# Patient Record
Sex: FEMALE | Race: WHITE | Employment: OTHER | URBAN - METROPOLITAN AREA
[De-identification: names, ages, dates, MRNs, and addresses within clinical notes are randomized per-mention and may not be internally consistent; named-entity substitution may affect disease eponyms.]

---

## 2017-03-17 ENCOUNTER — TELEPHONE (OUTPATIENT)
Dept: PULMONOLOGY | Age: 72
End: 2017-03-17

## 2017-03-19 RX ORDER — PREDNISONE 20 MG/1
40 TABLET ORAL DAILY
Qty: 10 TABLET | Refills: 0 | Status: SHIPPED | OUTPATIENT
Start: 2017-03-19 | End: 2017-03-24

## 2017-03-19 RX ORDER — AMOXICILLIN AND CLAVULANATE POTASSIUM 875; 125 MG/1; MG/1
1 TABLET, FILM COATED ORAL EVERY 12 HOURS
Qty: 10 TABLET | Refills: 0 | Status: SHIPPED | OUTPATIENT
Start: 2017-03-19 | End: 2017-03-24

## 2017-04-07 ENCOUNTER — HOSPITAL ENCOUNTER (OUTPATIENT)
Dept: WOMENS IMAGING | Age: 72
Discharge: HOME | End: 2017-04-07
Attending: FAMILY MEDICINE | Admitting: FAMILY MEDICINE

## 2017-04-07 ENCOUNTER — HOSPITAL ENCOUNTER (OUTPATIENT)
Dept: WOMENS IMAGING | Age: 72
Discharge: HOME | End: 2017-04-08
Admitting: FAMILY MEDICINE

## 2017-04-07 DIAGNOSIS — Z13.820 ENCOUNTER FOR SCREENING FOR OSTEOPOROSIS: ICD-10-CM

## 2017-04-07 DIAGNOSIS — Z12.31 VISIT FOR SCREENING MAMMOGRAM: ICD-10-CM

## 2017-04-07 DIAGNOSIS — Z13.820 SCREENING FOR OSTEOPOROSIS: ICD-10-CM

## 2017-05-15 ENCOUNTER — OFFICE VISIT (OUTPATIENT)
Dept: PULMONOLOGY | Age: 72
End: 2017-05-15

## 2017-05-15 ENCOUNTER — HOSPITAL ENCOUNTER (OUTPATIENT)
Dept: OTHER | Age: 72
Discharge: HOME | End: 2017-05-15
Attending: INTERNAL MEDICINE | Admitting: INTERNAL MEDICINE

## 2017-05-15 VITALS
OXYGEN SATURATION: 93 % | RESPIRATION RATE: 20 BRPM | HEIGHT: 60 IN | WEIGHT: 163.4 LBS | BODY MASS INDEX: 32.08 KG/M2 | DIASTOLIC BLOOD PRESSURE: 70 MMHG | TEMPERATURE: 98.5 F | SYSTOLIC BLOOD PRESSURE: 176 MMHG | HEART RATE: 70 BPM

## 2017-05-15 DIAGNOSIS — R06.02 SOB (SHORTNESS OF BREATH): ICD-10-CM

## 2017-05-15 DIAGNOSIS — J44.9 CHRONIC OBSTRUCTIVE PULMONARY DISEASE, UNSPECIFIED COPD TYPE (HCC): Primary | ICD-10-CM

## 2017-05-15 DIAGNOSIS — R06.02 SHORTNESS OF BREATH: ICD-10-CM

## 2017-05-15 DIAGNOSIS — G47.33 OSA (OBSTRUCTIVE SLEEP APNEA): ICD-10-CM

## 2017-05-15 PROCEDURE — 99214 OFFICE O/P EST MOD 30 MIN: CPT | Performed by: INTERNAL MEDICINE

## 2017-06-14 RX ORDER — LISINOPRIL 40 MG/1
40 TABLET ORAL DAILY
Qty: 90 TABLET | Refills: 0 | Status: SHIPPED | OUTPATIENT
Start: 2017-06-14 | End: 2017-09-14 | Stop reason: SDUPTHER

## 2017-06-16 ENCOUNTER — TELEPHONE (OUTPATIENT)
Dept: PULMONOLOGY | Age: 72
End: 2017-06-16

## 2017-06-16 DIAGNOSIS — J96.01 ACUTE RESPIRATORY FAILURE WITH HYPOXIA (HCC): Primary | ICD-10-CM

## 2017-06-16 DIAGNOSIS — J44.1 COPD EXACERBATION (HCC): ICD-10-CM

## 2017-06-16 RX ORDER — ALBUTEROL SULFATE 90 UG/1
2 AEROSOL, METERED RESPIRATORY (INHALATION) EVERY 6 HOURS PRN
Qty: 1 INHALER | Refills: 3 | Status: SHIPPED | OUTPATIENT
Start: 2017-06-16 | End: 2018-04-25 | Stop reason: SDUPTHER

## 2017-06-20 ENCOUNTER — HOSPITAL ENCOUNTER (OUTPATIENT)
Dept: CARDIAC REHAB | Age: 72
Discharge: HOME | End: 2017-06-30
Attending: INTERNAL MEDICINE | Admitting: INTERNAL MEDICINE

## 2017-06-20 ENCOUNTER — OFFICE VISIT (OUTPATIENT)
Dept: CARDIOLOGY CLINIC | Age: 72
End: 2017-06-20

## 2017-06-20 VITALS
OXYGEN SATURATION: 95 % | SYSTOLIC BLOOD PRESSURE: 128 MMHG | HEIGHT: 60 IN | WEIGHT: 164 LBS | HEART RATE: 68 BPM | BODY MASS INDEX: 32.2 KG/M2 | DIASTOLIC BLOOD PRESSURE: 60 MMHG

## 2017-06-20 DIAGNOSIS — I10 ESSENTIAL HYPERTENSION: ICD-10-CM

## 2017-06-20 DIAGNOSIS — I73.9 PAD (PERIPHERAL ARTERY DISEASE) (HCC): ICD-10-CM

## 2017-06-20 DIAGNOSIS — R09.89 BILATERAL CAROTID BRUITS: ICD-10-CM

## 2017-06-20 DIAGNOSIS — R06.02 SOB (SHORTNESS OF BREATH): Primary | ICD-10-CM

## 2017-06-20 DIAGNOSIS — E03.9 HYPOTHYROIDISM, UNSPECIFIED TYPE: ICD-10-CM

## 2017-06-20 DIAGNOSIS — R53.82 CHRONIC FATIGUE: ICD-10-CM

## 2017-06-20 DIAGNOSIS — J44.9 CHRONIC OBSTRUCTIVE PULMONARY DISEASE, UNSPECIFIED COPD TYPE (HCC): ICD-10-CM

## 2017-06-20 PROCEDURE — 99214 OFFICE O/P EST MOD 30 MIN: CPT | Performed by: NURSE PRACTITIONER

## 2017-06-20 RX ORDER — TORSEMIDE 20 MG/1
20 TABLET ORAL DAILY
Qty: 30 TABLET | Refills: 3 | Status: SHIPPED | OUTPATIENT
Start: 2017-06-20 | End: 2017-09-28 | Stop reason: SDUPTHER

## 2017-06-20 RX ORDER — FUROSEMIDE 40 MG/1
40 TABLET ORAL 2 TIMES DAILY
COMMUNITY
End: 2017-06-20 | Stop reason: ALTCHOICE

## 2017-07-19 ENCOUNTER — HOSPITAL ENCOUNTER (OUTPATIENT)
Dept: NON INVASIVE DIAGNOSTICS | Age: 72
Discharge: HOME | End: 2017-07-19
Attending: NURSE PRACTITIONER | Admitting: NURSE PRACTITIONER

## 2017-07-19 LAB
LV EF: 58 %
LVEF MODALITY: NORMAL

## 2017-08-22 ENCOUNTER — OFFICE VISIT (OUTPATIENT)
Dept: CARDIOLOGY CLINIC | Age: 72
End: 2017-08-22

## 2017-08-22 VITALS
DIASTOLIC BLOOD PRESSURE: 78 MMHG | BODY MASS INDEX: 32.59 KG/M2 | WEIGHT: 166 LBS | OXYGEN SATURATION: 94 % | HEIGHT: 60 IN | SYSTOLIC BLOOD PRESSURE: 128 MMHG | HEART RATE: 66 BPM

## 2017-08-22 DIAGNOSIS — I77.9 CAROTID DISEASE, BILATERAL (HCC): ICD-10-CM

## 2017-08-22 DIAGNOSIS — I25.119 CORONARY ARTERY DISEASE INVOLVING NATIVE CORONARY ARTERY OF NATIVE HEART WITH ANGINA PECTORIS (HCC): Primary | ICD-10-CM

## 2017-08-22 DIAGNOSIS — J44.9 CHRONIC OBSTRUCTIVE PULMONARY DISEASE, UNSPECIFIED COPD TYPE (HCC): ICD-10-CM

## 2017-08-22 DIAGNOSIS — I73.9 PAD (PERIPHERAL ARTERY DISEASE) (HCC): ICD-10-CM

## 2017-08-22 DIAGNOSIS — R07.89 CHEST DISCOMFORT: ICD-10-CM

## 2017-08-22 DIAGNOSIS — I73.9 CLAUDICATION (HCC): ICD-10-CM

## 2017-08-22 DIAGNOSIS — R06.09 DOE (DYSPNEA ON EXERTION): ICD-10-CM

## 2017-08-22 PROCEDURE — 99214 OFFICE O/P EST MOD 30 MIN: CPT | Performed by: INTERNAL MEDICINE

## 2017-08-28 ENCOUNTER — HOSPITAL ENCOUNTER (OUTPATIENT)
Dept: NON INVASIVE DIAGNOSTICS | Age: 72
Discharge: HOME | End: 2017-08-28
Attending: INTERNAL MEDICINE | Admitting: INTERNAL MEDICINE

## 2017-08-28 DIAGNOSIS — I25.119 ATHEROSCLEROTIC HEART DISEASE OF NATIVE CORONARY ARTERY WITH ANGINA PECTORIS (HCC): ICD-10-CM

## 2017-08-28 LAB
LV EF: 75 %
LVEF MODALITY: NORMAL

## 2017-09-01 ENCOUNTER — TELEPHONE (OUTPATIENT)
Dept: CARDIOLOGY CLINIC | Age: 72
End: 2017-09-01

## 2017-09-05 DIAGNOSIS — I25.119 CORONARY ARTERY DISEASE INVOLVING NATIVE CORONARY ARTERY OF NATIVE HEART WITH ANGINA PECTORIS (HCC): ICD-10-CM

## 2017-09-05 DIAGNOSIS — R94.39 ABNORMAL STRESS TEST: Primary | ICD-10-CM

## 2017-09-05 RX ORDER — PREDNISONE 20 MG/1
40 TABLET ORAL EVERY 8 HOURS
Qty: 6 TABLET | Refills: 1 | Status: SHIPPED | OUTPATIENT
Start: 2017-09-05 | End: 2017-09-06

## 2017-09-05 RX ORDER — DIPHENHYDRAMINE HCL 25 MG
25 CAPSULE ORAL EVERY 8 HOURS
Qty: 3 CAPSULE | Refills: 1 | Status: SHIPPED | OUTPATIENT
Start: 2017-09-05 | End: 2017-09-06

## 2017-09-05 RX ORDER — FAMOTIDINE 20 MG/1
20 TABLET, FILM COATED ORAL EVERY 8 HOURS
Qty: 3 TABLET | Refills: 1 | Status: SHIPPED | OUTPATIENT
Start: 2017-09-05 | End: 2017-09-28

## 2017-09-07 ENCOUNTER — TELEPHONE (OUTPATIENT)
Dept: CARDIOLOGY CLINIC | Age: 72
End: 2017-09-07

## 2017-09-08 ENCOUNTER — HOSPITAL ENCOUNTER (OUTPATIENT)
Dept: OTHER | Age: 72
Discharge: HOME | End: 2017-09-08
Attending: INTERNAL MEDICINE | Admitting: INTERNAL MEDICINE

## 2017-09-08 DIAGNOSIS — R94.39 ABNORMAL STRESS TEST: ICD-10-CM

## 2017-09-08 DIAGNOSIS — I25.119 CORONARY ARTERY DISEASE INVOLVING NATIVE CORONARY ARTERY OF NATIVE HEART WITH ANGINA PECTORIS (HCC): ICD-10-CM

## 2017-09-08 LAB
ANION GAP SERPL CALCULATED.3IONS-SCNC: 11 MMOL/L (ref 3–16)
BUN BLDV-MCNC: 38 MG/DL (ref 7–20)
CALCIUM SERPL-MCNC: 9.6 MG/DL (ref 8.3–10.6)
CHLORIDE BLD-SCNC: 98 MMOL/L (ref 99–110)
CO2: 31 MMOL/L (ref 21–32)
CREAT SERPL-MCNC: 1.3 MG/DL (ref 0.6–1.2)
GFR AFRICAN AMERICAN: 49
GFR NON-AFRICAN AMERICAN: 40
GLUCOSE BLD-MCNC: 99 MG/DL (ref 70–99)
HCT VFR BLD CALC: 40.7 % (ref 36–48)
HEMOGLOBIN: 13.3 G/DL (ref 12–16)
INR BLD: 0.99 (ref 0.85–1.15)
MCH RBC QN AUTO: 28.7 PG (ref 26–34)
MCHC RBC AUTO-ENTMCNC: 32.8 G/DL (ref 31–36)
MCV RBC AUTO: 87.5 FL (ref 80–100)
PDW BLD-RTO: 15.2 % (ref 12.4–15.4)
PLATELET # BLD: 264 K/UL (ref 135–450)
PMV BLD AUTO: 9.2 FL (ref 5–10.5)
POTASSIUM SERPL-SCNC: 5.1 MMOL/L (ref 3.5–5.1)
PROTHROMBIN TIME: 11.2 SEC (ref 9.6–13)
RBC # BLD: 4.65 M/UL (ref 4–5.2)
SODIUM BLD-SCNC: 140 MMOL/L (ref 136–145)
WBC # BLD: 8.6 K/UL (ref 4–11)

## 2017-09-08 RX ORDER — DIPHENHYDRAMINE HCL 25 MG
25 CAPSULE ORAL EVERY 8 HOURS
Qty: 3 CAPSULE | Refills: 0 | Status: SHIPPED | OUTPATIENT
Start: 2017-09-08 | End: 2017-09-09

## 2017-09-08 RX ORDER — PREDNISONE 20 MG/1
40 TABLET ORAL DAILY
Qty: 6 TABLET | Refills: 0 | Status: SHIPPED | OUTPATIENT
Start: 2017-09-08 | End: 2017-09-11

## 2017-09-11 ENCOUNTER — TELEPHONE (OUTPATIENT)
Dept: CARDIOLOGY CLINIC | Age: 72
End: 2017-09-11

## 2017-09-12 ENCOUNTER — TELEPHONE (OUTPATIENT)
Dept: CARDIOLOGY CLINIC | Age: 72
End: 2017-09-12

## 2017-09-13 ENCOUNTER — HOSPITAL ENCOUNTER (OUTPATIENT)
Dept: CARDIAC CATH/INVASIVE PROCEDURES | Age: 72
Discharge: HOME | End: 2017-09-13
Attending: INTERNAL MEDICINE | Admitting: INTERNAL MEDICINE

## 2017-09-13 VITALS — BODY MASS INDEX: 33.18 KG/M2 | WEIGHT: 169 LBS | HEIGHT: 60 IN

## 2017-09-13 DIAGNOSIS — R94.39 ABNORMAL CARDIOVASCULAR STRESS TEST: ICD-10-CM

## 2017-09-13 DIAGNOSIS — R07.2 PRECORDIAL PAIN: ICD-10-CM

## 2017-09-13 LAB
CREAT SERPL-MCNC: 1.4 MG/DL (ref 0.6–1.2)
GFR AFRICAN AMERICAN: 45
GFR NON-AFRICAN AMERICAN: 37

## 2017-09-13 PROCEDURE — 93458 L HRT ARTERY/VENTRICLE ANGIO: CPT | Performed by: INTERNAL MEDICINE

## 2017-09-13 PROCEDURE — 99152 MOD SED SAME PHYS/QHP 5/>YRS: CPT | Performed by: INTERNAL MEDICINE

## 2017-09-13 RX ORDER — ASPIRIN 325 MG
325 TABLET ORAL ONCE
Status: DISCONTINUED | OUTPATIENT
Start: 2017-09-13 | End: 2017-09-14 | Stop reason: HOSPADM

## 2017-09-13 RX ORDER — SODIUM CHLORIDE 9 MG/ML
INJECTION, SOLUTION INTRAVENOUS CONTINUOUS
Status: DISCONTINUED | OUTPATIENT
Start: 2017-09-13 | End: 2017-09-14 | Stop reason: HOSPADM

## 2017-09-13 RX ORDER — ACETAMINOPHEN 325 MG/1
650 TABLET ORAL EVERY 4 HOURS PRN
Status: DISCONTINUED | OUTPATIENT
Start: 2017-09-13 | End: 2017-09-14 | Stop reason: HOSPADM

## 2017-09-13 RX ORDER — SODIUM CHLORIDE 0.9 % (FLUSH) 0.9 %
10 SYRINGE (ML) INJECTION EVERY 12 HOURS SCHEDULED
Status: CANCELLED | OUTPATIENT
Start: 2017-09-13

## 2017-09-13 RX ORDER — SODIUM CHLORIDE 0.9 % (FLUSH) 0.9 %
10 SYRINGE (ML) INJECTION EVERY 12 HOURS SCHEDULED
Status: DISCONTINUED | OUTPATIENT
Start: 2017-09-13 | End: 2017-09-14 | Stop reason: HOSPADM

## 2017-09-13 RX ORDER — SODIUM CHLORIDE 0.9 % (FLUSH) 0.9 %
10 SYRINGE (ML) INJECTION PRN
Status: CANCELLED | OUTPATIENT
Start: 2017-09-13

## 2017-09-13 RX ORDER — SODIUM CHLORIDE 0.9 % (FLUSH) 0.9 %
10 SYRINGE (ML) INJECTION PRN
Status: DISCONTINUED | OUTPATIENT
Start: 2017-09-13 | End: 2017-09-14 | Stop reason: HOSPADM

## 2017-09-13 RX ORDER — ONDANSETRON 2 MG/ML
4 INJECTION INTRAMUSCULAR; INTRAVENOUS EVERY 6 HOURS PRN
Status: DISCONTINUED | OUTPATIENT
Start: 2017-09-13 | End: 2017-09-14 | Stop reason: HOSPADM

## 2017-09-15 RX ORDER — LISINOPRIL 40 MG/1
40 TABLET ORAL DAILY
Qty: 30 TABLET | Refills: 3 | Status: SHIPPED | OUTPATIENT
Start: 2017-09-15 | End: 2017-09-28 | Stop reason: SDUPTHER

## 2017-09-15 RX ORDER — CARVEDILOL 25 MG/1
25 TABLET ORAL 2 TIMES DAILY WITH MEALS
Qty: 60 TABLET | Refills: 3 | Status: SHIPPED | OUTPATIENT
Start: 2017-09-15 | End: 2017-09-28 | Stop reason: SDUPTHER

## 2017-09-15 RX ORDER — CLOPIDOGREL BISULFATE 75 MG/1
75 TABLET ORAL DAILY
Qty: 30 TABLET | Refills: 3 | Status: SHIPPED | OUTPATIENT
Start: 2017-09-15 | End: 2018-01-24 | Stop reason: SDUPTHER

## 2017-09-28 ENCOUNTER — OFFICE VISIT (OUTPATIENT)
Dept: CARDIOLOGY CLINIC | Age: 72
End: 2017-09-28

## 2017-09-28 VITALS
WEIGHT: 167 LBS | HEIGHT: 60 IN | OXYGEN SATURATION: 96 % | DIASTOLIC BLOOD PRESSURE: 68 MMHG | BODY MASS INDEX: 32.79 KG/M2 | SYSTOLIC BLOOD PRESSURE: 122 MMHG | HEART RATE: 62 BPM

## 2017-09-28 DIAGNOSIS — I25.10 CORONARY ARTERY DISEASE INVOLVING NATIVE CORONARY ARTERY OF NATIVE HEART WITHOUT ANGINA PECTORIS: ICD-10-CM

## 2017-09-28 DIAGNOSIS — I10 ESSENTIAL HYPERTENSION: ICD-10-CM

## 2017-09-28 DIAGNOSIS — E03.9 HYPOTHYROIDISM, UNSPECIFIED TYPE: ICD-10-CM

## 2017-09-28 DIAGNOSIS — I73.9 PAD (PERIPHERAL ARTERY DISEASE) (HCC): Primary | ICD-10-CM

## 2017-09-28 DIAGNOSIS — I77.9 CAROTID DISEASE, BILATERAL (HCC): ICD-10-CM

## 2017-09-28 DIAGNOSIS — R06.02 SOB (SHORTNESS OF BREATH): ICD-10-CM

## 2017-09-28 DIAGNOSIS — K21.9 GASTROESOPHAGEAL REFLUX DISEASE, ESOPHAGITIS PRESENCE NOT SPECIFIED: ICD-10-CM

## 2017-09-28 PROCEDURE — 99214 OFFICE O/P EST MOD 30 MIN: CPT | Performed by: NURSE PRACTITIONER

## 2017-09-28 RX ORDER — CARVEDILOL 25 MG/1
25 TABLET ORAL 2 TIMES DAILY WITH MEALS
Qty: 60 TABLET | Refills: 3 | Status: SHIPPED | OUTPATIENT
Start: 2017-09-28 | End: 2018-05-26 | Stop reason: SDUPTHER

## 2017-09-28 RX ORDER — ATORVASTATIN CALCIUM 20 MG/1
20 TABLET, FILM COATED ORAL DAILY
Qty: 30 TABLET | Refills: 2 | Status: SHIPPED | OUTPATIENT
Start: 2017-09-28 | End: 2018-01-12 | Stop reason: SDUPTHER

## 2017-09-28 RX ORDER — CLONIDINE HYDROCHLORIDE 0.1 MG/1
0.1 TABLET ORAL 2 TIMES DAILY
Qty: 60 TABLET | Refills: 2 | Status: SHIPPED | OUTPATIENT
Start: 2017-09-28 | End: 2018-01-08 | Stop reason: SDUPTHER

## 2017-09-28 RX ORDER — TORSEMIDE 20 MG/1
20 TABLET ORAL DAILY
Qty: 30 TABLET | Refills: 3 | Status: SHIPPED | OUTPATIENT
Start: 2017-09-28 | End: 2018-03-25 | Stop reason: SDUPTHER

## 2017-09-28 RX ORDER — LANSOPRAZOLE 30 MG/1
30 CAPSULE, DELAYED RELEASE ORAL DAILY
Qty: 30 CAPSULE | Refills: 0 | Status: SHIPPED | OUTPATIENT
Start: 2017-09-28 | End: 2017-10-10 | Stop reason: SDUPTHER

## 2017-09-28 RX ORDER — LISINOPRIL 40 MG/1
40 TABLET ORAL DAILY
Qty: 30 TABLET | Refills: 3 | Status: ON HOLD | OUTPATIENT
Start: 2017-09-28 | End: 2018-06-14 | Stop reason: HOSPADM

## 2017-09-28 RX ORDER — AMLODIPINE BESYLATE 5 MG/1
5 TABLET ORAL DAILY
Qty: 30 TABLET | Refills: 3 | Status: SHIPPED | OUTPATIENT
Start: 2017-09-28 | End: 2018-04-06 | Stop reason: SDUPTHER

## 2017-10-10 ENCOUNTER — OFFICE VISIT (OUTPATIENT)
Dept: FAMILY MEDICINE CLINIC | Age: 72
End: 2017-10-10

## 2017-10-10 VITALS
WEIGHT: 168.6 LBS | HEART RATE: 66 BPM | OXYGEN SATURATION: 93 % | SYSTOLIC BLOOD PRESSURE: 138 MMHG | DIASTOLIC BLOOD PRESSURE: 82 MMHG | HEIGHT: 61 IN | TEMPERATURE: 98.6 F | RESPIRATION RATE: 20 BRPM | BODY MASS INDEX: 31.83 KG/M2

## 2017-10-10 DIAGNOSIS — N18.3 CKD (CHRONIC KIDNEY DISEASE), STAGE 3 (MODERATE): ICD-10-CM

## 2017-10-10 DIAGNOSIS — K21.9 GASTROESOPHAGEAL REFLUX DISEASE, ESOPHAGITIS PRESENCE NOT SPECIFIED: ICD-10-CM

## 2017-10-10 DIAGNOSIS — F41.9 ANXIETY AND DEPRESSION: ICD-10-CM

## 2017-10-10 DIAGNOSIS — N32.1 COLOVESICAL FISTULA: Primary | ICD-10-CM

## 2017-10-10 DIAGNOSIS — I10 ESSENTIAL HYPERTENSION: ICD-10-CM

## 2017-10-10 DIAGNOSIS — F32.A ANXIETY AND DEPRESSION: ICD-10-CM

## 2017-10-10 DIAGNOSIS — E03.9 HYPOTHYROIDISM, UNSPECIFIED TYPE: ICD-10-CM

## 2017-10-10 PROBLEM — R07.2 PRECORDIAL PAIN: Status: RESOLVED | Noted: 2017-09-13 | Resolved: 2017-10-10

## 2017-10-10 LAB
BILIRUBIN, POC: ABNORMAL
BLOOD URINE, POC: ABNORMAL
CLARITY, POC: CLEAR
COLOR, POC: YELLOW
GLUCOSE URINE, POC: ABNORMAL
KETONES, POC: ABNORMAL
LEUKOCYTE EST, POC: ABNORMAL
NITRITE, POC: ABNORMAL
PH, POC: 6
PROTEIN, POC: ABNORMAL
SPECIFIC GRAVITY, POC: 1.02
UROBILINOGEN, POC: ABNORMAL

## 2017-10-10 PROCEDURE — 99204 OFFICE O/P NEW MOD 45 MIN: CPT | Performed by: FAMILY MEDICINE

## 2017-10-10 PROCEDURE — 81002 URINALYSIS NONAUTO W/O SCOPE: CPT | Performed by: FAMILY MEDICINE

## 2017-10-10 RX ORDER — LEVOTHYROXINE SODIUM 0.05 MG/1
50 TABLET ORAL DAILY
Qty: 30 TABLET | Refills: 2 | Status: SHIPPED | OUTPATIENT
Start: 2017-10-10 | End: 2018-01-17 | Stop reason: SDUPTHER

## 2017-10-10 RX ORDER — IBUPROFEN 800 MG/1
800 TABLET ORAL EVERY 8 HOURS PRN
Status: ON HOLD | COMMUNITY
End: 2018-03-14 | Stop reason: HOSPADM

## 2017-10-10 RX ORDER — CITALOPRAM 20 MG/1
TABLET ORAL
Qty: 60 TABLET | Refills: 1 | Status: SHIPPED | OUTPATIENT
Start: 2017-10-10 | End: 2017-12-12 | Stop reason: SDUPTHER

## 2017-10-10 RX ORDER — LANSOPRAZOLE 15 MG/1
15 CAPSULE, DELAYED RELEASE ORAL DAILY
Qty: 90 CAPSULE | Refills: 1 | Status: SHIPPED | OUTPATIENT
Start: 2017-10-10 | End: 2018-07-03 | Stop reason: SDUPTHER

## 2017-10-10 ASSESSMENT — ENCOUNTER SYMPTOMS
CONSTIPATION: 0
COUGH: 0
SHORTNESS OF BREATH: 0
DIARRHEA: 0

## 2017-10-10 NOTE — PROGRESS NOTES
W Day, MD   carvedilol (COREG) 25 MG tablet Take 1 tablet by mouth 2 times daily (with meals) Yes Diane M Enzweiler, CNP   lisinopril (PRINIVIL;ZESTRIL) 40 MG tablet Take 1 tablet by mouth daily Yes Amanda Bond CNP   torsemide (DEMADEX) 20 MG tablet Take 1 tablet by mouth daily Yes Eloina M Enzweiler, CNP   amLODIPine (NORVASC) 5 MG tablet Take 1 tablet by mouth daily Yes Eloina Georgene Monday, CNP   cloNIDine (CATAPRES) 0.1 MG tablet Take 1 tablet by mouth 2 times daily Yes Eloina M Enzweiler, CNP   atorvastatin (LIPITOR) 20 MG tablet Take 1 tablet by mouth daily Yes Eloina Carrascone Monday, CNP   clopidogrel (PLAVIX) 75 MG tablet Take 1 tablet by mouth daily Yes Hermelinda Beck MD   ipratropium-albuterol (DUONEB) 0.5-2.5 (3) MG/3ML SOLN nebulizer solution Take 3 mLs by nebulization 2 times daily Yes Mono Damon MD   albuterol sulfate HFA (VENTOLIN HFA) 108 (90 Base) MCG/ACT inhaler Inhale 2 puffs into the lungs every 6 hours as needed for Wheezing or Shortness of Breath Yes Mono Damon MD   budesonide (PULMICORT) 0.5 MG/2ML nebulizer suspension TAKE ONE VIAL VIA NEBULIZER BY MOUTH TWICE A DAY Yes Mono Damon MD   aspirin 81 MG tablet Take 1 tablet by mouth daily Yes Hermelinda Beck MD   BROVANA 15 MCG/2ML NEBU INHALE TWO MILLILITERS VIA NEBULIZER TWICE A DAY Yes Mono Damon MD   ipratropium-albuterol (DUONEB) 0.5-2.5 (3) MG/3ML SOLN nebulizer solution Inhale 3 mLs into the lungs every 6 hours. Yes Myla Paniagua CNP   montelukast (SINGULAIR) 10 MG tablet Take 1 tablet by mouth daily.  Yes Myla Paniagua CNP   potassium chloride (MICRO-K) 10 MEQ CR capsule Take 1 capsule by mouth See Carmelina Arroyo MD     Past Medical History:   Diagnosis Date    [facility]     [facility]     Hyperlipidemia     Hypertension     Kidney disease     Stage 3    Peripheral vascular disease [facility] Results   Component Value Date    GLUCOSE 99 09/08/2017     Lab Results   Component Value Date     09/08/2017    K 5.1 09/08/2017    CREATININE 1.4 (H) 09/13/2017     No results found for: CHOL, LDLCALCNo results found for: HDLNo results found for: TRIG  Lab Results   Component Value Date    ALT 11 06/10/2017    AST 16 06/10/2017    ALKPHOS 50 06/10/2017    BILITOT <0.2 06/10/2017      Lab Results   Component Value Date    WBC 8.6 09/08/2017    HGB 13.3 09/08/2017    HCT 40.7 09/08/2017    MCV 87.5 09/08/2017     09/08/2017        PHYSICAL EXAM  /82 (Site: Right Arm, Position: Sitting, Cuff Size: Small Adult)   Pulse 66   Temp 98.6 °F (37 °C) (Oral)   Resp 20   Ht 5' 0.5\" (1.537 m)   Wt 168 lb 9.6 oz (76.5 kg)   LMP 05/01/2006 (Approximate)   SpO2 93% Comment: not on O2 currently  BMI 32.39 kg/m²     BP Readings from Last 3 Encounters:   10/10/17 138/82   09/28/17 122/68   08/22/17 128/78       Wt Readings from Last 3 Encounters:   10/10/17 168 lb 9.6 oz (76.5 kg)   09/28/17 167 lb (75.8 kg)   09/13/17 169 lb (76.7 kg)        Physical Exam   Constitutional: She is oriented to person, place, and time. She appears well-developed and well-nourished. HENT:   Head: Normocephalic and atraumatic. Eyes: Conjunctivae and EOM are normal.   Neck: Normal range of motion. Neck supple. Cardiovascular: Normal rate, regular rhythm and normal heart sounds. II/VI systolic murmur   Pulmonary/Chest: Effort normal and breath sounds normal. She has no wheezes. Abdominal: Soft. Bowel sounds are normal. She exhibits no mass. There is no tenderness. Musculoskeletal: Normal range of motion. Lymphadenopathy:     She has no cervical adenopathy. Neurological: She is alert and oriented to person, place, and time. She displays normal reflexes. Skin: Skin is warm and dry. No rash noted. Normal turgor   Psychiatric: She has a normal mood and affect. Thought content normal.       ASSESSMENT/PLAN:  1.

## 2017-10-11 LAB — URINE CULTURE, ROUTINE: NORMAL

## 2017-11-09 RX ORDER — MONTELUKAST SODIUM 10 MG/1
10 TABLET ORAL DAILY
Qty: 30 TABLET | Refills: 3 | OUTPATIENT
Start: 2017-11-09

## 2017-12-12 RX ORDER — CITALOPRAM 20 MG/1
20 TABLET ORAL DAILY
Qty: 60 TABLET | Refills: 1 | Status: SHIPPED | OUTPATIENT
Start: 2017-12-12 | End: 2018-02-15 | Stop reason: SDUPTHER

## 2017-12-14 ENCOUNTER — TELEPHONE (OUTPATIENT)
Dept: PULMONOLOGY | Age: 72
End: 2017-12-14

## 2017-12-19 ENCOUNTER — TELEPHONE (OUTPATIENT)
Dept: PULMONOLOGY | Age: 72
End: 2017-12-19

## 2017-12-19 RX ORDER — LEVOFLOXACIN 500 MG/1
500 TABLET, FILM COATED ORAL DAILY
Qty: 7 TABLET | Refills: 0 | Status: SHIPPED | OUTPATIENT
Start: 2017-12-19 | End: 2017-12-26

## 2017-12-19 RX ORDER — PREDNISONE 20 MG/1
40 TABLET ORAL DAILY
Qty: 10 TABLET | Refills: 0 | Status: SHIPPED | OUTPATIENT
Start: 2017-12-19 | End: 2017-12-24

## 2017-12-19 NOTE — TELEPHONE ENCOUNTER
Pt called c/o sore throat, chest pain hurts to breathe, productive yellow cough, nasal drainage dont know color, no fevers but chills comes and goes, and wheezing slightly, also gets sob when walks, would like abx called in before it gets bad.

## 2018-01-08 DIAGNOSIS — I10 ESSENTIAL HYPERTENSION: ICD-10-CM

## 2018-01-08 RX ORDER — CLONIDINE HYDROCHLORIDE 0.1 MG/1
TABLET ORAL
Qty: 60 TABLET | Refills: 3 | Status: SHIPPED | OUTPATIENT
Start: 2018-01-08 | End: 2018-04-19 | Stop reason: SDUPTHER

## 2018-01-09 DIAGNOSIS — I10 ESSENTIAL HYPERTENSION: ICD-10-CM

## 2018-01-09 DIAGNOSIS — E03.9 HYPOTHYROIDISM, UNSPECIFIED TYPE: ICD-10-CM

## 2018-01-09 DIAGNOSIS — I73.9 PAD (PERIPHERAL ARTERY DISEASE) (HCC): ICD-10-CM

## 2018-01-09 DIAGNOSIS — I25.10 CORONARY ARTERY DISEASE INVOLVING NATIVE CORONARY ARTERY OF NATIVE HEART WITHOUT ANGINA PECTORIS: ICD-10-CM

## 2018-01-09 DIAGNOSIS — I77.9 CAROTID DISEASE, BILATERAL (HCC): ICD-10-CM

## 2018-01-09 LAB
A/G RATIO: 1.3 (ref 1.1–2.2)
ALBUMIN SERPL-MCNC: 3.9 G/DL (ref 3.4–5)
ALP BLD-CCNC: 51 U/L (ref 40–129)
ALT SERPL-CCNC: 10 U/L (ref 10–40)
ANION GAP SERPL CALCULATED.3IONS-SCNC: 13 MMOL/L (ref 3–16)
AST SERPL-CCNC: 11 U/L (ref 15–37)
BASOPHILS ABSOLUTE: 0.1 K/UL (ref 0–0.2)
BASOPHILS RELATIVE PERCENT: 0.6 %
BILIRUB SERPL-MCNC: <0.2 MG/DL (ref 0–1)
BUN BLDV-MCNC: 41 MG/DL (ref 7–20)
CALCIUM SERPL-MCNC: 9.6 MG/DL (ref 8.3–10.6)
CHLORIDE BLD-SCNC: 100 MMOL/L (ref 99–110)
CHOLESTEROL, TOTAL: 198 MG/DL (ref 0–199)
CO2: 32 MMOL/L (ref 21–32)
CREAT SERPL-MCNC: 1.8 MG/DL (ref 0.6–1.2)
EOSINOPHILS ABSOLUTE: 0.2 K/UL (ref 0–0.6)
EOSINOPHILS RELATIVE PERCENT: 2 %
GFR AFRICAN AMERICAN: 34
GFR NON-AFRICAN AMERICAN: 28
GLOBULIN: 3.1 G/DL
GLUCOSE BLD-MCNC: 107 MG/DL (ref 70–99)
HCT VFR BLD CALC: 39.6 % (ref 36–48)
HDLC SERPL-MCNC: 43 MG/DL (ref 40–60)
HEMOGLOBIN: 12.7 G/DL (ref 12–16)
LDL CHOLESTEROL CALCULATED: ABNORMAL MG/DL
LDL CHOLESTEROL DIRECT: 120 MG/DL
LYMPHOCYTES ABSOLUTE: 1.9 K/UL (ref 1–5.1)
LYMPHOCYTES RELATIVE PERCENT: 21.5 %
MCH RBC QN AUTO: 28.2 PG (ref 26–34)
MCHC RBC AUTO-ENTMCNC: 32.1 G/DL (ref 31–36)
MCV RBC AUTO: 87.8 FL (ref 80–100)
MONOCYTES ABSOLUTE: 0.6 K/UL (ref 0–1.3)
MONOCYTES RELATIVE PERCENT: 6.9 %
NEUTROPHILS ABSOLUTE: 6.1 K/UL (ref 1.7–7.7)
NEUTROPHILS RELATIVE PERCENT: 69 %
PDW BLD-RTO: 15 % (ref 12.4–15.4)
PLATELET # BLD: 266 K/UL (ref 135–450)
PMV BLD AUTO: 9.4 FL (ref 5–10.5)
POTASSIUM SERPL-SCNC: 4.6 MMOL/L (ref 3.5–5.1)
RBC # BLD: 4.51 M/UL (ref 4–5.2)
SODIUM BLD-SCNC: 145 MMOL/L (ref 136–145)
TOTAL PROTEIN: 7 G/DL (ref 6.4–8.2)
TRIGL SERPL-MCNC: 354 MG/DL (ref 0–150)
TSH SERPL DL<=0.05 MIU/L-ACNC: 1.38 UIU/ML (ref 0.27–4.2)
TSH SERPL DL<=0.05 MIU/L-ACNC: 1.39 UIU/ML (ref 0.27–4.2)
VLDLC SERPL CALC-MCNC: ABNORMAL MG/DL
WBC # BLD: 8.8 K/UL (ref 4–11)

## 2018-01-10 ENCOUNTER — TELEPHONE (OUTPATIENT)
Dept: PULMONOLOGY | Age: 73
End: 2018-01-10

## 2018-01-10 DIAGNOSIS — J44.9 COPD, VERY SEVERE (HCC): Primary | ICD-10-CM

## 2018-01-10 LAB
ESTIMATED AVERAGE GLUCOSE: 134.1 MG/DL
HBA1C MFR BLD: 6.3 %

## 2018-01-10 NOTE — TELEPHONE ENCOUNTER
Isadore Sever called to change her appt with Dr Shlomo Castillo.   She also wants a Rx sent to [facility] for a POC

## 2018-01-11 ENCOUNTER — TELEPHONE (OUTPATIENT)
Dept: CARDIOLOGY CLINIC | Age: 73
End: 2018-01-11

## 2018-01-11 DIAGNOSIS — I25.119 CORONARY ARTERY DISEASE INVOLVING NATIVE CORONARY ARTERY OF NATIVE HEART WITH ANGINA PECTORIS (HCC): Primary | ICD-10-CM

## 2018-01-11 DIAGNOSIS — I10 ESSENTIAL HYPERTENSION: ICD-10-CM

## 2018-01-11 DIAGNOSIS — E78.1 HIGH TRIGLYCERIDES: ICD-10-CM

## 2018-01-12 DIAGNOSIS — I25.10 CORONARY ARTERY DISEASE INVOLVING NATIVE CORONARY ARTERY OF NATIVE HEART WITHOUT ANGINA PECTORIS: ICD-10-CM

## 2018-01-12 DIAGNOSIS — I73.9 PAD (PERIPHERAL ARTERY DISEASE) (HCC): ICD-10-CM

## 2018-01-15 RX ORDER — ATORVASTATIN CALCIUM 40 MG/1
40 TABLET, FILM COATED ORAL DAILY
Qty: 30 TABLET | Refills: 2 | Status: SHIPPED | OUTPATIENT
Start: 2018-01-15 | End: 2018-04-18 | Stop reason: SDUPTHER

## 2018-01-17 ENCOUNTER — OFFICE VISIT (OUTPATIENT)
Dept: PULMONOLOGY | Age: 73
End: 2018-01-17

## 2018-01-17 VITALS
HEIGHT: 60 IN | SYSTOLIC BLOOD PRESSURE: 174 MMHG | DIASTOLIC BLOOD PRESSURE: 68 MMHG | TEMPERATURE: 97.7 F | WEIGHT: 170.6 LBS | HEART RATE: 57 BPM | BODY MASS INDEX: 33.49 KG/M2 | OXYGEN SATURATION: 93 % | RESPIRATION RATE: 16 BRPM

## 2018-01-17 DIAGNOSIS — J44.9 CHRONIC OBSTRUCTIVE PULMONARY DISEASE, UNSPECIFIED COPD TYPE (HCC): Primary | ICD-10-CM

## 2018-01-17 DIAGNOSIS — G47.33 OSA (OBSTRUCTIVE SLEEP APNEA): ICD-10-CM

## 2018-01-17 DIAGNOSIS — Z23 NEED FOR INFLUENZA VACCINATION: ICD-10-CM

## 2018-01-17 DIAGNOSIS — Z23 NEED FOR VACCINATION FOR STREP PNEUMONIAE: ICD-10-CM

## 2018-01-17 DIAGNOSIS — Z01.818 PRE-OP EVALUATION: ICD-10-CM

## 2018-01-17 PROCEDURE — G8484 FLU IMMUNIZE NO ADMIN: HCPCS | Performed by: INTERNAL MEDICINE

## 2018-01-17 PROCEDURE — 90670 PCV13 VACCINE IM: CPT | Performed by: INTERNAL MEDICINE

## 2018-01-17 PROCEDURE — 3014F SCREEN MAMMO DOC REV: CPT | Performed by: INTERNAL MEDICINE

## 2018-01-17 PROCEDURE — 90472 IMMUNIZATION ADMIN EACH ADD: CPT | Performed by: INTERNAL MEDICINE

## 2018-01-17 PROCEDURE — 3023F SPIROM DOC REV: CPT | Performed by: INTERNAL MEDICINE

## 2018-01-17 PROCEDURE — 90471 IMMUNIZATION ADMIN: CPT | Performed by: INTERNAL MEDICINE

## 2018-01-17 PROCEDURE — G8399 PT W/DXA RESULTS DOCUMENT: HCPCS | Performed by: INTERNAL MEDICINE

## 2018-01-17 PROCEDURE — 1090F PRES/ABSN URINE INCON ASSESS: CPT | Performed by: INTERNAL MEDICINE

## 2018-01-17 PROCEDURE — G8598 ASA/ANTIPLAT THER USED: HCPCS | Performed by: INTERNAL MEDICINE

## 2018-01-17 PROCEDURE — 90662 IIV NO PRSV INCREASED AG IM: CPT | Performed by: INTERNAL MEDICINE

## 2018-01-17 PROCEDURE — 1036F TOBACCO NON-USER: CPT | Performed by: INTERNAL MEDICINE

## 2018-01-17 PROCEDURE — G8926 SPIRO NO PERF OR DOC: HCPCS | Performed by: INTERNAL MEDICINE

## 2018-01-17 PROCEDURE — 4040F PNEUMOC VAC/ADMIN/RCVD: CPT | Performed by: INTERNAL MEDICINE

## 2018-01-17 PROCEDURE — 99214 OFFICE O/P EST MOD 30 MIN: CPT | Performed by: INTERNAL MEDICINE

## 2018-01-17 PROCEDURE — 3017F COLORECTAL CA SCREEN DOC REV: CPT | Performed by: INTERNAL MEDICINE

## 2018-01-17 PROCEDURE — G8417 CALC BMI ABV UP PARAM F/U: HCPCS | Performed by: INTERNAL MEDICINE

## 2018-01-17 PROCEDURE — G8427 DOCREV CUR MEDS BY ELIG CLIN: HCPCS | Performed by: INTERNAL MEDICINE

## 2018-01-17 PROCEDURE — 1123F ACP DISCUSS/DSCN MKR DOCD: CPT | Performed by: INTERNAL MEDICINE

## 2018-01-17 RX ORDER — LEVOTHYROXINE SODIUM 0.05 MG/1
TABLET ORAL
Qty: 30 TABLET | Refills: 5 | Status: SHIPPED | OUTPATIENT
Start: 2018-01-17 | End: 2018-06-29 | Stop reason: SDUPTHER

## 2018-01-17 NOTE — PROGRESS NOTES
education: 15     Occupational History    Not on file. Social History Main Topics    Smoking status: Former Smoker     Packs/day: 3.00     Years: 30.00     Quit date: 2/2/1992    Smokeless tobacco: Never Used      Comment: QUIT 21 YRS AGO    Alcohol use No    Drug use: No    Sexual activity: Not Currently     Other Topics Concern    Not on file     Social History Narrative    No narrative on file       Immunization History   Administered Date(s) Administered    Influenza, High Dose 12/16/2015, 01/17/2018    Pneumococcal 13-valent Conjugate ([id number]) 01/17/2018    Pneumococcal Conjugate 7-valent 12/30/2013       ROS:  GENERAL:  No fevers, no chills, +7lb weight gain in 8 months  RESPIRATORY:  + exertional shortness of breath, no wheezing, no cough      PHYSICAL EXAM:  Vitals:    01/17/18 1402 01/17/18 1432   BP: (!) 173/66 (!) 174/68   Site: Left Arm    Position: Sitting    Cuff Size: Medium Adult    Pulse: 57    Resp: 16    Temp: 97.7 °F (36.5 °C)    TempSrc: Oral    SpO2: 93%    Weight: 170 lb 9.6 oz (77.4 kg)    Height: 5' (1.524 m)    on RA    Gen: Well developed; well nourished  Eyes: No scleral icterus. No conjunctival injection. ENT:  Oropharynx clear. External appearance of ears and nose normal.  Neck: Trachea midline. No obvious mass. No visible thyroid enlargement    Respiratory: Clear to auscultation bilaterally, no accessory muscle use  Cardiovascular: Regular rate and rhythm, no appreciable murmurs. No edema. Gastrointestinal: Soft, non-tender. No hernia  Skin: Warm and dry. No rashes or ulcers on visible areas. Normal texture and turgor  Lymphatic: No cervical LAD. No supraclavicular LAD. Musculoskeletal: No cyanosis, clubbing or joint deformity.     Psychiatric: Normal mood and affect; exhibits normal insight and judgement     Pulmonary Function Testing (7/7/15)  FEV1 0.75 L at 36% predicted ---> 0.86 (41%)  FVC 1.54 L at 56% predicted---> 1.76 (64%)  FEV1/FVC ratio at 49%---> 49%  TLC 4.47 L at 97% predicted  VC 1.85L at 72% predicted  ERV 0.29L at 34% predicted  RV/TLC at 59% predicted  DLCO 11.7 at 60% predicted  DLCO/VA 4.26L at 115 % predicted      Overall: Severe lower airflow obstruction with reversal in FVC only; air trapping and mild reduction in diffusion       Six minute walk test:  10/27/16: Walked 303m, 70% predicted, normal; michael saturation 89%     Images and reports of chest imaging were reviewed by me. My interpretation is:  CXR (6/10/17): Clear lung fields  Chest CT (4/8/15): Small mediastinal nodes; RML granuloma; RML and LLL atelectasis      ECHO (5/12/14)  Summary  Left ventricle size is normal.  Normal left ventricular wall thickness. Ejection fraction is visually estimated to be   55 %. There is reversal of E/A inflow velocities across the mitral valve  suggesting impaired left ventricular relaxation. Normal right ventricular size and function. Lab Results   Component Value Date    WBC 8.8 01/09/2018    HGB 12.7 01/09/2018    HCT 39.6 01/09/2018    MCV 87.8 01/09/2018     01/09/2018       No results found for: BNP    Lab Results   Component Value Date    CREATININE 1.8 (H) 01/09/2018    BUN 41 (H) 01/09/2018     01/09/2018    K 4.6 01/09/2018     01/09/2018    CO2 32 01/09/2018         Assessment/Plan:81y.o. year old female presents for follow up. COPD- Has severe lower airflow obstruction on PFTs. She will continue budesonide and formoterol nebulizers twice daily. Continue Duonebs twice daily and as needed. Will obtain repeat PFTs and 6 minute walk test. She has not attended pulmonary rehabilitation. Pre-operative evaluation- She reports that she is being considered for \"bladder surgery,\" but is unsure of what operation is planned or the reason for it. She has severe COPD and so is at a higher risk than average of post-operative respiratory failure which has been discussed with her.  Will try to obtain records from her urologist and

## 2018-01-18 ENCOUNTER — TELEPHONE (OUTPATIENT)
Dept: PULMONOLOGY | Age: 73
End: 2018-01-18

## 2018-01-22 ENCOUNTER — TELEPHONE (OUTPATIENT)
Dept: PULMONOLOGY | Age: 73
End: 2018-01-22

## 2018-01-22 RX ORDER — PREDNISONE 20 MG/1
40 TABLET ORAL DAILY
Qty: 10 TABLET | Refills: 0 | Status: SHIPPED | OUTPATIENT
Start: 2018-01-22 | End: 2018-01-27

## 2018-01-22 RX ORDER — DOXYCYCLINE HYCLATE 100 MG
100 TABLET ORAL 2 TIMES DAILY
Qty: 14 TABLET | Refills: 0 | Status: SHIPPED | OUTPATIENT
Start: 2018-01-22 | End: 2018-01-29

## 2018-01-25 ENCOUNTER — TELEPHONE (OUTPATIENT)
Dept: CARDIOLOGY CLINIC | Age: 73
End: 2018-01-25

## 2018-01-25 DIAGNOSIS — I65.23 BILATERAL CAROTID ARTERY STENOSIS: Primary | ICD-10-CM

## 2018-01-25 DIAGNOSIS — H53.8 BLURRED VISION, BILATERAL: ICD-10-CM

## 2018-01-25 DIAGNOSIS — R29.90 STROKE-LIKE SYMPTOMS: ICD-10-CM

## 2018-01-25 RX ORDER — CLOPIDOGREL BISULFATE 75 MG/1
TABLET ORAL
Qty: 30 TABLET | Refills: 2 | Status: SHIPPED | OUTPATIENT
Start: 2018-01-25 | End: 2018-04-19 | Stop reason: SDUPTHER

## 2018-01-25 NOTE — TELEPHONE ENCOUNTER
Patient woke up 5 days ago and states she could not move anything-she is unsure the duration of the episode, but it seemed like forever. Then the symptoms resolved spontaneously \"like nothing ever happened\". The rest of the day she was fine except for some fatigue and she felt nervous. Two days later, she woke up with the same episode. She denies any numbness or tingling in her hands and feet. She has had a headache on and off for the past few days. She states when she looks in the mirror-her smile is symmetrical, tongue is at midline and no shift to the left or right noted. Patient denies any unilateral arm weakness. Patient called Dr. Abdi Young office and is going to start Viratabs and Prednisone for complaints of congestion, sore throat,  Low grade fever and increased SOB. States she has blurred vision in both eyes-it started about 4-5 days ago and just comes and goes. Last week her BP was as high as 230/89-had her check while she was on the phone-reading 110/46. She does have \"pinched nerve and disc problems\" and has had imaging in the past-unable to find imaging to review. He denies any heavy lifting or abnormal activity prior to either episode. Spoke with Dr. Jag Lopez. Last carotid duplex revealed bilateral internal carotid disease 50-79 %. He would like to order a MRI of the brain WO contrast and CTA of the neck W/WO contrast.  Orders placed. she does have a IV dye allergy so she will need premedication. Patient would like us to schedule the test and then call her with the date and time and then we will proceed with the premedication from there.

## 2018-01-26 ENCOUNTER — HOSPITAL ENCOUNTER (OUTPATIENT)
Dept: MRI IMAGING | Age: 73
Discharge: HOME | End: 2018-01-26
Admitting: INTERNAL MEDICINE

## 2018-01-26 ENCOUNTER — HOSPITAL ENCOUNTER (OUTPATIENT)
Dept: INFUSION THERAPY | Age: 73
Discharge: HOME | End: 2018-01-31
Admitting: INTERNAL MEDICINE

## 2018-01-26 VITALS
RESPIRATION RATE: 17 BRPM | HEART RATE: 65 BPM | TEMPERATURE: 97.8 F | DIASTOLIC BLOOD PRESSURE: 68 MMHG | SYSTOLIC BLOOD PRESSURE: 173 MMHG | OXYGEN SATURATION: 95 % | HEIGHT: 60 IN

## 2018-01-26 DIAGNOSIS — I65.23 OCCLUSION AND STENOSIS OF BILATERAL CAROTID ARTERIES: ICD-10-CM

## 2018-01-26 DIAGNOSIS — I65.23 BILATERAL CAROTID ARTERY STENOSIS: ICD-10-CM

## 2018-01-26 DIAGNOSIS — R29.90 STROKE-LIKE SYMPTOMS: ICD-10-CM

## 2018-01-26 DIAGNOSIS — H53.8 BLURRED VISION, BILATERAL: ICD-10-CM

## 2018-01-26 DIAGNOSIS — I10 ESSENTIAL HYPERTENSION: Primary | ICD-10-CM

## 2018-01-26 LAB
GFR AFRICAN AMERICAN: 30
GFR NON-AFRICAN AMERICAN: 25
PERFORMED ON: ABNORMAL
POC CREATININE: 2 MG/DL (ref 0.6–1.2)
POC SAMPLE TYPE: ABNORMAL

## 2018-01-26 RX ORDER — SODIUM CHLORIDE 9 MG/ML
INJECTION, SOLUTION INTRAVENOUS
Status: COMPLETED
Start: 2018-01-26 | End: 2018-01-26

## 2018-01-26 RX ORDER — SODIUM CHLORIDE 9 MG/ML
INJECTION, SOLUTION INTRAVENOUS CONTINUOUS
Status: DISCONTINUED | OUTPATIENT
Start: 2018-01-26 | End: 2018-01-27 | Stop reason: HOSPADM

## 2018-01-26 RX ORDER — SODIUM CHLORIDE 9 MG/ML
INJECTION, SOLUTION INTRAVENOUS ONCE
Status: DISCONTINUED | OUTPATIENT
Start: 2018-01-26 | End: 2018-02-01 | Stop reason: HOSPADM

## 2018-01-26 RX ORDER — SODIUM CHLORIDE 9 MG/ML
INJECTION, SOLUTION INTRAVENOUS
Qty: 1000 ML | Refills: 0 | Status: ON HOLD | OUTPATIENT
Start: 2018-01-26 | End: 2018-03-14 | Stop reason: HOSPADM

## 2018-01-26 RX ADMIN — SODIUM CHLORIDE 250 ML: 9 INJECTION, SOLUTION INTRAVENOUS at 13:35

## 2018-01-26 RX ADMIN — SODIUM CHLORIDE 250 ML: 9 INJECTION, SOLUTION INTRAVENOUS at 14:45

## 2018-01-26 NOTE — TELEPHONE ENCOUNTER
Billie from the Infusion center called and spoke with ISABELL Perez to verify IV fluid instructions. She explained them to her. ISABELL Perez asked that I call Paulo Salamanca to make sure we are all on the same page. I called Paulo Salamanca and she did verbalized confusion. I explained that William caballerot. called the infusion center and she called me back and walked me through placing the order-word for word.    [facility] and she will speak with Paulo Salamanca.

## 2018-01-26 NOTE — PROGRESS NOTES
[facility]    IV Hydration Visit    NAME:  Cullen Layne  YOB: 1945  MEDICAL RECORD NUMBER:  [id number]  DATE:  1/26/2018    Patient arrived to [facility]   [] per wheelchair   [x] ambulatory     Patient Active Problem List   Diagnosis    Fracture, metacarpal, neck    [facility]peripheral [facility]    CAD (coronary artery disease)    HTN (hypertension)    Diastolic dysfunction    RLS (restless legs syndrome)    COPD, very severe ([facility]    History of tobacco use    Hypoxia    Chronic obstructive pulmonary disease ([facility]    Centrilobular emphysema (HCC)    Abnormal cardiovascular stress test    Colovesical fistula     Dehydration due to:  Nausea: No  Vomiting: No  Diarrhea: No  Other: Yes // Kidney disease stage 3 needing hydration before and after CT scan. Labs drawn: No     LMP 05/01/2006 (Approximate)   Vital signs and infusion rates per protocol and documented of flowsheets. Fluids ordered: saline bolus pre and post CT Scan today. Meds ordered: none. IV access:    [x]  Peripheral   []  Port  []  PICC                   Fluid orders clarified from MD and CT dept. 3 ml/KGs pre procedure x 1 hour and   1 ml/KGs post procedure x 3 hours due to kidney disease. Bilateral carotids scanned today due to incident about 1.5 weeks ago where she was unable to speak to her son or move any extremties    Just happened for a few minutes and then went away. Didn't call doc at the time that it happened. No further problems since then. Denies dizziness while here, but does have this at home occ. Went over her AVS and pt took it home along with her recent labwork. She is very interested in her past / current medical history. Reminded to call MD Monday or Tuesday for CT results and make a follow-up appt.  With MD.    Also reminded of her January 29th appt here at 0930 for a Pulmonary test.    Family  to pick her up soon,

## 2018-02-01 ENCOUNTER — HOSPITAL ENCOUNTER (OUTPATIENT)
Dept: INFUSION THERAPY | Age: 73
Discharge: HOME | End: 2018-02-28
Attending: INTERNAL MEDICINE | Admitting: INTERNAL MEDICINE

## 2018-02-13 ENCOUNTER — HOSPITAL ENCOUNTER (OUTPATIENT)
Dept: OTHER | Age: 73
Discharge: HOME | End: 2018-02-13
Attending: INTERNAL MEDICINE | Admitting: INTERNAL MEDICINE

## 2018-02-13 PROCEDURE — 94618 PULMONARY STRESS TESTING: CPT | Performed by: INTERNAL MEDICINE

## 2018-02-15 ENCOUNTER — TELEPHONE (OUTPATIENT)
Dept: FAMILY MEDICINE CLINIC | Age: 73
End: 2018-02-15

## 2018-02-15 ENCOUNTER — OFFICE VISIT (OUTPATIENT)
Dept: FAMILY MEDICINE CLINIC | Age: 73
End: 2018-02-15

## 2018-02-15 VITALS
TEMPERATURE: 98.6 F | DIASTOLIC BLOOD PRESSURE: 82 MMHG | SYSTOLIC BLOOD PRESSURE: 136 MMHG | HEART RATE: 88 BPM | BODY MASS INDEX: 32.75 KG/M2 | WEIGHT: 166.8 LBS | HEIGHT: 60 IN | OXYGEN SATURATION: 94 % | RESPIRATION RATE: 18 BRPM

## 2018-02-15 DIAGNOSIS — F41.9 ANXIETY: ICD-10-CM

## 2018-02-15 DIAGNOSIS — J44.9 COPD, VERY SEVERE (HCC): ICD-10-CM

## 2018-02-15 DIAGNOSIS — K57.92 ACUTE DIVERTICULITIS: Primary | ICD-10-CM

## 2018-02-15 DIAGNOSIS — I25.119 CORONARY ARTERY DISEASE INVOLVING NATIVE CORONARY ARTERY OF NATIVE HEART WITH ANGINA PECTORIS (HCC): ICD-10-CM

## 2018-02-15 DIAGNOSIS — I10 ESSENTIAL HYPERTENSION: ICD-10-CM

## 2018-02-15 DIAGNOSIS — R21 SKIN RASH: ICD-10-CM

## 2018-02-15 PROCEDURE — 99214 OFFICE O/P EST MOD 30 MIN: CPT | Performed by: FAMILY MEDICINE

## 2018-02-15 RX ORDER — CIPROFLOXACIN 500 MG/1
500 TABLET, FILM COATED ORAL 2 TIMES DAILY
Qty: 20 TABLET | Refills: 0 | Status: SHIPPED | OUTPATIENT
Start: 2018-02-15 | End: 2018-02-15 | Stop reason: CLARIF

## 2018-02-15 RX ORDER — CITALOPRAM 40 MG/1
40 TABLET ORAL DAILY
Qty: 30 TABLET | Refills: 3 | Status: SHIPPED | OUTPATIENT
Start: 2018-02-15 | End: 2018-02-15 | Stop reason: CLARIF

## 2018-02-15 RX ORDER — METRONIDAZOLE 500 MG/1
500 TABLET ORAL 3 TIMES DAILY
Qty: 30 TABLET | Refills: 0 | Status: SHIPPED | OUTPATIENT
Start: 2018-02-15 | End: 2018-02-15 | Stop reason: CLARIF

## 2018-02-15 RX ORDER — METRONIDAZOLE 500 MG/1
500 TABLET ORAL 2 TIMES DAILY
Qty: 20 TABLET | Refills: 0 | Status: SHIPPED | OUTPATIENT
Start: 2018-02-15 | End: 2018-02-25

## 2018-02-15 RX ORDER — CIPROFLOXACIN 250 MG/1
250 TABLET, FILM COATED ORAL 2 TIMES DAILY
Qty: 20 TABLET | Refills: 0 | Status: SHIPPED | OUTPATIENT
Start: 2018-02-15 | End: 2018-02-25

## 2018-02-15 ASSESSMENT — ENCOUNTER SYMPTOMS
COUGH: 1
VOMITING: 0
NAUSEA: 1
ABDOMINAL PAIN: 1
CONSTIPATION: 0

## 2018-02-15 NOTE — PROGRESS NOTES
01/09/2018     Lab Results   Component Value Date     01/09/2018    K 4.6 01/09/2018    CREATININE 2.0 (H) 01/26/2018     Cholesterol, Total   Date Value Ref Range Status   01/09/2018 198 0 - 199 mg/dL Final     LDL Calculated   Date Value Ref Range Status   01/09/2018 see below <100 mg/dL Final     Comment:     When the triglyceride is <30 mg/dL or >300 mg/dL the  calculated LDL and  VLDL are not valid and a measured  LDL is performed. HDL   Date Value Ref Range Status   01/09/2018 43 40 - 60 mg/dL Final     Triglycerides   Date Value Ref Range Status   01/09/2018 354 (H) 0 - 150 mg/dL Final     Lab Results   Component Value Date    ALT 10 01/09/2018    AST 11 (L) 01/09/2018    ALKPHOS 51 01/09/2018    BILITOT <0.2 01/09/2018      Lab Results   Component Value Date    WBC 8.8 01/09/2018    HGB 12.7 01/09/2018    HCT 39.6 01/09/2018    MCV 87.8 01/09/2018     01/09/2018     TSH (uIU/mL)   Date Value   01/09/2018 1.38   01/09/2018 1.39     Lab Results   Component Value Date    LABA1C 6.3 01/09/2018        PHYSICAL EXAM  /82 (Site: Right Arm, Position: Sitting, Cuff Size: Small Adult)   Pulse 88   Temp 98.6 °F (37 °C) (Oral)   Resp 18   Ht 5' (1.524 m)   Wt 166 lb 12.8 oz (75.7 kg)   LMP 05/01/2006 (Approximate)   SpO2 94%   BMI 32.58 kg/m²     BP Readings from Last 5 Encounters:   02/15/18 136/82   01/26/18 (!) 173/68   01/17/18 (!) 174/68   10/10/17 138/82   09/28/17 122/68       Wt Readings from Last 5 Encounters:   02/15/18 166 lb 12.8 oz (75.7 kg)   01/17/18 170 lb 9.6 oz (77.4 kg)   10/10/17 168 lb 9.6 oz (76.5 kg)   09/28/17 167 lb (75.8 kg)   09/13/17 169 lb (76.7 kg)        Physical Exam   Constitutional: She is oriented to person, place, and time. She appears well-developed and well-nourished. HENT:   Head: Normocephalic and atraumatic. Eyes: EOM are normal.   Neck: Normal range of motion. Cardiovascular: Normal rate, regular rhythm and normal heart sounds. Pulmonary/Chest: Effort normal and breath sounds normal.   Abdominal: Soft. Bowel sounds are normal. There is no rebound and no guarding. Tender to palpation in left lower quadrant   Musculoskeletal: Normal range of motion. She exhibits no edema or tenderness. Neurological: She is alert and oriented to person, place, and time. Skin: Skin is warm and dry. Scattered erythematous papules on mid lower back   Psychiatric: She has a normal mood and affect. ASSESSMENT/PLAN:  1. Acute diverticulitis  Patient has a history of diverticulitis in her current symptoms are consistent with an acute flareup. She has normal vitals today and does not appear toxic. I asked her to call if her symptoms are not improved with antibiotics in 48 hours as she may warrant CT imaging at that time  - ciprofloxacin (CIPRO) 250 MG tablet; Take 1 tablet by mouth 2 times daily for 10 days  Dispense: 20 tablet; Refill: 0  - metroNIDAZOLE (FLAGYL) 500 MG tablet; Take 1 tablet by mouth 2 times daily for 10 days  Dispense: 20 tablet; Refill: 0    2. Essential hypertension  Well-controlled on current regimen. Has been running high prior to visits today. Stressed importance of medication compliance in setting of her chronic kidney disease    3. Anxiety  On Celexa    4. COPD, very severe ([facility]  Follows with Dr. Vaishali Liz    5. Coronary artery disease involving native coronary artery of native heart with angina pectoris [facility])  Follows with cardiology    6. Skin rash  Likely dry skin dermatitis versus local reaction, treatment with topical steroids call if unimproved  - hydrocortisone 2.5 % cream; Apply topically 2 times daily. Dispense: 28 g; Refill: 1      Return in about 3 months (around 5/15/2018) for anxiety f/u.

## 2018-02-19 ENCOUNTER — TELEPHONE (OUTPATIENT)
Dept: PULMONOLOGY | Age: 73
End: 2018-02-19

## 2018-02-19 NOTE — TELEPHONE ENCOUNTER
Patient calling stating she is supposed to go to [place] in March and wants to know if it is OK since she is on 3L O2 and worried about the higher altitude.   Please advise

## 2018-02-20 NOTE — TELEPHONE ENCOUNTER
Patient called back and expressed understanding. She will call [employer] and see if they can set her up with a company out in [place] for over night O2. She will call us when she gets back in town to have [employer]  her tanks since she no longer qualifies for day time O2.

## 2018-02-22 ENCOUNTER — TELEPHONE (OUTPATIENT)
Dept: FAMILY MEDICINE CLINIC | Age: 73
End: 2018-02-22

## 2018-02-22 RX ORDER — CLOTRIMAZOLE AND BETAMETHASONE DIPROPIONATE 10; .64 MG/G; MG/G
CREAM TOPICAL
Qty: 15 G | Refills: 0 | Status: SHIPPED | OUTPATIENT
Start: 2018-02-22 | End: 2018-08-20 | Stop reason: SDUPTHER

## 2018-03-01 ENCOUNTER — TELEPHONE (OUTPATIENT)
Dept: FAMILY MEDICINE CLINIC | Age: 73
End: 2018-03-01

## 2018-03-02 ENCOUNTER — OFFICE VISIT (OUTPATIENT)
Dept: FAMILY MEDICINE CLINIC | Age: 73
End: 2018-03-02

## 2018-03-02 VITALS
SYSTOLIC BLOOD PRESSURE: 136 MMHG | OXYGEN SATURATION: 95 % | HEIGHT: 60 IN | TEMPERATURE: 98.6 F | HEART RATE: 70 BPM | WEIGHT: 176 LBS | BODY MASS INDEX: 34.55 KG/M2 | DIASTOLIC BLOOD PRESSURE: 86 MMHG | RESPIRATION RATE: 18 BRPM

## 2018-03-02 DIAGNOSIS — Z87.19 HISTORY OF DIVERTICULITIS: ICD-10-CM

## 2018-03-02 DIAGNOSIS — R10.32 LLQ ABDOMINAL PAIN: Primary | ICD-10-CM

## 2018-03-02 PROCEDURE — 3014F SCREEN MAMMO DOC REV: CPT | Performed by: FAMILY MEDICINE

## 2018-03-02 PROCEDURE — 1123F ACP DISCUSS/DSCN MKR DOCD: CPT | Performed by: FAMILY MEDICINE

## 2018-03-02 PROCEDURE — 1090F PRES/ABSN URINE INCON ASSESS: CPT | Performed by: FAMILY MEDICINE

## 2018-03-02 PROCEDURE — 1036F TOBACCO NON-USER: CPT | Performed by: FAMILY MEDICINE

## 2018-03-02 PROCEDURE — 4040F PNEUMOC VAC/ADMIN/RCVD: CPT | Performed by: FAMILY MEDICINE

## 2018-03-02 PROCEDURE — G8598 ASA/ANTIPLAT THER USED: HCPCS | Performed by: FAMILY MEDICINE

## 2018-03-02 PROCEDURE — G8417 CALC BMI ABV UP PARAM F/U: HCPCS | Performed by: FAMILY MEDICINE

## 2018-03-02 PROCEDURE — G8428 CUR MEDS NOT DOCUMENT: HCPCS | Performed by: FAMILY MEDICINE

## 2018-03-02 PROCEDURE — G8399 PT W/DXA RESULTS DOCUMENT: HCPCS | Performed by: FAMILY MEDICINE

## 2018-03-02 PROCEDURE — 3017F COLORECTAL CA SCREEN DOC REV: CPT | Performed by: FAMILY MEDICINE

## 2018-03-02 PROCEDURE — G8484 FLU IMMUNIZE NO ADMIN: HCPCS | Performed by: FAMILY MEDICINE

## 2018-03-02 PROCEDURE — 99213 OFFICE O/P EST LOW 20 MIN: CPT | Performed by: FAMILY MEDICINE

## 2018-03-02 ASSESSMENT — ENCOUNTER SYMPTOMS
NAUSEA: 1
ABDOMINAL PAIN: 1

## 2018-03-02 NOTE — PROGRESS NOTES
since treatment. Some concern for underlying abscess or other underlying intraabdominal pathology. She is quite tender on abdominal exam today. I feel she would benefit from CT scan and have called the ER for patient to be evaluated. Appreciate their assistance. Pt agreeable to this.     History of diverticulitis

## 2018-03-03 PROBLEM — K57.33 DIVERTICULITIS LARGE INTESTINE W/O PERFORATION OR ABSCESS W/BLEEDING: Status: ACTIVE | Noted: 2018-03-03

## 2018-03-03 PROBLEM — K57.32 DIVERTICULITIS OF LARGE INTESTINE WITHOUT PERFORATION OR ABSCESS WITHOUT BLEEDING: Status: ACTIVE | Noted: 2018-03-03

## 2018-03-05 PROBLEM — N17.9 ACUTE-ON-CHRONIC KIDNEY INJURY (HCC): Status: ACTIVE | Noted: 2018-03-05

## 2018-03-05 PROBLEM — K56.609 LARGE BOWEL OBSTRUCTION (HCC): Status: ACTIVE | Noted: 2018-03-05

## 2018-03-05 PROBLEM — J96.21 ACUTE ON CHRONIC RESPIRATORY FAILURE WITH HYPOXIA (HCC): Status: ACTIVE | Noted: 2018-03-05

## 2018-03-05 PROBLEM — N18.9 ACUTE-ON-CHRONIC KIDNEY INJURY (HCC): Status: ACTIVE | Noted: 2018-03-05

## 2018-03-06 PROBLEM — G47.33 OSA ON CPAP: Status: ACTIVE | Noted: 2018-03-06

## 2018-03-06 PROBLEM — J98.11 ATELECTASIS: Status: ACTIVE | Noted: 2018-03-06

## 2018-03-06 PROBLEM — Z99.89 OSA ON CPAP: Status: ACTIVE | Noted: 2018-03-06

## 2018-03-06 PROBLEM — J96.01 ACUTE RESPIRATORY FAILURE WITH HYPOXIA (HCC): Status: ACTIVE | Noted: 2018-03-06

## 2018-03-06 PROBLEM — G47.33 OSA TREATED WITH BIPAP: Status: ACTIVE | Noted: 2018-03-06

## 2018-03-08 PROBLEM — N18.30 CKD (CHRONIC KIDNEY DISEASE), STAGE III (HCC): Status: ACTIVE | Noted: 2018-03-08

## 2018-03-14 PROBLEM — R10.9 ABDOMINAL PAIN: Status: ACTIVE | Noted: 2018-03-14

## 2018-03-20 ENCOUNTER — TELEPHONE (OUTPATIENT)
Dept: PULMONOLOGY | Age: 73
End: 2018-03-20

## 2018-03-22 ENCOUNTER — OFFICE VISIT (OUTPATIENT)
Dept: SURGERY | Age: 73
End: 2018-03-22

## 2018-03-22 VITALS
DIASTOLIC BLOOD PRESSURE: 95 MMHG | HEART RATE: 83 BPM | WEIGHT: 165 LBS | HEIGHT: 60 IN | SYSTOLIC BLOOD PRESSURE: 224 MMHG | BODY MASS INDEX: 32.39 KG/M2

## 2018-03-22 DIAGNOSIS — N32.1 COLOVESICAL FISTULA: Primary | ICD-10-CM

## 2018-03-22 DIAGNOSIS — K56.609 COLONIC OBSTRUCTION (HCC): ICD-10-CM

## 2018-03-22 PROCEDURE — 99024 POSTOP FOLLOW-UP VISIT: CPT | Performed by: SURGERY

## 2018-03-22 RX ORDER — SULFAMETHOXAZOLE AND TRIMETHOPRIM 800; 160 MG/1; MG/1
1 TABLET ORAL 2 TIMES DAILY
Qty: 20 TABLET | Refills: 0 | Status: SHIPPED | OUTPATIENT
Start: 2018-03-22 | End: 2018-04-01

## 2018-03-25 DIAGNOSIS — R06.02 SOB (SHORTNESS OF BREATH): ICD-10-CM

## 2018-03-26 RX ORDER — TORSEMIDE 20 MG/1
TABLET ORAL
Qty: 30 TABLET | Refills: 1 | Status: SHIPPED | OUTPATIENT
Start: 2018-03-26 | End: 2018-04-19 | Stop reason: SDUPTHER

## 2018-03-27 ENCOUNTER — TELEPHONE (OUTPATIENT)
Dept: SLEEP MEDICINE | Age: 73
End: 2018-03-27

## 2018-03-27 DIAGNOSIS — J43.2 CENTRILOBULAR EMPHYSEMA (HCC): Primary | ICD-10-CM

## 2018-04-04 ENCOUNTER — OFFICE VISIT (OUTPATIENT)
Dept: SURGERY | Age: 73
End: 2018-04-04

## 2018-04-04 ENCOUNTER — OFFICE VISIT (OUTPATIENT)
Dept: FAMILY MEDICINE CLINIC | Age: 73
End: 2018-04-04

## 2018-04-04 VITALS
OXYGEN SATURATION: 94 % | HEIGHT: 60 IN | DIASTOLIC BLOOD PRESSURE: 60 MMHG | WEIGHT: 160.8 LBS | TEMPERATURE: 98.2 F | HEART RATE: 80 BPM | BODY MASS INDEX: 31.57 KG/M2 | RESPIRATION RATE: 20 BRPM | SYSTOLIC BLOOD PRESSURE: 108 MMHG

## 2018-04-04 VITALS
WEIGHT: 163 LBS | BODY MASS INDEX: 32 KG/M2 | HEIGHT: 60 IN | HEART RATE: 108 BPM | SYSTOLIC BLOOD PRESSURE: 109 MMHG | DIASTOLIC BLOOD PRESSURE: 70 MMHG

## 2018-04-04 DIAGNOSIS — R68.84 JAW PAIN: ICD-10-CM

## 2018-04-04 DIAGNOSIS — R68.84 JAW PAIN: Primary | ICD-10-CM

## 2018-04-04 DIAGNOSIS — R53.1 WEAKNESS: ICD-10-CM

## 2018-04-04 DIAGNOSIS — K56.609 COLONIC OBSTRUCTION (HCC): Primary | ICD-10-CM

## 2018-04-04 DIAGNOSIS — F06.8 ANXIETY DISORDER DUE TO MULTIPLE MEDICAL PROBLEMS: ICD-10-CM

## 2018-04-04 DIAGNOSIS — N32.1 COLOVESICAL FISTULA: ICD-10-CM

## 2018-04-04 LAB
C-REACTIVE PROTEIN: 12.4 MG/L (ref 0–5.1)
SEDIMENTATION RATE, ERYTHROCYTE: 38 MM/HR (ref 0–30)

## 2018-04-04 PROCEDURE — 99213 OFFICE O/P EST LOW 20 MIN: CPT | Performed by: FAMILY MEDICINE

## 2018-04-04 PROCEDURE — G8427 DOCREV CUR MEDS BY ELIG CLIN: HCPCS | Performed by: FAMILY MEDICINE

## 2018-04-04 PROCEDURE — G8399 PT W/DXA RESULTS DOCUMENT: HCPCS | Performed by: FAMILY MEDICINE

## 2018-04-04 PROCEDURE — 99024 POSTOP FOLLOW-UP VISIT: CPT | Performed by: SURGERY

## 2018-04-04 PROCEDURE — 4040F PNEUMOC VAC/ADMIN/RCVD: CPT | Performed by: FAMILY MEDICINE

## 2018-04-04 PROCEDURE — G8598 ASA/ANTIPLAT THER USED: HCPCS | Performed by: FAMILY MEDICINE

## 2018-04-04 PROCEDURE — 1111F DSCHRG MED/CURRENT MED MERGE: CPT | Performed by: FAMILY MEDICINE

## 2018-04-04 PROCEDURE — 1090F PRES/ABSN URINE INCON ASSESS: CPT | Performed by: FAMILY MEDICINE

## 2018-04-04 PROCEDURE — G8417 CALC BMI ABV UP PARAM F/U: HCPCS | Performed by: FAMILY MEDICINE

## 2018-04-04 PROCEDURE — 3017F COLORECTAL CA SCREEN DOC REV: CPT | Performed by: FAMILY MEDICINE

## 2018-04-04 PROCEDURE — 1036F TOBACCO NON-USER: CPT | Performed by: FAMILY MEDICINE

## 2018-04-04 PROCEDURE — 1123F ACP DISCUSS/DSCN MKR DOCD: CPT | Performed by: FAMILY MEDICINE

## 2018-04-04 PROCEDURE — 3014F SCREEN MAMMO DOC REV: CPT | Performed by: FAMILY MEDICINE

## 2018-04-04 RX ORDER — LORAZEPAM 0.5 MG/1
0.5 TABLET ORAL DAILY PRN
Qty: 30 TABLET | Refills: 0 | Status: SHIPPED | OUTPATIENT
Start: 2018-04-04 | End: 2018-05-01 | Stop reason: SDUPTHER

## 2018-04-04 ASSESSMENT — PATIENT HEALTH QUESTIONNAIRE - PHQ9
SUM OF ALL RESPONSES TO PHQ QUESTIONS 1-9: 2
SUM OF ALL RESPONSES TO PHQ9 QUESTIONS 1 & 2: 2
1. LITTLE INTEREST OR PLEASURE IN DOING THINGS: 1
2. FEELING DOWN, DEPRESSED OR HOPELESS: 1

## 2018-04-05 ENCOUNTER — TELEPHONE (OUTPATIENT)
Dept: SURGERY | Age: 73
End: 2018-04-05

## 2018-04-05 ENCOUNTER — TELEPHONE (OUTPATIENT)
Dept: FAMILY MEDICINE CLINIC | Age: 73
End: 2018-04-05

## 2018-04-05 DIAGNOSIS — M26.69 JAW CLAUDICATION: Primary | ICD-10-CM

## 2018-04-05 RX ORDER — PREDNISONE 20 MG/1
40 TABLET ORAL DAILY
Qty: 10 TABLET | Refills: 0 | Status: SHIPPED | OUTPATIENT
Start: 2018-04-05 | End: 2018-04-10

## 2018-04-05 ASSESSMENT — ENCOUNTER SYMPTOMS
SHORTNESS OF BREATH: 0
COUGH: 0

## 2018-04-06 ENCOUNTER — OFFICE VISIT (OUTPATIENT)
Dept: SURGERY | Age: 73
End: 2018-04-06

## 2018-04-06 ENCOUNTER — TELEPHONE (OUTPATIENT)
Dept: SURGERY | Age: 73
End: 2018-04-06

## 2018-04-06 ENCOUNTER — OFFICE VISIT (OUTPATIENT)
Dept: CARDIOLOGY CLINIC | Age: 73
End: 2018-04-06

## 2018-04-06 VITALS
HEART RATE: 118 BPM | WEIGHT: 164 LBS | SYSTOLIC BLOOD PRESSURE: 201 MMHG | DIASTOLIC BLOOD PRESSURE: 93 MMHG | BODY MASS INDEX: 32.03 KG/M2

## 2018-04-06 VITALS
DIASTOLIC BLOOD PRESSURE: 70 MMHG | HEART RATE: 114 BPM | BODY MASS INDEX: 32.2 KG/M2 | WEIGHT: 164 LBS | HEIGHT: 60 IN | OXYGEN SATURATION: 93 % | SYSTOLIC BLOOD PRESSURE: 200 MMHG

## 2018-04-06 DIAGNOSIS — M26.623 BILATERAL TEMPOROMANDIBULAR JOINT PAIN: ICD-10-CM

## 2018-04-06 DIAGNOSIS — I25.119 CORONARY ARTERY DISEASE INVOLVING NATIVE CORONARY ARTERY OF NATIVE HEART WITH ANGINA PECTORIS (HCC): Primary | ICD-10-CM

## 2018-04-06 DIAGNOSIS — Z99.89 OSA ON CPAP: ICD-10-CM

## 2018-04-06 DIAGNOSIS — I10 ESSENTIAL HYPERTENSION: ICD-10-CM

## 2018-04-06 DIAGNOSIS — I25.119 CORONARY ARTERY DISEASE INVOLVING NATIVE CORONARY ARTERY OF NATIVE HEART WITH ANGINA PECTORIS (HCC): ICD-10-CM

## 2018-04-06 DIAGNOSIS — R07.9 CHEST PAIN AT REST: Primary | ICD-10-CM

## 2018-04-06 DIAGNOSIS — G47.33 OSA ON CPAP: ICD-10-CM

## 2018-04-06 DIAGNOSIS — I65.23 CAROTID ARTERY STENOSIS WITHOUT CEREBRAL INFARCTION, BILATERAL: ICD-10-CM

## 2018-04-06 DIAGNOSIS — J44.9 COPD, SEVERE (HCC): ICD-10-CM

## 2018-04-06 DIAGNOSIS — N18.30 CKD (CHRONIC KIDNEY DISEASE), STAGE III (HCC): ICD-10-CM

## 2018-04-06 PROCEDURE — G8926 SPIRO NO PERF OR DOC: HCPCS | Performed by: SURGERY

## 2018-04-06 PROCEDURE — 1090F PRES/ABSN URINE INCON ASSESS: CPT | Performed by: INTERNAL MEDICINE

## 2018-04-06 PROCEDURE — G8427 DOCREV CUR MEDS BY ELIG CLIN: HCPCS | Performed by: SURGERY

## 2018-04-06 PROCEDURE — 99214 OFFICE O/P EST MOD 30 MIN: CPT | Performed by: INTERNAL MEDICINE

## 2018-04-06 PROCEDURE — G8598 ASA/ANTIPLAT THER USED: HCPCS | Performed by: INTERNAL MEDICINE

## 2018-04-06 PROCEDURE — 1036F TOBACCO NON-USER: CPT | Performed by: SURGERY

## 2018-04-06 PROCEDURE — 1090F PRES/ABSN URINE INCON ASSESS: CPT | Performed by: SURGERY

## 2018-04-06 PROCEDURE — 4040F PNEUMOC VAC/ADMIN/RCVD: CPT | Performed by: INTERNAL MEDICINE

## 2018-04-06 PROCEDURE — 4040F PNEUMOC VAC/ADMIN/RCVD: CPT | Performed by: SURGERY

## 2018-04-06 PROCEDURE — G8399 PT W/DXA RESULTS DOCUMENT: HCPCS | Performed by: INTERNAL MEDICINE

## 2018-04-06 PROCEDURE — G8417 CALC BMI ABV UP PARAM F/U: HCPCS | Performed by: SURGERY

## 2018-04-06 PROCEDURE — G8427 DOCREV CUR MEDS BY ELIG CLIN: HCPCS | Performed by: INTERNAL MEDICINE

## 2018-04-06 PROCEDURE — 3017F COLORECTAL CA SCREEN DOC REV: CPT | Performed by: INTERNAL MEDICINE

## 2018-04-06 PROCEDURE — 1111F DSCHRG MED/CURRENT MED MERGE: CPT | Performed by: INTERNAL MEDICINE

## 2018-04-06 PROCEDURE — 1123F ACP DISCUSS/DSCN MKR DOCD: CPT | Performed by: SURGERY

## 2018-04-06 PROCEDURE — 3014F SCREEN MAMMO DOC REV: CPT | Performed by: INTERNAL MEDICINE

## 2018-04-06 PROCEDURE — 3014F SCREEN MAMMO DOC REV: CPT | Performed by: SURGERY

## 2018-04-06 PROCEDURE — 1036F TOBACCO NON-USER: CPT | Performed by: INTERNAL MEDICINE

## 2018-04-06 PROCEDURE — G8399 PT W/DXA RESULTS DOCUMENT: HCPCS | Performed by: SURGERY

## 2018-04-06 PROCEDURE — 3017F COLORECTAL CA SCREEN DOC REV: CPT | Performed by: SURGERY

## 2018-04-06 PROCEDURE — 99214 OFFICE O/P EST MOD 30 MIN: CPT | Performed by: SURGERY

## 2018-04-06 PROCEDURE — 3023F SPIROM DOC REV: CPT | Performed by: SURGERY

## 2018-04-06 PROCEDURE — G8417 CALC BMI ABV UP PARAM F/U: HCPCS | Performed by: INTERNAL MEDICINE

## 2018-04-06 PROCEDURE — 1111F DSCHRG MED/CURRENT MED MERGE: CPT | Performed by: SURGERY

## 2018-04-06 PROCEDURE — 1123F ACP DISCUSS/DSCN MKR DOCD: CPT | Performed by: INTERNAL MEDICINE

## 2018-04-06 PROCEDURE — G8598 ASA/ANTIPLAT THER USED: HCPCS | Performed by: SURGERY

## 2018-04-06 PROCEDURE — 93000 ELECTROCARDIOGRAM COMPLETE: CPT | Performed by: INTERNAL MEDICINE

## 2018-04-06 RX ORDER — AMLODIPINE BESYLATE 10 MG/1
10 TABLET ORAL DAILY
Qty: 30 TABLET | Refills: 6 | Status: SHIPPED | OUTPATIENT
Start: 2018-04-06 | End: 2018-04-19 | Stop reason: SDUPTHER

## 2018-04-12 ENCOUNTER — TELEPHONE (OUTPATIENT)
Dept: SURGERY | Age: 73
End: 2018-04-12

## 2018-04-12 ENCOUNTER — TELEPHONE (OUTPATIENT)
Dept: CARDIOLOGY CLINIC | Age: 73
End: 2018-04-12

## 2018-04-16 ENCOUNTER — TELEPHONE (OUTPATIENT)
Dept: FAMILY MEDICINE CLINIC | Age: 73
End: 2018-04-16

## 2018-04-18 ENCOUNTER — TELEPHONE (OUTPATIENT)
Dept: CARDIOLOGY CLINIC | Age: 73
End: 2018-04-18

## 2018-04-18 DIAGNOSIS — I73.9 PAD (PERIPHERAL ARTERY DISEASE) (HCC): ICD-10-CM

## 2018-04-18 DIAGNOSIS — I25.10 CORONARY ARTERY DISEASE INVOLVING NATIVE CORONARY ARTERY OF NATIVE HEART WITHOUT ANGINA PECTORIS: ICD-10-CM

## 2018-04-18 RX ORDER — ATORVASTATIN CALCIUM 40 MG/1
TABLET, FILM COATED ORAL
Qty: 30 TABLET | Refills: 3 | Status: SHIPPED | OUTPATIENT
Start: 2018-04-18 | End: 2018-08-22 | Stop reason: SDUPTHER

## 2018-04-19 ENCOUNTER — OFFICE VISIT (OUTPATIENT)
Dept: SURGERY | Age: 73
End: 2018-04-19

## 2018-04-19 ENCOUNTER — OFFICE VISIT (OUTPATIENT)
Dept: CARDIOLOGY CLINIC | Age: 73
End: 2018-04-19

## 2018-04-19 ENCOUNTER — HOSPITAL ENCOUNTER (OUTPATIENT)
Dept: CARDIAC REHAB | Age: 73
Discharge: HOME | End: 2018-04-19
Attending: INTERNAL MEDICINE | Admitting: INTERNAL MEDICINE

## 2018-04-19 VITALS
WEIGHT: 163 LBS | HEIGHT: 60 IN | BODY MASS INDEX: 32 KG/M2 | SYSTOLIC BLOOD PRESSURE: 116 MMHG | HEART RATE: 92 BPM | DIASTOLIC BLOOD PRESSURE: 68 MMHG

## 2018-04-19 VITALS
SYSTOLIC BLOOD PRESSURE: 92 MMHG | OXYGEN SATURATION: 96 % | HEART RATE: 80 BPM | DIASTOLIC BLOOD PRESSURE: 40 MMHG | BODY MASS INDEX: 32 KG/M2 | HEIGHT: 60 IN | WEIGHT: 163 LBS

## 2018-04-19 DIAGNOSIS — R42 LIGHTHEADEDNESS: ICD-10-CM

## 2018-04-19 DIAGNOSIS — I25.10 CORONARY ARTERY DISEASE INVOLVING NATIVE CORONARY ARTERY OF NATIVE HEART WITHOUT ANGINA PECTORIS: ICD-10-CM

## 2018-04-19 DIAGNOSIS — R07.89 CHEST TIGHTNESS: Primary | ICD-10-CM

## 2018-04-19 DIAGNOSIS — I10 ESSENTIAL HYPERTENSION: ICD-10-CM

## 2018-04-19 DIAGNOSIS — R06.02 SOB (SHORTNESS OF BREATH): ICD-10-CM

## 2018-04-19 DIAGNOSIS — D64.9 ANEMIA, UNSPECIFIED TYPE: ICD-10-CM

## 2018-04-19 DIAGNOSIS — I95.1 ORTHOSTATIC HYPOTENSION: ICD-10-CM

## 2018-04-19 DIAGNOSIS — K56.609 COLONIC OBSTRUCTION (HCC): Primary | ICD-10-CM

## 2018-04-19 DIAGNOSIS — I65.23 BILATERAL CAROTID ARTERY STENOSIS: ICD-10-CM

## 2018-04-19 DIAGNOSIS — I73.9 PAD (PERIPHERAL ARTERY DISEASE) (HCC): ICD-10-CM

## 2018-04-19 PROCEDURE — G8598 ASA/ANTIPLAT THER USED: HCPCS | Performed by: NURSE PRACTITIONER

## 2018-04-19 PROCEDURE — 4040F PNEUMOC VAC/ADMIN/RCVD: CPT | Performed by: NURSE PRACTITIONER

## 2018-04-19 PROCEDURE — 99024 POSTOP FOLLOW-UP VISIT: CPT | Performed by: SURGERY

## 2018-04-19 PROCEDURE — G8417 CALC BMI ABV UP PARAM F/U: HCPCS | Performed by: NURSE PRACTITIONER

## 2018-04-19 PROCEDURE — 99214 OFFICE O/P EST MOD 30 MIN: CPT | Performed by: NURSE PRACTITIONER

## 2018-04-19 PROCEDURE — G8427 DOCREV CUR MEDS BY ELIG CLIN: HCPCS | Performed by: NURSE PRACTITIONER

## 2018-04-19 PROCEDURE — 3014F SCREEN MAMMO DOC REV: CPT | Performed by: NURSE PRACTITIONER

## 2018-04-19 PROCEDURE — 1090F PRES/ABSN URINE INCON ASSESS: CPT | Performed by: NURSE PRACTITIONER

## 2018-04-19 PROCEDURE — 1123F ACP DISCUSS/DSCN MKR DOCD: CPT | Performed by: NURSE PRACTITIONER

## 2018-04-19 PROCEDURE — 3017F COLORECTAL CA SCREEN DOC REV: CPT | Performed by: NURSE PRACTITIONER

## 2018-04-19 PROCEDURE — G8399 PT W/DXA RESULTS DOCUMENT: HCPCS | Performed by: NURSE PRACTITIONER

## 2018-04-19 PROCEDURE — 93000 ELECTROCARDIOGRAM COMPLETE: CPT | Performed by: NURSE PRACTITIONER

## 2018-04-19 PROCEDURE — 94618 PULMONARY STRESS TESTING: CPT | Performed by: INTERNAL MEDICINE

## 2018-04-19 PROCEDURE — 1036F TOBACCO NON-USER: CPT | Performed by: NURSE PRACTITIONER

## 2018-04-19 RX ORDER — AMLODIPINE BESYLATE 10 MG/1
5 TABLET ORAL DAILY
Qty: 30 TABLET | Refills: 6
Start: 2018-04-19 | End: 2018-06-20 | Stop reason: DRUGHIGH

## 2018-04-19 RX ORDER — CLONIDINE HYDROCHLORIDE 0.1 MG/1
TABLET ORAL
Qty: 60 TABLET | Refills: 3
Start: 2018-04-19 | End: 2018-06-08 | Stop reason: SDUPTHER

## 2018-04-19 RX ORDER — TORSEMIDE 20 MG/1
TABLET ORAL
Qty: 30 TABLET | Refills: 1 | Status: SHIPPED | OUTPATIENT
Start: 2018-04-19 | End: 2018-06-29 | Stop reason: SDUPTHER

## 2018-04-19 RX ORDER — CLOPIDOGREL BISULFATE 75 MG/1
75 TABLET ORAL DAILY
Qty: 30 TABLET | Refills: 3 | Status: SHIPPED | OUTPATIENT
Start: 2018-04-19 | End: 2018-07-20 | Stop reason: SDUPTHER

## 2018-04-25 ENCOUNTER — HOSPITAL ENCOUNTER (OUTPATIENT)
Dept: WOUND CARE | Age: 73
Discharge: HOME | End: 2018-04-25
Attending: NURSE PRACTITIONER | Admitting: NURSE PRACTITIONER

## 2018-04-25 ENCOUNTER — OFFICE VISIT (OUTPATIENT)
Dept: PULMONOLOGY | Age: 73
End: 2018-04-25

## 2018-04-25 VITALS
RESPIRATION RATE: 16 BRPM | WEIGHT: 164.24 LBS | HEIGHT: 60 IN | TEMPERATURE: 97.7 F | DIASTOLIC BLOOD PRESSURE: 75 MMHG | HEART RATE: 97 BPM | SYSTOLIC BLOOD PRESSURE: 194 MMHG | BODY MASS INDEX: 32.25 KG/M2

## 2018-04-25 VITALS
DIASTOLIC BLOOD PRESSURE: 78 MMHG | BODY MASS INDEX: 32.31 KG/M2 | WEIGHT: 164.6 LBS | TEMPERATURE: 97.1 F | HEIGHT: 60 IN | OXYGEN SATURATION: 94 % | SYSTOLIC BLOOD PRESSURE: 130 MMHG | RESPIRATION RATE: 16 BRPM | HEART RATE: 79 BPM

## 2018-04-25 DIAGNOSIS — G47.33 OSA TREATED WITH BIPAP: ICD-10-CM

## 2018-04-25 DIAGNOSIS — I25.10 CORONARY ARTERY DISEASE INVOLVING NATIVE CORONARY ARTERY OF NATIVE HEART WITHOUT ANGINA PECTORIS: ICD-10-CM

## 2018-04-25 DIAGNOSIS — I10 ESSENTIAL HYPERTENSION: ICD-10-CM

## 2018-04-25 DIAGNOSIS — J44.9 CHRONIC OBSTRUCTIVE PULMONARY DISEASE, UNSPECIFIED COPD TYPE (HCC): Primary | ICD-10-CM

## 2018-04-25 DIAGNOSIS — R06.02 SOB (SHORTNESS OF BREATH): ICD-10-CM

## 2018-04-25 DIAGNOSIS — Z43.3 COLOSTOMY CARE (HCC): ICD-10-CM

## 2018-04-25 LAB
ANION GAP SERPL CALCULATED.3IONS-SCNC: 15 MMOL/L (ref 3–16)
BASOPHILS ABSOLUTE: 0.1 K/UL (ref 0–0.2)
BASOPHILS RELATIVE PERCENT: 0.8 %
BUN BLDV-MCNC: 25 MG/DL (ref 7–20)
CALCIUM SERPL-MCNC: 9 MG/DL (ref 8.3–10.6)
CHLORIDE BLD-SCNC: 108 MMOL/L (ref 99–110)
CO2: 21 MMOL/L (ref 21–32)
CREAT SERPL-MCNC: 1.3 MG/DL (ref 0.6–1.2)
EOSINOPHILS ABSOLUTE: 0.3 K/UL (ref 0–0.6)
EOSINOPHILS RELATIVE PERCENT: 3.8 %
GFR AFRICAN AMERICAN: 49
GFR NON-AFRICAN AMERICAN: 40
GLUCOSE BLD-MCNC: 94 MG/DL (ref 70–99)
HCT VFR BLD CALC: 34.3 % (ref 36–48)
HEMOGLOBIN: 11.3 G/DL (ref 12–16)
LYMPHOCYTES ABSOLUTE: 1.6 K/UL (ref 1–5.1)
LYMPHOCYTES RELATIVE PERCENT: 21.2 %
MCH RBC QN AUTO: 27.8 PG (ref 26–34)
MCHC RBC AUTO-ENTMCNC: 33 G/DL (ref 31–36)
MCV RBC AUTO: 84.2 FL (ref 80–100)
MONOCYTES ABSOLUTE: 0.4 K/UL (ref 0–1.3)
MONOCYTES RELATIVE PERCENT: 5.8 %
NEUTROPHILS ABSOLUTE: 5.1 K/UL (ref 1.7–7.7)
NEUTROPHILS RELATIVE PERCENT: 68.4 %
PDW BLD-RTO: 16.4 % (ref 12.4–15.4)
PLATELET # BLD: 321 K/UL (ref 135–450)
PMV BLD AUTO: 8.6 FL (ref 5–10.5)
POTASSIUM SERPL-SCNC: 5.5 MMOL/L (ref 3.5–5.1)
RBC # BLD: 4.07 M/UL (ref 4–5.2)
SODIUM BLD-SCNC: 144 MMOL/L (ref 136–145)
WBC # BLD: 7.4 K/UL (ref 4–11)

## 2018-04-25 PROCEDURE — G8417 CALC BMI ABV UP PARAM F/U: HCPCS | Performed by: INTERNAL MEDICINE

## 2018-04-25 PROCEDURE — 4040F PNEUMOC VAC/ADMIN/RCVD: CPT | Performed by: INTERNAL MEDICINE

## 2018-04-25 PROCEDURE — G8399 PT W/DXA RESULTS DOCUMENT: HCPCS | Performed by: INTERNAL MEDICINE

## 2018-04-25 PROCEDURE — 1090F PRES/ABSN URINE INCON ASSESS: CPT | Performed by: INTERNAL MEDICINE

## 2018-04-25 PROCEDURE — 1036F TOBACCO NON-USER: CPT | Performed by: INTERNAL MEDICINE

## 2018-04-25 PROCEDURE — 99213 OFFICE O/P EST LOW 20 MIN: CPT | Performed by: INTERNAL MEDICINE

## 2018-04-25 PROCEDURE — 1123F ACP DISCUSS/DSCN MKR DOCD: CPT | Performed by: INTERNAL MEDICINE

## 2018-04-25 PROCEDURE — 3017F COLORECTAL CA SCREEN DOC REV: CPT | Performed by: INTERNAL MEDICINE

## 2018-04-25 PROCEDURE — 99204 OFFICE O/P NEW MOD 45 MIN: CPT | Performed by: NURSE PRACTITIONER

## 2018-04-25 PROCEDURE — G8926 SPIRO NO PERF OR DOC: HCPCS | Performed by: INTERNAL MEDICINE

## 2018-04-25 PROCEDURE — G8427 DOCREV CUR MEDS BY ELIG CLIN: HCPCS | Performed by: INTERNAL MEDICINE

## 2018-04-25 PROCEDURE — G8598 ASA/ANTIPLAT THER USED: HCPCS | Performed by: INTERNAL MEDICINE

## 2018-04-25 PROCEDURE — 3023F SPIROM DOC REV: CPT | Performed by: INTERNAL MEDICINE

## 2018-04-25 RX ORDER — MONTELUKAST SODIUM 10 MG/1
10 TABLET ORAL DAILY
Qty: 30 TABLET | Refills: 5 | Status: SHIPPED | OUTPATIENT
Start: 2018-04-25 | End: 2018-07-20 | Stop reason: SDUPTHER

## 2018-04-25 RX ORDER — ALBUTEROL SULFATE 90 UG/1
2 AEROSOL, METERED RESPIRATORY (INHALATION) EVERY 4 HOURS PRN
Qty: 1 INHALER | Refills: 5 | Status: SHIPPED | OUTPATIENT
Start: 2018-04-25 | End: 2018-09-08 | Stop reason: ALTCHOICE

## 2018-04-25 ASSESSMENT — PAIN DESCRIPTION - PAIN TYPE: TYPE: ACUTE PAIN

## 2018-04-25 ASSESSMENT — PAIN DESCRIPTION - FREQUENCY: FREQUENCY: INTERMITTENT

## 2018-04-25 ASSESSMENT — PAIN DESCRIPTION - PROGRESSION: CLINICAL_PROGRESSION: NOT CHANGED

## 2018-04-25 ASSESSMENT — PAIN DESCRIPTION - ORIENTATION: ORIENTATION: RIGHT

## 2018-04-25 ASSESSMENT — PAIN DESCRIPTION - ONSET: ONSET: ON-GOING

## 2018-04-25 ASSESSMENT — PAIN DESCRIPTION - DESCRIPTORS: DESCRIPTORS: DISCOMFORT

## 2018-04-25 ASSESSMENT — PAIN SCALES - GENERAL: PAINLEVEL_OUTOF10: 5

## 2018-04-25 ASSESSMENT — PAIN DESCRIPTION - LOCATION: LOCATION: ABDOMEN

## 2018-04-26 ENCOUNTER — TELEPHONE (OUTPATIENT)
Dept: INTERNAL MEDICINE | Age: 73
End: 2018-04-26

## 2018-04-27 DIAGNOSIS — I10 ESSENTIAL HYPERTENSION: Primary | ICD-10-CM

## 2018-04-30 ENCOUNTER — HOSPITAL ENCOUNTER (OUTPATIENT)
Dept: WOUND CARE | Age: 73
Discharge: HOME | End: 2018-04-30
Attending: NURSE PRACTITIONER | Admitting: NURSE PRACTITIONER

## 2018-05-01 ENCOUNTER — TELEPHONE (OUTPATIENT)
Dept: INTERNAL MEDICINE | Age: 73
End: 2018-05-01

## 2018-05-01 DIAGNOSIS — F06.8 ANXIETY DISORDER DUE TO MULTIPLE MEDICAL PROBLEMS: ICD-10-CM

## 2018-05-01 RX ORDER — LORAZEPAM 0.5 MG/1
0.5 TABLET ORAL DAILY PRN
Qty: 30 TABLET | Refills: 0 | Status: SHIPPED | OUTPATIENT
Start: 2018-05-01 | End: 2018-06-11 | Stop reason: SDUPTHER

## 2018-05-08 ENCOUNTER — TELEPHONE (OUTPATIENT)
Dept: INTERNAL MEDICINE | Age: 73
End: 2018-05-08

## 2018-05-09 ENCOUNTER — TELEPHONE (OUTPATIENT)
Dept: SURGERY | Age: 73
End: 2018-05-09

## 2018-05-11 ENCOUNTER — TELEPHONE (OUTPATIENT)
Dept: PULMONOLOGY | Age: 73
End: 2018-05-11

## 2018-05-17 ENCOUNTER — HOSPITAL ENCOUNTER (OUTPATIENT)
Dept: WOUND CARE | Age: 73
Discharge: HOME | End: 2018-05-17
Attending: NURSE PRACTITIONER | Admitting: NURSE PRACTITIONER

## 2018-05-26 DIAGNOSIS — I25.10 CORONARY ARTERY DISEASE INVOLVING NATIVE CORONARY ARTERY OF NATIVE HEART WITHOUT ANGINA PECTORIS: ICD-10-CM

## 2018-05-26 DIAGNOSIS — I10 ESSENTIAL HYPERTENSION: ICD-10-CM

## 2018-05-29 RX ORDER — CARVEDILOL 25 MG/1
TABLET ORAL
Qty: 60 TABLET | Refills: 2 | Status: SHIPPED | OUTPATIENT
Start: 2018-05-29 | End: 2018-07-20 | Stop reason: SDUPTHER

## 2018-06-08 ENCOUNTER — OFFICE VISIT (OUTPATIENT)
Dept: CARDIOLOGY CLINIC | Age: 73
End: 2018-06-08

## 2018-06-08 ENCOUNTER — HOSPITAL ENCOUNTER (OUTPATIENT)
Dept: WOUND CARE | Age: 73
Discharge: HOME | End: 2018-06-08
Attending: NURSE PRACTITIONER | Admitting: NURSE PRACTITIONER

## 2018-06-08 VITALS
TEMPERATURE: 98.4 F | DIASTOLIC BLOOD PRESSURE: 100 MMHG | RESPIRATION RATE: 16 BRPM | SYSTOLIC BLOOD PRESSURE: 217 MMHG | HEART RATE: 98 BPM

## 2018-06-08 VITALS
DIASTOLIC BLOOD PRESSURE: 94 MMHG | HEIGHT: 60 IN | BODY MASS INDEX: 32.39 KG/M2 | SYSTOLIC BLOOD PRESSURE: 170 MMHG | WEIGHT: 165 LBS | OXYGEN SATURATION: 92 % | HEART RATE: 95 BPM

## 2018-06-08 DIAGNOSIS — R09.89 BILATERAL CAROTID BRUITS: ICD-10-CM

## 2018-06-08 DIAGNOSIS — I10 ESSENTIAL HYPERTENSION: ICD-10-CM

## 2018-06-08 DIAGNOSIS — I25.10 CORONARY ARTERY DISEASE INVOLVING NATIVE CORONARY ARTERY OF NATIVE HEART WITHOUT ANGINA PECTORIS: ICD-10-CM

## 2018-06-08 DIAGNOSIS — R00.2 PALPITATIONS: Primary | ICD-10-CM

## 2018-06-08 DIAGNOSIS — E87.5 HYPERKALEMIA: ICD-10-CM

## 2018-06-08 DIAGNOSIS — Z43.3 COLOSTOMY CARE (HCC): ICD-10-CM

## 2018-06-08 DIAGNOSIS — I73.9 PAD (PERIPHERAL ARTERY DISEASE) (HCC): ICD-10-CM

## 2018-06-08 DIAGNOSIS — R42 DIZZINESS: ICD-10-CM

## 2018-06-08 DIAGNOSIS — J44.9 CHRONIC OBSTRUCTIVE PULMONARY DISEASE, UNSPECIFIED COPD TYPE (HCC): ICD-10-CM

## 2018-06-08 LAB
ANION GAP SERPL CALCULATED.3IONS-SCNC: 15 MMOL/L (ref 3–16)
BUN BLDV-MCNC: 13 MG/DL (ref 7–20)
CALCIUM SERPL-MCNC: 9.3 MG/DL (ref 8.3–10.6)
CHLORIDE BLD-SCNC: 100 MMOL/L (ref 99–110)
CO2: 26 MMOL/L (ref 21–32)
CREAT SERPL-MCNC: 1 MG/DL (ref 0.6–1.2)
GFR AFRICAN AMERICAN: >60
GFR NON-AFRICAN AMERICAN: 55
GLUCOSE BLD-MCNC: 96 MG/DL (ref 70–99)
POTASSIUM SERPL-SCNC: 4.5 MMOL/L (ref 3.5–5.1)
SODIUM BLD-SCNC: 141 MMOL/L (ref 136–145)

## 2018-06-08 PROCEDURE — G8417 CALC BMI ABV UP PARAM F/U: HCPCS | Performed by: NURSE PRACTITIONER

## 2018-06-08 PROCEDURE — G8598 ASA/ANTIPLAT THER USED: HCPCS | Performed by: NURSE PRACTITIONER

## 2018-06-08 PROCEDURE — 4040F PNEUMOC VAC/ADMIN/RCVD: CPT | Performed by: NURSE PRACTITIONER

## 2018-06-08 PROCEDURE — 3017F COLORECTAL CA SCREEN DOC REV: CPT | Performed by: NURSE PRACTITIONER

## 2018-06-08 PROCEDURE — G8926 SPIRO NO PERF OR DOC: HCPCS | Performed by: NURSE PRACTITIONER

## 2018-06-08 PROCEDURE — 99213 OFFICE O/P EST LOW 20 MIN: CPT | Performed by: NURSE PRACTITIONER

## 2018-06-08 PROCEDURE — 1123F ACP DISCUSS/DSCN MKR DOCD: CPT | Performed by: NURSE PRACTITIONER

## 2018-06-08 PROCEDURE — 3023F SPIROM DOC REV: CPT | Performed by: NURSE PRACTITIONER

## 2018-06-08 PROCEDURE — 99214 OFFICE O/P EST MOD 30 MIN: CPT | Performed by: NURSE PRACTITIONER

## 2018-06-08 PROCEDURE — 1090F PRES/ABSN URINE INCON ASSESS: CPT | Performed by: NURSE PRACTITIONER

## 2018-06-08 PROCEDURE — G8399 PT W/DXA RESULTS DOCUMENT: HCPCS | Performed by: NURSE PRACTITIONER

## 2018-06-08 PROCEDURE — G8427 DOCREV CUR MEDS BY ELIG CLIN: HCPCS | Performed by: NURSE PRACTITIONER

## 2018-06-08 PROCEDURE — 1036F TOBACCO NON-USER: CPT | Performed by: NURSE PRACTITIONER

## 2018-06-08 RX ORDER — CLONIDINE HYDROCHLORIDE 0.1 MG/1
TABLET ORAL
Qty: 60 TABLET | Refills: 3 | Status: ON HOLD | OUTPATIENT
Start: 2018-06-08 | End: 2018-06-13 | Stop reason: HOSPADM

## 2018-06-09 ENCOUNTER — PATIENT MESSAGE (OUTPATIENT)
Dept: FAMILY MEDICINE CLINIC | Age: 73
End: 2018-06-09

## 2018-06-09 DIAGNOSIS — F06.8 ANXIETY DISORDER DUE TO MULTIPLE MEDICAL PROBLEMS: ICD-10-CM

## 2018-06-10 PROBLEM — J96.00 ACUTE RESPIRATORY FAILURE (HCC): Status: ACTIVE | Noted: 2018-06-10

## 2018-06-11 PROBLEM — J96.22 ACUTE ON CHRONIC RESPIRATORY FAILURE WITH HYPOXIA AND HYPERCAPNIA (HCC): Status: ACTIVE | Noted: 2018-06-10

## 2018-06-11 PROBLEM — J96.21 ACUTE ON CHRONIC RESPIRATORY FAILURE WITH HYPOXIA AND HYPERCAPNIA (HCC): Status: ACTIVE | Noted: 2018-06-10

## 2018-06-11 RX ORDER — LORAZEPAM 0.5 MG/1
0.5 TABLET ORAL DAILY PRN
Qty: 30 TABLET | Refills: 0 | Status: SHIPPED | OUTPATIENT
Start: 2018-06-11 | End: 2018-07-12 | Stop reason: SDUPTHER

## 2018-06-20 ENCOUNTER — OFFICE VISIT (OUTPATIENT)
Dept: FAMILY MEDICINE CLINIC | Age: 73
End: 2018-06-20

## 2018-06-20 ENCOUNTER — TELEPHONE (OUTPATIENT)
Dept: FAMILY MEDICINE CLINIC | Age: 73
End: 2018-06-20

## 2018-06-20 VITALS
SYSTOLIC BLOOD PRESSURE: 172 MMHG | DIASTOLIC BLOOD PRESSURE: 70 MMHG | OXYGEN SATURATION: 98 % | WEIGHT: 167.8 LBS | RESPIRATION RATE: 20 BRPM | TEMPERATURE: 98.3 F | HEART RATE: 83 BPM | BODY MASS INDEX: 32.94 KG/M2 | HEIGHT: 60 IN

## 2018-06-20 DIAGNOSIS — J44.9 COPD, SEVERE (HCC): ICD-10-CM

## 2018-06-20 DIAGNOSIS — N18.30 CKD (CHRONIC KIDNEY DISEASE), STAGE III (HCC): ICD-10-CM

## 2018-06-20 DIAGNOSIS — I10 ESSENTIAL HYPERTENSION: ICD-10-CM

## 2018-06-20 DIAGNOSIS — Z09 HOSPITAL DISCHARGE FOLLOW-UP: Primary | ICD-10-CM

## 2018-06-20 PROBLEM — M26.623 BILATERAL TEMPOROMANDIBULAR JOINT PAIN: Status: RESOLVED | Noted: 2018-04-06 | Resolved: 2018-06-20

## 2018-06-20 PROBLEM — R10.9 ABDOMINAL PAIN: Status: RESOLVED | Noted: 2018-03-14 | Resolved: 2018-06-20

## 2018-06-20 PROBLEM — J96.21 ACUTE ON CHRONIC RESPIRATORY FAILURE WITH HYPOXIA (HCC): Status: RESOLVED | Noted: 2018-03-05 | Resolved: 2018-06-20

## 2018-06-20 PROCEDURE — 4040F PNEUMOC VAC/ADMIN/RCVD: CPT | Performed by: FAMILY MEDICINE

## 2018-06-20 PROCEDURE — G8417 CALC BMI ABV UP PARAM F/U: HCPCS | Performed by: FAMILY MEDICINE

## 2018-06-20 PROCEDURE — 1111F DSCHRG MED/CURRENT MED MERGE: CPT | Performed by: FAMILY MEDICINE

## 2018-06-20 PROCEDURE — G8926 SPIRO NO PERF OR DOC: HCPCS | Performed by: FAMILY MEDICINE

## 2018-06-20 PROCEDURE — 99214 OFFICE O/P EST MOD 30 MIN: CPT | Performed by: FAMILY MEDICINE

## 2018-06-20 PROCEDURE — G8598 ASA/ANTIPLAT THER USED: HCPCS | Performed by: FAMILY MEDICINE

## 2018-06-20 PROCEDURE — 1123F ACP DISCUSS/DSCN MKR DOCD: CPT | Performed by: FAMILY MEDICINE

## 2018-06-20 PROCEDURE — G8427 DOCREV CUR MEDS BY ELIG CLIN: HCPCS | Performed by: FAMILY MEDICINE

## 2018-06-20 PROCEDURE — 3023F SPIROM DOC REV: CPT | Performed by: FAMILY MEDICINE

## 2018-06-20 PROCEDURE — G8399 PT W/DXA RESULTS DOCUMENT: HCPCS | Performed by: FAMILY MEDICINE

## 2018-06-20 PROCEDURE — 1036F TOBACCO NON-USER: CPT | Performed by: FAMILY MEDICINE

## 2018-06-20 PROCEDURE — 1090F PRES/ABSN URINE INCON ASSESS: CPT | Performed by: FAMILY MEDICINE

## 2018-06-20 PROCEDURE — 3017F COLORECTAL CA SCREEN DOC REV: CPT | Performed by: FAMILY MEDICINE

## 2018-06-20 RX ORDER — LISINOPRIL 20 MG/1
20 TABLET ORAL DAILY
Qty: 30 TABLET | Refills: 3 | Status: SHIPPED | OUTPATIENT
Start: 2018-06-20 | End: 2018-06-20 | Stop reason: SDUPTHER

## 2018-06-20 RX ORDER — AMLODIPINE BESYLATE 10 MG/1
10 TABLET ORAL DAILY
Qty: 30 TABLET | Refills: 0 | Status: SHIPPED
Start: 2018-06-20 | End: 2018-08-20 | Stop reason: SDUPTHER

## 2018-06-20 RX ORDER — LISINOPRIL 10 MG/1
10 TABLET ORAL DAILY
Qty: 30 TABLET | Refills: 2 | Status: SHIPPED | OUTPATIENT
Start: 2018-06-20 | End: 2018-07-10 | Stop reason: ALTCHOICE

## 2018-06-20 RX ORDER — CLONIDINE HYDROCHLORIDE 0.1 MG/1
0.1 TABLET ORAL 2 TIMES DAILY
Qty: 60 TABLET | Refills: 2 | Status: SHIPPED | OUTPATIENT
Start: 2018-06-20 | End: 2018-07-24 | Stop reason: SDUPTHER

## 2018-06-25 DIAGNOSIS — I10 ESSENTIAL HYPERTENSION: ICD-10-CM

## 2018-06-25 LAB
ANION GAP SERPL CALCULATED.3IONS-SCNC: 19 MMOL/L (ref 3–16)
BUN BLDV-MCNC: 33 MG/DL (ref 7–20)
CALCIUM SERPL-MCNC: 9.9 MG/DL (ref 8.3–10.6)
CHLORIDE BLD-SCNC: 97 MMOL/L (ref 99–110)
CO2: 33 MMOL/L (ref 21–32)
CREAT SERPL-MCNC: 1.3 MG/DL (ref 0.6–1.2)
GFR AFRICAN AMERICAN: 49
GFR NON-AFRICAN AMERICAN: 40
GLUCOSE BLD-MCNC: 98 MG/DL (ref 70–99)
POTASSIUM SERPL-SCNC: 3.9 MMOL/L (ref 3.5–5.1)
SODIUM BLD-SCNC: 149 MMOL/L (ref 136–145)

## 2018-06-25 RX ORDER — ARFORMOTEROL TARTRATE 15 UG/2ML
1 SOLUTION RESPIRATORY (INHALATION) 2 TIMES DAILY
Qty: 120 ML | Refills: 5 | Status: SHIPPED | OUTPATIENT
Start: 2018-06-25 | End: 2018-10-23 | Stop reason: SDUPTHER

## 2018-06-26 ENCOUNTER — TELEPHONE (OUTPATIENT)
Dept: PULMONOLOGY | Age: 73
End: 2018-06-26

## 2018-06-27 ENCOUNTER — NURSE ONLY (OUTPATIENT)
Dept: FAMILY MEDICINE CLINIC | Age: 73
End: 2018-06-27

## 2018-06-27 VITALS — SYSTOLIC BLOOD PRESSURE: 132 MMHG | DIASTOLIC BLOOD PRESSURE: 84 MMHG

## 2018-06-27 DIAGNOSIS — J43.2 CENTRILOBULAR EMPHYSEMA (HCC): Primary | ICD-10-CM

## 2018-06-27 DIAGNOSIS — J44.9 CHRONIC OBSTRUCTIVE PULMONARY DISEASE, UNSPECIFIED COPD TYPE (HCC): ICD-10-CM

## 2018-06-27 DIAGNOSIS — Z01.30 BLOOD PRESSURE CHECK: Primary | ICD-10-CM

## 2018-06-28 ENCOUNTER — TELEPHONE (OUTPATIENT)
Dept: PULMONOLOGY | Age: 73
End: 2018-06-28

## 2018-06-28 ENCOUNTER — HOSPITAL ENCOUNTER (OUTPATIENT)
Dept: OTHER | Age: 73
Discharge: HOME | End: 2018-06-28
Attending: NURSE PRACTITIONER | Admitting: NURSE PRACTITIONER

## 2018-06-28 ENCOUNTER — OFFICE VISIT (OUTPATIENT)
Dept: FAMILY MEDICINE CLINIC | Age: 73
End: 2018-06-28

## 2018-06-28 VITALS
BODY MASS INDEX: 32.81 KG/M2 | SYSTOLIC BLOOD PRESSURE: 130 MMHG | RESPIRATION RATE: 22 BRPM | OXYGEN SATURATION: 93 % | WEIGHT: 168 LBS | HEART RATE: 73 BPM | DIASTOLIC BLOOD PRESSURE: 60 MMHG

## 2018-06-28 DIAGNOSIS — J44.9 COPD, SEVERE (HCC): Primary | ICD-10-CM

## 2018-06-28 DIAGNOSIS — J44.9 CHRONIC OBSTRUCTIVE PULMONARY DISEASE, UNSPECIFIED COPD TYPE (HCC): ICD-10-CM

## 2018-06-28 DIAGNOSIS — J43.2 CENTRILOBULAR EMPHYSEMA (HCC): ICD-10-CM

## 2018-06-28 DIAGNOSIS — J44.9 COPD, SEVERE (HCC): ICD-10-CM

## 2018-06-28 DIAGNOSIS — I10 ESSENTIAL HYPERTENSION: ICD-10-CM

## 2018-06-28 LAB
ALBUMIN SERPL-MCNC: 3.7 G/DL (ref 3.4–5)
ANION GAP SERPL CALCULATED.3IONS-SCNC: 14 MMOL/L (ref 3–16)
BUN BLDV-MCNC: 39 MG/DL (ref 7–20)
CALCIUM SERPL-MCNC: 9.3 MG/DL (ref 8.3–10.6)
CHLORIDE BLD-SCNC: 99 MMOL/L (ref 99–110)
CO2: 29 MMOL/L (ref 21–32)
CREAT SERPL-MCNC: 1.6 MG/DL (ref 0.6–1.2)
GFR AFRICAN AMERICAN: 38
GFR NON-AFRICAN AMERICAN: 32
GLUCOSE BLD-MCNC: 103 MG/DL (ref 70–99)
PHOSPHORUS: 4.1 MG/DL (ref 2.5–4.9)
POTASSIUM SERPL-SCNC: 4.5 MMOL/L (ref 3.5–5.1)
SODIUM BLD-SCNC: 142 MMOL/L (ref 136–145)

## 2018-06-28 PROCEDURE — 99214 OFFICE O/P EST MOD 30 MIN: CPT | Performed by: NURSE PRACTITIONER

## 2018-06-28 PROCEDURE — G8598 ASA/ANTIPLAT THER USED: HCPCS | Performed by: NURSE PRACTITIONER

## 2018-06-28 PROCEDURE — 96372 THER/PROPH/DIAG INJ SC/IM: CPT | Performed by: NURSE PRACTITIONER

## 2018-06-28 PROCEDURE — G8417 CALC BMI ABV UP PARAM F/U: HCPCS | Performed by: NURSE PRACTITIONER

## 2018-06-28 PROCEDURE — G8926 SPIRO NO PERF OR DOC: HCPCS | Performed by: NURSE PRACTITIONER

## 2018-06-28 PROCEDURE — 1036F TOBACCO NON-USER: CPT | Performed by: NURSE PRACTITIONER

## 2018-06-28 PROCEDURE — G8427 DOCREV CUR MEDS BY ELIG CLIN: HCPCS | Performed by: NURSE PRACTITIONER

## 2018-06-28 PROCEDURE — G8399 PT W/DXA RESULTS DOCUMENT: HCPCS | Performed by: NURSE PRACTITIONER

## 2018-06-28 PROCEDURE — 1090F PRES/ABSN URINE INCON ASSESS: CPT | Performed by: NURSE PRACTITIONER

## 2018-06-28 PROCEDURE — 3017F COLORECTAL CA SCREEN DOC REV: CPT | Performed by: NURSE PRACTITIONER

## 2018-06-28 PROCEDURE — 3023F SPIROM DOC REV: CPT | Performed by: NURSE PRACTITIONER

## 2018-06-28 PROCEDURE — 1111F DSCHRG MED/CURRENT MED MERGE: CPT | Performed by: NURSE PRACTITIONER

## 2018-06-28 PROCEDURE — 1123F ACP DISCUSS/DSCN MKR DOCD: CPT | Performed by: NURSE PRACTITIONER

## 2018-06-28 PROCEDURE — 4040F PNEUMOC VAC/ADMIN/RCVD: CPT | Performed by: NURSE PRACTITIONER

## 2018-06-28 RX ORDER — METHYLPREDNISOLONE ACETATE 80 MG/ML
80 INJECTION, SUSPENSION INTRA-ARTICULAR; INTRALESIONAL; INTRAMUSCULAR; SOFT TISSUE ONCE
Qty: 1 ML | Refills: 0
Start: 2018-06-28 | End: 2018-06-28 | Stop reason: CLARIF

## 2018-06-28 RX ORDER — METHYLPREDNISOLONE ACETATE 80 MG/ML
80 INJECTION, SUSPENSION INTRA-ARTICULAR; INTRALESIONAL; INTRAMUSCULAR; SOFT TISSUE ONCE
Status: COMPLETED | OUTPATIENT
Start: 2018-06-28 | End: 2018-06-28

## 2018-06-28 RX ORDER — PREDNISONE 10 MG/1
TABLET ORAL
Qty: 53 TABLET | Refills: 0 | Status: SHIPPED | OUTPATIENT
Start: 2018-06-28 | End: 2018-07-08

## 2018-06-28 RX ADMIN — METHYLPREDNISOLONE ACETATE 80 MG: 80 INJECTION, SUSPENSION INTRA-ARTICULAR; INTRALESIONAL; INTRAMUSCULAR; SOFT TISSUE at 11:39

## 2018-06-28 ASSESSMENT — ENCOUNTER SYMPTOMS
CHEST TIGHTNESS: 0
COUGH: 1
VOMITING: 0
NAUSEA: 0
SHORTNESS OF BREATH: 1
WHEEZING: 0

## 2018-06-29 DIAGNOSIS — R06.02 SOB (SHORTNESS OF BREATH): ICD-10-CM

## 2018-06-29 DIAGNOSIS — I73.9 PAD (PERIPHERAL ARTERY DISEASE) (HCC): ICD-10-CM

## 2018-06-29 DIAGNOSIS — I25.10 CORONARY ARTERY DISEASE INVOLVING NATIVE CORONARY ARTERY OF NATIVE HEART WITHOUT ANGINA PECTORIS: ICD-10-CM

## 2018-06-29 LAB — URIC ACID, SERUM: 8.5 MG/DL (ref 2.6–6)

## 2018-06-29 RX ORDER — TORSEMIDE 20 MG/1
20 TABLET ORAL 2 TIMES DAILY
Qty: 60 TABLET | Refills: 5 | Status: SHIPPED | OUTPATIENT
Start: 2018-06-29 | End: 2018-08-24

## 2018-06-29 RX ORDER — LEVOTHYROXINE SODIUM 0.05 MG/1
TABLET ORAL
Qty: 30 TABLET | Refills: 0 | Status: SHIPPED | OUTPATIENT
Start: 2018-06-29 | End: 2018-07-20 | Stop reason: SDUPTHER

## 2018-06-29 RX ORDER — ATORVASTATIN CALCIUM 40 MG/1
40 TABLET, FILM COATED ORAL DAILY
Qty: 30 TABLET | Refills: 5 | Status: CANCELLED | OUTPATIENT
Start: 2018-06-29

## 2018-07-02 DIAGNOSIS — I10 ESSENTIAL HYPERTENSION: ICD-10-CM

## 2018-07-03 DIAGNOSIS — K21.9 GASTROESOPHAGEAL REFLUX DISEASE, ESOPHAGITIS PRESENCE NOT SPECIFIED: ICD-10-CM

## 2018-07-03 LAB
ANION GAP SERPL CALCULATED.3IONS-SCNC: 16 MMOL/L (ref 3–16)
BUN BLDV-MCNC: 30 MG/DL (ref 7–20)
CALCIUM SERPL-MCNC: 10.3 MG/DL (ref 8.3–10.6)
CHLORIDE BLD-SCNC: 95 MMOL/L (ref 99–110)
CO2: 31 MMOL/L (ref 21–32)
CREAT SERPL-MCNC: 1.4 MG/DL (ref 0.6–1.2)
GFR AFRICAN AMERICAN: 45
GFR NON-AFRICAN AMERICAN: 37
GLUCOSE BLD-MCNC: 138 MG/DL (ref 70–99)
POTASSIUM SERPL-SCNC: 4.5 MMOL/L (ref 3.5–5.1)
SODIUM BLD-SCNC: 142 MMOL/L (ref 136–145)

## 2018-07-03 RX ORDER — LANSOPRAZOLE 15 MG/1
15 CAPSULE, DELAYED RELEASE ORAL DAILY
Qty: 90 CAPSULE | Refills: 1 | Status: SHIPPED | OUTPATIENT
Start: 2018-07-03 | End: 2018-08-20 | Stop reason: SDUPTHER

## 2018-07-03 NOTE — TELEPHONE ENCOUNTER
From: Parvin Barraza  Sent: 7/2/2018 7:38 PM EDT  Subject: Medication Renewal Request    David Romeo would like a refill of the following medications:     aspirin 81 MG tablet Dakota Irwin MD]    Preferred pharmacy: [facility] [facility] [phone] - F [phone]    Comment:      Medication renewals requested in this message routed separately:     lansoprazole (PREVACID) 15 MG delayed release capsule [Hans Webber MD]

## 2018-07-04 LAB — ALDOSTERONE: 20 NG/DL

## 2018-07-10 ENCOUNTER — OFFICE VISIT (OUTPATIENT)
Dept: CARDIOLOGY CLINIC | Age: 73
End: 2018-07-10

## 2018-07-10 VITALS
WEIGHT: 172 LBS | SYSTOLIC BLOOD PRESSURE: 180 MMHG | OXYGEN SATURATION: 98 % | DIASTOLIC BLOOD PRESSURE: 72 MMHG | HEIGHT: 60 IN | HEART RATE: 64 BPM | BODY MASS INDEX: 33.77 KG/M2

## 2018-07-10 DIAGNOSIS — I73.9 PAD (PERIPHERAL ARTERY DISEASE) (HCC): ICD-10-CM

## 2018-07-10 DIAGNOSIS — N18.9 CHRONIC KIDNEY DISEASE, UNSPECIFIED CKD STAGE: ICD-10-CM

## 2018-07-10 DIAGNOSIS — I25.10 CORONARY ARTERY DISEASE INVOLVING NATIVE CORONARY ARTERY OF NATIVE HEART WITHOUT ANGINA PECTORIS: ICD-10-CM

## 2018-07-10 DIAGNOSIS — I65.23 CAROTID ARTERY STENOSIS WITHOUT CEREBRAL INFARCTION, BILATERAL: ICD-10-CM

## 2018-07-10 DIAGNOSIS — J44.9 CHRONIC OBSTRUCTIVE PULMONARY DISEASE, UNSPECIFIED COPD TYPE (HCC): ICD-10-CM

## 2018-07-10 DIAGNOSIS — I50.32 CHRONIC DIASTOLIC HF (HEART FAILURE) (HCC): Primary | ICD-10-CM

## 2018-07-10 DIAGNOSIS — I10 ESSENTIAL HYPERTENSION: ICD-10-CM

## 2018-07-10 PROCEDURE — 3023F SPIROM DOC REV: CPT | Performed by: NURSE PRACTITIONER

## 2018-07-10 PROCEDURE — 1123F ACP DISCUSS/DSCN MKR DOCD: CPT | Performed by: NURSE PRACTITIONER

## 2018-07-10 PROCEDURE — G8427 DOCREV CUR MEDS BY ELIG CLIN: HCPCS | Performed by: NURSE PRACTITIONER

## 2018-07-10 PROCEDURE — 1090F PRES/ABSN URINE INCON ASSESS: CPT | Performed by: NURSE PRACTITIONER

## 2018-07-10 PROCEDURE — 99214 OFFICE O/P EST MOD 30 MIN: CPT | Performed by: NURSE PRACTITIONER

## 2018-07-10 PROCEDURE — 3017F COLORECTAL CA SCREEN DOC REV: CPT | Performed by: NURSE PRACTITIONER

## 2018-07-10 PROCEDURE — G8399 PT W/DXA RESULTS DOCUMENT: HCPCS | Performed by: NURSE PRACTITIONER

## 2018-07-10 PROCEDURE — 4040F PNEUMOC VAC/ADMIN/RCVD: CPT | Performed by: NURSE PRACTITIONER

## 2018-07-10 PROCEDURE — 1111F DSCHRG MED/CURRENT MED MERGE: CPT | Performed by: NURSE PRACTITIONER

## 2018-07-10 PROCEDURE — 93000 ELECTROCARDIOGRAM COMPLETE: CPT | Performed by: NURSE PRACTITIONER

## 2018-07-10 PROCEDURE — 1036F TOBACCO NON-USER: CPT | Performed by: NURSE PRACTITIONER

## 2018-07-10 PROCEDURE — G8598 ASA/ANTIPLAT THER USED: HCPCS | Performed by: NURSE PRACTITIONER

## 2018-07-10 PROCEDURE — G8926 SPIRO NO PERF OR DOC: HCPCS | Performed by: NURSE PRACTITIONER

## 2018-07-10 PROCEDURE — G8417 CALC BMI ABV UP PARAM F/U: HCPCS | Performed by: NURSE PRACTITIONER

## 2018-07-10 RX ORDER — HYDRALAZINE HYDROCHLORIDE 50 MG/1
50 TABLET, FILM COATED ORAL 3 TIMES DAILY
Qty: 90 TABLET | Refills: 3 | Status: SHIPPED | OUTPATIENT
Start: 2018-07-10 | End: 2018-08-24 | Stop reason: SDUPTHER

## 2018-07-10 NOTE — LETTER
[facility] [address]  Phone: [phone]  Fax: [phone]    Nisreenmacalvin Doshi, GIULIANO - MILDRED        July 10, 2018     Becky Orta MD  [address] [place] [address] Drive [address] [address]    Patient: Isaac Wallace  MR Number: [id number]  YOB: 1945  Date of Visit: 7/10/2018    Dear Dr. John Tidwell Day: Thank you for the request for consultation for [facility]. HPI:  The patient is 81 y.o. female with a past medical history significant for CAD/CABG, HTN, COPD, bilateral carotid disease and sleep apnea her for hospital follow up. She was hospitalized from 6/11/2018-6/14/2018 with SOB, respiratory failure and admitted with PNA and DHF. She was diuresed and received antibiotics with improvement in her symptoms. Her Pro-BNP was 1480. She has been followed by Dr. Mauricio Jones in our office. Seen by Dr. Mauricio Jones on 4/6/2018 d/t elevated BP (SBP > 200), tachycardia and chest pain. On prednisone at that time for temporal arteritis. Her meds were adjusted and she was seen in office follow up on 4/19/2018 and noted to have orthostatic hypotension and amlodipine was decreased. She was seen in office again on 6/8/2018 and was noted to not be feeling well with increased BP and dizziness. Meds were adjusted at that time. Overall doing okay since discharge from hospital. Her BP has been running high 159//112. Lisinopril dc'd in hospital d/t elevated Cr. Remains on O2 24/7. Has palpitations, dizziness and chest tightness when BP goes up and improves after BP decreases. SOB has been worse recently d/t elevated humidity and heat. Denies  orthopnea/PND, cough,  syncope, edema , weight change or claudication. No regular exercise. Was scheduled to have a renal scan performed, but nephrology has ot called her to schedule. Monitoring sodium intake closely.  Using BiPap nightly    Assessment: 1. Chronic diastolic HF (heart failure) (HCC)  -currently compensated after recent hospitalzation for decompensation  -NYHA class III  -not on ACE-I/ARB/aldactone d/t CKD  -continue carvedilol, hydralazine and diuretic    2. PAD (peripheral artery disease) (HCC)  -mild-moderate disease on peripheral angiogram in 2014  -prior aorto-bifem graft  -Arterial dopplers LE 8/2017 with normal ELOISE's  -continue statin and DAPT    3. Essential Hypertension  -elevated and labile  -continue medical management with increase in hydralazine    4. Coronary artery disease involving native coronary artery of native heart without angina pectoris  -noted on cardiac cath in 9/2017 (50^ mid LAD stenosis)  -continue medical management with ASA, statin and BB  -risk factor modification    5. Chronic obstructive pulmonary disease, unspecified COPD type ([facility]  -on home O2  -follows pulmonary    6. Carotid artery stenosis without cerebral infarction, bilateral  -bilateral carotids 50-79% noted on carotid doppler 8/2017  -CTA neck unremarkable in 1/2018     7. Chronic kidney disease, unspecified CKD stage  -last Cr 1.4     8. Sleep apnea  -uses BiPap    Plan:    Increase Hydralazine to 50 mg TID  Continue with torsemide, plavix, clonidine, carvedilol, statin, ASA and amlodipine  Emphasized low-fat/low sodium diet, monitoring of daily weights, fluid restriction, worsening signs and symptoms of heart failure and when to call, and the importance of regular exercise and activity. Follow up with Nephrology (Dr. Andrzej Fink)  Follow up with D. Enzweiler, CNP in 2-4 weeks or sooner if needed    Return in about 2 weeks (around 7/24/2018) for 2-4 weeks with D. Enzweiler, CNP. Thanks for allowing me to participate in the care of this patient. If you have questions, please do not hesitate to call me. I look forward to following Shaye Yao along with you.     Sincerely,        GIULIANO Woodson - MILDRED

## 2018-07-10 NOTE — PATIENT INSTRUCTIONS
Increase Hydralazine to 50 mg three times a day    Continue other medications    Try taking 2nd torsemide tablet and amlodipine in afternoon with 2nd dose of Hydralazine    Take carvedilol and clonidine in the morning and at night (can space 1-2 hours between these)    Take Torsemide in the morning with morning medications

## 2018-07-10 NOTE — PROGRESS NOTES
[facility]  Office Visit    Michelle [id number]  1945    July 10, 2018    CC:   Chief Complaint   Patient presents with    Congestive Heart Failure     pad,htn,cad/ hospital f/u/ pt states that she feels rough today because its so hot today its causing her to SOB/ but no other cardiac complaints/ pt states that her blood pressue has been up very high lately,she has documentation of her BP.     HPI:  The patient is 81 y.o. female with a past medical history significant for CAD/CABG, HTN, COPD, bilateral carotid disease and sleep apnea her for hospital follow up. She was hospitalized from 6/11/2018-6/14/2018 with SOB, respiratory failure and admitted with PNA and DHF. She was diuresed and received antibiotics with improvement in her symptoms. Her Pro-BNP was 1480. She has been followed by Dr. Gold Figueroa in our office. Seen by Dr. Gold Figueroa on 4/6/2018 d/t elevated BP (SBP > 200), tachycardia and chest pain. On prednisone at that time for temporal arteritis. Her meds were adjusted and she was seen in office follow up on 4/19/2018 and noted to have orthostatic hypotension and amlodipine was decreased. She was seen in office again on 6/8/2018 and was noted to not be feeling well with increased BP and dizziness. Meds were adjusted at that time. Overall doing okay since discharge from hospital. Her BP has been running high 159//112. Lisinopril dc'd in hospital d/t elevated Cr. Remains on O2 24/7. Has palpitations, dizziness and chest tightness when BP goes up and improves after BP decreases. SOB has been worse recently d/t elevated humidity and heat. Denies  orthopnea/PND, cough,  syncope, edema , weight change or claudication. No regular exercise. Was scheduled to have a renal scan performed, but nephrology has ot called her to schedule. Monitoring sodium intake closely. Using BiPap nightly      Review of Systems:  Constitutional: Denies night sweats or fever.    + weakness and fatigue person, place, and time and self. In no acute distress. Face flushed  HEENT: Normocephalic and atraumatic. Sclerae anicteric. No xanthelasmas. EOM's intact  Neck: Neck supple. No JVD present. Carotids with bilateral bruits. No thyromegaly present. Cardiovascular: RRR, normal S1 and S2; + S4 gallop  Pulmonary/Chest: Effort normal.  Lungs clear to auscultation. Chest wall nontender  Abdominal: soft, nontender, nondistended. + bowel sounds; no hepatomegaly  Extremities: No edema, cyanosis. Pulses are 2+ radial/DP/PT bilaterally. Cap refill brisk. Neurological: No focal deficit. Skin: Skin is warm and dry. Psychiatric: She has a normal mood and affect. Her speech is normal and behavior is normal.     Lab Review:   Lab Results   Component Value Date    TRIG 354 01/09/2018    HDL 43 01/09/2018    LDLCALC see below 01/09/2018    LDLDIRECT 120 01/09/2018    LABVLDL see below 01/09/2018     Lab Results   Component Value Date     07/02/2018    K 4.5 07/02/2018    K 4.2 06/11/2018    CL 95 07/02/2018    CO2 31 07/02/2018    BUN 30 07/02/2018    CREATININE 1.4 07/02/2018    GLUCOSE 138 07/02/2018    CALCIUM 10.3 07/02/2018      Lab Results   Component Value Date    WBC 14.5 (H) 06/13/2018    HGB 10.7 (L) 06/13/2018    HCT 33.6 (L) 06/13/2018    MCV 84.3 06/13/2018     06/13/2018     ECG 7/10/2018:   Sinus rhythm with nonspecific ST-T abnormalities; short PA interval    Echo 6/12/2018:  Summary   Left ventricular cavity size is normal.   Normal left ventricular wall thickness.   Ejection fraction is visually estimated to be 55-60%.   There is reversal of E/A inflow velocities across the mitral valve.   Mitral valve leaflets appear mildly thickened.   Mild mitral regurgitation is present.   The left atrium is at the upper limits of normal in size.   Mild tricuspid regurgitation.  IVC size is dilated (>2.1 cm) and collapses < 50% with respiration   consistent with elevated RA pressure (15 mmHg).    Estimated pulmonary artery systolic pressure is moderately elevated at 47  mmHg assuming a right atrial pressure of 15 mmHg. c/w mild pulmonary HTN.     9/2017 Cardiac cath:  ANGIOGRAPHY FINDINGS:  1. The left main comes from the left coronary cusp. There is normal ELIER 3 flow. 2.  The left anterior descending artery comes from the left main coronary artery. It has ELIER 3 flow. The midportion has 50% stenosis. 3.  The left circumflex comes from the left main. It is nondominant. No significant stenosis was seen. 4.  The right coronary artery comes from the right coronary cusp. It is dominant giving rise to the right posterior descending artery and posterolateral branch and has no significant stenosis. 5.  LV ejection fraction is 60%.     SUMMARY:  Nonobstructive coronary artery disease with normal LV ejection fraction. LVEDP was 20 mmHg. 8/2017 Lexiscan-Myoview:  Summary    Abnormal study. There is a moderate sized, moderate intensity, reversible    defect of the mid to apical inferior wall which is consistent with ischemia.    Normal LV size and systolic function.    Overall findings represent an intermediate risk study. Carotid US 8/28/2017:  Jean Pierre Preston left and right internal carotid artery appears to have a 50-79%     Arterial dopplers LE 8/28/2017:  Right Impression   Right ELOISE: 1.1. This is consistent with no significant PAD at rest.   Right first toe/brachial index not calculated due to calcified vessels. Continuous wave Doppler is normal throughout the right lower extremity. Arterial waveforms for digits 1-5 appear normal.   Left Impression   Left ELOISE: 1.1. This is consistent with no significant PAD at rest.   Left first toe/brachial index not calculated due to calcified vessels. Continuous wave Doppler is normal throughout the left lower extremity.    Arterial waveforms for digits 1-5 appear normal.       Echo 7/2017:  Left ventricle size is normal.   There is mild concentric LV hypertrophy with D. Enzweiler, CNP in 2-4 weeks or sooner if needed    Return in about 2 weeks (around 7/24/2018) for 2-4 weeks with D. Enzweiler, CNP. Thanks for allowing me to participate in the care of this patient.     Michael Will, [address] [facility] [address], [address] [place] [address]  Office: [phone]  Fax: [phone]

## 2018-07-12 ENCOUNTER — OFFICE VISIT (OUTPATIENT)
Dept: FAMILY MEDICINE CLINIC | Age: 73
End: 2018-07-12

## 2018-07-12 ENCOUNTER — PATIENT MESSAGE (OUTPATIENT)
Dept: FAMILY MEDICINE CLINIC | Age: 73
End: 2018-07-12

## 2018-07-12 VITALS
BODY MASS INDEX: 32.94 KG/M2 | SYSTOLIC BLOOD PRESSURE: 106 MMHG | RESPIRATION RATE: 18 BRPM | WEIGHT: 167.8 LBS | TEMPERATURE: 98.2 F | DIASTOLIC BLOOD PRESSURE: 62 MMHG | HEIGHT: 60 IN | HEART RATE: 72 BPM | OXYGEN SATURATION: 91 %

## 2018-07-12 DIAGNOSIS — N18.30 CKD (CHRONIC KIDNEY DISEASE), STAGE III (HCC): ICD-10-CM

## 2018-07-12 DIAGNOSIS — F06.8 ANXIETY DISORDER DUE TO MULTIPLE MEDICAL PROBLEMS: ICD-10-CM

## 2018-07-12 DIAGNOSIS — I10 ESSENTIAL HYPERTENSION: Primary | ICD-10-CM

## 2018-07-12 DIAGNOSIS — Z90.49 HISTORY OF COLON RESECTION: ICD-10-CM

## 2018-07-12 DIAGNOSIS — Z43.3 COLOSTOMY CARE (HCC): ICD-10-CM

## 2018-07-12 DIAGNOSIS — J44.9 COPD, SEVERE (HCC): ICD-10-CM

## 2018-07-12 PROBLEM — N17.9 ACUTE-ON-CHRONIC KIDNEY INJURY (HCC): Status: RESOLVED | Noted: 2018-03-05 | Resolved: 2018-07-12

## 2018-07-12 PROBLEM — J96.01 ACUTE RESPIRATORY FAILURE WITH HYPOXIA (HCC): Status: RESOLVED | Noted: 2018-03-06 | Resolved: 2018-07-12

## 2018-07-12 PROBLEM — J96.22 ACUTE ON CHRONIC RESPIRATORY FAILURE WITH HYPOXIA AND HYPERCAPNIA (HCC): Status: RESOLVED | Noted: 2018-06-10 | Resolved: 2018-07-12

## 2018-07-12 PROBLEM — J96.21 ACUTE ON CHRONIC RESPIRATORY FAILURE WITH HYPOXIA AND HYPERCAPNIA (HCC): Status: RESOLVED | Noted: 2018-06-10 | Resolved: 2018-07-12

## 2018-07-12 PROBLEM — N18.9 ACUTE-ON-CHRONIC KIDNEY INJURY (HCC): Status: RESOLVED | Noted: 2018-03-05 | Resolved: 2018-07-12

## 2018-07-12 PROBLEM — R07.9 CHEST PAIN AT REST: Status: RESOLVED | Noted: 2018-04-06 | Resolved: 2018-07-12

## 2018-07-12 PROBLEM — J98.11 ATELECTASIS: Status: RESOLVED | Noted: 2018-03-06 | Resolved: 2018-07-12

## 2018-07-12 PROCEDURE — 1036F TOBACCO NON-USER: CPT | Performed by: FAMILY MEDICINE

## 2018-07-12 PROCEDURE — 1101F PT FALLS ASSESS-DOCD LE1/YR: CPT | Performed by: FAMILY MEDICINE

## 2018-07-12 PROCEDURE — G8417 CALC BMI ABV UP PARAM F/U: HCPCS | Performed by: FAMILY MEDICINE

## 2018-07-12 PROCEDURE — G8598 ASA/ANTIPLAT THER USED: HCPCS | Performed by: FAMILY MEDICINE

## 2018-07-12 PROCEDURE — 4040F PNEUMOC VAC/ADMIN/RCVD: CPT | Performed by: FAMILY MEDICINE

## 2018-07-12 PROCEDURE — G8926 SPIRO NO PERF OR DOC: HCPCS | Performed by: FAMILY MEDICINE

## 2018-07-12 PROCEDURE — 99213 OFFICE O/P EST LOW 20 MIN: CPT | Performed by: FAMILY MEDICINE

## 2018-07-12 PROCEDURE — 1123F ACP DISCUSS/DSCN MKR DOCD: CPT | Performed by: FAMILY MEDICINE

## 2018-07-12 PROCEDURE — 3023F SPIROM DOC REV: CPT | Performed by: FAMILY MEDICINE

## 2018-07-12 PROCEDURE — G8427 DOCREV CUR MEDS BY ELIG CLIN: HCPCS | Performed by: FAMILY MEDICINE

## 2018-07-12 PROCEDURE — 1111F DSCHRG MED/CURRENT MED MERGE: CPT | Performed by: FAMILY MEDICINE

## 2018-07-12 PROCEDURE — 3017F COLORECTAL CA SCREEN DOC REV: CPT | Performed by: FAMILY MEDICINE

## 2018-07-12 PROCEDURE — 1090F PRES/ABSN URINE INCON ASSESS: CPT | Performed by: FAMILY MEDICINE

## 2018-07-12 PROCEDURE — G8399 PT W/DXA RESULTS DOCUMENT: HCPCS | Performed by: FAMILY MEDICINE

## 2018-07-12 RX ORDER — LORAZEPAM 0.5 MG/1
0.5 TABLET ORAL DAILY PRN
Qty: 30 TABLET | Refills: 0 | Status: SHIPPED | OUTPATIENT
Start: 2018-07-12 | End: 2018-09-17 | Stop reason: SDUPTHER

## 2018-07-12 ASSESSMENT — PATIENT HEALTH QUESTIONNAIRE - PHQ9
SUM OF ALL RESPONSES TO PHQ9 QUESTIONS 1 & 2: 0
2. FEELING DOWN, DEPRESSED OR HOPELESS: 0
1. LITTLE INTEREST OR PLEASURE IN DOING THINGS: 0
SUM OF ALL RESPONSES TO PHQ QUESTIONS 1-9: 0

## 2018-07-12 NOTE — PROGRESS NOTES
0.5 MG tablet Take 1 tablet by mouth daily as needed for Anxiety for up to 30 days. Adrien Webber MD     Health Maintenance   Topic Date Due    Hepatitis C screen  1945    DTaP/Tdap/Td vaccine (1 - Tdap) 09/20/1966    Shingles Vaccine (1 of 2 - 2 Dose Series) 09/20/1997    Colon cancer screen colonoscopy  09/20/1997    Flu vaccine (1) 09/01/2018    A1C test (Diabetic or Prediabetic)  01/09/2019    Breast cancer screen  04/07/2019    Potassium monitoring  07/02/2019    Creatinine monitoring  07/02/2019    Lipid screen  01/09/2023    DEXA (modify frequency per FRAX score)  Completed    Pneumococcal low/med risk  Completed     Past Medical History:   Diagnosis Date    CAD (coronary artery disease)     Nonobstructive on cath in 9/2017    [facility]     Colostomy care ([facility] 4/25/2018    Peristomal irritation and early leaking    COPD (chronic obstructive pulmonary disease) ([facility])     Hyperlipidemia     Hypertension     Kidney disease     Stage 3    Peripheral vascular disease ([facility]     Rectal fissure 11/2014    surgery pending    Restless legs syndrome     Sleep apnea     O2 3 liters at hs    Unspecified sleep apnea      Social History     Social History    Marital status:      Spouse name: N/A    Number of children: 6    Years of education: 15     Social History Main Topics    Smoking status: Former Smoker     Packs/day: 3.00     Years: 30.00     Quit date: 2/2/1992    Smokeless tobacco: Never Used      Comment: QUIT 21 YRS AGO    Alcohol use No    Drug use: No    Sexual activity: Not Currently     Other Topics Concern    Not on file     Social History Narrative    No narrative on file     Allergies   Allergen Reactions    Iv Dye [Iodides]      Flushed, broke out and difficulty breathing       LABS:  Lab Results   Component Value Date    GLUCOSE 138 (H) 07/02/2018     Lab Results   Component Value Date     07/02/2018    K 4.5 07/02/2018 place, and time. Skin: No pallor. Psychiatric: She has a normal mood and affect. ASSESSMENT/PLAN:  1. Essential hypertension  Her blood pressures continued to be labile. Asked her to follow up with her nephrologist Dr. Phuong Hope are to see if indeed has adjustment is needed    2. Colostomy care ([facility]  No signs of infection. She is having some discomfort and is concerned about hernia. If this continues she'll get a CT scan to evaluate  - CT ABDOMEN PELVIS WO CONTRAST Additional Contrast? None; Future    3. COPD, severe ([facility]  Follows with pulmonology    4. CKD (chronic kidney disease), stage III - sees Dr. Phuong Hope  As above encouraged close follow-up due to labile blood pressures. She is well-controlled here today    5.  History of colon resection  As above  - CT ABDOMEN PELVIS WO CONTRAST Additional Contrast? None; Future      Return in about 3 months (around 10/12/2018) for thyroid, BP .

## 2018-07-13 ASSESSMENT — ENCOUNTER SYMPTOMS: COUGH: 1

## 2018-07-17 ENCOUNTER — TELEPHONE (OUTPATIENT)
Dept: CARDIOLOGY CLINIC | Age: 73
End: 2018-07-17

## 2018-07-17 PROCEDURE — 93228 REMOTE 30 DAY ECG REV/REPORT: CPT | Performed by: NURSE PRACTITIONER

## 2018-07-18 DIAGNOSIS — R00.2 PALPITATIONS: ICD-10-CM

## 2018-07-18 DIAGNOSIS — R42 DIZZINESS: ICD-10-CM

## 2018-07-19 NOTE — TELEPHONE ENCOUNTER
Spoke with the patient and she is longer having any palpitations and is feeling much better. So per Fidel Hdez CNP-no need to reorder event monitor. Instructed patient to mail the monitor back to the company that she has at her home at this time. Patient v/u.

## 2018-07-20 ENCOUNTER — TELEPHONE (OUTPATIENT)
Dept: SURGERY | Age: 73
End: 2018-07-20

## 2018-07-20 DIAGNOSIS — I25.10 CORONARY ARTERY DISEASE INVOLVING NATIVE CORONARY ARTERY OF NATIVE HEART WITHOUT ANGINA PECTORIS: ICD-10-CM

## 2018-07-20 DIAGNOSIS — I65.23 BILATERAL CAROTID ARTERY STENOSIS: ICD-10-CM

## 2018-07-20 DIAGNOSIS — I10 ESSENTIAL HYPERTENSION: ICD-10-CM

## 2018-07-20 RX ORDER — CARVEDILOL 25 MG/1
25 TABLET ORAL 2 TIMES DAILY WITH MEALS
Qty: 60 TABLET | Refills: 5 | Status: SHIPPED | OUTPATIENT
Start: 2018-07-20 | End: 2018-08-20 | Stop reason: SDUPTHER

## 2018-07-20 RX ORDER — CARVEDILOL 25 MG/1
25 TABLET ORAL 2 TIMES DAILY WITH MEALS
Qty: 60 TABLET | Refills: 5 | Status: SHIPPED | OUTPATIENT
Start: 2018-07-20 | End: 2018-07-20 | Stop reason: SDUPTHER

## 2018-07-20 RX ORDER — CLOPIDOGREL BISULFATE 75 MG/1
75 TABLET ORAL DAILY
Qty: 30 TABLET | Refills: 5 | Status: SHIPPED | OUTPATIENT
Start: 2018-07-20 | End: 2018-07-20 | Stop reason: SDUPTHER

## 2018-07-20 RX ORDER — CLOPIDOGREL BISULFATE 75 MG/1
75 TABLET ORAL DAILY
Qty: 30 TABLET | Refills: 5 | Status: SHIPPED | OUTPATIENT
Start: 2018-07-20 | End: 2018-08-20 | Stop reason: SDUPTHER

## 2018-07-20 RX ORDER — LEVOTHYROXINE SODIUM 0.05 MG/1
TABLET ORAL
Qty: 30 TABLET | Refills: 3 | Status: SHIPPED | OUTPATIENT
Start: 2018-07-20 | End: 2018-08-20 | Stop reason: SDUPTHER

## 2018-07-20 RX ORDER — MONTELUKAST SODIUM 10 MG/1
10 TABLET ORAL DAILY
Qty: 30 TABLET | Refills: 5 | Status: SHIPPED | OUTPATIENT
Start: 2018-07-20 | End: 2018-08-20 | Stop reason: SDUPTHER

## 2018-07-20 NOTE — TELEPHONE ENCOUNTER
Called [employer] requested order request form to be re-faxed to our office, fax number verified.

## 2018-07-20 NOTE — TELEPHONE ENCOUNTER
Patient needs RX signed for ostomy supplies. They report sending a fax for this already. They also need office notes to submit to insurance. Does PCP take care of this?    If not, please fax to [address] [facility] @[phone]

## 2018-07-24 ENCOUNTER — OFFICE VISIT (OUTPATIENT)
Dept: CARDIOLOGY CLINIC | Age: 73
End: 2018-07-24

## 2018-07-24 VITALS
OXYGEN SATURATION: 96 % | BODY MASS INDEX: 33.77 KG/M2 | WEIGHT: 172 LBS | DIASTOLIC BLOOD PRESSURE: 80 MMHG | HEIGHT: 60 IN | SYSTOLIC BLOOD PRESSURE: 160 MMHG | HEART RATE: 72 BPM

## 2018-07-24 DIAGNOSIS — I10 ESSENTIAL HYPERTENSION: Primary | ICD-10-CM

## 2018-07-24 DIAGNOSIS — I25.10 CORONARY ARTERY DISEASE INVOLVING NATIVE CORONARY ARTERY OF NATIVE HEART WITHOUT ANGINA PECTORIS: ICD-10-CM

## 2018-07-24 DIAGNOSIS — I50.32 CHRONIC DIASTOLIC HF (HEART FAILURE) (HCC): ICD-10-CM

## 2018-07-24 DIAGNOSIS — I73.9 PAD (PERIPHERAL ARTERY DISEASE) (HCC): ICD-10-CM

## 2018-07-24 DIAGNOSIS — J44.9 CHRONIC OBSTRUCTIVE PULMONARY DISEASE, UNSPECIFIED COPD TYPE (HCC): ICD-10-CM

## 2018-07-24 PROCEDURE — 4040F PNEUMOC VAC/ADMIN/RCVD: CPT | Performed by: NURSE PRACTITIONER

## 2018-07-24 PROCEDURE — G8926 SPIRO NO PERF OR DOC: HCPCS | Performed by: NURSE PRACTITIONER

## 2018-07-24 PROCEDURE — 3023F SPIROM DOC REV: CPT | Performed by: NURSE PRACTITIONER

## 2018-07-24 PROCEDURE — 1101F PT FALLS ASSESS-DOCD LE1/YR: CPT | Performed by: NURSE PRACTITIONER

## 2018-07-24 PROCEDURE — G8427 DOCREV CUR MEDS BY ELIG CLIN: HCPCS | Performed by: NURSE PRACTITIONER

## 2018-07-24 PROCEDURE — G8417 CALC BMI ABV UP PARAM F/U: HCPCS | Performed by: NURSE PRACTITIONER

## 2018-07-24 PROCEDURE — 1036F TOBACCO NON-USER: CPT | Performed by: NURSE PRACTITIONER

## 2018-07-24 PROCEDURE — 1090F PRES/ABSN URINE INCON ASSESS: CPT | Performed by: NURSE PRACTITIONER

## 2018-07-24 PROCEDURE — 99214 OFFICE O/P EST MOD 30 MIN: CPT | Performed by: NURSE PRACTITIONER

## 2018-07-24 PROCEDURE — G8399 PT W/DXA RESULTS DOCUMENT: HCPCS | Performed by: NURSE PRACTITIONER

## 2018-07-24 PROCEDURE — G8598 ASA/ANTIPLAT THER USED: HCPCS | Performed by: NURSE PRACTITIONER

## 2018-07-24 PROCEDURE — 1123F ACP DISCUSS/DSCN MKR DOCD: CPT | Performed by: NURSE PRACTITIONER

## 2018-07-24 PROCEDURE — 3017F COLORECTAL CA SCREEN DOC REV: CPT | Performed by: NURSE PRACTITIONER

## 2018-07-24 RX ORDER — CLONIDINE HYDROCHLORIDE 0.2 MG/1
0.1 TABLET ORAL 2 TIMES DAILY
Qty: 30 TABLET | Refills: 0
Start: 2018-07-24 | End: 2018-08-24 | Stop reason: SDUPTHER

## 2018-07-24 NOTE — LETTER
[facility] [address]. [address]  Phone: [phone]  Fax: [phone]    GIULIANO See - CNP        July 24, 2018     María Gonzalez, [address] [address]    Patient: Kip Baker  MR Number: [id number]  YOB: 1945  Date of Visit: 7/24/2018    Dear Dr. Teofilo De La Rosa Day: Thank you for the request for consultation for [facility]. HPI:  The patient is 81 y.o. female with a past medical history significant for CAD/CABG, HTN, COPD, bilateral carotid disease and sleep apnea here for  follow up. She was hospitalized from 6/11/2018-6/14/2018 with SOB, respiratory failure and admitted with PNA and DHF. She was diuresed and received antibiotics with improvement in her symptoms. Her Pro-BNP was 1480. She was last seen in office on 7/10/2018 and BP elevated and hydralazine increased and meds adjusted. Her for follow up of her BP and med changes. Overall doing okay. Continues to have no energy and feels weak and fatigued. Chronic SOBOE has been improving. O2 sats continue to drop at times with increased walking. Uses O2 as needed and at night with BiPap. Occasional dry cough. Sleeps elevated on 3 pillows. Denies chest pain/discomfort,  palpitations, dizziness, syncope, edema , weight change or claudication. Still has issues with anxiety and takes Ativan. No regular exercise regimen. Assessment:    1. Essential hypertension  -remains labile  -continue carvedilol, hydralazine and diuretic  -adjust clonidine    2. Chronic diastolic HF (heart failure) (HCC)  -currently compensated; NYHA class III  -not on ACE-I/ARB/aldactone d/t CKD  -continue carvedilol, hydralazine and diuretic    3.  PAD (peripheral artery disease) (HCC)  -mild-moderate disease on peripheral angiogram in 2014  -prior aorto-bifem graft  -Arterial dopplers LE 8/2017 with normal ELOISE's  -continue statin and DAPT -no c/o claudication    4. Coronary artery disease involving native coronary artery of native heart without angina pectoris  -noted on cardiac cath in 9/2017 (50% mid LAD stenosis)  -continue medical management with ASA, statin and BB  -risk factor modification  -no recurrent angina    5. Chronic obstructive pulmonary disease, unspecified COPD type ([facility]  -on home O2  -follows pulmonary    6. Carotid artery stenosis without cerebral infarction, bilateral  -bilateral carotids 50-79% noted on carotid doppler 8/2017  -CTA neck unremarkable in 1/2018     7. Chronic kidney disease, unspecified CKD stage  -last Cr 1.4     8. Sleep apnea  -uses BiPap    Plan:    Increase Clonidine to 1 tablet at night and half tablet midday  Continue with torsemide, plavix, hydralazine, carvedilol, statin, ASA and amlodipine  Reinforced and discussed low-fat/low sodium diet, monitoring of daily weights, fluid restriction, worsening signs and symptoms of heart failure and when to call, and the importance of regular exercise and activity. D/W Dr. Cliff Martinez regarding renal doppler  Follow up with Nephrology (Dr. Freddy Sebastian)  Follow up with Dr. Cliff Martinez in 2 months    Return in about 2 months (around 9/24/2018) for with Dr. Cliff Martinez. Thanks for allowing me to participate in the care of this patient. If you have questions, please do not hesitate to call me. I look forward to following Janet Ho along with you.     Sincerely,        GIULIANO Valenzuela - CNP

## 2018-07-24 NOTE — PROGRESS NOTES
[facility]  Office Visit    Michelle [id number]  1945 July 24, 2018    CC:   Chief Complaint   Patient presents with    Coronary Artery Disease     pad,copd, 2 week f/u/pt states that she has a headache pt Sob with normal activity/ no other cardiac complaints at this time / pt didnt bring medication list today but states all medications are the same and current/ pt states  thats he BP has been running really high for the past week. HPI:  The patient is 81 y.o. female with a past medical history significant for CAD/CABG, HTN, COPD, bilateral carotid disease and sleep apnea here for  follow up. She was hospitalized from 6/11/2018-6/14/2018 with SOB, respiratory failure and admitted with PNA and DHF. She was diuresed and received antibiotics with improvement in her symptoms. Her Pro-BNP was 1480. She was last seen in office on 7/10/2018 and BP elevated and hydralazine increased and meds adjusted. Her for follow up of her BP and med changes. Overall doing okay. Continues to have no energy and feels weak and fatigued. Chronic SOBOE has been improving. O2 sats continue to drop at times with increased walking. Uses O2 as needed and at night with BiPap. Occasional dry cough. Sleeps elevated on 3 pillows. Denies chest pain/discomfort,  palpitations, dizziness, syncope, edema , weight change or claudication. Still has issues with anxiety and takes Ativan. No regular exercise regimen. Review of Systems:  Constitutional: Denies night sweats or fever. HEENT: Denies new visual changes, ringing in ears, nosebleeds   Respiratory: + SOBOE improving  Cardiovascular: see HPI  GI: Denies nausea/vomiting,  constipation, abdominal pain, change in bowel habits, melena or hematochezia   : Denies urinary frequency, urgency, incontinence, hematuria or dysuria.   Skin: Denies rash, hives, or cyanosis  Musculoskeletal: + occasional muscle/joint aching  Neurological: Denies syncope or TIA-like hepatomegaly  Extremities: No edema, cyanosis. Pulses are 2+ radial//pedal bilaterally. Cap refill brisk. Neurological: No focal deficit. Skin: Skin is warm and dry. Psychiatric: She has a normal mood and affect. Her speech is normal and behavior is normal.     Lab Review:   Lab Results   Component Value Date    TRIG 354 01/09/2018    HDL 43 01/09/2018    LDLCALC see below 01/09/2018    LDLDIRECT 120 01/09/2018    LABVLDL see below 01/09/2018     Lab Results   Component Value Date     07/02/2018    K 4.5 07/02/2018    K 4.2 06/11/2018    CL 95 07/02/2018    CO2 31 07/02/2018    BUN 30 07/02/2018    CREATININE 1.4 07/02/2018    GLUCOSE 138 07/02/2018    CALCIUM 10.3 07/02/2018      Lab Results   Component Value Date    WBC 14.5 (H) 06/13/2018    HGB 10.7 (L) 06/13/2018    HCT 33.6 (L) 06/13/2018    MCV 84.3 06/13/2018     06/13/2018     ECG 7/10/2018:   Sinus rhythm with nonspecific ST-T abnormalities; short AL interval    Echo 6/12/2018:  Summary   Left ventricular cavity size is normal.   Normal left ventricular wall thickness.   Ejection fraction is visually estimated to be 55-60%.   There is reversal of E/A inflow velocities across the mitral valve.   Mitral valve leaflets appear mildly thickened.   Mild mitral regurgitation is present.   The left atrium is at the upper limits of normal in size.   Mild tricuspid regurgitation.  IVC size is dilated (>2.1 cm) and collapses < 50% with respiration   consistent with elevated RA pressure (15 mmHg).  Estimated pulmonary artery systolic pressure is moderately elevated at 47  mmHg assuming a right atrial pressure of 15 mmHg. c/w mild pulmonary HTN.     9/2017 Cardiac cath:  ANGIOGRAPHY FINDINGS:  1. The left main comes from the left coronary cusp. There is normal ELIER 3 flow. 2.  The left anterior descending artery comes from the left main coronary artery. It has ELIER 3 flow. The midportion has 50% stenosis.   3.  The left circumflex comes from the

## 2018-07-25 NOTE — COMMUNICATION BODY
HPI:  The patient is 81 y.o. female with a past medical history significant for CAD/CABG, HTN, COPD, bilateral carotid disease and sleep apnea here for  follow up. She was hospitalized from 6/11/2018-6/14/2018 with SOB, respiratory failure and admitted with PNA and DHF. She was diuresed and received antibiotics with improvement in her symptoms. Her Pro-BNP was 1480. She was last seen in office on 7/10/2018 and BP elevated and hydralazine increased and meds adjusted. Her for follow up of her BP and med changes. Overall doing okay. Continues to have no energy and feels weak and fatigued. Chronic SOBOE has been improving. O2 sats continue to drop at times with increased walking. Uses O2 as needed and at night with BiPap. Occasional dry cough. Sleeps elevated on 3 pillows. Denies chest pain/discomfort,  palpitations, dizziness, syncope, edema , weight change or claudication. Still has issues with anxiety and takes Ativan. No regular exercise regimen. Assessment:    1. Essential hypertension  -remains labile  -continue carvedilol, hydralazine and diuretic  -adjust clonidine    2. Chronic diastolic HF (heart failure) (HCC)  -currently compensated; NYHA class III  -not on ACE-I/ARB/aldactone d/t CKD  -continue carvedilol, hydralazine and diuretic    3. PAD (peripheral artery disease) (HCC)  -mild-moderate disease on peripheral angiogram in 2014  -prior aorto-bifem graft  -Arterial dopplers LE 8/2017 with normal ELOISE's  -continue statin and DAPT  -no c/o claudication    4. Coronary artery disease involving native coronary artery of native heart without angina pectoris  -noted on cardiac cath in 9/2017 (50% mid LAD stenosis)  -continue medical management with ASA, statin and BB  -risk factor modification  -no recurrent angina    5. Chronic obstructive pulmonary disease, unspecified COPD type ([facility]  -on home O2  -follows pulmonary    6.  Carotid artery stenosis without cerebral infarction, bilateral  -bilateral

## 2018-07-26 ENCOUNTER — OFFICE VISIT (OUTPATIENT)
Dept: PULMONOLOGY | Age: 73
End: 2018-07-26

## 2018-07-26 VITALS
DIASTOLIC BLOOD PRESSURE: 79 MMHG | WEIGHT: 172.6 LBS | OXYGEN SATURATION: 92 % | RESPIRATION RATE: 16 BRPM | SYSTOLIC BLOOD PRESSURE: 195 MMHG | TEMPERATURE: 97.9 F | HEIGHT: 60 IN | HEART RATE: 89 BPM | BODY MASS INDEX: 33.89 KG/M2

## 2018-07-26 DIAGNOSIS — J43.2 CENTRILOBULAR EMPHYSEMA (HCC): Primary | ICD-10-CM

## 2018-07-26 DIAGNOSIS — G47.33 OSA (OBSTRUCTIVE SLEEP APNEA): ICD-10-CM

## 2018-07-26 PROCEDURE — 1101F PT FALLS ASSESS-DOCD LE1/YR: CPT | Performed by: INTERNAL MEDICINE

## 2018-07-26 PROCEDURE — G8427 DOCREV CUR MEDS BY ELIG CLIN: HCPCS | Performed by: INTERNAL MEDICINE

## 2018-07-26 PROCEDURE — G8598 ASA/ANTIPLAT THER USED: HCPCS | Performed by: INTERNAL MEDICINE

## 2018-07-26 PROCEDURE — 1090F PRES/ABSN URINE INCON ASSESS: CPT | Performed by: INTERNAL MEDICINE

## 2018-07-26 PROCEDURE — 3023F SPIROM DOC REV: CPT | Performed by: INTERNAL MEDICINE

## 2018-07-26 PROCEDURE — 1123F ACP DISCUSS/DSCN MKR DOCD: CPT | Performed by: INTERNAL MEDICINE

## 2018-07-26 PROCEDURE — 99214 OFFICE O/P EST MOD 30 MIN: CPT | Performed by: INTERNAL MEDICINE

## 2018-07-26 PROCEDURE — 4040F PNEUMOC VAC/ADMIN/RCVD: CPT | Performed by: INTERNAL MEDICINE

## 2018-07-26 PROCEDURE — G8926 SPIRO NO PERF OR DOC: HCPCS | Performed by: INTERNAL MEDICINE

## 2018-07-26 PROCEDURE — G8417 CALC BMI ABV UP PARAM F/U: HCPCS | Performed by: INTERNAL MEDICINE

## 2018-07-26 PROCEDURE — G8399 PT W/DXA RESULTS DOCUMENT: HCPCS | Performed by: INTERNAL MEDICINE

## 2018-07-26 PROCEDURE — 1036F TOBACCO NON-USER: CPT | Performed by: INTERNAL MEDICINE

## 2018-07-26 PROCEDURE — 3017F COLORECTAL CA SCREEN DOC REV: CPT | Performed by: INTERNAL MEDICINE

## 2018-07-26 RX ORDER — BUDESONIDE AND FORMOTEROL FUMARATE DIHYDRATE 160; 4.5 UG/1; UG/1
2 AEROSOL RESPIRATORY (INHALATION) 2 TIMES DAILY
Qty: 1 INHALER | Refills: 0 | Status: ON HOLD | COMMUNITY
Start: 2018-07-26 | End: 2018-10-13 | Stop reason: ALTCHOICE

## 2018-07-26 NOTE — PROGRESS NOTES
Chief complaint  This is a 81y.o. year old female  who presents with a chief complaint of   Chief Complaint   Patient presents with    Follow-up     COPD       HPI  82-year-old woman with severe COPD (FEV1 0.86 L, 41% predicted) and ANGEI (on BiPAP) presents for follow-up. She was admitted to [facility] in June 2018 for shortness of breath. She was treated for COPD and CHF. She reports that she does no activities because of feeling \"tired. \" She says she sleeps much of the day. She denies cough or sputum production. She checks her oxygen saturation which is at lowest 89%. She says it is usually in the low to mid 90s. She uses BiPAP at night with 5 L of oxygen. She has been using budesonide and formoterol nebulizers and Duonebs about twice a day, however, her nebulizer machine broke yesterday.        Past Medical History:   Diagnosis Date    CAD (coronary artery disease)     Nonobstructive on cath in 9/2017    [facility]     Colostomy care ([facility] 4/25/2018    Peristomal irritation and early leaking    COPD (chronic obstructive pulmonary disease) ([facility])     Hyperlipidemia     Hypertension     Kidney disease     Stage 3    Peripheral vascular disease ([facility]     Rectal fissure 11/2014    surgery pending    Restless legs syndrome     Sleep apnea     O2 3 liters at hs    Unspecified sleep apnea        Past Surgical History:   Procedure Laterality Date    CARDIAC CATHETERIZATION  09/13/2017    Dr. Lopez Adjutant  03/09/2018    CORONARY ARTERY BYPASS GRAFT      Legs & Carotid    HERNIA REPAIR         Current Outpatient Prescriptions   Medication Sig Dispense Refill    budesonide-formoterol (SYMBICORT) 160-4.5 MCG/ACT AERO Inhale 2 puffs into the lungs 2 times daily 1 Inhaler 0    cloNIDine (CATAPRES) 0.2 MG tablet Take 0.5 tablets by mouth 2 times daily 30 tablet 0    levothyroxine (SYNTHROID) 50 MCG tablet TAKE ONE TABLET BY MOUTH DAILY 30 tablet 3    Marital status:      Spouse name: N/A    Number of children: 6    Years of education: 15     Occupational History    Not on file. Social History Main Topics    Smoking status: Former Smoker     Packs/day: 3.00     Years: 30.00     Quit date: 2/2/1992    Smokeless tobacco: Never Used      Comment: QUIT 21 YRS AGO    Alcohol use No    Drug use: No    Sexual activity: Not Currently     Other Topics Concern    Not on file     Social History Narrative    No narrative on file       Immunization History   Administered Date(s) Administered    Influenza, High Dose 12/16/2015, 01/17/2018    Pneumococcal 13-valent Conjugate ([id number]) 01/17/2018    Pneumococcal Conjugate 7-valent 12/30/2013       ROS:  GENERAL:  No fevers, no chills, +8lb weight gain in 3 months  RESPIRATORY: +exertional shortness of breath, no wheezing, no cough      PHYSICAL EXAM:  Vitals:    07/26/18 1150   BP: 195/79   Pulse: 89   Resp: 16   Temp: 97.9 °F (36.6 °C)   SpO2: 92%   On RA    Gen: Well developed; well nourished  Eyes: No scleral icterus. No conjunctival injection. ENT:  Oropharynx clear. External appearance of ears and nose normal.  Neck: Trachea midline. No obvious mass. No visible thyroid enlargement    Respiratory: Clear to auscultation bilaterally, no accessory muscle use  Cardiovascular: Regular rate and rhythm, no appreciable murmurs. No edema. Gastrointestinal: Soft, non-tender. No hernia  Skin: Warm and dry. No rashes or ulcers on visible areas. Normal texture and turgor  Lymphatic: No cervical LAD. No supraclavicular LAD. Musculoskeletal: No cyanosis, clubbing or joint deformity.     Psychiatric: Normal mood and affect; exhibits normal insight and judgement     Pulmonary Function Testing (7/7/15)  FEV1 0.75 L at 36% predicted ---> 0.86 (41%)  FVC 1.54 L at 56% predicted---> 1.76 (64%)  FEV1/FVC ratio at 49%---> 49%  TLC 4.47 L at 97% predicted  VC 1.85L at 72% predicted  ERV 0.29L at 34% predicted  RV/TLC at 59% predicted  DLCO 11.7 at 60% predicted  DLCO/VA 4.26L at 115 % predicted      Overall: Severe lower airflow obstruction with reversal in FVC only; air trapping and mild reduction in diffusion       Six minute walk test:  10/27/16: Walked 303m, 70% predicted, normal; michael saturation 89%  4/19/18- Walked 213m, 51.5% predicted; michael saturation 89%     Images and reports of chest imaging were reviewed by me. My interpretation is:  CXR (6/28/18): Clear lung fields  Chest CT (4/8/15): Small mediastinal nodes; RML granuloma; RML and LLL atelectasis  Chest CT (6/11/18): No LAD; bilateral pleural effusions; centrilobular emphysema; diffuse ground glass opacities; atelectasis in the bilateral lower lobes and lingula      ECHO (6/12/18)  Summary   Left ventricular cavity size is normal.   Normal left ventricular wall thickness.   Ejection fraction is visually estimated to be 55-60%.  Norrine Maryland is reversal of E/A inflow velocities across the mitral valve.   Mitral valve leaflets appear mildly thickened.   Mild mitral regurgitation is present.   The left atrium is at the upper limits of normal in size.   Mild tricuspid regurgitation.  IVC size is dilated (>2.1 cm) and collapses < 50% with respiration   consistent with elevated RA pressure (15 mmHg).  Estimated pulmonary artery systolic pressure is moderately elevated at 47   mmHg assuming a right atrial pressure of 15 mmHg. c/w mild pulmonary HTN. Lab Results   Component Value Date    WBC 14.5 (H) 06/13/2018    HGB 10.7 (L) 06/13/2018    HCT 33.6 (L) 06/13/2018    MCV 84.3 06/13/2018     06/13/2018       No results found for: BNP    Lab Results   Component Value Date    CREATININE 1.4 (H) 07/02/2018    BUN 30 (H) 07/02/2018     07/02/2018    K 4.5 07/02/2018    CL 95 (L) 07/02/2018    CO2 31 07/02/2018         Assessment/Plan:81y.o. year old female presents for follow up. COPD- Has severe lower airflow obstruction on PFTs.  Will try to obtain a new nebulizer machine for her. In the meantime I have given her a sample of Symbicort 160/4.5 µg to be used twice daily. Continue Ventolin inhaler as needed. She has not attended pulmonary rehabilitation. Will obtain new PFTs and 6 minute walk test.    Obstructive sleep apnea- On BiPAP. I have asked her to use 3 L nasal cannula bleed in. I have also asked her to see Dr. Samy Ramirez. She is to return for follow-up in 2 months.       Lidia Guardado MD  [facility]

## 2018-07-26 NOTE — LETTER
[facility] Sleep and [address]  Phone: [phone]  Fax: [phone]    Isidro Higgins MD        July 26, 2018     Patient: Lorrie Menendez   YOB: 1945   Date of Visit: 7/26/2018       To Whom It May Concern: It is my medical opinion that Alvino Roy requires a disability parking placard for the following reasons: COPD    Duration of need: 5 years  If you have any questions or concerns, please don't hesitate to call.     Sincerely,        Isidro Higgins MD

## 2018-08-08 ENCOUNTER — HOSPITAL ENCOUNTER (OUTPATIENT)
Dept: PULMONOLOGY | Age: 73
Discharge: HOME | End: 2018-08-08

## 2018-08-08 DIAGNOSIS — J43.2 CENTRILOBULAR EMPHYSEMA (HCC): ICD-10-CM

## 2018-08-16 ENCOUNTER — HOSPITAL ENCOUNTER (OUTPATIENT)
Dept: OTHER | Age: 73
Discharge: HOME | End: 2018-08-16
Attending: FAMILY MEDICINE | Admitting: FAMILY MEDICINE

## 2018-08-16 ENCOUNTER — OFFICE VISIT (OUTPATIENT)
Dept: FAMILY MEDICINE CLINIC | Age: 73
End: 2018-08-16

## 2018-08-16 VITALS
HEIGHT: 60 IN | DIASTOLIC BLOOD PRESSURE: 78 MMHG | BODY MASS INDEX: 35.22 KG/M2 | HEART RATE: 79 BPM | TEMPERATURE: 98.3 F | WEIGHT: 179.4 LBS | SYSTOLIC BLOOD PRESSURE: 200 MMHG | OXYGEN SATURATION: 94 %

## 2018-08-16 DIAGNOSIS — I10 ESSENTIAL HYPERTENSION: ICD-10-CM

## 2018-08-16 DIAGNOSIS — R06.02 SHORTNESS OF BREATH: ICD-10-CM

## 2018-08-16 DIAGNOSIS — I51.89 DIASTOLIC DYSFUNCTION: ICD-10-CM

## 2018-08-16 DIAGNOSIS — J44.9 COPD, SEVERE (HCC): ICD-10-CM

## 2018-08-16 DIAGNOSIS — Z09 HOSPITAL DISCHARGE FOLLOW-UP: Primary | ICD-10-CM

## 2018-08-16 LAB
ANION GAP SERPL CALCULATED.3IONS-SCNC: 15 MMOL/L (ref 3–16)
BASOPHILS ABSOLUTE: 0.1 K/UL (ref 0–0.2)
BASOPHILS RELATIVE PERCENT: 0.3 %
BUN BLDV-MCNC: 30 MG/DL (ref 7–20)
CALCIUM SERPL-MCNC: 9.8 MG/DL (ref 8.3–10.6)
CHLORIDE BLD-SCNC: 87 MMOL/L (ref 99–110)
CO2: 38 MMOL/L (ref 21–32)
CREAT SERPL-MCNC: 1.6 MG/DL (ref 0.6–1.2)
EOSINOPHILS ABSOLUTE: 0 K/UL (ref 0–0.6)
EOSINOPHILS RELATIVE PERCENT: 0.2 %
GFR AFRICAN AMERICAN: 38
GFR NON-AFRICAN AMERICAN: 32
GLUCOSE BLD-MCNC: 121 MG/DL (ref 70–99)
HCT VFR BLD CALC: 41.2 % (ref 36–48)
HEMOGLOBIN: 13.1 G/DL (ref 12–16)
LYMPHOCYTES ABSOLUTE: 2.9 K/UL (ref 1–5.1)
LYMPHOCYTES RELATIVE PERCENT: 17.9 %
MCH RBC QN AUTO: 26.4 PG (ref 26–34)
MCHC RBC AUTO-ENTMCNC: 31.8 G/DL (ref 31–36)
MCV RBC AUTO: 83.1 FL (ref 80–100)
MONOCYTES ABSOLUTE: 1.1 K/UL (ref 0–1.3)
MONOCYTES RELATIVE PERCENT: 7.1 %
NEUTROPHILS ABSOLUTE: 11.9 K/UL (ref 1.7–7.7)
NEUTROPHILS RELATIVE PERCENT: 74.5 %
PDW BLD-RTO: 18.4 % (ref 12.4–15.4)
PLATELET # BLD: 316 K/UL (ref 135–450)
PMV BLD AUTO: 9.4 FL (ref 5–10.5)
POTASSIUM SERPL-SCNC: 3.7 MMOL/L (ref 3.5–5.1)
RBC # BLD: 4.96 M/UL (ref 4–5.2)
SODIUM BLD-SCNC: 140 MMOL/L (ref 136–145)
WBC # BLD: 16 K/UL (ref 4–11)

## 2018-08-16 PROCEDURE — G8417 CALC BMI ABV UP PARAM F/U: HCPCS | Performed by: FAMILY MEDICINE

## 2018-08-16 PROCEDURE — G8926 SPIRO NO PERF OR DOC: HCPCS | Performed by: FAMILY MEDICINE

## 2018-08-16 PROCEDURE — 4040F PNEUMOC VAC/ADMIN/RCVD: CPT | Performed by: FAMILY MEDICINE

## 2018-08-16 PROCEDURE — G8399 PT W/DXA RESULTS DOCUMENT: HCPCS | Performed by: FAMILY MEDICINE

## 2018-08-16 PROCEDURE — 3017F COLORECTAL CA SCREEN DOC REV: CPT | Performed by: FAMILY MEDICINE

## 2018-08-16 PROCEDURE — G8427 DOCREV CUR MEDS BY ELIG CLIN: HCPCS | Performed by: FAMILY MEDICINE

## 2018-08-16 PROCEDURE — 1101F PT FALLS ASSESS-DOCD LE1/YR: CPT | Performed by: FAMILY MEDICINE

## 2018-08-16 PROCEDURE — 99213 OFFICE O/P EST LOW 20 MIN: CPT | Performed by: FAMILY MEDICINE

## 2018-08-16 PROCEDURE — 1090F PRES/ABSN URINE INCON ASSESS: CPT | Performed by: FAMILY MEDICINE

## 2018-08-16 PROCEDURE — 1123F ACP DISCUSS/DSCN MKR DOCD: CPT | Performed by: FAMILY MEDICINE

## 2018-08-16 PROCEDURE — G8598 ASA/ANTIPLAT THER USED: HCPCS | Performed by: FAMILY MEDICINE

## 2018-08-16 PROCEDURE — 3023F SPIROM DOC REV: CPT | Performed by: FAMILY MEDICINE

## 2018-08-16 PROCEDURE — 1036F TOBACCO NON-USER: CPT | Performed by: FAMILY MEDICINE

## 2018-08-16 ASSESSMENT — ENCOUNTER SYMPTOMS
SHORTNESS OF BREATH: 1
RHINORRHEA: 0
WHEEZING: 1

## 2018-08-16 NOTE — PROGRESS NOTES
no edema or tenderness. Neurological: She is alert and oriented to person, place, and time. She has normal reflexes. Skin: Skin is warm and dry. No rash noted. Psychiatric: She has a normal mood and affect. ASSESSMENT/PLAN:  1. Hospital discharge follow-up  Recently finished prednisone for COPDE admission up in [facility], [address] [place] [address] [address]. Still with some SOB (see below)    2. COPD, severe ([address]  As above    3. Essential hypertension  Remains labile. Following with Nephrology    4. Shortness of breath  Concern with her weight gain and exertional SOB that she may be having buildup of fluid based on her CHF. However, somewhat difficult to discern with her underlying COPDE. We will check labs and CXR and based on results treat underlying etiology. Told to call immediately if symptoms worsen or SOB at rest  - XR CHEST STANDARD (2 VW); Future  - BASIC METABOLIC PANEL; Future  - CBC Auto Differential; Future    5. Diastolic dysfunction  As above      Return if symptoms worsen or fail to improve.

## 2018-08-17 ENCOUNTER — TELEPHONE (OUTPATIENT)
Dept: FAMILY MEDICINE CLINIC | Age: 73
End: 2018-08-17

## 2018-08-17 ENCOUNTER — TELEPHONE (OUTPATIENT)
Dept: PULMONOLOGY | Age: 73
End: 2018-08-17

## 2018-08-17 DIAGNOSIS — J44.1 COPD EXACERBATION (HCC): Primary | ICD-10-CM

## 2018-08-17 RX ORDER — PREDNISONE 1 MG/1
TABLET ORAL
Qty: 25 TABLET | Refills: 0 | Status: SHIPPED | OUTPATIENT
Start: 2018-08-17 | End: 2018-09-08 | Stop reason: ALTCHOICE

## 2018-08-17 RX ORDER — DOXYCYCLINE HYCLATE 100 MG/1
100 CAPSULE ORAL 2 TIMES DAILY
Qty: 14 CAPSULE | Refills: 0 | Status: SHIPPED | OUTPATIENT
Start: 2018-08-17 | End: 2018-08-24

## 2018-08-20 DIAGNOSIS — I65.23 BILATERAL CAROTID ARTERY STENOSIS: ICD-10-CM

## 2018-08-20 DIAGNOSIS — K21.9 GASTROESOPHAGEAL REFLUX DISEASE, ESOPHAGITIS PRESENCE NOT SPECIFIED: ICD-10-CM

## 2018-08-20 DIAGNOSIS — I25.10 CORONARY ARTERY DISEASE INVOLVING NATIVE CORONARY ARTERY OF NATIVE HEART WITHOUT ANGINA PECTORIS: ICD-10-CM

## 2018-08-20 DIAGNOSIS — I10 ESSENTIAL HYPERTENSION: ICD-10-CM

## 2018-08-20 RX ORDER — CITALOPRAM 40 MG/1
40 TABLET ORAL DAILY
Qty: 30 TABLET | Refills: 5 | Status: SHIPPED | OUTPATIENT
Start: 2018-08-20 | End: 2018-10-12 | Stop reason: SDUPTHER

## 2018-08-20 RX ORDER — CLOPIDOGREL BISULFATE 75 MG/1
75 TABLET ORAL DAILY
Qty: 30 TABLET | Refills: 5 | Status: SHIPPED | OUTPATIENT
Start: 2018-08-20 | End: 2018-10-12 | Stop reason: SDUPTHER

## 2018-08-20 RX ORDER — CLOTRIMAZOLE AND BETAMETHASONE DIPROPIONATE 10; .64 MG/G; MG/G
CREAM TOPICAL
Qty: 15 G | Refills: 0 | Status: SHIPPED | OUTPATIENT
Start: 2018-08-20 | End: 2018-10-26

## 2018-08-20 RX ORDER — CARVEDILOL 25 MG/1
25 TABLET ORAL 2 TIMES DAILY WITH MEALS
Qty: 60 TABLET | Refills: 5 | Status: SHIPPED | OUTPATIENT
Start: 2018-08-20 | End: 2018-10-12 | Stop reason: SDUPTHER

## 2018-08-20 RX ORDER — LANSOPRAZOLE 15 MG/1
15 CAPSULE, DELAYED RELEASE ORAL DAILY
Qty: 90 CAPSULE | Refills: 1 | Status: SHIPPED | OUTPATIENT
Start: 2018-08-20 | End: 2018-10-12 | Stop reason: SDUPTHER

## 2018-08-20 RX ORDER — AMLODIPINE BESYLATE 10 MG/1
10 TABLET ORAL DAILY
Qty: 30 TABLET | Refills: 0 | Status: ON HOLD | OUTPATIENT
Start: 2018-08-20 | End: 2018-10-13 | Stop reason: ALTCHOICE

## 2018-08-20 RX ORDER — MONTELUKAST SODIUM 10 MG/1
10 TABLET ORAL DAILY
Qty: 30 TABLET | Refills: 5 | Status: SHIPPED | OUTPATIENT
Start: 2018-08-20 | End: 2018-10-12 | Stop reason: SDUPTHER

## 2018-08-20 RX ORDER — LEVOTHYROXINE SODIUM 0.05 MG/1
TABLET ORAL
Qty: 30 TABLET | Refills: 3 | Status: SHIPPED | OUTPATIENT
Start: 2018-08-20 | End: 2018-10-12 | Stop reason: SDUPTHER

## 2018-08-21 ENCOUNTER — OFFICE VISIT (OUTPATIENT)
Dept: PULMONOLOGY | Age: 73
End: 2018-08-21

## 2018-08-21 ENCOUNTER — TELEPHONE (OUTPATIENT)
Dept: CARDIOLOGY CLINIC | Age: 73
End: 2018-08-21

## 2018-08-21 VITALS
WEIGHT: 179 LBS | HEIGHT: 60 IN | SYSTOLIC BLOOD PRESSURE: 160 MMHG | HEART RATE: 86 BPM | OXYGEN SATURATION: 91 % | DIASTOLIC BLOOD PRESSURE: 62 MMHG | BODY MASS INDEX: 35.14 KG/M2 | RESPIRATION RATE: 16 BRPM | TEMPERATURE: 98.4 F

## 2018-08-21 DIAGNOSIS — G47.33 OSA (OBSTRUCTIVE SLEEP APNEA): ICD-10-CM

## 2018-08-21 DIAGNOSIS — J43.2 CENTRILOBULAR EMPHYSEMA (HCC): ICD-10-CM

## 2018-08-21 DIAGNOSIS — R06.02 SOB (SHORTNESS OF BREATH): Primary | ICD-10-CM

## 2018-08-21 DIAGNOSIS — R06.02 SOB (SHORTNESS OF BREATH): ICD-10-CM

## 2018-08-21 LAB — PRO-BNP: 2186 PG/ML (ref 0–124)

## 2018-08-21 PROCEDURE — 1123F ACP DISCUSS/DSCN MKR DOCD: CPT | Performed by: INTERNAL MEDICINE

## 2018-08-21 PROCEDURE — G8926 SPIRO NO PERF OR DOC: HCPCS | Performed by: INTERNAL MEDICINE

## 2018-08-21 PROCEDURE — 99214 OFFICE O/P EST MOD 30 MIN: CPT | Performed by: INTERNAL MEDICINE

## 2018-08-21 PROCEDURE — G8598 ASA/ANTIPLAT THER USED: HCPCS | Performed by: INTERNAL MEDICINE

## 2018-08-21 PROCEDURE — 1101F PT FALLS ASSESS-DOCD LE1/YR: CPT | Performed by: INTERNAL MEDICINE

## 2018-08-21 PROCEDURE — 1090F PRES/ABSN URINE INCON ASSESS: CPT | Performed by: INTERNAL MEDICINE

## 2018-08-21 PROCEDURE — G8427 DOCREV CUR MEDS BY ELIG CLIN: HCPCS | Performed by: INTERNAL MEDICINE

## 2018-08-21 PROCEDURE — G8417 CALC BMI ABV UP PARAM F/U: HCPCS | Performed by: INTERNAL MEDICINE

## 2018-08-21 PROCEDURE — 3023F SPIROM DOC REV: CPT | Performed by: INTERNAL MEDICINE

## 2018-08-21 PROCEDURE — 3017F COLORECTAL CA SCREEN DOC REV: CPT | Performed by: INTERNAL MEDICINE

## 2018-08-21 PROCEDURE — G8399 PT W/DXA RESULTS DOCUMENT: HCPCS | Performed by: INTERNAL MEDICINE

## 2018-08-21 PROCEDURE — 1036F TOBACCO NON-USER: CPT | Performed by: INTERNAL MEDICINE

## 2018-08-21 PROCEDURE — 4040F PNEUMOC VAC/ADMIN/RCVD: CPT | Performed by: INTERNAL MEDICINE

## 2018-08-21 NOTE — TELEPHONE ENCOUNTER
Dr. Marin Ness discussed appt with Kandace Mcgarry today and is requesting for us to bring her in for an appt due to SOSBE, weight gain, increase in diuretics without improvement. Dr. Jorge Stanley completing proBNP.   (COPD/ANGIE-BiPAP 3L NC)    Called Alisha IRIZARRY at [phone] and left her a message to call the office to schedule an appt with Fidel Hdez CNP or with Dr. Viral howard.

## 2018-08-21 NOTE — PROGRESS NOTES
(NORVASC) 10 MG tablet Take 1 tablet by mouth daily 30 tablet 0    lansoprazole (PREVACID) 15 MG delayed release capsule Take 1 capsule by mouth daily 90 capsule 1    levothyroxine (SYNTHROID) 50 MCG tablet TAKE ONE TABLET BY MOUTH DAILY 30 tablet 3    clopidogrel (PLAVIX) 75 MG tablet Take 1 tablet by mouth daily 30 tablet 5    carvedilol (COREG) 25 MG tablet Take 1 tablet by mouth 2 times daily (with meals) 60 tablet 5    montelukast (SINGULAIR) 10 MG tablet Take 1 tablet by mouth daily 30 tablet 5    citalopram (CELEXA) 40 MG tablet Take 1 tablet by mouth daily 30 tablet 5    predniSONE (DELTASONE) 5 MG tablet Take 4 tabs by mouth daily for 3 days, 3 tabs for 2 days, 2 tabs for 2 days, 1 tab for 2 days, 1/2 tab for 2 days 25 tablet 0    doxycycline hyclate (VIBRAMYCIN) 100 MG capsule Take 1 capsule by mouth 2 times daily for 7 days 14 capsule 0    budesonide-formoterol (SYMBICORT) 160-4.5 MCG/ACT AERO Inhale 2 puffs into the lungs 2 times daily 1 Inhaler 0    cloNIDine (CATAPRES) 0.2 MG tablet Take 0.5 tablets by mouth 2 times daily 30 tablet 0    hydrALAZINE (APRESOLINE) 50 MG tablet Take 1 tablet by mouth 3 times daily 90 tablet 3    aspirin 81 MG tablet Take 1 tablet by mouth daily 90 tablet 4    torsemide (DEMADEX) 20 MG tablet Take 1 tablet by mouth 2 times daily 60 tablet 5    Arformoterol Tartrate (BROVANA) 15 MCG/2ML NEBU Take 1 ampule by nebulization 2 times daily 120 mL 5    albuterol sulfate HFA (VENTOLIN HFA) 108 (90 Base) MCG/ACT inhaler Inhale 2 puffs into the lungs every 4 hours as needed for Wheezing or Shortness of Breath 1 Inhaler 5    atorvastatin (LIPITOR) 40 MG tablet TAKE ONE TABLET BY MOUTH DAILY 30 tablet 3    budesonide (PULMICORT) 0.5 MG/2ML nebulizer suspension Take 2 mLs by nebulization 2 times daily 120 ampule 5    ipratropium-albuterol (DUONEB) 0.5-2.5 (3) MG/3ML SOLN nebulizer solution Inhale 3 mLs into the lungs every 6 hours.  120 vial 3     No current facility-administered medications for this visit. Allergies   Allergen Reactions    Iv Dye [Iodides]      Flushed, broke out and difficulty breathing       Family History   Problem Relation Age of Onset    Heart Disease Mother     Heart Disease Father     Heart Disease Sister     Cancer Brother        Social History     Social History    Marital status:      Spouse name: N/A    Number of children: 6    Years of education: 15     Occupational History    Not on file. Social History Main Topics    Smoking status: Former Smoker     Packs/day: 3.00     Years: 30.00     Quit date: 2/2/1992    Smokeless tobacco: Never Used      Comment: QUIT 21 YRS AGO    Alcohol use No    Drug use: No    Sexual activity: Not Currently     Other Topics Concern    Not on file     Social History Narrative    No narrative on file       Immunization History   Administered Date(s) Administered    Influenza, High Dose 12/16/2015, 01/17/2018    Pneumococcal 13-valent Conjugate ([id number]) 01/17/2018    Pneumococcal Conjugate 7-valent 12/30/2013       ROS:  GENERAL:  No fevers, no chills, +9lb weight gain in 1 month  RESPIRATORY:  +exertional shortness of breath, no wheezing, no cough      PHYSICAL EXAM:  Vitals:    08/21/18 1443 08/21/18 1518   BP: (!) 170/64 (!) 160/62   Site: Left Arm    Position: Sitting    Cuff Size: Medium Adult    Pulse: 86    Resp: 16    Temp: 98.4 °F (36.9 °C)    TempSrc: Oral    SpO2: 91%    Weight: 179 lb (81.2 kg)    Height: 5' (1.524 m)    on RA    Gen: Well developed; well nourished  Eyes: No scleral icterus. No conjunctival injection. ENT:  Oropharynx clear. External appearance of ears and nose normal.  Neck: Trachea midline. No obvious mass. No visible thyroid enlargement    Respiratory: Clear to auscultation bilaterally, no accessory muscle use  Cardiovascular: Regular rate and rhythm, murmur over right heart border that radiates to the carotids. No edema. Gastrointestinal: Soft, non-tender. No hernia  Skin: Warm and dry. No rashes or ulcers on visible areas. Normal texture and turgor  Lymphatic: No cervical LAD. No supraclavicular LAD. Musculoskeletal: No cyanosis, clubbing or joint deformity. Psychiatric: Normal mood and affect; exhibits normal insight and judgement        Pulmonary Function Testing (7/7/15)  FEV1 0.75 L at 36% predicted ---> 0.86 (41%)  FVC 1.54 L at 56% predicted---> 1.76 (64%)  FEV1/FVC ratio at 49%---> 49%  TLC 4.47 L at 97% predicted  VC 1.85L at 72% predicted  ERV 0.29L at 34% predicted  RV/TLC at 59% predicted  DLCO 11.7 at 60% predicted  DLCO/VA 4.26L at 115 % predicted      Overall: Severe lower airflow obstruction with reversal in FVC only; air trapping and mild reduction in diffusion       Six minute walk test:  10/27/16: Walked 303m, 70% predicted, normal; michael saturation 89%  4/19/18- Walked 213m, 51.5% predicted; michael saturation 89%     Images and reports of chest imaging were reviewed by me. My interpretation is:  CXR (8/16/18): Clear lung fields  Chest CT (4/8/15): Small mediastinal nodes; RML granuloma; RML and LLL atelectasis  Chest CT (6/11/18): No LAD; bilateral pleural effusions; centrilobular emphysema; diffuse ground glass opacities; atelectasis in the bilateral lower lobes and lingula      ECHO (6/12/18)  Summary   Left ventricular cavity size is normal.   Normal left ventricular wall thickness.   Ejection fraction is visually estimated to be 55-60%.  Vernelle Rile is reversal of E/A inflow velocities across the mitral valve.   Mitral valve leaflets appear mildly thickened.   Mild mitral regurgitation is present.   The left atrium is at the upper limits of normal in size.   Mild tricuspid regurgitation.  IVC size is dilated (>2.1 cm) and collapses < 50% with respiration   consistent with elevated RA pressure (15 mmHg).    Estimated pulmonary artery systolic pressure is moderately elevated at 47   mmHg assuming a right atrial pressure of 15 mmHg. c/w mild pulmonary HTN. Lab Results   Component Value Date    WBC 16.0 (H) 08/16/2018    HGB 13.1 08/16/2018    HCT 41.2 08/16/2018    MCV 83.1 08/16/2018     08/16/2018       No results found for: BNP    Lab Results   Component Value Date    CREATININE 1.6 (H) 08/16/2018    BUN 30 (H) 08/16/2018     08/16/2018    K 3.7 08/16/2018    CL 87 (L) 08/16/2018    CO2 38 (H) 08/16/2018         Assessment/Plan:81y.o. year old female presents for follow up. Shortness of breath- Suspect that this is due to volume overload. Will obtain BNP. Discussed with patient that if her breathing worsens she should got to the emergency room. Will contact cardiology to find out if they are able to see her sooner than her scheduled appointment. COPD- Has severe lower airflow obstruction on PFTs. Continue budesonide and formoterol nebulizers and Duonebs twice a day. Continue Ventolin as needed. Repeat PFTs and 6 minute walk test have been ordered. Obstructive sleep apnea- On BiPAP with 3 L nasal cannula bleed in. Needs to follow up with Dr. Vicki Miller.        She is to return for follow-up in 6 weeks.       Rae Saavedra MD  [facility]

## 2018-08-22 ENCOUNTER — TELEPHONE (OUTPATIENT)
Dept: PULMONOLOGY | Age: 73
End: 2018-08-22

## 2018-08-22 DIAGNOSIS — I25.10 CORONARY ARTERY DISEASE INVOLVING NATIVE CORONARY ARTERY OF NATIVE HEART WITHOUT ANGINA PECTORIS: ICD-10-CM

## 2018-08-22 DIAGNOSIS — I73.9 PAD (PERIPHERAL ARTERY DISEASE) (HCC): ICD-10-CM

## 2018-08-22 RX ORDER — METOLAZONE 5 MG/1
5 TABLET ORAL DAILY
Qty: 14 TABLET | Refills: 0 | Status: SHIPPED | OUTPATIENT
Start: 2018-08-22 | End: 2018-08-24 | Stop reason: HOSPADM

## 2018-08-22 NOTE — TELEPHONE ENCOUNTER
Fox David called back this morning and Chasity Lock had a cancellation appt tomorrow 8/23/18 and Fox David is scheduled to come in at 2:45 pm

## 2018-08-22 NOTE — TELEPHONE ENCOUNTER
Patient called and stated that Dr. Steve Dean said she was putting her on another water pill yesterday at her visit. I looked in her chart and meds list and couldn't find anything Dr. Steve Dean prescribing her a water pill yesterday. She even said she went to the pharmacy to pick it up and there was nothing. Please advise and verify if there was a water pill even prescribed or not.  Ty

## 2018-08-23 ENCOUNTER — OFFICE VISIT (OUTPATIENT)
Dept: CARDIOLOGY CLINIC | Age: 73
End: 2018-08-23

## 2018-08-23 ENCOUNTER — HOSPITAL ENCOUNTER (OUTPATIENT)
Dept: OTHER | Age: 73
Discharge: HOME | End: 2018-08-23
Attending: INTERNAL MEDICINE | Admitting: INTERNAL MEDICINE

## 2018-08-23 VITALS
DIASTOLIC BLOOD PRESSURE: 78 MMHG | WEIGHT: 174 LBS | BODY MASS INDEX: 34.16 KG/M2 | SYSTOLIC BLOOD PRESSURE: 118 MMHG | HEIGHT: 60 IN | OXYGEN SATURATION: 95 %

## 2018-08-23 DIAGNOSIS — I25.119 CORONARY ARTERY DISEASE INVOLVING NATIVE CORONARY ARTERY OF NATIVE HEART WITH ANGINA PECTORIS (HCC): ICD-10-CM

## 2018-08-23 DIAGNOSIS — I73.9 PAD (PERIPHERAL ARTERY DISEASE) (HCC): ICD-10-CM

## 2018-08-23 DIAGNOSIS — R06.02 SHORTNESS OF BREATH: Primary | ICD-10-CM

## 2018-08-23 DIAGNOSIS — R06.02 SHORTNESS OF BREATH: ICD-10-CM

## 2018-08-23 DIAGNOSIS — J44.9 CHRONIC OBSTRUCTIVE PULMONARY DISEASE, UNSPECIFIED COPD TYPE (HCC): ICD-10-CM

## 2018-08-23 DIAGNOSIS — I10 ESSENTIAL HYPERTENSION: ICD-10-CM

## 2018-08-23 LAB
ANION GAP SERPL CALCULATED.3IONS-SCNC: 19 MMOL/L (ref 3–16)
BUN BLDV-MCNC: 40 MG/DL (ref 7–20)
CALCIUM SERPL-MCNC: 10.7 MG/DL (ref 8.3–10.6)
CHLORIDE BLD-SCNC: 80 MMOL/L (ref 99–110)
CO2: 39 MMOL/L (ref 21–32)
CREAT SERPL-MCNC: 1.6 MG/DL (ref 0.6–1.2)
GFR AFRICAN AMERICAN: 38
GFR NON-AFRICAN AMERICAN: 32
GLUCOSE BLD-MCNC: 148 MG/DL (ref 70–99)
HCT VFR BLD CALC: 44 % (ref 36–48)
HEMOGLOBIN: 14.3 G/DL (ref 12–16)
INR BLD: 0.96 (ref 0.86–1.14)
MCH RBC QN AUTO: 26.5 PG (ref 26–34)
MCHC RBC AUTO-ENTMCNC: 32.5 G/DL (ref 31–36)
MCV RBC AUTO: 81.6 FL (ref 80–100)
PDW BLD-RTO: 19 % (ref 12.4–15.4)
PLATELET # BLD: 297 K/UL (ref 135–450)
PMV BLD AUTO: 8.7 FL (ref 5–10.5)
POTASSIUM SERPL-SCNC: 2.9 MMOL/L (ref 3.5–5.1)
PROTHROMBIN TIME: 10.9 SEC (ref 9.8–13)
RBC # BLD: 5.39 M/UL (ref 4–5.2)
SODIUM BLD-SCNC: 138 MMOL/L (ref 136–145)
WBC # BLD: 16.7 K/UL (ref 4–11)

## 2018-08-23 PROCEDURE — G8427 DOCREV CUR MEDS BY ELIG CLIN: HCPCS | Performed by: INTERNAL MEDICINE

## 2018-08-23 PROCEDURE — 1101F PT FALLS ASSESS-DOCD LE1/YR: CPT | Performed by: INTERNAL MEDICINE

## 2018-08-23 PROCEDURE — G8598 ASA/ANTIPLAT THER USED: HCPCS | Performed by: INTERNAL MEDICINE

## 2018-08-23 PROCEDURE — 1090F PRES/ABSN URINE INCON ASSESS: CPT | Performed by: INTERNAL MEDICINE

## 2018-08-23 PROCEDURE — 93000 ELECTROCARDIOGRAM COMPLETE: CPT | Performed by: INTERNAL MEDICINE

## 2018-08-23 PROCEDURE — 1036F TOBACCO NON-USER: CPT | Performed by: INTERNAL MEDICINE

## 2018-08-23 PROCEDURE — 1123F ACP DISCUSS/DSCN MKR DOCD: CPT | Performed by: INTERNAL MEDICINE

## 2018-08-23 PROCEDURE — 99214 OFFICE O/P EST MOD 30 MIN: CPT | Performed by: INTERNAL MEDICINE

## 2018-08-23 PROCEDURE — 3023F SPIROM DOC REV: CPT | Performed by: INTERNAL MEDICINE

## 2018-08-23 PROCEDURE — 4040F PNEUMOC VAC/ADMIN/RCVD: CPT | Performed by: INTERNAL MEDICINE

## 2018-08-23 PROCEDURE — 3017F COLORECTAL CA SCREEN DOC REV: CPT | Performed by: INTERNAL MEDICINE

## 2018-08-23 PROCEDURE — G8926 SPIRO NO PERF OR DOC: HCPCS | Performed by: INTERNAL MEDICINE

## 2018-08-23 PROCEDURE — G8399 PT W/DXA RESULTS DOCUMENT: HCPCS | Performed by: INTERNAL MEDICINE

## 2018-08-23 PROCEDURE — G8417 CALC BMI ABV UP PARAM F/U: HCPCS | Performed by: INTERNAL MEDICINE

## 2018-08-23 RX ORDER — ATORVASTATIN CALCIUM 40 MG/1
40 TABLET, FILM COATED ORAL DAILY
Qty: 30 TABLET | Refills: 3 | Status: SHIPPED | OUTPATIENT
Start: 2018-08-23 | End: 2018-10-25 | Stop reason: SDUPTHER

## 2018-08-23 NOTE — PROGRESS NOTES
[address]      Cardiology Progress Note    Negar Alcala  1945 August 23, 2018    CC: \"More short of breath\"     HPI:  The patient is 81 y.o. female with a past medical history significant for CABG, claudication, coronary artery disease, hypercholesterolemia and hypertension, CTA for evaluation for peripheral vascular disease and MVP. Patient had reported symptoms of exertional BLE pain. CTA reviewed and reports stenosis at ostium of the graft originating from the aorta.   Ms. Johanna Cadena returned semi urgently 4/2018 for elevated SBP >/= 200, tachycardic and CP at her follow up today with Dr. Lafonda Lundborg. She is on prednisone for jaw, temple and head pain with possible temporal arteritis. Mostly constant with occasionally mild relief. Sed rate 38, CRP 12.4. Surgery with Dr. Zehra Jimenez with colostomy bag placement-diverticulitis, bowel blockage and fistula. Clonidine >  otherwise once daily per Nephrology. Lisinopril and coreg together evening, rest in the morning. Chest pain started yesterday, mild, was not concerned. Off/on. Stronger today and concerning. Lasting 4-5 minutes and resolving on own. She regularly checks her BP once daily to 1 week. Did check BP yesterday , did not check today. Taken all of her medical therapy today. Chronic SCOTT does worsen with chest pain. Not on anything with decongestants, no increase in caffeine, occasional chocolate intake and does not drink alcohol. Chronic arms and legs feel weak and heavy since surgery. Occasionally off/on lightheadedness/dizziness all since the headaches started. We also follow her for her CAD, PAD. She reports that her SOB/SCOTT has not worsened and has been stable. Continues to follow Dr. Neeta Santos and Dr. Floyd Gomez. Chronic back-lumbar. Denies bilateral leg pain or cramping with walking.      She was seen by pulmonology 8/21/18 for worsening shortness of breath 8/21/18 and had a BNP completed and was instructed to follow up with cardiology. Her BNP 2186 and she was started on metolazone. She reports episodes of lightheadedness and denies any syncopal episodes. She is on continuous oxygen at 3 liters per nasal canula. She states episodes of her \"heart racing\" that will occur weekly. She has been compliant with medications and tolerating. No abnormal bruising or bleeding. Patient denies any symptoms of chest pain, pressure, tightness, nausea, vomiting, near syncope, dizziness, lightheaded, PND, orthopnea, bilateral lower extremities pain, cramping or fatigue. No s/s TIA/CVA. No falls at home.      Past Medical History:   Diagnosis Date    CAD (coronary artery disease)     Nonobstructive on cath in 9/2017    [facility]     Colostomy care ([facility] 4/25/2018    Peristomal irritation and early leaking    COPD (chronic obstructive pulmonary disease) ([facility])     Hyperlipidemia     Hypertension     Kidney disease     Stage 3    Peripheral vascular disease ([facility]     Rectal fissure 11/2014    surgery pending    Restless legs syndrome     Sleep apnea     O2 3 liters at hs    Unspecified sleep apnea      Past Surgical History:   Procedure Laterality Date    CARDIAC CATHETERIZATION  09/13/2017    Dr. Peace Aparicio  03/09/2018    CORONARY ARTERY BYPASS GRAFT      Legs & Carotid    HERNIA REPAIR       Family History   Problem Relation Age of Onset    Heart Disease Mother     Heart Disease Father     Heart Disease Sister     Cancer Brother      Social History   Substance Use Topics    Smoking status: Former Smoker     Packs/day: 3.00     Years: 30.00     Quit date: 2/2/1992    Smokeless tobacco: Never Used      Comment: QUIT 21 YRS AGO    Alcohol use No       Allergies   Allergen Reactions    Iv Dye [Iodides]      Flushed, broke out and difficulty breathing     Current Outpatient Prescriptions   Medication Sig Dispense Refill    atorvastatin (LIPITOR) 40 MG Coordination normal.     Skin: Skin is warm and dry. There is no rash or diaphoresis. Psychiatric: She has a normal mood and affect. Her speech is normal and behavior is normal.     Lab Review:     Lab Results   Component Value Date    TRIG 354 01/09/2018    HDL 43 01/09/2018    LDLCALC see below 01/09/2018    LDLDIRECT 120 01/09/2018    LABVLDL see below 01/09/2018      Lab Results   Component Value Date    BUN 30 08/16/2018    CREATININE 1.6 08/16/2018     Lab Results   Component Value Date     08/16/2018     Lab Results   Component Value Date    HGB 13.1 08/16/2018    HCT 41.2 08/16/2018     Lab Results   Component Value Date     08/16/2018     [place]   Lab Results   Component Value Date    K 3.7 08/16/2018    K 4.2 06/11/2018   proBNP 428 4/1/16      EKG Interpretation: 8/25/16 SR. No acute changes. 4/6/18 SR , 100 bpm      8/23/18 Sinus rhythm    Image Review:     Echo 6/12/2018  Left ventricular cavity size is normal.  Normal left ventricular wall thickness. Ejection fraction is visually estimated to be 55-60%. There is reversal of E/A inflow velocities across the mitral valve. Mitral valve leaflets appear mildly thickened. Mild mitral regurgitation is present. The left atrium is at the upper limits of normal in size. Mild tricuspid regurgitation. IVC size is dilated (>2.1 cm) and collapses < 50% with respiration  consistent with elevated RA pressure (15 mmHg). Estimated pulmonary artery systolic pressure is moderately elevated at 47  mmHg assuming a right atrial pressure of 15 mmHg. c/w mild pulmonary HTN.    Cath 9/18/2017  1. The left main comes from the left coronary cusp. There is normal ELIER 3 flow. 2.  The left anterior descending artery comes from the left main coronary artery. It has ELIER 3 flow. The midportion has 50%  stenosis. 3.  The left circumflex comes from the left main. It is nondominant. No significant stenosis was seen.   4.  The right coronary artery comes from the right coronary cusp. It is dominant giving rise to the right posterior descending artery and posterolateral branch and has no significant stenosis. 5.  LV ejection fraction is 60%. Assessment:    Worsening shortness of breath  Will arrange for a right heart cath     -Hypertension  Controlled, continue current medical therapy   Encouraged to continue to monitor at home  Call with elevated blood pressures    -Possible temporal arteritis  Sed rate and CRP elevated   Evaluated per Dr. Gabriel Jauregui today   On prednisone   Not pursuing temporal artery biopsy at this time    -PAD  No claudication, bilateral leg pain  Fatigue, weakenss   Last arterial duplex 8/28/17     - CAD:   Chest  Pain and worsening chronic SCOTT since yesterday  Elevated BP today   Reviewed last stress test and C 9/2017 non obstructive CAD      -Carotid stenosis   8/28/17  Carotid duplex     -Hyperlipidemia     -Sleep Apnea  BiPAP compliance     - COPD-severe obstruction on PFT's   Dr. Sha Zamora     -Former smoker   Stressed importance of continued smoking cessation and spent 3 minutes discussing       Follow up      Thank you very much for allowing me to participate in the care of your patient. Please do not hesitate to contact me if you have any questions. Sincerely,    Jemal Weber MD, MPH      [facility] [address] [place] [address]  Ph: [phone]  Fax: [phone]    Physician Attestation:  The scribes documentation has been prepared under my direction and personally reviewed by me in its entirety. I confirm that the note above accurately reflects all work, treatment, procedures, and medical decision making performed by me.

## 2018-08-24 ENCOUNTER — TELEPHONE (OUTPATIENT)
Dept: CARDIOLOGY CLINIC | Age: 73
End: 2018-08-24

## 2018-08-24 ENCOUNTER — HOSPITAL ENCOUNTER (OUTPATIENT)
Dept: CARDIAC CATH/INVASIVE PROCEDURES | Age: 73
Discharge: HOME | End: 2018-08-24
Attending: INTERNAL MEDICINE | Admitting: INTERNAL MEDICINE

## 2018-08-24 VITALS — BODY MASS INDEX: 34.24 KG/M2 | HEIGHT: 60 IN | WEIGHT: 174.38 LBS

## 2018-08-24 DIAGNOSIS — R06.02 SOB (SHORTNESS OF BREATH): ICD-10-CM

## 2018-08-24 DIAGNOSIS — Z43.3 ENCOUNTER FOR ATTENTION TO COLOSTOMY (HCC): ICD-10-CM

## 2018-08-24 DIAGNOSIS — I10 ESSENTIAL HYPERTENSION: ICD-10-CM

## 2018-08-24 LAB
ANION GAP SERPL CALCULATED.3IONS-SCNC: 17 MMOL/L (ref 3–16)
BUN BLDV-MCNC: 38 MG/DL (ref 7–20)
CALCIUM SERPL-MCNC: 9.7 MG/DL (ref 8.3–10.6)
CHLORIDE BLD-SCNC: 83 MMOL/L (ref 99–110)
CO2: 36 MMOL/L (ref 21–32)
CREAT SERPL-MCNC: 1.5 MG/DL (ref 0.6–1.2)
GFR AFRICAN AMERICAN: 41
GFR NON-AFRICAN AMERICAN: 34
GLUCOSE BLD-MCNC: 173 MG/DL (ref 70–99)
POTASSIUM SERPL-SCNC: 3.1 MMOL/L (ref 3.5–5.1)
SODIUM BLD-SCNC: 136 MMOL/L (ref 136–145)

## 2018-08-24 PROCEDURE — 93451 RIGHT HEART CATH: CPT | Performed by: INTERNAL MEDICINE

## 2018-08-24 PROCEDURE — 99152 MOD SED SAME PHYS/QHP 5/>YRS: CPT | Performed by: INTERNAL MEDICINE

## 2018-08-24 RX ORDER — SODIUM CHLORIDE 0.9 % (FLUSH) 0.9 %
10 SYRINGE (ML) INJECTION EVERY 12 HOURS SCHEDULED
Status: DISCONTINUED | OUTPATIENT
Start: 2018-08-24 | End: 2018-08-25 | Stop reason: HOSPADM

## 2018-08-24 RX ORDER — TORSEMIDE 20 MG/1
20 TABLET ORAL DAILY
Qty: 60 TABLET | Refills: 5 | Status: SHIPPED | OUTPATIENT
Start: 2018-08-24 | End: 2018-11-05 | Stop reason: SDUPTHER

## 2018-08-24 RX ORDER — CLONIDINE HYDROCHLORIDE 0.2 MG/1
0.1 TABLET ORAL 2 TIMES DAILY
Qty: 30 TABLET | Refills: 0 | Status: SHIPPED | OUTPATIENT
Start: 2018-08-24 | End: 2018-10-12 | Stop reason: SDUPTHER

## 2018-08-24 RX ORDER — HYDRALAZINE HYDROCHLORIDE 50 MG/1
50 TABLET, FILM COATED ORAL 3 TIMES DAILY
Qty: 90 TABLET | Refills: 3 | Status: SHIPPED | OUTPATIENT
Start: 2018-08-24 | End: 2018-10-12 | Stop reason: SDUPTHER

## 2018-08-24 RX ORDER — ACETAMINOPHEN 325 MG/1
650 TABLET ORAL EVERY 4 HOURS PRN
Status: DISCONTINUED | OUTPATIENT
Start: 2018-08-24 | End: 2018-08-25 | Stop reason: HOSPADM

## 2018-08-24 RX ORDER — ONDANSETRON 2 MG/ML
4 INJECTION INTRAMUSCULAR; INTRAVENOUS EVERY 6 HOURS PRN
Status: DISCONTINUED | OUTPATIENT
Start: 2018-08-24 | End: 2018-08-25 | Stop reason: HOSPADM

## 2018-08-24 RX ORDER — SODIUM CHLORIDE 9 MG/ML
INJECTION, SOLUTION INTRAVENOUS CONTINUOUS
Status: DISCONTINUED | OUTPATIENT
Start: 2018-08-24 | End: 2018-08-24 | Stop reason: ALTCHOICE

## 2018-08-24 RX ORDER — SODIUM CHLORIDE 0.9 % (FLUSH) 0.9 %
10 SYRINGE (ML) INJECTION PRN
Status: DISCONTINUED | OUTPATIENT
Start: 2018-08-24 | End: 2018-08-24 | Stop reason: ALTCHOICE

## 2018-08-24 RX ORDER — SODIUM CHLORIDE 0.9 % (FLUSH) 0.9 %
10 SYRINGE (ML) INJECTION EVERY 12 HOURS SCHEDULED
Status: DISCONTINUED | OUTPATIENT
Start: 2018-08-24 | End: 2018-08-24 | Stop reason: ALTCHOICE

## 2018-08-24 RX ORDER — TORSEMIDE 20 MG/1
20 TABLET ORAL 2 TIMES DAILY
Qty: 60 TABLET | Refills: 5 | Status: SHIPPED | OUTPATIENT
Start: 2018-08-24 | End: 2018-08-28 | Stop reason: SDUPTHER

## 2018-08-24 RX ORDER — ASPIRIN 325 MG
325 TABLET ORAL ONCE
Status: DISCONTINUED | OUTPATIENT
Start: 2018-08-24 | End: 2018-08-24 | Stop reason: ALTCHOICE

## 2018-08-24 RX ORDER — SODIUM CHLORIDE 0.9 % (FLUSH) 0.9 %
10 SYRINGE (ML) INJECTION PRN
Status: DISCONTINUED | OUTPATIENT
Start: 2018-08-24 | End: 2018-08-25 | Stop reason: HOSPADM

## 2018-08-24 NOTE — PROCEDURES
[address] [place], [place] [address]                              CARDIAC CATHETERIZATION    PATIENT NAME: Aretha Guajardo                      :        1945  MED REC NO:   [id number]                          ROOM:  ACCOUNT NO:   [de-identified]                          ADMIT DATE: 2018  PROVIDER:     Nereyda Michele MD    DATE OF PROCEDURE:  2018    PROCEDURE PERFORMED:  Right heart catheterization. INDICATION FOR PROCEDURE:  Chronic dyspnea, heart failure. Informed consent obtained. PROCEDURE IN DETAIL:  The patient was brought to cardiac cath laboratory. Her right arm was prepped and draped in sterile fashion. We accessed the  right basilic vein after giving lidocaine. We inserted a 7-Maltese sheath. Dilators and wires were removed. Sheath was flushed. A balloon-tipped  Hensley-Saad catheter was advanced in the right atrium, right ventricle, right  pulmonary artery recording pressures and hemodynamics. After that, the  Donney Kenvil was pulled. Sheath was pulled. Manual pressure was applied to  achieve hemostasis. No complication. Estimated blood loss less than 20  mL. HEMODYNAMIC FINDINGS:  1. Right atrial pressure is 10 mmHg. 2.  RV pressure is at 39/-6. 3.  Right ventricular pressure is 32/-1. 4.  Pulmonary capillary wedge pressure of 7 mmHg. 5.  Pulmonary artery pressure 26/1.  6.  Cardiac output is 3.46 liters per minute. 7.  Right atrial pressure is 63%. 8.  Aortic saturation 96%. 9.  Pulmonary artery saturation 64%. SUMMARY:  Normal left and right heart cardiac pressure. RECOMMENDATION:  1. Continue postop post cath care. 2.  Continue to watch for bleeding. Site care. 3.  We will discuss with pulmonologist regarding her pulmonary status.         Arlyn Hussein MD    D: 2018 12:21:45       T: 2018 14:20:56     [email or web address]  Job#: [id number]     Doc#: [id number]    CC:

## 2018-08-24 NOTE — TELEPHONE ENCOUNTER
From: Bere Fearing  Sent: 8/18/2018 2:08 PM EDT  Subject: Medication Renewal Request    Suraj Burciagajair Leo would like a refill of the following medications:     torsemide (DEMADEX) 20 MG tablet Gloryirma Romano, APRN - CNP]     aspirin 81 MG tablet [DIANE M ENZWEILER, APRN - CNP]     hydrALAZINE (APRESOLINE) 50 MG tablet Glory Juan Antonio, APRN - CNP]     cloNIDine (CATAPRES) 0.2 MG tablet Glory Juan Antonio, APRN - CNP]    Preferred pharmacy: [place] [address], [facility] [address] [place] [address] [phone] - F [phone]    Comment:      Medication renewals requested in this message routed separately:     atorvastatin (LIPITOR) 40 MG tablet Kusum Deras MD]       Arformoterol Tartrate (BROVANA) 15 MCG/2ML NEBU [Aubrie Damon, APRN - CNP]       budesonide (PULMICORT) 0.5 MG/2ML nebulizer suspension Valarie Quiroz MD]     albuterol sulfate HFA (VENTOLIN HFA) 108 (90 Base) MCG/ACT inhaler Valarie Quiroz MD]     budesonide-formoterol (SYMBICORT) 160-4.5 MCG/ACT AERO Valarie Quiroz MD]       clotrimazole-betamethasone (LOTRISONE) 1-0.05 % cream [Hans Webber MD]     amLODIPine (NORVASC) 10 MG tablet [Hans Webber MD]     lansoprazole (PREVACID) 15 MG delayed release capsule [Hans Webber MD]     levothyroxine (SYNTHROID) 50 MCG tablet [Hans Webber MD]     clopidogrel (PLAVIX) 75 MG tablet [Hans Webber MD]     carvedilol (COREG) 25 MG tablet [Hans Webber MD]     montelukast (SINGULAIR) 10 MG tablet [Hans Webber MD]     citalopram (CELEXA) 40 MG tablet [Hans Webber MD]

## 2018-08-24 NOTE — TELEPHONE ENCOUNTER
Procedure scheduled  8/24/18 11:30am         Pt to arrive & report to [facility] cath lab  10:00am  Pre procedure labs due/ no labs ordered per [address] [place] [address] Avenue to take all med's in am with sip of water   at discharge  If you go home the same day as your procedure someone needs to stay with you overnight.      HFU ov   9/6/18  130pm dr Azeb Wynn    Patient expressed understanding

## 2018-08-27 DIAGNOSIS — R06.02 SOB (SHORTNESS OF BREATH): Primary | ICD-10-CM

## 2018-08-28 ENCOUNTER — OFFICE VISIT (OUTPATIENT)
Dept: PULMONOLOGY | Age: 73
End: 2018-08-28

## 2018-08-28 ENCOUNTER — TELEPHONE (OUTPATIENT)
Dept: PULMONOLOGY | Age: 73
End: 2018-08-28

## 2018-08-28 VITALS
BODY MASS INDEX: 34.83 KG/M2 | SYSTOLIC BLOOD PRESSURE: 140 MMHG | WEIGHT: 177.4 LBS | OXYGEN SATURATION: 93 % | HEIGHT: 60 IN | DIASTOLIC BLOOD PRESSURE: 78 MMHG | TEMPERATURE: 98.4 F | HEART RATE: 95 BPM | RESPIRATION RATE: 20 BRPM

## 2018-08-28 DIAGNOSIS — R06.02 SOB (SHORTNESS OF BREATH): Primary | ICD-10-CM

## 2018-08-28 DIAGNOSIS — J43.2 CENTRILOBULAR EMPHYSEMA (HCC): ICD-10-CM

## 2018-08-28 DIAGNOSIS — G47.33 OSA (OBSTRUCTIVE SLEEP APNEA): ICD-10-CM

## 2018-08-28 PROCEDURE — 99214 OFFICE O/P EST MOD 30 MIN: CPT | Performed by: INTERNAL MEDICINE

## 2018-08-28 PROCEDURE — 3023F SPIROM DOC REV: CPT | Performed by: INTERNAL MEDICINE

## 2018-08-28 PROCEDURE — 1090F PRES/ABSN URINE INCON ASSESS: CPT | Performed by: INTERNAL MEDICINE

## 2018-08-28 PROCEDURE — G8417 CALC BMI ABV UP PARAM F/U: HCPCS | Performed by: INTERNAL MEDICINE

## 2018-08-28 PROCEDURE — G8926 SPIRO NO PERF OR DOC: HCPCS | Performed by: INTERNAL MEDICINE

## 2018-08-28 PROCEDURE — G8598 ASA/ANTIPLAT THER USED: HCPCS | Performed by: INTERNAL MEDICINE

## 2018-08-28 PROCEDURE — G8427 DOCREV CUR MEDS BY ELIG CLIN: HCPCS | Performed by: INTERNAL MEDICINE

## 2018-08-28 PROCEDURE — 1101F PT FALLS ASSESS-DOCD LE1/YR: CPT | Performed by: INTERNAL MEDICINE

## 2018-08-28 PROCEDURE — 3017F COLORECTAL CA SCREEN DOC REV: CPT | Performed by: INTERNAL MEDICINE

## 2018-08-28 PROCEDURE — 1036F TOBACCO NON-USER: CPT | Performed by: INTERNAL MEDICINE

## 2018-08-28 PROCEDURE — 4040F PNEUMOC VAC/ADMIN/RCVD: CPT | Performed by: INTERNAL MEDICINE

## 2018-08-28 PROCEDURE — 96372 THER/PROPH/DIAG INJ SC/IM: CPT | Performed by: INTERNAL MEDICINE

## 2018-08-28 PROCEDURE — 1123F ACP DISCUSS/DSCN MKR DOCD: CPT | Performed by: INTERNAL MEDICINE

## 2018-08-28 PROCEDURE — G8399 PT W/DXA RESULTS DOCUMENT: HCPCS | Performed by: INTERNAL MEDICINE

## 2018-08-28 RX ORDER — PREDNISONE 20 MG/1
TABLET ORAL
Qty: 17 TABLET | Refills: 0 | Status: CANCELLED | OUTPATIENT
Start: 2018-08-28

## 2018-08-28 RX ORDER — METHYLPREDNISOLONE ACETATE 80 MG/ML
80 INJECTION, SUSPENSION INTRA-ARTICULAR; INTRALESIONAL; INTRAMUSCULAR; SOFT TISSUE ONCE
Status: COMPLETED | OUTPATIENT
Start: 2018-08-28 | End: 2018-08-29

## 2018-08-28 NOTE — TELEPHONE ENCOUNTER
Yes, she can take those 2 water pills today, but she should not continue to take them. Can she come in today at 3pm to see me?

## 2018-08-28 NOTE — PROGRESS NOTES
Pulmonary Function Testing (7/7/15)  FEV1 0.75 L at 36% predicted ---> 0.86 (41%)  FVC 1.54 L at 56% predicted---> 1.76 (64%)  FEV1/FVC ratio at 49%---> 49%  TLC 4.47 L at 97% predicted  VC 1.85L at 72% predicted  ERV 0.29L at 34% predicted  RV/TLC at 59% predicted  DLCO 11.7 at 60% predicted  DLCO/VA 4.26L at 115 % predicted      Overall: Severe lower airflow obstruction with reversal in FVC only; air trapping and mild reduction in diffusion       Six minute walk test:  10/27/16: Walked 303m, 70% predicted, normal; michael saturation 89%  4/19/18- Walked 213m, 51.5% predicted; michael saturation 89%     Images and reports of chest imaging were reviewed by me. My interpretation is:  CXR (8/16/18): Clear lung fields  Chest CT (8/24/18): No LAD; centrilobular emphysema; bi-apical scarring; granuloma in the medial right lower lobe; scarring/atelectasis in the lingula and left lower lobe      ECHO (6/12/18)  Summary   Left ventricular cavity size is normal.   Normal left ventricular wall thickness.   Ejection fraction is visually estimated to be 55-60%.  Roberta Le is reversal of E/A inflow velocities across the mitral valve.   Mitral valve leaflets appear mildly thickened.   Mild mitral regurgitation is present.   The left atrium is at the upper limits of normal in size.   Mild tricuspid regurgitation.  IVC size is dilated (>2.1 cm) and collapses < 50% with respiration   consistent with elevated RA pressure (15 mmHg).  Estimated pulmonary artery systolic pressure is moderately elevated at 47   mmHg assuming a right atrial pressure of 15 mmHg. c/w mild pulmonary HTN.     Lab Results   Component Value Date    WBC 16.7 (H) 08/23/2018    HGB 14.3 08/23/2018    HCT 44.0 08/23/2018    MCV 81.6 08/23/2018     08/23/2018       No results found for: BNP    Lab Results   Component Value Date    CREATININE 1.5 (H) 08/24/2018    BUN 38 (H) 08/24/2018     08/24/2018    K 3.1 (L) 08/24/2018    CL 83 (L) 08/24/2018    CO2 39 (H) 08/24/2018         Assessment/Plan:81y.o. year old female presents for follow up. Shortness of breath- Recent right heart catheterization showed normal filling pressures. Patient will continue torsemide, but stop metolazone. I have also asked her to check her weight daily and to call her cardiologist's office if her weight increases by more than 2 pounds in a day. I have ordered the V/Q scan stat as she has chronic kidney disease. She was given 80 mg of Depo-Medrol IM empirically today. We discussed that if her breathing does not improve over the next 24 hours she should go to the emergency room. COPD- Has severe lower airflow obstruction on PFTs. Continue budesonide and formoterol nebulizers and Duonebs twice a day. Continue Ventolin as needed. Repeat PFTs and 6 minute walk test have been ordered.     Obstructive sleep apnea- On BiPAP with 3 L nasal cannula bleed in. Needs to follow up with Dr. Samy Ramirez.        She is to return for follow-up in 4 weeks.       Lidia Guardado MD  [facility] Pulmonology, Critical Care and Sleep

## 2018-08-29 ENCOUNTER — HOSPITAL ENCOUNTER (OUTPATIENT)
Dept: NUCLEAR MEDICINE | Age: 73
Discharge: HOME | End: 2018-08-29
Attending: INTERNAL MEDICINE | Admitting: INTERNAL MEDICINE

## 2018-08-29 DIAGNOSIS — R06.02 SHORTNESS OF BREATH: ICD-10-CM

## 2018-08-29 DIAGNOSIS — R06.02 SOB (SHORTNESS OF BREATH): ICD-10-CM

## 2018-08-29 DIAGNOSIS — E87.6 HYPOKALEMIA: Primary | ICD-10-CM

## 2018-08-29 RX ORDER — POTASSIUM CHLORIDE 20 MEQ/1
TABLET, EXTENDED RELEASE ORAL
Qty: 60 TABLET | Refills: 3 | Status: ON HOLD | OUTPATIENT
Start: 2018-08-29 | End: 2018-10-13 | Stop reason: ALTCHOICE

## 2018-08-29 RX ORDER — XENON XE-133 10 MCI/1
10 GAS RESPIRATORY (INHALATION)
Status: COMPLETED | OUTPATIENT
Start: 2018-08-29 | End: 2018-08-29

## 2018-08-29 RX ADMIN — XENON XE-133 10 MILLICURIE: 10 GAS RESPIRATORY (INHALATION) at 10:51

## 2018-08-29 RX ADMIN — Medication 6 MILLICURIE: at 10:52

## 2018-08-29 RX ADMIN — METHYLPREDNISOLONE ACETATE 80 MG: 80 INJECTION, SUSPENSION INTRA-ARTICULAR; INTRALESIONAL; INTRAMUSCULAR; SOFT TISSUE at 07:39

## 2018-09-04 ENCOUNTER — TELEPHONE (OUTPATIENT)
Dept: PULMONOLOGY | Age: 73
End: 2018-09-04

## 2018-09-04 RX ORDER — PREDNISONE 20 MG/1
TABLET ORAL
Qty: 35 TABLET | Refills: 0 | Status: SHIPPED | OUTPATIENT
Start: 2018-09-04 | End: 2018-09-08 | Stop reason: ALTCHOICE

## 2018-09-04 NOTE — TELEPHONE ENCOUNTER
Spoke to patient. She says she felt better after Depo-Medrol, but is feeling short of breath again. Will prescribe a slow prednisone taper.  Will also discuss with her cardiologist whether she needs a stress test.

## 2018-09-06 ENCOUNTER — OFFICE VISIT (OUTPATIENT)
Dept: CARDIOLOGY CLINIC | Age: 73
End: 2018-09-06

## 2018-09-06 ENCOUNTER — TELEPHONE (OUTPATIENT)
Dept: PULMONOLOGY | Age: 73
End: 2018-09-06

## 2018-09-06 VITALS
BODY MASS INDEX: 35.34 KG/M2 | SYSTOLIC BLOOD PRESSURE: 152 MMHG | DIASTOLIC BLOOD PRESSURE: 84 MMHG | OXYGEN SATURATION: 92 % | WEIGHT: 180 LBS | HEIGHT: 60 IN

## 2018-09-06 DIAGNOSIS — I73.9 PAD (PERIPHERAL ARTERY DISEASE) (HCC): ICD-10-CM

## 2018-09-06 DIAGNOSIS — I65.23 BILATERAL CAROTID ARTERY STENOSIS: ICD-10-CM

## 2018-09-06 DIAGNOSIS — I10 ESSENTIAL HYPERTENSION: ICD-10-CM

## 2018-09-06 DIAGNOSIS — I25.10 CORONARY ARTERY DISEASE INVOLVING NATIVE CORONARY ARTERY OF NATIVE HEART WITHOUT ANGINA PECTORIS: ICD-10-CM

## 2018-09-06 DIAGNOSIS — R06.09 DOE (DYSPNEA ON EXERTION): Primary | ICD-10-CM

## 2018-09-06 DIAGNOSIS — E78.2 MIXED HYPERLIPIDEMIA: ICD-10-CM

## 2018-09-06 DIAGNOSIS — J44.9 CHRONIC OBSTRUCTIVE PULMONARY DISEASE, UNSPECIFIED COPD TYPE (HCC): ICD-10-CM

## 2018-09-06 DIAGNOSIS — G47.33 OSA TREATED WITH BIPAP: ICD-10-CM

## 2018-09-06 DIAGNOSIS — Z87.891 FORMER SMOKER: ICD-10-CM

## 2018-09-06 PROCEDURE — G8427 DOCREV CUR MEDS BY ELIG CLIN: HCPCS | Performed by: INTERNAL MEDICINE

## 2018-09-06 PROCEDURE — 1036F TOBACCO NON-USER: CPT | Performed by: INTERNAL MEDICINE

## 2018-09-06 PROCEDURE — 1090F PRES/ABSN URINE INCON ASSESS: CPT | Performed by: INTERNAL MEDICINE

## 2018-09-06 PROCEDURE — G8399 PT W/DXA RESULTS DOCUMENT: HCPCS | Performed by: INTERNAL MEDICINE

## 2018-09-06 PROCEDURE — G8598 ASA/ANTIPLAT THER USED: HCPCS | Performed by: INTERNAL MEDICINE

## 2018-09-06 PROCEDURE — 3017F COLORECTAL CA SCREEN DOC REV: CPT | Performed by: INTERNAL MEDICINE

## 2018-09-06 PROCEDURE — 4040F PNEUMOC VAC/ADMIN/RCVD: CPT | Performed by: INTERNAL MEDICINE

## 2018-09-06 PROCEDURE — G8417 CALC BMI ABV UP PARAM F/U: HCPCS | Performed by: INTERNAL MEDICINE

## 2018-09-06 PROCEDURE — 1101F PT FALLS ASSESS-DOCD LE1/YR: CPT | Performed by: INTERNAL MEDICINE

## 2018-09-06 PROCEDURE — 3023F SPIROM DOC REV: CPT | Performed by: INTERNAL MEDICINE

## 2018-09-06 PROCEDURE — 1123F ACP DISCUSS/DSCN MKR DOCD: CPT | Performed by: INTERNAL MEDICINE

## 2018-09-06 PROCEDURE — 99214 OFFICE O/P EST MOD 30 MIN: CPT | Performed by: INTERNAL MEDICINE

## 2018-09-06 PROCEDURE — G8926 SPIRO NO PERF OR DOC: HCPCS | Performed by: INTERNAL MEDICINE

## 2018-09-06 RX ORDER — DIPHENHYDRAMINE HCL 25 MG
CAPSULE ORAL
Qty: 3 CAPSULE | Refills: 1 | Status: SHIPPED | OUTPATIENT
Start: 2018-09-06 | End: 2018-09-26 | Stop reason: SDUPTHER

## 2018-09-06 RX ORDER — FAMOTIDINE 10 MG
TABLET ORAL
Qty: 3 TABLET | Refills: 1 | Status: SHIPPED | OUTPATIENT
Start: 2018-09-06 | End: 2018-09-26 | Stop reason: SDUPTHER

## 2018-09-06 RX ORDER — PREDNISONE 20 MG/1
TABLET ORAL
Qty: 6 TABLET | Refills: 1 | Status: SHIPPED | OUTPATIENT
Start: 2018-09-06 | End: 2018-09-08 | Stop reason: ALTCHOICE

## 2018-09-06 NOTE — PROGRESS NOTES
[address]      Cardiology Progress Note    Lorrie Menendez  1945 September 6, 2018    CC: \"More short of breath\"     HPI:  The patient is 81 y.o. female with a past medical history significant for CABG, claudication, coronary artery disease, hypercholesterolemia and hypertension, PAD. She was seen by pulmonology 8/21/18 for worsening shortness of breath 8/21/18 and had a BNP completed and was instructed to follow up with cardiology. Her BNP 2186 and she was started on metolazone. She reports episodes of lightheadedness and denies any syncopal episodes. She is on continuous oxygen at 3 liters per nasal canula. She states episodes of her \"heart racing\" that will occur weekly. She has been compliant with medications and tolerating. No abnormal bruising or bleeding. Patient denies any symptoms of chest pain, pressure, tightness, nausea, vomiting, near syncope, dizziness, lightheaded, PND, orthopnea, bilateral lower extremities pain, cramping or fatigue. No s/s TIA/CVA. No falls at home.      We had done a RHC but found her filling pressures to be normal     Past Medical History:   Diagnosis Date    CAD (coronary artery disease)     Nonobstructive on cath in 9/2017    CHF (congestive heart failure) ([address]     Colostomy care ([address] 4/25/2018    Peristomal irritation and early leaking    [facility]     Hyperlipidemia     Hypertension     Kidney disease     Stage 3    Peripheral vascular disease ([address]     Rectal fissure 11/2014    surgery pending    Restless legs syndrome     Sleep apnea     O2 3 liters at hs    Unspecified sleep apnea      Past Surgical History:   Procedure Laterality Date    CARDIAC CATHETERIZATION  09/13/2017    Dr. Chucky Morales  03/09/2018    CORONARY ARTERY BYPASS GRAFT      Legs & Carotid    HERNIA REPAIR       Family History   Problem Relation Age of Onset    Heart Disease Mother    [facility] (DELTASONE) 5 MG tablet Take 4 tabs by mouth daily for 3 days, 3 tabs for 2 days, 2 tabs for 2 days, 1 tab for 2 days, 1/2 tab for 2 days 25 tablet 0    budesonide-formoterol (SYMBICORT) 160-4.5 MCG/ACT AERO Inhale 2 puffs into the lungs 2 times daily 1 Inhaler 0    Arformoterol Tartrate (BROVANA) 15 MCG/2ML NEBU Take 1 ampule by nebulization 2 times daily 120 mL 5    albuterol sulfate HFA (VENTOLIN HFA) 108 (90 Base) MCG/ACT inhaler Inhale 2 puffs into the lungs every 4 hours as needed for Wheezing or Shortness of Breath 1 Inhaler 5    budesonide (PULMICORT) 0.5 MG/2ML nebulizer suspension Take 2 mLs by nebulization 2 times daily 120 ampule 5    ipratropium-albuterol (DUONEB) 0.5-2.5 (3) MG/3ML SOLN nebulizer solution Inhale 3 mLs into the lungs every 6 hours. 120 vial 3     No current facility-administered medications for this visit. Review of Systems:    Constitutional: positive for fatigue  Eyes: negative  Ears, nose, mouth, throat, and face: negative  Respiratory: positive for dyspnea on exertion  Cardiovascular: positive for chest pain  Gastrointestinal: negative  Genitourinary:negative  Integument/breast: negative  Hematologic/lymphatic: negative  Musculoskeletal:negative  Neurological: negative  Behavioral/Psych: negative  Endocrine: negative  Allergic/Immunologic: negative   Colostomy bag in place  Headache      Physical Exam:   BP (!) 152/84 (Site: Left Arm, Position: Sitting)   Ht 5' (1.524 m)   Wt 180 lb (81.6 kg) Comment: shoes on  LMP 05/01/2006 (Approximate)   SpO2 92% Comment: on O2  Breastfeeding? No   BMI 35.15 kg/m²   Wt Readings from Last 3 Encounters:   09/06/18 180 lb (81.6 kg)   08/28/18 177 lb 6.4 oz (80.5 kg)   08/24/18 174 lb 6.1 oz (79.1 kg)       Constitutional: She is oriented to person, place, and time. She appears well-developed and well-nourished. In no acute distress. Head: Normocephalic and atraumatic. Pupils equal and round. Neck: Neck supple.  No JVP or regurgitation. IVC size is dilated (>2.1 cm) and collapses < 50% with respiration  consistent with elevated RA pressure (15 mmHg). Estimated pulmonary artery systolic pressure is moderately elevated at 47  mmHg assuming a right atrial pressure of 15 mmHg. c/w mild pulmonary HTN.    Cath 9/18/2017  1. The left main comes from the left coronary cusp. There is normal ELIER 3 flow. 2.  The left anterior descending artery comes from the left main coronary artery. It has ELIER 3 flow. The midportion has 50%  stenosis. 3.  The left circumflex comes from the left main. It is nondominant. No significant stenosis was seen. 4.  The right coronary artery comes from the right coronary cusp. It is dominant giving rise to the right posterior descending artery and posterolateral branch and has no significant stenosis. 5.  LV ejection fraction is 60%. ECHO:6/12/18   Summary   Left ventricular cavity size is normal.   Normal left ventricular wall thickness.   Ejection fraction is visually estimated to be 55-60%.   There is reversal of E/A inflow velocities across the mitral valve.   Mitral valve leaflets appear mildly thickened.   Mild mitral regurgitation is present.   The left atrium is at the upper limits of normal in size.   Mild tricuspid regurgitation.  IVC size is dilated (>2.1 cm) and collapses < 50% with respiration   consistent with elevated RA pressure (15 mmHg).  Estimated pulmonary artery systolic pressure is moderately elevated at 47   mmHg assuming a right atrial pressure of 15 mmHg. c/w mild pulmonary HTN. RHC: 8/27/18  HEMODYNAMIC FINDINGS:  1. Right atrial pressure is 10 mmHg. 2.  RV pressure is at 39/-6. 3.  Right ventricular pressure is 32/-1. 4.  Pulmonary capillary wedge pressure of 7 mmHg. 5.  Pulmonary artery pressure 26/1.  6.  Cardiac output is 3.46 liters per minute. 7.  Right atrial pressure is 63%. 8.  Aortic saturation 96%.   9.  Pulmonary artery saturation 64%.   SUMMARY:

## 2018-09-12 ENCOUNTER — TELEPHONE (OUTPATIENT)
Dept: CARDIOLOGY CLINIC | Age: 73
End: 2018-09-12

## 2018-09-12 DIAGNOSIS — I25.119 CORONARY ARTERY DISEASE INVOLVING NATIVE CORONARY ARTERY OF NATIVE HEART WITH ANGINA PECTORIS (HCC): Primary | ICD-10-CM

## 2018-09-12 RX ORDER — SODIUM CHLORIDE 9 MG/ML
INJECTION, SOLUTION INTRAVENOUS CONTINUOUS
Status: CANCELLED | OUTPATIENT
Start: 2018-09-12 | End: 2018-09-12

## 2018-09-13 ENCOUNTER — TELEPHONE (OUTPATIENT)
Dept: PULMONOLOGY | Age: 73
End: 2018-09-13

## 2018-09-13 RX ORDER — METOPROLOL TARTRATE 50 MG/1
50 TABLET, FILM COATED ORAL ONCE
Qty: 1 TABLET | Refills: 0 | Status: ON HOLD | OUTPATIENT
Start: 2018-09-18 | End: 2018-09-24 | Stop reason: HOSPADM

## 2018-09-13 NOTE — TELEPHONE ENCOUNTER
Fanybriana Ivey has pending orders for a PFT and 6 min walk from back at her 8/21/18 appt. Do you still want her to get those done prior to her 9/25/18 appt with all her current issues or should she hold off for now?   Please advise

## 2018-09-13 NOTE — TELEPHONE ENCOUNTER
Updated Brady Padilla on Dr. Mollie Holter recommendations for lopressor 50 mg before the procedure. Anthony instructed to have her take it before she leaves for the appointment on 9/18. Called and spoke to [place], she verbalized an understanding and sent RX to her pharmacy.

## 2018-09-17 DIAGNOSIS — F06.8 ANXIETY DISORDER DUE TO MULTIPLE MEDICAL PROBLEMS: ICD-10-CM

## 2018-09-17 RX ORDER — LORAZEPAM 0.5 MG/1
0.5 TABLET ORAL DAILY PRN
Qty: 30 TABLET | Refills: 0 | Status: SHIPPED | OUTPATIENT
Start: 2018-09-17 | End: 2018-10-17

## 2018-09-20 ENCOUNTER — OFFICE VISIT (OUTPATIENT)
Dept: FAMILY MEDICINE CLINIC | Age: 73
End: 2018-09-20

## 2018-09-20 VITALS
DIASTOLIC BLOOD PRESSURE: 80 MMHG | WEIGHT: 182 LBS | RESPIRATION RATE: 20 BRPM | HEART RATE: 84 BPM | OXYGEN SATURATION: 95 % | SYSTOLIC BLOOD PRESSURE: 110 MMHG | BODY MASS INDEX: 35.54 KG/M2

## 2018-09-20 DIAGNOSIS — I25.119 CORONARY ARTERY DISEASE INVOLVING NATIVE CORONARY ARTERY OF NATIVE HEART WITH ANGINA PECTORIS (HCC): ICD-10-CM

## 2018-09-20 DIAGNOSIS — J44.9 COPD, SEVERE (HCC): ICD-10-CM

## 2018-09-20 DIAGNOSIS — R06.09 DOE (DYSPNEA ON EXERTION): Primary | ICD-10-CM

## 2018-09-20 PROCEDURE — 1101F PT FALLS ASSESS-DOCD LE1/YR: CPT | Performed by: NURSE PRACTITIONER

## 2018-09-20 PROCEDURE — 99214 OFFICE O/P EST MOD 30 MIN: CPT | Performed by: NURSE PRACTITIONER

## 2018-09-20 PROCEDURE — G8417 CALC BMI ABV UP PARAM F/U: HCPCS | Performed by: NURSE PRACTITIONER

## 2018-09-20 PROCEDURE — 4040F PNEUMOC VAC/ADMIN/RCVD: CPT | Performed by: NURSE PRACTITIONER

## 2018-09-20 PROCEDURE — G8427 DOCREV CUR MEDS BY ELIG CLIN: HCPCS | Performed by: NURSE PRACTITIONER

## 2018-09-20 PROCEDURE — G8926 SPIRO NO PERF OR DOC: HCPCS | Performed by: NURSE PRACTITIONER

## 2018-09-20 PROCEDURE — 1090F PRES/ABSN URINE INCON ASSESS: CPT | Performed by: NURSE PRACTITIONER

## 2018-09-20 PROCEDURE — 1123F ACP DISCUSS/DSCN MKR DOCD: CPT | Performed by: NURSE PRACTITIONER

## 2018-09-20 PROCEDURE — G8399 PT W/DXA RESULTS DOCUMENT: HCPCS | Performed by: NURSE PRACTITIONER

## 2018-09-20 PROCEDURE — 3017F COLORECTAL CA SCREEN DOC REV: CPT | Performed by: NURSE PRACTITIONER

## 2018-09-20 PROCEDURE — G8598 ASA/ANTIPLAT THER USED: HCPCS | Performed by: NURSE PRACTITIONER

## 2018-09-20 PROCEDURE — 1036F TOBACCO NON-USER: CPT | Performed by: NURSE PRACTITIONER

## 2018-09-20 PROCEDURE — 3023F SPIROM DOC REV: CPT | Performed by: NURSE PRACTITIONER

## 2018-09-20 ASSESSMENT — ENCOUNTER SYMPTOMS
ABDOMINAL PAIN: 0
CHEST TIGHTNESS: 1
CONSTIPATION: 0
NAUSEA: 1
COUGH: 1
DIARRHEA: 0
WHEEZING: 1
VOMITING: 0
SHORTNESS OF BREATH: 1

## 2018-09-20 NOTE — PROGRESS NOTES
9/20/2018    This is a 72 y.o. female   Chief Complaint   Patient presents with    Shortness of Breath     x2 days, chest tightness, come and go    Leg Swelling     bilateral    .    HPI  Patient reports that she has had increased shortness of breath in the past couple of days. Will happen with rest and with activity. Will have to sit down and rest. Will have chest pain or tightness with the shortness of breath. Saw cardiologist Dr. Harjeet Ndiaye on 9/6/18 for shortness of breath and chest pain. She had RHC and was found to have filling pressures to be normal. Scheduled to have coronary CTA next week. She has history of LHC 9/17 that showed non obstructive CAD. Had leg swelling that started 2 days ago. Reports that it is currently controlled today. Taking her torsemide medication. She reports that she thinks she has gained 10 lbs in the past month. Discussed with patient that her weight at 8/16/18 visit was 179 lbs. Has severe COPD. Using 3 L O2 continuously. Denies worsening cough. Reports that she has been compliant with her respiratory medications. Has order to repeat PFT and 6 minute walk test, but has not yet completed.       Patient Active Problem List   Diagnosis    Fracture, metacarpal, neck    PAD (peripheral artery disease) (HCC)    CAD (coronary artery disease)    HTN (hypertension)    Diastolic dysfunction    RLS (restless legs syndrome)    History of tobacco use    Chronic obstructive pulmonary disease (HCC)    Centrilobular emphysema (HCC)    Abnormal cardiovascular stress test    Colovesical fistula    Diverticulitis of large intestine without perforation or abscess without bleeding    Diverticulitis large intestine w/o perforation or abscess w/bleeding    Large bowel obstruction ([address]    ANGIE on CPAP    ANGIE treated with BiPAP    CKD (chronic kidney disease), stage III - sees Dr. Jeffery Powell COPD, severe ([address]    Colonic obstruction ([address]    Carotid artery stenosis without cerebral by mouth daily 30 tablet 0    lansoprazole (PREVACID) 15 MG delayed release capsule Take 1 capsule by mouth daily 90 capsule 1    levothyroxine (SYNTHROID) 50 MCG tablet TAKE ONE TABLET BY MOUTH DAILY 30 tablet 3    clopidogrel (PLAVIX) 75 MG tablet Take 1 tablet by mouth daily 30 tablet 5    carvedilol (COREG) 25 MG tablet Take 1 tablet by mouth 2 times daily (with meals) 60 tablet 5    montelukast (SINGULAIR) 10 MG tablet Take 1 tablet by mouth daily 30 tablet 5    citalopram (CELEXA) 40 MG tablet Take 1 tablet by mouth daily 30 tablet 5    budesonide-formoterol (SYMBICORT) 160-4.5 MCG/ACT AERO Inhale 2 puffs into the lungs 2 times daily 1 Inhaler 0    Arformoterol Tartrate (BROVANA) 15 MCG/2ML NEBU Take 1 ampule by nebulization 2 times daily 120 mL 5    budesonide (PULMICORT) 0.5 MG/2ML nebulizer suspension Take 2 mLs by nebulization 2 times daily 120 ampule 5    ipratropium-albuterol (DUONEB) 0.5-2.5 (3) MG/3ML SOLN nebulizer solution Inhale 3 mLs into the lungs every 6 hours. 120 vial 3    metoprolol tartrate (LOPRESSOR) 50 MG tablet Take 1 tablet by mouth once for 1 dose Take prior to scheduled coronary CT. 1 tablet 0     No current facility-administered medications for this visit. Allergies   Allergen Reactions    Iv Dye [Iodides]      Flushed, broke out and difficulty breathing       Review of Systems   Constitutional: Positive for activity change and fatigue. Negative for fever. Respiratory: Positive for cough, chest tightness, shortness of breath and wheezing. Cardiovascular: Positive for chest pain. Gastrointestinal: Positive for nausea. Negative for abdominal pain, constipation, diarrhea and vomiting. Genitourinary: Negative for dysuria and frequency. Neurological: Positive for dizziness. Negative for headaches.        Vitals:    09/20/18 1548   BP: 110/80   Site: Right Upper Arm   Position: Sitting   Cuff Size: Medium Adult   Pulse: 84   Resp: 20   SpO2: 95%   Weight: 182 lb artery of native heart with angina pectoris (HCC)  Shortness of breath and chest tightness has been worsening. Had recent evaluation with Dr. Cliff Martinez. Planning on coronary CTA next week. [facility] from 9/17 showed non obstructive CAD    COPD, severe ([facility] [address]  Had recent evaluation with Dr. Janusz White. Continue medications per Dr. Irish Thomson orders. Has PFT and 6 minute walk test orders yet to complete. Return if symptoms worsen or fail to improve. Should keep f/u with Dr. Lauro Stevens at scheduled appt next month.

## 2018-09-22 ENCOUNTER — TELEPHONE (OUTPATIENT)
Dept: PULMONOLOGY | Age: 73
End: 2018-09-22

## 2018-09-22 ENCOUNTER — APPOINTMENT (OUTPATIENT)
Dept: GENERAL RADIOLOGY | Age: 73
DRG: 720 | End: 2018-09-22
Payer: COMMERCIAL

## 2018-09-22 ENCOUNTER — HOSPITAL ENCOUNTER (INPATIENT)
Age: 73
LOS: 2 days | Discharge: HOME | DRG: 720 | End: 2018-09-24
Attending: EMERGENCY MEDICINE | Admitting: FAMILY MEDICINE
Payer: COMMERCIAL

## 2018-09-22 DIAGNOSIS — J18.9 PNEUMONIA OF BOTH LUNGS DUE TO INFECTIOUS ORGANISM, UNSPECIFIED PART OF LUNG: ICD-10-CM

## 2018-09-22 DIAGNOSIS — J96.21 ACUTE ON CHRONIC RESPIRATORY FAILURE WITH HYPOXIA AND HYPERCAPNIA (HCC): ICD-10-CM

## 2018-09-22 DIAGNOSIS — J96.22 ACUTE ON CHRONIC RESPIRATORY FAILURE WITH HYPOXIA AND HYPERCAPNIA (HCC): ICD-10-CM

## 2018-09-22 DIAGNOSIS — A41.9 SEPSIS, DUE TO UNSPECIFIED ORGANISM: Primary | ICD-10-CM

## 2018-09-22 LAB
A/G RATIO: 1.3 (ref 1.1–2.2)
ALBUMIN SERPL-MCNC: 4.3 G/DL (ref 3.4–5)
ALP BLD-CCNC: 54 U/L (ref 40–129)
ALT SERPL-CCNC: 54 U/L (ref 10–40)
ANION GAP SERPL CALCULATED.3IONS-SCNC: 21 MMOL/L (ref 3–16)
AST SERPL-CCNC: 38 U/L (ref 15–37)
BASE EXCESS ARTERIAL: 5.8 MMOL/L (ref -3–3)
BASOPHILS ABSOLUTE: 0.1 K/UL (ref 0–0.2)
BASOPHILS RELATIVE PERCENT: 0.5 %
BILIRUB SERPL-MCNC: <0.2 MG/DL (ref 0–1)
BUN BLDV-MCNC: 18 MG/DL (ref 7–20)
CALCIUM SERPL-MCNC: 9.6 MG/DL (ref 8.3–10.6)
CARBOXYHEMOGLOBIN ARTERIAL: 0.9 % (ref 0–1.5)
CHLORIDE BLD-SCNC: 98 MMOL/L (ref 99–110)
CO2: 25 MMOL/L (ref 21–32)
CREAT SERPL-MCNC: 1.1 MG/DL (ref 0.6–1.2)
EOSINOPHILS ABSOLUTE: 0 K/UL (ref 0–0.6)
EOSINOPHILS RELATIVE PERCENT: 0.1 %
GFR AFRICAN AMERICAN: 59
GFR NON-AFRICAN AMERICAN: 49
GLOBULIN: 3.4 G/DL
GLUCOSE BLD-MCNC: 175 MG/DL (ref 70–99)
HCO3 ARTERIAL: 31.8 MMOL/L (ref 21–29)
HCT VFR BLD CALC: 46.9 % (ref 36–48)
HEMOGLOBIN, ART, EXTENDED: 13.3 G/DL (ref 12–16)
HEMOGLOBIN: 14.2 G/DL (ref 12–16)
LACTIC ACID: 2.9 MMOL/L (ref 0.4–2)
LYMPHOCYTES ABSOLUTE: 3.8 K/UL (ref 1–5.1)
LYMPHOCYTES RELATIVE PERCENT: 13.2 %
MCH RBC QN AUTO: 26.1 PG (ref 26–34)
MCHC RBC AUTO-ENTMCNC: 30.3 G/DL (ref 31–36)
MCV RBC AUTO: 85.9 FL (ref 80–100)
METHEMOGLOBIN ARTERIAL: 0.8 %
MONOCYTES ABSOLUTE: 1.2 K/UL (ref 0–1.3)
MONOCYTES RELATIVE PERCENT: 4.2 %
NEUTROPHILS ABSOLUTE: 23.4 K/UL (ref 1.7–7.7)
NEUTROPHILS RELATIVE PERCENT: 82 %
O2 CONTENT ARTERIAL: 18 ML/DL
O2 SAT, ARTERIAL: 95.6 %
O2 THERAPY: ABNORMAL
PCO2 ARTERIAL: 54.6 MMHG (ref 35–45)
PDW BLD-RTO: 19.7 % (ref 12.4–15.4)
PH ARTERIAL: 7.38 (ref 7.35–7.45)
PLATELET # BLD: 297 K/UL (ref 135–450)
PMV BLD AUTO: 9.1 FL (ref 5–10.5)
PO2 ARTERIAL: 80.2 MMHG (ref 75–108)
POTASSIUM SERPL-SCNC: 4.4 MMOL/L (ref 3.5–5.1)
PRO-BNP: 4984 PG/ML (ref 0–124)
RBC # BLD: 5.46 M/UL (ref 4–5.2)
SODIUM BLD-SCNC: 144 MMOL/L (ref 136–145)
TCO2 ARTERIAL: 33.5 MMOL/L
TOTAL PROTEIN: 7.7 G/DL (ref 6.4–8.2)
TROPONIN: <0.01 NG/ML
WBC # BLD: 28.5 K/UL (ref 4–11)

## 2018-09-22 PROCEDURE — 36415 COLL VENOUS BLD VENIPUNCTURE: CPT

## 2018-09-22 PROCEDURE — 94762 N-INVAS EAR/PLS OXIMTRY CONT: CPT

## 2018-09-22 PROCEDURE — 99285 EMERGENCY DEPT VISIT HI MDM: CPT

## 2018-09-22 PROCEDURE — 6370000000 HC RX 637 (ALT 250 FOR IP): Performed by: PHYSICIAN ASSISTANT

## 2018-09-22 PROCEDURE — 36600 WITHDRAWAL OF ARTERIAL BLOOD: CPT

## 2018-09-22 PROCEDURE — 93005 ELECTROCARDIOGRAM TRACING: CPT | Performed by: PHYSICIAN ASSISTANT

## 2018-09-22 PROCEDURE — 2580000003 HC RX 258: Performed by: PHYSICIAN ASSISTANT

## 2018-09-22 PROCEDURE — 83605 ASSAY OF LACTIC ACID: CPT

## 2018-09-22 PROCEDURE — 82803 BLOOD GASES ANY COMBINATION: CPT

## 2018-09-22 PROCEDURE — 83880 ASSAY OF NATRIURETIC PEPTIDE: CPT

## 2018-09-22 PROCEDURE — 94660 CPAP INITIATION&MGMT: CPT

## 2018-09-22 PROCEDURE — 80053 COMPREHEN METABOLIC PANEL: CPT

## 2018-09-22 PROCEDURE — 6360000002 HC RX W HCPCS: Performed by: PHYSICIAN ASSISTANT

## 2018-09-22 PROCEDURE — 84484 ASSAY OF TROPONIN QUANT: CPT

## 2018-09-22 PROCEDURE — 94640 AIRWAY INHALATION TREATMENT: CPT

## 2018-09-22 PROCEDURE — 71045 X-RAY EXAM CHEST 1 VIEW: CPT

## 2018-09-22 PROCEDURE — 2700000000 HC OXYGEN THERAPY PER DAY

## 2018-09-22 PROCEDURE — 96368 THER/DIAG CONCURRENT INF: CPT

## 2018-09-22 PROCEDURE — 96375 TX/PRO/DX INJ NEW DRUG ADDON: CPT

## 2018-09-22 PROCEDURE — 96365 THER/PROPH/DIAG IV INF INIT: CPT

## 2018-09-22 PROCEDURE — 87040 BLOOD CULTURE FOR BACTERIA: CPT

## 2018-09-22 PROCEDURE — 85025 COMPLETE CBC W/AUTO DIFF WBC: CPT

## 2018-09-22 PROCEDURE — 2060000000 HC ICU INTERMEDIATE R&B

## 2018-09-22 RX ORDER — IPRATROPIUM BROMIDE AND ALBUTEROL SULFATE 2.5; .5 MG/3ML; MG/3ML
3 SOLUTION RESPIRATORY (INHALATION) ONCE
Status: COMPLETED | OUTPATIENT
Start: 2018-09-22 | End: 2018-09-22

## 2018-09-22 RX ORDER — METHYLPREDNISOLONE SODIUM SUCCINATE 125 MG/2ML
125 INJECTION, POWDER, LYOPHILIZED, FOR SOLUTION INTRAMUSCULAR; INTRAVENOUS ONCE
Status: COMPLETED | OUTPATIENT
Start: 2018-09-22 | End: 2018-09-22

## 2018-09-22 RX ORDER — 0.9 % SODIUM CHLORIDE 0.9 %
1650 INTRAVENOUS SOLUTION INTRAVENOUS ONCE
Status: DISCONTINUED | OUTPATIENT
Start: 2018-09-22 | End: 2018-09-23

## 2018-09-22 RX ADMIN — CEFEPIME HYDROCHLORIDE 2 G: 2 INJECTION, POWDER, FOR SOLUTION INTRAVENOUS at 23:56

## 2018-09-22 RX ADMIN — AZITHROMYCIN MONOHYDRATE 500 MG: 500 INJECTION, POWDER, LYOPHILIZED, FOR SOLUTION INTRAVENOUS at 23:56

## 2018-09-22 RX ADMIN — METHYLPREDNISOLONE SODIUM SUCCINATE 125 MG: 125 INJECTION, POWDER, FOR SOLUTION INTRAMUSCULAR; INTRAVENOUS at 19:58

## 2018-09-22 RX ADMIN — NITROGLYCERIN 1 INCH: 20 OINTMENT TOPICAL at 20:47

## 2018-09-22 RX ADMIN — IPRATROPIUM BROMIDE AND ALBUTEROL SULFATE 3 AMPULE: .5; 3 SOLUTION RESPIRATORY (INHALATION) at 19:59

## 2018-09-22 ASSESSMENT — ENCOUNTER SYMPTOMS
COUGH: 0
SHORTNESS OF BREATH: 1

## 2018-09-22 NOTE — ED PROVIDER NOTES
[facility]  eMERGENCY dEPARTMENT eNCOUnter      Pt Name: Jessica Orozco  MRN: [id number]  [place] 1945  Date of evaluation: 9/22/2018  Provider: SHILOH Cedillo    CHIEF COMPLAINT       Chief Complaint   Patient presents with    Shortness of Breath     since yesterday, on oxygen at home, requesting cpap, has been intubated in past         HISTORY OF PRESENT ILLNESS  (Location/Symptom, Timing/Onset, Context/Setting, Quality, Duration, Modifying Factors, Severity.)   Jessica Orozco is a 72 y.o. female who presents to the emergency department For shortness of breath since yesterday. Patient comes in requesting BiPAP. History is limited due to her shortness of breath and respiratory distress. She has COPD and CHF. Her home nebulizers and inhalers are helping. Does report some weight gain was unsure how much. Also reports intermittent chest pain but none now. Denies fevers or chills or cough. No history of VTE. She is normally on 3 L but has increased to 5 L all day. Nursing Notes were reviewed and I agree. REVIEW OF SYSTEMS    (2-9 systems for level 4, 10 or more for level 5)   Limited due to respiratory distress  Review of Systems   Constitutional: Negative for chills and fever. Respiratory: Positive for shortness of breath. Negative for cough. Cardiovascular: Positive for chest pain (none now). Except as noted above the remainder of the review of systems was reviewed and negative.        PAST MEDICAL HISTORY         Diagnosis Date    CAD (coronary artery disease)     Nonobstructive on cath in 9/2017    [facility] heart failure) ([facility])     Colostomy care ([facility] [address] 4/25/2018    Peristomal irritation and early leaking    COPD (chronic obstructive pulmonary disease) ([facility])     Hyperlipidemia     Hypertension     Kidney disease     Stage 3    Peripheral vascular disease ([facility] [address]     Rectal fissure 11/2014    surgery pending    Restless Benadryl 25 mg, pepcid 10 mg and Prednisone 40 mg every 8 hours times 3 doses prior to cath due to iodine allergy    HYDRALAZINE (APRESOLINE) 50 MG TABLET    Take 1 tablet by mouth 3 times daily    IPRATROPIUM-ALBUTEROL (DUONEB) 0.5-2.5 (3) MG/3ML SOLN NEBULIZER SOLUTION    Inhale 3 mLs into the lungs every 6 hours. LANSOPRAZOLE (PREVACID) 15 MG DELAYED RELEASE CAPSULE    Take 1 capsule by mouth daily    LEVOTHYROXINE (SYNTHROID) 50 MCG TABLET    TAKE ONE TABLET BY MOUTH DAILY    LIDOCAINE (XYLOCAINE) 2 % JELLY    Apply small amount topically to affected area every 8 hours when necessary pain    LORAZEPAM (ATIVAN) 0.5 MG TABLET    Take 1 tablet by mouth daily as needed for Anxiety for up to 30 days. Chazy Bender METOPROLOL TARTRATE (LOPRESSOR) 50 MG TABLET    Take 1 tablet by mouth once for 1 dose Take prior to scheduled coronary CT. MONTELUKAST (SINGULAIR) 10 MG TABLET    Take 1 tablet by mouth daily    POTASSIUM CHLORIDE (KLOR-CON M) 20 MEQ EXTENDED RELEASE TABLET    Pt is to take 40mEq x 3 doses today 4hrs apart, then 20mEq BID    TORSEMIDE (DEMADEX) 20 MG TABLET    Take 1 tablet by mouth daily       ALLERGIES     Iv dye [iodides]    FAMILY HISTORY       Family History   Problem Relation Age of Onset    Heart Disease Mother     Heart Disease Father     Heart Disease Sister     Cancer Brother      Family Status   Relation Status    Mother     Father     Sister Alive    Brother         SOCIAL HISTORY      reports that she quit smoking about 26 years ago. She started smoking about 55 years ago. She has a 90.00 pack-year smoking history. She has never used smokeless tobacco. She reports that she does not drink alcohol or use drugs.     PHYSICAL EXAM    (up to 7 for level 4, 8 or more for level 5)     ED Triage Vitals   BP Temp Temp src Pulse Resp SpO2 Height Weight   18 -- -- 18 -- 18   (!) 255/154   163 (!) 38 (!) 55 %  190 lb 0.6 oz (86.2 kg)       Physical Exam   Constitutional: She is oriented to person, place, and time. She appears well-developed and well-nourished. She appears distressed (moderately distressed due to SOB). HENT:   Head: Normocephalic and atraumatic. Right Ear: External ear normal.   Left Ear: External ear normal.   Mouth/Throat: Oropharynx is clear and moist.   Eyes: EOM are normal.   Neck: Normal range of motion. Neck supple. Cardiovascular: Regular rhythm and normal heart sounds. +tachy   Pulmonary/Chest: She is in respiratory distress (moderate distress). Diminished breath sounds bilaterally     Musculoskeletal: Normal range of motion. She exhibits no edema (no lower extremity edema bialterally) or tenderness (no calf tenderness bilaterally). Neurological: She is alert and oriented to person, place, and time. Skin: Skin is warm and dry. She is not diaphoretic. Psychiatric: She has a normal mood and affect. Her behavior is normal. Judgment and thought content normal.       DIFFERENTIAL DIAGNOSIS   COPD/Asthma Exacerbation, Pneumothorax, CHF, Pneumonia, Pulmonary Embolism, Anemia      DIAGNOSTIC RESULTS     EKG: All EKG's are interpreted by SHILOH Stanton in the absence of a cardiologist.    EKG obtained. Please see Dr. Kenan Aguilar note for interpretation.      RADIOLOGY:   Non-plain film images such as CT, Ultrasound and MRI are read by the radiologist. Tabby Newsome radiographic images are visualized and preliminarily interpreted by SHILOH Stanton with the below findings:      Interpretation per the Radiologist below, if available at the time of this note:    XR CHEST PORTABLE   Final Result   Bilateral perihilar opacities suspicious for pneumonia               LABS:  Results for orders placed or performed during the hospital encounter of 09/22/18   Blood Gas, Arterial   Result Value Ref Range    pH, Arterial 7.384 7.350 - 7.450    pCO2, Arterial 54.6 (H) 35.0 - 45.0 mmHg    pO2, Arterial 80.2 75.0 - 108.0 mmHg    HCO3, Arterial 31.8 (H) 21.0 - 29.0 mmol/L    Base Excess, Arterial 5.8 (H) -3.0 - 3.0 mmol/L    Hemoglobin, Art, Extended 13.3 12.0 - 16.0 g/dL    O2 Sat, Arterial 95.6 >92 %    Carboxyhgb, Arterial 0.9 0.0 - 1.5 %    Methemoglobin, Arterial 0.8 <1.5 %    TCO2, Arterial 33.5 Not Established mmol/L    O2 Content, Arterial 18 Not Established mL/dL    O2 Therapy Unknown    CBC Auto Differential   Result Value Ref Range    WBC 28.5 (H) 4.0 - 11.0 K/uL    RBC 5.46 (H) 4.00 - 5.20 M/uL    Hemoglobin 14.2 12.0 - 16.0 g/dL    Hematocrit 46.9 36.0 - 48.0 %    MCV 85.9 80.0 - 100.0 fL    MCH 26.1 26.0 - 34.0 pg    MCHC 30.3 (L) 31.0 - 36.0 g/dL    RDW 19.7 (H) 12.4 - 15.4 %    Platelets 506 743 - 979 K/uL    MPV 9.1 5.0 - 10.5 fL    Neutrophils % 82.0 %    Lymphocytes % 13.2 %    Monocytes % 4.2 %    Eosinophils % 0.1 %    Basophils % 0.5 %    Neutrophils # 23.4 (H) 1.7 - 7.7 K/uL    Lymphocytes # 3.8 1.0 - 5.1 K/uL    Monocytes # 1.2 0.0 - 1.3 K/uL    Eosinophils # 0.0 0.0 - 0.6 K/uL    Basophils # 0.1 0.0 - 0.2 K/uL   Comprehensive Metabolic Panel   Result Value Ref Range    Sodium 144 136 - 145 mmol/L    Potassium 4.4 3.5 - 5.1 mmol/L    Chloride 98 (L) 99 - 110 mmol/L    CO2 25 21 - 32 mmol/L    Anion Gap 21 (H) 3 - 16    Glucose 175 (H) 70 - 99 mg/dL    BUN 18 7 - 20 mg/dL    CREATININE 1.1 0.6 - 1.2 mg/dL    GFR Non- 49 (A) >60    GFR  59 (A) >60    Calcium 9.6 8.3 - 10.6 mg/dL    Total Protein 7.7 6.4 - 8.2 g/dL    Alb 4.3 3.4 - 5.0 g/dL    Albumin/Globulin Ratio 1.3 1.1 - 2.2    Total Bilirubin <0.2 0.0 - 1.0 mg/dL    Alkaline Phosphatase 54 40 - 129 U/L    ALT 54 (H) 10 - 40 U/L    AST 38 (H) 15 - 37 U/L    Globulin 3.4 g/dL   Troponin   Result Value Ref Range    Troponin <0.01 <0.01 ng/mL   Brain Natriuretic Peptide   Result Value Ref Range    Pro-BNP 4,984 (H) 0 - 124 pg/mL       All other labs were within normal range or not returned as of this dictation. EMERGENCY DEPARTMENT COURSE and DIFFERENTIAL DIAGNOSIS/MDM:   Vitals:    Vitals:    09/22/18 1955 09/22/18 1959 09/22/18 2041 09/22/18 2054   BP: (!) 255/154      Pulse: 131  96    Resp: (!) 32 (!) 31  (!) 31   SpO2: 98% 98% 99%    Weight:           Patient was seen and examined by myself and Dr. Angel Rangel. She is in moderate respiratory distress upon arrival.  Requesting bipap and clinically, she needs it for her respiratory distress. Resp tech immediately called and came to bedside to start. Initial oxygen saturation was 55%.  and RR 38. /154. Nitropaste applied. Solumedrol and albuterol given. EKg with no acute changes. Upon reeval, she clinically appears greatly improved. She is now smiling and no longer in respiratory distress. Skin appears more well perfused. Saturation is normal.  CBC with white count 28.5. ABg wit normal pH.  CO2 is mildly elevated. CMP with AG 21 and minimally elevated LFTs. Trop negative. BNP 4984. CXR with bilateral perihilar opacities suspicious for pneumonia. Upon reevaluation, she still appears improved. She has CHF and BNP is almost 5000 and the perihilar opacities could be edema so was not given the sepsis bolus. She does meet sepsis criteria with the pneumonia nad has been admitted in the past 90 days so will cover for HCAP with cefepime, vanc, and azithro. Upon my reexamination, breath sounds diminished bilaterally. Heart with regular rate and rhythm. Distal radial pulse weak. Capillary refill brisk and less than 2 seconds. /155, sat 100%, HR 88 and RR is 24. She requires admission. I consulted medicine and Dr. Luis Manuel Jacob accepted admission. Patient is in agreement with plan for admission. She is admitted in stable condition. CRITICAL CARE TIME   Total Critical Care time was 40 minutes, excluding separately reportable procedures.   There was a high probability of clinically significant/life threatening deterioration in the patient's condition which required my urgent intervention. Time was spent managing patients acute respiratory distress and also sepsis with initiating bipap, steroids, nebs along with antibiotics and frequent reevals. This patient was seen by the attending physician and they agreed with the assessment and plan. CONSULTS:  IP CONSULT TO HOSPITALIST  IP CONSULT TO PHARMACY    PROCEDURES:  None    FINAL IMPRESSION      1. Sepsis, due to unspecified organism ([address]    2. Pneumonia of both lungs due to infectious organism, unspecified part of lung    3. Acute on chronic respiratory failure with hypoxia and hypercapnia (HCC)          DISPOSITION/PLAN   DISPOSITION Decision To Admit 09/22/2018 09:34:15 PM      PATIENT REFERRED TO:  No follow-up provider specified.     DISCHARGE MEDICATIONS:  New Prescriptions    No medications on file       (Please note that portions of this note were completed with a voice recognition program.  Efforts were made to edit the dictations but occasionally words are mis-transcribed.)    Frida Dailey, [address] [place] [address]  09/22/18 0681

## 2018-09-23 PROBLEM — J96.01 ACUTE RESPIRATORY FAILURE WITH HYPOXIA AND HYPERCAPNIA (HCC): Status: ACTIVE | Noted: 2018-09-23

## 2018-09-23 PROBLEM — J96.02 ACUTE RESPIRATORY FAILURE WITH HYPOXIA AND HYPERCAPNIA (HCC): Status: ACTIVE | Noted: 2018-09-23

## 2018-09-23 PROBLEM — E78.5 HYPERLIPIDEMIA: Status: ACTIVE | Noted: 2018-09-23

## 2018-09-23 LAB
ANION GAP SERPL CALCULATED.3IONS-SCNC: 11 MMOL/L (ref 3–16)
BUN BLDV-MCNC: 19 MG/DL (ref 7–20)
CALCIUM SERPL-MCNC: 9.2 MG/DL (ref 8.3–10.6)
CHLORIDE BLD-SCNC: 98 MMOL/L (ref 99–110)
CO2: 31 MMOL/L (ref 21–32)
CREAT SERPL-MCNC: 1.1 MG/DL (ref 0.6–1.2)
GFR AFRICAN AMERICAN: 59
GFR NON-AFRICAN AMERICAN: 49
GLUCOSE BLD-MCNC: 208 MG/DL (ref 70–99)
HCT VFR BLD CALC: 37.9 % (ref 36–48)
HEMOGLOBIN: 12.1 G/DL (ref 12–16)
MCH RBC QN AUTO: 27 PG (ref 26–34)
MCHC RBC AUTO-ENTMCNC: 31.9 G/DL (ref 31–36)
MCV RBC AUTO: 84.6 FL (ref 80–100)
MRSA SCREEN RT-PCR: NORMAL
PDW BLD-RTO: 19.5 % (ref 12.4–15.4)
PLATELET # BLD: 202 K/UL (ref 135–450)
PMV BLD AUTO: 8.6 FL (ref 5–10.5)
POTASSIUM REFLEX MAGNESIUM: 4.1 MMOL/L (ref 3.5–5.1)
RBC # BLD: 4.48 M/UL (ref 4–5.2)
SODIUM BLD-SCNC: 140 MMOL/L (ref 136–145)
WBC # BLD: 14.1 K/UL (ref 4–11)

## 2018-09-23 PROCEDURE — 2580000003 HC RX 258: Performed by: INTERNAL MEDICINE

## 2018-09-23 PROCEDURE — 2580000003 HC RX 258

## 2018-09-23 PROCEDURE — 6360000002 HC RX W HCPCS: Performed by: INTERNAL MEDICINE

## 2018-09-23 PROCEDURE — 85027 COMPLETE CBC AUTOMATED: CPT

## 2018-09-23 PROCEDURE — 87641 MR-STAPH DNA AMP PROBE: CPT

## 2018-09-23 PROCEDURE — 80048 BASIC METABOLIC PNL TOTAL CA: CPT

## 2018-09-23 PROCEDURE — 94640 AIRWAY INHALATION TREATMENT: CPT

## 2018-09-23 PROCEDURE — 94664 DEMO&/EVAL PT USE INHALER: CPT

## 2018-09-23 PROCEDURE — 94660 CPAP INITIATION&MGMT: CPT

## 2018-09-23 PROCEDURE — 6360000002 HC RX W HCPCS: Performed by: FAMILY MEDICINE

## 2018-09-23 PROCEDURE — 2060000000 HC ICU INTERMEDIATE R&B

## 2018-09-23 PROCEDURE — 6370000000 HC RX 637 (ALT 250 FOR IP): Performed by: FAMILY MEDICINE

## 2018-09-23 PROCEDURE — 36415 COLL VENOUS BLD VENIPUNCTURE: CPT

## 2018-09-23 PROCEDURE — 2580000003 HC RX 258: Performed by: FAMILY MEDICINE

## 2018-09-23 PROCEDURE — 2700000000 HC OXYGEN THERAPY PER DAY

## 2018-09-23 PROCEDURE — 99223 1ST HOSP IP/OBS HIGH 75: CPT | Performed by: INTERNAL MEDICINE

## 2018-09-23 PROCEDURE — 94761 N-INVAS EAR/PLS OXIMETRY MLT: CPT

## 2018-09-23 RX ORDER — SODIUM CHLORIDE 0.9 % (FLUSH) 0.9 %
10 SYRINGE (ML) INJECTION PRN
Status: DISCONTINUED | OUTPATIENT
Start: 2018-09-23 | End: 2018-09-24 | Stop reason: HOSPADM

## 2018-09-23 RX ORDER — IPRATROPIUM BROMIDE AND ALBUTEROL SULFATE 2.5; .5 MG/3ML; MG/3ML
1 SOLUTION RESPIRATORY (INHALATION) EVERY 4 HOURS
Status: DISCONTINUED | OUTPATIENT
Start: 2018-09-23 | End: 2018-09-24 | Stop reason: HOSPADM

## 2018-09-23 RX ORDER — MONTELUKAST SODIUM 10 MG/1
10 TABLET ORAL DAILY
Status: DISCONTINUED | OUTPATIENT
Start: 2018-09-23 | End: 2018-09-24 | Stop reason: HOSPADM

## 2018-09-23 RX ORDER — METHYLPREDNISOLONE SODIUM SUCCINATE 40 MG/ML
40 INJECTION, POWDER, LYOPHILIZED, FOR SOLUTION INTRAMUSCULAR; INTRAVENOUS DAILY
Status: DISCONTINUED | OUTPATIENT
Start: 2018-09-23 | End: 2018-09-24 | Stop reason: HOSPADM

## 2018-09-23 RX ORDER — PANTOPRAZOLE SODIUM 40 MG/1
40 TABLET, DELAYED RELEASE ORAL
Status: DISCONTINUED | OUTPATIENT
Start: 2018-09-23 | End: 2018-09-24 | Stop reason: HOSPADM

## 2018-09-23 RX ORDER — LORAZEPAM 0.5 MG/1
0.5 TABLET ORAL DAILY PRN
Status: DISCONTINUED | OUTPATIENT
Start: 2018-09-23 | End: 2018-09-24 | Stop reason: HOSPADM

## 2018-09-23 RX ORDER — AMLODIPINE BESYLATE 10 MG/1
10 TABLET ORAL DAILY
Status: DISCONTINUED | OUTPATIENT
Start: 2018-09-23 | End: 2018-09-24 | Stop reason: HOSPADM

## 2018-09-23 RX ORDER — CLOPIDOGREL BISULFATE 75 MG/1
75 TABLET ORAL DAILY
Status: DISCONTINUED | OUTPATIENT
Start: 2018-09-23 | End: 2018-09-24 | Stop reason: HOSPADM

## 2018-09-23 RX ORDER — HYDRALAZINE HYDROCHLORIDE 50 MG/1
50 TABLET, FILM COATED ORAL ONCE
Status: COMPLETED | OUTPATIENT
Start: 2018-09-23 | End: 2018-09-23

## 2018-09-23 RX ORDER — FORMOTEROL FUMARATE 20 UG/2ML
20 SOLUTION RESPIRATORY (INHALATION) 2 TIMES DAILY
Status: DISCONTINUED | OUTPATIENT
Start: 2018-09-23 | End: 2018-09-24 | Stop reason: HOSPADM

## 2018-09-23 RX ORDER — IPRATROPIUM BROMIDE AND ALBUTEROL SULFATE 2.5; .5 MG/3ML; MG/3ML
3 SOLUTION RESPIRATORY (INHALATION) EVERY 4 HOURS
Status: DISCONTINUED | OUTPATIENT
Start: 2018-09-23 | End: 2018-09-23 | Stop reason: SDUPTHER

## 2018-09-23 RX ORDER — BENZONATATE 100 MG/1
100 CAPSULE ORAL 3 TIMES DAILY PRN
Status: DISCONTINUED | OUTPATIENT
Start: 2018-09-23 | End: 2018-09-24 | Stop reason: HOSPADM

## 2018-09-23 RX ORDER — HYDRALAZINE HYDROCHLORIDE 50 MG/1
50 TABLET, FILM COATED ORAL 3 TIMES DAILY
Status: DISCONTINUED | OUTPATIENT
Start: 2018-09-23 | End: 2018-09-24 | Stop reason: HOSPADM

## 2018-09-23 RX ORDER — BUDESONIDE 0.5 MG/2ML
500 INHALANT ORAL 2 TIMES DAILY
Status: DISCONTINUED | OUTPATIENT
Start: 2018-09-23 | End: 2018-09-24 | Stop reason: HOSPADM

## 2018-09-23 RX ORDER — CARVEDILOL 25 MG/1
25 TABLET ORAL ONCE
Status: COMPLETED | OUTPATIENT
Start: 2018-09-23 | End: 2018-09-23

## 2018-09-23 RX ORDER — GUAIFENESIN 600 MG/1
600 TABLET, EXTENDED RELEASE ORAL 2 TIMES DAILY
Status: DISCONTINUED | OUTPATIENT
Start: 2018-09-23 | End: 2018-09-24 | Stop reason: HOSPADM

## 2018-09-23 RX ORDER — CLONIDINE HYDROCHLORIDE 0.1 MG/1
0.1 TABLET ORAL 2 TIMES DAILY
Status: DISCONTINUED | OUTPATIENT
Start: 2018-09-23 | End: 2018-09-24 | Stop reason: HOSPADM

## 2018-09-23 RX ORDER — POTASSIUM CHLORIDE 750 MG/1
10 TABLET, FILM COATED, EXTENDED RELEASE ORAL
Status: DISCONTINUED | OUTPATIENT
Start: 2018-09-23 | End: 2018-09-24 | Stop reason: HOSPADM

## 2018-09-23 RX ORDER — ONDANSETRON 2 MG/ML
4 INJECTION INTRAMUSCULAR; INTRAVENOUS EVERY 6 HOURS PRN
Status: DISCONTINUED | OUTPATIENT
Start: 2018-09-23 | End: 2018-09-24 | Stop reason: HOSPADM

## 2018-09-23 RX ORDER — CLONIDINE HYDROCHLORIDE 0.1 MG/1
0.1 TABLET ORAL ONCE
Status: COMPLETED | OUTPATIENT
Start: 2018-09-23 | End: 2018-09-23

## 2018-09-23 RX ORDER — FAMOTIDINE 20 MG/1
10 TABLET, FILM COATED ORAL 2 TIMES DAILY
Status: DISCONTINUED | OUTPATIENT
Start: 2018-09-23 | End: 2018-09-23 | Stop reason: ALTCHOICE

## 2018-09-23 RX ORDER — ATORVASTATIN CALCIUM 40 MG/1
40 TABLET, FILM COATED ORAL DAILY
Status: DISCONTINUED | OUTPATIENT
Start: 2018-09-23 | End: 2018-09-24 | Stop reason: HOSPADM

## 2018-09-23 RX ORDER — LEVOTHYROXINE SODIUM 0.05 MG/1
50 TABLET ORAL DAILY
Status: DISCONTINUED | OUTPATIENT
Start: 2018-09-23 | End: 2018-09-24 | Stop reason: HOSPADM

## 2018-09-23 RX ORDER — CITALOPRAM 20 MG/1
40 TABLET ORAL DAILY
Status: DISCONTINUED | OUTPATIENT
Start: 2018-09-23 | End: 2018-09-24 | Stop reason: HOSPADM

## 2018-09-23 RX ORDER — SODIUM CHLORIDE 0.9 % (FLUSH) 0.9 %
10 SYRINGE (ML) INJECTION EVERY 12 HOURS SCHEDULED
Status: DISCONTINUED | OUTPATIENT
Start: 2018-09-23 | End: 2018-09-24 | Stop reason: HOSPADM

## 2018-09-23 RX ORDER — CARVEDILOL 25 MG/1
25 TABLET ORAL 2 TIMES DAILY WITH MEALS
Status: DISCONTINUED | OUTPATIENT
Start: 2018-09-23 | End: 2018-09-24 | Stop reason: HOSPADM

## 2018-09-23 RX ORDER — TORSEMIDE 20 MG/1
20 TABLET ORAL DAILY
Status: DISCONTINUED | OUTPATIENT
Start: 2018-09-23 | End: 2018-09-24 | Stop reason: HOSPADM

## 2018-09-23 RX ORDER — SODIUM CHLORIDE 9 MG/ML
INJECTION, SOLUTION INTRAVENOUS
Status: COMPLETED
Start: 2018-09-23 | End: 2018-09-23

## 2018-09-23 RX ORDER — ASPIRIN 81 MG/1
81 TABLET, CHEWABLE ORAL DAILY
Status: DISCONTINUED | OUTPATIENT
Start: 2018-09-23 | End: 2018-09-24 | Stop reason: HOSPADM

## 2018-09-23 RX ADMIN — Medication 10 ML: at 20:54

## 2018-09-23 RX ADMIN — TORSEMIDE 20 MG: 20 TABLET ORAL at 10:18

## 2018-09-23 RX ADMIN — CARVEDILOL 25 MG: 25 TABLET, FILM COATED ORAL at 01:41

## 2018-09-23 RX ADMIN — HYDRALAZINE HYDROCHLORIDE 50 MG: 50 TABLET, FILM COATED ORAL at 10:18

## 2018-09-23 RX ADMIN — MONTELUKAST SODIUM 10 MG: 10 TABLET, COATED ORAL at 10:20

## 2018-09-23 RX ADMIN — ENOXAPARIN SODIUM 40 MG: 40 INJECTION SUBCUTANEOUS at 10:18

## 2018-09-23 RX ADMIN — GUAIFENESIN 600 MG: 600 TABLET, EXTENDED RELEASE ORAL at 20:54

## 2018-09-23 RX ADMIN — IPRATROPIUM BROMIDE AND ALBUTEROL SULFATE 1 AMPULE: .5; 3 SOLUTION RESPIRATORY (INHALATION) at 19:47

## 2018-09-23 RX ADMIN — FORMOTEROL FUMARATE DIHYDRATE 20 MCG: 20 SOLUTION RESPIRATORY (INHALATION) at 19:47

## 2018-09-23 RX ADMIN — GUAIFENESIN 600 MG: 600 TABLET, EXTENDED RELEASE ORAL at 10:20

## 2018-09-23 RX ADMIN — CLONIDINE HYDROCHLORIDE 0.1 MG: 0.1 TABLET ORAL at 20:54

## 2018-09-23 RX ADMIN — VANCOMYCIN HYDROCHLORIDE 1000 MG: 1 INJECTION, POWDER, LYOPHILIZED, FOR SOLUTION INTRAVENOUS at 04:53

## 2018-09-23 RX ADMIN — CITALOPRAM HYDROBROMIDE 40 MG: 20 TABLET ORAL at 10:19

## 2018-09-23 RX ADMIN — AMLODIPINE BESYLATE 10 MG: 10 TABLET ORAL at 10:18

## 2018-09-23 RX ADMIN — HYDRALAZINE HYDROCHLORIDE 50 MG: 50 TABLET, FILM COATED ORAL at 20:54

## 2018-09-23 RX ADMIN — LORAZEPAM 0.5 MG: 0.5 TABLET ORAL at 16:30

## 2018-09-23 RX ADMIN — Medication 10 ML: at 05:56

## 2018-09-23 RX ADMIN — POTASSIUM CHLORIDE 10 MEQ: 750 TABLET, EXTENDED RELEASE ORAL at 10:18

## 2018-09-23 RX ADMIN — METHYLPREDNISOLONE SODIUM SUCCINATE 40 MG: 40 INJECTION, POWDER, FOR SOLUTION INTRAMUSCULAR; INTRAVENOUS at 10:17

## 2018-09-23 RX ADMIN — CLOPIDOGREL BISULFATE 75 MG: 75 TABLET ORAL at 10:20

## 2018-09-23 RX ADMIN — CARVEDILOL 25 MG: 25 TABLET, FILM COATED ORAL at 16:30

## 2018-09-23 RX ADMIN — CLONIDINE HYDROCHLORIDE 0.1 MG: 0.1 TABLET ORAL at 10:19

## 2018-09-23 RX ADMIN — IPRATROPIUM BROMIDE AND ALBUTEROL SULFATE 1 AMPULE: .5; 3 SOLUTION RESPIRATORY (INHALATION) at 01:10

## 2018-09-23 RX ADMIN — Medication 20 ML: at 12:24

## 2018-09-23 RX ADMIN — ATORVASTATIN CALCIUM 40 MG: 40 TABLET, FILM COATED ORAL at 10:20

## 2018-09-23 RX ADMIN — ASPIRIN 81 MG 81 MG: 81 TABLET ORAL at 10:19

## 2018-09-23 RX ADMIN — CEFTRIAXONE 1 G: 1 INJECTION, POWDER, FOR SOLUTION INTRAMUSCULAR; INTRAVENOUS at 12:06

## 2018-09-23 RX ADMIN — GUAIFENESIN 600 MG: 600 TABLET, EXTENDED RELEASE ORAL at 01:41

## 2018-09-23 RX ADMIN — SODIUM CHLORIDE 20 ML: 9 INJECTION, SOLUTION INTRAVENOUS at 12:24

## 2018-09-23 RX ADMIN — CLONIDINE HYDROCHLORIDE 0.1 MG: 0.1 TABLET ORAL at 01:41

## 2018-09-23 RX ADMIN — CEFEPIME HYDROCHLORIDE 2 G: 2 INJECTION, POWDER, FOR SOLUTION INTRAVENOUS at 16:30

## 2018-09-23 RX ADMIN — CARVEDILOL 25 MG: 25 TABLET, FILM COATED ORAL at 10:19

## 2018-09-23 RX ADMIN — IPRATROPIUM BROMIDE AND ALBUTEROL SULFATE 1 AMPULE: .5; 3 SOLUTION RESPIRATORY (INHALATION) at 16:17

## 2018-09-23 RX ADMIN — LEVOTHYROXINE SODIUM 50 MCG: 50 TABLET ORAL at 05:56

## 2018-09-23 RX ADMIN — PANTOPRAZOLE SODIUM 40 MG: 40 TABLET, DELAYED RELEASE ORAL at 05:56

## 2018-09-23 RX ADMIN — BUDESONIDE 500 MCG: 0.5 SUSPENSION RESPIRATORY (INHALATION) at 08:25

## 2018-09-23 RX ADMIN — IPRATROPIUM BROMIDE AND ALBUTEROL SULFATE 1 AMPULE: .5; 3 SOLUTION RESPIRATORY (INHALATION) at 08:15

## 2018-09-23 RX ADMIN — HYDRALAZINE HYDROCHLORIDE 50 MG: 50 TABLET, FILM COATED ORAL at 12:06

## 2018-09-23 RX ADMIN — HYDRALAZINE HYDROCHLORIDE 50 MG: 50 TABLET, FILM COATED ORAL at 01:41

## 2018-09-23 RX ADMIN — Medication 10 ML: at 10:20

## 2018-09-23 RX ADMIN — BUDESONIDE 500 MCG: 0.5 SUSPENSION RESPIRATORY (INHALATION) at 19:47

## 2018-09-23 RX ADMIN — IPRATROPIUM BROMIDE AND ALBUTEROL SULFATE 1 AMPULE: .5; 3 SOLUTION RESPIRATORY (INHALATION) at 12:30

## 2018-09-23 RX ADMIN — FORMOTEROL FUMARATE DIHYDRATE 20 MCG: 20 SOLUTION RESPIRATORY (INHALATION) at 08:32

## 2018-09-23 ASSESSMENT — PAIN SCALES - GENERAL
PAINLEVEL_OUTOF10: 0
PAINLEVEL_OUTOF10: 0

## 2018-09-23 ASSESSMENT — PAIN DESCRIPTION - FREQUENCY: FREQUENCY: INTERMITTENT

## 2018-09-23 ASSESSMENT — PAIN DESCRIPTION - DESCRIPTORS: DESCRIPTORS: TIGHTNESS

## 2018-09-23 ASSESSMENT — PAIN DESCRIPTION - PAIN TYPE: TYPE: CHRONIC PAIN

## 2018-09-23 ASSESSMENT — PAIN DESCRIPTION - LOCATION: LOCATION: CHEST

## 2018-09-23 ASSESSMENT — PAIN DESCRIPTION - ORIENTATION: ORIENTATION: MID

## 2018-09-23 NOTE — PROGRESS NOTES
ADVANCED CARE PLANNING    Name:Alisha Sinha       :  1945              MRN:  [id number]      Purpose of Encounter: Advanced care planning in light of respiratory failure  Parties in attendance: :Ethel Payne, Sapna Muller MD  Decisional Capacity:Yes    Subjective/Patient Story: Patient understands that her respiratory function continues to deteriorate. Patient no longer wishes further curative interventions, including hospitalization, if there is no long term benefit :No  Patient wishes to continue further interventions, patient will re-evaluate at a later time: Yes    Objective/Medical Story: Patient 72 y.o. woman with a history of Hypertension, Hyperlipidemia, CKD, COPD and CHF who was admitted on 2018 for treatment of a COPD exacerbation, acute on chronic diastolic CHF with acute hypoxic and hypercarbic respiratory failure    Goals of Care Determinations: Patient wishes to focus on treatment and return to home. Plan: Will notify Jhonny Rodriguez MD of change in care plan. Will look at further interventions as needed. Code Status: At this time patient wishes to be Full Code    Time Spent on Advanced Planning Documents: 30+ minutes    Advanced Care Planning Documents: Completed advances directives, advanced directives in chart. Electronically signed by Sapna Muller MD on 2018 at 7:30 AM  Thank you Jhonny Rodriguez MD for the opportunity to be involved in this patient's care. If you have any questions or concerns please feel free to contact me at [phone].

## 2018-09-23 NOTE — ED TRIAGE NOTES
Patient from home with family in private vehicle, assisted from vehicle by staff c/o worsening sob since yesterday, unable to ambulate today. On oxygen at home at 3 lpm, dusky appearance oxygen sats 55% and extremely anxious with poor air movement. ST on monitor at 163. MLP and RT at bedside bipap placed. BP elevated at 255/154. Hx of copd, chf.  Significant cardiac history. Patient becoming more comfortable, breathing easier, HR down to 106, bp remains elevated at 243/125.

## 2018-09-23 NOTE — CONSULTS
REASON FOR CONSULTATION/CC:  shortness of breath       Consult at request of  Carlito Guerrero MD for     PCP: Kana Maloney MD  Established Pulmonologist: Dr. Lukas Lambert: Katherine Gómez is a 72y.o. year old female with a history of copd who presents with      She presents with increasing shortness of breath for the last 1 days found to be hypoxemic to 77% in the emergency room with tachypnea, tachycardia, hypertension started on BiPAP with other complaints of wheeze cough without production    PAST MEDICAL HISTORY:  Past Medical History:   Diagnosis Date    CAD (coronary artery disease)     Nonobstructive on cath in 9/2017    CHF (congestive heart failure) ([address]     Colostomy care ([facility] [address] [address] 4/25/2018    Peristomal irritation and early leaking    COPD (chronic obstructive pulmonary disease) ([address]     Hyperlipidemia     Hypertension     Kidney disease     Stage 3    Peripheral vascular disease ([address]     Rectal fissure 11/2014    surgery pending    Restless legs syndrome     Sleep apnea     O2 3 liters at hs    Unspecified sleep apnea        PAST SURGICAL HISTORY:  Past Surgical History:   Procedure Laterality Date    CARDIAC CATHETERIZATION  09/13/2017    Dr. Kathia Mcdaniels  03/09/2018    CORONARY ARTERY BYPASS GRAFT      Legs & Carotid    HERNIA REPAIR         FAMILY HISTORY:  family history includes Cancer in her brother; Heart Disease in her father, mother, and sister. SOCIAL HISTORY:   reports that she quit smoking about 26 years ago. She started smoking about 55 years ago. She has a 90.00 pack-year smoking history.  She has never used smokeless tobacco.    Scheduled Meds:   budesonide  500 mcg Nebulization BID    formoterol  20 mcg Nebulization BID    amLODIPine  10 mg Oral Daily    pantoprazole  40 mg Oral QAM AC    levothyroxine  50 mcg Oral Daily    clopidogrel  75 mg Oral Daily    carvedilol  25 mg Oral BID WC    montelukast  10 mg Oral Daily    citalopram  40 mg Oral Daily    atorvastatin  40 mg Oral Daily    torsemide  20 mg Oral Daily    aspirin  81 mg Oral Daily    hydrALAZINE  50 mg Oral TID    cloNIDine  0.1 mg Oral BID    potassium chloride  10 mEq Oral Daily with breakfast    sodium chloride flush  10 mL Intravenous 2 times per day    enoxaparin  40 mg Subcutaneous Daily    ipratropium-albuterol  1 ampule Inhalation Q4H    [START ON 9/24/2018] azithromycin  500 mg Intravenous Q24H    And    cefTRIAXone (ROCEPHIN) IV  1 g Intravenous Q24H    guaiFENesin  600 mg Oral BID    methylPREDNISolone  40 mg Intravenous Daily    vancomycin  1,000 mg Intravenous Q24H    vancomycin (VANCOCIN) intermittent dosing (placeholder)   Other RX Placeholder    [START ON 9/24/2018] influenza virus vaccine  0.5 mL Intramuscular Once       Continuous Infusions:      PRN Meds:  LORazepam, benzonatate, sodium chloride flush, magnesium hydroxide, ondansetron    ALLERGIES:  Patient is allergic to iv dye [iodides]. REVIEW OF SYSTEMS:  Constitutional: Negative for fever  HENT: Negative for sore throat  Eyes: Negative for redness   Respiratory: Severe shortness of breath and cough ×1 day  Cardiovascular: Negative for chest pain  Gastrointestinal: Negative for vomiting, diarrhea   Genitourinary: Negative for hematuria   Musculoskeletal: Negative for arthralgias   Skin: Negative for rash  Neurological: Negative for syncope  Hematological: Negative for adenopathy  Psychiatric/Behavorial: Negative for anxiety    Objective:   PHYSICAL EXAM:  Blood pressure 110/67, pulse 76, temperature 98.5 °F (36.9 °C), resp. rate 20, height 5' (1.524 m), weight 184 lb 1.4 oz (83.5 kg), last menstrual period 05/01/2006, SpO2 97 %, not currently breastfeeding.'  Gen: No distress. Obese, BMI 36  Eyes: PERRL. No sclera icterus. No conjunctival injection. ENT: No discharge. Pharynx clear. External appearance of ears and nose normal.  Neck: Trachea midline.  No obvious mass.    Resp: No accessory muscle use. No crackles. No wheezes. No rhonchi. CV: Regular rate. Regular rhythm. No murmur or rub. No edema. GI: Non-tender. Non-distended. No hernia. Skin: Warm, dry, normal texture and turgor. No nodule on exposed extremities. Lymph: No cervical LAD. No supraclavicular LAD. M/S: No cyanosis. No clubbing. No joint deformity. Neuro: Moves all four extremities. Psych: Oriented x 3. No anxiety. Awake. Alert. Intact judgement and insight. Data Reviewed:   LABS:  CBC:   Recent Labs      09/22/18 1956 09/23/18   0610   WBC  28.5*  14.1*   HGB  14.2  12.1   HCT  46.9  37.9   MCV  85.9  84.6   PLT  297  202     BMP:   Recent Labs      09/22/18 1956 09/23/18   0610   NA  144  140   K  4.4  4.1   CL  98*  98*   CO2  25  31   BUN  18  19   CREATININE  1.1  1.1     LIVER PROFILE:   Recent Labs      09/22/18 1956   AST  38*   ALT  54*   BILITOT  <0.2   ALKPHOS  54     PT/INR: No results for input(s): PROTIME, INR in the last 72 hours. APTT: No results for input(s): APTT in the last 72 hours. UA:No results for input(s): NITRITE, COLORU, PHUR, LABCAST, WBCUA, RBCUA, MUCUS, TRICHOMONAS, YEAST, BACTERIA, CLARITYU, SPECGRAV, LEUKOCYTESUR, UROBILINOGEN, BILIRUBINUR, BLOODU, GLUCOSEU, AMORPHOUS in the last 72 hours. Invalid input(s): KETONESU  Recent Labs      09/22/18 2054   PHART  7.384   LRS5VOM  54.6*   PO2ART  80.2       Vent Information  FiO2 : 40 %  I Time/ I Time %: 1 s    Radiology Review:  Pertinent images / reports were reviewed as a part of this visit. CT Chest w/ contrast: No results found for this or any previous visit.     CT Chest w/o contrast:   Results for orders placed during the hospital encounter of 06/11/18   CT CHEST WO CONTRAST    Narrative EXAMINATION:  CT OF THE CHEST WITHOUT CONTRAST 6/11/2018 1:34 pm    TECHNIQUE:  CT of the chest was performed without the administration of intravenous  contrast. Multiplanar reformatted images are provided

## 2018-09-23 NOTE — H&P
Hospital Medicine History & Physical      PCP: Aruna Haas MD    Date of Admission: 9/22/2018    Date of Service: Pt seen/examined, with 1st encounter, on 9/23/2018 and Admitted to Inpatient     Chief Complaint:  SOb      History Of Present Illness: The patient is a 72 y.o. female with severe COPD on 3L O2 and chronic dCHF who presents to [facility] with acutely worsening SOB over her normal level. On presentation she was hypoxic at 55%, hypertensive at 255/154, and tachycardic at 163. She was placed on bipap and given nitro paste with improvement in respiratory status, BP, and HR. Currently she reports feeling much better and is on 3L O2 and 98%. She denies coughing, fevers. CXR showed BL PNA. She was admitted to the hospital for AECOPD 6/11/18. Past Medical History:        Diagnosis Date    CAD (coronary artery disease)     Nonobstructive on cath in 9/2017    [facility]     Colostomy care ([facility] [address] 4/25/2018    Peristomal irritation and early leaking    COPD (chronic obstructive pulmonary disease) ([facility]     Hyperlipidemia     Hypertension     Kidney disease     Stage 3    Peripheral vascular disease ([facility] [address]     Rectal fissure 11/2014    surgery pending    Restless legs syndrome     Sleep apnea     O2 3 liters at hs    Unspecified sleep apnea        Past Surgical History:        Procedure Laterality Date    CARDIAC CATHETERIZATION  09/13/2017    Dr. Laura Wyman  03/09/2018    CORONARY ARTERY BYPASS GRAFT      Legs & Carotid    HERNIA REPAIR         Medications Prior to Admission:    Prior to Admission medications    Medication Sig Start Date End Date Taking? Authorizing Provider   LORazepam (ATIVAN) 0.5 MG tablet Take 1 tablet by mouth daily as needed for Anxiety for up to 30 days. . 9/17/18 10/17/18 09/17/2018    AMORPHOUS 4+ 03/02/2015       ABG    Lab Results   Component Value Date    ORD1TCB 31.8 09/22/2018    BEART 5.8 09/22/2018    [id number] 95.6 09/22/2018    PHART 7.384 09/22/2018    IAC5DKH 54.6 09/22/2018    PO2ART 80.2 09/22/2018    ZUF7GMJ 33.5 09/22/2018           There are no active hospital problems to display for this patient. PHYSICIANS CERTIFICATION:    I certify that Negar Alcala is expected to be hospitalized for more than 2 midnights based on the following assessment and plan:      ASSESSMENT/PLAN:    HCAP  - empiric vanc and cefepime and treat as GN since she was recently in pt  - check blood and sputum cultures  - DC vanc if MRSA negative  - mucinex     AECOPD   - nebs, steroids, O2  - formoterol, budesonide nebs    Acute hypoxic and hypercarbic respiratory failure, POA  - with criteria: < 90% on RA, accessory muscle use, RR> 18, due to PNA on baseline COPD  - SO2 55% on presentation, improved with bipap  - ABG reviewed   - supplemental oxygen as necessary to keep SaO2 > 90%, on 3L O2 at home    Sepsis, POA  - with criteria: leukocytosis, lactic acidosis, tachycardia, tachypnea; source is PNA  - cultures, Abx as above  - IVF  - recheck lactate Q 6hrs until normalizes    HTN  - uncontrolled 255/154 on presentation, improved with nitro paste  - cont home meds    Chronic dCHF   - last ECHO 6/2018: LVEF 55-60%, mild pulm HTN  - trop neg  - proBNP 4980  - cont BB, torsemide  - not on ACEi/ARB  - daily wts, I/Os    CKD III  - stable  - monitor    ANGIE  - bipap     DVT Prophylaxis: lovenox  Diet:  cardiac  Code Status: Prior    Dispo - in pt, pcu       Evelyn Hong, DO    Thank you Tanisha Foster MD for the opportunity to be involved in this patient's care. If you have any questions or concerns please feel free to contact me at [phone].

## 2018-09-23 NOTE — PROGRESS NOTES
Hospitalist   Progress Note    Patient Name: Alivia Moore  PCP: Dave Sandhu MD  Date of Admission: 9/22/2018    Chief Complaint on Admission: shortness of breath  Chief diagnosis after evaluation: COPD exacerbation, acute on chronic diastolic CHF with acute hypoxic and hypercarbic respiratory failure    Brief Synopsis: Patient 72 y.o. woman with a history of Hypertension, Hyperlipidemia, CKD, COPD and CHF who was admitted on 9/22/2018 for treatment of a COPD exacerbation, acute on chronic diastolic CHF with acute hypoxic and hypercarbic respiratory failure    Pt Seen/Examined and Chart Reviewed. Subjective: Pt is feeling better and has no new complaints. Breathing much easier    Objective:   Allergies  Iv dye [iodides]    Medications    Scheduled Meds:   budesonide  500 mcg Nebulization BID    formoterol  20 mcg Nebulization BID    amLODIPine  10 mg Oral Daily    pantoprazole  40 mg Oral QAM AC    levothyroxine  50 mcg Oral Daily    clopidogrel  75 mg Oral Daily    carvedilol  25 mg Oral BID WC    montelukast  10 mg Oral Daily    citalopram  40 mg Oral Daily    atorvastatin  40 mg Oral Daily    torsemide  20 mg Oral Daily    aspirin  81 mg Oral Daily    hydrALAZINE  50 mg Oral TID    cloNIDine  0.1 mg Oral BID    potassium chloride  10 mEq Oral Daily with breakfast    sodium chloride flush  10 mL Intravenous 2 times per day    enoxaparin  40 mg Subcutaneous Daily    ipratropium-albuterol  1 ampule Inhalation Q4H    guaiFENesin  600 mg Oral BID    methylPREDNISolone  40 mg Intravenous Daily    [START ON 9/24/2018] influenza virus vaccine  0.5 mL Intramuscular Once    cefepime  2 g Intravenous Once     Infusions:    PRN Meds:  LORazepam, benzonatate, sodium chloride flush, magnesium hydroxide, ondansetron    Physical    VITALS:  /67   Pulse 76   Temp 98.5 °F (36.9 °C)   Resp 20   Ht 5' (1.524 m)   Wt 184 lb 1.4 oz (83.5 kg)   LMP 05/01/2006 (Approximate)   SpO2 97%   BMI 35.95 ([facility] - due to above; provide supplemental oxygen as necessary to keep SaO2 92% or greater. Chronic diastolic (congestive) heart failure ([address] A 2D Echocardiogram on 06/2018 shows an EF of 55-60%. Continue home Torsemide. Monitor strict I&Os and daily weights. A low sodium diet has been ordered. CAD (coronary artery disease) - currently stable. Pt denies chest pain. Continue Beta Blocker, Statin and Aspirin    Chronic Renal Failure stage III - Renal function is at baseline and is stable; Monitor renal function and avoid Nephrotoxic agents as able     Essential (primary) hypertension - continue home meds and monitor blood pressure    Hyperlipidemia - No current evidence of Rhabdomyolysis or other adverse effects. Continue statin therapy while in the hospital          DVT Prophylaxis: Lovenox  Diet: DIET CARDIAC; Low Sodium (2 GM);  Daily Fluid Restriction: 2000 ml  Code Status: Full Code    PT/OT Eval Status: Not Ordered    Dispo - Anticipated discharge date 1 day    Leah Chapa MD

## 2018-09-24 ENCOUNTER — APPOINTMENT (OUTPATIENT)
Dept: GENERAL RADIOLOGY | Age: 73
DRG: 720 | End: 2018-09-24
Payer: COMMERCIAL

## 2018-09-24 VITALS
DIASTOLIC BLOOD PRESSURE: 67 MMHG | RESPIRATION RATE: 18 BRPM | TEMPERATURE: 98.1 F | BODY MASS INDEX: 36.49 KG/M2 | HEIGHT: 60 IN | WEIGHT: 185.85 LBS | SYSTOLIC BLOOD PRESSURE: 114 MMHG | OXYGEN SATURATION: 97 % | HEART RATE: 73 BPM

## 2018-09-24 PROCEDURE — 6360000002 HC RX W HCPCS: Performed by: INTERNAL MEDICINE

## 2018-09-24 PROCEDURE — 71045 X-RAY EXAM CHEST 1 VIEW: CPT

## 2018-09-24 PROCEDURE — 99232 SBSQ HOSP IP/OBS MODERATE 35: CPT | Performed by: INTERNAL MEDICINE

## 2018-09-24 PROCEDURE — 90686 IIV4 VACC NO PRSV 0.5 ML IM: CPT

## 2018-09-24 PROCEDURE — 2580000003 HC RX 258: Performed by: FAMILY MEDICINE

## 2018-09-24 PROCEDURE — 6360000002 HC RX W HCPCS

## 2018-09-24 PROCEDURE — 6370000000 HC RX 637 (ALT 250 FOR IP): Performed by: FAMILY MEDICINE

## 2018-09-24 PROCEDURE — 94660 CPAP INITIATION&MGMT: CPT

## 2018-09-24 PROCEDURE — 94761 N-INVAS EAR/PLS OXIMETRY MLT: CPT

## 2018-09-24 PROCEDURE — 6360000002 HC RX W HCPCS: Performed by: FAMILY MEDICINE

## 2018-09-24 PROCEDURE — G0008 ADMIN INFLUENZA VIRUS VAC: HCPCS

## 2018-09-24 PROCEDURE — 94640 AIRWAY INHALATION TREATMENT: CPT

## 2018-09-24 PROCEDURE — 93010 ELECTROCARDIOGRAM REPORT: CPT | Performed by: INTERNAL MEDICINE

## 2018-09-24 PROCEDURE — 94664 DEMO&/EVAL PT USE INHALER: CPT

## 2018-09-24 PROCEDURE — 2580000003 HC RX 258: Performed by: INTERNAL MEDICINE

## 2018-09-24 PROCEDURE — 2700000000 HC OXYGEN THERAPY PER DAY

## 2018-09-24 PROCEDURE — 6370000000 HC RX 637 (ALT 250 FOR IP): Performed by: NURSE PRACTITIONER

## 2018-09-24 RX ORDER — ACETAMINOPHEN 325 MG/1
650 TABLET ORAL EVERY 4 HOURS PRN
Status: DISCONTINUED | OUTPATIENT
Start: 2018-09-24 | End: 2018-09-24 | Stop reason: HOSPADM

## 2018-09-24 RX ORDER — PREDNISONE 20 MG/1
40 TABLET ORAL DAILY
Qty: 10 TABLET | Refills: 0 | Status: SHIPPED | OUTPATIENT
Start: 2018-09-24 | End: 2018-09-29

## 2018-09-24 RX ORDER — DOXYCYCLINE HYCLATE 100 MG/1
100 CAPSULE ORAL 2 TIMES DAILY
Qty: 10 CAPSULE | Refills: 0 | Status: SHIPPED | OUTPATIENT
Start: 2018-09-24 | End: 2018-09-29

## 2018-09-24 RX ORDER — GUAIFENESIN 600 MG/1
600 TABLET, EXTENDED RELEASE ORAL 2 TIMES DAILY
Qty: 20 TABLET | Refills: 0 | Status: SHIPPED | OUTPATIENT
Start: 2018-09-24 | End: 2018-10-17 | Stop reason: SDUPTHER

## 2018-09-24 RX ADMIN — CEFEPIME HYDROCHLORIDE 2 G: 2 INJECTION, POWDER, FOR SOLUTION INTRAVENOUS at 09:48

## 2018-09-24 RX ADMIN — MONTELUKAST SODIUM 10 MG: 10 TABLET, COATED ORAL at 09:48

## 2018-09-24 RX ADMIN — Medication 10 ML: at 09:49

## 2018-09-24 RX ADMIN — IPRATROPIUM BROMIDE AND ALBUTEROL SULFATE 1 AMPULE: .5; 3 SOLUTION RESPIRATORY (INHALATION) at 00:59

## 2018-09-24 RX ADMIN — POTASSIUM CHLORIDE 10 MEQ: 750 TABLET, EXTENDED RELEASE ORAL at 09:47

## 2018-09-24 RX ADMIN — CITALOPRAM HYDROBROMIDE 40 MG: 20 TABLET ORAL at 09:48

## 2018-09-24 RX ADMIN — IPRATROPIUM BROMIDE AND ALBUTEROL SULFATE 1 AMPULE: .5; 3 SOLUTION RESPIRATORY (INHALATION) at 04:19

## 2018-09-24 RX ADMIN — CLONIDINE HYDROCHLORIDE 0.1 MG: 0.1 TABLET ORAL at 09:47

## 2018-09-24 RX ADMIN — PANTOPRAZOLE SODIUM 40 MG: 40 TABLET, DELAYED RELEASE ORAL at 05:40

## 2018-09-24 RX ADMIN — AMLODIPINE BESYLATE 10 MG: 10 TABLET ORAL at 09:47

## 2018-09-24 RX ADMIN — IPRATROPIUM BROMIDE AND ALBUTEROL SULFATE 1 AMPULE: .5; 3 SOLUTION RESPIRATORY (INHALATION) at 11:15

## 2018-09-24 RX ADMIN — ACETAMINOPHEN 650 MG: 325 TABLET, FILM COATED ORAL at 01:21

## 2018-09-24 RX ADMIN — CARVEDILOL 25 MG: 25 TABLET, FILM COATED ORAL at 09:47

## 2018-09-24 RX ADMIN — GUAIFENESIN 600 MG: 600 TABLET, EXTENDED RELEASE ORAL at 09:47

## 2018-09-24 RX ADMIN — LEVOTHYROXINE SODIUM 50 MCG: 50 TABLET ORAL at 05:40

## 2018-09-24 RX ADMIN — ATORVASTATIN CALCIUM 40 MG: 40 TABLET, FILM COATED ORAL at 09:47

## 2018-09-24 RX ADMIN — HYDRALAZINE HYDROCHLORIDE 50 MG: 50 TABLET, FILM COATED ORAL at 09:47

## 2018-09-24 RX ADMIN — FORMOTEROL FUMARATE DIHYDRATE 20 MCG: 20 SOLUTION RESPIRATORY (INHALATION) at 07:45

## 2018-09-24 RX ADMIN — ENOXAPARIN SODIUM 40 MG: 40 INJECTION SUBCUTANEOUS at 09:46

## 2018-09-24 RX ADMIN — INFLUENZA A VIRUS A/MICHIGAN/45/2015 X-275 (H1N1) ANTIGEN (FORMALDEHYDE INACTIVATED), INFLUENZA A VIRUS A/SINGAPORE/INFIMH-16-0019/2016 IVR-186 (H3N2) ANTIGEN (FORMALDEHYDE INACTIVATED), INFLUENZA B VIRUS B/PHUKET/3073/2013 ANTIGEN (FORMALDEHYDE INACTIVATED), AND INFLUENZA B VIRUS B/MARYLAND/15/2016 BX-69A ANTIGEN (FORMALDEHYDE INACTIVATED) 0.5 ML: 15; 15; 15; 15 INJECTION, SUSPENSION INTRAMUSCULAR at 09:48

## 2018-09-24 RX ADMIN — HYDRALAZINE HYDROCHLORIDE 50 MG: 50 TABLET, FILM COATED ORAL at 14:53

## 2018-09-24 RX ADMIN — METHYLPREDNISOLONE SODIUM SUCCINATE 40 MG: 40 INJECTION, POWDER, FOR SOLUTION INTRAMUSCULAR; INTRAVENOUS at 09:48

## 2018-09-24 RX ADMIN — CLOPIDOGREL BISULFATE 75 MG: 75 TABLET ORAL at 09:47

## 2018-09-24 RX ADMIN — ASPIRIN 81 MG 81 MG: 81 TABLET ORAL at 09:48

## 2018-09-24 RX ADMIN — BUDESONIDE 500 MCG: 0.5 SUSPENSION RESPIRATORY (INHALATION) at 07:35

## 2018-09-24 RX ADMIN — IPRATROPIUM BROMIDE AND ALBUTEROL SULFATE 1 AMPULE: .5; 3 SOLUTION RESPIRATORY (INHALATION) at 07:25

## 2018-09-24 RX ADMIN — TORSEMIDE 20 MG: 20 TABLET ORAL at 09:47

## 2018-09-24 ASSESSMENT — PAIN SCALES - GENERAL
PAINLEVEL_OUTOF10: 0
PAINLEVEL_OUTOF10: 5

## 2018-09-24 NOTE — PROGRESS NOTES
Pt complaining of right calf pain. No swelling or redness. Strong pedal pulse. Secure message sent to on call NP. New order for tylenol placed by NP. Pt resting in bed, VSS, will continue to monitor.     Mitch Restrepo RN

## 2018-09-24 NOTE — PROGRESS NOTES
[facility]    Respiratory Therapy   Home Oxygen Evaluation        Name: Roxana Jefferson Record Number: [id number]  Age: 72 y.o. Gender:  female   : 1945  Today's date: 2018  Room: [address]-      Assessment        /67   Pulse 73   Temp 98.1 °F (36.7 °C) (Oral)   Resp 18   Ht 5' (1.524 m)   Wt 185 lb 13.6 oz (84.3 kg)   LMP 2006 (Approximate)   SpO2 97%   BMI 36.30 kg/m²     Patient Active Problem List   Diagnosis    Fracture, metacarpal, neck    COPD exacerbation (HCC)    PAD (peripheral artery disease) ([address]    CAD (coronary artery disease)    HTN (hypertension)    Diastolic dysfunction    RLS (restless legs syndrome)    History of tobacco use    Chronic obstructive pulmonary disease (HCC)    Centrilobular emphysema (HCC)    Abnormal cardiovascular stress test    Colovesical fistula    Diverticulitis of large intestine without perforation or abscess without bleeding    Diverticulitis large intestine w/o perforation or abscess w/bleeding    Large bowel obstruction (HCC)    ANGIE on CPAP    ANGIE treated with BiPAP    CKD (chronic kidney disease), stage III - sees Dr. Eden Casey COPD, severe ([address]    Colonic obstruction ([address]    Carotid artery stenosis without cerebral infarction, bilateral    Colostomy care ([address]    Dyspnea and respiratory abnormalities    Chronic diastolic (congestive) heart failure (HCC)    PNA (pneumonia)    Hyperlipidemia    Acute respiratory failure with hypoxia and hypercapnia (HCC)       Social History:  Social History   Substance Use Topics    Smoking status: Former Smoker     Packs/day: 3.00     Years: 30.00     Start date: 1963     Quit date: 1992    Smokeless tobacco: Never Used      Comment: QUIT 21 YRS AGO    Alcohol use No       Patient already has home oxygen at 3LPM; is currently 97 percent on 3LPM here. No home O2 evaluation is needed.       Kira Khan RCP on 2018 at 11:21 AM

## 2018-09-24 NOTE — PROGRESS NOTES
Pulmonary Progress Note    Date of Admission: 9/22/2018   LOS: 2 days       CC:  COPD versus asthma with exacerbation    Subjective:  Feeling significantly better. Close to baseline. Believes can be discharged today. ROS:   No nausea  No Vomiting  No chest pain    PICC D#       Intake/Output Summary (Last 24 hours) at 09/24/18 0600  Last data filed at 09/24/18 0554   Gross per 24 hour   Intake              120 ml   Output              550 ml   Net             -430 ml         PHYSICAL EXAM:   Blood pressure (!) 174/49, pulse 76, temperature 97.4 °F (36.3 °C), temperature source Oral, resp. rate 20, height 5' (1.524 m), weight 185 lb 13.6 oz (84.3 kg), last menstrual period 05/01/2006, SpO2 100 %, not currently breastfeeding.'  Gen: No distress. Eyes: PERRL. No sclera icterus. No conjunctival injection. ENT: No discharge. Pharynx clear. External appearance of ears and nose normal.  Neck: Trachea midline. No obvious mass. Resp: No accessory muscle use. No crackles. No wheezes. No rhonchi. Normal work of breathing  CV: Regular rate. Regular rhythm. No murmur or rub. No edema. Neuro: Moves all four extremities  Psych: Oriented x 3. No anxiety. Awake. Alert. Intact judgement and insight.       Medications:    Scheduled Meds:   budesonide  500 mcg Nebulization BID    formoterol  20 mcg Nebulization BID    amLODIPine  10 mg Oral Daily    pantoprazole  40 mg Oral QAM AC    levothyroxine  50 mcg Oral Daily    clopidogrel  75 mg Oral Daily    carvedilol  25 mg Oral BID WC    montelukast  10 mg Oral Daily    citalopram  40 mg Oral Daily    atorvastatin  40 mg Oral Daily    torsemide  20 mg Oral Daily    aspirin  81 mg Oral Daily    hydrALAZINE  50 mg Oral TID    cloNIDine  0.1 mg Oral BID    potassium chloride  10 mEq Oral Daily with breakfast    sodium chloride flush  10 mL Intravenous 2 times per day    enoxaparin  40 mg Subcutaneous Daily    ipratropium-albuterol  1 ampule Inhalation Q4H   

## 2018-09-25 ENCOUNTER — PREP FOR PROCEDURE (OUTPATIENT)
Dept: INTERVENTIONAL RADIOLOGY/VASCULAR | Age: 73
End: 2018-09-25

## 2018-09-25 LAB
EKG ATRIAL RATE: 133 BPM
EKG DIAGNOSIS: NORMAL
EKG P-R INTERVAL: 160 MS
EKG Q-T INTERVAL: 296 MS
EKG QRS DURATION: 68 MS
EKG QTC CALCULATION (BAZETT): 440 MS
EKG R AXIS: 65 DEGREES
EKG T AXIS: 188 DEGREES
EKG VENTRICULAR RATE: 133 BPM

## 2018-09-25 NOTE — DISCHARGE SUMMARY
202 09/23/2018       Renal:    Lab Results   Component Value Date     09/23/2018    K 4.1 09/23/2018    CL 98 09/23/2018    CO2 31 09/23/2018    BUN 19 09/23/2018    CREATININE 1.1 09/23/2018    CALCIUM 9.2 09/23/2018    PHOS 4.1 06/28/2018         Significant Diagnostic Studies    Radiology:   XR CHEST PORTABLE   Final Result   Mild improvement. XR CHEST PORTABLE   Final Result   Bilateral perihilar opacities suspicious for pneumonia                Consults:     IP CONSULT TO HOSPITALIST  IP CONSULT TO PHARMACY  IP CONSULT TO PULMONOLOGY    Disposition:  home     Condition at Discharge: Stable    Discharge Instructions/Follow-up:  PCP in 1 week    Code Status:  Prior     Activity: activity as tolerated    Diet: cardiac diet      Discharge Medications:     Discharge Medication List as of 9/24/2018  3:00 PM           Details   ! ! predniSONE (DELTASONE) 20 MG tablet Take 2 tablets by mouth daily for 5 days, Disp-10 tablet, R-0Fill both prescription for prednisone. Will take 40 mg x 5 days now and have the second prescription for prednisone for 5 days incase of future flair to be used when directed. Normal      !! predniSONE (DELTASONE) 20 MG tablet Take 2 tablets by mouth daily for 5 days, Disp-10 tablet, R-0Fill both prescription for prednisone. Will take 40 mg x 5 days now and have the second prescription for prednisone for 5 days incase of future flair to be used when directed. Normal      guaiFENesin (MUCINEX) 600 MG extended release tablet Take 1 tablet by mouth 2 times daily, Disp-20 tablet, R-0Normal      doxycycline hyclate (VIBRAMYCIN) 100 MG capsule Take 1 capsule by mouth 2 times daily for 5 days, Disp-10 capsule, R-0Normal       !! - Potential duplicate medications found. Please discuss with provider. Details   LORazepam (ATIVAN) 0.5 MG tablet Take 1 tablet by mouth daily as needed for Anxiety for up to 30 days. ., Disp-30 tablet, R-0Normal      lidocaine (XYLOCAINE) 2 % jelly Apply small amount topically to affected area every 8 hours when necessary pain, Disp-1 Tube, R-0, Print      benzonatate (TESSALON PERLES) 100 MG capsule Take 1 capsule by mouth 3 times daily as needed for Cough, Disp-30 capsule, R-0Print      albuterol (PROVENTIL) (2.5 MG/3ML) 0.083% nebulizer solution Take 3 mLs by nebulization every 6 hours as needed for Wheezing, Disp-120 each, R-1Print      albuterol sulfate HFA (PROAIR HFA) 108 (90 Base) MCG/ACT inhaler Use every 4 hours while awake for 7-10 days then PRN wheezing  Dispense with SPACER and Instruct on use.   May sub Ventolin or Proventil as needed per Insurance., Disp-1 Inhaler, R-1Print      diphenhydrAMINE (BENADRYL) 25 MG capsule Benadryl 25 mg, pepcid 10 mg and Prednisone 40 mg every 8 hours times 3 doses prior to cath due to iodine allergy, Disp-3 capsule, R-1Normal      famotidine (PEPCID) 10 MG tablet Benadryl 25 mg, pepcid 10 mg and Prednisone 40 mg every 8 hours times 3 doses prior to cath due to iodine allergy, Disp-3 tablet, R-1Normal      potassium chloride (KLOR-CON M) 20 MEQ extended release tablet Pt is to take 40mEq x 3 doses today 4hrs apart, then 20mEq BID, Disp-60 tablet, R-3Normal      torsemide (DEMADEX) 20 MG tablet Take 1 tablet by mouth daily, Disp-60 tablet, R-5Normal      aspirin 81 MG tablet Take 1 tablet by mouth daily, Disp-90 tablet, R-4Normal      hydrALAZINE (APRESOLINE) 50 MG tablet Take 1 tablet by mouth 3 times daily, Disp-90 tablet, R-3Normal      cloNIDine (CATAPRES) 0.2 MG tablet Take 0.5 tablets by mouth 2 times daily, Disp-30 tablet, R-0Normal      atorvastatin (LIPITOR) 40 MG tablet Take 1 tablet by mouth daily, Disp-30 tablet, R-3Normal      clotrimazole-betamethasone (LOTRISONE) 1-0.05 % cream Apply topically 2 times daily for 7 days, Disp-15 g, R-0, Normal      amLODIPine (NORVASC) 10 MG tablet Take 1 tablet by mouth daily, Disp-30 tablet, R-0Normal      lansoprazole (PREVACID) 15 MG delayed release capsule Take 1 capsule

## 2018-09-26 ENCOUNTER — OFFICE VISIT (OUTPATIENT)
Dept: PULMONOLOGY | Age: 73
End: 2018-09-26
Payer: COMMERCIAL

## 2018-09-26 ENCOUNTER — TELEPHONE (OUTPATIENT)
Dept: CARDIOLOGY CLINIC | Age: 73
End: 2018-09-26

## 2018-09-26 VITALS
TEMPERATURE: 97 F | DIASTOLIC BLOOD PRESSURE: 77 MMHG | WEIGHT: 185 LBS | SYSTOLIC BLOOD PRESSURE: 151 MMHG | RESPIRATION RATE: 20 BRPM | BODY MASS INDEX: 36.32 KG/M2 | OXYGEN SATURATION: 96 % | HEART RATE: 83 BPM | HEIGHT: 60 IN

## 2018-09-26 DIAGNOSIS — G47.33 OSA (OBSTRUCTIVE SLEEP APNEA): ICD-10-CM

## 2018-09-26 DIAGNOSIS — J96.11 CHRONIC RESPIRATORY FAILURE WITH HYPOXIA (HCC): ICD-10-CM

## 2018-09-26 DIAGNOSIS — R06.02 SHORTNESS OF BREATH: Primary | ICD-10-CM

## 2018-09-26 DIAGNOSIS — J43.2 CENTRILOBULAR EMPHYSEMA (HCC): ICD-10-CM

## 2018-09-26 PROCEDURE — 1101F PT FALLS ASSESS-DOCD LE1/YR: CPT | Performed by: INTERNAL MEDICINE

## 2018-09-26 PROCEDURE — 4040F PNEUMOC VAC/ADMIN/RCVD: CPT | Performed by: INTERNAL MEDICINE

## 2018-09-26 PROCEDURE — 1090F PRES/ABSN URINE INCON ASSESS: CPT | Performed by: INTERNAL MEDICINE

## 2018-09-26 PROCEDURE — G8427 DOCREV CUR MEDS BY ELIG CLIN: HCPCS | Performed by: INTERNAL MEDICINE

## 2018-09-26 PROCEDURE — 99214 OFFICE O/P EST MOD 30 MIN: CPT | Performed by: INTERNAL MEDICINE

## 2018-09-26 PROCEDURE — 1111F DSCHRG MED/CURRENT MED MERGE: CPT | Performed by: INTERNAL MEDICINE

## 2018-09-26 PROCEDURE — G8417 CALC BMI ABV UP PARAM F/U: HCPCS | Performed by: INTERNAL MEDICINE

## 2018-09-26 PROCEDURE — 1036F TOBACCO NON-USER: CPT | Performed by: INTERNAL MEDICINE

## 2018-09-26 PROCEDURE — G8598 ASA/ANTIPLAT THER USED: HCPCS | Performed by: INTERNAL MEDICINE

## 2018-09-26 PROCEDURE — G8926 SPIRO NO PERF OR DOC: HCPCS | Performed by: INTERNAL MEDICINE

## 2018-09-26 PROCEDURE — 3017F COLORECTAL CA SCREEN DOC REV: CPT | Performed by: INTERNAL MEDICINE

## 2018-09-26 PROCEDURE — 1123F ACP DISCUSS/DSCN MKR DOCD: CPT | Performed by: INTERNAL MEDICINE

## 2018-09-26 PROCEDURE — G8399 PT W/DXA RESULTS DOCUMENT: HCPCS | Performed by: INTERNAL MEDICINE

## 2018-09-26 PROCEDURE — 3023F SPIROM DOC REV: CPT | Performed by: INTERNAL MEDICINE

## 2018-09-26 RX ORDER — PREDNISONE 20 MG/1
TABLET ORAL
Qty: 46 TABLET | Refills: 0 | Status: ON HOLD | OUTPATIENT
Start: 2018-09-26 | End: 2019-07-26 | Stop reason: HOSPADM

## 2018-09-26 RX ORDER — FAMOTIDINE 10 MG
TABLET ORAL
Qty: 3 TABLET | Refills: 1 | Status: SHIPPED | OUTPATIENT
Start: 2018-09-26 | End: 2018-10-09 | Stop reason: SDUPTHER

## 2018-09-26 RX ORDER — DIPHENHYDRAMINE HCL 25 MG
CAPSULE ORAL
Qty: 3 CAPSULE | Refills: 1 | Status: SHIPPED | OUTPATIENT
Start: 2018-09-26 | End: 2018-10-09 | Stop reason: SDUPTHER

## 2018-09-26 RX ORDER — PREDNISONE 20 MG/1
TABLET ORAL
Qty: 6 TABLET | Refills: 1 | Status: SHIPPED | OUTPATIENT
Start: 2018-09-26 | End: 2018-10-09 | Stop reason: SDUPTHER

## 2018-09-26 NOTE — PROGRESS NOTES
Chief complaint  This is a 72y.o. year old female  who presents with a chief complaint of   Chief Complaint   Patient presents with    Follow-up     COPD       HPI  72-year-old woman with severe COPD (FEV1 0.86 L, 41% predicted), chronic hypoxic respiratory failure (on 3 L nasal cannula) and ANGIE (on BiPAP) presents for follow-up. She was admitted to [facility] a few days ago for shortness of breath and hypoxia. She was treated for COPD exacerbation and pneumonia. She says yesterday her breathing was good, but today she is short of breath walking to her car and walking around her home. She is taking Mucinex and now has a productive cough, but does not know the color of the mucus. She is compliant with budesonide and formoterol nebulizers twice daily as well as Duonebs twice daily. She is taking prednisone, but does not know the dose. She is scheduled to undergo coronary CTA tomorrow.     Past Medical History:   Diagnosis Date    CAD (coronary artery disease)     Nonobstructive on cath in 9/2017    CHF [facility]congestive heart failure) ([facility])     Colostomy care ([facility] 4/25/2018    Peristomal irritation and early leaking    COPD (chronic obstructive pulmonary disease) ([facility])     Hyperlipidemia     Hypertension     Kidney disease     Stage 3    Peripheral vascular disease ([facility]     Rectal fissure 11/2014    surgery pending    Restless legs syndrome     Sleep apnea     O2 3 liters at hs    Unspecified sleep apnea        Past Surgical History:   Procedure Laterality Date    CARDIAC CATHETERIZATION  09/13/2017    Dr. Yvette Goodwin  03/09/2018    CORONARY ARTERY BYPASS GRAFT      Legs & Carotid    HERNIA REPAIR         Current Outpatient Prescriptions   Medication Sig Dispense Refill    predniSONE (DELTASONE) 20 MG tablet Take prednisone 60 mg daily for one week, then 40 mg daily for one week, then 20 mg daily for one week, then 10 mg daily for one week, then stop 46 tablet 0    have reminded her to obtain repeat PFTs and 6 minute walk test.    Chronic hypoxic respiratory failure- She will continue 3 L nasal cannula continuously. Obtain 6 minute walk test.    Obstructive sleep apnea- On BiPAP with 3 L nasal cannula bleed in. Needs to follow up with Dr. Cullen Camacho.        She is to return for follow-up in 6 weeks.       Analia James MD  [facility] Pulmonology, Critical Care and Sleep

## 2018-09-27 ENCOUNTER — TELEPHONE (OUTPATIENT)
Dept: CARDIOLOGY CLINIC | Age: 73
End: 2018-09-27

## 2018-09-27 ENCOUNTER — HOSPITAL ENCOUNTER (OUTPATIENT)
Dept: CT IMAGING | Age: 73
Discharge: HOME | End: 2018-09-27
Attending: INTERNAL MEDICINE | Admitting: INTERNAL MEDICINE
Payer: COMMERCIAL

## 2018-09-27 VITALS
DIASTOLIC BLOOD PRESSURE: 84 MMHG | SYSTOLIC BLOOD PRESSURE: 200 MMHG | RESPIRATION RATE: 24 BRPM | HEART RATE: 88 BPM | OXYGEN SATURATION: 96 %

## 2018-09-27 DIAGNOSIS — I25.10 CORONARY ARTERY DISEASE INVOLVING NATIVE CORONARY ARTERY OF NATIVE HEART WITHOUT ANGINA PECTORIS: ICD-10-CM

## 2018-09-27 DIAGNOSIS — R06.09 DOE (DYSPNEA ON EXERTION): ICD-10-CM

## 2018-09-27 NOTE — TELEPHONE ENCOUNTER
Jevon Keane from Radiology called to relay that this patient was unable to obtain her coronary CTA today due to missing her dose of AM Lopressor. She was pre-medicated for her iodine allergy (as instructed) but forgot the beta blocker. Her HR was 102 on arrival and they need it below 60 bpm. This will need to be re-scheduled with pre-medication re-ordered.

## 2018-09-27 NOTE — TELEPHONE ENCOUNTER
Couldn't get a hold of her to review pre-medication for iodine allergy and that she would need IVF before and after. Her appt is today at 0900.

## 2018-09-28 LAB
BLOOD CULTURE, ROUTINE: NORMAL
CULTURE, BLOOD 2: NORMAL

## 2018-09-28 NOTE — TELEPHONE ENCOUNTER
I will work on this with Wendy Rizvi but I had attempted to get a hold of her 3x prior to her testing after our phone conversation was disconnected

## 2018-10-01 ENCOUNTER — TELEPHONE (OUTPATIENT)
Dept: CARDIOLOGY CLINIC | Age: 73
End: 2018-10-01

## 2018-10-02 ENCOUNTER — TELEPHONE (OUTPATIENT)
Dept: PULMONOLOGY | Age: 73
End: 2018-10-02

## 2018-10-04 ENCOUNTER — TELEPHONE (OUTPATIENT)
Dept: PULMONOLOGY | Age: 73
End: 2018-10-04

## 2018-10-04 NOTE — TELEPHONE ENCOUNTER
OK, unfortunately I won't have any new recommendations until she has had her PFTs and 6 minute walk test. I see that her 6 minute walk test was scheduled, but not her PFTs. Please ask her to schedule that. She should complete the steroid taper as we discussed.

## 2018-10-05 ENCOUNTER — HOSPITAL ENCOUNTER (OUTPATIENT)
Dept: CARDIAC REHAB | Age: 73
Setting detail: THERAPIES SERIES
Discharge: HOME | End: 2018-10-05
Payer: COMMERCIAL

## 2018-10-05 ENCOUNTER — TELEPHONE (OUTPATIENT)
Dept: SURGERY | Age: 73
End: 2018-10-05

## 2018-10-05 DIAGNOSIS — J43.2 CENTRILOBULAR EMPHYSEMA (HCC): ICD-10-CM

## 2018-10-05 PROCEDURE — 94618 PULMONARY STRESS TESTING: CPT

## 2018-10-05 PROCEDURE — 94618 PULMONARY STRESS TESTING: CPT | Performed by: INTERNAL MEDICINE

## 2018-10-05 NOTE — TELEPHONE ENCOUNTER
Yoshi Lucero RN    Progress Notes        I called Dr. Kerri Palafox nurse, Terrie Pope, on Tuesday about discussing the coronary CTA. I called again today and left a message for Terrie Pope. I want to assure that this patient has the proper premedication order as well as her oral Lopressor orders. I have spoke with Rmia Cates in the [address] [place] [address] and she is aware the patient is coming and that she requires hydration pre and post procedure. I left a message today with the  to pass along to Terrie Pope on Monday.   Electronically signed by Yoshi Lucero RN on 10/5/2018 at 3:45 PM  Clinical Coordinator Interventional Radiology         (office) [phone]

## 2018-10-08 NOTE — TELEPHONE ENCOUNTER
Called Anthony with CT dept and left her a message regarding if her dept could see our tele encounter with the patient and that all instructions were emailed to patient and scanned into media. Also that one of our RN's called her this morning to make sure that she received our e-mail. DIMAS also has my extension and I asked her that if I do not answer to not leave a message and call the main number at [phone].

## 2018-10-09 ENCOUNTER — TELEPHONE (OUTPATIENT)
Dept: CARDIOLOGY CLINIC | Age: 73
End: 2018-10-09

## 2018-10-09 RX ORDER — 0.9 % SODIUM CHLORIDE 0.9 %
250 INTRAVENOUS SOLUTION INTRAVENOUS ONCE
Status: CANCELLED | OUTPATIENT
Start: 2018-10-09 | End: 2018-10-09

## 2018-10-09 RX ORDER — DIPHENHYDRAMINE HCL 25 MG
CAPSULE ORAL
Qty: 3 CAPSULE | Refills: 1 | Status: ON HOLD | OUTPATIENT
Start: 2018-10-09 | End: 2018-10-13 | Stop reason: ALTCHOICE

## 2018-10-09 RX ORDER — FAMOTIDINE 10 MG
TABLET ORAL
Qty: 3 TABLET | Refills: 1 | Status: ON HOLD | OUTPATIENT
Start: 2018-10-09 | End: 2018-10-13 | Stop reason: ALTCHOICE

## 2018-10-09 RX ORDER — PREDNISONE 20 MG/1
TABLET ORAL
Qty: 6 TABLET | Refills: 1 | Status: ON HOLD | OUTPATIENT
Start: 2018-10-09 | End: 2018-10-13 | Stop reason: ALTCHOICE

## 2018-10-09 NOTE — TELEPHONE ENCOUNTER
From: Marilyn Hedrick  Sent: 10/9/2018 6:42 AM EDT  Subject: Medication Renewal Request    Angi Dalton would like a refill of the following medications:     predniSONE (DELTASONE) 20 MG tablet Johnson Razo MD]    Preferred pharmacy: [address], [facility] [address] [place] [address] [phone] - F [phone]    Comment:

## 2018-10-09 NOTE — TELEPHONE ENCOUNTER
Trav Lamar came into the office today asking if she can go back to taking her torsemide 20 mg 2 time daily. States that while she was at [facility] Dr. Faviola Fatima or the doctor on call (she could not recall mind was fuzzy) and told her to cut back to 1 pill a day. Trav Lamar states that for the last 2-3 days she has been feeling like crap and SOB. Alisha missed her appt with Dr. Faviola Fatima on 10/1/18 due to transportation.      Trav Lamar would like a call back today if possible at [phone]

## 2018-10-10 ENCOUNTER — HOSPITAL ENCOUNTER (OUTPATIENT)
Dept: CT IMAGING | Age: 73
Discharge: HOME | End: 2018-10-10
Payer: COMMERCIAL

## 2018-10-10 VITALS
DIASTOLIC BLOOD PRESSURE: 116 MMHG | BODY MASS INDEX: 36.32 KG/M2 | SYSTOLIC BLOOD PRESSURE: 198 MMHG | HEART RATE: 73 BPM | HEIGHT: 60 IN | OXYGEN SATURATION: 93 % | WEIGHT: 185 LBS | RESPIRATION RATE: 22 BRPM

## 2018-10-10 PROCEDURE — 2580000003 HC RX 258: Performed by: RADIOLOGY

## 2018-10-10 RX ORDER — SODIUM CHLORIDE 9 MG/ML
INJECTION, SOLUTION INTRAVENOUS CONTINUOUS
Status: DISCONTINUED | OUTPATIENT
Start: 2018-10-10 | End: 2018-10-10 | Stop reason: SDUPTHER

## 2018-10-10 RX ORDER — 0.9 % SODIUM CHLORIDE 0.9 %
250 INTRAVENOUS SOLUTION INTRAVENOUS ONCE
Status: COMPLETED | OUTPATIENT
Start: 2018-10-10 | End: 2018-10-10

## 2018-10-10 RX ADMIN — SODIUM CHLORIDE 250 ML: 9 INJECTION, SOLUTION INTRAVENOUS at 09:07

## 2018-10-12 ENCOUNTER — HOSPITAL ENCOUNTER (INPATIENT)
Age: 73
LOS: 2 days | Discharge: HOME | DRG: 140 | End: 2018-10-15
Attending: EMERGENCY MEDICINE | Admitting: INTERNAL MEDICINE
Payer: COMMERCIAL

## 2018-10-12 ENCOUNTER — APPOINTMENT (OUTPATIENT)
Dept: GENERAL RADIOLOGY | Age: 73
DRG: 140 | End: 2018-10-12
Payer: COMMERCIAL

## 2018-10-12 DIAGNOSIS — I65.23 BILATERAL CAROTID ARTERY STENOSIS: ICD-10-CM

## 2018-10-12 DIAGNOSIS — R79.89 ELEVATED BRAIN NATRIURETIC PEPTIDE (BNP) LEVEL: ICD-10-CM

## 2018-10-12 DIAGNOSIS — E87.20 LACTIC ACIDOSIS: ICD-10-CM

## 2018-10-12 DIAGNOSIS — I10 ESSENTIAL HYPERTENSION: ICD-10-CM

## 2018-10-12 DIAGNOSIS — K21.9 GASTROESOPHAGEAL REFLUX DISEASE, ESOPHAGITIS PRESENCE NOT SPECIFIED: ICD-10-CM

## 2018-10-12 DIAGNOSIS — I25.10 CORONARY ARTERY DISEASE INVOLVING NATIVE CORONARY ARTERY OF NATIVE HEART WITHOUT ANGINA PECTORIS: ICD-10-CM

## 2018-10-12 DIAGNOSIS — D72.829 LEUKOCYTOSIS, UNSPECIFIED TYPE: ICD-10-CM

## 2018-10-12 DIAGNOSIS — R77.8 ELEVATED TROPONIN: ICD-10-CM

## 2018-10-12 DIAGNOSIS — J44.1 COPD EXACERBATION (HCC): Primary | ICD-10-CM

## 2018-10-12 LAB
A/G RATIO: 1.7 (ref 1.1–2.2)
ALBUMIN SERPL-MCNC: 3.8 G/DL (ref 3.4–5)
ALP BLD-CCNC: 59 U/L (ref 40–129)
ALT SERPL-CCNC: 55 U/L (ref 10–40)
ANION GAP SERPL CALCULATED.3IONS-SCNC: 14 MMOL/L (ref 3–16)
AST SERPL-CCNC: 27 U/L (ref 15–37)
BASE EXCESS VENOUS: 14.2 MMOL/L
BASOPHILS ABSOLUTE: 0 K/UL (ref 0–0.2)
BASOPHILS RELATIVE PERCENT: 0.2 %
BILIRUB SERPL-MCNC: <0.2 MG/DL (ref 0–1)
BUN BLDV-MCNC: 36 MG/DL (ref 7–20)
CALCIUM SERPL-MCNC: 9 MG/DL (ref 8.3–10.6)
CARBOXYHEMOGLOBIN: 2.1 %
CHLORIDE BLD-SCNC: 87 MMOL/L (ref 99–110)
CO2: 38 MMOL/L (ref 21–32)
CREAT SERPL-MCNC: 1.2 MG/DL (ref 0.6–1.2)
EOSINOPHILS ABSOLUTE: 0 K/UL (ref 0–0.6)
EOSINOPHILS RELATIVE PERCENT: 0.1 %
GFR AFRICAN AMERICAN: 54
GFR NON-AFRICAN AMERICAN: 44
GLOBULIN: 2.3 G/DL
GLUCOSE BLD-MCNC: 162 MG/DL (ref 70–99)
HCO3 VENOUS: 43 MMOL/L (ref 23–29)
HCT VFR BLD CALC: 37.9 % (ref 36–48)
HEMOGLOBIN: 11.8 G/DL (ref 12–16)
LACTIC ACID: 3 MMOL/L (ref 0.4–2)
LYMPHOCYTES ABSOLUTE: 1.6 K/UL (ref 1–5.1)
LYMPHOCYTES RELATIVE PERCENT: 10.5 %
MCH RBC QN AUTO: 26.8 PG (ref 26–34)
MCHC RBC AUTO-ENTMCNC: 31.2 G/DL (ref 31–36)
MCV RBC AUTO: 86.2 FL (ref 80–100)
METHEMOGLOBIN VENOUS: 0.9 %
MONOCYTES ABSOLUTE: 0.7 K/UL (ref 0–1.3)
MONOCYTES RELATIVE PERCENT: 4.4 %
NEUTROPHILS ABSOLUTE: 13 K/UL (ref 1.7–7.7)
NEUTROPHILS RELATIVE PERCENT: 84.8 %
O2 CONTENT, VEN: 12 ML/DL
O2 SAT, VEN: 76 %
O2 THERAPY: ABNORMAL
PCO2, VEN: 77.3 MMHG (ref 40–50)
PDW BLD-RTO: 18.8 % (ref 12.4–15.4)
PH VENOUS: 7.36 (ref 7.35–7.45)
PLATELET # BLD: 198 K/UL (ref 135–450)
PMV BLD AUTO: 8.9 FL (ref 5–10.5)
PO2, VEN: 46 MMHG
POTASSIUM SERPL-SCNC: 3.3 MMOL/L (ref 3.5–5.1)
PRO-BNP: 6682 PG/ML (ref 0–124)
RBC # BLD: 4.4 M/UL (ref 4–5.2)
SODIUM BLD-SCNC: 139 MMOL/L (ref 136–145)
TCO2 CALC VENOUS: 45 MMOL/L
TOTAL PROTEIN: 6.1 G/DL (ref 6.4–8.2)
TROPONIN: 0.03 NG/ML
WBC # BLD: 15.4 K/UL (ref 4–11)

## 2018-10-12 PROCEDURE — 6360000002 HC RX W HCPCS: Performed by: NURSE PRACTITIONER

## 2018-10-12 PROCEDURE — 82803 BLOOD GASES ANY COMBINATION: CPT

## 2018-10-12 PROCEDURE — 96374 THER/PROPH/DIAG INJ IV PUSH: CPT

## 2018-10-12 PROCEDURE — 85025 COMPLETE CBC W/AUTO DIFF WBC: CPT

## 2018-10-12 PROCEDURE — 94762 N-INVAS EAR/PLS OXIMTRY CONT: CPT

## 2018-10-12 PROCEDURE — 2700000000 HC OXYGEN THERAPY PER DAY

## 2018-10-12 PROCEDURE — 84484 ASSAY OF TROPONIN QUANT: CPT

## 2018-10-12 PROCEDURE — 80053 COMPREHEN METABOLIC PANEL: CPT

## 2018-10-12 PROCEDURE — 84145 PROCALCITONIN (PCT): CPT

## 2018-10-12 PROCEDURE — 83605 ASSAY OF LACTIC ACID: CPT

## 2018-10-12 PROCEDURE — 87040 BLOOD CULTURE FOR BACTERIA: CPT

## 2018-10-12 PROCEDURE — 93005 ELECTROCARDIOGRAM TRACING: CPT | Performed by: NURSE PRACTITIONER

## 2018-10-12 PROCEDURE — 94664 DEMO&/EVAL PT USE INHALER: CPT

## 2018-10-12 PROCEDURE — 71045 X-RAY EXAM CHEST 1 VIEW: CPT

## 2018-10-12 PROCEDURE — 94640 AIRWAY INHALATION TREATMENT: CPT

## 2018-10-12 PROCEDURE — 94660 CPAP INITIATION&MGMT: CPT

## 2018-10-12 PROCEDURE — 99285 EMERGENCY DEPT VISIT HI MDM: CPT

## 2018-10-12 PROCEDURE — 6370000000 HC RX 637 (ALT 250 FOR IP): Performed by: NURSE PRACTITIONER

## 2018-10-12 PROCEDURE — 83880 ASSAY OF NATRIURETIC PEPTIDE: CPT

## 2018-10-12 RX ORDER — IPRATROPIUM BROMIDE AND ALBUTEROL SULFATE 2.5; .5 MG/3ML; MG/3ML
1 SOLUTION RESPIRATORY (INHALATION) ONCE
Status: DISCONTINUED | OUTPATIENT
Start: 2018-10-12 | End: 2018-10-12

## 2018-10-12 RX ORDER — CLONIDINE HYDROCHLORIDE 0.2 MG/1
0.1 TABLET ORAL 2 TIMES DAILY
Qty: 30 TABLET | Refills: 4 | Status: SHIPPED | OUTPATIENT
Start: 2018-10-12 | End: 2018-11-05 | Stop reason: SDUPTHER

## 2018-10-12 RX ORDER — OXYCODONE HYDROCHLORIDE AND ACETAMINOPHEN 5; 325 MG/1; MG/1
1 TABLET ORAL ONCE
Status: DISCONTINUED | OUTPATIENT
Start: 2018-10-12 | End: 2018-10-12

## 2018-10-12 RX ORDER — CLOPIDOGREL BISULFATE 75 MG/1
75 TABLET ORAL DAILY
Qty: 30 TABLET | Refills: 5 | Status: SHIPPED | OUTPATIENT
Start: 2018-10-12 | End: 2018-11-02 | Stop reason: ALTCHOICE

## 2018-10-12 RX ORDER — CARVEDILOL 25 MG/1
25 TABLET ORAL 2 TIMES DAILY WITH MEALS
Qty: 60 TABLET | Refills: 5 | Status: SHIPPED | OUTPATIENT
Start: 2018-10-12 | End: 2019-05-16 | Stop reason: SDUPTHER

## 2018-10-12 RX ORDER — IPRATROPIUM BROMIDE AND ALBUTEROL SULFATE 2.5; .5 MG/3ML; MG/3ML
3 SOLUTION RESPIRATORY (INHALATION) ONCE
Status: COMPLETED | OUTPATIENT
Start: 2018-10-12 | End: 2018-10-12

## 2018-10-12 RX ORDER — HYDRALAZINE HYDROCHLORIDE 50 MG/1
50 TABLET, FILM COATED ORAL 3 TIMES DAILY
Qty: 90 TABLET | Refills: 3 | Status: SHIPPED | OUTPATIENT
Start: 2018-10-12 | End: 2018-11-05 | Stop reason: SDUPTHER

## 2018-10-12 RX ORDER — LEVOTHYROXINE SODIUM 0.05 MG/1
TABLET ORAL
Qty: 30 TABLET | Refills: 3 | Status: SHIPPED | OUTPATIENT
Start: 2018-10-12 | End: 2018-10-23 | Stop reason: SDUPTHER

## 2018-10-12 RX ORDER — MONTELUKAST SODIUM 10 MG/1
10 TABLET ORAL DAILY
Qty: 30 TABLET | Refills: 5 | Status: ON HOLD | OUTPATIENT
Start: 2018-10-12 | End: 2019-07-26 | Stop reason: SDUPTHER

## 2018-10-12 RX ORDER — CITALOPRAM 40 MG/1
40 TABLET ORAL DAILY
Qty: 30 TABLET | Refills: 5 | Status: SHIPPED | OUTPATIENT
Start: 2018-10-12 | End: 2019-05-16 | Stop reason: SDUPTHER

## 2018-10-12 RX ORDER — LANSOPRAZOLE 15 MG/1
15 CAPSULE, DELAYED RELEASE ORAL DAILY
Qty: 90 CAPSULE | Refills: 1 | Status: SHIPPED | OUTPATIENT
Start: 2018-10-12 | End: 2018-10-23 | Stop reason: SDUPTHER

## 2018-10-12 RX ORDER — METHYLPREDNISOLONE SODIUM SUCCINATE 125 MG/2ML
125 INJECTION, POWDER, LYOPHILIZED, FOR SOLUTION INTRAMUSCULAR; INTRAVENOUS ONCE
Status: COMPLETED | OUTPATIENT
Start: 2018-10-12 | End: 2018-10-12

## 2018-10-12 RX ADMIN — METHYLPREDNISOLONE SODIUM SUCCINATE 125 MG: 125 INJECTION, POWDER, FOR SOLUTION INTRAMUSCULAR; INTRAVENOUS at 23:23

## 2018-10-12 RX ADMIN — IPRATROPIUM BROMIDE AND ALBUTEROL SULFATE 3 AMPULE: .5; 3 SOLUTION RESPIRATORY (INHALATION) at 23:34

## 2018-10-12 ASSESSMENT — ENCOUNTER SYMPTOMS
ABDOMINAL PAIN: 0
CHEST TIGHTNESS: 0
WHEEZING: 1
VOMITING: 0
DIARRHEA: 0
NAUSEA: 0
SHORTNESS OF BREATH: 1
COLOR CHANGE: 0
COUGH: 0

## 2018-10-12 ASSESSMENT — PAIN DESCRIPTION - PAIN TYPE: TYPE: ACUTE PAIN

## 2018-10-12 ASSESSMENT — PAIN SCALES - GENERAL: PAINLEVEL_OUTOF10: 10

## 2018-10-13 PROBLEM — J96.02 ACUTE ON CHRONIC RESPIRATORY ACIDOSIS (HCC): Status: ACTIVE | Noted: 2018-10-13

## 2018-10-13 PROBLEM — J96.12 ACUTE ON CHRONIC RESPIRATORY ACIDOSIS (HCC): Status: ACTIVE | Noted: 2018-10-13

## 2018-10-13 LAB — PROCALCITONIN: 0.05 NG/ML (ref 0–0.15)

## 2018-10-13 PROCEDURE — 6370000000 HC RX 637 (ALT 250 FOR IP): Performed by: INTERNAL MEDICINE

## 2018-10-13 PROCEDURE — 99221 1ST HOSP IP/OBS SF/LOW 40: CPT | Performed by: INTERNAL MEDICINE

## 2018-10-13 PROCEDURE — 6360000002 HC RX W HCPCS: Performed by: INTERNAL MEDICINE

## 2018-10-13 PROCEDURE — 2700000000 HC OXYGEN THERAPY PER DAY

## 2018-10-13 PROCEDURE — 94640 AIRWAY INHALATION TREATMENT: CPT

## 2018-10-13 PROCEDURE — 2060000000 HC ICU INTERMEDIATE R&B

## 2018-10-13 PROCEDURE — 6370000000 HC RX 637 (ALT 250 FOR IP): Performed by: NURSE PRACTITIONER

## 2018-10-13 PROCEDURE — 94660 CPAP INITIATION&MGMT: CPT

## 2018-10-13 PROCEDURE — 2580000003 HC RX 258: Performed by: INTERNAL MEDICINE

## 2018-10-13 RX ORDER — CLONIDINE HYDROCHLORIDE 0.1 MG/1
0.1 TABLET ORAL 2 TIMES DAILY
Status: DISCONTINUED | OUTPATIENT
Start: 2018-10-13 | End: 2018-10-15 | Stop reason: HOSPADM

## 2018-10-13 RX ORDER — METHYLPREDNISOLONE SODIUM SUCCINATE 40 MG/ML
40 INJECTION, POWDER, LYOPHILIZED, FOR SOLUTION INTRAMUSCULAR; INTRAVENOUS EVERY 6 HOURS
Status: DISCONTINUED | OUTPATIENT
Start: 2018-10-13 | End: 2018-10-15 | Stop reason: HOSPADM

## 2018-10-13 RX ORDER — BUDESONIDE 0.5 MG/2ML
500 INHALANT ORAL 2 TIMES DAILY
Status: DISCONTINUED | OUTPATIENT
Start: 2018-10-13 | End: 2018-10-13 | Stop reason: SDUPTHER

## 2018-10-13 RX ORDER — ALBUTEROL SULFATE 2.5 MG/3ML
2.5 SOLUTION RESPIRATORY (INHALATION)
Status: DISCONTINUED | OUTPATIENT
Start: 2018-10-13 | End: 2018-10-15 | Stop reason: HOSPADM

## 2018-10-13 RX ORDER — ONDANSETRON 2 MG/ML
4 INJECTION INTRAMUSCULAR; INTRAVENOUS EVERY 6 HOURS PRN
Status: DISCONTINUED | OUTPATIENT
Start: 2018-10-13 | End: 2018-10-15 | Stop reason: HOSPADM

## 2018-10-13 RX ORDER — OXYCODONE HYDROCHLORIDE AND ACETAMINOPHEN 5; 325 MG/1; MG/1
1 TABLET ORAL ONCE
Status: COMPLETED | OUTPATIENT
Start: 2018-10-13 | End: 2018-10-13

## 2018-10-13 RX ORDER — CITALOPRAM 20 MG/1
40 TABLET ORAL DAILY
Status: DISCONTINUED | OUTPATIENT
Start: 2018-10-13 | End: 2018-10-15 | Stop reason: HOSPADM

## 2018-10-13 RX ORDER — SODIUM CHLORIDE 0.9 % (FLUSH) 0.9 %
10 SYRINGE (ML) INJECTION PRN
Status: DISCONTINUED | OUTPATIENT
Start: 2018-10-13 | End: 2018-10-15 | Stop reason: HOSPADM

## 2018-10-13 RX ORDER — ALBUTEROL SULFATE 90 UG/1
2 AEROSOL, METERED RESPIRATORY (INHALATION) EVERY 6 HOURS PRN
Status: DISCONTINUED | OUTPATIENT
Start: 2018-10-13 | End: 2018-10-15 | Stop reason: HOSPADM

## 2018-10-13 RX ORDER — CLOPIDOGREL BISULFATE 75 MG/1
75 TABLET ORAL DAILY
Status: DISCONTINUED | OUTPATIENT
Start: 2018-10-13 | End: 2018-10-15 | Stop reason: HOSPADM

## 2018-10-13 RX ORDER — HYDRALAZINE HYDROCHLORIDE 50 MG/1
50 TABLET, FILM COATED ORAL 3 TIMES DAILY
Status: DISCONTINUED | OUTPATIENT
Start: 2018-10-13 | End: 2018-10-15 | Stop reason: HOSPADM

## 2018-10-13 RX ORDER — ATORVASTATIN CALCIUM 40 MG/1
40 TABLET, FILM COATED ORAL DAILY
Status: DISCONTINUED | OUTPATIENT
Start: 2018-10-13 | End: 2018-10-15 | Stop reason: HOSPADM

## 2018-10-13 RX ORDER — BUDESONIDE 0.5 MG/2ML
0.5 INHALANT ORAL 2 TIMES DAILY
Status: DISCONTINUED | OUTPATIENT
Start: 2018-10-13 | End: 2018-10-15 | Stop reason: HOSPADM

## 2018-10-13 RX ORDER — IPRATROPIUM BROMIDE AND ALBUTEROL SULFATE 2.5; .5 MG/3ML; MG/3ML
1 SOLUTION RESPIRATORY (INHALATION)
Status: DISCONTINUED | OUTPATIENT
Start: 2018-10-13 | End: 2018-10-15 | Stop reason: HOSPADM

## 2018-10-13 RX ORDER — MONTELUKAST SODIUM 10 MG/1
10 TABLET ORAL DAILY
Status: DISCONTINUED | OUTPATIENT
Start: 2018-10-13 | End: 2018-10-15 | Stop reason: HOSPADM

## 2018-10-13 RX ORDER — ASPIRIN 81 MG/1
81 TABLET, CHEWABLE ORAL DAILY
Status: DISCONTINUED | OUTPATIENT
Start: 2018-10-13 | End: 2018-10-15 | Stop reason: HOSPADM

## 2018-10-13 RX ORDER — ALBUTEROL SULFATE 2.5 MG/3ML
2.5 SOLUTION RESPIRATORY (INHALATION) EVERY 6 HOURS PRN
Status: DISCONTINUED | OUTPATIENT
Start: 2018-10-13 | End: 2018-10-13

## 2018-10-13 RX ORDER — LEVOTHYROXINE SODIUM 0.05 MG/1
50 TABLET ORAL DAILY
Status: DISCONTINUED | OUTPATIENT
Start: 2018-10-13 | End: 2018-10-15 | Stop reason: HOSPADM

## 2018-10-13 RX ORDER — SODIUM CHLORIDE 0.9 % (FLUSH) 0.9 %
10 SYRINGE (ML) INJECTION EVERY 12 HOURS SCHEDULED
Status: DISCONTINUED | OUTPATIENT
Start: 2018-10-13 | End: 2018-10-15 | Stop reason: HOSPADM

## 2018-10-13 RX ORDER — AMLODIPINE BESYLATE 10 MG/1
10 TABLET ORAL DAILY
Status: DISCONTINUED | OUTPATIENT
Start: 2018-10-13 | End: 2018-10-15 | Stop reason: HOSPADM

## 2018-10-13 RX ORDER — FORMOTEROL FUMARATE 20 UG/2ML
20 SOLUTION RESPIRATORY (INHALATION) 2 TIMES DAILY
Status: DISCONTINUED | OUTPATIENT
Start: 2018-10-13 | End: 2018-10-15 | Stop reason: HOSPADM

## 2018-10-13 RX ORDER — FORMOTEROL FUMARATE 20 UG/2ML
20 SOLUTION RESPIRATORY (INHALATION) 2 TIMES DAILY
Status: DISCONTINUED | OUTPATIENT
Start: 2018-10-13 | End: 2018-10-13 | Stop reason: SDUPTHER

## 2018-10-13 RX ADMIN — BUDESONIDE 500 MCG: 0.5 SUSPENSION RESPIRATORY (INHALATION) at 20:24

## 2018-10-13 RX ADMIN — METHYLPREDNISOLONE SODIUM SUCCINATE 40 MG: 40 INJECTION, POWDER, FOR SOLUTION INTRAMUSCULAR; INTRAVENOUS at 12:14

## 2018-10-13 RX ADMIN — AMLODIPINE BESYLATE 10 MG: 10 TABLET ORAL at 09:04

## 2018-10-13 RX ADMIN — HYDRALAZINE HYDROCHLORIDE 50 MG: 50 TABLET, FILM COATED ORAL at 21:53

## 2018-10-13 RX ADMIN — CITALOPRAM HYDROBROMIDE 40 MG: 20 TABLET ORAL at 09:07

## 2018-10-13 RX ADMIN — METHYLPREDNISOLONE SODIUM SUCCINATE 40 MG: 40 INJECTION, POWDER, FOR SOLUTION INTRAMUSCULAR; INTRAVENOUS at 18:18

## 2018-10-13 RX ADMIN — Medication 10 ML: at 21:54

## 2018-10-13 RX ADMIN — CLOPIDOGREL BISULFATE 75 MG: 75 TABLET ORAL at 09:07

## 2018-10-13 RX ADMIN — CEFTRIAXONE 1 G: 1 INJECTION, POWDER, FOR SOLUTION INTRAMUSCULAR; INTRAVENOUS at 02:32

## 2018-10-13 RX ADMIN — IPRATROPIUM BROMIDE AND ALBUTEROL SULFATE 1 AMPULE: .5; 3 SOLUTION RESPIRATORY (INHALATION) at 17:10

## 2018-10-13 RX ADMIN — FORMOTEROL FUMARATE DIHYDRATE 20 MCG: 20 SOLUTION RESPIRATORY (INHALATION) at 08:50

## 2018-10-13 RX ADMIN — MONTELUKAST SODIUM 10 MG: 10 TABLET, FILM COATED ORAL at 09:05

## 2018-10-13 RX ADMIN — CLONIDINE HYDROCHLORIDE 0.1 MG: 0.1 TABLET ORAL at 09:04

## 2018-10-13 RX ADMIN — Medication 10 ML: at 09:09

## 2018-10-13 RX ADMIN — HYDRALAZINE HYDROCHLORIDE 50 MG: 50 TABLET, FILM COATED ORAL at 14:20

## 2018-10-13 RX ADMIN — IPRATROPIUM BROMIDE AND ALBUTEROL SULFATE 1 AMPULE: .5; 3 SOLUTION RESPIRATORY (INHALATION) at 20:24

## 2018-10-13 RX ADMIN — METHYLPREDNISOLONE SODIUM SUCCINATE 40 MG: 40 INJECTION, POWDER, FOR SOLUTION INTRAMUSCULAR; INTRAVENOUS at 06:47

## 2018-10-13 RX ADMIN — OXYCODONE HYDROCHLORIDE AND ACETAMINOPHEN 1 TABLET: 5; 325 TABLET ORAL at 02:19

## 2018-10-13 RX ADMIN — AZITHROMYCIN MONOHYDRATE 500 MG: 500 INJECTION, POWDER, LYOPHILIZED, FOR SOLUTION INTRAVENOUS at 03:27

## 2018-10-13 RX ADMIN — CLONIDINE HYDROCHLORIDE 0.1 MG: 0.1 TABLET ORAL at 21:53

## 2018-10-13 RX ADMIN — IPRATROPIUM BROMIDE AND ALBUTEROL SULFATE 1 AMPULE: .5; 3 SOLUTION RESPIRATORY (INHALATION) at 12:11

## 2018-10-13 RX ADMIN — HYDRALAZINE HYDROCHLORIDE 50 MG: 50 TABLET, FILM COATED ORAL at 09:05

## 2018-10-13 RX ADMIN — ATORVASTATIN CALCIUM 40 MG: 40 TABLET, FILM COATED ORAL at 09:04

## 2018-10-13 RX ADMIN — FORMOTEROL FUMARATE DIHYDRATE 20 MCG: 20 SOLUTION RESPIRATORY (INHALATION) at 20:24

## 2018-10-13 RX ADMIN — BUDESONIDE 500 MCG: 0.5 SUSPENSION RESPIRATORY (INHALATION) at 08:50

## 2018-10-13 RX ADMIN — IPRATROPIUM BROMIDE AND ALBUTEROL SULFATE 1 AMPULE: .5; 3 SOLUTION RESPIRATORY (INHALATION) at 08:44

## 2018-10-13 RX ADMIN — LEVOTHYROXINE SODIUM 50 MCG: 50 TABLET ORAL at 06:47

## 2018-10-13 RX ADMIN — ENOXAPARIN SODIUM 40 MG: 40 INJECTION SUBCUTANEOUS at 09:05

## 2018-10-13 RX ADMIN — ASPIRIN 81 MG CHEWABLE TABLET 81 MG: 81 TABLET CHEWABLE at 09:07

## 2018-10-13 ASSESSMENT — PAIN SCALES - GENERAL
PAINLEVEL_OUTOF10: 10
PAINLEVEL_OUTOF10: 6
PAINLEVEL_OUTOF10: 0
PAINLEVEL_OUTOF10: 0

## 2018-10-13 ASSESSMENT — PAIN DESCRIPTION - PAIN TYPE: TYPE: ACUTE PAIN

## 2018-10-13 ASSESSMENT — PAIN DESCRIPTION - LOCATION
LOCATION: KNEE
LOCATION: KNEE

## 2018-10-13 ASSESSMENT — PAIN DESCRIPTION - ORIENTATION: ORIENTATION: RIGHT;LEFT

## 2018-10-13 NOTE — ED PROVIDER NOTES
systems reviewed and are negative. Positives and Pertinent negatives as per HPI. Except as noted abovein the ROS, all other systems were reviewed and negative. PAST MEDICAL HISTORY     Past Medical History:   Diagnosis Date    CAD (coronary artery disease)     Nonobstructive on cath in 9/2017    CHF [facility]congest[facility] heart failure) ([facility])     Colostomy care ([facility] 4/25/2018    Peristomal irritation and early leaking    COPD (chronic obstructive pulmonary disease) ([facility])     Hyperlipidemia     Hypertension     Kidney disease     Stage 3    [facility]     Rectal fissure 11/2014    surgery pending    Restless legs syndrome     Sleep apnea     O2 3 liters at hs    Unspecified sleep apnea          SURGICAL HISTORY     Past Surgical History:   Procedure Laterality Date    CARDIAC CATHETERIZATION  09/13/2017    Dr. Dorothy Philip  03/09/2018    CORONARY ARTERY BYPASS GRAFT      Legs & Carotid    HERNIA REPAIR           CURRENTMEDICATIONS       Previous Medications    ALBUTEROL (PROVENTIL) (2.5 MG/3ML) 0.083% NEBULIZER SOLUTION    Take 3 mLs by nebulization every 6 hours as needed for Wheezing    ALBUTEROL SULFATE HFA (PROAIR HFA) 108 (90 BASE) MCG/ACT INHALER    Use every 4 hours while awake for 7-10 days then PRN wheezing  Dispense with SPACER and Instruct on use. May sub Ventolin or Proventil as needed per [employer].     AMLODIPINE (NORVASC) 10 MG TABLET    Take 1 tablet by mouth daily    ARFORMOTEROL TARTRATE (BROVANA) 15 MCG/2ML NEBU    Take 1 ampule by nebulization 2 times daily    ASPIRIN 81 MG TABLET    Take 1 tablet by mouth daily    ATORVASTATIN (LIPITOR) 40 MG TABLET    Take 1 tablet by mouth daily    BUDESONIDE (PULMICORT) 0.5 MG/2ML NEBULIZER SUSPENSION    Take 2 mLs by nebulization 2 times daily    BUDESONIDE-FORMOTEROL (SYMBICORT) 160-4.5 MCG/ACT AERO    Inhale 2 puffs into the lungs 2 times daily    CARVEDILOL (COREG) 25 MG TABLET    Take 1 methylPREDNISolone sodium (SOLU-MEDROL) injection 125 mg (125 mg Intravenous Given 10/12/18 9914)       Differential Diagnosis: Acute Coronary Syndrome, Congestive Heart Failure, Myocardial Infarction, Pulmonary Embolus, Pneumonia, Pneumothorax, other    Patient presents with shortness of breath and chronic knee pain. See HPI for presentation. Physical exam as above. Very wheezy. Mild respiratory distress. Placed on BiPAP. Chest x-ray normal.  Lactate 3.0. Leukocytosis with white count of 15.4 left shift of 13.0 no bandemia. No anemia. No gross electrolyte abnormality other kidney or liver dysfunction. CO2 38. Troponin elevated at 0.03. BNP 6600. Small amount of VBG. Patient placed on BiPAP and was resting much more comfortably on my reassessment. She will be admitted for the diagnoses below. She was given all test results and given an opportunity to ask and have any questions answered. She was agreeable to admission. The hospitalist was consulted and agreed to admit patient and write orders. The patient tolerated their visit well. They were seen and evaluated by the attending physician who agreed with the assessment and plan. The patient and / or thefamily were informed of the results of any tests, a time was given to answer questions, a plan was proposed and they agreed with plan. FINAL IMPRESSION      1. COPD exacerbation (HCC)    2. Leukocytosis, unspecified type    3. Lactic acidosis    4. Elevated brain natriuretic peptide (BNP) level    5.  Elevated troponin          DISPOSITION/PLAN   DISPOSITION Admitted 10/13/2018 12:42:12 AM      PATIENT REFERREDTO:  MD Anup Zhao [facility] [address] [facility]  [phone]            DISCHARGE MEDICATIONS:  New Prescriptions    No medications on file       DISCONTINUED MEDICATIONS:  Discontinued Medications    No medications on file         Attestation: The Prescription Monitoring Report for this patient was

## 2018-10-13 NOTE — PLAN OF CARE
Problem: OXYGENATION/RESPIRATORY FUNCTION  Goal: Patient will maintain patent airway  Monitor vital signs every four hours as ordered. Assess for peripheral pulses, cap refill, and apical pulse. Assess heart sounds and breath sounds. Strict I&Os as ordered. Daily weights. Monitor electrolytes and report any abnormalities to MD. Administer oxygen as needed. Provide oxygen if hypoxic from decreased cardiac output. Provide quite environment limiting stimuli. Provide small meals which reduces pressure on the diaphragm and enhances chest expansion. Keep HOB 30-60 degrees to promote lung expansion and decrease venous return.

## 2018-10-13 NOTE — ED NOTES
Report called to Iwona Lanza RN PCU. Pt to room 5275. Axo. Vss. Bipap in place. Pt tolerating well. No s/s of acute distress noted. Electronically signed by Jo Blair RN on 10/13/2018 at 1:00 AM       Jo Blair RN  10/13/18 0104

## 2018-10-13 NOTE — H&P
Hospital Medicine History & Physical      PCP: Óscar Pickett MD    Date of Admission: 10/12/2018    Date of Service: Pt seen/examined on 10/13/2018 and Admitted to Inpatient with expected LOS greater than two midnights due to medical therapy    Chief Complaint:  Shortness of breath      History Of Present Illness:      72 y.o. female who presented to [facility] with shortness of breath. Patient has been reported by family to be dyspneic area she is having more labored breathing. She's had occasional cough. She was brought to the emergency room and was on higher than normal oxygen requirement. She required BiPAP to help with her work of breathing. She is normally on 4 L of oxygen at baseline. Patient is pretty somnolent right now although it is in the middle of the night (3AM)    Per review of electronic health record patient recently completed 6 minute walk test and is also had CTA of the chest coronary arteries  She was discharged by hospitalist about 2-1/2 weeks ago after being treated for COPD    Past Medical History:          Diagnosis Date    CAD (coronary artery disease)     Nonobstructive on cath in 9/2017    CHF (congestive heart failure) ([address]     Colostomy care ([address] 4/25/2018    Peristomal irritation and early leaking    COPD (chronic obstructive pulmonary disease) ([address]     Hyperlipidemia     Hypertension     Kidney disease     Stage 3    Peripheral vascular disease ([address]     Rectal fissure 11/2014    surgery pending    Restless legs syndrome     Sleep apnea     O2 3 liters at hs    Unspecified sleep apnea        Past Surgical History:          Procedure Laterality Date    CARDIAC CATHETERIZATION  09/13/2017    Dr. Lesley Nur  03/09/2018    CORONARY ARTERY BYPASS GRAFT      Legs & Carotid    HERNIA REPAIR         Medications Prior to Admission:      Prior to Admission medications    Medication Sig Start Date End Date Taking?  Authorizing Provider

## 2018-10-13 NOTE — PLAN OF CARE
Problem: Falls - Risk of:  Goal: Will remain free from falls  Will remain free from falls   Outcome: Ongoing  Fall risk assessment completed every shift. All precautions in place. Pt has call light within reach at all times. Room clear of clutter. Pt aware to call for assistance when getting up. Problem: OXYGENATION/RESPIRATORY FUNCTION  Goal: Patient will maintain patent airway  Outcome: Ongoing  Lung sounds being assessed each shift. I/Os being monitored throughout the shift.  bipap used as prescribed by MD.

## 2018-10-14 ENCOUNTER — APPOINTMENT (OUTPATIENT)
Dept: GENERAL RADIOLOGY | Age: 73
DRG: 140 | End: 2018-10-14
Payer: COMMERCIAL

## 2018-10-14 LAB
ANION GAP SERPL CALCULATED.3IONS-SCNC: 11 MMOL/L (ref 3–16)
BUN BLDV-MCNC: 33 MG/DL (ref 7–20)
CALCIUM SERPL-MCNC: 9.9 MG/DL (ref 8.3–10.6)
CHLORIDE BLD-SCNC: 93 MMOL/L (ref 99–110)
CO2: 37 MMOL/L (ref 21–32)
CREAT SERPL-MCNC: 1.1 MG/DL (ref 0.6–1.2)
GFR AFRICAN AMERICAN: 59
GFR NON-AFRICAN AMERICAN: 49
GLUCOSE BLD-MCNC: 192 MG/DL (ref 70–99)
POTASSIUM REFLEX MAGNESIUM: 4.4 MMOL/L (ref 3.5–5.1)
SODIUM BLD-SCNC: 141 MMOL/L (ref 136–145)

## 2018-10-14 PROCEDURE — 2700000000 HC OXYGEN THERAPY PER DAY

## 2018-10-14 PROCEDURE — 94762 N-INVAS EAR/PLS OXIMTRY CONT: CPT

## 2018-10-14 PROCEDURE — 36415 COLL VENOUS BLD VENIPUNCTURE: CPT

## 2018-10-14 PROCEDURE — 94760 N-INVAS EAR/PLS OXIMETRY 1: CPT

## 2018-10-14 PROCEDURE — 93010 ELECTROCARDIOGRAM REPORT: CPT | Performed by: INTERNAL MEDICINE

## 2018-10-14 PROCEDURE — 99232 SBSQ HOSP IP/OBS MODERATE 35: CPT | Performed by: INTERNAL MEDICINE

## 2018-10-14 PROCEDURE — 6370000000 HC RX 637 (ALT 250 FOR IP): Performed by: INTERNAL MEDICINE

## 2018-10-14 PROCEDURE — 6360000002 HC RX W HCPCS: Performed by: INTERNAL MEDICINE

## 2018-10-14 PROCEDURE — 71045 X-RAY EXAM CHEST 1 VIEW: CPT

## 2018-10-14 PROCEDURE — 94640 AIRWAY INHALATION TREATMENT: CPT

## 2018-10-14 PROCEDURE — 80048 BASIC METABOLIC PNL TOTAL CA: CPT

## 2018-10-14 PROCEDURE — 2060000000 HC ICU INTERMEDIATE R&B

## 2018-10-14 PROCEDURE — 2580000003 HC RX 258: Performed by: INTERNAL MEDICINE

## 2018-10-14 RX ORDER — LORAZEPAM 0.5 MG/1
0.5 TABLET ORAL 2 TIMES DAILY PRN
Status: DISCONTINUED | OUTPATIENT
Start: 2018-10-14 | End: 2018-10-15 | Stop reason: HOSPADM

## 2018-10-14 RX ORDER — HYDRALAZINE HYDROCHLORIDE 20 MG/ML
10 INJECTION INTRAMUSCULAR; INTRAVENOUS EVERY 6 HOURS PRN
Status: DISCONTINUED | OUTPATIENT
Start: 2018-10-14 | End: 2018-10-15 | Stop reason: HOSPADM

## 2018-10-14 RX ORDER — HYDRALAZINE HYDROCHLORIDE 20 MG/ML
INJECTION INTRAMUSCULAR; INTRAVENOUS
Status: DISPENSED
Start: 2018-10-14 | End: 2018-10-15

## 2018-10-14 RX ORDER — TORSEMIDE 20 MG/1
20 TABLET ORAL DAILY
Status: DISCONTINUED | OUTPATIENT
Start: 2018-10-14 | End: 2018-10-15

## 2018-10-14 RX ADMIN — METHYLPREDNISOLONE SODIUM SUCCINATE 40 MG: 40 INJECTION, POWDER, FOR SOLUTION INTRAMUSCULAR; INTRAVENOUS at 06:27

## 2018-10-14 RX ADMIN — ENOXAPARIN SODIUM 40 MG: 40 INJECTION SUBCUTANEOUS at 09:24

## 2018-10-14 RX ADMIN — HYDRALAZINE HYDROCHLORIDE 50 MG: 50 TABLET, FILM COATED ORAL at 20:55

## 2018-10-14 RX ADMIN — TORSEMIDE 20 MG: 20 TABLET ORAL at 14:24

## 2018-10-14 RX ADMIN — BUDESONIDE 500 MCG: 0.5 SUSPENSION RESPIRATORY (INHALATION) at 08:11

## 2018-10-14 RX ADMIN — LEVOTHYROXINE SODIUM 50 MCG: 50 TABLET ORAL at 06:27

## 2018-10-14 RX ADMIN — IPRATROPIUM BROMIDE AND ALBUTEROL SULFATE 1 AMPULE: .5; 3 SOLUTION RESPIRATORY (INHALATION) at 12:09

## 2018-10-14 RX ADMIN — METHYLPREDNISOLONE SODIUM SUCCINATE 40 MG: 40 INJECTION, POWDER, FOR SOLUTION INTRAMUSCULAR; INTRAVENOUS at 00:02

## 2018-10-14 RX ADMIN — LORAZEPAM 0.5 MG: 0.5 TABLET ORAL at 21:51

## 2018-10-14 RX ADMIN — HYDRALAZINE HYDROCHLORIDE 50 MG: 50 TABLET, FILM COATED ORAL at 09:25

## 2018-10-14 RX ADMIN — FORMOTEROL FUMARATE DIHYDRATE 20 MCG: 20 SOLUTION RESPIRATORY (INHALATION) at 08:11

## 2018-10-14 RX ADMIN — AMLODIPINE BESYLATE 10 MG: 10 TABLET ORAL at 09:25

## 2018-10-14 RX ADMIN — Medication 10 ML: at 20:55

## 2018-10-14 RX ADMIN — Medication 10 ML: at 09:26

## 2018-10-14 RX ADMIN — FORMOTEROL FUMARATE DIHYDRATE 20 MCG: 20 SOLUTION RESPIRATORY (INHALATION) at 20:07

## 2018-10-14 RX ADMIN — ATORVASTATIN CALCIUM 40 MG: 40 TABLET, FILM COATED ORAL at 09:25

## 2018-10-14 RX ADMIN — MONTELUKAST SODIUM 10 MG: 10 TABLET, FILM COATED ORAL at 09:25

## 2018-10-14 RX ADMIN — METHYLPREDNISOLONE SODIUM SUCCINATE 40 MG: 40 INJECTION, POWDER, FOR SOLUTION INTRAMUSCULAR; INTRAVENOUS at 17:15

## 2018-10-14 RX ADMIN — CLONIDINE HYDROCHLORIDE 0.1 MG: 0.1 TABLET ORAL at 09:25

## 2018-10-14 RX ADMIN — METHYLPREDNISOLONE SODIUM SUCCINATE 40 MG: 40 INJECTION, POWDER, FOR SOLUTION INTRAMUSCULAR; INTRAVENOUS at 23:42

## 2018-10-14 RX ADMIN — HYDRALAZINE HYDROCHLORIDE 50 MG: 50 TABLET, FILM COATED ORAL at 14:24

## 2018-10-14 RX ADMIN — IPRATROPIUM BROMIDE AND ALBUTEROL SULFATE 1 AMPULE: .5; 3 SOLUTION RESPIRATORY (INHALATION) at 20:07

## 2018-10-14 RX ADMIN — BUDESONIDE 500 MCG: 0.5 SUSPENSION RESPIRATORY (INHALATION) at 20:07

## 2018-10-14 RX ADMIN — CLONIDINE HYDROCHLORIDE 0.1 MG: 0.1 TABLET ORAL at 20:55

## 2018-10-14 RX ADMIN — METHYLPREDNISOLONE SODIUM SUCCINATE 40 MG: 40 INJECTION, POWDER, FOR SOLUTION INTRAMUSCULAR; INTRAVENOUS at 12:39

## 2018-10-14 RX ADMIN — IPRATROPIUM BROMIDE AND ALBUTEROL SULFATE 1 AMPULE: .5; 3 SOLUTION RESPIRATORY (INHALATION) at 16:42

## 2018-10-14 RX ADMIN — HYDRALAZINE HYDROCHLORIDE 10 MG: 20 INJECTION INTRAMUSCULAR; INTRAVENOUS at 12:38

## 2018-10-14 RX ADMIN — ASPIRIN 81 MG CHEWABLE TABLET 81 MG: 81 TABLET CHEWABLE at 09:25

## 2018-10-14 RX ADMIN — IPRATROPIUM BROMIDE AND ALBUTEROL SULFATE 1 AMPULE: .5; 3 SOLUTION RESPIRATORY (INHALATION) at 08:11

## 2018-10-14 RX ADMIN — CITALOPRAM HYDROBROMIDE 40 MG: 20 TABLET ORAL at 09:25

## 2018-10-14 RX ADMIN — LORAZEPAM 0.5 MG: 0.5 TABLET ORAL at 10:35

## 2018-10-14 RX ADMIN — CLOPIDOGREL BISULFATE 75 MG: 75 TABLET ORAL at 09:25

## 2018-10-14 ASSESSMENT — PAIN SCALES - GENERAL: PAINLEVEL_OUTOF10: 0

## 2018-10-14 NOTE — PROGRESS NOTES
Hospitalist Progress Note      PCP: Corwin Hercules MD    Date of Admission: 10/12/2018    Chief Complaint: dyspnea      Subjective:   Dyspnea slowly improving. Continues to feel wheezy. Feels edematous. CXR without signs of fluid overload    Medications:  Reviewed    Infusion Medications   Scheduled Medications    hydrALAZINE        torsemide  20 mg Oral Daily    amLODIPine  10 mg Oral Daily    aspirin  81 mg Oral Daily    atorvastatin  40 mg Oral Daily    citalopram  40 mg Oral Daily    cloNIDine  0.1 mg Oral BID    clopidogrel  75 mg Oral Daily    hydrALAZINE  50 mg Oral TID    levothyroxine  50 mcg Oral Daily    montelukast  10 mg Oral Daily    sodium chloride flush  10 mL Intravenous 2 times per day    enoxaparin  40 mg Subcutaneous Daily    ipratropium-albuterol  1 ampule Inhalation Q4H WA    methylPREDNISolone  40 mg Intravenous Q6H    budesonide  0.5 mg Nebulization BID    formoterol  20 mcg Nebulization BID     PRN Meds: LORazepam, hydrALAZINE, albuterol sulfate HFA, sodium chloride flush, magnesium hydroxide, ondansetron, albuterol      Intake/Output Summary (Last 24 hours) at 10/14/18 1439  Last data filed at 10/14/18 0952   Gross per 24 hour   Intake              960 ml   Output              300 ml   Net              660 ml       Exam:    BP (!) 141/61   Pulse 86   Temp 98.9 °F (37.2 °C) (Oral)   Resp 22   Ht 5' (1.524 m)   Wt 190 lb 4.1 oz (86.3 kg)   LMP 05/01/2006 (Approximate)   SpO2 99%   BMI 37.16 kg/m²     Gen/overall appearance: Not in acute distress. Alert. Head: Normocephalic, atraumatic  Eyes: EOMI, no scleral icterus  Neck: No JVD  CVS: regular rate and rhythm, Normal S1S2  Pulm: diffuse wheezing bilat, no accessory muscle use  Gastrointestinal: Soft, nontender, obese, no guarding or rebound  Extremities: No edema.  No erythema or warmth  Neuro: No gross focal deficits noted  Skin: Warm, dry    Labs:   Recent Labs      10/12/18   2308   WBC  15.4*   HGB  11.8*

## 2018-10-14 NOTE — PROGRESS NOTES
Pulmonary Progress Note       ASSESSMENT:  · Acute Hypoxic and Hypercapnic Respiratory Failure due to AECOPD - still using BIPAP off and on  · Acute Exacerbation of COPD - not precipitated by infection since Procalcitonin is normal  · COPD     PLAN:  · Oxygen  · BIPAP if needed  · Solumedrol 40 mg IV every 6 hours  · Albuterol and Ipratropium Nebulizer   · Formoterol and Budesonide Nebulizer   · No antibiotics since Procalcitonin is normal  · She agrees to stay in the hospital until tomorrow  · Would discharge on Prednisone 40 mg daily for 5 days  · Resume home dose of Torsemide for bloating with fluid retention      UPDATE:  She has been off and on BIPAP. Afebrile. PULMONARY CHIEF COMPLAINT:  shortness of breath     HISTORY:  She has intermittent moderate dyspnea at rest.  She has wheezing. She feels bloated and wants her home Torsemide. REVIEW OF SYSTEMS:  No chest pain or palpitations. No sore throat or hoarseness. MEDICATIONS:  Scheduled Meds:   hydrALAZINE        amLODIPine  10 mg Oral Daily    aspirin  81 mg Oral Daily    atorvastatin  40 mg Oral Daily    citalopram  40 mg Oral Daily    cloNIDine  0.1 mg Oral BID    clopidogrel  75 mg Oral Daily    hydrALAZINE  50 mg Oral TID    levothyroxine  50 mcg Oral Daily    montelukast  10 mg Oral Daily    sodium chloride flush  10 mL Intravenous 2 times per day    enoxaparin  40 mg Subcutaneous Daily    ipratropium-albuterol  1 ampule Inhalation Q4H WA    methylPREDNISolone  40 mg Intravenous Q6H    budesonide  0.5 mg Nebulization BID    formoterol  20 mcg Nebulization BID         PHYSICAL EXAM:   Vital Signs: BP (!) 178/76   Pulse 86   Temp 98.9 °F (37.2 °C) (Oral)   Resp 22   Ht 5' (1.524 m)   Wt 190 lb 4.1 oz (86.3 kg)   LMP 05/01/2006 (Approximate)   SpO2 99%   BMI 37.16 kg/m²     Gen:   No distress. Breathing comfortably at rest with BIPAP. ENT:   BIPAP mask in place. Neck:   Trachea is midline. No JVD.    Resp:   No accessory muscle use. End expiratory wheezing. CV:   PMI is normal. No murmur or gallop. Neuro:  Awake and alert. Normal muscle tone. No tremor. LAB RESULTS:  CBC:   Recent Labs      10/12/18   2308   WBC  15.4*   HGB  11.8*   HCT  37.9   MCV  86.2   PLT  198     BMP:   Recent Labs      10/12/18   2308  10/14/18   0635   NA  139  141   K  3.3*  4.4   CL  87*  93*   CO2  38*  37*   BUN  36*  33*   CREATININE  1.2  1.1       ABG: No results for input(s): PHART, HQS6MWL, PO2ART in the last 72 hours. CHEST XRAY 10/14/18:  Cardiac leads project over the chest.  There is improved visualization of the   left costophrenic angle as compared to prior.  Persistent linear opacity   within the left mid lung, suggestive of atelectasis or scar.  No evidence of   pneumothorax.  Cardiac and mediastinal silhouettes are unchanged. Calcification of aorta. ECHOCARDIOGRAM 6/12/18:   Left ventricular cavity size is normal.   Normal left ventricular wall thickness.   Ejection fraction is visually estimated to be 55-60%.   There is reversal of E/A inflow velocities across the mitral valve.   Mitral valve leaflets appear mildly thickened.   Mild mitral regurgitation is present.   The left atrium is at the upper limits of normal in size.   Mild tricuspid regurgitation.  IVC size is dilated (>2.1 cm) and collapses < 50% with respiration   consistent with elevated RA pressure (15 mmHg).  Estimated pulmonary artery systolic pressure is moderately elevated at 47   mmHg assuming a right atrial pressure of 15 mmHg. c/w mild pulmonary HTN.

## 2018-10-15 ENCOUNTER — TELEPHONE (OUTPATIENT)
Dept: FAMILY MEDICINE CLINIC | Age: 73
End: 2018-10-15

## 2018-10-15 VITALS
SYSTOLIC BLOOD PRESSURE: 150 MMHG | TEMPERATURE: 98.3 F | WEIGHT: 190.92 LBS | OXYGEN SATURATION: 97 % | HEART RATE: 112 BPM | RESPIRATION RATE: 18 BRPM | BODY MASS INDEX: 37.48 KG/M2 | HEIGHT: 60 IN | DIASTOLIC BLOOD PRESSURE: 72 MMHG

## 2018-10-15 LAB
ANION GAP SERPL CALCULATED.3IONS-SCNC: 15 MMOL/L (ref 3–16)
BUN BLDV-MCNC: 40 MG/DL (ref 7–20)
CALCIUM SERPL-MCNC: 9.8 MG/DL (ref 8.3–10.6)
CHLORIDE BLD-SCNC: 89 MMOL/L (ref 99–110)
CO2: 35 MMOL/L (ref 21–32)
CREAT SERPL-MCNC: 1.1 MG/DL (ref 0.6–1.2)
GFR AFRICAN AMERICAN: 59
GFR NON-AFRICAN AMERICAN: 49
GLUCOSE BLD-MCNC: 222 MG/DL (ref 70–99)
HCT VFR BLD CALC: 36.8 % (ref 36–48)
HEMOGLOBIN: 11.7 G/DL (ref 12–16)
MCH RBC QN AUTO: 27.2 PG (ref 26–34)
MCHC RBC AUTO-ENTMCNC: 31.7 G/DL (ref 31–36)
MCV RBC AUTO: 85.9 FL (ref 80–100)
PDW BLD-RTO: 19.1 % (ref 12.4–15.4)
PLATELET # BLD: 231 K/UL (ref 135–450)
PMV BLD AUTO: 8.9 FL (ref 5–10.5)
POTASSIUM SERPL-SCNC: 3.8 MMOL/L (ref 3.5–5.1)
RBC # BLD: 4.28 M/UL (ref 4–5.2)
SODIUM BLD-SCNC: 139 MMOL/L (ref 136–145)
WBC # BLD: 17.9 K/UL (ref 4–11)

## 2018-10-15 PROCEDURE — 6360000002 HC RX W HCPCS: Performed by: INTERNAL MEDICINE

## 2018-10-15 PROCEDURE — 99232 SBSQ HOSP IP/OBS MODERATE 35: CPT | Performed by: INTERNAL MEDICINE

## 2018-10-15 PROCEDURE — 6370000000 HC RX 637 (ALT 250 FOR IP): Performed by: INTERNAL MEDICINE

## 2018-10-15 PROCEDURE — 94760 N-INVAS EAR/PLS OXIMETRY 1: CPT

## 2018-10-15 PROCEDURE — 2580000003 HC RX 258: Performed by: INTERNAL MEDICINE

## 2018-10-15 PROCEDURE — 36415 COLL VENOUS BLD VENIPUNCTURE: CPT

## 2018-10-15 PROCEDURE — 2700000000 HC OXYGEN THERAPY PER DAY

## 2018-10-15 PROCEDURE — 80048 BASIC METABOLIC PNL TOTAL CA: CPT

## 2018-10-15 PROCEDURE — 94640 AIRWAY INHALATION TREATMENT: CPT

## 2018-10-15 PROCEDURE — 85027 COMPLETE CBC AUTOMATED: CPT

## 2018-10-15 RX ORDER — TORSEMIDE 20 MG/1
20 TABLET ORAL 2 TIMES DAILY
Status: DISCONTINUED | OUTPATIENT
Start: 2018-10-15 | End: 2018-10-15

## 2018-10-15 RX ORDER — TORSEMIDE 20 MG/1
20 TABLET ORAL 2 TIMES DAILY
Status: DISCONTINUED | OUTPATIENT
Start: 2018-10-15 | End: 2018-10-15 | Stop reason: HOSPADM

## 2018-10-15 RX ADMIN — METHYLPREDNISOLONE SODIUM SUCCINATE 40 MG: 40 INJECTION, POWDER, FOR SOLUTION INTRAMUSCULAR; INTRAVENOUS at 06:07

## 2018-10-15 RX ADMIN — Medication 10 ML: at 09:04

## 2018-10-15 RX ADMIN — HYDRALAZINE HYDROCHLORIDE 10 MG: 20 INJECTION INTRAMUSCULAR; INTRAVENOUS at 04:22

## 2018-10-15 RX ADMIN — IPRATROPIUM BROMIDE AND ALBUTEROL SULFATE 1 AMPULE: .5; 3 SOLUTION RESPIRATORY (INHALATION) at 16:19

## 2018-10-15 RX ADMIN — HYDRALAZINE HYDROCHLORIDE 10 MG: 20 INJECTION INTRAMUSCULAR; INTRAVENOUS at 12:31

## 2018-10-15 RX ADMIN — CLOPIDOGREL BISULFATE 75 MG: 75 TABLET ORAL at 09:01

## 2018-10-15 RX ADMIN — LEVOTHYROXINE SODIUM 50 MCG: 50 TABLET ORAL at 06:07

## 2018-10-15 RX ADMIN — ENOXAPARIN SODIUM 40 MG: 40 INJECTION SUBCUTANEOUS at 09:01

## 2018-10-15 RX ADMIN — CLONIDINE HYDROCHLORIDE 0.1 MG: 0.1 TABLET ORAL at 09:00

## 2018-10-15 RX ADMIN — TORSEMIDE 20 MG: 20 TABLET ORAL at 09:01

## 2018-10-15 RX ADMIN — ASPIRIN 81 MG CHEWABLE TABLET 81 MG: 81 TABLET CHEWABLE at 09:00

## 2018-10-15 RX ADMIN — IPRATROPIUM BROMIDE AND ALBUTEROL SULFATE 1 AMPULE: .5; 3 SOLUTION RESPIRATORY (INHALATION) at 12:36

## 2018-10-15 RX ADMIN — MONTELUKAST SODIUM 10 MG: 10 TABLET, FILM COATED ORAL at 09:00

## 2018-10-15 RX ADMIN — HYDRALAZINE HYDROCHLORIDE 50 MG: 50 TABLET, FILM COATED ORAL at 14:05

## 2018-10-15 RX ADMIN — IPRATROPIUM BROMIDE AND ALBUTEROL SULFATE 1 AMPULE: .5; 3 SOLUTION RESPIRATORY (INHALATION) at 09:33

## 2018-10-15 RX ADMIN — CITALOPRAM HYDROBROMIDE 40 MG: 20 TABLET ORAL at 09:00

## 2018-10-15 RX ADMIN — AMLODIPINE BESYLATE 10 MG: 10 TABLET ORAL at 09:01

## 2018-10-15 RX ADMIN — LORAZEPAM 0.5 MG: 0.5 TABLET ORAL at 09:01

## 2018-10-15 RX ADMIN — FORMOTEROL FUMARATE DIHYDRATE 20 MCG: 20 SOLUTION RESPIRATORY (INHALATION) at 09:34

## 2018-10-15 RX ADMIN — METHYLPREDNISOLONE SODIUM SUCCINATE 40 MG: 40 INJECTION, POWDER, FOR SOLUTION INTRAMUSCULAR; INTRAVENOUS at 11:58

## 2018-10-15 RX ADMIN — HYDRALAZINE HYDROCHLORIDE 50 MG: 50 TABLET, FILM COATED ORAL at 09:01

## 2018-10-15 RX ADMIN — BUDESONIDE 500 MCG: 0.5 SUSPENSION RESPIRATORY (INHALATION) at 09:34

## 2018-10-15 RX ADMIN — ATORVASTATIN CALCIUM 40 MG: 40 TABLET, FILM COATED ORAL at 09:01

## 2018-10-15 ASSESSMENT — PAIN SCALES - GENERAL
PAINLEVEL_OUTOF10: 0

## 2018-10-15 NOTE — CONSULTS
Pulmonary & Critical Care Medicine Consultation    ASSESSMENT:  · Acute Hypoxic and Hypercapnic Respiratory Failure due to AECOPD  · Acute Exacerbation of COPD - not precipitated by infection since Procalcitonin is normal  · COPD    PLAN:  · Oxygen  · BIPAP if needed  · Solumedrol 40 mg IV every 6 hours  · Albuterol and Ipratropium Nebulizer   · Formoterol and Budesonide Nebulizer   · Discontinue Zithromax since Procalcitonin is normal  · She agrees to stay in the hospital until tomorrow  · Would discharge on Prednisone 40 mg daily for 5 days      REASON FOR CONSULTATION/CC: COPD exacerbation    CONSULTING PHYSICIAN: Dr. Wendie Hickman:  shortness of breath     HISTORY OF PRESENT ILLNESS:  Acute onset of dyspnea with minimal cough and wheezing. She began wheezing in the ED and developed mild respiratory distress. She improved with BIPAP and has been weaned off of it now. She still has dyspnea on exertion but is feeling better. She has COPD. She wants to go home because she has several things to take care of. I encouraged her to stay until tomorrow morning due to the severity of her AECOPD and she agrees.      PAST MEDICAL HISTORY:  Past Medical History:   Diagnosis Date    CAD (coronary artery disease)     Nonobstructive on cath in 9/2017    [facility]     Colostomy care ([facility] [address] 4/25/2018    Peristomal irritation and early leaking    COPD (chronic obstructive pulmonary disease) ([facility])     Hyperlipidemia     Hypertension     Kidney disease     Stage 3    Peripheral vascular disease ([facility] [address]     Rectal fissure 11/2014    surgery pending    Restless legs syndrome     Sleep apnea     O2 3 liters at hs    Unspecified sleep apnea      PAST SURGICAL HISTORY:  Past Surgical History:   Procedure Laterality Date    CARDIAC CATHETERIZATION  09/13/2017    Dr. Dorothy Philip  03/09/2018    CORONARY ARTERY BYPASS GRAFT      Legs & Carotid    HERNIA
Albuterol/Ipratropium        Pulmonary Function Test Results: 41% severe COPD    Assessment:     CONSULTS:   IP CONSULT TO HOSPITALIST  IP CONSULT TO SPIRITUAL SERVICES  IP CONSULT TO PULMONOLOGY  IP CONSULT TO COPD NURSE      Patient has a PULMONOLOGY CONSULT: Yes     Patient receives flu vaccine:  Yes   Patient receives pneumonia vaccine:   Yes   Patient was given smoking cessation counseling: N/A    Patient would benefit from Pulmonary Rehabilitation:  Yes     Educated on correct MDI technique:  Yes       EDUCATION STATUS: Patient   [x]  Provided both written and verbal education on COPD signs/symptoms. [x]  Provided instructions on when to take medications. [x]  Provided instructions on modalities . []  Provided instructions to how to accurately read pulse oximeter. []  Provided instructions to monitor and record oxygen saturations. [x]  Provided instructions on how use pursed lip breathing. [x]  Provided recommendations for smoking cessation programs. [x]  Provided recommendations on behavior modification for smoking cessation. [x]  Provided recommendations on avoiding passive smoking. [x]  Provided recommendations on activity and exercise. [x]  Provided recommendations on energy conversation. []  Other:    CURRENT DIET: DIET CARDIAC;    EDUCATIONAL PACKETS PROVIDED- PRINTED FROM [employer]. Titles and material given:  Yes  [x]  What is COPD? [x]  Shortness of Breath: Maximizing Your Energy   []  COPD: Using Inhalers  [x]  Chronic Lung Disease: Tips for Quitting Smoking  [x]  Chronic Lung Disease: Preventing Lung Infections  []  What is Pulmonary Rehabilitation?   []  Other    PATIENT/CAREGIVER TEACHING:   Level of patient/caregiver understanding able to:   [] Verbalize understanding   [] Demonstrate understanding       [] Teach back        [] Needs reinforcement        []  Other:      TEACHING TIME:  50 minutes       Plan:       DISCHARGE PLAN:  Placement for patient upon discharge: independent

## 2018-10-15 NOTE — PLAN OF CARE
Problem: Falls - Risk of:  Goal: Will remain free from falls  Will remain free from falls   Outcome: Ongoing  Fall risk assessment completed per unit protocol. Patient's bed is in the lowest position, call light is within reach and the patient's room is free of clutter. The patient has been instructed to call for assistance before getting out of bed or the chair. Problem: OXYGENATION/RESPIRATORY FUNCTION  Goal: Patient will maintain patent airway  Outcome: Ongoing  Patient is able to breathe comfortably on 3 L of oxygen per nasal cannula. Problem: HEMODYNAMIC STATUS  Goal: Patient has stable vital signs and fluid balance  Outcome: Ongoing  Patient's vital signs are stable and WNL at this time. Problem: FLUID AND ELECTROLYTE IMBALANCE  Goal: Fluid and electrolyte balance are achieved/maintained  Outcome: Ongoing  Patient is on strict intake and output monitoring along with daily weight checks to monitor for fluid retention. Problem: ACTIVITY INTOLERANCE/IMPAIRED MOBILITY  Goal: Mobility/activity is maintained at optimum level for patient  Outcome: Ongoing  Patient is able to ambulate independently throughout the room. Problem: Pain:  Goal: Pain level will decrease  Pain level will decrease   Outcome: Ongoing  Pain/discomfort being managed with PRN analgesics per MD orders. Pt able to express presence and absence of pain and rate pain appropriately using numerical scale.

## 2018-10-16 ENCOUNTER — TELEPHONE (OUTPATIENT)
Dept: PULMONOLOGY | Age: 73
End: 2018-10-16

## 2018-10-16 ENCOUNTER — SCHEDULED TELEPHONE ENCOUNTER (OUTPATIENT)
Dept: PHARMACY | Age: 73
End: 2018-10-16

## 2018-10-16 DIAGNOSIS — J43.2 CENTRILOBULAR EMPHYSEMA (HCC): ICD-10-CM

## 2018-10-16 DIAGNOSIS — J44.9 CHRONIC OBSTRUCTIVE PULMONARY DISEASE, UNSPECIFIED COPD TYPE (HCC): Primary | ICD-10-CM

## 2018-10-16 LAB
EKG ATRIAL RATE: 88 BPM
EKG DIAGNOSIS: NORMAL
EKG P AXIS: 67 DEGREES
EKG P-R INTERVAL: 114 MS
EKG Q-T INTERVAL: 384 MS
EKG QRS DURATION: 68 MS
EKG QTC CALCULATION (BAZETT): 464 MS
EKG R AXIS: 48 DEGREES
EKG T AXIS: 155 DEGREES
EKG VENTRICULAR RATE: 88 BPM

## 2018-10-16 NOTE — DISCHARGE SUMMARY
Hospital Medicine Discharge Summary    Patient ID: Chasity Blount      Patient's PCP: Tammi Paige MD    Admit Date: 10/12/2018     Discharge Date: 10/16/2018    Admitting Physician: Cesar Hernandez MD     Discharge Physician: Governor Nena MD     Discharge Diagnoses and Hospital Course: Active Hospital Problems    Diagnosis Date Noted    Acute on chronic respiratory acidosis [E87.2] 10/13/2018     1 Acute on chronic hypoxic/hypercapnic respiratory failure. 2 COPD with exacerbation in the setting of severe COPD at baseline  3 Chronic diastolic heart failure. Does not appear uncompensated at this time  4 coronary artery disease  5 stage III CK D  6 peripheral vascular disease  7 hypothyroidism    73 y/o F with PMH of COPD, chronic resp failure, chronic diastolic CHF, CAD, CKD, PVD who presented with shortness of breath. Workup found remarkable for acute copd exacerbation and diastolic CHF with subsequent acute on chronic resp failure. Treated with intermittent bipap, IV solumedrol, med nebs, IV diuresis. Pt with some clinical improvement. Recommended continued medical treatment, but pt stated she had to leave hospital for a family emergency. Was discharged on home prednisone taper starting at 60mg. Recommended to return to hospital if symptoms persisted or worsening. The patient was seen and examined on day of discharge and this discharge summary is in conjunction with any daily progress note from day of discharge. Exam:     BP (!) 150/72   Pulse 112   Temp 98.3 °F (36.8 °C) (Oral)   Resp 18   Ht 5' (1.524 m)   Wt 190 lb 14.7 oz (86.6 kg)   LMP 05/01/2006 (Approximate)   SpO2 97%   BMI 37.29 kg/m²     Gen/overall appearance: Not in acute distress. Alert.   Head: Normocephalic, atraumatic  Eyes: EOMI, no scleral icterus  Neck: No JVD  CVS: regular rate and rhythm, Normal S1S2  Pulm: diffuse wheezing bilat, no accessory muscle use  Gastrointestinal: Soft, nontender, obese, no

## 2018-10-16 NOTE — TELEPHONE ENCOUNTER
Tried to leave message for patient but voice mail was in [facility] and I was unable to understand directions to leave a message.

## 2018-10-17 ENCOUNTER — PATIENT MESSAGE (OUTPATIENT)
Dept: FAMILY MEDICINE CLINIC | Age: 73
End: 2018-10-17

## 2018-10-17 RX ORDER — GUAIFENESIN 600 MG/1
600 TABLET, EXTENDED RELEASE ORAL 2 TIMES DAILY
Qty: 30 TABLET | Refills: 1 | Status: SHIPPED | OUTPATIENT
Start: 2018-10-17 | End: 2018-11-16 | Stop reason: SDUPTHER

## 2018-10-18 LAB
BLOOD CULTURE, ROUTINE: NORMAL
CULTURE, BLOOD 2: NORMAL

## 2018-10-23 ENCOUNTER — SCHEDULED TELEPHONE ENCOUNTER (OUTPATIENT)
Dept: PHARMACY | Age: 73
End: 2018-10-23

## 2018-10-23 DIAGNOSIS — K21.9 GASTROESOPHAGEAL REFLUX DISEASE, ESOPHAGITIS PRESENCE NOT SPECIFIED: ICD-10-CM

## 2018-10-23 RX ORDER — BUDESONIDE 0.5 MG/2ML
1 INHALANT ORAL 2 TIMES DAILY
Qty: 120 AMPULE | Refills: 5 | Status: ON HOLD | OUTPATIENT
Start: 2018-10-23 | End: 2019-07-26 | Stop reason: SDUPTHER

## 2018-10-23 RX ORDER — ARFORMOTEROL TARTRATE 15 UG/2ML
1 SOLUTION RESPIRATORY (INHALATION) 2 TIMES DAILY
Qty: 120 ML | Refills: 5 | Status: ON HOLD | OUTPATIENT
Start: 2018-10-23 | End: 2019-07-26 | Stop reason: SDUPTHER

## 2018-10-23 RX ORDER — LANSOPRAZOLE 15 MG/1
15 CAPSULE, DELAYED RELEASE ORAL DAILY
Qty: 90 CAPSULE | Refills: 0 | Status: SHIPPED | OUTPATIENT
Start: 2018-10-23 | End: 2018-11-03 | Stop reason: SDUPTHER

## 2018-10-23 RX ORDER — LEVOTHYROXINE SODIUM 0.05 MG/1
TABLET ORAL
Qty: 30 TABLET | Refills: 2 | Status: SHIPPED | OUTPATIENT
Start: 2018-10-23 | End: 2018-11-03 | Stop reason: SDUPTHER

## 2018-10-23 NOTE — TELEPHONE ENCOUNTER
From: Jones Postal  Sent: 10/23/2018 3:17 PM EDT  Subject: Medication Renewal Request    Maurine Jansky M. Davy Boeck would like a refill of the following medications:     lansoprazole (PREVACID) 15 MG delayed release capsule [Hans Webber MD]     levothyroxine (SYNTHROID) 50 MCG tablet [Hans Webber MD]    Preferred pharmacy: [address] [place] [address], [facility] [address] [place] [address] [phone] - F [phone]    Comment:      Medication renewals requested in this message routed separately:     atorvastatin (LIPITOR) 40 MG tablet Avis Tineo MD]       Arformoterol Tartrate (BROVANA) 15 MCG/2ML NEBU [Aubrie Damon, APRN - CNP]       budesonide (PULMICORT) 0.5 MG/2ML nebulizer suspension Tank Dallas MD]

## 2018-10-23 NOTE — TELEPHONE ENCOUNTER
From: Tom Sutton  Sent: 10/23/2018 3:17 PM EDT  Subject: Medication Renewal Request    Heidi Rudolph would like a refill of the following medications:     Arformoterol Tartrate (BROVANA) 15 MCG/2ML NEBU Astrid Damon, APRN - CNP]    Preferred pharmacy: [address], [facility] [address] [place] [address] [phone] - F [phone]    Comment:      Medication renewals requested in this message routed separately:     atorvastatin (LIPITOR) 40 MG tablet Dimitrios Emanuel MD]       budesonide (PULMICORT) 0.5 MG/2ML nebulizer suspension Nae Steele MD]       lansoprazole (PREVACID) 15 MG delayed release capsule [Hans Webber MD]     levothyroxine (SYNTHROID) 50 MCG tablet [Hans Webber MD]

## 2018-10-25 ENCOUNTER — TELEPHONE (OUTPATIENT)
Dept: PULMONOLOGY | Age: 73
End: 2018-10-25

## 2018-10-25 DIAGNOSIS — I73.9 PAD (PERIPHERAL ARTERY DISEASE) (HCC): ICD-10-CM

## 2018-10-25 DIAGNOSIS — I25.10 CORONARY ARTERY DISEASE INVOLVING NATIVE CORONARY ARTERY OF NATIVE HEART WITHOUT ANGINA PECTORIS: ICD-10-CM

## 2018-10-25 RX ORDER — ATORVASTATIN CALCIUM 40 MG/1
40 TABLET, FILM COATED ORAL DAILY
Qty: 30 TABLET | Refills: 3 | Status: CANCELLED | OUTPATIENT
Start: 2018-10-25

## 2018-10-25 RX ORDER — AMOXICILLIN AND CLAVULANATE POTASSIUM 875; 125 MG/1; MG/1
1 TABLET, FILM COATED ORAL 2 TIMES DAILY
Qty: 14 TABLET | Refills: 0 | Status: SHIPPED | OUTPATIENT
Start: 2018-10-25 | End: 2018-11-01

## 2018-10-25 NOTE — TELEPHONE ENCOUNTER
Please give patient an appointment to see me tomorrow at 1pm. I am also sending a prescription for 7 days of augmentin.

## 2018-10-26 ENCOUNTER — TELEPHONE (OUTPATIENT)
Dept: OTHER | Facility: CLINIC | Age: 73
End: 2018-10-26

## 2018-10-26 ENCOUNTER — APPOINTMENT (OUTPATIENT)
Dept: GENERAL RADIOLOGY | Age: 73
DRG: 140 | End: 2018-10-26
Payer: COMMERCIAL

## 2018-10-26 ENCOUNTER — HOSPITAL ENCOUNTER (INPATIENT)
Age: 73
LOS: 6 days | Discharge: HOME HEALTH | DRG: 140 | End: 2018-11-01
Attending: EMERGENCY MEDICINE | Admitting: HOSPITALIST
Payer: COMMERCIAL

## 2018-10-26 DIAGNOSIS — R77.8 ELEVATED TROPONIN: Primary | ICD-10-CM

## 2018-10-26 DIAGNOSIS — J44.1 COPD EXACERBATION (HCC): ICD-10-CM

## 2018-10-26 LAB
ANION GAP SERPL CALCULATED.3IONS-SCNC: 13 MMOL/L (ref 3–16)
BASOPHILS ABSOLUTE: 0.1 K/UL (ref 0–0.2)
BASOPHILS RELATIVE PERCENT: 0.6 %
BUN BLDV-MCNC: 21 MG/DL (ref 7–20)
CALCIUM SERPL-MCNC: 9.4 MG/DL (ref 8.3–10.6)
CHLORIDE BLD-SCNC: 90 MMOL/L (ref 99–110)
CO2: 34 MMOL/L (ref 21–32)
CREAT SERPL-MCNC: 0.9 MG/DL (ref 0.6–1.2)
EOSINOPHILS ABSOLUTE: 0 K/UL (ref 0–0.6)
EOSINOPHILS RELATIVE PERCENT: 0.3 %
GFR AFRICAN AMERICAN: >60
GFR NON-AFRICAN AMERICAN: >60
GLUCOSE BLD-MCNC: 123 MG/DL (ref 70–99)
HCT VFR BLD CALC: 36.5 % (ref 36–48)
HEMOGLOBIN: 11.7 G/DL (ref 12–16)
LACTIC ACID: 2.9 MMOL/L (ref 0.4–2)
LYMPHOCYTES ABSOLUTE: 1.1 K/UL (ref 1–5.1)
LYMPHOCYTES RELATIVE PERCENT: 8.7 %
MCH RBC QN AUTO: 28.4 PG (ref 26–34)
MCHC RBC AUTO-ENTMCNC: 31.9 G/DL (ref 31–36)
MCV RBC AUTO: 88.8 FL (ref 80–100)
MONOCYTES ABSOLUTE: 1 K/UL (ref 0–1.3)
MONOCYTES RELATIVE PERCENT: 7.3 %
NEUTROPHILS ABSOLUTE: 10.8 K/UL (ref 1.7–7.7)
NEUTROPHILS RELATIVE PERCENT: 83.1 %
PDW BLD-RTO: 20.2 % (ref 12.4–15.4)
PLATELET # BLD: 236 K/UL (ref 135–450)
PMV BLD AUTO: 8 FL (ref 5–10.5)
POTASSIUM SERPL-SCNC: 3.7 MMOL/L (ref 3.5–5.1)
PRO-BNP: 3372 PG/ML (ref 0–124)
RBC # BLD: 4.11 M/UL (ref 4–5.2)
SODIUM BLD-SCNC: 137 MMOL/L (ref 136–145)
TROPONIN: 0.1 NG/ML
TROPONIN: 0.12 NG/ML
TSH SERPL DL<=0.05 MIU/L-ACNC: 1.13 UIU/ML (ref 0.27–4.2)
WBC # BLD: 13 K/UL (ref 4–11)

## 2018-10-26 PROCEDURE — 71046 X-RAY EXAM CHEST 2 VIEWS: CPT

## 2018-10-26 PROCEDURE — 6360000002 HC RX W HCPCS: Performed by: HOSPITALIST

## 2018-10-26 PROCEDURE — 93005 ELECTROCARDIOGRAM TRACING: CPT | Performed by: PHYSICIAN ASSISTANT

## 2018-10-26 PROCEDURE — 96374 THER/PROPH/DIAG INJ IV PUSH: CPT

## 2018-10-26 PROCEDURE — 6360000002 HC RX W HCPCS: Performed by: PHYSICIAN ASSISTANT

## 2018-10-26 PROCEDURE — 83880 ASSAY OF NATRIURETIC PEPTIDE: CPT

## 2018-10-26 PROCEDURE — 85025 COMPLETE CBC W/AUTO DIFF WBC: CPT

## 2018-10-26 PROCEDURE — 84484 ASSAY OF TROPONIN QUANT: CPT

## 2018-10-26 PROCEDURE — 36415 COLL VENOUS BLD VENIPUNCTURE: CPT

## 2018-10-26 PROCEDURE — 2700000000 HC OXYGEN THERAPY PER DAY

## 2018-10-26 PROCEDURE — 83605 ASSAY OF LACTIC ACID: CPT

## 2018-10-26 PROCEDURE — 99285 EMERGENCY DEPT VISIT HI MDM: CPT

## 2018-10-26 PROCEDURE — 6370000000 HC RX 637 (ALT 250 FOR IP): Performed by: HOSPITALIST

## 2018-10-26 PROCEDURE — 80048 BASIC METABOLIC PNL TOTAL CA: CPT

## 2018-10-26 PROCEDURE — 94640 AIRWAY INHALATION TREATMENT: CPT

## 2018-10-26 PROCEDURE — 1200000000 HC SEMI PRIVATE

## 2018-10-26 PROCEDURE — 6370000000 HC RX 637 (ALT 250 FOR IP): Performed by: PHYSICIAN ASSISTANT

## 2018-10-26 PROCEDURE — 2580000003 HC RX 258: Performed by: HOSPITALIST

## 2018-10-26 PROCEDURE — 99255 IP/OBS CONSLTJ NEW/EST HI 80: CPT | Performed by: INTERNAL MEDICINE

## 2018-10-26 PROCEDURE — 84443 ASSAY THYROID STIM HORMONE: CPT

## 2018-10-26 RX ORDER — HYDRALAZINE HYDROCHLORIDE 50 MG/1
50 TABLET, FILM COATED ORAL 3 TIMES DAILY
Status: DISCONTINUED | OUTPATIENT
Start: 2018-10-26 | End: 2018-10-28

## 2018-10-26 RX ORDER — ASPIRIN 81 MG/1
81 TABLET, CHEWABLE ORAL NIGHTLY
Status: DISCONTINUED | OUTPATIENT
Start: 2018-10-27 | End: 2018-11-01 | Stop reason: HOSPADM

## 2018-10-26 RX ORDER — LORAZEPAM 0.5 MG/1
0.5 TABLET ORAL DAILY
COMMUNITY
End: 2018-11-12 | Stop reason: SDUPTHER

## 2018-10-26 RX ORDER — ALBUTEROL SULFATE 2.5 MG/3ML
5 SOLUTION RESPIRATORY (INHALATION) ONCE
Status: COMPLETED | OUTPATIENT
Start: 2018-10-26 | End: 2018-10-26

## 2018-10-26 RX ORDER — ASPIRIN 81 MG/1
81 TABLET, CHEWABLE ORAL NIGHTLY
Status: DISCONTINUED | OUTPATIENT
Start: 2018-10-26 | End: 2018-10-26 | Stop reason: SDUPTHER

## 2018-10-26 RX ORDER — GUAIFENESIN 600 MG/1
600 TABLET, EXTENDED RELEASE ORAL 2 TIMES DAILY
Status: DISCONTINUED | OUTPATIENT
Start: 2018-10-26 | End: 2018-11-01 | Stop reason: HOSPADM

## 2018-10-26 RX ORDER — CITALOPRAM 20 MG/1
40 TABLET ORAL DAILY
Status: DISCONTINUED | OUTPATIENT
Start: 2018-10-26 | End: 2018-11-01 | Stop reason: HOSPADM

## 2018-10-26 RX ORDER — ONDANSETRON 2 MG/ML
4 INJECTION INTRAMUSCULAR; INTRAVENOUS EVERY 6 HOURS PRN
Status: DISCONTINUED | OUTPATIENT
Start: 2018-10-26 | End: 2018-11-01 | Stop reason: HOSPADM

## 2018-10-26 RX ORDER — FUROSEMIDE 10 MG/ML
40 INJECTION INTRAMUSCULAR; INTRAVENOUS 2 TIMES DAILY
Status: DISCONTINUED | OUTPATIENT
Start: 2018-10-26 | End: 2018-10-26

## 2018-10-26 RX ORDER — PANTOPRAZOLE SODIUM 40 MG/1
40 TABLET, DELAYED RELEASE ORAL
Status: DISCONTINUED | OUTPATIENT
Start: 2018-10-27 | End: 2018-11-01 | Stop reason: HOSPADM

## 2018-10-26 RX ORDER — MONTELUKAST SODIUM 10 MG/1
10 TABLET ORAL NIGHTLY
Status: DISCONTINUED | OUTPATIENT
Start: 2018-10-26 | End: 2018-11-01 | Stop reason: HOSPADM

## 2018-10-26 RX ORDER — CLOPIDOGREL BISULFATE 75 MG/1
75 TABLET ORAL DAILY
Status: DISCONTINUED | OUTPATIENT
Start: 2018-10-26 | End: 2018-10-29

## 2018-10-26 RX ORDER — CLONIDINE HYDROCHLORIDE 0.1 MG/1
0.1 TABLET ORAL 2 TIMES DAILY
Status: DISCONTINUED | OUTPATIENT
Start: 2018-10-26 | End: 2018-10-30

## 2018-10-26 RX ORDER — ATORVASTATIN CALCIUM 40 MG/1
40 TABLET, FILM COATED ORAL DAILY
Qty: 30 TABLET | Refills: 3 | Status: SHIPPED | OUTPATIENT
Start: 2018-10-26 | End: 2018-11-05 | Stop reason: SDUPTHER

## 2018-10-26 RX ORDER — TORSEMIDE 10 MG/1
20 TABLET ORAL DAILY
Status: DISCONTINUED | OUTPATIENT
Start: 2018-10-26 | End: 2018-10-26 | Stop reason: ALTCHOICE

## 2018-10-26 RX ORDER — LISINOPRIL 5 MG/1
5 TABLET ORAL DAILY
Status: DISCONTINUED | OUTPATIENT
Start: 2018-10-26 | End: 2018-10-28

## 2018-10-26 RX ORDER — ASPIRIN 81 MG/1
162 TABLET, CHEWABLE ORAL ONCE
Status: COMPLETED | OUTPATIENT
Start: 2018-10-26 | End: 2018-10-26

## 2018-10-26 RX ORDER — LEVOTHYROXINE SODIUM 0.05 MG/1
50 TABLET ORAL DAILY
Status: DISCONTINUED | OUTPATIENT
Start: 2018-10-26 | End: 2018-11-01 | Stop reason: HOSPADM

## 2018-10-26 RX ORDER — METOPROLOL SUCCINATE 25 MG/1
25 TABLET, EXTENDED RELEASE ORAL DAILY
Status: DISCONTINUED | OUTPATIENT
Start: 2018-10-26 | End: 2018-10-26 | Stop reason: ALTCHOICE

## 2018-10-26 RX ORDER — SODIUM CHLORIDE 0.9 % (FLUSH) 0.9 %
10 SYRINGE (ML) INJECTION EVERY 12 HOURS SCHEDULED
Status: DISCONTINUED | OUTPATIENT
Start: 2018-10-26 | End: 2018-11-01 | Stop reason: HOSPADM

## 2018-10-26 RX ORDER — BUDESONIDE 0.5 MG/2ML
500 INHALANT ORAL 2 TIMES DAILY
Status: DISCONTINUED | OUTPATIENT
Start: 2018-10-26 | End: 2018-10-29

## 2018-10-26 RX ORDER — IPRATROPIUM BROMIDE AND ALBUTEROL SULFATE 2.5; .5 MG/3ML; MG/3ML
3 SOLUTION RESPIRATORY (INHALATION) EVERY 6 HOURS
Status: DISCONTINUED | OUTPATIENT
Start: 2018-10-26 | End: 2018-10-29

## 2018-10-26 RX ORDER — CARVEDILOL 6.25 MG/1
25 TABLET ORAL ONCE
Status: COMPLETED | OUTPATIENT
Start: 2018-10-26 | End: 2018-10-26

## 2018-10-26 RX ORDER — METHYLPREDNISOLONE SODIUM SUCCINATE 40 MG/ML
40 INJECTION, POWDER, LYOPHILIZED, FOR SOLUTION INTRAMUSCULAR; INTRAVENOUS EVERY 8 HOURS
Status: DISCONTINUED | OUTPATIENT
Start: 2018-10-26 | End: 2018-10-28

## 2018-10-26 RX ORDER — ATORVASTATIN CALCIUM 40 MG/1
40 TABLET, FILM COATED ORAL NIGHTLY
Status: DISCONTINUED | OUTPATIENT
Start: 2018-10-26 | End: 2018-11-01 | Stop reason: HOSPADM

## 2018-10-26 RX ORDER — CARVEDILOL 25 MG/1
25 TABLET ORAL 2 TIMES DAILY WITH MEALS
Status: DISCONTINUED | OUTPATIENT
Start: 2018-10-26 | End: 2018-10-26 | Stop reason: ALTCHOICE

## 2018-10-26 RX ORDER — SODIUM CHLORIDE 0.9 % (FLUSH) 0.9 %
10 SYRINGE (ML) INJECTION PRN
Status: DISCONTINUED | OUTPATIENT
Start: 2018-10-26 | End: 2018-11-01 | Stop reason: HOSPADM

## 2018-10-26 RX ORDER — HYDRALAZINE HYDROCHLORIDE 25 MG/1
50 TABLET, FILM COATED ORAL ONCE
Status: COMPLETED | OUTPATIENT
Start: 2018-10-26 | End: 2018-10-26

## 2018-10-26 RX ORDER — CARVEDILOL 25 MG/1
25 TABLET ORAL 2 TIMES DAILY WITH MEALS
Status: DISCONTINUED | OUTPATIENT
Start: 2018-10-27 | End: 2018-11-01 | Stop reason: HOSPADM

## 2018-10-26 RX ORDER — LEVOFLOXACIN 5 MG/ML
500 INJECTION, SOLUTION INTRAVENOUS EVERY 24 HOURS
Status: DISCONTINUED | OUTPATIENT
Start: 2018-10-26 | End: 2018-10-30

## 2018-10-26 RX ORDER — LORAZEPAM 0.5 MG/1
0.5 TABLET ORAL NIGHTLY
Status: DISCONTINUED | OUTPATIENT
Start: 2018-10-26 | End: 2018-10-31

## 2018-10-26 RX ORDER — METHYLPREDNISOLONE SODIUM SUCCINATE 125 MG/2ML
125 INJECTION, POWDER, LYOPHILIZED, FOR SOLUTION INTRAMUSCULAR; INTRAVENOUS ONCE
Status: COMPLETED | OUTPATIENT
Start: 2018-10-26 | End: 2018-10-26

## 2018-10-26 RX ADMIN — CARVEDILOL 25 MG: 6.25 TABLET, FILM COATED ORAL at 16:13

## 2018-10-26 RX ADMIN — HYDRALAZINE HYDROCHLORIDE 50 MG: 25 TABLET, FILM COATED ORAL at 16:13

## 2018-10-26 RX ADMIN — CLOPIDOGREL BISULFATE 75 MG: 75 TABLET ORAL at 17:39

## 2018-10-26 RX ADMIN — Medication 10 ML: at 19:55

## 2018-10-26 RX ADMIN — LEVOTHYROXINE SODIUM 50 MCG: 50 TABLET ORAL at 17:39

## 2018-10-26 RX ADMIN — ASPIRIN 81 MG CHEWABLE TABLET 162 MG: 81 TABLET CHEWABLE at 16:13

## 2018-10-26 RX ADMIN — ENOXAPARIN SODIUM 40 MG: 40 INJECTION SUBCUTANEOUS at 19:54

## 2018-10-26 RX ADMIN — HYDRALAZINE HYDROCHLORIDE 50 MG: 50 TABLET, FILM COATED ORAL at 19:55

## 2018-10-26 RX ADMIN — IPRATROPIUM BROMIDE AND ALBUTEROL SULFATE 3 ML: .5; 3 SOLUTION RESPIRATORY (INHALATION) at 20:43

## 2018-10-26 RX ADMIN — METHYLPREDNISOLONE SODIUM SUCCINATE 125 MG: 125 INJECTION, POWDER, FOR SOLUTION INTRAMUSCULAR; INTRAVENOUS at 14:38

## 2018-10-26 RX ADMIN — CITALOPRAM HYDROBROMIDE 40 MG: 20 TABLET ORAL at 17:39

## 2018-10-26 RX ADMIN — LEVOFLOXACIN 500 MG: 5 INJECTION, SOLUTION INTRAVENOUS at 17:48

## 2018-10-26 RX ADMIN — TORSEMIDE 20 MG: 10 TABLET ORAL at 16:13

## 2018-10-26 RX ADMIN — BUDESONIDE 500 MCG: 0.5 SUSPENSION RESPIRATORY (INHALATION) at 20:43

## 2018-10-26 RX ADMIN — LISINOPRIL 5 MG: 5 TABLET ORAL at 17:48

## 2018-10-26 RX ADMIN — GUAIFENESIN 600 MG: 600 TABLET, EXTENDED RELEASE ORAL at 19:55

## 2018-10-26 RX ADMIN — MONTELUKAST SODIUM 10 MG: 10 TABLET, FILM COATED ORAL at 19:55

## 2018-10-26 RX ADMIN — METHYLPREDNISOLONE SODIUM SUCCINATE 40 MG: 40 INJECTION, POWDER, FOR SOLUTION INTRAMUSCULAR; INTRAVENOUS at 20:18

## 2018-10-26 RX ADMIN — ATORVASTATIN CALCIUM 40 MG: 40 TABLET, FILM COATED ORAL at 19:55

## 2018-10-26 RX ADMIN — LORAZEPAM 0.5 MG: 0.5 TABLET ORAL at 19:55

## 2018-10-26 RX ADMIN — CLONIDINE HYDROCHLORIDE 0.1 MG: 0.1 TABLET ORAL at 19:54

## 2018-10-26 RX ADMIN — ALBUTEROL SULFATE 5 MG: 2.5 SOLUTION RESPIRATORY (INHALATION) at 13:56

## 2018-10-26 ASSESSMENT — PAIN SCALES - GENERAL
PAINLEVEL_OUTOF10: 0
PAINLEVEL_OUTOF10: 0

## 2018-10-26 ASSESSMENT — ENCOUNTER SYMPTOMS
WHEEZING: 1
ABDOMINAL PAIN: 0
NAUSEA: 0
SHORTNESS OF BREATH: 1
BACK PAIN: 0
EYE PAIN: 0
DIARRHEA: 0
VOMITING: 0
COUGH: 1

## 2018-10-26 ASSESSMENT — HEART SCORE: ECG: 0

## 2018-10-26 NOTE — ED PROVIDER NOTES
V2 and V3 with likely J-point morphology, flipped T wave in V6.   Clinical Impression: Abnormal EKG, but similar to prior EKG in 10/12/18              Ayush Clayton MD  Attending Emergency Physician            Brendan Rojo MD  10/26/18 9843

## 2018-10-26 NOTE — H&P
Hospitalist  History and Physical    Patient:  Pratik Griffith  MRN: [id number]  PCP: Amarilys Broderick MD    CHIEF COMPLAINT:  Shortness of breath      HISTORY OF PRESENT ILLNESS:   The patient Pratik Griffith is a 72 y. o.female with medical history significant for coronary artery disease, CHF, COPD and chronic respiratory failure with 3 L nasal cannula oxygen at home. Patient presented to the emergency room with worsening shortness of breath for the last 2 days. Patient also reports increased cough with production of sputum. Patient was planning to see her pulmonologist today but instead she came to the emergency room as her symptoms became worse. Patient has been using her bronchodilators without much help. Patient has been using Augmentin for the last 2 days. Past Medical History:        Diagnosis Date    CAD (coronary artery disease)     Nonobstructive on cath in 9/2017    [facility]F [facility]congestive heart failure) ([facility])     Colostomy care ([facility] [address] 4/25/2018    Peristomal irritation and early leaking    COPD (chronic obstructive pulmonary disease) ([facility])     Hyperlipidemia     Hypertension     Kidney disease     Stage 3    Peripheral vascular disease ([facility] [address]     Rectal fissure 11/2014    surgery pending    Restless legs syndrome     Sleep apnea     O2 3 liters at hs    Unspecified sleep apnea        Past Surgical History:        Procedure Laterality Date    CARDIAC CATHETERIZATION  09/13/2017    Dr. Noreen Lima  03/09/2018    CORONARY ARTERY BYPASS GRAFT      Legs & Carotid    HERNIA REPAIR         Medications Prior to Admission:    Prior to Admission medications    Medication Sig Start Date End Date Taking?  Authorizing Provider   atorvastatin (LIPITOR) 40 MG tablet Take 1 tablet by mouth daily 10/26/18  Yes Mario Merritt MD   amoxicillin-clavulanate (AUGMENTIN) 875-125 MG per tablet Take 1 tablet by mouth 2 times daily for 7 days 10/25/18 11/1/18 Yes Glenn Alonso MD daily  Patient taking differently: Take 20 mg by mouth 2 times daily  8/24/18  Yes John Haas MD   aspirin 81 MG tablet Take 1 tablet by mouth daily 8/24/18  Yes GIULIANO Kern CNP   ipratropium-albuterol (DUONEB) 0.5-2.5 (3) MG/3ML SOLN nebulizer solution Inhale 3 mLs into the lungs every 6 hours. 1/1/15  Yes GIULIANO Guzmán CNP   lidocaine (XYLOCAINE) 2 % jelly Apply small amount topically to affected area every 8 hours when necessary pain 9/17/18   Doris Mistry PA-C       Allergies: Iv dye [iodides]      Social History:   TOBACCO:   reports that she quit smoking about 26 years ago. She started smoking about 55 years ago. She has a 90.00 pack-year smoking history. She has never used smokeless tobacco.  ETOH:   reports that she does not drink alcohol. Family History:   Family History   Problem Relation Age of Onset    Heart Disease Mother     Heart Disease Father     Heart Disease Sister     Cancer Brother            REVIEW OF SYSTEMS:     patients reported symptoms are in BOLD all other symptoms are negative. CONSTITUTIONAL:      fatigue, fever, chills or night sweats, recent weight gain, recent wt loss, insomnia,  General weakness, poor appetite, muscle aches and pains    HEAD: headache, dizziness    EYES:      blurriness,  double vision, dryness,  discharge, irritation,diplopia    EARS:      hearing loss, vertigo, ear discharge,  Earache. Ringing in the ears. NOSE:      Rhinorrhea, sneezing, epistaxis. Discharge, sinusitis,     MOUTH/THROAT:         sore throat, mouth ulcers, Hoarseness    RESPIRATORY:        Shortness of breath, wheezing,  cough, sputum, hemoptysis, obstructive sleep apnea,    CARDIOVASCULAR :      chest pain, palpitations, dyspnea on exercise, Lower extrimity edema (swelling),     GASTROINTESTINAL:       Dysphagia, Poor appetite,  Nausea, Vomiting, diarrhea, heartburn, abdominal pain. Blood in the stools, hematemesis.  Pain with swallowing, Value Date    LABA1C 6.3 01/09/2018           No results for input(s): CKTOTAL in the last 72 hours. -----------------------------------------------------------------    EKG   normal sinus rhythm left ventricular hypertrophy with repolarization abnormality    Chest x-ray-bilateral pleural effusion with pulmonary congestion    Assessment / Plan     COPD Exacerbation J44.1  Steroids and bronchodilators and antibiotics  bronchial higiene  Consult to pulmonary      CHF  Appears to be compensated  Continue on diuretics    Essential primary hypertension I10  Continue patient on home medication    Coronary artery disease  Continue on aspirin and beta blocker and statin      Anxiety depression F 41.9  Continue on home medication        DVT and GI prophylaxis      Full Code      Doug Robledo M.D    This note was transcribed using [id number] [employer]. Please disregard any translational errors.

## 2018-10-26 NOTE — CONSULTS
Peripheral vascular disease ([facility] [address] Rectal fissure; Restless legs syndrome; Sleep apnea; and Unspecified sleep apnea. Surgical History:   has a past surgical history that includes Cholecystectomy; hernia repair; Coronary artery bypass graft; Cardiac catheterization (09/13/2017); and colostomy (03/09/2018). Social History:   reports that she quit smoking about 26 years ago. She started smoking about 55 years ago. She has a 90.00 pack-year smoking history. She has never used smokeless tobacco. She reports that she does not drink alcohol or use drugs. Family History:  family history includes Cancer in her brother; Heart Disease in her father, mother, and sister. Home Medications:  Were reviewed and are listed in nursing record and/or below  Prior to Admission medications    Medication Sig Start Date End Date Taking?  Authorizing Provider   atorvastatin (LIPITOR) 40 MG tablet Take 1 tablet by mouth daily 10/26/18  Yes Jose Manuel Fine MD   amoxicillin-clavulanate (AUGMENTIN) 875-125 MG per tablet Take 1 tablet by mouth 2 times daily for 7 days 10/25/18 11/1/18 Yes Haydee Florez MD   budesonide (PULMICORT) 0.5 MG/2ML nebulizer suspension Take 2 mLs by nebulization 2 times daily 10/23/18  Yes Haydee Florez MD   Arformoterol Tartrate [facility]) 15 MCG/2ML NEBU Take 1 ampule by nebulization 2 times daily 10/23/18  Yes Haydee Florez MD   lansoprazole (PREVACID) 15 MG delayed release capsule Take 1 capsule by mouth daily 10/23/18  Yes Sharon Reynoso MD   levothyroxine (SYNTHROID) 50 MCG tablet TAKE ONE TABLET BY MOUTH DAILY 10/23/18  Yes Sharon Reynoso MD   guaiFENesin [facility]) 600 MG extended release tablet Take 1 tablet by mouth 2 times daily 10/17/18  Yes Anuradha Webber MD   hydrALAZINE (APRESOLINE) 50 MG tablet Take 1 tablet by mouth 3 times daily 10/12/18  Yes GIULIANO Davidson - CNP   cloNIDine (CATAPRES) 0.2 MG tablet Take 0.5 tablets by mouth 2 times daily 10/12/18  Yes Vince RODRIGUEZ or bleeding, blood clots or swollen lymph nodes. · Allergic/Immunologic: No nasal congestion or hives. Objective:     PHYSICAL EXAM:      Vitals:    10/26/18 1645   BP: (!) 204/72   Pulse: 95   Resp: 18   Temp: 97.5 °F (36.4 °C)   SpO2: 98%    Weight: 196 lb 13.9 oz (89.3 kg)       General Appearance:  Alert, cooperative, no distress, appears stated age. Head:  Normocephalic, without obvious abnormality, atraumatic. Eyes:  Pupils equal and round. No scleral icterus. Mouth: Moist mucosa, no pharyngeal erythema. Nose: Nares normal. No drainage or sinus tenderness. Neck: Supple, symmetrical, trachea midline. No adenopathy. No tenderness/mass/nodules. No carotid bruit or elevated JVD. Lungs:   Respiratory Effort: labored   Bilateral diffuse wheeze   Chest Wall:  No tenderness or deformity. Cardiovascular:    Pulses  Palpation:  Ascultation: Regular rate, S1/ S2 normal. No murmur, rub, or gallop. 2+ radial and pedal pulses, symmetric  Carotid  Femoral   Abdomen and Gastrointestinal:   Soft, non-tender, bowel sounds active. Liver and Spleen  Masses   Musculoskeletal: No muscle wasting  Back  Gait   Extremities: Extremities normal, atraumatic. No cyanosis or edema. No cyanosis clubbing       Skin: Inspection and palpation performed, no rashes or lesions. Pysch: Normal mood and affect.  Alert and oriented to time place person   Neurologic: Normal gross motor and sensory exam.       Labs     CBC: Lab Results   Component Value Date    WBC 13.0 10/26/2018    RBC 4.11 10/26/2018    HGB 11.7 10/26/2018    HCT 36.5 10/26/2018    MCV 88.8 10/26/2018    RDW 20.2 10/26/2018     10/26/2018     CMP:  Lab Results   Component Value Date     10/26/2018    K 3.7 10/26/2018    K 4.4 10/14/2018    CL 90 10/26/2018    CO2 34 10/26/2018    BUN 21 10/26/2018    CREATININE 0.9 10/26/2018    GFRAA >60 10/26/2018    AGRATIO 1.7 10/12/2018    LABGLOM >60 10/26/2018    GLUCOSE 123 10/26/2018    PROT 6.1 10/12/2018 CALCIUM 9.4 10/26/2018    BILITOT <0.2 10/12/2018    ALKPHOS 59 10/12/2018    AST 27 10/12/2018    ALT 55 10/12/2018     PT/INR:  No results found for: PTINR  HgBA1c:  Lab Results   Component Value Date    LABA1C 6.3 01/09/2018     Lab Results   Component Value Date    TROPONINI 0.10 (H) 10/26/2018       Cardiac, Vascular and Imaging Data all Personally Reviewed in Detail by Myself      Echocardiogram: Left ventricular cavity size is normal.   Normal left ventricular wall thickness.   Ejection fraction is visually estimated to be 55-60%.  Missy Munda is reversal of E/A inflow velocities across the mitral valve.   Mild mitral regurgitation is present.   The left atrium is at the upper limits of normal in size.     Cath:    8/2018  HEMODYNAMIC FINDINGS:  1. Right atrial pressure is 10 mmHg. 2.  RV pressure is at 39/-6. 3.  Right ventricular pressure is 32/-1. 4.  Pulmonary capillary wedge pressure of 7 mmHg. 5.  Pulmonary artery pressure 26/1.  6.  Cardiac output is 3.46 liters per minute. 7.  Right atrial pressure is 63%. 8.  Aortic saturation 96%. 9.  Pulmonary artery saturation 64%.     SUMMARY:  Normal left and right heart cardiac pressure. 9/2017    ANGIOGRAPHY FINDINGS:  1. The left main comes from the left coronary cusp. There is normal  ELIER 3 flow. 2.  The left anterior descending artery comes from the left main  coronary artery. It has ELIER 3 flow. The midportion has 50%  stenosis. 3.  The left circumflex comes from the left main. It is nondominant. No significant stenosis was seen. 4.  The right coronary artery comes from the right coronary cusp. It  is dominant giving rise to the right posterior descending artery and  posterolateral branch and has no significant stenosis. 5.  LV ejection fraction is 60%.     SUMMARY:  Nonobstructive coronary artery disease with normal LV  ejection fraction. LVEDP was 20 mmHg.     Imaging:      Assessment and Plan     -Acute on chronic worsening

## 2018-10-27 ENCOUNTER — APPOINTMENT (OUTPATIENT)
Dept: CT IMAGING | Age: 73
DRG: 140 | End: 2018-10-27
Payer: COMMERCIAL

## 2018-10-27 LAB
ANION GAP SERPL CALCULATED.3IONS-SCNC: 17 MMOL/L (ref 3–16)
BUN BLDV-MCNC: 37 MG/DL (ref 7–20)
CALCIUM SERPL-MCNC: 9.6 MG/DL (ref 8.3–10.6)
CHLORIDE BLD-SCNC: 90 MMOL/L (ref 99–110)
CHOLESTEROL, TOTAL: 266 MG/DL (ref 0–199)
CO2: 31 MMOL/L (ref 21–32)
CREAT SERPL-MCNC: 1.4 MG/DL (ref 0.6–1.2)
GFR AFRICAN AMERICAN: 45
GFR NON-AFRICAN AMERICAN: 37
GLUCOSE BLD-MCNC: 197 MG/DL (ref 70–99)
HCT VFR BLD CALC: 36.6 % (ref 36–48)
HDLC SERPL-MCNC: 72 MG/DL (ref 40–60)
HEMOGLOBIN: 11.4 G/DL (ref 12–16)
LDL CHOLESTEROL CALCULATED: 143 MG/DL
LV EF: 63 %
LVEF MODALITY: NORMAL
MAGNESIUM: 2.3 MG/DL (ref 1.8–2.4)
MCH RBC QN AUTO: 27.6 PG (ref 26–34)
MCHC RBC AUTO-ENTMCNC: 31.2 G/DL (ref 31–36)
MCV RBC AUTO: 88.4 FL (ref 80–100)
PDW BLD-RTO: 20.5 % (ref 12.4–15.4)
PLATELET # BLD: 230 K/UL (ref 135–450)
PMV BLD AUTO: 8.3 FL (ref 5–10.5)
POTASSIUM SERPL-SCNC: 4.1 MMOL/L (ref 3.5–5.1)
RBC # BLD: 4.14 M/UL (ref 4–5.2)
REPORT: NORMAL
RESPIRATORY PANEL PCR: NORMAL
SODIUM BLD-SCNC: 138 MMOL/L (ref 136–145)
TRIGL SERPL-MCNC: 253 MG/DL (ref 0–150)
TROPONIN: 0.07 NG/ML
TROPONIN: 0.08 NG/ML
TROPONIN: 0.1 NG/ML
TROPONIN: 0.1 NG/ML
VLDLC SERPL CALC-MCNC: 51 MG/DL
WBC # BLD: 13.3 K/UL (ref 4–11)

## 2018-10-27 PROCEDURE — 84484 ASSAY OF TROPONIN QUANT: CPT

## 2018-10-27 PROCEDURE — 94760 N-INVAS EAR/PLS OXIMETRY 1: CPT

## 2018-10-27 PROCEDURE — 87798 DETECT AGENT NOS DNA AMP: CPT

## 2018-10-27 PROCEDURE — 85027 COMPLETE CBC AUTOMATED: CPT

## 2018-10-27 PROCEDURE — 2580000003 HC RX 258: Performed by: HOSPITALIST

## 2018-10-27 PROCEDURE — 99255 IP/OBS CONSLTJ NEW/EST HI 80: CPT | Performed by: INTERNAL MEDICINE

## 2018-10-27 PROCEDURE — 94664 DEMO&/EVAL PT USE INHALER: CPT

## 2018-10-27 PROCEDURE — 6360000002 HC RX W HCPCS: Performed by: HOSPITALIST

## 2018-10-27 PROCEDURE — 87581 M.PNEUMON DNA AMP PROBE: CPT

## 2018-10-27 PROCEDURE — 70450 CT HEAD/BRAIN W/O DYE: CPT

## 2018-10-27 PROCEDURE — 6370000000 HC RX 637 (ALT 250 FOR IP): Performed by: HOSPITALIST

## 2018-10-27 PROCEDURE — 99232 SBSQ HOSP IP/OBS MODERATE 35: CPT | Performed by: INTERNAL MEDICINE

## 2018-10-27 PROCEDURE — 80048 BASIC METABOLIC PNL TOTAL CA: CPT

## 2018-10-27 PROCEDURE — 93306 TTE W/DOPPLER COMPLETE: CPT

## 2018-10-27 PROCEDURE — 80061 LIPID PANEL: CPT

## 2018-10-27 PROCEDURE — 94660 CPAP INITIATION&MGMT: CPT

## 2018-10-27 PROCEDURE — 36415 COLL VENOUS BLD VENIPUNCTURE: CPT

## 2018-10-27 PROCEDURE — 93010 ELECTROCARDIOGRAM REPORT: CPT | Performed by: INTERNAL MEDICINE

## 2018-10-27 PROCEDURE — 94640 AIRWAY INHALATION TREATMENT: CPT

## 2018-10-27 PROCEDURE — 87486 CHLMYD PNEUM DNA AMP PROBE: CPT

## 2018-10-27 PROCEDURE — 6360000002 HC RX W HCPCS: Performed by: INTERNAL MEDICINE

## 2018-10-27 PROCEDURE — 1200000000 HC SEMI PRIVATE

## 2018-10-27 PROCEDURE — 83735 ASSAY OF MAGNESIUM: CPT

## 2018-10-27 PROCEDURE — 2700000000 HC OXYGEN THERAPY PER DAY

## 2018-10-27 PROCEDURE — 87633 RESP VIRUS 12-25 TARGETS: CPT

## 2018-10-27 RX ORDER — 0.9 % SODIUM CHLORIDE 0.9 %
500 INTRAVENOUS SOLUTION INTRAVENOUS ONCE
Status: COMPLETED | OUTPATIENT
Start: 2018-10-27 | End: 2018-10-27

## 2018-10-27 RX ORDER — FORMOTEROL FUMARATE 20 UG/2ML
20 SOLUTION RESPIRATORY (INHALATION) 2 TIMES DAILY
Status: DISCONTINUED | OUTPATIENT
Start: 2018-10-27 | End: 2018-10-30

## 2018-10-27 RX ADMIN — CARVEDILOL 25 MG: 25 TABLET, FILM COATED ORAL at 17:59

## 2018-10-27 RX ADMIN — LISINOPRIL 5 MG: 5 TABLET ORAL at 09:11

## 2018-10-27 RX ADMIN — METHYLPREDNISOLONE SODIUM SUCCINATE 40 MG: 40 INJECTION, POWDER, FOR SOLUTION INTRAMUSCULAR; INTRAVENOUS at 13:38

## 2018-10-27 RX ADMIN — FORMOTEROL FUMARATE DIHYDRATE 20 MCG: 20 SOLUTION RESPIRATORY (INHALATION) at 20:51

## 2018-10-27 RX ADMIN — CLOPIDOGREL BISULFATE 75 MG: 75 TABLET ORAL at 09:10

## 2018-10-27 RX ADMIN — CLONIDINE HYDROCHLORIDE 0.1 MG: 0.1 TABLET ORAL at 09:11

## 2018-10-27 RX ADMIN — ENOXAPARIN SODIUM 40 MG: 40 INJECTION SUBCUTANEOUS at 21:50

## 2018-10-27 RX ADMIN — SODIUM CHLORIDE 500 ML: 9 INJECTION, SOLUTION INTRAVENOUS at 13:38

## 2018-10-27 RX ADMIN — LORAZEPAM 0.5 MG: 0.5 TABLET ORAL at 21:50

## 2018-10-27 RX ADMIN — Medication 10 ML: at 21:51

## 2018-10-27 RX ADMIN — LEVOTHYROXINE SODIUM 50 MCG: 50 TABLET ORAL at 05:03

## 2018-10-27 RX ADMIN — CITALOPRAM HYDROBROMIDE 40 MG: 20 TABLET ORAL at 09:10

## 2018-10-27 RX ADMIN — HYDRALAZINE HYDROCHLORIDE 50 MG: 50 TABLET, FILM COATED ORAL at 09:10

## 2018-10-27 RX ADMIN — LEVOFLOXACIN 500 MG: 5 INJECTION, SOLUTION INTRAVENOUS at 18:00

## 2018-10-27 RX ADMIN — HYDRALAZINE HYDROCHLORIDE 50 MG: 50 TABLET, FILM COATED ORAL at 13:38

## 2018-10-27 RX ADMIN — GUAIFENESIN 600 MG: 600 TABLET, EXTENDED RELEASE ORAL at 21:50

## 2018-10-27 RX ADMIN — CARVEDILOL 25 MG: 25 TABLET, FILM COATED ORAL at 09:11

## 2018-10-27 RX ADMIN — PANTOPRAZOLE SODIUM 40 MG: 40 TABLET, DELAYED RELEASE ORAL at 05:03

## 2018-10-27 RX ADMIN — CLONIDINE HYDROCHLORIDE 0.1 MG: 0.1 TABLET ORAL at 21:50

## 2018-10-27 RX ADMIN — Medication 10 ML: at 09:11

## 2018-10-27 RX ADMIN — BUDESONIDE 500 MCG: 0.5 SUSPENSION RESPIRATORY (INHALATION) at 20:51

## 2018-10-27 RX ADMIN — ATORVASTATIN CALCIUM 40 MG: 40 TABLET, FILM COATED ORAL at 21:50

## 2018-10-27 RX ADMIN — METHYLPREDNISOLONE SODIUM SUCCINATE 40 MG: 40 INJECTION, POWDER, FOR SOLUTION INTRAMUSCULAR; INTRAVENOUS at 05:03

## 2018-10-27 RX ADMIN — MONTELUKAST SODIUM 10 MG: 10 TABLET, FILM COATED ORAL at 21:50

## 2018-10-27 RX ADMIN — HYDRALAZINE HYDROCHLORIDE 50 MG: 50 TABLET, FILM COATED ORAL at 21:50

## 2018-10-27 RX ADMIN — IPRATROPIUM BROMIDE AND ALBUTEROL SULFATE 3 ML: .5; 3 SOLUTION RESPIRATORY (INHALATION) at 13:04

## 2018-10-27 RX ADMIN — METHYLPREDNISOLONE SODIUM SUCCINATE 40 MG: 40 INJECTION, POWDER, FOR SOLUTION INTRAMUSCULAR; INTRAVENOUS at 21:50

## 2018-10-27 RX ADMIN — IPRATROPIUM BROMIDE AND ALBUTEROL SULFATE 3 ML: .5; 3 SOLUTION RESPIRATORY (INHALATION) at 01:45

## 2018-10-27 RX ADMIN — ASPIRIN 81 MG CHEWABLE TABLET 81 MG: 81 TABLET CHEWABLE at 21:50

## 2018-10-27 RX ADMIN — GUAIFENESIN 600 MG: 600 TABLET, EXTENDED RELEASE ORAL at 09:10

## 2018-10-27 RX ADMIN — BUDESONIDE 500 MCG: 0.5 SUSPENSION RESPIRATORY (INHALATION) at 09:15

## 2018-10-27 RX ADMIN — IPRATROPIUM BROMIDE AND ALBUTEROL SULFATE 3 ML: .5; 3 SOLUTION RESPIRATORY (INHALATION) at 20:51

## 2018-10-27 RX ADMIN — FORMOTEROL FUMARATE DIHYDRATE 20 MCG: 20 SOLUTION RESPIRATORY (INHALATION) at 09:19

## 2018-10-27 RX ADMIN — IPRATROPIUM BROMIDE AND ALBUTEROL SULFATE 3 ML: .5; 3 SOLUTION RESPIRATORY (INHALATION) at 09:14

## 2018-10-27 NOTE — CONSULTS
and/or weight based adjustment of the  mA/kV was utilized to reduce the radiation dose to as low as reasonably  achievable. COMPARISON:  04/08/2015    HISTORY:  ORDERING PHYSICIAN PROVIDED HISTORY: sob. hx of TA and CHF  TECHNOLOGIST PROVIDED HISTORY:  Technologist Provided Reason for Exam: sob-(Lows 70's on home O2. EMS gave 4  Duonebs and put on CPap. hx severe copd  Acuity: Acute  Type of Encounter: Subsequent/Follow-up    FINDINGS:  Mediastinum: Thyroid gland appears normal.  Scattered small mediastinal  subcentimeter nodes are noted. Coronary artery calcification is seen. Trace  pericardial fluid is seen. Atherosclerotic changes is seen in the aortic arch    Lungs/pleura: There is mild apical predominant emphysema. No large blebs or  bulla. Scattered ground-glass opacities are seen in the apices. Bandlike  opacity is seen in the lingula, likely subsegmental atelectasis or scar. Scattered Hazy ground-glass opacities are seen in the lung bases. Small  pleural effusions are seen. There is adjacent consolidation seen in the lung  bases    Upper Abdomen: Right adrenal gland is normal.  Left adrenal gland is normal.  Atherosclerotic change is is seen in the aorta. There are clips from prior  cholecystectomy    Soft Tissues/Bones: Spurring is seen in the spine      Impression Hazy ground-glass opacities are seen in the right and left lung. Ground-glass opacities are nonspecific and can be due to early pneumonia or  pulmonary edema. Small pleural effusions are seen, with adjacent consolidation the lung bases,  either atelectasis or pneumonia. No significant septal thickening is seen. There is mild emphysema         CTPA: No results found for this or any previous visit.     CXR PA/LAT:   Results for orders placed during the hospital encounter of 10/26/18   XR CHEST STANDARD (2 VW)    Narrative EXAMINATION:  TWO VIEWS OF THE CHEST    10/26/2018 2:02 pm    COMPARISON:  10/14/2018    HISTORY:  ORDERING for the consult    [address] Pulmonary, Sleep and Critical Care Medicine

## 2018-10-27 NOTE — PROGRESS NOTES
Pt A&O x4. On 3L O2. VSS. Pt states she is feeling a lot better from before. Tele on. Colostomy intact and patent. Pt UAL. Night time meds given. No further needs expressed.  Call light in reach

## 2018-10-27 NOTE — PLAN OF CARE
Problem: Falls - Risk of:  Goal: Will remain free from falls  Will remain free from falls   Bed in low position. Side rails up x 2. Call light within reach. Bed alarm is on. Reminded Pt to call for assistance. Will continue to monitor.

## 2018-10-27 NOTE — PROGRESS NOTES
Pt A&O. UAL. Pt was sitting on couch. Tried to get up and slipped onto knees. Pt on Lovenox. pt denies hitting head. No injuries noted. VSS. Charge nurse, nursing supervisor and Dilia Pritchard NP notified.

## 2018-10-27 NOTE — PLAN OF CARE
Problem: Safety:  Goal: Free from accidental physical injury  Free from accidental physical injury   Outcome: Ongoing  Safe environment was maintained for patient during this shift. Bed in lowest position with wheels locked. Call light in reach of patient and appropriate ID bands in place. Goal: Free from intentional harm  Free from intentional harm   Outcome: Ongoing  Safe environment was maintained for patient during this shift. Bed in lowest position with wheels locked. Call light in reach of patient and appropriate ID bands in place. Problem: Daily Care:  Goal: Daily care needs are met  Daily care needs are met   Outcome: Ongoing  In collaboration with the healthcare team, the patient's daily care needs are meet. Patient is encouraged to perform tasks independently per ability. Problem: OXYGENATION/RESPIRATORY FUNCTION  Goal: Patient will maintain patent airway  Outcome: Ongoing  Pt will maintain absence of respiratory complications. Oxygen saturations >90% at all times with supplemental O2 as needed. Will assess respiratory status every shift and PRN. Encourage to cough and deep breath. Encourage HHN as ordered. Encourage IS. Goal: Patient will achieve/maintain normal respiratory rate/effort  Respiratory rate and effort will be within normal limits for the patient   Outcome: Ongoing  Pt will maintain absence of respiratory complications. Oxygen saturations >90% at all times with supplemental O2 as needed. Will assess respiratory status every shift and PRN. Encourage to cough and deep breath. Encourage HHN as ordered. Encourage IS.

## 2018-10-27 NOTE — PROGRESS NOTES
flush, magnesium hydroxide, ondansetron    Lab Data:  CBC:   Recent Labs      10/26/18   1427  10/27/18   0724   WBC  13.0*  13.3*   HGB  11.7*  11.4*   PLT  236  230     BMP:  Recent Labs      10/26/18   1427  10/27/18   0724   NA  137  138   K  3.7  4.1   CO2  34*  31   BUN  21*  37*   CREATININE  0.9  1.4*     LIVR: No results for input(s): AST, ALT in the last 72 hours. INR:  No results for input(s): INR in the last 72 hours. APTT: No results for input(s): APTT in the last 72 hours. BNP:  No results for input(s): BNP in the last 72 hours. Imaging:    Assessment and Plan      -Acute on chronic worsening respiratory insufficiency and failure with hypoxemia and chronic oxygen support   Patient more likely has worsening of her pulmonary pathology with worsening COPD      Do not think this is heart failure there is no JVD no other heart failure findings   RHC had shown normal left heart filling pressures   LHC with non obstructive CAD   CXR reviewed and does not reveal pulmonary edema   Seen by pulmonary   hold diuretics due to worsening renal function     -Non obstructive CAD from last year   Troponin elevation secondary to increased demand and there is no evidence of ACS      -PAD: Stable continue medical management      -Temporal arteritis on prednisone     Ok for discharge from cardiac stand point.     Thank you for allowing us to participate in the care [place] [address].   Please do not hesitate to contact me if you have any questions.     Raymond Masterson MD,  [address] [place] [address]  Ph: [phone]  Fax: [phone]    Electronically signed by Raymond Masterson MD on 10/27/2018 at 11:16 AM

## 2018-10-28 LAB
ANION GAP SERPL CALCULATED.3IONS-SCNC: 13 MMOL/L (ref 3–16)
BUN BLDV-MCNC: 53 MG/DL (ref 7–20)
CALCIUM SERPL-MCNC: 9.5 MG/DL (ref 8.3–10.6)
CHLORIDE BLD-SCNC: 95 MMOL/L (ref 99–110)
CO2: 29 MMOL/L (ref 21–32)
CREAT SERPL-MCNC: 1.6 MG/DL (ref 0.6–1.2)
GFR AFRICAN AMERICAN: 38
GFR NON-AFRICAN AMERICAN: 32
GLUCOSE BLD-MCNC: 182 MG/DL (ref 70–99)
HCT VFR BLD CALC: 32.6 % (ref 36–48)
HEMOGLOBIN: 10.2 G/DL (ref 12–16)
MCH RBC QN AUTO: 27.6 PG (ref 26–34)
MCHC RBC AUTO-ENTMCNC: 31.2 G/DL (ref 31–36)
MCV RBC AUTO: 88.5 FL (ref 80–100)
PDW BLD-RTO: 20.1 % (ref 12.4–15.4)
PLATELET # BLD: 217 K/UL (ref 135–450)
PMV BLD AUTO: 8.6 FL (ref 5–10.5)
POTASSIUM SERPL-SCNC: 3.9 MMOL/L (ref 3.5–5.1)
PRO-BNP: 2065 PG/ML (ref 0–124)
RBC # BLD: 3.68 M/UL (ref 4–5.2)
SODIUM BLD-SCNC: 137 MMOL/L (ref 136–145)
TROPONIN: 0.06 NG/ML
TROPONIN: 0.07 NG/ML
TROPONIN: 0.07 NG/ML
WBC # BLD: 13.7 K/UL (ref 4–11)

## 2018-10-28 PROCEDURE — 80048 BASIC METABOLIC PNL TOTAL CA: CPT

## 2018-10-28 PROCEDURE — 94760 N-INVAS EAR/PLS OXIMETRY 1: CPT

## 2018-10-28 PROCEDURE — 83880 ASSAY OF NATRIURETIC PEPTIDE: CPT

## 2018-10-28 PROCEDURE — 6370000000 HC RX 637 (ALT 250 FOR IP): Performed by: HOSPITALIST

## 2018-10-28 PROCEDURE — 94660 CPAP INITIATION&MGMT: CPT

## 2018-10-28 PROCEDURE — 2580000003 HC RX 258: Performed by: HOSPITALIST

## 2018-10-28 PROCEDURE — 6360000002 HC RX W HCPCS: Performed by: INTERNAL MEDICINE

## 2018-10-28 PROCEDURE — 6360000002 HC RX W HCPCS: Performed by: HOSPITALIST

## 2018-10-28 PROCEDURE — 84484 ASSAY OF TROPONIN QUANT: CPT

## 2018-10-28 PROCEDURE — 1200000000 HC SEMI PRIVATE

## 2018-10-28 PROCEDURE — 36415 COLL VENOUS BLD VENIPUNCTURE: CPT

## 2018-10-28 PROCEDURE — 99232 SBSQ HOSP IP/OBS MODERATE 35: CPT | Performed by: INTERNAL MEDICINE

## 2018-10-28 PROCEDURE — 94640 AIRWAY INHALATION TREATMENT: CPT

## 2018-10-28 PROCEDURE — 2700000000 HC OXYGEN THERAPY PER DAY

## 2018-10-28 PROCEDURE — 85027 COMPLETE CBC AUTOMATED: CPT

## 2018-10-28 RX ORDER — 0.9 % SODIUM CHLORIDE 0.9 %
500 INTRAVENOUS SOLUTION INTRAVENOUS ONCE
Status: DISCONTINUED | OUTPATIENT
Start: 2018-10-28 | End: 2018-10-28

## 2018-10-28 RX ORDER — METHYLPREDNISOLONE SODIUM SUCCINATE 40 MG/ML
40 INJECTION, POWDER, LYOPHILIZED, FOR SOLUTION INTRAMUSCULAR; INTRAVENOUS DAILY
Status: DISCONTINUED | OUTPATIENT
Start: 2018-10-29 | End: 2018-11-01

## 2018-10-28 RX ADMIN — GUAIFENESIN 600 MG: 600 TABLET, EXTENDED RELEASE ORAL at 20:06

## 2018-10-28 RX ADMIN — IPRATROPIUM BROMIDE AND ALBUTEROL SULFATE 3 ML: .5; 3 SOLUTION RESPIRATORY (INHALATION) at 01:03

## 2018-10-28 RX ADMIN — HYDRALAZINE HYDROCHLORIDE 50 MG: 50 TABLET, FILM COATED ORAL at 13:27

## 2018-10-28 RX ADMIN — IPRATROPIUM BROMIDE AND ALBUTEROL SULFATE 3 ML: .5; 3 SOLUTION RESPIRATORY (INHALATION) at 20:35

## 2018-10-28 RX ADMIN — ASPIRIN 81 MG CHEWABLE TABLET 81 MG: 81 TABLET CHEWABLE at 20:06

## 2018-10-28 RX ADMIN — LEVOFLOXACIN 500 MG: 5 INJECTION, SOLUTION INTRAVENOUS at 17:41

## 2018-10-28 RX ADMIN — PANTOPRAZOLE SODIUM 40 MG: 40 TABLET, DELAYED RELEASE ORAL at 06:52

## 2018-10-28 RX ADMIN — ATORVASTATIN CALCIUM 40 MG: 40 TABLET, FILM COATED ORAL at 20:06

## 2018-10-28 RX ADMIN — CLONIDINE HYDROCHLORIDE 0.1 MG: 0.1 TABLET ORAL at 10:21

## 2018-10-28 RX ADMIN — CLOPIDOGREL BISULFATE 75 MG: 75 TABLET ORAL at 10:20

## 2018-10-28 RX ADMIN — METHYLPREDNISOLONE SODIUM SUCCINATE 40 MG: 40 INJECTION, POWDER, FOR SOLUTION INTRAMUSCULAR; INTRAVENOUS at 06:52

## 2018-10-28 RX ADMIN — IPRATROPIUM BROMIDE AND ALBUTEROL SULFATE 3 ML: .5; 3 SOLUTION RESPIRATORY (INHALATION) at 12:52

## 2018-10-28 RX ADMIN — FORMOTEROL FUMARATE DIHYDRATE 20 MCG: 20 SOLUTION RESPIRATORY (INHALATION) at 09:09

## 2018-10-28 RX ADMIN — BUDESONIDE 500 MCG: 0.5 SUSPENSION RESPIRATORY (INHALATION) at 09:09

## 2018-10-28 RX ADMIN — HYDRALAZINE HYDROCHLORIDE 50 MG: 50 TABLET, FILM COATED ORAL at 10:21

## 2018-10-28 RX ADMIN — LISINOPRIL 5 MG: 5 TABLET ORAL at 10:21

## 2018-10-28 RX ADMIN — LEVOTHYROXINE SODIUM 50 MCG: 50 TABLET ORAL at 06:52

## 2018-10-28 RX ADMIN — Medication 10 ML: at 20:07

## 2018-10-28 RX ADMIN — CLONIDINE HYDROCHLORIDE 0.1 MG: 0.1 TABLET ORAL at 20:06

## 2018-10-28 RX ADMIN — CARVEDILOL 25 MG: 25 TABLET, FILM COATED ORAL at 10:20

## 2018-10-28 RX ADMIN — IPRATROPIUM BROMIDE AND ALBUTEROL SULFATE 3 ML: .5; 3 SOLUTION RESPIRATORY (INHALATION) at 09:08

## 2018-10-28 RX ADMIN — ENOXAPARIN SODIUM 40 MG: 40 INJECTION SUBCUTANEOUS at 20:06

## 2018-10-28 RX ADMIN — MONTELUKAST SODIUM 10 MG: 10 TABLET, FILM COATED ORAL at 20:06

## 2018-10-28 RX ADMIN — BUDESONIDE 500 MCG: 0.5 SUSPENSION RESPIRATORY (INHALATION) at 20:35

## 2018-10-28 RX ADMIN — CARVEDILOL 25 MG: 25 TABLET, FILM COATED ORAL at 16:15

## 2018-10-28 RX ADMIN — LORAZEPAM 0.5 MG: 0.5 TABLET ORAL at 20:06

## 2018-10-28 RX ADMIN — CITALOPRAM HYDROBROMIDE 40 MG: 20 TABLET ORAL at 10:21

## 2018-10-28 RX ADMIN — GUAIFENESIN 600 MG: 600 TABLET, EXTENDED RELEASE ORAL at 10:21

## 2018-10-28 RX ADMIN — FORMOTEROL FUMARATE DIHYDRATE 20 MCG: 20 SOLUTION RESPIRATORY (INHALATION) at 20:35

## 2018-10-28 RX ADMIN — METHYLPREDNISOLONE SODIUM SUCCINATE 40 MG: 40 INJECTION, POWDER, FOR SOLUTION INTRAMUSCULAR; INTRAVENOUS at 13:27

## 2018-10-28 NOTE — PROGRESS NOTES
10/27/18   0724  10/28/18   0559   NA  137  138  137   K  3.7  4.1  3.9   CL  90*  90*  95*   CO2  34*  31  29   BUN  21*  37*  53*   CREATININE  0.9  1.4*  1.6*       LIVER PROFILE: No results for input(s): ALKPHOS, AST, ALT, ALB, BILIDIR, BILITOT, ALKPHOS in the last 72 hours. No results for input(s): AMYLASE in the last 72 hours. No results for input(s): LIPASE in the last 72 hours. UA:No results for input(s): NITRITE, LABCAST, WBCUA, RBCUA, MUCUS in the last 72 hours. TROPONIN:   Recent Labs      10/27/18   1740  10/27/18   2345  10/28/18   0559   TROPONINI  0.08*  0.07*  0.06*       Lab Results   Component Value Date/Time    URRFLXCULT Yes 03/02/2018 02:08 PM       No results for input(s): TSHREFLEX in the last 72 hours. No components found for: [id number]  POC GLUCOSE:  No results for input(s): POCGLU in the last 72 hours. No results for input(s): LABA1C in the last 72 hours. Lab Results   Component Value Date    LABA1C 6.3 01/09/2018     Ejection fraction is visually estimated to be 60-65%. ASSESSMENT AND PLAN  COPD Exacerbation J44.1  Decrease 2 Solu-Medrol once daily  bronchial higiene   pulmonary consult is appreciated    Acute renal failure  Increasing creatinine   Lasix is on hold  500 cc IV fluid bolus      CHF  Appears to be compensated  Needs fluids     Essential primary hypertension I10  Hold hydralazine for low blood pressure     Coronary artery disease  Continue on aspirin and beta blocker and statin        Anxiety depression F 41.9  Continue on home medication           Code Status: Full Code        Dispo - continue care        The patient and / or the family were informed of the results of any tests, a time was given to answer questions, a plan was proposed and they agreed with plan. Leo España MD    This note was transcribed using [id number]. Please disregard any translational errors.

## 2018-10-28 NOTE — PROGRESS NOTES
flush, magnesium hydroxide, ondansetron    Lab Data:  CBC:   Recent Labs      10/26/18   1427  10/27/18   0724  10/28/18   0559   WBC  13.0*  13.3*  13.7*   HGB  11.7*  11.4*  10.2*   PLT  236  230  217     BMP:    Recent Labs      10/26/18   1427  10/27/18   0724  10/28/18   0559   NA  137  138  137   K  3.7  4.1  3.9   CO2  34*  31  29   BUN  21*  37*  53*   CREATININE  0.9  1.4*  1.6*     LIVR: No results for input(s): AST, ALT in the last 72 hours. INR:  No results for input(s): INR in the last 72 hours. APTT: No results for input(s): APTT in the last 72 hours. BNP:  No results for input(s): BNP in the last 72 hours. Imaging:    Assessment and Plan      -Acute on chronic worsening respiratory insufficiency and failure with hypoxemia and chronic oxygen support   Patient more likely has worsening of her pulmonary pathology with worsening COPD      Do not think this is heart failure there is no JVD no other heart failure findings   RHC had shown normal left heart filling pressures   LHC with non obstructive CAD   CXR reviewed and does not reveal pulmonary edema   Seen by pulmonary   hold diuretics due to worsening renal function     -Non obstructive CAD from last year   Troponin elevation secondary to increased demand and there is no evidence of ACS      -PAD: Stable continue medical management     - RAMÍREZ  - renal function worsening.  - will hold ACEI for now    -Temporal arteritis on prednisone     Ok for discharge from cardiac stand point.     Thank you for allowing us to participate in the care of [place] [address].   Please do not hesitate to contact me if you have any questions.     North Orta MD,  [address] [facility], [address] [place] [address]  Ph: [phone]  Fax: [phone]    Electronically signed by [place] Orta MD on 10/28/2018 at 11:52 AM

## 2018-10-28 NOTE — PLAN OF CARE
Problem: Infection:  Goal: Will remain free from infection  Will remain free from infection   Outcome: Ongoing      Problem: Safety:  Goal: Free from accidental physical injury  Free from accidental physical injury   Outcome: Ongoing    Goal: Free from intentional harm  Free from intentional harm   Outcome: Ongoing      Problem: Daily Care:  Goal: Daily care needs are met  Daily care needs are met   Outcome: Ongoing      Problem: Pain:  Goal: Patient's pain/discomfort is manageable  Patient's pain/discomfort is manageable   Outcome: Ongoing      Problem: Skin Integrity:  Goal: Skin integrity will stabilize  Skin integrity will stabilize   Outcome: Ongoing      Problem: Discharge Planning:  Goal: Patients continuum of care needs are met  Patients continuum of care needs are met   Outcome: Ongoing  Continuing to work with patient and health care team on discharge plan. Discharge instructions and medication management will be reviewed prior to discharge. Problem: OXYGENATION/RESPIRATORY FUNCTION  Goal: Patient will maintain patent airway  Outcome: Ongoing    Goal: Patient will achieve/maintain normal respiratory rate/effort  Respiratory rate and effort will be within normal limits for the patient   Outcome: Ongoing      Problem: Falls - Risk of:  Goal: Will remain free from falls  Will remain free from falls   Outcome: Ongoing  Pt remains free from falls this shift.   Goal: Absence of physical injury  Absence of physical injury   Outcome: Ongoing

## 2018-10-29 ENCOUNTER — TELEPHONE (OUTPATIENT)
Dept: FAMILY MEDICINE CLINIC | Age: 73
End: 2018-10-29

## 2018-10-29 ENCOUNTER — CARE COORDINATION (OUTPATIENT)
Dept: CARE COORDINATION | Age: 73
End: 2018-10-29

## 2018-10-29 LAB
ANION GAP SERPL CALCULATED.3IONS-SCNC: 10 MMOL/L (ref 3–16)
BUN BLDV-MCNC: 55 MG/DL (ref 7–20)
CALCIUM SERPL-MCNC: 10 MG/DL (ref 8.3–10.6)
CHLORIDE BLD-SCNC: 100 MMOL/L (ref 99–110)
CO2: 32 MMOL/L (ref 21–32)
CREAT SERPL-MCNC: 1.3 MG/DL (ref 0.6–1.2)
GFR AFRICAN AMERICAN: 49
GFR NON-AFRICAN AMERICAN: 40
GLUCOSE BLD-MCNC: 141 MG/DL (ref 70–99)
POTASSIUM SERPL-SCNC: 4.3 MMOL/L (ref 3.5–5.1)
SODIUM BLD-SCNC: 142 MMOL/L (ref 136–145)

## 2018-10-29 PROCEDURE — 6370000000 HC RX 637 (ALT 250 FOR IP): Performed by: HOSPITALIST

## 2018-10-29 PROCEDURE — 2580000003 HC RX 258: Performed by: INTERNAL MEDICINE

## 2018-10-29 PROCEDURE — 94760 N-INVAS EAR/PLS OXIMETRY 1: CPT

## 2018-10-29 PROCEDURE — 36415 COLL VENOUS BLD VENIPUNCTURE: CPT

## 2018-10-29 PROCEDURE — 2580000003 HC RX 258: Performed by: HOSPITALIST

## 2018-10-29 PROCEDURE — 93005 ELECTROCARDIOGRAM TRACING: CPT | Performed by: INTERNAL MEDICINE

## 2018-10-29 PROCEDURE — 99233 SBSQ HOSP IP/OBS HIGH 50: CPT | Performed by: INTERNAL MEDICINE

## 2018-10-29 PROCEDURE — 94640 AIRWAY INHALATION TREATMENT: CPT

## 2018-10-29 PROCEDURE — 6360000002 HC RX W HCPCS: Performed by: HOSPITALIST

## 2018-10-29 PROCEDURE — 1200000000 HC SEMI PRIVATE

## 2018-10-29 PROCEDURE — 94660 CPAP INITIATION&MGMT: CPT

## 2018-10-29 PROCEDURE — 6360000002 HC RX W HCPCS: Performed by: INTERNAL MEDICINE

## 2018-10-29 PROCEDURE — 2500000003 HC RX 250 WO HCPCS: Performed by: INTERNAL MEDICINE

## 2018-10-29 PROCEDURE — 80048 BASIC METABOLIC PNL TOTAL CA: CPT

## 2018-10-29 PROCEDURE — 6370000000 HC RX 637 (ALT 250 FOR IP): Performed by: INTERNAL MEDICINE

## 2018-10-29 PROCEDURE — 2700000000 HC OXYGEN THERAPY PER DAY

## 2018-10-29 PROCEDURE — 2500000003 HC RX 250 WO HCPCS: Performed by: HOSPITALIST

## 2018-10-29 PROCEDURE — 99232 SBSQ HOSP IP/OBS MODERATE 35: CPT | Performed by: INTERNAL MEDICINE

## 2018-10-29 RX ORDER — LEVALBUTEROL INHALATION SOLUTION 0.63 MG/3ML
0.63 SOLUTION RESPIRATORY (INHALATION) EVERY 6 HOURS PRN
Status: DISCONTINUED | OUTPATIENT
Start: 2018-10-29 | End: 2018-10-29

## 2018-10-29 RX ORDER — DILTIAZEM HYDROCHLORIDE 5 MG/ML
10 INJECTION INTRAVENOUS ONCE
Status: COMPLETED | OUTPATIENT
Start: 2018-10-29 | End: 2018-10-29

## 2018-10-29 RX ORDER — SODIUM CHLORIDE FOR INHALATION 0.9 %
3 VIAL, NEBULIZER (ML) INHALATION EVERY 8 HOURS PRN
Status: DISCONTINUED | OUTPATIENT
Start: 2018-10-29 | End: 2018-11-01 | Stop reason: HOSPADM

## 2018-10-29 RX ORDER — METOPROLOL TARTRATE 5 MG/5ML
5 INJECTION INTRAVENOUS ONCE
Status: COMPLETED | OUTPATIENT
Start: 2018-10-29 | End: 2018-10-29

## 2018-10-29 RX ORDER — ACETAMINOPHEN 325 MG/1
650 TABLET ORAL EVERY 4 HOURS PRN
Status: DISCONTINUED | OUTPATIENT
Start: 2018-10-29 | End: 2018-11-01 | Stop reason: HOSPADM

## 2018-10-29 RX ORDER — LEVALBUTEROL 1.25 MG/.5ML
1.25 SOLUTION, CONCENTRATE RESPIRATORY (INHALATION) EVERY 8 HOURS PRN
Status: DISCONTINUED | OUTPATIENT
Start: 2018-10-29 | End: 2018-11-01 | Stop reason: HOSPADM

## 2018-10-29 RX ADMIN — MONTELUKAST SODIUM 10 MG: 10 TABLET, FILM COATED ORAL at 21:00

## 2018-10-29 RX ADMIN — DILTIAZEM HYDROCHLORIDE 10 MG: 5 INJECTION INTRAVENOUS at 15:00

## 2018-10-29 RX ADMIN — BUDESONIDE 500 MCG: 0.5 SUSPENSION RESPIRATORY (INHALATION) at 13:28

## 2018-10-29 RX ADMIN — ACETAMINOPHEN 650 MG: 325 TABLET, FILM COATED ORAL at 10:06

## 2018-10-29 RX ADMIN — ATORVASTATIN CALCIUM 40 MG: 40 TABLET, FILM COATED ORAL at 21:00

## 2018-10-29 RX ADMIN — LEVALBUTEROL 1.25 MG: 1.25 SOLUTION, CONCENTRATE RESPIRATORY (INHALATION) at 16:41

## 2018-10-29 RX ADMIN — CLONIDINE HYDROCHLORIDE 0.1 MG: 0.1 TABLET ORAL at 08:38

## 2018-10-29 RX ADMIN — METOPROLOL TARTRATE 5 MG: 1 INJECTION, SOLUTION INTRAVENOUS at 10:06

## 2018-10-29 RX ADMIN — ACETAMINOPHEN 650 MG: 325 TABLET, FILM COATED ORAL at 17:36

## 2018-10-29 RX ADMIN — IPRATROPIUM BROMIDE 0.5 MG: 0.5 SOLUTION RESPIRATORY (INHALATION) at 20:13

## 2018-10-29 RX ADMIN — FORMOTEROL FUMARATE DIHYDRATE 20 MCG: 20 SOLUTION RESPIRATORY (INHALATION) at 13:28

## 2018-10-29 RX ADMIN — LEVOFLOXACIN 500 MG: 5 INJECTION, SOLUTION INTRAVENOUS at 17:43

## 2018-10-29 RX ADMIN — Medication 10 ML: at 08:39

## 2018-10-29 RX ADMIN — CITALOPRAM HYDROBROMIDE 40 MG: 20 TABLET ORAL at 08:38

## 2018-10-29 RX ADMIN — CARVEDILOL 25 MG: 25 TABLET, FILM COATED ORAL at 17:36

## 2018-10-29 RX ADMIN — METHYLPREDNISOLONE SODIUM SUCCINATE 40 MG: 40 INJECTION, POWDER, FOR SOLUTION INTRAMUSCULAR; INTRAVENOUS at 08:38

## 2018-10-29 RX ADMIN — DILTIAZEM HYDROCHLORIDE 5 MG/HR: 5 INJECTION INTRAVENOUS at 11:08

## 2018-10-29 RX ADMIN — LORAZEPAM 0.5 MG: 0.5 TABLET ORAL at 21:00

## 2018-10-29 RX ADMIN — IPRATROPIUM BROMIDE 0.5 MG: 0.5 SOLUTION RESPIRATORY (INHALATION) at 16:41

## 2018-10-29 RX ADMIN — CARVEDILOL 25 MG: 25 TABLET, FILM COATED ORAL at 08:38

## 2018-10-29 RX ADMIN — GUAIFENESIN 600 MG: 600 TABLET, EXTENDED RELEASE ORAL at 21:00

## 2018-10-29 RX ADMIN — GUAIFENESIN 600 MG: 600 TABLET, EXTENDED RELEASE ORAL at 08:38

## 2018-10-29 RX ADMIN — CLONIDINE HYDROCHLORIDE 0.1 MG: 0.1 TABLET ORAL at 21:00

## 2018-10-29 RX ADMIN — PANTOPRAZOLE SODIUM 40 MG: 40 TABLET, DELAYED RELEASE ORAL at 06:47

## 2018-10-29 RX ADMIN — CLOPIDOGREL BISULFATE 75 MG: 75 TABLET ORAL at 08:38

## 2018-10-29 RX ADMIN — IPRATROPIUM BROMIDE AND ALBUTEROL SULFATE 3 ML: .5; 3 SOLUTION RESPIRATORY (INHALATION) at 00:52

## 2018-10-29 RX ADMIN — ASPIRIN 81 MG CHEWABLE TABLET 81 MG: 81 TABLET CHEWABLE at 21:00

## 2018-10-29 RX ADMIN — APIXABAN 5 MG: 5 TABLET, FILM COATED ORAL at 21:00

## 2018-10-29 RX ADMIN — LEVOTHYROXINE SODIUM 50 MCG: 50 TABLET ORAL at 06:47

## 2018-10-29 RX ADMIN — IPRATROPIUM BROMIDE AND ALBUTEROL SULFATE 3 ML: .5; 3 SOLUTION RESPIRATORY (INHALATION) at 13:28

## 2018-10-29 RX ADMIN — FORMOTEROL FUMARATE DIHYDRATE 20 MCG: 20 SOLUTION RESPIRATORY (INHALATION) at 20:13

## 2018-10-29 ASSESSMENT — PAIN SCALES - GENERAL
PAINLEVEL_OUTOF10: 0
PAINLEVEL_OUTOF10: 8
PAINLEVEL_OUTOF10: 3
PAINLEVEL_OUTOF10: 0
PAINLEVEL_OUTOF10: 5

## 2018-10-29 ASSESSMENT — PAIN DESCRIPTION - LOCATION: LOCATION: HEAD

## 2018-10-29 NOTE — PROGRESS NOTES
10/27/18   0724  10/28/18   0559  10/29/18   0752   NA  137  138  137  142   K  3.7  4.1  3.9  4.3   CL  90*  90*  95*  100   CO2  34*  31  29  32   BUN  21*  37*  53*  55*   CREATININE  0.9  1.4*  1.6*  1.3*       LIVER PROFILE: No results for input(s): ALKPHOS, AST, ALT, ALB, BILIDIR, BILITOT, ALKPHOS in the last 72 hours. No results for input(s): AMYLASE in the last 72 hours. No results for input(s): LIPASE in the last 72 hours. UA:No results for input(s): NITRITE, LABCAST, WBCUA, RBCUA, MUCUS in the last 72 hours. TROPONIN:   Recent Labs      10/27/18   2345  10/28/18   0559  10/28/18   1125   TROPONINI  0.07*  0.06*  0.07*       Lab Results   Component Value Date/Time    URRFLXCULT Yes 03/02/2018 02:08 PM       No results for input(s): TSHREFLEX in the last 72 hours. No components found for: [id number]  POC GLUCOSE:  No results for input(s): POCGLU in the last 72 hours. No results for input(s): LABA1C in the last 72 hours. Lab Results   Component Value Date    LABA1C 6.3 01/09/2018     Ejection fraction is visually estimated to be 60-65%. ASSESSMENT AND PLAN  COPD Exacerbation J44.1  Bronchodilators and antibiotics and once daily Solu-Medrol  bronchial higiene   pulmonary consult is appreciated    Acute renal failure  Creatinine is improving while Lasix on hold  May resume home dose in a.m. CHF  Appears to be compensated  Home dose of diuretics in a.m. Atrial fibrillation   patient on Eliquis carvedilol started on Cardizem infusion      Essential primary hypertension I10  Hold hydralazine for low blood pressure     Coronary artery disease  Continue on aspirin and beta blocker and statin        Anxiety depression F 41.9  Continue on home medication           Code Status: Full Code        Dispo - continue care        The patient and / or the family were informed of the results of any tests, a time was given to answer questions, a plan was proposed and they agreed with plan.     April Jennings Carla Pa MD    This note was transcribed using [id number] [employer]. Please disregard any translational errors.

## 2018-10-29 NOTE — PROGRESS NOTES
EXAMINATION:  CT OF THE CHEST WITHOUT CONTRAST 6/11/2018 1:34 pm    TECHNIQUE:  CT of the chest was performed without the administration of intravenous  contrast. Multiplanar reformatted images are provided for review. Dose  modulation, iterative reconstruction, and/or weight based adjustment of the  mA/kV was utilized to reduce the radiation dose to as low as reasonably  achievable. COMPARISON:  04/08/2015    HISTORY:  ORDERING PHYSICIAN PROVIDED HISTORY: sob. hx of TA and CHF  TECHNOLOGIST PROVIDED HISTORY:  Technologist Provided Reason for Exam: sob-(Lows 70's on home O2. EMS gave 4  Duonebs and put on CPap. hx severe copd  Acuity: Acute  Type of Encounter: Subsequent/Follow-up    FINDINGS:  Mediastinum: Thyroid gland appears normal.  Scattered small mediastinal  subcentimeter nodes are noted. Coronary artery calcification is seen. Trace  pericardial fluid is seen. Atherosclerotic changes is seen in the aortic arch    Lungs/pleura: There is mild apical predominant emphysema. No large blebs or  bulla. Scattered ground-glass opacities are seen in the apices. Bandlike  opacity is seen in the lingula, likely subsegmental atelectasis or scar. Scattered Hazy ground-glass opacities are seen in the lung bases. Small  pleural effusions are seen. There is adjacent consolidation seen in the lung  bases    Upper Abdomen: Right adrenal gland is normal.  Left adrenal gland is normal.  Atherosclerotic change is is seen in the aorta. There are clips from prior  cholecystectomy    Soft Tissues/Bones: Spurring is seen in the spine      Impression Hazy ground-glass opacities are seen in the right and left lung. Ground-glass opacities are nonspecific and can be due to early pneumonia or  pulmonary edema. Small pleural effusions are seen, with adjacent consolidation the lung bases,  either atelectasis or pneumonia. No significant septal thickening is seen.   There is mild emphysema         CTPA: No results found

## 2018-10-29 NOTE — PROGRESS NOTES
Pt resting quietly in bed with BIPAP on. No distress noted. Call light in reach, bed alarm active. Will monitor.  Electronically signed by Eusebio Mcardle, RN on 10/28/2018 at 10:28 PM

## 2018-10-30 ENCOUNTER — APPOINTMENT (OUTPATIENT)
Dept: GENERAL RADIOLOGY | Age: 73
DRG: 140 | End: 2018-10-30
Payer: COMMERCIAL

## 2018-10-30 ENCOUNTER — SCHEDULED TELEPHONE ENCOUNTER (OUTPATIENT)
Dept: PHARMACY | Age: 73
End: 2018-10-30

## 2018-10-30 LAB
ANION GAP SERPL CALCULATED.3IONS-SCNC: 13 MMOL/L (ref 3–16)
BUN BLDV-MCNC: 47 MG/DL (ref 7–20)
CALCIUM SERPL-MCNC: 9.5 MG/DL (ref 8.3–10.6)
CHLORIDE BLD-SCNC: 95 MMOL/L (ref 99–110)
CO2: 26 MMOL/L (ref 21–32)
CREAT SERPL-MCNC: 1.3 MG/DL (ref 0.6–1.2)
EKG ATRIAL RATE: 97 BPM
EKG DIAGNOSIS: NORMAL
EKG P AXIS: 40 DEGREES
EKG P-R INTERVAL: 108 MS
EKG Q-T INTERVAL: 358 MS
EKG QRS DURATION: 68 MS
EKG QTC CALCULATION (BAZETT): 454 MS
EKG R AXIS: 42 DEGREES
EKG T AXIS: 145 DEGREES
EKG VENTRICULAR RATE: 97 BPM
GFR AFRICAN AMERICAN: 49
GFR NON-AFRICAN AMERICAN: 40
GLUCOSE BLD-MCNC: 158 MG/DL (ref 70–99)
POTASSIUM SERPL-SCNC: 5.1 MMOL/L (ref 3.5–5.1)
PRO-BNP: 3630 PG/ML (ref 0–124)
SODIUM BLD-SCNC: 134 MMOL/L (ref 136–145)

## 2018-10-30 PROCEDURE — 2580000003 HC RX 258: Performed by: HOSPITALIST

## 2018-10-30 PROCEDURE — 99232 SBSQ HOSP IP/OBS MODERATE 35: CPT | Performed by: INTERNAL MEDICINE

## 2018-10-30 PROCEDURE — 83880 ASSAY OF NATRIURETIC PEPTIDE: CPT

## 2018-10-30 PROCEDURE — 80048 BASIC METABOLIC PNL TOTAL CA: CPT

## 2018-10-30 PROCEDURE — 36415 COLL VENOUS BLD VENIPUNCTURE: CPT

## 2018-10-30 PROCEDURE — 6370000000 HC RX 637 (ALT 250 FOR IP): Performed by: INTERNAL MEDICINE

## 2018-10-30 PROCEDURE — 99233 SBSQ HOSP IP/OBS HIGH 50: CPT | Performed by: INTERNAL MEDICINE

## 2018-10-30 PROCEDURE — 2580000003 HC RX 258: Performed by: INTERNAL MEDICINE

## 2018-10-30 PROCEDURE — 94664 DEMO&/EVAL PT USE INHALER: CPT

## 2018-10-30 PROCEDURE — 94762 N-INVAS EAR/PLS OXIMTRY CONT: CPT

## 2018-10-30 PROCEDURE — 94640 AIRWAY INHALATION TREATMENT: CPT

## 2018-10-30 PROCEDURE — 6360000002 HC RX W HCPCS: Performed by: INTERNAL MEDICINE

## 2018-10-30 PROCEDURE — 6360000002 HC RX W HCPCS: Performed by: HOSPITALIST

## 2018-10-30 PROCEDURE — 94770 HC ETCO2 MONITOR DAILY: CPT

## 2018-10-30 PROCEDURE — 94660 CPAP INITIATION&MGMT: CPT

## 2018-10-30 PROCEDURE — 71045 X-RAY EXAM CHEST 1 VIEW: CPT

## 2018-10-30 PROCEDURE — 51798 US URINE CAPACITY MEASURE: CPT

## 2018-10-30 PROCEDURE — 1200000000 HC SEMI PRIVATE

## 2018-10-30 PROCEDURE — 6370000000 HC RX 637 (ALT 250 FOR IP): Performed by: HOSPITALIST

## 2018-10-30 PROCEDURE — 2700000000 HC OXYGEN THERAPY PER DAY

## 2018-10-30 RX ORDER — DILTIAZEM HYDROCHLORIDE 180 MG/1
180 CAPSULE, COATED, EXTENDED RELEASE ORAL DAILY
Status: DISCONTINUED | OUTPATIENT
Start: 2018-10-30 | End: 2018-11-01 | Stop reason: HOSPADM

## 2018-10-30 RX ORDER — TORSEMIDE 20 MG/1
20 TABLET ORAL DAILY
Status: DISCONTINUED | OUTPATIENT
Start: 2018-10-30 | End: 2018-11-01 | Stop reason: HOSPADM

## 2018-10-30 RX ORDER — FUROSEMIDE 10 MG/ML
40 INJECTION INTRAMUSCULAR; INTRAVENOUS ONCE
Status: COMPLETED | OUTPATIENT
Start: 2018-10-30 | End: 2018-10-30

## 2018-10-30 RX ORDER — TORSEMIDE 20 MG/1
20 TABLET ORAL DAILY
Status: DISCONTINUED | OUTPATIENT
Start: 2018-10-30 | End: 2018-10-30

## 2018-10-30 RX ORDER — LEVOFLOXACIN 500 MG/1
500 TABLET, FILM COATED ORAL EVERY EVENING
Status: DISCONTINUED | OUTPATIENT
Start: 2018-10-30 | End: 2018-11-01 | Stop reason: HOSPADM

## 2018-10-30 RX ADMIN — IPRATROPIUM BROMIDE 0.5 MG: 0.5 SOLUTION RESPIRATORY (INHALATION) at 09:10

## 2018-10-30 RX ADMIN — ASPIRIN 81 MG CHEWABLE TABLET 81 MG: 81 TABLET CHEWABLE at 21:17

## 2018-10-30 RX ADMIN — GUAIFENESIN 600 MG: 600 TABLET, EXTENDED RELEASE ORAL at 09:49

## 2018-10-30 RX ADMIN — LEVOTHYROXINE SODIUM 50 MCG: 50 TABLET ORAL at 05:35

## 2018-10-30 RX ADMIN — MONTELUKAST SODIUM 10 MG: 10 TABLET, FILM COATED ORAL at 21:17

## 2018-10-30 RX ADMIN — Medication 10 ML: at 21:17

## 2018-10-30 RX ADMIN — FORMOTEROL FUMARATE DIHYDRATE 20 MCG: 20 SOLUTION RESPIRATORY (INHALATION) at 09:10

## 2018-10-30 RX ADMIN — DILTIAZEM HYDROCHLORIDE 180 MG: 180 CAPSULE, COATED, EXTENDED RELEASE ORAL at 10:45

## 2018-10-30 RX ADMIN — IPRATROPIUM BROMIDE 0.5 MG: 0.5 SOLUTION RESPIRATORY (INHALATION) at 12:00

## 2018-10-30 RX ADMIN — PANTOPRAZOLE SODIUM 40 MG: 40 TABLET, DELAYED RELEASE ORAL at 05:35

## 2018-10-30 RX ADMIN — LORAZEPAM 0.5 MG: 0.5 TABLET ORAL at 21:17

## 2018-10-30 RX ADMIN — TORSEMIDE 20 MG: 20 TABLET ORAL at 09:53

## 2018-10-30 RX ADMIN — CARVEDILOL 25 MG: 25 TABLET, FILM COATED ORAL at 09:48

## 2018-10-30 RX ADMIN — LEVOFLOXACIN 500 MG: 500 TABLET, FILM COATED ORAL at 17:27

## 2018-10-30 RX ADMIN — Medication 10 ML: at 09:55

## 2018-10-30 RX ADMIN — FUROSEMIDE 40 MG: 10 INJECTION, SOLUTION INTRAMUSCULAR; INTRAVENOUS at 12:00

## 2018-10-30 RX ADMIN — MOMETASONE FUROATE AND FORMOTEROL FUMARATE DIHYDRATE 2 PUFF: 200; 5 AEROSOL RESPIRATORY (INHALATION) at 20:17

## 2018-10-30 RX ADMIN — GUAIFENESIN 600 MG: 600 TABLET, EXTENDED RELEASE ORAL at 21:17

## 2018-10-30 RX ADMIN — CITALOPRAM HYDROBROMIDE 40 MG: 20 TABLET ORAL at 09:49

## 2018-10-30 RX ADMIN — DEXTROSE MONOHYDRATE 150 MG: 50 INJECTION, SOLUTION INTRAVENOUS at 11:41

## 2018-10-30 RX ADMIN — CLONIDINE HYDROCHLORIDE 0.1 MG: 0.1 TABLET ORAL at 09:48

## 2018-10-30 RX ADMIN — IPRATROPIUM BROMIDE 0.5 MG: 0.5 SOLUTION RESPIRATORY (INHALATION) at 16:53

## 2018-10-30 RX ADMIN — ATORVASTATIN CALCIUM 40 MG: 40 TABLET, FILM COATED ORAL at 21:17

## 2018-10-30 RX ADMIN — APIXABAN 5 MG: 5 TABLET, FILM COATED ORAL at 09:48

## 2018-10-30 RX ADMIN — METHYLPREDNISOLONE SODIUM SUCCINATE 40 MG: 40 INJECTION, POWDER, FOR SOLUTION INTRAMUSCULAR; INTRAVENOUS at 09:53

## 2018-10-30 RX ADMIN — CARVEDILOL 25 MG: 25 TABLET, FILM COATED ORAL at 17:27

## 2018-10-30 RX ADMIN — IPRATROPIUM BROMIDE 0.5 MG: 0.5 SOLUTION RESPIRATORY (INHALATION) at 20:17

## 2018-10-30 RX ADMIN — APIXABAN 5 MG: 5 TABLET, FILM COATED ORAL at 21:17

## 2018-10-30 ASSESSMENT — PAIN SCALES - GENERAL
PAINLEVEL_OUTOF10: 0

## 2018-10-30 NOTE — TELEPHONE ENCOUNTER
Left another message for patient to please call to schedule an appointment in the [facility] for COPD. This is our third attempt.

## 2018-10-30 NOTE — PROGRESS NOTES
flush 0.9 % injection 10 mL 2 times per day   sodium chloride flush 0.9 % injection 10 mL PRN   magnesium hydroxide (MILK OF MAGNESIA) 400 MG/5ML suspension 30 mL Daily PRN   ondansetron (ZOFRAN) injection 4 mg Q6H PRN   levofloxacin (LEVAQUIN) 500 MG/100ML infusion 500 mg Q24H   aspirin chewable tablet 81 mg Nightly   carvedilol (COREG) tablet 25 mg BID WC   LORazepam (ATIVAN) tablet 0.5 mg Nightly       Allergies: Iv dye [iodides]     Review of Systems:     Constitutional: negative  Eyes: negative  Ears, nose, mouth, throat, and face: negative  Respiratory: positive for cough, dyspnea on exertion and shortness of breath  Cardiovascular: positive for dyspnea, fatigue and palpitations  Gastrointestinal: negative  Genitourinary:negative  Integument/breast: negative  Hematologic/lymphatic: negative  Musculoskeletal:negative  Neurological: negative  Behavioral/Psych: negative  Endocrine: negative  Allergic/Immunologic: negative        Objective:     PHYSICAL EXAM:      Vitals:    10/30/18 0916   BP:    Pulse:    Resp: 25   Temp:    SpO2:     Weight: 209 lb 14.1 oz (95.2 kg)       General Appearance:  Alert, cooperative, no distress, appears stated age. Head:  Normocephalic, without obvious abnormality, atraumatic. Eyes:  Pupils equal and round. No scleral icterus. Mouth: Moist mucosa, no pharyngeal erythema. Nose: Nares normal. No drainage or sinus tenderness. Neck: Supple, symmetrical, trachea midline. No adenopathy. No tenderness/mass/nodules. No carotid bruit or elevated JVD. Lungs:   Respiratory Effort: Normal   Auscultation: Clear to auscultation bilaterally, respirations unlabored. No wheeze, rales   Chest Wall:  No tenderness or deformity. Cardiovascular:    Pulses  Palpation: normal   Ascultation: tachycardia  No murmur, rub, or gallop. 2+ radial and pedal pulses, symmetric  Carotid  Femoral   Abdomen and Gastrointestinal:   Soft, non-tender, bowel sounds active.   Liver and Spleen  Masses  Pulmonary artery saturation 64%.     SUMMARY:  Normal left and right heart cardiac pressure.     9/2017     ANGIOGRAPHY FINDINGS:  1.  The left main comes from the left coronary cusp.  There is normal  ELIER 3 flow. 2.  The left anterior descending artery comes from the left main  coronary artery.  It has ELIER 3 flow.  The midportion has 50%  stenosis. 3.  The left circumflex comes from the left main.  It is nondominant. No significant stenosis was seen. 4.  The right coronary artery comes from the right coronary cusp.  It  is dominant giving rise to the right posterior descending artery and  posterolateral branch and has no significant stenosis. 5.  LV ejection fraction is 60%.     SUMMARY:  Nonobstructive coronary artery disease with normal LV  ejection fraction.  LVEDP was 20 mmHg    Assessment and Plan     -Acute new onset atrial fibrillation with RVR  Rate control  anticoagulation  High CHADSVASC     -Acute on chronic worsening respiratory insufficiency and failure with hypoxemia and chronic oxygen support   Patient more likely has worsening of her pulmonary pathology with worsening COPD      Do not think this is heart failure there is no JVD no other heart failure findings   RHC had shown normal left heart filling pressures   LHC with non obstructive CAD   CXR reviewed and does not reveal pulmonary edema   Followed by pulmonary     -Non obstructive CAD from last year   Troponin elevation secondary to increased demand and there is no evidence of ACS      -PAD: Stable continue medical management      -Temporal arteritis on prednisone     Thank you for allowing us to participate in the care of [place] [id number]. Please do not hesitate to contact me if you have any questions.     Stephanie Hwang MD, MPH    [facility], [address] [place] [address]   Srinath Lazo [facility]  Ph: [phone]  Fax: [phone]    10/30/2018 9:36 AM      Physician Attestation: The scribes documentation has been

## 2018-10-30 NOTE — PROGRESS NOTES
Nebulization BID    atorvastatin  40 mg Oral Nightly    citalopram  40 mg Oral Daily    guaiFENesin  600 mg Oral BID    pantoprazole  40 mg Oral QAM AC    levothyroxine  50 mcg Oral Daily    montelukast  10 mg Oral Nightly    sodium chloride flush  10 mL Intravenous 2 times per day    aspirin  81 mg Oral Nightly    carvedilol  25 mg Oral BID WC    LORazepam  0.5 mg Oral Nightly       Continuous Infusions:      PRN Meds:  acetaminophen, levalbuterol, sodium chloride nebulizer, lidocaine, sodium chloride flush, magnesium hydroxide, ondansetron    Labs reviewed:  CBC:   Recent Labs      10/28/18   0559   WBC  13.7*   HGB  10.2*   HCT  32.6*   MCV  88.5   PLT  217     BMP:   Recent Labs      10/28/18   0559  10/29/18   0752  10/30/18   0533   NA  137  142  134*   K  3.9  4.3  5.1   CL  95*  100  95*   CO2  29  32  26   BUN  53*  55*  47*   CREATININE  1.6*  1.3*  1.3*     LIVER PROFILE: No results for input(s): AST, ALT, LIPASE, BILIDIR, BILITOT, ALKPHOS in the last 72 hours. Invalid input(s): AMYLASE,  ALB  PT/INR: No results for input(s): PROTIME, INR in the last 72 hours. APTT: No results for input(s): APTT in the last 72 hours. UA:No results for input(s): NITRITE, COLORU, PHUR, LABCAST, WBCUA, RBCUA, MUCUS, TRICHOMONAS, YEAST, BACTERIA, CLARITYU, SPECGRAV, LEUKOCYTESUR, UROBILINOGEN, BILIRUBINUR, BLOODU, GLUCOSEU, AMORPHOUS in the last 72 hours. Invalid input(s): Gwendloyn Countess  No results for input(s): PH, PCO2, PO2 in the last 72 hours. Films:  Radiology Review:  Pertinent images / reports were reviewed as a part of this visit. CT Chest w/ contrast: No results found for this or any previous visit.     CT Chest w/o contrast:   Results for orders placed during the hospital encounter of 06/11/18   CT CHEST WO CONTRAST    Narrative EXAMINATION:  CT OF THE CHEST WITHOUT CONTRAST 6/11/2018 1:34 pm    TECHNIQUE:  CT of the chest was performed without the administration of intravenous  contrast.

## 2018-10-30 NOTE — PROGRESS NOTES
Clinical Pharmacy Note  Medication Counseling    Pharmacy initial introduction completed. Discussed pharmacy services with Koki Elizondo. Explained pharmacy's role in the review of all new medication orders prior to the patient receiving their first dose. Reviewed new medications started since current hospital admission: eliquis, , . Indications and side effects were emphasized during counseling. All medication-related questions addressed. Patient verbalized understanding. Should the patient express any additional questions or concerns regarding their medications, please do not hesitate to contact the pharmacy department.

## 2018-10-30 NOTE — PROGRESS NOTES
Dr Nallely Carpenter notified of Demadex.  Electronically signed by Carolin Rios RN on 10/30/2018 at 9:32 AM

## 2018-10-31 LAB
ANION GAP SERPL CALCULATED.3IONS-SCNC: 14 MMOL/L (ref 3–16)
BUN BLDV-MCNC: 45 MG/DL (ref 7–20)
CALCIUM SERPL-MCNC: 9.2 MG/DL (ref 8.3–10.6)
CHLORIDE BLD-SCNC: 90 MMOL/L (ref 99–110)
CO2: 33 MMOL/L (ref 21–32)
CREAT SERPL-MCNC: 1.3 MG/DL (ref 0.6–1.2)
EKG ATRIAL RATE: 174 BPM
EKG DIAGNOSIS: NORMAL
EKG Q-T INTERVAL: 280 MS
EKG QRS DURATION: 72 MS
EKG QTC CALCULATION (BAZETT): 452 MS
EKG R AXIS: 62 DEGREES
EKG T AXIS: 236 DEGREES
EKG VENTRICULAR RATE: 157 BPM
GFR AFRICAN AMERICAN: 49
GFR NON-AFRICAN AMERICAN: 40
GLUCOSE BLD-MCNC: 208 MG/DL (ref 70–99)
POTASSIUM SERPL-SCNC: 4.6 MMOL/L (ref 3.5–5.1)
SODIUM BLD-SCNC: 137 MMOL/L (ref 136–145)

## 2018-10-31 PROCEDURE — 6370000000 HC RX 637 (ALT 250 FOR IP): Performed by: INTERNAL MEDICINE

## 2018-10-31 PROCEDURE — G8987 SELF CARE CURRENT STATUS: HCPCS

## 2018-10-31 PROCEDURE — G8979 MOBILITY GOAL STATUS: HCPCS

## 2018-10-31 PROCEDURE — 97530 THERAPEUTIC ACTIVITIES: CPT

## 2018-10-31 PROCEDURE — 93010 ELECTROCARDIOGRAM REPORT: CPT | Performed by: INTERNAL MEDICINE

## 2018-10-31 PROCEDURE — 99232 SBSQ HOSP IP/OBS MODERATE 35: CPT | Performed by: INTERNAL MEDICINE

## 2018-10-31 PROCEDURE — G8978 MOBILITY CURRENT STATUS: HCPCS

## 2018-10-31 PROCEDURE — 6370000000 HC RX 637 (ALT 250 FOR IP): Performed by: HOSPITALIST

## 2018-10-31 PROCEDURE — 80048 BASIC METABOLIC PNL TOTAL CA: CPT

## 2018-10-31 PROCEDURE — 2700000000 HC OXYGEN THERAPY PER DAY

## 2018-10-31 PROCEDURE — 97161 PT EVAL LOW COMPLEX 20 MIN: CPT

## 2018-10-31 PROCEDURE — G8988 SELF CARE GOAL STATUS: HCPCS

## 2018-10-31 PROCEDURE — 94640 AIRWAY INHALATION TREATMENT: CPT

## 2018-10-31 PROCEDURE — 6360000002 HC RX W HCPCS: Performed by: INTERNAL MEDICINE

## 2018-10-31 PROCEDURE — 1200000000 HC SEMI PRIVATE

## 2018-10-31 PROCEDURE — 2580000003 HC RX 258: Performed by: HOSPITALIST

## 2018-10-31 PROCEDURE — 94762 N-INVAS EAR/PLS OXIMTRY CONT: CPT

## 2018-10-31 PROCEDURE — 97166 OT EVAL MOD COMPLEX 45 MIN: CPT

## 2018-10-31 PROCEDURE — 36415 COLL VENOUS BLD VENIPUNCTURE: CPT

## 2018-10-31 PROCEDURE — 6360000002 HC RX W HCPCS: Performed by: HOSPITALIST

## 2018-10-31 PROCEDURE — 94660 CPAP INITIATION&MGMT: CPT

## 2018-10-31 PROCEDURE — 94664 DEMO&/EVAL PT USE INHALER: CPT

## 2018-10-31 PROCEDURE — 99231 SBSQ HOSP IP/OBS SF/LOW 25: CPT | Performed by: NURSE PRACTITIONER

## 2018-10-31 RX ORDER — ACYCLOVIR 50 MG/G
OINTMENT TOPICAL
Status: DISCONTINUED | OUTPATIENT
Start: 2018-10-31 | End: 2018-11-01 | Stop reason: HOSPADM

## 2018-10-31 RX ORDER — FLUTICASONE PROPIONATE 50 MCG
1 SPRAY, SUSPENSION (ML) NASAL DAILY
Status: DISCONTINUED | OUTPATIENT
Start: 2018-10-31 | End: 2018-11-01 | Stop reason: HOSPADM

## 2018-10-31 RX ORDER — LORAZEPAM 0.5 MG/1
0.5 TABLET ORAL 2 TIMES DAILY
Status: DISCONTINUED | OUTPATIENT
Start: 2018-10-31 | End: 2018-11-01 | Stop reason: HOSPADM

## 2018-10-31 RX ADMIN — IPRATROPIUM BROMIDE 0.5 MG: 0.5 SOLUTION RESPIRATORY (INHALATION) at 08:17

## 2018-10-31 RX ADMIN — MOMETASONE FUROATE AND FORMOTEROL FUMARATE DIHYDRATE 2 PUFF: 200; 5 AEROSOL RESPIRATORY (INHALATION) at 08:17

## 2018-10-31 RX ADMIN — ACYCLOVIR: 50 OINTMENT TOPICAL at 22:39

## 2018-10-31 RX ADMIN — MOMETASONE FUROATE AND FORMOTEROL FUMARATE DIHYDRATE 2 PUFF: 200; 5 AEROSOL RESPIRATORY (INHALATION) at 20:27

## 2018-10-31 RX ADMIN — LEVOTHYROXINE SODIUM 50 MCG: 50 TABLET ORAL at 05:48

## 2018-10-31 RX ADMIN — ASPIRIN 81 MG CHEWABLE TABLET 81 MG: 81 TABLET CHEWABLE at 20:42

## 2018-10-31 RX ADMIN — LEVOFLOXACIN 500 MG: 500 TABLET, FILM COATED ORAL at 17:35

## 2018-10-31 RX ADMIN — ATORVASTATIN CALCIUM 40 MG: 40 TABLET, FILM COATED ORAL at 20:41

## 2018-10-31 RX ADMIN — IPRATROPIUM BROMIDE 0.5 MG: 0.5 SOLUTION RESPIRATORY (INHALATION) at 16:43

## 2018-10-31 RX ADMIN — GUAIFENESIN 600 MG: 600 TABLET, EXTENDED RELEASE ORAL at 08:14

## 2018-10-31 RX ADMIN — ACYCLOVIR: 50 OINTMENT TOPICAL at 19:13

## 2018-10-31 RX ADMIN — TORSEMIDE 20 MG: 20 TABLET ORAL at 08:14

## 2018-10-31 RX ADMIN — ACYCLOVIR: 50 OINTMENT TOPICAL at 13:26

## 2018-10-31 RX ADMIN — CARVEDILOL 25 MG: 25 TABLET, FILM COATED ORAL at 08:14

## 2018-10-31 RX ADMIN — CARVEDILOL 25 MG: 25 TABLET, FILM COATED ORAL at 16:41

## 2018-10-31 RX ADMIN — METHYLPREDNISOLONE SODIUM SUCCINATE 40 MG: 40 INJECTION, POWDER, FOR SOLUTION INTRAMUSCULAR; INTRAVENOUS at 08:14

## 2018-10-31 RX ADMIN — Medication 10 ML: at 08:15

## 2018-10-31 RX ADMIN — GUAIFENESIN 600 MG: 600 TABLET, EXTENDED RELEASE ORAL at 20:41

## 2018-10-31 RX ADMIN — LORAZEPAM 0.5 MG: 0.5 TABLET ORAL at 12:52

## 2018-10-31 RX ADMIN — APIXABAN 5 MG: 5 TABLET, FILM COATED ORAL at 08:14

## 2018-10-31 RX ADMIN — PANTOPRAZOLE SODIUM 40 MG: 40 TABLET, DELAYED RELEASE ORAL at 05:48

## 2018-10-31 RX ADMIN — IPRATROPIUM BROMIDE 0.5 MG: 0.5 SOLUTION RESPIRATORY (INHALATION) at 12:17

## 2018-10-31 RX ADMIN — APIXABAN 5 MG: 5 TABLET, FILM COATED ORAL at 20:41

## 2018-10-31 RX ADMIN — FLUTICASONE PROPIONATE 1 SPRAY: 50 SPRAY, METERED NASAL at 13:26

## 2018-10-31 RX ADMIN — MONTELUKAST SODIUM 10 MG: 10 TABLET, FILM COATED ORAL at 20:42

## 2018-10-31 RX ADMIN — CITALOPRAM HYDROBROMIDE 40 MG: 20 TABLET ORAL at 08:14

## 2018-10-31 RX ADMIN — IPRATROPIUM BROMIDE 0.5 MG: 0.5 SOLUTION RESPIRATORY (INHALATION) at 20:27

## 2018-10-31 RX ADMIN — DILTIAZEM HYDROCHLORIDE 180 MG: 180 CAPSULE, COATED, EXTENDED RELEASE ORAL at 08:14

## 2018-10-31 RX ADMIN — ACYCLOVIR: 50 OINTMENT TOPICAL at 16:38

## 2018-10-31 RX ADMIN — Medication 10 ML: at 20:42

## 2018-10-31 RX ADMIN — LORAZEPAM 0.5 MG: 0.5 TABLET ORAL at 20:41

## 2018-10-31 ASSESSMENT — PAIN SCALES - GENERAL
PAINLEVEL_OUTOF10: 0
PAINLEVEL_OUTOF10: 0

## 2018-10-31 NOTE — PROGRESS NOTES
[facility] 83   Daily Progress Note      Admit Date:  10/26/2018    Reason for follow up visit: Atrial fib/Dyspnea    CC: \"How much longer am I going to be here? Interval History:  Back in SR with PAC's  Remains SOB and on O2; BiPap at night  Denies chest pain or edema  BP elevated. ? Wt accurate today  Cr stable at 1.3    Subjective:  Pt with no acute overnight cardiac events. Denies chest pain, palpitations, or dizziness. + SOB on O2    Review of Systems:   · Constitutional:  No fevers, chills. · Eyes: No visual changes or diplopia. No scleral icterus. · ENT: No headaches or vertigo. No mouth sores or sore throat. · Cardiovascular: as reviewed in HPI  · Respiratory: No cough, no sputum production. No hematemesis. · Gastrointestinal: No abdominal pain, appetite loss, blood in stools. No change in bowel or bladder habits. · Genitourinary: No dysuria, trouble voiding, or hematuria. · Musculoskeletal:  No joint complaints. · Integumentary: No rash or pruritis. · Neurological: No headache, diplopia,  numbness or tingling. · Psychiatric: No anxiety or depression. · Endocrine: No temperature intolerance. No excessive thirst, fluid intake, or urination. No tremor. · Hematologic/Lymphatic: No abnormal bruising or bleeding, blood clots or swollen lymph nodes. · Allergic/Immunologic: No nasal congestion or hives. Objective:   BP (!) 183/90   Pulse 82   Temp 97.1 °F (36.2 °C) (Oral)   Resp 24   Ht 5' (1.524 m)   Wt 196 lb 13.9 oz (89.3 kg)   LMP 05/01/2006 (Approximate)   SpO2 97%   BMI 38.45 kg/m²     Intake/Output Summary (Last 24 hours) at 10/31/18 0826  Last data filed at 10/31/18 0451   Gross per 24 hour   Intake                0 ml   Output             1525 ml   Net            -1525 ml     Wt Readings from Last 3 Encounters:   10/31/18 196 lb 13.9 oz (89.3 kg)   10/15/18 190 lb 14.7 oz (86.6 kg)   10/10/18 185 lb (83.9 kg)       Physical Exam:  General: In no acute distress.

## 2018-10-31 NOTE — PROGRESS NOTES
Pulmonary Progress Note    Date of Admission: 10/26/2018   LOS: 5 days         Assessment:   Acute on Chronic Respiratory failure with hypoxia  COPD  Atrial Fibrillation with Rapid Ventricular REsponse    Plan:      -dyspnea persists, she is on systemic corticosteroids, atrovent, xopenex, dulera  -may be some component of anxiety to her subjective dyspnea, she is back to her baseline o2  -BIPAP PRN, qhs  -on torsemide daily, targetting euvolemia    24 Hour Events/Subjective  Pt feeling a little better, she think her anxiety may be contributing to her dyspnea. ROS:  No nausea  No Vomiting  No chest pain      Intake/Output Summary (Last 24 hours) at 10/31/18 0928  Last data filed at 10/31/18 0911   Gross per 24 hour   Intake                0 ml   Output             1975 ml   Net            -1975 ml         PHYSICAL EXAM:   Blood pressure (!) 183/90, pulse 82, temperature 97.1 °F (36.2 °C), temperature source Oral, resp. rate 24, height 5' (1.524 m), weight 196 lb 13.9 oz (89.3 kg), last menstrual period 05/01/2006, SpO2 97 %, not currently breastfeeding.'  Gen:  No acute distress. Using bipap when I walked in. Eyes: PERRL. Anicteric sclera. No conjunctival injection. ENT: No discharge. Posterior oropharynx clear. External appearance of ears and nose normal.  Neck: Trachea midline. No mass   Resp:  No crackles. + wheezes, improving. No rhonchi. No dullness on percussion. CV: Regular rate. irregular. No murmur or rub. trace edema. GI: Soft, Non-tender. Non-distended. +BS  Skin: Warm, dry, w/o erythema. Lymph: No cervical or supraclavicular LAD. M/S: No cyanosis. No clubbing. Neuro:  CN 2-12 tested, no focal neurologic deficit. Moves all extremities  Psych: Awake and alert, Oriented x 3. Judgement and insight appropriate. Mood stable.       Medications:    Scheduled Meds:   torsemide  20 mg Oral Daily    diltiazem  180 mg Oral Daily    levofloxacin  500 mg Oral QPM    mometasone-formoterol  2 puff Inhalation BID    apixaban  5 mg Oral BID    ipratropium  0.5 mg Nebulization 4x daily    methylPREDNISolone  40 mg Intravenous Daily    atorvastatin  40 mg Oral Nightly    citalopram  40 mg Oral Daily    guaiFENesin  600 mg Oral BID    pantoprazole  40 mg Oral QAM AC    levothyroxine  50 mcg Oral Daily    montelukast  10 mg Oral Nightly    sodium chloride flush  10 mL Intravenous 2 times per day    aspirin  81 mg Oral Nightly    carvedilol  25 mg Oral BID WC    LORazepam  0.5 mg Oral Nightly       Continuous Infusions:      PRN Meds:  acetaminophen, levalbuterol, sodium chloride nebulizer, lidocaine, sodium chloride flush, magnesium hydroxide, ondansetron    Labs reviewed:  CBC:   No results for input(s): WBC, HGB, HCT, MCV, PLT in the last 72 hours. BMP:   Recent Labs      10/29/18   0752  10/30/18   0533  10/31/18   0627   NA  142  134*  137   K  4.3  5.1  4.6   CL  100  95*  90*   CO2  32  26  33*   BUN  55*  47*  45*   CREATININE  1.3*  1.3*  1.3*     LIVER PROFILE: No results for input(s): AST, ALT, LIPASE, BILIDIR, BILITOT, ALKPHOS in the last 72 hours. Invalid input(s): AMYLASE,  ALB  PT/INR: No results for input(s): PROTIME, INR in the last 72 hours. APTT: No results for input(s): APTT in the last 72 hours. UA:No results for input(s): NITRITE, COLORU, PHUR, LABCAST, WBCUA, RBCUA, MUCUS, TRICHOMONAS, YEAST, BACTERIA, CLARITYU, SPECGRAV, LEUKOCYTESUR, UROBILINOGEN, BILIRUBINUR, BLOODU, GLUCOSEU, AMORPHOUS in the last 72 hours. Invalid input(s): Danita Elmer  No results for input(s): PH, PCO2, PO2 in the last 72 hours. Films:  Radiology Review:  Pertinent images / reports were reviewed as a part of this visit.       Thank you for this consult,    Deborah Garcia [address] [place] [address] Pulmonary, Critical Care, and Sleep Medicine

## 2018-10-31 NOTE — PROGRESS NOTES
Occupational Therapy   Occupational Therapy Initial Assessment  Date: 10/31/2018   Patient Name: Tom Sutton  MRN: [id number]     : 1945    Date of Service: 10/31/2018    Assessment: Pt is 72 y.o. who presents with SOB. Pt is on 3L of O2 at baseline. PTA pt lives with grandkids and is the primary caregiver for the children. Pt is able to live on main floor of home and despite recent increased difficulty pt reports she is able to complete self-care tasks and functional mobility independently. Currently, pt is completing functional mobility and transfers with SBA with a few instances of SOB, able to self-correct. Pt would benefit from continued therapy to maximize strength and activity tolerance. Anticipate pt safe to d/c home with home health OT. Discharge Recommendations:  Home with assist PRN, Home with Home health OT         Patient Diagnosis(es): The primary encounter diagnosis was Elevated troponin. A diagnosis of COPD exacerbation ([facility] was also pertinent to this visit. has a past medical history of CAD (coronary artery disease); CHF (congestive heart failure) ([facility] Colostomy care [facility]); COPD (chronic obstructive pulmonary disease) ([facility] Hyperlipidemia; Hypertension; Kidney disease; Peripheral vascular disease ([facility] Rectal fissure; Restless legs syndrome; Sleep apnea; and Unspecified sleep apnea. has a past surgical history that includes Cholecystectomy; hernia repair; Coronary artery bypass graft; Cardiac catheterization (2017); and colostomy (2018). Treatment Diagnosis: Decreased functional mobility ; Decreased ADL status; Decreased strength;Decreased balance;Decreased endurance; Restrictions  Restrictions/Precautions  Restrictions/Precautions: Fall Risk  Position Activity Restriction  Other position/activity restrictions: 3 L.  O2 per nc    Subjective   General  Chart Reviewed: Yes  Patient assessed for rehabilitation services?: Yes  Additional Pertinent Hx: Per [place] than 40 percent impaired, limited or restricted     OutComes Score    How much help for putting on and taking off regular lower body clothing?: A Lot  How much help for Bathing?: A Little  How much help for Toileting?: A Little  How much help for putting on and taking off regular upper body clothing?: None  How much help for taking care of personal grooming?: A Little  How much help for eating meals?: None  AM-PAC Inpatient Daily Activity Raw Score: 19  AM-PAC Inpatient ADL T-Scale Score : 40.22  ADL Inpatient CMS 0-100% Score: 42.8  ADL Inpatient CMS G-Code Modifier : CK    AM-PAC Score    Eleonore Mortimer scored a 19/24 on the AM-PAC ADL Inpatient form. Current research shows that an AM-PAC score of 18 or greater is typically associated with a discharge to the patient's home setting. Based on the patients AM-PAC score and their current ADL deficits, it is recommended that the patient have 2-3 sessions per week of Occupational Therapy at d/c to increase the patients independence.      AM-PAC Inpatient Daily Activity Raw Score: 19  AM-PAC Inpatient ADL T-Scale Score : 40.22  ADL Inpatient CMS 0-100% Score: 42.8  ADL Inpatient CMS G-Code Modifier : CK    Goals  Short term goals  Time Frame for Short term goals: prior to d/c   Short term goal 1: Pt will complete functional mobility and transfers mod I   Short term goal 2: Pt will complete bathing with mod I  Short term goal 3: Pt will complete dressing with mod I  Short term goal 4: Pt will complete toileting mod I  Short term goal 5: Pt will complete grooming in stance at sink with mod I   Short term goal 6: Pt will tolerate 5+ minutes of standing activity and UE exercises to increase activity tolerance and strength for self-care and functional mobility   Patient Goals   Patient goals : \"to go back home\"       Therapy Time   Individual Concurrent Group Co-treatment   Time In 1535         Time Out 1605         Minutes 30         Timed Code Treatment Minutes: 15 Minutes

## 2018-11-01 VITALS
OXYGEN SATURATION: 96 % | RESPIRATION RATE: 18 BRPM | SYSTOLIC BLOOD PRESSURE: 185 MMHG | BODY MASS INDEX: 39 KG/M2 | TEMPERATURE: 98 F | WEIGHT: 198.63 LBS | HEART RATE: 84 BPM | DIASTOLIC BLOOD PRESSURE: 70 MMHG | HEIGHT: 60 IN

## 2018-11-01 LAB — PRO-BNP: 2972 PG/ML (ref 0–124)

## 2018-11-01 PROCEDURE — G8978 MOBILITY CURRENT STATUS: HCPCS

## 2018-11-01 PROCEDURE — 2580000003 HC RX 258: Performed by: HOSPITALIST

## 2018-11-01 PROCEDURE — 6370000000 HC RX 637 (ALT 250 FOR IP): Performed by: INTERNAL MEDICINE

## 2018-11-01 PROCEDURE — 2700000000 HC OXYGEN THERAPY PER DAY

## 2018-11-01 PROCEDURE — 97535 SELF CARE MNGMENT TRAINING: CPT

## 2018-11-01 PROCEDURE — 97530 THERAPEUTIC ACTIVITIES: CPT

## 2018-11-01 PROCEDURE — 6360000002 HC RX W HCPCS: Performed by: HOSPITALIST

## 2018-11-01 PROCEDURE — 94640 AIRWAY INHALATION TREATMENT: CPT

## 2018-11-01 PROCEDURE — 36415 COLL VENOUS BLD VENIPUNCTURE: CPT

## 2018-11-01 PROCEDURE — 94762 N-INVAS EAR/PLS OXIMTRY CONT: CPT

## 2018-11-01 PROCEDURE — 83880 ASSAY OF NATRIURETIC PEPTIDE: CPT

## 2018-11-01 PROCEDURE — 94660 CPAP INITIATION&MGMT: CPT

## 2018-11-01 PROCEDURE — 6360000002 HC RX W HCPCS: Performed by: INTERNAL MEDICINE

## 2018-11-01 PROCEDURE — 94664 DEMO&/EVAL PT USE INHALER: CPT

## 2018-11-01 PROCEDURE — 6370000000 HC RX 637 (ALT 250 FOR IP): Performed by: HOSPITALIST

## 2018-11-01 PROCEDURE — 99232 SBSQ HOSP IP/OBS MODERATE 35: CPT | Performed by: INTERNAL MEDICINE

## 2018-11-01 PROCEDURE — 99232 SBSQ HOSP IP/OBS MODERATE 35: CPT | Performed by: NURSE PRACTITIONER

## 2018-11-01 PROCEDURE — G8979 MOBILITY GOAL STATUS: HCPCS

## 2018-11-01 RX ORDER — LEVOFLOXACIN 500 MG/1
500 TABLET, FILM COATED ORAL DAILY
Qty: 5 TABLET | Refills: 0 | Status: SHIPPED | OUTPATIENT
Start: 2018-11-01 | End: 2018-11-06

## 2018-11-01 RX ORDER — HYDRALAZINE HYDROCHLORIDE 50 MG/1
50 TABLET, FILM COATED ORAL 3 TIMES DAILY
Status: DISCONTINUED | OUTPATIENT
Start: 2018-11-01 | End: 2018-11-01 | Stop reason: HOSPADM

## 2018-11-01 RX ORDER — PREDNISONE 10 MG/1
TABLET ORAL
Qty: 20 TABLET | Refills: 0 | Status: ON HOLD | OUTPATIENT
Start: 2018-11-01 | End: 2019-07-26 | Stop reason: HOSPADM

## 2018-11-01 RX ORDER — PREDNISONE 20 MG/1
40 TABLET ORAL DAILY
Status: DISCONTINUED | OUTPATIENT
Start: 2018-11-01 | End: 2018-11-01 | Stop reason: HOSPADM

## 2018-11-01 RX ORDER — CLONIDINE HYDROCHLORIDE 0.1 MG/1
0.2 TABLET ORAL 2 TIMES DAILY
Status: DISCONTINUED | OUTPATIENT
Start: 2018-11-01 | End: 2018-11-01 | Stop reason: HOSPADM

## 2018-11-01 RX ADMIN — LEVOTHYROXINE SODIUM 50 MCG: 50 TABLET ORAL at 05:50

## 2018-11-01 RX ADMIN — CLONIDINE HYDROCHLORIDE 0.2 MG: 0.1 TABLET ORAL at 15:02

## 2018-11-01 RX ADMIN — ACYCLOVIR: 50 OINTMENT TOPICAL at 16:53

## 2018-11-01 RX ADMIN — CITALOPRAM HYDROBROMIDE 40 MG: 20 TABLET ORAL at 09:37

## 2018-11-01 RX ADMIN — LEVOFLOXACIN 500 MG: 500 TABLET, FILM COATED ORAL at 16:52

## 2018-11-01 RX ADMIN — GUAIFENESIN 600 MG: 600 TABLET, EXTENDED RELEASE ORAL at 09:38

## 2018-11-01 RX ADMIN — PANTOPRAZOLE SODIUM 40 MG: 40 TABLET, DELAYED RELEASE ORAL at 05:50

## 2018-11-01 RX ADMIN — ACYCLOVIR: 50 OINTMENT TOPICAL at 04:18

## 2018-11-01 RX ADMIN — MOMETASONE FUROATE AND FORMOTEROL FUMARATE DIHYDRATE 2 PUFF: 200; 5 AEROSOL RESPIRATORY (INHALATION) at 07:55

## 2018-11-01 RX ADMIN — HYDRALAZINE HYDROCHLORIDE 50 MG: 50 TABLET, FILM COATED ORAL at 15:02

## 2018-11-01 RX ADMIN — IPRATROPIUM BROMIDE 0.5 MG: 0.5 SOLUTION RESPIRATORY (INHALATION) at 16:35

## 2018-11-01 RX ADMIN — DILTIAZEM HYDROCHLORIDE 180 MG: 180 CAPSULE, COATED, EXTENDED RELEASE ORAL at 09:38

## 2018-11-01 RX ADMIN — FLUTICASONE PROPIONATE 1 SPRAY: 50 SPRAY, METERED NASAL at 09:42

## 2018-11-01 RX ADMIN — ACYCLOVIR: 50 OINTMENT TOPICAL at 05:50

## 2018-11-01 RX ADMIN — APIXABAN 5 MG: 5 TABLET, FILM COATED ORAL at 09:37

## 2018-11-01 RX ADMIN — LORAZEPAM 0.5 MG: 0.5 TABLET ORAL at 09:37

## 2018-11-01 RX ADMIN — IPRATROPIUM BROMIDE 0.5 MG: 0.5 SOLUTION RESPIRATORY (INHALATION) at 07:55

## 2018-11-01 RX ADMIN — IPRATROPIUM BROMIDE 0.5 MG: 0.5 SOLUTION RESPIRATORY (INHALATION) at 11:49

## 2018-11-01 RX ADMIN — Medication 10 ML: at 09:38

## 2018-11-01 RX ADMIN — ACYCLOVIR: 50 OINTMENT TOPICAL at 09:37

## 2018-11-01 RX ADMIN — ACYCLOVIR: 50 OINTMENT TOPICAL at 13:15

## 2018-11-01 RX ADMIN — CARVEDILOL 25 MG: 25 TABLET, FILM COATED ORAL at 09:37

## 2018-11-01 RX ADMIN — TORSEMIDE 20 MG: 20 TABLET ORAL at 09:38

## 2018-11-01 RX ADMIN — CARVEDILOL 25 MG: 25 TABLET, FILM COATED ORAL at 16:52

## 2018-11-01 RX ADMIN — METHYLPREDNISOLONE SODIUM SUCCINATE 40 MG: 40 INJECTION, POWDER, FOR SOLUTION INTRAMUSCULAR; INTRAVENOUS at 09:38

## 2018-11-01 ASSESSMENT — PAIN SCALES - GENERAL
PAINLEVEL_OUTOF10: 0
PAINLEVEL_OUTOF10: 0

## 2018-11-01 NOTE — PROGRESS NOTES
place: Yes (RN, Stefani aware)  Type of devices: Left in chair;Call light within reach; Chair alarm in place;Gait belt;Nurse notified          Plan   Plan  Times per week: 2-3  Current Treatment Recommendations: Strengthening, Balance Training, Functional Mobility Training, Endurance Training, Safety Education & Training, Self-Care / ADL, Equipment Evaluation, Education, & procurement, Patient/Caregiver Education & Training    AM-PAC Score        AM-PAC Inpatient Daily Activity Raw Score: 20  AM-PAC Inpatient ADL T-Scale Score : 42.03  ADL Inpatient CMS 0-100% Score: 38.32  ADL Inpatient CMS G-Code Modifier : CJ    Goals  Short term goals  Time Frame for Short term goals: Status:goal ongoing  Short term goal 1: Pt will complete functional mobility and transfers mod I   Short term goal 2: Pt will complete bathing with mod I  Short term goal 3: Pt will complete dressing with mod I  Short term goal 4: Pt will complete toileting mod I  Short term goal 5: Pt will complete grooming in stance at sink with mod I   Short term goal 6: Pt will tolerate 5+ minutes of standing activity and UE exercises to increase activity tolerance and strength for self-care and functional mobility   Patient Goals   Patient goals : \"to go back home\"       Therapy Time   Individual Concurrent Group Co-treatment   Time In 0815         Time Out 0915         Minutes 60               This note to serve as discharge summary if pt d/c'd prior to next session  Caitlyn RAMESH/ALMA,[id number]

## 2018-11-01 NOTE — PROGRESS NOTES
Pulmonary Progress Note    Date of Admission: 10/26/2018   LOS: 6 days         Assessment:   Acute on Chronic Respiratory failure with hypoxia  COPD  Atrial Fibrillation with Rapid Ventricular REsponse    Plan:      -dyspnea improving, she is on systemic corticosteroids, atrovent, xopenex, dulera  -will decrease to PO pred 40mg for discharge, she should decrease to 20mg after 5days and cont until outpatient pulm follow up.  -She should see DR. Radha Holbrook in clinic within 1-2 weeks.  -may be some component of anxiety to her subjective dyspnea, she is back to her baseline o2  -BIPAP PRN, qhs  -on torsemide daily, targetting euvolemia    24 Hour Events/Subjective  Pt feeling much better today. ROS:  No nausea  No Vomiting  No chest pain      Intake/Output Summary (Last 24 hours) at 11/01/18 0805  Last data filed at 11/01/18 0524   Gross per 24 hour   Intake             1480 ml   Output             3700 ml   Net            -2220 ml         PHYSICAL EXAM:   Blood pressure (!) 178/82, pulse 85, temperature 97.6 °F (36.4 °C), temperature source Axillary, resp. rate 20, height 5' (1.524 m), weight 198 lb 10.2 oz (90.1 kg), last menstrual period 05/01/2006, SpO2 98 %, not currently breastfeeding.'  Gen:  No acute distress. Using bipap when I walked in. Eyes: PERRL. Anicteric sclera. No conjunctival injection. ENT: No discharge. Posterior oropharynx clear. External appearance of ears and nose normal.  Neck: Trachea midline. No mass   Resp:  No crackles. + wheezes, improving. No rhonchi. No dullness on percussion. CV: Regular rate. irregular. No murmur or rub. trace edema. GI: Soft, Non-tender. Non-distended. +BS  Skin: Warm, dry, w/o erythema. Lymph: No cervical or supraclavicular LAD. M/S: No cyanosis. No clubbing. Neuro:  CN 2-12 tested, no focal neurologic deficit. Moves all extremities  Psych: Awake and alert, Oriented x 3. Judgement and insight appropriate. Mood stable.       Medications:    Scheduled

## 2018-11-01 NOTE — CARE COORDINATION
DC ordered. Met w/pt about home care. Pt wanted referral to [address] [place] [address] because a family member works there. Called [phone]. They do not accept caresource  2-[facility], [phone], they do not accept caresource  3-[phone], closed for business in [facility], [phone], Jeremias Jaramillo accepted referral.    Message send to Dr Hancock Roles requesting malinda/home care orders.     Hoang Evans, RN  Case management  [phone]

## 2018-11-01 NOTE — PLAN OF CARE
Problem: HEMODYNAMIC STATUS  Goal: Patient has stable vital signs and fluid balance  Outcome: Ongoing  Vitals completed q4h and assessed by RN. I&O charted and assessed per MD order. Pt tolerating adequate fluid intake.

## 2018-11-01 NOTE — DISCHARGE INSTR - COC
Elimination:  Continence:   · Bowel: Yes  · Bladder: Yes  Urinary Catheter: None   Colostomy/Ileostomy/Ileal Conduit: Yes  Colostomy LUQ Descending/sigmoid-Stomal Appliance: 1 piece  Colostomy LUQ Descending/sigmoid-Stoma  Assessment: Protrudes, Red  Colostomy LUQ Descending/sigmoid-Mucocutaneous Junction: Intact  Colostomy LUQ Descending/sigmoid-Peristomal Assessment: Clean, Intact  Colostomy LUQ Descending/sigmoid-Stool Appearance: Formed  Colostomy LUQ Descending/sigmoid-Stool Color: Brown  Colostomy LUQ Descending/sigmoid-Stool Amount: Large  Colostomy LUQ Descending/sigmoid-Output (mL): 200 ml    Date of Last BM: colostomy    Intake/Output Summary (Last 24 hours) at 11/01/18 1414  Last data filed at 11/01/18 1300   Gross per 24 hour   Intake             1000 ml   Output             3150 ml   Net            -2150 ml     I/O last 3 completed shifts: In: 18 [P.O.:1480]  Out: 3700 [Urine:3350; Stool:350]    Safety Concerns: At Risk for Falls    Impairments/Disabilities:      ***    Nutrition Therapy:  Current Nutrition Therapy:   - Oral Diet:  Cardiac    Routes of Feeding: Oral  Liquids: No Restrictions  Daily Fluid Restriction: no  Last Modified Barium Swallow with Video (Video Swallowing Test): not done    Treatments at the Time of Hospital Discharge:   Respiratory Treatments:  Oxygen Therapy:  is on oxygen at 3 L/min per nasal cannula.   Ventilator:    - No ventilator support    Rehab Therapies: Physical Therapy and Occupational Therapy  Weight Bearing Status/Restrictions: No weight bearing restirctions  Other Medical Equipment (for information only, NOT a DME order):  walker and bath bench  Other Treatments:    Patient's personal belongings (please select all that are sent with patient):      RN SIGNATURE:  Electronically signed by Sobia Wylie RN on 11/1/18 at 2:16 PM    CASE MANAGEMENT/SOCIAL WORK SECTION    Inpatient Status Date: 10/26/18    Readmission Risk Assessment Score:  Readmission Risk

## 2018-11-01 NOTE — CARE COORDINATION
[facility] received referral from  for Nilsa Marquis[facility] 63 w/SEAT. Will need MD Orders. Will verify patient's insurance and follow up with patient to deliver the ordered item(s) prior to discharge.     Thank you for the referral.  Electronically signed by Eric Ceballos on 11/1/2018 at 3:56 PM  Cell ph# [phone]

## 2018-11-02 ENCOUNTER — TELEPHONE (OUTPATIENT)
Dept: CARDIOLOGY CLINIC | Age: 73
End: 2018-11-02

## 2018-11-02 DIAGNOSIS — I48.91 NEW ONSET ATRIAL FIBRILLATION (HCC): Primary | ICD-10-CM

## 2018-11-02 NOTE — TELEPHONE ENCOUNTER
Alanna Alberto called in stating that when she was DC earlier today, she was sent back home on Plavix and aspirin. They had both been DC per Cardio for new onset AFIB. Started on Eliquis 5 mg BID. Confirmed with MILDRED Duvall and sent script to pharmacy per patient's request.   Victorina Naylor update patient of the plan.

## 2018-11-02 NOTE — DISCHARGE SUMMARY
Acute respiratory insufficiency    Elevated troponin    Atrial fibrillation with RVR (HCC)            Presenting symptoms:  Shortness of breath     Diagnostic workup:  XR CHEST STANDARD (2 VW)   CT HEAD WO CONTRAST   Echo           CONSULTS DURING ADMISSION :   IP CONSULT TO CARDIOLOGY  IP CONSULT TO DIETITIAN  IP CONSULT TO HEART FAILURE NURSE/COORDINATOR  IP CONSULT TO PULMONOLOGY  IP CONSULT TO PALLIATIVE CARE  IP CONSULT TO COPD NURSE        Patient was diagnosed with:  COPD Exacerbation   Acute on chronic respiratory failure  CHF-compensated  New onset atrial fibrillation  Essential primary hypertension  Coronary artery disease  Anxiety depression         Treatment while inpatient:   patient presented with shortness of breath. Patient was diagnosed with COPD exacerbation. Patient was started on treatment with steroids bronchodilators and antibiotics. Patient responded well to the treatment. Patient's hospital course was complicated with new onset of atrial fibrillation for which cardiology was consulted. Patient was started on amiodarone and Cardizem and carvedilol and eliquis. patient converted to sinus rhythm and maintained until discharge.                      patient is being discharged home on prednisone    Discharge Condition:  stable     Discharged to:  Home     Activity:   as tolerated: Follow Up:  Patient should follow-up with Dr. Jeison Yoder in 1 week              Labs:  For convenience and continuity at follow-up the following most recent labs are provided:      CBC:   Lab Results   Component Value Date    WBC 13.7 10/28/2018    HGB 10.2 10/28/2018    HCT 32.6 10/28/2018     10/28/2018       RENAL:   Lab Results   Component Value Date     10/31/2018    K 4.6 10/31/2018    K 4.4 10/14/2018    CL 90 10/31/2018    CO2 33 10/31/2018    BUN 45 10/31/2018    CREATININE 1.3 10/31/2018           Discharge Medications:    Danny Mckenzie, [address] Medication Instructions [id number]

## 2018-11-03 DIAGNOSIS — K21.9 GASTROESOPHAGEAL REFLUX DISEASE, ESOPHAGITIS PRESENCE NOT SPECIFIED: ICD-10-CM

## 2018-11-03 RX ORDER — LEVOTHYROXINE SODIUM 0.05 MG/1
TABLET ORAL
Qty: 90 TABLET | Refills: 1 | Status: ON HOLD | OUTPATIENT
Start: 2018-11-03 | End: 2019-07-26 | Stop reason: SDUPTHER

## 2018-11-03 RX ORDER — LANSOPRAZOLE 15 MG/1
15 CAPSULE, DELAYED RELEASE ORAL DAILY
Qty: 90 CAPSULE | Refills: 1 | Status: ON HOLD | OUTPATIENT
Start: 2018-11-03 | End: 2019-07-26 | Stop reason: SDUPTHER

## 2018-11-03 NOTE — TELEPHONE ENCOUNTER
From: Cristal Whitten  Sent: 11/2/2018 9:15 PM EDT  Subject: Medication Renewal Request    Alisha JENNIFERChristiano  Manav Likens would like a refill of the following medications:     lansoprazole (PREVACID) 15 MG delayed release capsule Virginia Keane MD]     levothyroxine (SYNTHROID) 50 MCG tablet Virginia Keane MD]    Preferred pharmacy: [place] [id number]    Comment:      Medication renewals requested in this message routed separately:     hydrALAZINE (APRESOLINE) 50 MG tablet [DIANE M ENZWEILER, APRN - CNP]     cloNIDine (CATAPRES) 0.2 MG tablet [DIANE M ENZWEILER, APRN - CNP]       torsemide (DEMADEX) 20 MG tablet Edmond Rodriguez MD]     atorvastatin (LIPITOR) 40 MG tablet Edmond Rodriguez MD]       carvedilol (COREG) 25 MG tablet [Hans Webber MD]     montelukast (SINGULAIR) 10 MG tablet [Hans Webber MD]     citalopram (CELEXA) 40 MG tablet [Hans Webber MD]

## 2018-11-05 DIAGNOSIS — I73.9 PAD (PERIPHERAL ARTERY DISEASE) (HCC): ICD-10-CM

## 2018-11-05 DIAGNOSIS — I10 ESSENTIAL HYPERTENSION: ICD-10-CM

## 2018-11-05 DIAGNOSIS — I25.10 CORONARY ARTERY DISEASE INVOLVING NATIVE CORONARY ARTERY OF NATIVE HEART WITHOUT ANGINA PECTORIS: ICD-10-CM

## 2018-11-05 DIAGNOSIS — R06.02 SOB (SHORTNESS OF BREATH): ICD-10-CM

## 2018-11-05 RX ORDER — TORSEMIDE 20 MG/1
20 TABLET ORAL DAILY
Qty: 30 TABLET | Refills: 5 | Status: SHIPPED | OUTPATIENT
Start: 2018-11-05 | End: 2019-04-25 | Stop reason: SDUPTHER

## 2018-11-05 RX ORDER — HYDRALAZINE HYDROCHLORIDE 50 MG/1
50 TABLET, FILM COATED ORAL 3 TIMES DAILY
Qty: 90 TABLET | Refills: 3 | Status: SHIPPED | OUTPATIENT
Start: 2018-11-05 | End: 2019-06-13 | Stop reason: SDUPTHER

## 2018-11-05 RX ORDER — ATORVASTATIN CALCIUM 40 MG/1
40 TABLET, FILM COATED ORAL DAILY
Qty: 30 TABLET | Refills: 3 | Status: ON HOLD | OUTPATIENT
Start: 2018-11-05 | End: 2019-07-26 | Stop reason: SDUPTHER

## 2018-11-05 RX ORDER — CLONIDINE HYDROCHLORIDE 0.2 MG/1
0.2 TABLET ORAL 2 TIMES DAILY
Qty: 60 TABLET | Refills: 3 | Status: ON HOLD | OUTPATIENT
Start: 2018-11-05 | End: 2019-07-26 | Stop reason: SDUPTHER

## 2018-11-05 NOTE — TELEPHONE ENCOUNTER
From: Millie Frias  Sent: 11/2/2018 9:15 PM EDT  Subject: Medication Renewal Request    Alisha Cerrato would like a refill of the following medications:     torsemide (DEMADEX) 20 MG tablet Argentina Garcia MD]     atorvastatin (LIPITOR) 40 MG tablet Argentina Garcia MD]    Preferred pharmacy: [place] [id number]    Comment:      Medication renewals requested in this message routed separately:     lansoprazole (PREVACID) 15 MG delayed release capsule Nupur Lyn MD]     levothyroxine (SYNTHROID) 50 MCG tablet Nupur Lyn MD]       hydrALAZINE (APRESOLINE) 50 MG tablet [DIANE M ENZWEILER, APRN - CNP]     cloNIDine (CATAPRES) 0.2 MG tablet [DIANE M ENZWEILER, APRN - CNP]       carvedilol (COREG) 25 MG tablet [Hans Webber MD]     montelukast (SINGULAIR) 10 MG tablet [Hans Webber MD]     citalopram (CELEXA) 40 MG tablet [Hans Webber MD]

## 2018-11-08 ENCOUNTER — TELEPHONE (OUTPATIENT)
Dept: PHARMACY | Age: 73
End: 2018-11-08

## 2018-11-10 ENCOUNTER — PATIENT MESSAGE (OUTPATIENT)
Dept: FAMILY MEDICINE CLINIC | Age: 73
End: 2018-11-10

## 2018-11-10 DIAGNOSIS — F41.9 ANXIETY: Primary | ICD-10-CM

## 2018-11-12 RX ORDER — LORAZEPAM 0.5 MG/1
0.5 TABLET ORAL DAILY
Qty: 30 TABLET | Refills: 1 | Status: SHIPPED | OUTPATIENT
Start: 2018-11-12 | End: 2019-01-11

## 2018-11-12 NOTE — TELEPHONE ENCOUNTER
From: Tuan Estevez  To: Osiris Loredo MD  Sent: 11/10/2018 12:47 PM EST  Subject: Non-Urgent Medical Question    Could i please get refill on my ativan

## 2018-11-14 ENCOUNTER — CARE COORDINATION (OUTPATIENT)
Dept: CARE COORDINATION | Age: 73
End: 2018-11-14

## 2018-11-15 ENCOUNTER — CARE COORDINATION (OUTPATIENT)
Dept: CARE COORDINATION | Age: 73
End: 2018-11-15

## 2018-11-15 ENCOUNTER — TELEPHONE (OUTPATIENT)
Dept: FAMILY MEDICINE CLINIC | Age: 73
End: 2018-11-15

## 2018-11-15 NOTE — TELEPHONE ENCOUNTER
Yahir Rojas From [facility] is calling to say that they cant get a hold of the pt so at this point they will stop services. Please advise,    Thanks.

## 2018-11-16 ENCOUNTER — PATIENT MESSAGE (OUTPATIENT)
Dept: FAMILY MEDICINE CLINIC | Age: 73
End: 2018-11-16

## 2018-11-16 RX ORDER — GUAIFENESIN 600 MG/1
600 TABLET, EXTENDED RELEASE ORAL 2 TIMES DAILY
Qty: 30 TABLET | Refills: 0 | Status: SHIPPED | OUTPATIENT
Start: 2018-11-16 | End: 2018-11-19 | Stop reason: SDUPTHER

## 2018-11-17 ENCOUNTER — PATIENT MESSAGE (OUTPATIENT)
Dept: FAMILY MEDICINE CLINIC | Age: 73
End: 2018-11-17

## 2018-11-19 RX ORDER — GUAIFENESIN 600 MG/1
600 TABLET, EXTENDED RELEASE ORAL 2 TIMES DAILY
Qty: 30 TABLET | Refills: 5 | Status: SHIPPED | OUTPATIENT
Start: 2018-11-19 | End: 2018-12-04

## 2018-11-23 ENCOUNTER — TELEPHONE (OUTPATIENT)
Dept: PHARMACY | Age: 73
End: 2018-11-23

## 2018-11-30 ENCOUNTER — TELEPHONE (OUTPATIENT)
Dept: PHARMACY | Age: 73
End: 2018-11-30

## 2018-12-07 ENCOUNTER — SCHEDULED TELEPHONE ENCOUNTER (OUTPATIENT)
Dept: PHARMACY | Age: 73
End: 2018-12-07
Payer: COMMERCIAL

## 2018-12-13 ENCOUNTER — SCHEDULED TELEPHONE ENCOUNTER (OUTPATIENT)
Dept: PHARMACY | Age: 73
End: 2018-12-13
Payer: COMMERCIAL

## 2018-12-13 NOTE — TELEPHONE ENCOUNTER
I left another message for patient to call to schedule an appointment in the [facility] for COPD. This is the second time patient has been referred to our services for COPD she has not returned our calls to schedule. We will no longer try to contact her to schedule however she is welcomed to call us if needed.

## 2019-01-17 ENCOUNTER — CARE COORDINATION (OUTPATIENT)
Dept: CARE COORDINATION | Age: 74
End: 2019-01-17

## 2019-02-25 DIAGNOSIS — F41.9 ANXIETY: Primary | ICD-10-CM

## 2019-02-26 RX ORDER — LORAZEPAM 0.5 MG/1
TABLET ORAL
Qty: 30 TABLET | Refills: 0 | Status: SHIPPED | OUTPATIENT
Start: 2019-02-26 | End: 2019-03-28

## 2019-03-21 ENCOUNTER — CARE COORDINATION (OUTPATIENT)
Dept: CARE COORDINATION | Age: 74
End: 2019-03-21

## 2019-04-15 RX ORDER — LORAZEPAM 1 MG/1
TABLET ORAL
Qty: 1 TABLET | Refills: 0 | OUTPATIENT
Start: 2019-04-15

## 2019-04-25 DIAGNOSIS — R06.02 SOB (SHORTNESS OF BREATH): ICD-10-CM

## 2019-04-25 RX ORDER — LISINOPRIL 10 MG/1
TABLET ORAL
Qty: 30 TABLET | Refills: 0 | Status: SHIPPED | OUTPATIENT
Start: 2019-04-25 | End: 2019-06-11 | Stop reason: SDUPTHER

## 2019-04-25 RX ORDER — TORSEMIDE 20 MG/1
TABLET ORAL
Qty: 30 TABLET | Refills: 3 | Status: ON HOLD | OUTPATIENT
Start: 2019-04-25 | End: 2019-07-26 | Stop reason: SDUPTHER

## 2019-04-28 NOTE — PLAN OF CARE
Problem: HEMODYNAMIC STATUS  Goal: Patient has stable vital signs and fluid balance  Outcome: Ongoing  Vitals completed q4h and assessed by RN. I&O charted and assessed per MD order. Pt tolerating adequate fluid intake. Intact

## 2019-05-16 DIAGNOSIS — I25.10 CORONARY ARTERY DISEASE INVOLVING NATIVE CORONARY ARTERY OF NATIVE HEART WITHOUT ANGINA PECTORIS: ICD-10-CM

## 2019-05-16 DIAGNOSIS — I10 ESSENTIAL HYPERTENSION: ICD-10-CM

## 2019-05-17 RX ORDER — CITALOPRAM 40 MG/1
TABLET ORAL
Qty: 30 TABLET | Refills: 1 | Status: ON HOLD | OUTPATIENT
Start: 2019-05-17 | End: 2019-07-26 | Stop reason: SDUPTHER

## 2019-05-17 RX ORDER — CARVEDILOL 25 MG/1
TABLET ORAL
Qty: 60 TABLET | Refills: 1 | Status: ON HOLD | OUTPATIENT
Start: 2019-05-17 | End: 2019-07-26 | Stop reason: SDUPTHER

## 2019-06-11 RX ORDER — LISINOPRIL 10 MG/1
TABLET ORAL
Qty: 30 TABLET | Refills: 0 | Status: ON HOLD | OUTPATIENT
Start: 2019-06-11 | End: 2019-07-26 | Stop reason: SDUPTHER

## 2019-06-12 RX ORDER — APIXABAN 5 MG/1
TABLET, FILM COATED ORAL
Qty: 60 TABLET | Refills: 0 | Status: ON HOLD | OUTPATIENT
Start: 2019-06-12 | End: 2019-07-26 | Stop reason: SDUPTHER

## 2019-06-13 DIAGNOSIS — I10 ESSENTIAL HYPERTENSION: ICD-10-CM

## 2019-06-14 RX ORDER — HYDRALAZINE HYDROCHLORIDE 50 MG/1
TABLET, FILM COATED ORAL
Qty: 90 TABLET | Refills: 1 | Status: ON HOLD | OUTPATIENT
Start: 2019-06-14 | End: 2019-07-26 | Stop reason: SDUPTHER

## 2019-07-22 ENCOUNTER — TELEPHONE (OUTPATIENT)
Dept: PULMONOLOGY | Age: 74
End: 2019-07-22

## 2019-07-25 ENCOUNTER — APPOINTMENT (OUTPATIENT)
Dept: GENERAL RADIOLOGY | Age: 74
DRG: 194 | End: 2019-07-25
Payer: COMMERCIAL

## 2019-07-25 ENCOUNTER — HOSPITAL ENCOUNTER (INPATIENT)
Age: 74
LOS: 1 days | Discharge: HOME | DRG: 194 | End: 2019-07-26
Attending: EMERGENCY MEDICINE | Admitting: INTERNAL MEDICINE
Payer: COMMERCIAL

## 2019-07-25 DIAGNOSIS — K21.9 GASTROESOPHAGEAL REFLUX DISEASE, ESOPHAGITIS PRESENCE NOT SPECIFIED: ICD-10-CM

## 2019-07-25 DIAGNOSIS — R06.02 SOB (SHORTNESS OF BREATH): ICD-10-CM

## 2019-07-25 DIAGNOSIS — I50.9 ACUTE ON CHRONIC CONGESTIVE HEART FAILURE, UNSPECIFIED HEART FAILURE TYPE (HCC): ICD-10-CM

## 2019-07-25 DIAGNOSIS — I73.9 PAD (PERIPHERAL ARTERY DISEASE) (HCC): ICD-10-CM

## 2019-07-25 DIAGNOSIS — J44.1 COPD EXACERBATION (HCC): Primary | ICD-10-CM

## 2019-07-25 DIAGNOSIS — I10 ESSENTIAL HYPERTENSION: ICD-10-CM

## 2019-07-25 DIAGNOSIS — I25.10 CORONARY ARTERY DISEASE INVOLVING NATIVE CORONARY ARTERY OF NATIVE HEART WITHOUT ANGINA PECTORIS: ICD-10-CM

## 2019-07-25 LAB
ANION GAP SERPL CALCULATED.3IONS-SCNC: 12 MMOL/L (ref 3–16)
BASE EXCESS VENOUS: 9 (ref -3–3)
BASOPHILS ABSOLUTE: 0.1 K/UL (ref 0–0.2)
BASOPHILS RELATIVE PERCENT: 1.2 %
BUN BLDV-MCNC: 19 MG/DL (ref 7–20)
CALCIUM SERPL-MCNC: 9.3 MG/DL (ref 8.3–10.6)
CHLORIDE BLD-SCNC: 103 MMOL/L (ref 99–110)
CO2: 28 MMOL/L (ref 21–32)
CREAT SERPL-MCNC: 1.2 MG/DL (ref 0.6–1.2)
EKG ATRIAL RATE: 78 BPM
EKG DIAGNOSIS: NORMAL
EKG P AXIS: 69 DEGREES
EKG P-R INTERVAL: 118 MS
EKG Q-T INTERVAL: 400 MS
EKG QRS DURATION: 78 MS
EKG QTC CALCULATION (BAZETT): 456 MS
EKG R AXIS: 56 DEGREES
EKG T AXIS: -26 DEGREES
EKG VENTRICULAR RATE: 78 BPM
EOSINOPHILS ABSOLUTE: 0 K/UL (ref 0–0.6)
EOSINOPHILS RELATIVE PERCENT: 0.4 %
GFR AFRICAN AMERICAN: 53
GFR NON-AFRICAN AMERICAN: 44
GLUCOSE BLD-MCNC: 120 MG/DL (ref 70–99)
HCO3 VENOUS: 34.6 MMOL/L (ref 23–29)
HCT VFR BLD CALC: 31.9 % (ref 36–48)
HEMOGLOBIN: 10 G/DL (ref 12–16)
LACTATE: 0.93 MMOL/L (ref 0.4–2)
LYMPHOCYTES ABSOLUTE: 1.7 K/UL (ref 1–5.1)
LYMPHOCYTES RELATIVE PERCENT: 16.9 %
MAGNESIUM: 2.2 MG/DL (ref 1.8–2.4)
MCH RBC QN AUTO: 25.8 PG (ref 26–34)
MCHC RBC AUTO-ENTMCNC: 31.3 G/DL (ref 31–36)
MCV RBC AUTO: 82.4 FL (ref 80–100)
MONOCYTES ABSOLUTE: 0.8 K/UL (ref 0–1.3)
MONOCYTES RELATIVE PERCENT: 7.3 %
NEUTROPHILS ABSOLUTE: 7.6 K/UL (ref 1.7–7.7)
NEUTROPHILS RELATIVE PERCENT: 74.2 %
O2 SAT, VEN: 89 %
PCO2, VEN: 61 MM HG (ref 40–50)
PDW BLD-RTO: 19 % (ref 12.4–15.4)
PERFORMED ON: ABNORMAL
PH VENOUS: 7.36 (ref 7.35–7.45)
PHOSPHORUS: 3.9 MG/DL (ref 2.5–4.9)
PLATELET # BLD: 290 K/UL (ref 135–450)
PMV BLD AUTO: 8.6 FL (ref 5–10.5)
PO2, VEN: 60 MM HG
POC SAMPLE TYPE: ABNORMAL
POTASSIUM SERPL-SCNC: 4.6 MMOL/L (ref 3.5–5.1)
PRO-BNP: 2971 PG/ML (ref 0–124)
RBC # BLD: 3.87 M/UL (ref 4–5.2)
SODIUM BLD-SCNC: 143 MMOL/L (ref 136–145)
TCO2 CALC VENOUS: 36 MMOL/L
TROPONIN: <0.01 NG/ML
WBC # BLD: 10.3 K/UL (ref 4–11)

## 2019-07-25 PROCEDURE — 84484 ASSAY OF TROPONIN QUANT: CPT

## 2019-07-25 PROCEDURE — 82803 BLOOD GASES ANY COMBINATION: CPT

## 2019-07-25 PROCEDURE — 2700000000 HC OXYGEN THERAPY PER DAY

## 2019-07-25 PROCEDURE — 2580000003 HC RX 258: Performed by: INTERNAL MEDICINE

## 2019-07-25 PROCEDURE — 84100 ASSAY OF PHOSPHORUS: CPT

## 2019-07-25 PROCEDURE — 85025 COMPLETE CBC W/AUTO DIFF WBC: CPT

## 2019-07-25 PROCEDURE — 6360000002 HC RX W HCPCS: Performed by: EMERGENCY MEDICINE

## 2019-07-25 PROCEDURE — 6370000000 HC RX 637 (ALT 250 FOR IP): Performed by: INTERNAL MEDICINE

## 2019-07-25 PROCEDURE — 83880 ASSAY OF NATRIURETIC PEPTIDE: CPT

## 2019-07-25 PROCEDURE — 94640 AIRWAY INHALATION TREATMENT: CPT

## 2019-07-25 PROCEDURE — 94669 MECHANICAL CHEST WALL OSCILL: CPT

## 2019-07-25 PROCEDURE — 36415 COLL VENOUS BLD VENIPUNCTURE: CPT

## 2019-07-25 PROCEDURE — 6370000000 HC RX 637 (ALT 250 FOR IP): Performed by: EMERGENCY MEDICINE

## 2019-07-25 PROCEDURE — 71045 X-RAY EXAM CHEST 1 VIEW: CPT

## 2019-07-25 PROCEDURE — 80048 BASIC METABOLIC PNL TOTAL CA: CPT

## 2019-07-25 PROCEDURE — 1200000000 HC SEMI PRIVATE

## 2019-07-25 PROCEDURE — 2500000003 HC RX 250 WO HCPCS: Performed by: INTERNAL MEDICINE

## 2019-07-25 PROCEDURE — 94150 VITAL CAPACITY TEST: CPT

## 2019-07-25 PROCEDURE — 83735 ASSAY OF MAGNESIUM: CPT

## 2019-07-25 PROCEDURE — 93005 ELECTROCARDIOGRAM TRACING: CPT | Performed by: EMERGENCY MEDICINE

## 2019-07-25 PROCEDURE — 96374 THER/PROPH/DIAG INJ IV PUSH: CPT

## 2019-07-25 PROCEDURE — 6360000002 HC RX W HCPCS

## 2019-07-25 PROCEDURE — 94761 N-INVAS EAR/PLS OXIMETRY MLT: CPT

## 2019-07-25 PROCEDURE — 99285 EMERGENCY DEPT VISIT HI MDM: CPT

## 2019-07-25 PROCEDURE — 96375 TX/PRO/DX INJ NEW DRUG ADDON: CPT

## 2019-07-25 PROCEDURE — 83605 ASSAY OF LACTIC ACID: CPT

## 2019-07-25 PROCEDURE — 2580000003 HC RX 258: Performed by: EMERGENCY MEDICINE

## 2019-07-25 RX ORDER — ONDANSETRON 2 MG/ML
4 INJECTION INTRAMUSCULAR; INTRAVENOUS EVERY 6 HOURS PRN
Status: DISCONTINUED | OUTPATIENT
Start: 2019-07-25 | End: 2019-07-26 | Stop reason: HOSPADM

## 2019-07-25 RX ORDER — GUAIFENESIN 600 MG/1
600 TABLET, EXTENDED RELEASE ORAL 2 TIMES DAILY
Status: DISCONTINUED | OUTPATIENT
Start: 2019-07-25 | End: 2019-07-26 | Stop reason: HOSPADM

## 2019-07-25 RX ORDER — METHYLPREDNISOLONE SODIUM SUCCINATE 125 MG/2ML
125 INJECTION, POWDER, LYOPHILIZED, FOR SOLUTION INTRAMUSCULAR; INTRAVENOUS ONCE
Status: COMPLETED | OUTPATIENT
Start: 2019-07-25 | End: 2019-07-25

## 2019-07-25 RX ORDER — ALBUTEROL SULFATE 2.5 MG/3ML
SOLUTION RESPIRATORY (INHALATION)
Status: COMPLETED
Start: 2019-07-25 | End: 2019-07-25

## 2019-07-25 RX ORDER — IPRATROPIUM BROMIDE AND ALBUTEROL SULFATE 2.5; .5 MG/3ML; MG/3ML
1 SOLUTION RESPIRATORY (INHALATION)
Status: DISCONTINUED | OUTPATIENT
Start: 2019-07-25 | End: 2019-07-25

## 2019-07-25 RX ORDER — CARVEDILOL 25 MG/1
25 TABLET ORAL 2 TIMES DAILY WITH MEALS
Status: DISCONTINUED | OUTPATIENT
Start: 2019-07-25 | End: 2019-07-26 | Stop reason: HOSPADM

## 2019-07-25 RX ORDER — CLONIDINE HYDROCHLORIDE 0.2 MG/1
0.2 TABLET ORAL 2 TIMES DAILY
Status: DISCONTINUED | OUTPATIENT
Start: 2019-07-25 | End: 2019-07-26 | Stop reason: HOSPADM

## 2019-07-25 RX ORDER — CITALOPRAM 40 MG/1
40 TABLET ORAL DAILY
Status: DISCONTINUED | OUTPATIENT
Start: 2019-07-25 | End: 2019-07-26 | Stop reason: HOSPADM

## 2019-07-25 RX ORDER — FUROSEMIDE 10 MG/ML
40 INJECTION INTRAMUSCULAR; INTRAVENOUS DAILY
Status: COMPLETED | OUTPATIENT
Start: 2019-07-26 | End: 2019-07-26

## 2019-07-25 RX ORDER — LEVOTHYROXINE SODIUM 0.05 MG/1
50 TABLET ORAL DAILY
Status: DISCONTINUED | OUTPATIENT
Start: 2019-07-25 | End: 2019-07-26 | Stop reason: HOSPADM

## 2019-07-25 RX ORDER — ACETAMINOPHEN 325 MG/1
650 TABLET ORAL EVERY 4 HOURS PRN
Status: DISCONTINUED | OUTPATIENT
Start: 2019-07-25 | End: 2019-07-26 | Stop reason: HOSPADM

## 2019-07-25 RX ORDER — IPRATROPIUM BROMIDE AND ALBUTEROL SULFATE 2.5; .5 MG/3ML; MG/3ML
1 SOLUTION RESPIRATORY (INHALATION) ONCE
Status: COMPLETED | OUTPATIENT
Start: 2019-07-25 | End: 2019-07-25

## 2019-07-25 RX ORDER — HYDRALAZINE HYDROCHLORIDE 50 MG/1
50 TABLET, FILM COATED ORAL EVERY 8 HOURS SCHEDULED
Status: DISCONTINUED | OUTPATIENT
Start: 2019-07-25 | End: 2019-07-26 | Stop reason: HOSPADM

## 2019-07-25 RX ORDER — ATORVASTATIN CALCIUM 40 MG/1
40 TABLET, FILM COATED ORAL DAILY
Status: DISCONTINUED | OUTPATIENT
Start: 2019-07-25 | End: 2019-07-26 | Stop reason: HOSPADM

## 2019-07-25 RX ORDER — LABETALOL 20 MG/4 ML (5 MG/ML) INTRAVENOUS SYRINGE
10 ONCE
Status: COMPLETED | OUTPATIENT
Start: 2019-07-25 | End: 2019-07-25

## 2019-07-25 RX ORDER — FUROSEMIDE 10 MG/ML
40 INJECTION INTRAMUSCULAR; INTRAVENOUS 3 TIMES DAILY
Status: DISCONTINUED | OUTPATIENT
Start: 2019-07-25 | End: 2019-07-25

## 2019-07-25 RX ORDER — PANTOPRAZOLE SODIUM 40 MG/1
40 TABLET, DELAYED RELEASE ORAL
Status: DISCONTINUED | OUTPATIENT
Start: 2019-07-25 | End: 2019-07-26 | Stop reason: HOSPADM

## 2019-07-25 RX ORDER — FUROSEMIDE 10 MG/ML
40 INJECTION INTRAMUSCULAR; INTRAVENOUS DAILY
Status: DISCONTINUED | OUTPATIENT
Start: 2019-07-25 | End: 2019-07-25

## 2019-07-25 RX ORDER — ALBUTEROL SULFATE 2.5 MG/3ML
2.5 SOLUTION RESPIRATORY (INHALATION) ONCE
Status: COMPLETED | OUTPATIENT
Start: 2019-07-25 | End: 2019-07-25

## 2019-07-25 RX ORDER — LISINOPRIL 10 MG/1
10 TABLET ORAL DAILY
Status: DISCONTINUED | OUTPATIENT
Start: 2019-07-25 | End: 2019-07-26 | Stop reason: HOSPADM

## 2019-07-25 RX ORDER — SODIUM CHLORIDE 0.9 % (FLUSH) 0.9 %
10 SYRINGE (ML) INJECTION EVERY 12 HOURS SCHEDULED
Status: DISCONTINUED | OUTPATIENT
Start: 2019-07-25 | End: 2019-07-26 | Stop reason: HOSPADM

## 2019-07-25 RX ORDER — FUROSEMIDE 10 MG/ML
80 INJECTION INTRAMUSCULAR; INTRAVENOUS ONCE
Status: COMPLETED | OUTPATIENT
Start: 2019-07-25 | End: 2019-07-25

## 2019-07-25 RX ORDER — ALBUTEROL SULFATE 2.5 MG/3ML
2.5 SOLUTION RESPIRATORY (INHALATION)
Status: DISCONTINUED | OUTPATIENT
Start: 2019-07-25 | End: 2019-07-26 | Stop reason: HOSPADM

## 2019-07-25 RX ORDER — PREDNISONE 20 MG/1
40 TABLET ORAL DAILY
Status: DISCONTINUED | OUTPATIENT
Start: 2019-07-25 | End: 2019-07-26 | Stop reason: HOSPADM

## 2019-07-25 RX ORDER — ASPIRIN 81 MG/1
81 TABLET, CHEWABLE ORAL DAILY
Status: DISCONTINUED | OUTPATIENT
Start: 2019-07-25 | End: 2019-07-26 | Stop reason: HOSPADM

## 2019-07-25 RX ORDER — IPRATROPIUM BROMIDE AND ALBUTEROL SULFATE 2.5; .5 MG/3ML; MG/3ML
1 SOLUTION RESPIRATORY (INHALATION) 3 TIMES DAILY
Status: DISCONTINUED | OUTPATIENT
Start: 2019-07-25 | End: 2019-07-26 | Stop reason: HOSPADM

## 2019-07-25 RX ORDER — MONTELUKAST SODIUM 10 MG/1
10 TABLET ORAL DAILY
Status: DISCONTINUED | OUTPATIENT
Start: 2019-07-25 | End: 2019-07-26 | Stop reason: HOSPADM

## 2019-07-25 RX ORDER — SODIUM CHLORIDE 0.9 % (FLUSH) 0.9 %
10 SYRINGE (ML) INJECTION PRN
Status: DISCONTINUED | OUTPATIENT
Start: 2019-07-25 | End: 2019-07-26 | Stop reason: HOSPADM

## 2019-07-25 RX ADMIN — ALBUTEROL SULFATE 2.5 MG: 2.5 SOLUTION RESPIRATORY (INHALATION) at 05:32

## 2019-07-25 RX ADMIN — LEVOTHYROXINE SODIUM 50 MCG: 50 TABLET ORAL at 11:13

## 2019-07-25 RX ADMIN — FUROSEMIDE 80 MG: 10 INJECTION, SOLUTION INTRAMUSCULAR; INTRAVENOUS at 06:23

## 2019-07-25 RX ADMIN — AZITHROMYCIN MONOHYDRATE 500 MG: 500 INJECTION, POWDER, LYOPHILIZED, FOR SOLUTION INTRAVENOUS at 06:56

## 2019-07-25 RX ADMIN — APIXABAN 5 MG: 5 TABLET, FILM COATED ORAL at 11:12

## 2019-07-25 RX ADMIN — GUAIFENESIN 600 MG: 600 TABLET, EXTENDED RELEASE ORAL at 11:14

## 2019-07-25 RX ADMIN — CLONIDINE HYDROCHLORIDE 0.2 MG: 0.2 TABLET ORAL at 11:14

## 2019-07-25 RX ADMIN — Medication 10 ML: at 20:28

## 2019-07-25 RX ADMIN — PANTOPRAZOLE SODIUM 40 MG: 40 TABLET, DELAYED RELEASE ORAL at 11:13

## 2019-07-25 RX ADMIN — HYDRALAZINE HYDROCHLORIDE 50 MG: 50 TABLET, FILM COATED ORAL at 11:14

## 2019-07-25 RX ADMIN — METHYLPREDNISOLONE SODIUM SUCCINATE 125 MG: 125 INJECTION, POWDER, FOR SOLUTION INTRAMUSCULAR; INTRAVENOUS at 06:23

## 2019-07-25 RX ADMIN — ASPIRIN 81 MG 81 MG: 81 TABLET ORAL at 11:13

## 2019-07-25 RX ADMIN — GUAIFENESIN 600 MG: 600 TABLET, EXTENDED RELEASE ORAL at 20:27

## 2019-07-25 RX ADMIN — PREDNISONE 40 MG: 20 TABLET ORAL at 11:13

## 2019-07-25 RX ADMIN — ALBUTEROL SULFATE 2.5 MG: 2.5 SOLUTION RESPIRATORY (INHALATION) at 05:33

## 2019-07-25 RX ADMIN — NITROGLYCERIN 1 INCH: 20 OINTMENT TOPICAL at 06:23

## 2019-07-25 RX ADMIN — ATORVASTATIN CALCIUM 40 MG: 40 TABLET, FILM COATED ORAL at 11:12

## 2019-07-25 RX ADMIN — CARVEDILOL 25 MG: 25 TABLET, FILM COATED ORAL at 11:13

## 2019-07-25 RX ADMIN — Medication 10 ML: at 11:15

## 2019-07-25 RX ADMIN — LISINOPRIL 10 MG: 10 TABLET ORAL at 11:15

## 2019-07-25 RX ADMIN — MONTELUKAST SODIUM 10 MG: 10 TABLET, FILM COATED ORAL at 11:14

## 2019-07-25 RX ADMIN — APIXABAN 5 MG: 5 TABLET, FILM COATED ORAL at 20:27

## 2019-07-25 RX ADMIN — CITALOPRAM 40 MG: 40 TABLET, FILM COATED ORAL at 11:12

## 2019-07-25 RX ADMIN — IPRATROPIUM BROMIDE AND ALBUTEROL SULFATE 1 AMPULE: .5; 3 SOLUTION RESPIRATORY (INHALATION) at 05:31

## 2019-07-25 RX ADMIN — IPRATROPIUM BROMIDE AND ALBUTEROL SULFATE 1 AMPULE: .5; 3 SOLUTION RESPIRATORY (INHALATION) at 21:52

## 2019-07-25 RX ADMIN — LABETALOL 20 MG/4 ML (5 MG/ML) INTRAVENOUS SYRINGE 10 MG: at 07:27

## 2019-07-25 ASSESSMENT — ENCOUNTER SYMPTOMS
EYES NEGATIVE: 1
GASTROINTESTINAL NEGATIVE: 1
SHORTNESS OF BREATH: 1
CHEST TIGHTNESS: 1

## 2019-07-25 ASSESSMENT — PAIN SCALES - GENERAL
PAINLEVEL_OUTOF10: 0

## 2019-07-25 NOTE — H&P
Hospital Medicine History & Physical      PCP: Zehra Segal MD    Date of Admission: 7/25/2019    Date of Service: Pt seen/examined on 7.25.19 . Patient will be admitted  in/to the hospital.    Chief Complaint:  Sob x 2 days       History Of Present Illness:      72 y.o. female who presented to [facility] ed  with above complaints. Symptom onset has been acute for a time period of 2 day(s). Severity is described as moderate. Course of her symptoms over time is constant. Associated with sob, wheezin,g   Not Associated with fever, chills   No h/o of recent travel, ill contacts,weight loss. Pain description - Location left sided , rated 7/10, which is sharp  in nature, radiation to none , started 2 days  ago with cough  , aggravated by toshia breathing , relieved by none . Past Medical History:          Diagnosis Date    CAD (coronary artery disease)     Nonobstructive on cath in 9/2017    [facility])     [facility] care [facility] [address] 4/25/2018    Peristomal irritation and early leaking    [facility]ch[facility] disease) ([facility])     Hyperlipidemia     Hypertension     Kidney disease     Stage 3    Peripheral vascular disease ([facility] [address]     Rectal fissure 11/2014    surgery pending    Restless legs syndrome     Sleep apnea     O2 3 liters at hs    Unspecified sleep apnea        Past Surgical History:          Procedure Laterality Date    CARDIAC CATHETERIZATION  09/13/2017    Dr. Yahaira Olivares  03/09/2018    CORONARY ARTERY BYPASS GRAFT      Legs & Carotid    HERNIA REPAIR         Medications Prior to Admission:      Prior to Admission medications    Medication Sig Start Date End Date Taking?  Authorizing Provider   hydrALAZINE (APRESOLINE) 50 MG tablet TAKE ONE TABLET BY MOUTH THREE TIMES A DAY 6/14/19  Yes Danya Hernandez MD   ELIQUIS 5 MG TABS tablet TAKE ONE TABLET BY MOUTH TWICE A DAY 6/12/19  Yes Danya Hernandez MD   lisinopril (PRINIVIL;ZESTRIL) 10 MG tablet TAKE ONE TABLET BY MOUTH DAILY 6/11/19  Yes Vanessa Webber MD   citalopram (CELEXA) 40 MG tablet TAKE ONE TABLET BY MOUTH DAILY 5/17/19  Yes Vanessa Webber MD   carvedilol (COREG) 25 MG tablet TAKE ONE TABLET BY MOUTH TWICE A DAY WITH MEALS 5/17/19  Yes Vanessa Webber MD   aspirin 81 MG tablet Take 81 mg by mouth daily   Yes Historical Provider, MD   atorvastatin (LIPITOR) 40 MG tablet Take 1 tablet by mouth daily 11/5/18  Yes Eloina Cox, APRN - CNP   cloNIDine (CATAPRES) 0.2 MG tablet Take 1 tablet by mouth 2 times daily 11/5/18  Yes Eduard Cox, APRN - CNP   budesonide (PULMICORT) 0.5 MG/2ML nebulizer suspension Take 2 mLs by nebulization 2 times daily 10/23/18  Yes Patricia Trotter MD   Arformoterol Tartrate [facility]) 15 MCG/2ML NEBU Take 1 ampule by nebulization 2 times daily 10/23/18  Yes Patricia Trotter MD   torsemide (DEMADEX) 20 MG tablet TAKE ONE TABLET BY MOUTH DAILY 4/25/19   Shanel Waldron MD   ipratropium-albuterol (DUONEB) 0.5-2.5 (3) MG/3ML SOLN nebulizer solution INHALE 1 VIAL VIA NEBULIZER TWO TIMES A DAY 1/28/19   Patricia Trotter MD   lansoprazole (PREVACID) 15 MG delayed release capsule Take 1 capsule by mouth daily 11/3/18   Vanessa Webber MD   levothyroxine (SYNTHROID) 50 MCG tablet TAKE ONE TABLET BY MOUTH DAILY 11/3/18   Vanessa Webber MD   predniSONE (DELTASONE) 10 MG tablet 4 tablets by mouth daily for 2 days  3 tablets by mouth daily for 2 days  2 tablets by mouth daily for 2 days  1 tablet by mouth daily for 2 days 11/1/18   Mary Alice Lynch MD   montelukast (SINGULAIR) 10 MG tablet Take 1 tablet by mouth daily 10/12/18   Vanessa Webber MD   predniSONE (DELTASONE) 20 MG tablet Take prednisone 60 mg daily for one week, then 40 mg daily for one week, then 20 mg daily for one week, then 10 mg daily for one week, then stop 9/26/18   Patricia Trotter MD   lidocaine (XYLOCAINE) 2 % jelly Apply small amount topically to affected area every 8 hours when necessary pain making:  Independent review of images, Review / order clinical lab tests, Review / order radiology, tests Decision to obtain old records. DVT Prophylaxis: eliquis   Diet: No diet orders on file  Code Status: Prior    PT/OT Eval Status: na    : na     Dispo - will monitor in floor   Needs to stay in hospital for sx control . I have discussed the above stated plan with the patient,   and they verbalized understanding and agreed with the plan. A total time of 15 min were exclusively devoted to discuss plan of care, and advanced care planning during this encounter. RN notified about todays plan  bed side. Suresh Srivastava Caro, [address]  This chart was generated in part by using Dragon Dictation system and may contain errors related to that system including errors in grammar, punctuation, and spelling, as well as words and phrases that may be inappropriate. When dictating, effort is made to correct spelling/grammar errors. If there are any questions or concerns please feel free to contact the dictating provider for clarification. Thank you Brigid Hsieh MD for the opportunity to be involved in this patient's care. If you have any questions or concerns please feel free to contact me at [phone].

## 2019-07-25 NOTE — ED PROVIDER NOTES
graft; Cardiac catheterization (09/13/2017); and colostomy (03/09/2018). Her family history includes Cancer in her brother; Heart Disease in her father, mother, and sister. She reports that she quit smoking about 27 years ago. She started smoking about 56 years ago. She has a 90.00 pack-year smoking history. She has never used smokeless tobacco. She reports that she does not drink alcohol or use drugs. Medications     Previous Medications    ALBUTEROL (PROVENTIL) (2.5 MG/3ML) 0.083% NEBULIZER SOLUTION    Take 3 mLs by nebulization every 6 hours as needed for Wheezing    ALBUTEROL SULFATE HFA (PROAIR HFA) 108 (90 BASE) MCG/ACT INHALER    Use every 4 hours while awake for 7-10 days then PRN wheezing  Dispense with SPACER and Instruct on use. May sub Ventolin or Proventil as needed per [employer]. ARFORMOTEROL TARTRATE (BROVANA) 15 MCG/2ML NEBU    Take 1 ampule by nebulization 2 times daily    ASPIRIN 81 MG TABLET    Take 81 mg by mouth daily    ATORVASTATIN (LIPITOR) 40 MG TABLET    Take 1 tablet by mouth daily    BUDESONIDE (PULMICORT) 0.5 MG/2ML NEBULIZER SUSPENSION    Take 2 mLs by nebulization 2 times daily    CARVEDILOL (COREG) 25 MG TABLET    TAKE ONE TABLET BY MOUTH TWICE A DAY WITH MEALS    CITALOPRAM (CELEXA) 40 MG TABLET    TAKE ONE TABLET BY MOUTH DAILY    CLONIDINE (CATAPRES) 0.2 MG TABLET    Take 1 tablet by mouth 2 times daily    ELIQUIS 5 MG TABS TABLET    TAKE ONE TABLET BY MOUTH TWICE A DAY    HYDRALAZINE (APRESOLINE) 50 MG TABLET    TAKE ONE TABLET BY MOUTH THREE TIMES A DAY    IPRATROPIUM-ALBUTEROL (DUONEB) 0.5-2.5 (3) MG/3ML SOLN NEBULIZER SOLUTION    Inhale 3 mLs into the lungs every 6 hours.     IPRATROPIUM-ALBUTEROL (DUONEB) 0.5-2.5 (3) MG/3ML SOLN NEBULIZER SOLUTION    INHALE 1 VIAL VIA NEBULIZER TWO TIMES A DAY    LANSOPRAZOLE (PREVACID) 15 MG DELAYED RELEASE CAPSULE    Take 1 capsule by mouth daily    LEVOTHYROXINE (SYNTHROID) 50 MCG TABLET    TAKE ONE TABLET BY MOUTH DAILY Skin: Skin is warm and dry. No erythema. Nursing note and vitals reviewed. Diagnostic Results     EKG   EKG as interpreted by me shows patient to be in a normal sinus rhythm with a normal axis, normal HI and QT intervals, normal QRS duration, no ST segment abnormalities, diffuse T wave inversions present. RADIOLOGY:  XR CHEST PORTABLE   Final Result       Cardiomegaly. No significant vascular congestion. Bibasilar atelectasis. No pleural effusion.           LABS:   Results for orders placed or performed during the hospital encounter of 07/25/19   CBC auto differential   Result Value Ref Range    WBC 10.3 4.0 - 11.0 K/uL    RBC 3.87 (L) 4.00 - 5.20 M/uL    Hemoglobin 10.0 (L) 12.0 - 16.0 g/dL    Hematocrit 31.9 (L) 36.0 - 48.0 %    MCV 82.4 80.0 - 100.0 fL    MCH 25.8 (L) 26.0 - 34.0 pg    MCHC 31.3 31.0 - 36.0 g/dL    RDW 19.0 (H) 12.4 - 15.4 %    Platelets 795 145 - 165 K/uL    MPV 8.6 5.0 - 10.5 fL    Neutrophils % 74.2 %    Lymphocytes % 16.9 %    Monocytes % 7.3 %    Eosinophils % 0.4 %    Basophils % 1.2 %    Neutrophils # 7.6 1.7 - 7.7 K/uL    Lymphocytes # 1.7 1.0 - 5.1 K/uL    Monocytes # 0.8 0.0 - 1.3 K/uL    Eosinophils # 0.0 0.0 - 0.6 K/uL    Basophils # 0.1 0.0 - 0.2 K/uL   Basic Metabolic Panel (EP - 1)   Result Value Ref Range    Sodium 143 136 - 145 mmol/L    Potassium 4.6 3.5 - 5.1 mmol/L    Chloride 103 99 - 110 mmol/L    CO2 28 21 - 32 mmol/L    Anion Gap 12 3 - 16    Glucose 120 (H) 70 - 99 mg/dL    BUN 19 7 - 20 mg/dL    CREATININE 1.2 0.6 - 1.2 mg/dL    GFR Non- 44 (A) >60    GFR  53 (A) >60    Calcium 9.3 8.3 - 10.6 mg/dL   Troponin   Result Value Ref Range    Troponin <0.01 <0.01 ng/mL   Brain Natriuretic Peptide   Result Value Ref Range    Pro-BNP 2,971 (H) 0 - 124 pg/mL   Magnesium   Result Value Ref Range    Magnesium 2.20 1.80 - 2.40 mg/dL   Phosphorus   Result Value Ref Range    Phosphorus 3.9 2.5 - 4.9 mg/dL   POCT Venous   Result Value

## 2019-07-25 NOTE — PROGRESS NOTES
RESPIRATORY THERAPY ASSESSMENT    Name:  Roxana Jefferson Record Number:  [id number]  Age: 72 y.o. Gender: female  : 1945  Today's Date:  2019  Room:  [facility]-    Assessment     Is the patient being admitted for a COPD or Asthma exacerbation? No   (If yes the patient will be seen every 4 hours for the first 24 hours and then reassessed)    Patient Admission Diagnosis      Allergies  Allergies   Allergen Reactions    Iv Dye [Iodides]      Flushed, broke out and difficulty breathing       Minimum Predicted Vital Capacity:     680         Actual Vital Capacity:      750              Pulmonary History:COPD and CHF/Pulmonary Edema  Home Oxygen Therapy:  3 lpm via nasal cannula  Home Respiratory Therapy:Albuterol, Albuterol/Ipratropium Bromide HHN and Budesonide   Current Respiratory Therapy:  Albuterol HHN Q 2 prn, Duoneb HHN Q 4 w/a. Treatment Type: Vibratory mucous clearing therapy or intervention  Medications: Albuterol    Respiratory Severity Index(RSI)   Patients with orders for inhalation medications, oxygen, or any therapeutic treatment modality will be placed on Respiratory Protocol. They will be assessed with the first treatment and at least every 72 hours thereafter. The following severity scale will be used to determine frequency of treatment intervention.     Smoking History: Pulmonary Disease or Smoking History, Greater than 15 pack year = 2    Social History  Social History     Tobacco Use    Smoking status: Former Smoker     Packs/day: 3.00     Years: 30.00     Pack years: 90.00     Start date: 1963     Last attempt to quit: 1992     Years since quittin.4    Smokeless tobacco: Never Used    Tobacco comment: QUIT 20 YRS AGO   Substance Use Topics    Alcohol use: No     Alcohol/week: 0.0 standard drinks    Drug use: No       Recent Surgical History: None = 0  Past Surgical History  Past Surgical History:   Procedure Laterality Date    CARDIAC CATHETERIZATION Medications:    1. If the patient lacks prior history of lung disease, is not using inhaled anti-inflammatory medication at home, and lacks wheezing by examination or by history for at least 24 hours, contact physician for possible discontinuation.

## 2019-07-25 NOTE — CARE COORDINATION
[facility]  [place]      Patient ID: Korin Hernandez 72 y.o. 1945    Admission Date: 7/25/2019   Admission Weight: 175 lb  Discharge Date: 7/26/2019  Discharge Weight: 180 lb    30 Day Readmit? No    Pt History and Precipitating Cause of Decompensation:    CAD (coronary artery disease)       Nonobstructive on cath in 9/2017    [facility])      Colostomy care ([facility] [address] 4/25/2018     Peristomal irritation and early leaking    COPD (chronic obstructive pulmonary disease) ([facility])      Hyperlipidemia      Hypertension      Kidney disease       Stage 3    Peripheral vascular disease ([facility] [address]      Rectal fissure 11/2014     surgery pending    Restless legs syndrome      Sleep apnea       O2 3 liters at hs    Unspecified sleep apnea      Presented to ED with complaints of SOB for two days. Hx. Of COPD and HF.       Ejection Fraction: ECHO 10/27/2018   Left ventricular cavity size is small.   There is mildly increased left ventricular wall thickness.   Overall left ventricular systolic function appears normal.   Ejection fraction is visually estimated to be 60-65%.   The left atrial size is normal.   Normal right ventricular size and function.       Has patient been diagnosed with ANGIE: No  Is patient being treated: No    Cardiology Consulted: No  Heart Failure Order Set Used: No  Complete Intake and Output: No  Complete Daily Weights: Yes    BMP:  Lab Results   Component Value Date     07/25/2019     10/31/2018     10/30/2018    K 4.6 07/25/2019    K 4.6 10/31/2018    K 5.1 10/30/2018    K 4.4 10/14/2018    K 4.1 09/23/2018    K 4.1 09/17/2018     07/25/2019    CL 90 10/31/2018    CL 95 10/30/2018    CO2 28 07/25/2019    CO2 33 10/31/2018    CO2 26 10/30/2018    PHOS 3.9 07/25/2019    PHOS 4.1 06/28/2018    PHOS 3.2 03/12/2018    BUN 19 07/25/2019    BUN 45 10/31/2018    BUN 47 10/30/2018    CREATININE 1.2 07/25/2019    CREATININE 1.3 10/31/2018    CREATININE 1.3 10/30/2018     BNP:   Lab Results   Component Value Date    PROBNP 2,971 07/25/2019    PROBNP 2,972 11/01/2018    PROBNP 3,630 10/30/2018           ACTIVITY/FUNCTIONAL ASSESSMENT:     PT/OT: Ambulates independently    Ambulation by day 2: Yes  Cardiac Rehab Referral for Ph2: No      NUTRITION:  Type and Reason for Visit:  Consult, Patient Education    Diet Orders / Intake / Nutrition Support  Current diet/supplement order: DIET CARDIAC; Low Sodium (2 GM)       Subjective:  RD consult for CHF education. Provided low sodium and cardiac diet education for pt. Reviewed low sodium foods and choosing healthy fats and limiting fluid intake. Provided handout Cardiac-TLC Nutrition Therapy (NCM). RD will follow per standards of care. Pt currently tries to follow a low sodium diet at home and does not add salt to food. Pt states understanding of education. Provided heart failure diet education that included:     Instructed the Patient on:   [x] Low Sodium foods  [x] Sodium free  [x] Fluids   [x] Other: Choosing Healthy Fats     Educational Materials Provided:   [] [facility]) Heart Failure Education folder- Nutrition Therapy   [x] Other: Cardiac-TLC Nutrition Therapy (NCM)    -General Diet Recommendations: low fat, low cholesterol, substitute healthy fats, such as olive oil, canola oil, grapeseed oil for saturated fats like butter, margarine, and shortening, minimize processed foods, reduce sodium intake, minimize simple sugars and 2 liter/day fluid restriction.     Time spent with patient: 30 minutes     ANA MARIA EATON, LD  Pager:  [phone]  Office:  Laura Blair  During Admission  If EF <40% ACE ordered ___, or ARB ___ or contraindication documented ____N/A EF >40%  If EF <40% Evidence-Based Beta Blocker ordered ____ or contraindication documented__N/A EF >40%  If EF 35% or less, K <5on admit, Cr<2 (female) <2.5 (male), Aldosterone antagonist ordered__ N/A

## 2019-07-26 VITALS
RESPIRATION RATE: 18 BRPM | TEMPERATURE: 98 F | OXYGEN SATURATION: 100 % | WEIGHT: 180 LBS | BODY MASS INDEX: 35.34 KG/M2 | HEART RATE: 72 BPM | DIASTOLIC BLOOD PRESSURE: 51 MMHG | HEIGHT: 60 IN | SYSTOLIC BLOOD PRESSURE: 108 MMHG

## 2019-07-26 LAB
TROPONIN: <0.01 NG/ML
TROPONIN: <0.01 NG/ML

## 2019-07-26 PROCEDURE — 6370000000 HC RX 637 (ALT 250 FOR IP): Performed by: INTERNAL MEDICINE

## 2019-07-26 PROCEDURE — 36415 COLL VENOUS BLD VENIPUNCTURE: CPT

## 2019-07-26 PROCEDURE — 6360000002 HC RX W HCPCS: Performed by: INTERNAL MEDICINE

## 2019-07-26 PROCEDURE — 94640 AIRWAY INHALATION TREATMENT: CPT

## 2019-07-26 PROCEDURE — 94761 N-INVAS EAR/PLS OXIMETRY MLT: CPT

## 2019-07-26 PROCEDURE — 2700000000 HC OXYGEN THERAPY PER DAY

## 2019-07-26 PROCEDURE — 84484 ASSAY OF TROPONIN QUANT: CPT

## 2019-07-26 PROCEDURE — 2580000003 HC RX 258: Performed by: INTERNAL MEDICINE

## 2019-07-26 RX ORDER — TORSEMIDE 20 MG/1
20 TABLET ORAL DAILY
Status: DISCONTINUED | OUTPATIENT
Start: 2019-07-27 | End: 2019-07-26 | Stop reason: HOSPADM

## 2019-07-26 RX ORDER — CITALOPRAM 40 MG/1
TABLET ORAL
Qty: 30 TABLET | Refills: 1 | Status: SHIPPED | OUTPATIENT
Start: 2019-07-26 | End: 2019-09-04 | Stop reason: SDUPTHER

## 2019-07-26 RX ORDER — LORAZEPAM 0.5 MG/1
0.5 TABLET ORAL EVERY 8 HOURS PRN
Qty: 10 TABLET | Refills: 0 | Status: SHIPPED | OUTPATIENT
Start: 2019-07-26 | End: 2019-09-23 | Stop reason: SDUPTHER

## 2019-07-26 RX ORDER — LISINOPRIL 10 MG/1
TABLET ORAL
Qty: 30 TABLET | Refills: 0 | Status: SHIPPED | OUTPATIENT
Start: 2019-07-26 | End: 2019-09-19 | Stop reason: SDUPTHER

## 2019-07-26 RX ORDER — CARVEDILOL 25 MG/1
TABLET ORAL
Qty: 60 TABLET | Refills: 1 | Status: SHIPPED | OUTPATIENT
Start: 2019-07-26 | End: 2019-09-05 | Stop reason: SDUPTHER

## 2019-07-26 RX ORDER — BUDESONIDE 0.5 MG/2ML
1 INHALANT ORAL 2 TIMES DAILY
Qty: 120 AMPULE | Refills: 5 | Status: SHIPPED | OUTPATIENT
Start: 2019-07-26 | End: 2019-11-05

## 2019-07-26 RX ORDER — ALBUTEROL SULFATE 2.5 MG/3ML
2.5 SOLUTION RESPIRATORY (INHALATION) EVERY 6 HOURS PRN
Qty: 120 EACH | Refills: 1 | Status: SHIPPED | OUTPATIENT
Start: 2019-07-26 | End: 2019-09-09

## 2019-07-26 RX ORDER — MONTELUKAST SODIUM 10 MG/1
10 TABLET ORAL DAILY
Qty: 30 TABLET | Refills: 5 | Status: SHIPPED | OUTPATIENT
Start: 2019-07-26 | End: 2019-09-19 | Stop reason: SDUPTHER

## 2019-07-26 RX ORDER — PREDNISONE 20 MG/1
40 TABLET ORAL DAILY
Qty: 6 TABLET | Refills: 0 | Status: SHIPPED | OUTPATIENT
Start: 2019-07-27 | End: 2019-07-30

## 2019-07-26 RX ORDER — ARFORMOTEROL TARTRATE 15 UG/2ML
1 SOLUTION RESPIRATORY (INHALATION) 2 TIMES DAILY
Qty: 120 ML | Refills: 5 | Status: SHIPPED | OUTPATIENT
Start: 2019-07-26 | End: 2019-11-05

## 2019-07-26 RX ORDER — TORSEMIDE 20 MG/1
TABLET ORAL
Qty: 30 TABLET | Refills: 3 | Status: SHIPPED | OUTPATIENT
Start: 2019-07-26 | End: 2019-09-04 | Stop reason: SDUPTHER

## 2019-07-26 RX ORDER — HYDRALAZINE HYDROCHLORIDE 50 MG/1
TABLET, FILM COATED ORAL
Qty: 90 TABLET | Refills: 1 | Status: SHIPPED | OUTPATIENT
Start: 2019-07-26 | End: 2019-09-05 | Stop reason: SDUPTHER

## 2019-07-26 RX ORDER — ATORVASTATIN CALCIUM 40 MG/1
40 TABLET, FILM COATED ORAL DAILY
Qty: 30 TABLET | Refills: 3 | Status: SHIPPED | OUTPATIENT
Start: 2019-07-26 | End: 2019-09-12 | Stop reason: SDUPTHER

## 2019-07-26 RX ORDER — LANSOPRAZOLE 15 MG/1
15 CAPSULE, DELAYED RELEASE ORAL DAILY
Qty: 90 CAPSULE | Refills: 1 | Status: SHIPPED | OUTPATIENT
Start: 2019-07-26 | End: 2019-09-19 | Stop reason: SDUPTHER

## 2019-07-26 RX ORDER — LORAZEPAM 0.5 MG/1
0.5 TABLET ORAL 2 TIMES DAILY PRN
Status: DISCONTINUED | OUTPATIENT
Start: 2019-07-26 | End: 2019-07-26 | Stop reason: HOSPADM

## 2019-07-26 RX ORDER — AZITHROMYCIN 250 MG/1
250 TABLET, FILM COATED ORAL DAILY
Qty: 3 TABLET | Refills: 0 | Status: SHIPPED | OUTPATIENT
Start: 2019-07-26 | End: 2019-07-29

## 2019-07-26 RX ORDER — CLONIDINE HYDROCHLORIDE 0.2 MG/1
0.2 TABLET ORAL 2 TIMES DAILY
Qty: 60 TABLET | Refills: 3 | Status: SHIPPED | OUTPATIENT
Start: 2019-07-26 | End: 2019-09-12 | Stop reason: SDUPTHER

## 2019-07-26 RX ORDER — LEVOTHYROXINE SODIUM 0.05 MG/1
TABLET ORAL
Qty: 90 TABLET | Refills: 1 | Status: SHIPPED | OUTPATIENT
Start: 2019-07-26 | End: 2019-09-09 | Stop reason: SDUPTHER

## 2019-07-26 RX ADMIN — ASPIRIN 81 MG 81 MG: 81 TABLET ORAL at 08:51

## 2019-07-26 RX ADMIN — LORAZEPAM 0.5 MG: 0.5 TABLET ORAL at 09:55

## 2019-07-26 RX ADMIN — GUAIFENESIN 600 MG: 600 TABLET, EXTENDED RELEASE ORAL at 08:50

## 2019-07-26 RX ADMIN — CLONIDINE HYDROCHLORIDE 0.2 MG: 0.2 TABLET ORAL at 08:51

## 2019-07-26 RX ADMIN — Medication 10 ML: at 08:54

## 2019-07-26 RX ADMIN — LISINOPRIL 10 MG: 10 TABLET ORAL at 08:49

## 2019-07-26 RX ADMIN — PANTOPRAZOLE SODIUM 40 MG: 40 TABLET, DELAYED RELEASE ORAL at 06:11

## 2019-07-26 RX ADMIN — ACETAMINOPHEN 650 MG: 325 TABLET ORAL at 09:54

## 2019-07-26 RX ADMIN — FUROSEMIDE 40 MG: 10 INJECTION, SOLUTION INTRAMUSCULAR; INTRAVENOUS at 08:51

## 2019-07-26 RX ADMIN — IPRATROPIUM BROMIDE AND ALBUTEROL SULFATE 1 AMPULE: .5; 3 SOLUTION RESPIRATORY (INHALATION) at 07:45

## 2019-07-26 RX ADMIN — APIXABAN 5 MG: 5 TABLET, FILM COATED ORAL at 08:50

## 2019-07-26 RX ADMIN — LEVOTHYROXINE SODIUM 50 MCG: 50 TABLET ORAL at 06:10

## 2019-07-26 RX ADMIN — IPRATROPIUM BROMIDE AND ALBUTEROL SULFATE 1 AMPULE: .5; 3 SOLUTION RESPIRATORY (INHALATION) at 14:38

## 2019-07-26 RX ADMIN — CITALOPRAM 40 MG: 40 TABLET, FILM COATED ORAL at 08:50

## 2019-07-26 RX ADMIN — AZITHROMYCIN MONOHYDRATE 500 MG: 500 INJECTION, POWDER, LYOPHILIZED, FOR SOLUTION INTRAVENOUS at 06:11

## 2019-07-26 RX ADMIN — PREDNISONE 40 MG: 20 TABLET ORAL at 08:50

## 2019-07-26 RX ADMIN — ATORVASTATIN CALCIUM 40 MG: 40 TABLET, FILM COATED ORAL at 08:50

## 2019-07-26 RX ADMIN — HYDRALAZINE HYDROCHLORIDE 50 MG: 50 TABLET, FILM COATED ORAL at 14:38

## 2019-07-26 RX ADMIN — MONTELUKAST SODIUM 10 MG: 10 TABLET, FILM COATED ORAL at 08:50

## 2019-07-26 RX ADMIN — CARVEDILOL 25 MG: 25 TABLET, FILM COATED ORAL at 08:01

## 2019-07-26 ASSESSMENT — PAIN DESCRIPTION - DESCRIPTORS
DESCRIPTORS: ACHING;SHARP
DESCRIPTORS: ACHING;SHARP
DESCRIPTORS: SHARP;ACHING

## 2019-07-26 ASSESSMENT — PAIN DESCRIPTION - ORIENTATION
ORIENTATION: LOWER

## 2019-07-26 ASSESSMENT — PAIN DESCRIPTION - ONSET
ONSET: ON-GOING

## 2019-07-26 ASSESSMENT — PAIN DESCRIPTION - LOCATION
LOCATION: BACK

## 2019-07-26 ASSESSMENT — PAIN SCALES - GENERAL
PAINLEVEL_OUTOF10: 6
PAINLEVEL_OUTOF10: 4
PAINLEVEL_OUTOF10: 6
PAINLEVEL_OUTOF10: 4
PAINLEVEL_OUTOF10: 6

## 2019-07-26 ASSESSMENT — PAIN DESCRIPTION - PAIN TYPE
TYPE: CHRONIC PAIN

## 2019-07-26 ASSESSMENT — PAIN - FUNCTIONAL ASSESSMENT
PAIN_FUNCTIONAL_ASSESSMENT: ACTIVITIES ARE NOT PREVENTED

## 2019-07-26 ASSESSMENT — PAIN DESCRIPTION - PROGRESSION
CLINICAL_PROGRESSION: NOT CHANGED

## 2019-07-26 ASSESSMENT — PAIN DESCRIPTION - FREQUENCY
FREQUENCY: INTERMITTENT

## 2019-07-26 NOTE — CARE COORDINATION
Case Management Assessment            Discharge Note                    Date / Time of Note: 7/26/2019 12:55 PM                  Discharge Note Completed by: Carolina Mary Annpattie    Patient Name: Kalani Smith   YOB: 1945  Diagnosis: COPD exacerbation [facility]) [J44.1]   Date / Time: 7/25/2019  5:20 AM    Current PCP: Simon Pereira MD  Clinic patient: No    Hospitalization in the last 30 days: No    Advance Directives:  Code Status: Full Code  [place] DNR form completed and on chart: No    Financial:  Payor: Maya Cason / Plan: Noble Narvaez / Product Type: *No Product type* /      Pharmacy:    [facility] [address] [facility], [address]  [address] [facility] [address]  Phone: [phone] Fax: [phone]    AJKLPR BXXLALXUNH [facility] 108, [address] [facility] [phone] [facility] [phone] 1950 [facility] [address]  Phone: [phone] Fax: [phone] [facility] 166 [facility], [address] [place] [address] 1100 [facility] [phone] [facility] [phone] [address]  Phone: [phone] Fax: [phone]      Assistance purchasing medications?: Potential Assistance Purchasing Medications: No  Assistance provided by Case Management: None at this time    Does patient want to participate in local refill/ meds to beds program?: Yes    Meds To Beds General Rules:  1. Can ONLY be done Monday- Friday between 8:30am-5pm  2. Prescription(s) must be in pharmacy by 3pm to be filled same day  3. Copy of patient's insurance/ prescription drug card and patient face sheet must be sent along with the prescription(s)  4. Cost of Rx cannot be added to hospital bill. If financial assistance is needed, please contact unit  or ;  or  CANNOT provide pharmacy voucher for patients co-pays  5.  Patients can then  the prescription on their way out of the hospital at discharge, or pharmacy can deliver to the bedside if staff is available. (payment due at time of pick-up or delivery - cash, check, or card accepted)     Able to afford home medications/ co-pay costs: Yes    ADLS:  Current PT AM-PAC Score:   /24  Current OT AM-PAC Score:   /24      Lyndsay Crooks [address] [place] [address] :   Open ? Closes 5PM  Phone : [phone]      LOC at discharge: Not Applicable  STEWART Completed: Not Indicated    Notification completed in HENS/PAS?:  Not Applicable    IMM Completed:   Not Indicated    Transportation:  Transportation PLAN for discharge: friend   Mode of Transport: Not Applicable  Reason for medical transport: Not Applicable  Name of Transport Company: Not Applicable  Time of Transport: friend  At  [employer]    Transport form completed: Not Indicated    Home Care:  Home Care ordered at discharge: Not [address]: Not Applicable  Orders faxed: No    Durable Medical Equipment:  DME Provider: NA  Equipment obtained during hospitalization: NA    Home Oxygen and Respiratory Equipment:  Oxygen needed at discharge?: No  [address]: Not Applicable  Portable tank available for discharge?: Not Indicated      [address] 1100 [facility], [facility] [place] [address]       Phone: [phone]       Fax: [phone]        1). Cm  Called  [address] awaiting a call back  To  See if they can  Deliver  A tank here  For  Her to use  tp transport  To  [facility] during the night            2). CM  Called again  At  1445  To  Get  O 2  And  Confirm they are  Active  And  Need  Tank  :  Left  VM  Awaiting return  Call. Electronically signed by Mir Doshi RN on 7/26/2019 at 2:44 PM     3).  DELMA  Called  Hiren  A  3 rd  Time  To  followup on  Delivery of  O 2 tank  For  transport  To  [address] [place] [address] it  Would   Arrive  Within the  Next  [employer];  DELMA

## 2019-07-26 NOTE — DISCHARGE SUMMARY
discharging physician is Dr. Christiano Rivera. Gumaro Mcgovern and your admitting physician was Dr. Pato Faith MD.      Code Status:  Prior     Activity: as carlton      Diet: regular diet    Discharge Medications:     Discharge Medication List as of 7/26/2019  5:42 PM           Details   azithromycin (ZITHROMAX) 250 MG tablet Take 1 tablet by mouth daily for 3 days, Disp-3 tablet, R-0Normal      LORazepam (ATIVAN) 0.5 MG tablet Take 1 tablet by mouth every 8 hours as needed for Anxiety for up to 5 days. , Disp-10 tablet, R-0Print              Details   aspirin 81 MG tablet Take 1 tablet by mouth daily, Disp-30 tablet, R-3Normal      albuterol (PROVENTIL) (2.5 MG/3ML) 0.083% nebulizer solution Take 3 mLs by nebulization every 6 hours as needed for Wheezing, Disp-120 each, R-1Normal      Arformoterol Tartrate (BROVANA) 15 MCG/2ML NEBU Take 1 ampule by nebulization 2 times daily, Disp-120 mL, R-5Normal      apixaban (ELIQUIS) 5 MG TABS tablet TAKE ONE TABLET BY MOUTH TWICE A DAY, Disp-60 tablet, R-0Normal      montelukast (SINGULAIR) 10 MG tablet Take 1 tablet by mouth daily, Disp-30 tablet, R-5Normal      budesonide (PULMICORT) 0.5 MG/2ML nebulizer suspension Take 2 mLs by nebulization 2 times daily, Disp-120 ampule, R-5Normal      citalopram (CELEXA) 40 MG tablet TAKE ONE TABLET BY MOUTH DAILY, Disp-30 tablet, R-1Normal      atorvastatin (LIPITOR) 40 MG tablet Take 1 tablet by mouth daily, Disp-30 tablet, R-3Normal      cloNIDine (CATAPRES) 0.2 MG tablet Take 1 tablet by mouth 2 times daily, Disp-60 tablet, R-3Normal      hydrALAZINE (APRESOLINE) 50 MG tablet TAKE ONE TABLET BY MOUTH THREE TIMES A DAY, Disp-90 tablet, R-1Normal      lisinopril (PRINIVIL;ZESTRIL) 10 MG tablet TAKE ONE TABLET BY MOUTH DAILY, Disp-30 tablet, R-0Normal      carvedilol (COREG) 25 MG tablet TAKE ONE TABLET BY MOUTH TWICE A DAY WITH MEALS, Disp-60 tablet, R-1Normal      predniSONE (DELTASONE) 20 MG tablet Take 2 tablets by mouth daily for 3 days, Disp-6 tablet, R-0Normal      lidocaine (XYLOCAINE) 2 % jelly Apply small amount topically to affected area every 8 hours when necessary pain, Disp-1 Tube, R-0, Normal      torsemide (DEMADEX) 20 MG tablet TAKE ONE TABLET BY MOUTH DAILY, Disp-30 tablet, R-3Normal      levothyroxine (SYNTHROID) 50 MCG tablet TAKE ONE TABLET BY MOUTH DAILY, Disp-90 tablet, R-1Normal      lansoprazole (PREVACID) 15 MG delayed release capsule Take 1 capsule by mouth daily, Disp-90 capsule, R-1Normal             Time Spent on discharge is more than 30 minutes in the examination, evaluation, counseling and review of medications and discharge plan with the patient. Family was notified regarding discharge. The patient Kian Palomino was advised to consult their PCP/ or call 911 to proceed to nearest ED in the event if symptoms are not getting better and are getting worse . This chart was generated in part by using Dragon Dictation system and may contain errors related to that system including errors in grammar, punctuation, and spelling, as well as words and phrases that may be inappropriate. When dictating, effort is made to correct spelling/grammar errors. If there are any questions or concerns please feel free to contact the dictating provider for clarification. Signed:    Suresh Bhatia MD   7/27/2019      Thank you Mahad De Leon MD for the opportunity to be involved in this patient's care. If you have any questions or concerns please feel free to contact me at [phone].

## 2019-07-29 ENCOUNTER — TELEPHONE (OUTPATIENT)
Dept: FAMILY MEDICINE CLINIC | Age: 74
End: 2019-07-29

## 2019-07-29 NOTE — TELEPHONE ENCOUNTER
[facility] Transitions Initial Follow Up Call    Outreach made within 2 business days of discharge: Yes    Patient: Mitali Hudson Patient : 1945   MRN: [id number]  Reason for Admission: There are no discharge diagnoses documented for the most recent discharge. Discharge Date: 19       Spoke with: lvm    Discharge department/facility: [address] Interactive Patient Contact:  Was patient able to fill all prescriptions: NA  Was patient instructed to bring all medications to the follow-up visit: NA  Is patient taking all medications as directed in the discharge summary?  NA  Does patient understand their discharge instructions: NA  Does patient have questions or concerns that need addressed prior to 7-14 day follow up office visit: NA    Scheduled appointment with PCP within 7-14 days  [facility] for pt to call office th schedule with pcp    Follow Up  Future Appointments   Date Time Provider Cleo Peralta   2019  2:45 PM Zeenat Alter, APRN - CNP [facility]   2019  2:20 PM Pete Coburn MD [facility] PUL ANDREA Awan MA

## 2019-08-13 ENCOUNTER — OFFICE VISIT (OUTPATIENT)
Dept: PULMONOLOGY | Age: 74
End: 2019-08-13
Payer: COMMERCIAL

## 2019-08-13 VITALS
HEART RATE: 89 BPM | SYSTOLIC BLOOD PRESSURE: 128 MMHG | OXYGEN SATURATION: 91 % | WEIGHT: 177 LBS | TEMPERATURE: 98.6 F | DIASTOLIC BLOOD PRESSURE: 60 MMHG | HEIGHT: 60 IN | RESPIRATION RATE: 20 BRPM | BODY MASS INDEX: 34.75 KG/M2

## 2019-08-13 DIAGNOSIS — R06.02 SOB (SHORTNESS OF BREATH): ICD-10-CM

## 2019-08-13 DIAGNOSIS — G47.33 OSA (OBSTRUCTIVE SLEEP APNEA): ICD-10-CM

## 2019-08-13 DIAGNOSIS — J43.2 CENTRILOBULAR EMPHYSEMA (HCC): Primary | ICD-10-CM

## 2019-08-13 DIAGNOSIS — J96.11 CHRONIC RESPIRATORY FAILURE WITH HYPOXIA (HCC): ICD-10-CM

## 2019-08-13 PROCEDURE — 1123F ACP DISCUSS/DSCN MKR DOCD: CPT | Performed by: INTERNAL MEDICINE

## 2019-08-13 PROCEDURE — 1090F PRES/ABSN URINE INCON ASSESS: CPT | Performed by: INTERNAL MEDICINE

## 2019-08-13 PROCEDURE — 99214 OFFICE O/P EST MOD 30 MIN: CPT | Performed by: INTERNAL MEDICINE

## 2019-08-13 PROCEDURE — 96372 THER/PROPH/DIAG INJ SC/IM: CPT | Performed by: INTERNAL MEDICINE

## 2019-08-13 PROCEDURE — G8399 PT W/DXA RESULTS DOCUMENT: HCPCS | Performed by: INTERNAL MEDICINE

## 2019-08-13 PROCEDURE — G8427 DOCREV CUR MEDS BY ELIG CLIN: HCPCS | Performed by: INTERNAL MEDICINE

## 2019-08-13 PROCEDURE — G8598 ASA/ANTIPLAT THER USED: HCPCS | Performed by: INTERNAL MEDICINE

## 2019-08-13 PROCEDURE — G8417 CALC BMI ABV UP PARAM F/U: HCPCS | Performed by: INTERNAL MEDICINE

## 2019-08-13 PROCEDURE — 3017F COLORECTAL CA SCREEN DOC REV: CPT | Performed by: INTERNAL MEDICINE

## 2019-08-13 PROCEDURE — 1111F DSCHRG MED/CURRENT MED MERGE: CPT | Performed by: INTERNAL MEDICINE

## 2019-08-13 PROCEDURE — 3023F SPIROM DOC REV: CPT | Performed by: INTERNAL MEDICINE

## 2019-08-13 PROCEDURE — 1036F TOBACCO NON-USER: CPT | Performed by: INTERNAL MEDICINE

## 2019-08-13 PROCEDURE — G8926 SPIRO NO PERF OR DOC: HCPCS | Performed by: INTERNAL MEDICINE

## 2019-08-13 PROCEDURE — 4040F PNEUMOC VAC/ADMIN/RCVD: CPT | Performed by: INTERNAL MEDICINE

## 2019-08-13 RX ORDER — PREDNISONE 20 MG/1
TABLET ORAL
Qty: 17 TABLET | Refills: 0 | Status: SHIPPED | OUTPATIENT
Start: 2019-08-13 | End: 2019-09-25 | Stop reason: ALTCHOICE

## 2019-08-13 RX ORDER — METHYLPREDNISOLONE ACETATE 80 MG/ML
80 INJECTION, SUSPENSION INTRA-ARTICULAR; INTRALESIONAL; INTRAMUSCULAR; SOFT TISSUE ONCE
Status: COMPLETED | OUTPATIENT
Start: 2019-08-13 | End: 2019-08-15

## 2019-08-13 RX ORDER — METHYLPREDNISOLONE ACETATE 80 MG/ML
80 INJECTION, SUSPENSION INTRA-ARTICULAR; INTRALESIONAL; INTRAMUSCULAR; SOFT TISSUE ONCE
Status: COMPLETED | OUTPATIENT
Start: 2019-08-13 | End: 2019-08-13

## 2019-08-13 RX ADMIN — METHYLPREDNISOLONE ACETATE 80 MG: 80 INJECTION, SUSPENSION INTRA-ARTICULAR; INTRALESIONAL; INTRAMUSCULAR; SOFT TISSUE at 15:52

## 2019-08-13 NOTE — PROGRESS NOTES
5 days, then 20 mg daily for 5 days, then 10 mg daily for 4 days, then stop 17 tablet 0    Fluticasone-Umeclidin-Vilant (TRELEGY ELLIPTA) 100-62.5-25 MCG/INH AEPB Inhale 1 puff into the lungs daily 1 each 0    aspirin 81 MG tablet Take 1 tablet by mouth daily 30 tablet 3    albuterol (PROVENTIL) (2.5 MG/3ML) 0.083% nebulizer solution Take 3 mLs by nebulization every 6 hours as needed for Wheezing 120 each 1    Arformoterol Tartrate (BROVANA) 15 MCG/2ML NEBU Take 1 ampule by nebulization 2 times daily 120 mL 5    apixaban (ELIQUIS) 5 MG TABS tablet TAKE ONE TABLET BY MOUTH TWICE A DAY 60 tablet 0    montelukast (SINGULAIR) 10 MG tablet Take 1 tablet by mouth daily 30 tablet 5    budesonide (PULMICORT) 0.5 MG/2ML nebulizer suspension Take 2 mLs by nebulization 2 times daily 120 ampule 5    citalopram (CELEXA) 40 MG tablet TAKE ONE TABLET BY MOUTH DAILY 30 tablet 1    atorvastatin (LIPITOR) 40 MG tablet Take 1 tablet by mouth daily 30 tablet 3    cloNIDine (CATAPRES) 0.2 MG tablet Take 1 tablet by mouth 2 times daily 60 tablet 3    hydrALAZINE (APRESOLINE) 50 MG tablet TAKE ONE TABLET BY MOUTH THREE TIMES A DAY 90 tablet 1    lisinopril (PRINIVIL;ZESTRIL) 10 MG tablet TAKE ONE TABLET BY MOUTH DAILY 30 tablet 0    carvedilol (COREG) 25 MG tablet TAKE ONE TABLET BY MOUTH TWICE A DAY WITH MEALS 60 tablet 1    lidocaine (XYLOCAINE) 2 % jelly Apply small amount topically to affected area every 8 hours when necessary pain 1 Tube 0    torsemide (DEMADEX) 20 MG tablet TAKE ONE TABLET BY MOUTH DAILY 30 tablet 3    levothyroxine (SYNTHROID) 50 MCG tablet TAKE ONE TABLET BY MOUTH DAILY 90 tablet 1    lansoprazole (PREVACID) 15 MG delayed release capsule Take 1 capsule by mouth daily 90 capsule 1     Current Facility-Administered Medications   Medication Dose Route Frequency Provider Last Rate Last Dose    methylPREDNISolone acetate (DEPO-MEDROL) injection 80 mg  80 mg Intramuscular Once Ama Elmore MD

## 2019-08-15 ENCOUNTER — TELEPHONE (OUTPATIENT)
Dept: PULMONOLOGY | Age: 74
End: 2019-08-15

## 2019-08-15 RX ADMIN — METHYLPREDNISOLONE ACETATE 80 MG: 80 INJECTION, SUSPENSION INTRA-ARTICULAR; INTRALESIONAL; INTRAMUSCULAR; SOFT TISSUE at 09:39

## 2019-09-04 DIAGNOSIS — I10 ESSENTIAL HYPERTENSION: ICD-10-CM

## 2019-09-04 DIAGNOSIS — R06.02 SOB (SHORTNESS OF BREATH): ICD-10-CM

## 2019-09-04 DIAGNOSIS — I25.10 CORONARY ARTERY DISEASE INVOLVING NATIVE CORONARY ARTERY OF NATIVE HEART WITHOUT ANGINA PECTORIS: ICD-10-CM

## 2019-09-04 PROBLEM — J96.02 ACUTE ON CHRONIC RESPIRATORY ACIDOSIS (HCC): Status: RESOLVED | Noted: 2018-10-13 | Resolved: 2019-09-04

## 2019-09-04 PROBLEM — J96.12 ACUTE ON CHRONIC RESPIRATORY ACIDOSIS (HCC): Status: RESOLVED | Noted: 2018-10-13 | Resolved: 2019-09-04

## 2019-09-04 PROBLEM — J96.02 ACUTE RESPIRATORY FAILURE WITH HYPOXIA AND HYPERCAPNIA (HCC): Status: RESOLVED | Noted: 2018-09-23 | Resolved: 2019-09-04

## 2019-09-04 PROBLEM — J96.01 ACUTE RESPIRATORY FAILURE WITH HYPOXIA AND HYPERCAPNIA (HCC): Status: RESOLVED | Noted: 2018-09-23 | Resolved: 2019-09-04

## 2019-09-04 RX ORDER — CITALOPRAM 40 MG/1
TABLET ORAL
Qty: 30 TABLET | Refills: 1 | Status: SHIPPED | OUTPATIENT
Start: 2019-09-04 | End: 2019-09-19 | Stop reason: SDUPTHER

## 2019-09-04 RX ORDER — ALBUTEROL SULFATE 2.5 MG/3ML
2.5 SOLUTION RESPIRATORY (INHALATION) EVERY 6 HOURS PRN
Qty: 120 EACH | Refills: 1 | OUTPATIENT
Start: 2019-09-04

## 2019-09-04 RX ORDER — IPRATROPIUM BROMIDE AND ALBUTEROL SULFATE 2.5; .5 MG/3ML; MG/3ML
1 SOLUTION RESPIRATORY (INHALATION) EVERY 4 HOURS PRN
Qty: 360 ML | Refills: 5 | Status: SHIPPED | OUTPATIENT
Start: 2019-09-04 | End: 2019-09-19 | Stop reason: SDUPTHER

## 2019-09-04 RX ORDER — TORSEMIDE 20 MG/1
TABLET ORAL
Qty: 30 TABLET | Refills: 3 | Status: SHIPPED | OUTPATIENT
Start: 2019-09-04 | End: 2019-09-19 | Stop reason: SDUPTHER

## 2019-09-05 RX ORDER — CARVEDILOL 25 MG/1
TABLET ORAL
Qty: 60 TABLET | Refills: 0 | Status: SHIPPED | OUTPATIENT
Start: 2019-09-05 | End: 2019-09-19 | Stop reason: SDUPTHER

## 2019-09-05 RX ORDER — HYDRALAZINE HYDROCHLORIDE 50 MG/1
TABLET, FILM COATED ORAL
Qty: 90 TABLET | Refills: 0 | Status: SHIPPED | OUTPATIENT
Start: 2019-09-05 | End: 2019-09-19 | Stop reason: SDUPTHER

## 2019-09-09 RX ORDER — LEVOTHYROXINE SODIUM 0.05 MG/1
TABLET ORAL
Qty: 90 TABLET | Refills: 1 | Status: SHIPPED | OUTPATIENT
Start: 2019-09-09 | End: 2019-09-19 | Stop reason: SDUPTHER

## 2019-09-12 DIAGNOSIS — I10 ESSENTIAL HYPERTENSION: ICD-10-CM

## 2019-09-12 DIAGNOSIS — I25.10 CORONARY ARTERY DISEASE INVOLVING NATIVE CORONARY ARTERY OF NATIVE HEART WITHOUT ANGINA PECTORIS: ICD-10-CM

## 2019-09-12 DIAGNOSIS — I73.9 PAD (PERIPHERAL ARTERY DISEASE) (HCC): ICD-10-CM

## 2019-09-12 RX ORDER — CLONIDINE HYDROCHLORIDE 0.2 MG/1
0.2 TABLET ORAL 2 TIMES DAILY
Qty: 60 TABLET | Refills: 3 | Status: SHIPPED | OUTPATIENT
Start: 2019-09-12 | End: 2019-09-19 | Stop reason: SDUPTHER

## 2019-09-12 RX ORDER — ATORVASTATIN CALCIUM 40 MG/1
40 TABLET, FILM COATED ORAL DAILY
Qty: 30 TABLET | Refills: 3 | Status: SHIPPED | OUTPATIENT
Start: 2019-09-12 | End: 2019-09-19 | Stop reason: SDUPTHER

## 2019-09-19 ENCOUNTER — OFFICE VISIT (OUTPATIENT)
Dept: FAMILY MEDICINE CLINIC | Age: 74
End: 2019-09-19
Payer: COMMERCIAL

## 2019-09-19 VITALS
OXYGEN SATURATION: 96 % | HEART RATE: 72 BPM | DIASTOLIC BLOOD PRESSURE: 66 MMHG | SYSTOLIC BLOOD PRESSURE: 170 MMHG | BODY MASS INDEX: 35.65 KG/M2 | HEIGHT: 60 IN | RESPIRATION RATE: 24 BRPM | WEIGHT: 181.6 LBS | TEMPERATURE: 98.2 F

## 2019-09-19 DIAGNOSIS — Z12.31 ENCOUNTER FOR SCREENING MAMMOGRAM FOR BREAST CANCER: ICD-10-CM

## 2019-09-19 DIAGNOSIS — I48.0 PAROXYSMAL ATRIAL FIBRILLATION (HCC): ICD-10-CM

## 2019-09-19 DIAGNOSIS — I73.9 PAD (PERIPHERAL ARTERY DISEASE) (HCC): ICD-10-CM

## 2019-09-19 DIAGNOSIS — I10 ESSENTIAL HYPERTENSION: Primary | ICD-10-CM

## 2019-09-19 DIAGNOSIS — K21.9 GASTROESOPHAGEAL REFLUX DISEASE, ESOPHAGITIS PRESENCE NOT SPECIFIED: ICD-10-CM

## 2019-09-19 DIAGNOSIS — Z13.31 POSITIVE DEPRESSION SCREENING: ICD-10-CM

## 2019-09-19 DIAGNOSIS — I25.10 CORONARY ARTERY DISEASE INVOLVING NATIVE CORONARY ARTERY OF NATIVE HEART WITHOUT ANGINA PECTORIS: ICD-10-CM

## 2019-09-19 DIAGNOSIS — Z87.19 HISTORY OF GI BLEED: ICD-10-CM

## 2019-09-19 DIAGNOSIS — E03.9 HYPOTHYROIDISM, UNSPECIFIED TYPE: ICD-10-CM

## 2019-09-19 DIAGNOSIS — I50.32 CHRONIC DIASTOLIC (CONGESTIVE) HEART FAILURE (HCC): ICD-10-CM

## 2019-09-19 PROBLEM — J18.9 PNA (PNEUMONIA): Status: RESOLVED | Noted: 2018-09-22 | Resolved: 2019-09-19

## 2019-09-19 PROBLEM — G47.33 OSA ON CPAP: Status: RESOLVED | Noted: 2018-03-06 | Resolved: 2019-09-19

## 2019-09-19 PROBLEM — K57.32 DIVERTICULITIS OF LARGE INTESTINE WITHOUT PERFORATION OR ABSCESS WITHOUT BLEEDING: Status: RESOLVED | Noted: 2018-03-03 | Resolved: 2019-09-19

## 2019-09-19 PROBLEM — Z99.89 OSA ON CPAP: Status: RESOLVED | Noted: 2018-03-06 | Resolved: 2019-09-19

## 2019-09-19 PROCEDURE — 1090F PRES/ABSN URINE INCON ASSESS: CPT | Performed by: FAMILY MEDICINE

## 2019-09-19 PROCEDURE — G8417 CALC BMI ABV UP PARAM F/U: HCPCS | Performed by: FAMILY MEDICINE

## 2019-09-19 PROCEDURE — 99214 OFFICE O/P EST MOD 30 MIN: CPT | Performed by: FAMILY MEDICINE

## 2019-09-19 PROCEDURE — G8427 DOCREV CUR MEDS BY ELIG CLIN: HCPCS | Performed by: FAMILY MEDICINE

## 2019-09-19 PROCEDURE — 3017F COLORECTAL CA SCREEN DOC REV: CPT | Performed by: FAMILY MEDICINE

## 2019-09-19 PROCEDURE — G8431 POS CLIN DEPRES SCRN F/U DOC: HCPCS | Performed by: FAMILY MEDICINE

## 2019-09-19 PROCEDURE — 4040F PNEUMOC VAC/ADMIN/RCVD: CPT | Performed by: FAMILY MEDICINE

## 2019-09-19 PROCEDURE — G8598 ASA/ANTIPLAT THER USED: HCPCS | Performed by: FAMILY MEDICINE

## 2019-09-19 PROCEDURE — G8399 PT W/DXA RESULTS DOCUMENT: HCPCS | Performed by: FAMILY MEDICINE

## 2019-09-19 PROCEDURE — 1123F ACP DISCUSS/DSCN MKR DOCD: CPT | Performed by: FAMILY MEDICINE

## 2019-09-19 PROCEDURE — 1036F TOBACCO NON-USER: CPT | Performed by: FAMILY MEDICINE

## 2019-09-19 RX ORDER — CITALOPRAM 40 MG/1
TABLET ORAL
Qty: 30 TABLET | Refills: 1 | Status: SHIPPED | OUTPATIENT
Start: 2019-09-19 | End: 2019-10-11 | Stop reason: SDUPTHER

## 2019-09-19 RX ORDER — ARFORMOTEROL TARTRATE 15 UG/2ML
1 SOLUTION RESPIRATORY (INHALATION) 2 TIMES DAILY
Qty: 120 ML | Refills: 5 | Status: CANCELLED | OUTPATIENT
Start: 2019-09-19

## 2019-09-19 RX ORDER — ATORVASTATIN CALCIUM 40 MG/1
40 TABLET, FILM COATED ORAL DAILY
Qty: 30 TABLET | Refills: 3 | Status: SHIPPED | OUTPATIENT
Start: 2019-09-19 | End: 2019-10-11 | Stop reason: SDUPTHER

## 2019-09-19 RX ORDER — BUDESONIDE 0.5 MG/2ML
1 INHALANT ORAL 2 TIMES DAILY
Qty: 120 AMPULE | Refills: 5 | Status: CANCELLED | OUTPATIENT
Start: 2019-09-19

## 2019-09-19 RX ORDER — CARVEDILOL 25 MG/1
TABLET ORAL
Qty: 60 TABLET | Refills: 0 | Status: ON HOLD | OUTPATIENT
Start: 2019-09-19 | End: 2019-10-02 | Stop reason: HOSPADM

## 2019-09-19 RX ORDER — TORSEMIDE 20 MG/1
TABLET ORAL
Qty: 30 TABLET | Refills: 3 | Status: ON HOLD | OUTPATIENT
Start: 2019-09-19 | End: 2019-10-02 | Stop reason: HOSPADM

## 2019-09-19 RX ORDER — LANSOPRAZOLE 15 MG/1
15 CAPSULE, DELAYED RELEASE ORAL DAILY
Qty: 90 CAPSULE | Refills: 1 | Status: SHIPPED | OUTPATIENT
Start: 2019-09-19 | End: 2019-10-11 | Stop reason: SDUPTHER

## 2019-09-19 RX ORDER — LISINOPRIL 10 MG/1
TABLET ORAL
Qty: 30 TABLET | Refills: 0 | Status: SHIPPED | OUTPATIENT
Start: 2019-09-19 | End: 2019-10-11 | Stop reason: SDUPTHER

## 2019-09-19 RX ORDER — MONTELUKAST SODIUM 10 MG/1
10 TABLET ORAL DAILY
Qty: 30 TABLET | Refills: 5 | Status: SHIPPED | OUTPATIENT
Start: 2019-09-19 | End: 2019-10-11 | Stop reason: SDUPTHER

## 2019-09-19 RX ORDER — LEVOTHYROXINE SODIUM 0.05 MG/1
TABLET ORAL
Qty: 90 TABLET | Refills: 1 | Status: SHIPPED | OUTPATIENT
Start: 2019-09-19 | End: 2019-10-11 | Stop reason: SDUPTHER

## 2019-09-19 RX ORDER — CLONIDINE HYDROCHLORIDE 0.2 MG/1
0.2 TABLET ORAL 2 TIMES DAILY
Qty: 60 TABLET | Refills: 3 | Status: SHIPPED | OUTPATIENT
Start: 2019-09-19 | End: 2019-10-11 | Stop reason: SDUPTHER

## 2019-09-19 RX ORDER — HYDRALAZINE HYDROCHLORIDE 50 MG/1
TABLET, FILM COATED ORAL
Qty: 90 TABLET | Refills: 0 | Status: SHIPPED | OUTPATIENT
Start: 2019-09-19 | End: 2019-10-11 | Stop reason: SDUPTHER

## 2019-09-19 RX ORDER — IPRATROPIUM BROMIDE AND ALBUTEROL SULFATE 2.5; .5 MG/3ML; MG/3ML
1 SOLUTION RESPIRATORY (INHALATION) EVERY 4 HOURS PRN
Qty: 360 ML | Refills: 5 | Status: SHIPPED | OUTPATIENT
Start: 2019-09-19 | End: 2019-10-11 | Stop reason: SDUPTHER

## 2019-09-19 ASSESSMENT — PATIENT HEALTH QUESTIONNAIRE - PHQ9
10. IF YOU CHECKED OFF ANY PROBLEMS, HOW DIFFICULT HAVE THESE PROBLEMS MADE IT FOR YOU TO DO YOUR WORK, TAKE CARE OF THINGS AT HOME, OR GET ALONG WITH OTHER PEOPLE: 1
3. TROUBLE FALLING OR STAYING ASLEEP: 3
1. LITTLE INTEREST OR PLEASURE IN DOING THINGS: 3
6. FEELING BAD ABOUT YOURSELF - OR THAT YOU ARE A FAILURE OR HAVE LET YOURSELF OR YOUR FAMILY DOWN: 1
5. POOR APPETITE OR OVEREATING: 3
SUM OF ALL RESPONSES TO PHQ QUESTIONS 1-9: 16
9. THOUGHTS THAT YOU WOULD BE BETTER OFF DEAD, OR OF HURTING YOURSELF: 0
4. FEELING TIRED OR HAVING LITTLE ENERGY: 3
2. FEELING DOWN, DEPRESSED OR HOPELESS: 3
SUM OF ALL RESPONSES TO PHQ9 QUESTIONS 1 & 2: 6
7. TROUBLE CONCENTRATING ON THINGS, SUCH AS READING THE NEWSPAPER OR WATCHING TELEVISION: 0
8. MOVING OR SPEAKING SO SLOWLY THAT OTHER PEOPLE COULD HAVE NOTICED. OR THE OPPOSITE, BEING SO FIGETY OR RESTLESS THAT YOU HAVE BEEN MOVING AROUND A LOT MORE THAN USUAL: 0
SUM OF ALL RESPONSES TO PHQ QUESTIONS 1-9: 16

## 2019-09-19 NOTE — Clinical Note
Pt was hospitalized in [place] - please call her and ask for hospital info so we can get records. Thank you.

## 2019-09-24 ASSESSMENT — ENCOUNTER SYMPTOMS
COUGH: 0
WHEEZING: 0
SHORTNESS OF BREATH: 1

## 2019-09-25 ENCOUNTER — HOSPITAL ENCOUNTER (INPATIENT)
Age: 74
LOS: 7 days | Discharge: HOME | DRG: 469 | End: 2019-10-02
Attending: EMERGENCY MEDICINE | Admitting: INTERNAL MEDICINE
Payer: COMMERCIAL

## 2019-09-25 ENCOUNTER — APPOINTMENT (OUTPATIENT)
Dept: CT IMAGING | Age: 74
DRG: 469 | End: 2019-09-25
Payer: COMMERCIAL

## 2019-09-25 DIAGNOSIS — A49.9 BACTERIAL URINARY TRACT INFECTION: ICD-10-CM

## 2019-09-25 DIAGNOSIS — N17.9 AKI (ACUTE KIDNEY INJURY) (HCC): Primary | ICD-10-CM

## 2019-09-25 DIAGNOSIS — N39.0 BACTERIAL URINARY TRACT INFECTION: ICD-10-CM

## 2019-09-25 LAB
A/G RATIO: 1.4 (ref 1.1–2.2)
ALBUMIN SERPL-MCNC: 4.2 G/DL (ref 3.4–5)
ALP BLD-CCNC: 46 U/L (ref 40–129)
ALT SERPL-CCNC: 14 U/L (ref 10–40)
ANION GAP SERPL CALCULATED.3IONS-SCNC: 14 MMOL/L (ref 3–16)
AST SERPL-CCNC: 17 U/L (ref 15–37)
BASOPHILS ABSOLUTE: 0.1 K/UL (ref 0–0.2)
BASOPHILS RELATIVE PERCENT: 0.7 %
BILIRUB SERPL-MCNC: 0.3 MG/DL (ref 0–1)
BILIRUBIN URINE: ABNORMAL
BLOOD, URINE: NEGATIVE
BUN BLDV-MCNC: 41 MG/DL (ref 7–20)
C DIFF TOXIN/ANTIGEN: NORMAL
CALCIUM SERPL-MCNC: 9.8 MG/DL (ref 8.3–10.6)
CHLORIDE BLD-SCNC: 98 MMOL/L (ref 99–110)
CLARITY: ABNORMAL
CO2: 27 MMOL/L (ref 21–32)
COLOR: YELLOW
CREAT SERPL-MCNC: 3.7 MG/DL (ref 0.6–1.2)
CREATININE URINE: 385.7 MG/DL (ref 28–259)
EOSINOPHILS ABSOLUTE: 0.1 K/UL (ref 0–0.6)
EOSINOPHILS RELATIVE PERCENT: 0.5 %
EPITHELIAL CELLS, UA: 1 /HPF (ref 0–5)
GFR AFRICAN AMERICAN: 15
GFR NON-AFRICAN AMERICAN: 12
GLOBULIN: 3.1 G/DL
GLUCOSE BLD-MCNC: 119 MG/DL (ref 70–99)
GLUCOSE URINE: NEGATIVE MG/DL
HCT VFR BLD CALC: 34.3 % (ref 36–48)
HEMOGLOBIN: 10.5 G/DL (ref 12–16)
HYALINE CASTS: 6 /LPF (ref 0–8)
INR BLD: 1.37 (ref 0.86–1.14)
KETONES, URINE: NEGATIVE MG/DL
LEUKOCYTE ESTERASE, URINE: ABNORMAL
LIPASE: 68 U/L (ref 13–60)
LYMPHOCYTES ABSOLUTE: 1.3 K/UL (ref 1–5.1)
LYMPHOCYTES RELATIVE PERCENT: 10.8 %
MCH RBC QN AUTO: 23.9 PG (ref 26–34)
MCHC RBC AUTO-ENTMCNC: 30.5 G/DL (ref 31–36)
MCV RBC AUTO: 78.4 FL (ref 80–100)
MICROSCOPIC EXAMINATION: YES
MONOCYTES ABSOLUTE: 0.9 K/UL (ref 0–1.3)
MONOCYTES RELATIVE PERCENT: 7.9 %
NEUTROPHILS ABSOLUTE: 9.5 K/UL (ref 1.7–7.7)
NEUTROPHILS RELATIVE PERCENT: 80.1 %
NITRITE, URINE: NEGATIVE
PDW BLD-RTO: 19.5 % (ref 12.4–15.4)
PH UA: 5 (ref 5–8)
PLATELET # BLD: 278 K/UL (ref 135–450)
PMV BLD AUTO: 8.6 FL (ref 5–10.5)
POTASSIUM REFLEX MAGNESIUM: 4.7 MMOL/L (ref 3.5–5.1)
PROTEIN UA: 30 MG/DL
PROTHROMBIN TIME: 15.6 SEC (ref 9.8–13)
RBC # BLD: 4.37 M/UL (ref 4–5.2)
RBC UA: 6 /HPF (ref 0–4)
SODIUM BLD-SCNC: 139 MMOL/L (ref 136–145)
SODIUM URINE: 23 MMOL/L
SPECIFIC GRAVITY UA: 1.02 (ref 1–1.03)
TOTAL PROTEIN: 7.3 G/DL (ref 6.4–8.2)
URINE TYPE: ABNORMAL
UROBILINOGEN, URINE: 0.2 E.U./DL
WBC # BLD: 11.8 K/UL (ref 4–11)
WBC UA: 369 /HPF (ref 0–5)

## 2019-09-25 PROCEDURE — 87324 CLOSTRIDIUM AG IA: CPT

## 2019-09-25 PROCEDURE — 2500000003 HC RX 250 WO HCPCS: Performed by: INTERNAL MEDICINE

## 2019-09-25 PROCEDURE — 84300 ASSAY OF URINE SODIUM: CPT

## 2019-09-25 PROCEDURE — 6360000002 HC RX W HCPCS: Performed by: INTERNAL MEDICINE

## 2019-09-25 PROCEDURE — 85610 PROTHROMBIN TIME: CPT

## 2019-09-25 PROCEDURE — 96374 THER/PROPH/DIAG INJ IV PUSH: CPT

## 2019-09-25 PROCEDURE — 74176 CT ABD & PELVIS W/O CONTRAST: CPT

## 2019-09-25 PROCEDURE — 2580000003 HC RX 258: Performed by: EMERGENCY MEDICINE

## 2019-09-25 PROCEDURE — 99285 EMERGENCY DEPT VISIT HI MDM: CPT

## 2019-09-25 PROCEDURE — 6370000000 HC RX 637 (ALT 250 FOR IP): Performed by: INTERNAL MEDICINE

## 2019-09-25 PROCEDURE — 87086 URINE CULTURE/COLONY COUNT: CPT

## 2019-09-25 PROCEDURE — 87046 STOOL CULTR AEROBIC BACT EA: CPT

## 2019-09-25 PROCEDURE — 87505 NFCT AGENT DETECTION GI: CPT

## 2019-09-25 PROCEDURE — 2580000003 HC RX 258: Performed by: INTERNAL MEDICINE

## 2019-09-25 PROCEDURE — 6360000002 HC RX W HCPCS: Performed by: EMERGENCY MEDICINE

## 2019-09-25 PROCEDURE — 80053 COMPREHEN METABOLIC PANEL: CPT

## 2019-09-25 PROCEDURE — 2700000000 HC OXYGEN THERAPY PER DAY

## 2019-09-25 PROCEDURE — 81001 URINALYSIS AUTO W/SCOPE: CPT

## 2019-09-25 PROCEDURE — 94760 N-INVAS EAR/PLS OXIMETRY 1: CPT

## 2019-09-25 PROCEDURE — 85025 COMPLETE CBC W/AUTO DIFF WBC: CPT

## 2019-09-25 PROCEDURE — 82570 ASSAY OF URINE CREATININE: CPT

## 2019-09-25 PROCEDURE — 1200000000 HC SEMI PRIVATE

## 2019-09-25 PROCEDURE — 87449 NOS EACH ORGANISM AG IA: CPT

## 2019-09-25 PROCEDURE — 83690 ASSAY OF LIPASE: CPT

## 2019-09-25 PROCEDURE — 96375 TX/PRO/DX INJ NEW DRUG ADDON: CPT

## 2019-09-25 RX ORDER — LEVOTHYROXINE SODIUM 0.03 MG/1
50 TABLET ORAL DAILY
Status: DISCONTINUED | OUTPATIENT
Start: 2019-09-25 | End: 2019-10-02 | Stop reason: HOSPADM

## 2019-09-25 RX ORDER — MORPHINE SULFATE 4 MG/ML
4 INJECTION, SOLUTION INTRAMUSCULAR; INTRAVENOUS ONCE
Status: COMPLETED | OUTPATIENT
Start: 2019-09-25 | End: 2019-09-25

## 2019-09-25 RX ORDER — SODIUM CHLORIDE 9 MG/ML
INJECTION, SOLUTION INTRAVENOUS CONTINUOUS
Status: DISCONTINUED | OUTPATIENT
Start: 2019-09-25 | End: 2019-09-28

## 2019-09-25 RX ORDER — CARVEDILOL 25 MG/1
25 TABLET ORAL 2 TIMES DAILY WITH MEALS
Status: DISCONTINUED | OUTPATIENT
Start: 2019-09-25 | End: 2019-09-27

## 2019-09-25 RX ORDER — SODIUM CHLORIDE 0.9 % (FLUSH) 0.9 %
10 SYRINGE (ML) INJECTION PRN
Status: DISCONTINUED | OUTPATIENT
Start: 2019-09-25 | End: 2019-10-02 | Stop reason: HOSPADM

## 2019-09-25 RX ORDER — LACTOBACILLUS RHAMNOSUS GG 10B CELL
1 CAPSULE ORAL 2 TIMES DAILY WITH MEALS
Status: DISCONTINUED | OUTPATIENT
Start: 2019-09-25 | End: 2019-10-02 | Stop reason: HOSPADM

## 2019-09-25 RX ORDER — ATORVASTATIN CALCIUM 40 MG/1
40 TABLET, FILM COATED ORAL DAILY
Status: DISCONTINUED | OUTPATIENT
Start: 2019-09-25 | End: 2019-10-02 | Stop reason: HOSPADM

## 2019-09-25 RX ORDER — ONDANSETRON 2 MG/ML
4 INJECTION INTRAMUSCULAR; INTRAVENOUS EVERY 4 HOURS PRN
Status: DISCONTINUED | OUTPATIENT
Start: 2019-09-25 | End: 2019-10-02 | Stop reason: HOSPADM

## 2019-09-25 RX ORDER — CLONIDINE HYDROCHLORIDE 0.1 MG/1
0.2 TABLET ORAL 2 TIMES DAILY
Status: DISCONTINUED | OUTPATIENT
Start: 2019-09-25 | End: 2019-10-02 | Stop reason: HOSPADM

## 2019-09-25 RX ORDER — ONDANSETRON 2 MG/ML
4 INJECTION INTRAMUSCULAR; INTRAVENOUS EVERY 30 MIN PRN
Status: DISCONTINUED | OUTPATIENT
Start: 2019-09-25 | End: 2019-09-25 | Stop reason: HOSPADM

## 2019-09-25 RX ORDER — MONTELUKAST SODIUM 10 MG/1
10 TABLET ORAL DAILY
Status: DISCONTINUED | OUTPATIENT
Start: 2019-09-25 | End: 2019-10-02 | Stop reason: HOSPADM

## 2019-09-25 RX ORDER — ONDANSETRON 2 MG/ML
4 INJECTION INTRAMUSCULAR; INTRAVENOUS EVERY 6 HOURS PRN
Status: DISCONTINUED | OUTPATIENT
Start: 2019-09-25 | End: 2019-09-25

## 2019-09-25 RX ORDER — CITALOPRAM 20 MG/1
40 TABLET ORAL DAILY
Status: DISCONTINUED | OUTPATIENT
Start: 2019-09-25 | End: 2019-10-02 | Stop reason: HOSPADM

## 2019-09-25 RX ORDER — SODIUM CHLORIDE 9 MG/ML
INJECTION, SOLUTION INTRAVENOUS CONTINUOUS
Status: DISCONTINUED | OUTPATIENT
Start: 2019-09-25 | End: 2019-09-25

## 2019-09-25 RX ORDER — IPRATROPIUM BROMIDE AND ALBUTEROL SULFATE 2.5; .5 MG/3ML; MG/3ML
1 SOLUTION RESPIRATORY (INHALATION) EVERY 4 HOURS PRN
Status: DISCONTINUED | OUTPATIENT
Start: 2019-09-25 | End: 2019-10-02 | Stop reason: HOSPADM

## 2019-09-25 RX ORDER — ASPIRIN 81 MG/1
81 TABLET ORAL DAILY
Status: DISCONTINUED | OUTPATIENT
Start: 2019-09-25 | End: 2019-10-02 | Stop reason: HOSPADM

## 2019-09-25 RX ORDER — PANTOPRAZOLE SODIUM 40 MG/1
40 TABLET, DELAYED RELEASE ORAL
Status: DISCONTINUED | OUTPATIENT
Start: 2019-09-26 | End: 2019-09-27

## 2019-09-25 RX ORDER — 0.9 % SODIUM CHLORIDE 0.9 %
500 INTRAVENOUS SOLUTION INTRAVENOUS ONCE
Status: COMPLETED | OUTPATIENT
Start: 2019-09-25 | End: 2019-09-25

## 2019-09-25 RX ORDER — CIPROFLOXACIN 2 MG/ML
200 INJECTION, SOLUTION INTRAVENOUS EVERY 12 HOURS
Status: DISCONTINUED | OUTPATIENT
Start: 2019-09-25 | End: 2019-09-27

## 2019-09-25 RX ORDER — SODIUM CHLORIDE 0.9 % (FLUSH) 0.9 %
10 SYRINGE (ML) INJECTION EVERY 12 HOURS SCHEDULED
Status: DISCONTINUED | OUTPATIENT
Start: 2019-09-25 | End: 2019-10-02 | Stop reason: HOSPADM

## 2019-09-25 RX ORDER — HYDROCODONE BITARTRATE AND ACETAMINOPHEN 5; 325 MG/1; MG/1
1 TABLET ORAL EVERY 6 HOURS PRN
Status: DISCONTINUED | OUTPATIENT
Start: 2019-09-25 | End: 2019-09-26

## 2019-09-25 RX ADMIN — SODIUM CHLORIDE: 9 INJECTION, SOLUTION INTRAVENOUS at 13:45

## 2019-09-25 RX ADMIN — METRONIDAZOLE 500 MG: 500 INJECTION, SOLUTION INTRAVENOUS at 16:56

## 2019-09-25 RX ADMIN — SODIUM CHLORIDE 500 ML: 9 INJECTION, SOLUTION INTRAVENOUS at 11:26

## 2019-09-25 RX ADMIN — ONDANSETRON 4 MG: 2 INJECTION INTRAMUSCULAR; INTRAVENOUS at 08:47

## 2019-09-25 RX ADMIN — CLONIDINE HYDROCHLORIDE 0.2 MG: 0.1 TABLET ORAL at 15:10

## 2019-09-25 RX ADMIN — Medication 10 ML: at 22:10

## 2019-09-25 RX ADMIN — ONDANSETRON 4 MG: 2 INJECTION INTRAMUSCULAR; INTRAVENOUS at 18:31

## 2019-09-25 RX ADMIN — METRONIDAZOLE 500 MG: 500 INJECTION, SOLUTION INTRAVENOUS at 22:08

## 2019-09-25 RX ADMIN — HYDROCODONE BITARTRATE AND ACETAMINOPHEN 1 TABLET: 5; 325 TABLET ORAL at 13:43

## 2019-09-25 RX ADMIN — LEVOTHYROXINE SODIUM 50 MCG: 25 TABLET ORAL at 15:10

## 2019-09-25 RX ADMIN — HYDROCODONE BITARTRATE AND ACETAMINOPHEN 1 TABLET: 5; 325 TABLET ORAL at 22:04

## 2019-09-25 RX ADMIN — APIXABAN 5 MG: 5 TABLET, FILM COATED ORAL at 15:09

## 2019-09-25 RX ADMIN — Medication 1 G: at 11:26

## 2019-09-25 RX ADMIN — ONDANSETRON 4 MG: 2 INJECTION INTRAMUSCULAR; INTRAVENOUS at 13:43

## 2019-09-25 RX ADMIN — CARVEDILOL 25 MG: 25 TABLET, FILM COATED ORAL at 16:55

## 2019-09-25 RX ADMIN — APIXABAN 5 MG: 5 TABLET, FILM COATED ORAL at 22:04

## 2019-09-25 RX ADMIN — DESMOPRESSIN ACETATE 40 MG: 0.2 TABLET ORAL at 16:55

## 2019-09-25 RX ADMIN — MORPHINE SULFATE 4 MG: 4 INJECTION INTRAVENOUS at 09:11

## 2019-09-25 RX ADMIN — SODIUM CHLORIDE: 9 INJECTION, SOLUTION INTRAVENOUS at 22:08

## 2019-09-25 RX ADMIN — CITALOPRAM HYDROBROMIDE 40 MG: 20 TABLET ORAL at 15:10

## 2019-09-25 RX ADMIN — MONTELUKAST SODIUM 10 MG: 10 TABLET, FILM COATED ORAL at 15:10

## 2019-09-25 RX ADMIN — Medication 1 CAPSULE: at 16:55

## 2019-09-25 RX ADMIN — ASPIRIN 81 MG: 81 TABLET, COATED ORAL at 15:10

## 2019-09-25 RX ADMIN — CIPROFLOXACIN 200 MG: 2 INJECTION, SOLUTION INTRAVENOUS at 15:09

## 2019-09-25 ASSESSMENT — PAIN SCALES - GENERAL
PAINLEVEL_OUTOF10: 8
PAINLEVEL_OUTOF10: 10
PAINLEVEL_OUTOF10: 10

## 2019-09-25 ASSESSMENT — PAIN DESCRIPTION - LOCATION
LOCATION: ABDOMEN
LOCATION: ABDOMEN;BACK
LOCATION: ABDOMEN

## 2019-09-25 ASSESSMENT — PAIN DESCRIPTION - ORIENTATION: ORIENTATION: LOWER

## 2019-09-26 ENCOUNTER — APPOINTMENT (OUTPATIENT)
Dept: GENERAL RADIOLOGY | Age: 74
DRG: 469 | End: 2019-09-26
Payer: COMMERCIAL

## 2019-09-26 LAB
ALBUMIN SERPL-MCNC: 3.6 G/DL (ref 3.4–5)
ALP BLD-CCNC: 38 U/L (ref 40–129)
ALT SERPL-CCNC: 12 U/L (ref 10–40)
ANION GAP SERPL CALCULATED.3IONS-SCNC: 9 MMOL/L (ref 3–16)
AST SERPL-CCNC: 14 U/L (ref 15–37)
BILIRUB SERPL-MCNC: <0.2 MG/DL (ref 0–1)
BILIRUBIN DIRECT: <0.2 MG/DL (ref 0–0.3)
BILIRUBIN, INDIRECT: ABNORMAL MG/DL (ref 0–1)
BUN BLDV-MCNC: 51 MG/DL (ref 7–20)
CALCIUM SERPL-MCNC: 8.6 MG/DL (ref 8.3–10.6)
CHLORIDE BLD-SCNC: 106 MMOL/L (ref 99–110)
CO2: 24 MMOL/L (ref 21–32)
CREAT SERPL-MCNC: 4.3 MG/DL (ref 0.6–1.2)
FERRITIN: 36.1 NG/ML (ref 15–150)
GFR AFRICAN AMERICAN: 12
GFR NON-AFRICAN AMERICAN: 10
GI BACTERIAL PATHOGENS BY PCR: NORMAL
GLUCOSE BLD-MCNC: 106 MG/DL (ref 70–99)
HCT VFR BLD CALC: 30.1 % (ref 36–48)
HEMOGLOBIN: 9.1 G/DL (ref 12–16)
IRON SATURATION: 11 % (ref 15–50)
IRON: 38 UG/DL (ref 37–145)
LIPASE: 73 U/L (ref 13–60)
MCH RBC QN AUTO: 23.9 PG (ref 26–34)
MCHC RBC AUTO-ENTMCNC: 30.2 G/DL (ref 31–36)
MCV RBC AUTO: 79 FL (ref 80–100)
PARATHYROID HORMONE INTACT: 113.4 PG/ML (ref 14–72)
PDW BLD-RTO: 19.9 % (ref 12.4–15.4)
PHOSPHORUS: 6 MG/DL (ref 2.5–4.9)
PLATELET # BLD: 234 K/UL (ref 135–450)
PMV BLD AUTO: 8.8 FL (ref 5–10.5)
POTASSIUM REFLEX MAGNESIUM: 4.5 MMOL/L (ref 3.5–5.1)
RBC # BLD: 3.82 M/UL (ref 4–5.2)
SODIUM BLD-SCNC: 139 MMOL/L (ref 136–145)
TOTAL CK: 43 U/L (ref 26–192)
TOTAL IRON BINDING CAPACITY: 336 UG/DL (ref 260–445)
TOTAL PROTEIN: 6.4 G/DL (ref 6.4–8.2)
URINE CULTURE, ROUTINE: NORMAL
VITAMIN D 25-HYDROXY: 28.2 NG/ML
WBC # BLD: 12.2 K/UL (ref 4–11)

## 2019-09-26 PROCEDURE — 82306 VITAMIN D 25 HYDROXY: CPT

## 2019-09-26 PROCEDURE — 71045 X-RAY EXAM CHEST 1 VIEW: CPT

## 2019-09-26 PROCEDURE — 2580000003 HC RX 258: Performed by: INTERNAL MEDICINE

## 2019-09-26 PROCEDURE — 6360000002 HC RX W HCPCS: Performed by: INTERNAL MEDICINE

## 2019-09-26 PROCEDURE — 84100 ASSAY OF PHOSPHORUS: CPT

## 2019-09-26 PROCEDURE — 6370000000 HC RX 637 (ALT 250 FOR IP): Performed by: INTERNAL MEDICINE

## 2019-09-26 PROCEDURE — 85027 COMPLETE CBC AUTOMATED: CPT

## 2019-09-26 PROCEDURE — 82728 ASSAY OF FERRITIN: CPT

## 2019-09-26 PROCEDURE — 2500000003 HC RX 250 WO HCPCS: Performed by: INTERNAL MEDICINE

## 2019-09-26 PROCEDURE — 51798 US URINE CAPACITY MEASURE: CPT

## 2019-09-26 PROCEDURE — 80048 BASIC METABOLIC PNL TOTAL CA: CPT

## 2019-09-26 PROCEDURE — 83540 ASSAY OF IRON: CPT

## 2019-09-26 PROCEDURE — 83690 ASSAY OF LIPASE: CPT

## 2019-09-26 PROCEDURE — 80076 HEPATIC FUNCTION PANEL: CPT

## 2019-09-26 PROCEDURE — 83550 IRON BINDING TEST: CPT

## 2019-09-26 PROCEDURE — 83970 ASSAY OF PARATHORMONE: CPT

## 2019-09-26 PROCEDURE — 6370000000 HC RX 637 (ALT 250 FOR IP): Performed by: NURSE PRACTITIONER

## 2019-09-26 PROCEDURE — 36415 COLL VENOUS BLD VENIPUNCTURE: CPT

## 2019-09-26 PROCEDURE — 82550 ASSAY OF CK (CPK): CPT

## 2019-09-26 PROCEDURE — APPSS60 APP SPLIT SHARED TIME 46-60 MINUTES: Performed by: NURSE PRACTITIONER

## 2019-09-26 PROCEDURE — 1200000000 HC SEMI PRIVATE

## 2019-09-26 PROCEDURE — 99253 IP/OBS CNSLTJ NEW/EST LOW 45: CPT | Performed by: SURGERY

## 2019-09-26 PROCEDURE — APPNB30 APP NON BILLABLE TIME 0-30 MINS: Performed by: NURSE PRACTITIONER

## 2019-09-26 RX ORDER — LORAZEPAM 0.5 MG/1
0.5 TABLET ORAL DAILY PRN
Status: DISCONTINUED | OUTPATIENT
Start: 2019-09-26 | End: 2019-10-02 | Stop reason: HOSPADM

## 2019-09-26 RX ORDER — HYDROCODONE BITARTRATE AND ACETAMINOPHEN 5; 325 MG/1; MG/1
1 TABLET ORAL EVERY 4 HOURS PRN
Status: DISCONTINUED | OUTPATIENT
Start: 2019-09-26 | End: 2019-10-02 | Stop reason: HOSPADM

## 2019-09-26 RX ORDER — MORPHINE SULFATE 2 MG/ML
2 INJECTION, SOLUTION INTRAMUSCULAR; INTRAVENOUS EVERY 4 HOURS PRN
Status: DISCONTINUED | OUTPATIENT
Start: 2019-09-26 | End: 2019-10-02 | Stop reason: HOSPADM

## 2019-09-26 RX ORDER — LOPERAMIDE HYDROCHLORIDE 2 MG/1
2 CAPSULE ORAL 4 TIMES DAILY PRN
Status: DISCONTINUED | OUTPATIENT
Start: 2019-09-26 | End: 2019-10-02 | Stop reason: HOSPADM

## 2019-09-26 RX ADMIN — CIPROFLOXACIN 200 MG: 2 INJECTION, SOLUTION INTRAVENOUS at 01:22

## 2019-09-26 RX ADMIN — ONDANSETRON 4 MG: 2 INJECTION INTRAMUSCULAR; INTRAVENOUS at 05:10

## 2019-09-26 RX ADMIN — APIXABAN 5 MG: 5 TABLET, FILM COATED ORAL at 09:36

## 2019-09-26 RX ADMIN — ONDANSETRON 4 MG: 2 INJECTION INTRAMUSCULAR; INTRAVENOUS at 15:39

## 2019-09-26 RX ADMIN — ONDANSETRON 4 MG: 2 INJECTION INTRAMUSCULAR; INTRAVENOUS at 20:03

## 2019-09-26 RX ADMIN — HYDROCODONE BITARTRATE AND ACETAMINOPHEN 1 TABLET: 5; 325 TABLET ORAL at 05:10

## 2019-09-26 RX ADMIN — DESMOPRESSIN ACETATE 40 MG: 0.2 TABLET ORAL at 18:13

## 2019-09-26 RX ADMIN — CLONIDINE HYDROCHLORIDE 0.2 MG: 0.1 TABLET ORAL at 20:03

## 2019-09-26 RX ADMIN — METRONIDAZOLE 500 MG: 500 INJECTION, SOLUTION INTRAVENOUS at 23:31

## 2019-09-26 RX ADMIN — Medication 1 CAPSULE: at 09:36

## 2019-09-26 RX ADMIN — METRONIDAZOLE 500 MG: 500 INJECTION, SOLUTION INTRAVENOUS at 05:11

## 2019-09-26 RX ADMIN — ASPIRIN 81 MG: 81 TABLET, COATED ORAL at 09:36

## 2019-09-26 RX ADMIN — CLONIDINE HYDROCHLORIDE 0.2 MG: 0.1 TABLET ORAL at 09:37

## 2019-09-26 RX ADMIN — LEVOTHYROXINE SODIUM 50 MCG: 25 TABLET ORAL at 05:10

## 2019-09-26 RX ADMIN — Medication 10 ML: at 22:39

## 2019-09-26 RX ADMIN — MONTELUKAST SODIUM 10 MG: 10 TABLET, FILM COATED ORAL at 09:36

## 2019-09-26 RX ADMIN — METRONIDAZOLE 500 MG: 500 INJECTION, SOLUTION INTRAVENOUS at 15:40

## 2019-09-26 RX ADMIN — SODIUM CHLORIDE: 9 INJECTION, SOLUTION INTRAVENOUS at 23:41

## 2019-09-26 RX ADMIN — ONDANSETRON 4 MG: 2 INJECTION INTRAMUSCULAR; INTRAVENOUS at 09:36

## 2019-09-26 RX ADMIN — SODIUM CHLORIDE: 9 INJECTION, SOLUTION INTRAVENOUS at 12:11

## 2019-09-26 RX ADMIN — ONDANSETRON 4 MG: 2 INJECTION INTRAMUSCULAR; INTRAVENOUS at 00:55

## 2019-09-26 RX ADMIN — LOPERAMIDE HYDROCHLORIDE 2 MG: 2 CAPSULE ORAL at 20:11

## 2019-09-26 RX ADMIN — MORPHINE SULFATE 2 MG: 2 INJECTION, SOLUTION INTRAMUSCULAR; INTRAVENOUS at 12:11

## 2019-09-26 RX ADMIN — Medication 10 ML: at 09:37

## 2019-09-26 RX ADMIN — Medication 1 CAPSULE: at 18:12

## 2019-09-26 RX ADMIN — CIPROFLOXACIN 200 MG: 2 INJECTION, SOLUTION INTRAVENOUS at 14:02

## 2019-09-26 RX ADMIN — CITALOPRAM HYDROBROMIDE 40 MG: 20 TABLET ORAL at 09:37

## 2019-09-26 RX ADMIN — CARVEDILOL 25 MG: 25 TABLET, FILM COATED ORAL at 09:37

## 2019-09-26 RX ADMIN — MORPHINE SULFATE 2 MG: 2 INJECTION, SOLUTION INTRAMUSCULAR; INTRAVENOUS at 18:12

## 2019-09-26 RX ADMIN — APIXABAN 5 MG: 5 TABLET, FILM COATED ORAL at 20:03

## 2019-09-26 RX ADMIN — PANTOPRAZOLE SODIUM 40 MG: 40 TABLET, DELAYED RELEASE ORAL at 05:17

## 2019-09-26 RX ADMIN — HYDROCODONE BITARTRATE AND ACETAMINOPHEN 1 TABLET: 5; 325 TABLET ORAL at 20:04

## 2019-09-26 ASSESSMENT — PAIN SCALES - GENERAL
PAINLEVEL_OUTOF10: 8
PAINLEVEL_OUTOF10: 7
PAINLEVEL_OUTOF10: 8

## 2019-09-26 ASSESSMENT — PAIN DESCRIPTION - LOCATION
LOCATION: ABDOMEN

## 2019-09-26 ASSESSMENT — PAIN DESCRIPTION - ORIENTATION
ORIENTATION: LOWER
ORIENTATION: LOWER

## 2019-09-27 ENCOUNTER — APPOINTMENT (OUTPATIENT)
Dept: GENERAL RADIOLOGY | Age: 74
DRG: 469 | End: 2019-09-27
Payer: COMMERCIAL

## 2019-09-27 LAB
ANION GAP SERPL CALCULATED.3IONS-SCNC: 13 MMOL/L (ref 3–16)
BUN BLDV-MCNC: 56 MG/DL (ref 7–20)
CALCIUM SERPL-MCNC: 8.4 MG/DL (ref 8.3–10.6)
CHLORIDE BLD-SCNC: 104 MMOL/L (ref 99–110)
CO2: 20 MMOL/L (ref 21–32)
CREAT SERPL-MCNC: 4.3 MG/DL (ref 0.6–1.2)
EKG ATRIAL RATE: 64 BPM
EKG DIAGNOSIS: NORMAL
EKG P AXIS: 61 DEGREES
EKG P-R INTERVAL: 148 MS
EKG Q-T INTERVAL: 446 MS
EKG QRS DURATION: 74 MS
EKG QTC CALCULATION (BAZETT): 460 MS
EKG R AXIS: 44 DEGREES
EKG T AXIS: 128 DEGREES
EKG VENTRICULAR RATE: 64 BPM
GFR AFRICAN AMERICAN: 12
GFR NON-AFRICAN AMERICAN: 10
GLUCOSE BLD-MCNC: 102 MG/DL (ref 70–99)
HCT VFR BLD CALC: 26.6 % (ref 36–48)
HEMOGLOBIN: 8 G/DL (ref 12–16)
LIPASE: 77 U/L (ref 13–60)
MCH RBC QN AUTO: 23.9 PG (ref 26–34)
MCHC RBC AUTO-ENTMCNC: 30.1 G/DL (ref 31–36)
MCV RBC AUTO: 79.4 FL (ref 80–100)
PDW BLD-RTO: 19.7 % (ref 12.4–15.4)
PLATELET # BLD: 207 K/UL (ref 135–450)
PMV BLD AUTO: 8.1 FL (ref 5–10.5)
POTASSIUM SERPL-SCNC: 4.6 MMOL/L (ref 3.5–5.1)
RBC # BLD: 3.35 M/UL (ref 4–5.2)
SODIUM BLD-SCNC: 137 MMOL/L (ref 136–145)
WBC # BLD: 12.6 K/UL (ref 4–11)

## 2019-09-27 PROCEDURE — 83690 ASSAY OF LIPASE: CPT

## 2019-09-27 PROCEDURE — 6370000000 HC RX 637 (ALT 250 FOR IP): Performed by: NURSE PRACTITIONER

## 2019-09-27 PROCEDURE — 6370000000 HC RX 637 (ALT 250 FOR IP): Performed by: INTERNAL MEDICINE

## 2019-09-27 PROCEDURE — 2500000003 HC RX 250 WO HCPCS: Performed by: INTERNAL MEDICINE

## 2019-09-27 PROCEDURE — 1200000000 HC SEMI PRIVATE

## 2019-09-27 PROCEDURE — 6360000002 HC RX W HCPCS: Performed by: INTERNAL MEDICINE

## 2019-09-27 PROCEDURE — 36415 COLL VENOUS BLD VENIPUNCTURE: CPT

## 2019-09-27 PROCEDURE — 74018 RADEX ABDOMEN 1 VIEW: CPT

## 2019-09-27 PROCEDURE — 93005 ELECTROCARDIOGRAM TRACING: CPT | Performed by: INTERNAL MEDICINE

## 2019-09-27 PROCEDURE — 85027 COMPLETE CBC AUTOMATED: CPT

## 2019-09-27 PROCEDURE — C9113 INJ PANTOPRAZOLE SODIUM, VIA: HCPCS | Performed by: INTERNAL MEDICINE

## 2019-09-27 PROCEDURE — 93010 ELECTROCARDIOGRAM REPORT: CPT | Performed by: INTERNAL MEDICINE

## 2019-09-27 PROCEDURE — 2580000003 HC RX 258: Performed by: INTERNAL MEDICINE

## 2019-09-27 PROCEDURE — APPNB30 APP NON BILLABLE TIME 0-30 MINS: Performed by: NURSE PRACTITIONER

## 2019-09-27 PROCEDURE — 94760 N-INVAS EAR/PLS OXIMETRY 1: CPT

## 2019-09-27 PROCEDURE — 2700000000 HC OXYGEN THERAPY PER DAY

## 2019-09-27 PROCEDURE — 80048 BASIC METABOLIC PNL TOTAL CA: CPT

## 2019-09-27 RX ORDER — PANTOPRAZOLE SODIUM 40 MG/10ML
40 INJECTION, POWDER, LYOPHILIZED, FOR SOLUTION INTRAVENOUS 2 TIMES DAILY
Status: DISCONTINUED | OUTPATIENT
Start: 2019-09-27 | End: 2019-10-02 | Stop reason: HOSPADM

## 2019-09-27 RX ORDER — CIPROFLOXACIN 2 MG/ML
200 INJECTION, SOLUTION INTRAVENOUS EVERY 24 HOURS
Status: DISCONTINUED | OUTPATIENT
Start: 2019-09-27 | End: 2019-10-01

## 2019-09-27 RX ORDER — CARVEDILOL 6.25 MG/1
6.25 TABLET ORAL 2 TIMES DAILY WITH MEALS
Status: DISCONTINUED | OUTPATIENT
Start: 2019-09-27 | End: 2019-10-02 | Stop reason: HOSPADM

## 2019-09-27 RX ADMIN — CIPROFLOXACIN 200 MG: 2 INJECTION, SOLUTION INTRAVENOUS at 22:16

## 2019-09-27 RX ADMIN — LOPERAMIDE HYDROCHLORIDE 2 MG: 2 CAPSULE ORAL at 04:53

## 2019-09-27 RX ADMIN — METRONIDAZOLE 500 MG: 500 INJECTION, SOLUTION INTRAVENOUS at 15:06

## 2019-09-27 RX ADMIN — MONTELUKAST SODIUM 10 MG: 10 TABLET, FILM COATED ORAL at 07:51

## 2019-09-27 RX ADMIN — CIPROFLOXACIN 200 MG: 2 INJECTION, SOLUTION INTRAVENOUS at 01:28

## 2019-09-27 RX ADMIN — MORPHINE SULFATE 2 MG: 2 INJECTION, SOLUTION INTRAMUSCULAR; INTRAVENOUS at 01:29

## 2019-09-27 RX ADMIN — PANTOPRAZOLE SODIUM 40 MG: 40 TABLET, DELAYED RELEASE ORAL at 05:09

## 2019-09-27 RX ADMIN — CARVEDILOL 25 MG: 25 TABLET, FILM COATED ORAL at 07:51

## 2019-09-27 RX ADMIN — HYDROCODONE BITARTRATE AND ACETAMINOPHEN 1 TABLET: 5; 325 TABLET ORAL at 17:19

## 2019-09-27 RX ADMIN — APIXABAN 2.5 MG: 2.5 TABLET, FILM COATED ORAL at 22:07

## 2019-09-27 RX ADMIN — Medication 1 CAPSULE: at 16:13

## 2019-09-27 RX ADMIN — ONDANSETRON 4 MG: 2 INJECTION INTRAMUSCULAR; INTRAVENOUS at 22:07

## 2019-09-27 RX ADMIN — CITALOPRAM HYDROBROMIDE 40 MG: 20 TABLET ORAL at 07:51

## 2019-09-27 RX ADMIN — Medication 10 ML: at 22:09

## 2019-09-27 RX ADMIN — LEVOTHYROXINE SODIUM 50 MCG: 25 TABLET ORAL at 05:09

## 2019-09-27 RX ADMIN — Medication 1 CAPSULE: at 07:51

## 2019-09-27 RX ADMIN — PANTOPRAZOLE SODIUM 40 MG: 40 INJECTION, POWDER, LYOPHILIZED, FOR SOLUTION INTRAVENOUS at 22:07

## 2019-09-27 RX ADMIN — DESMOPRESSIN ACETATE 40 MG: 0.2 TABLET ORAL at 16:13

## 2019-09-27 RX ADMIN — ONDANSETRON 4 MG: 2 INJECTION INTRAMUSCULAR; INTRAVENOUS at 04:55

## 2019-09-27 RX ADMIN — METRONIDAZOLE 500 MG: 500 INJECTION, SOLUTION INTRAVENOUS at 23:55

## 2019-09-27 RX ADMIN — CARVEDILOL 6.25 MG: 6.25 TABLET, FILM COATED ORAL at 16:13

## 2019-09-27 RX ADMIN — HYDROCODONE BITARTRATE AND ACETAMINOPHEN 1 TABLET: 5; 325 TABLET ORAL at 04:53

## 2019-09-27 RX ADMIN — MORPHINE SULFATE 2 MG: 2 INJECTION, SOLUTION INTRAMUSCULAR; INTRAVENOUS at 22:09

## 2019-09-27 RX ADMIN — CLONIDINE HYDROCHLORIDE 0.2 MG: 0.1 TABLET ORAL at 07:51

## 2019-09-27 RX ADMIN — APIXABAN 5 MG: 5 TABLET, FILM COATED ORAL at 07:52

## 2019-09-27 RX ADMIN — METRONIDAZOLE 500 MG: 500 INJECTION, SOLUTION INTRAVENOUS at 05:10

## 2019-09-27 RX ADMIN — Medication 10 ML: at 07:55

## 2019-09-27 RX ADMIN — ASPIRIN 81 MG: 81 TABLET, COATED ORAL at 07:51

## 2019-09-27 ASSESSMENT — ENCOUNTER SYMPTOMS
NAUSEA: 0
COLOR CHANGE: 0
APNEA: 0
BACK PAIN: 0
EYE DISCHARGE: 0
VOMITING: 0
EYE ITCHING: 0
CHEST TIGHTNESS: 0
ABDOMINAL PAIN: 1

## 2019-09-27 ASSESSMENT — PAIN DESCRIPTION - LOCATION
LOCATION: ABDOMEN;BACK
LOCATION: ABDOMEN
LOCATION: ABDOMEN;BACK

## 2019-09-27 ASSESSMENT — PAIN DESCRIPTION - ORIENTATION
ORIENTATION: LOWER

## 2019-09-27 ASSESSMENT — PAIN SCALES - GENERAL
PAINLEVEL_OUTOF10: 8
PAINLEVEL_OUTOF10: 6
PAINLEVEL_OUTOF10: 7

## 2019-09-28 ENCOUNTER — APPOINTMENT (OUTPATIENT)
Dept: GENERAL RADIOLOGY | Age: 74
DRG: 469 | End: 2019-09-28
Payer: COMMERCIAL

## 2019-09-28 LAB
ANION GAP SERPL CALCULATED.3IONS-SCNC: 13 MMOL/L (ref 3–16)
BASE EXCESS ARTERIAL: -5.5 MMOL/L (ref -3–3)
BUN BLDV-MCNC: 57 MG/DL (ref 7–20)
CALCIUM SERPL-MCNC: 8.5 MG/DL (ref 8.3–10.6)
CARBOXYHEMOGLOBIN ARTERIAL: 1 % (ref 0–1.5)
CHLORIDE BLD-SCNC: 105 MMOL/L (ref 99–110)
CO2: 20 MMOL/L (ref 21–32)
CREAT SERPL-MCNC: 3.3 MG/DL (ref 0.6–1.2)
CRYPTOSPORIDIUM ANTIGEN STOOL: NORMAL
E HISTOLYTICA ANTIGEN STOOL: NORMAL
GFR AFRICAN AMERICAN: 17
GFR NON-AFRICAN AMERICAN: 14
GIARDIA ANTIGEN STOOL: NORMAL
GLUCOSE BLD-MCNC: 88 MG/DL (ref 70–99)
HCO3 ARTERIAL: 23.3 MMOL/L (ref 21–29)
HCT VFR BLD CALC: 26.6 % (ref 36–48)
HCT VFR BLD CALC: 27.1 % (ref 36–48)
HCT VFR BLD CALC: 28.2 % (ref 36–48)
HEMOGLOBIN, ART, EXTENDED: 9.3 G/DL (ref 12–16)
HEMOGLOBIN: 8.2 G/DL (ref 12–16)
HEMOGLOBIN: 8.4 G/DL (ref 12–16)
HEMOGLOBIN: 8.6 G/DL (ref 12–16)
METHEMOGLOBIN ARTERIAL: 0.2 %
O2 CONTENT ARTERIAL: 13 ML/DL
O2 SAT, ARTERIAL: 98 %
O2 THERAPY: ABNORMAL
PCO2 ARTERIAL: 63.8 MMHG (ref 35–45)
PH ARTERIAL: 7.17 (ref 7.35–7.45)
PO2 ARTERIAL: 111 MMHG (ref 75–108)
POTASSIUM SERPL-SCNC: 5.1 MMOL/L (ref 3.5–5.1)
SODIUM BLD-SCNC: 138 MMOL/L (ref 136–145)
TCO2 ARTERIAL: 56.5 MMOL/L

## 2019-09-28 PROCEDURE — 6370000000 HC RX 637 (ALT 250 FOR IP): Performed by: INTERNAL MEDICINE

## 2019-09-28 PROCEDURE — 85018 HEMOGLOBIN: CPT

## 2019-09-28 PROCEDURE — 2700000000 HC OXYGEN THERAPY PER DAY

## 2019-09-28 PROCEDURE — C9113 INJ PANTOPRAZOLE SODIUM, VIA: HCPCS | Performed by: INTERNAL MEDICINE

## 2019-09-28 PROCEDURE — 51702 INSERT TEMP BLADDER CATH: CPT

## 2019-09-28 PROCEDURE — 2500000003 HC RX 250 WO HCPCS: Performed by: INTERNAL MEDICINE

## 2019-09-28 PROCEDURE — 80048 BASIC METABOLIC PNL TOTAL CA: CPT

## 2019-09-28 PROCEDURE — 6360000002 HC RX W HCPCS: Performed by: INTERNAL MEDICINE

## 2019-09-28 PROCEDURE — 2580000003 HC RX 258: Performed by: INTERNAL MEDICINE

## 2019-09-28 PROCEDURE — 94760 N-INVAS EAR/PLS OXIMETRY 1: CPT

## 2019-09-28 PROCEDURE — 82803 BLOOD GASES ANY COMBINATION: CPT

## 2019-09-28 PROCEDURE — 87329 GIARDIA AG IA: CPT

## 2019-09-28 PROCEDURE — 94640 AIRWAY INHALATION TREATMENT: CPT

## 2019-09-28 PROCEDURE — 94660 CPAP INITIATION&MGMT: CPT

## 2019-09-28 PROCEDURE — 87336 ENTAMOEB HIST DISPR AG IA: CPT

## 2019-09-28 PROCEDURE — 71045 X-RAY EXAM CHEST 1 VIEW: CPT

## 2019-09-28 PROCEDURE — 85014 HEMATOCRIT: CPT

## 2019-09-28 PROCEDURE — 94664 DEMO&/EVAL PT USE INHALER: CPT

## 2019-09-28 PROCEDURE — 2060000000 HC ICU INTERMEDIATE R&B

## 2019-09-28 PROCEDURE — 87328 CRYPTOSPORIDIUM AG IA: CPT

## 2019-09-28 PROCEDURE — 36415 COLL VENOUS BLD VENIPUNCTURE: CPT

## 2019-09-28 RX ORDER — SODIUM CHLORIDE 9 MG/ML
INJECTION, SOLUTION INTRAVENOUS
Status: DISCONTINUED
Start: 2019-09-28 | End: 2019-09-29

## 2019-09-28 RX ORDER — FUROSEMIDE 10 MG/ML
40 INJECTION INTRAMUSCULAR; INTRAVENOUS ONCE
Status: COMPLETED | OUTPATIENT
Start: 2019-09-28 | End: 2019-09-28

## 2019-09-28 RX ORDER — FUROSEMIDE 10 MG/ML
80 INJECTION INTRAMUSCULAR; INTRAVENOUS ONCE
Status: COMPLETED | OUTPATIENT
Start: 2019-09-28 | End: 2019-09-28

## 2019-09-28 RX ORDER — LEVALBUTEROL INHALATION SOLUTION 1.25 MG/3ML
1.25 SOLUTION RESPIRATORY (INHALATION) EVERY 4 HOURS PRN
Status: DISCONTINUED | OUTPATIENT
Start: 2019-09-28 | End: 2019-10-02 | Stop reason: HOSPADM

## 2019-09-28 RX ADMIN — FUROSEMIDE 40 MG: 10 INJECTION, SOLUTION INTRAMUSCULAR; INTRAVENOUS at 16:22

## 2019-09-28 RX ADMIN — LORAZEPAM 0.5 MG: 0.5 TABLET ORAL at 11:48

## 2019-09-28 RX ADMIN — IPRATROPIUM BROMIDE AND ALBUTEROL SULFATE 3 ML: .5; 3 SOLUTION RESPIRATORY (INHALATION) at 22:16

## 2019-09-28 RX ADMIN — APIXABAN 2.5 MG: 2.5 TABLET, FILM COATED ORAL at 23:48

## 2019-09-28 RX ADMIN — CITALOPRAM HYDROBROMIDE 40 MG: 20 TABLET ORAL at 10:44

## 2019-09-28 RX ADMIN — LEVALBUTEROL HYDROCHLORIDE 1.25 MG: 1.25 SOLUTION RESPIRATORY (INHALATION) at 22:51

## 2019-09-28 RX ADMIN — MORPHINE SULFATE 2 MG: 2 INJECTION, SOLUTION INTRAMUSCULAR; INTRAVENOUS at 10:40

## 2019-09-28 RX ADMIN — IPRATROPIUM BROMIDE AND ALBUTEROL SULFATE 3 ML: .5; 3 SOLUTION RESPIRATORY (INHALATION) at 12:07

## 2019-09-28 RX ADMIN — Medication 1 CAPSULE: at 10:45

## 2019-09-28 RX ADMIN — CIPROFLOXACIN 200 MG: 2 INJECTION, SOLUTION INTRAVENOUS at 23:51

## 2019-09-28 RX ADMIN — ONDANSETRON 4 MG: 2 INJECTION INTRAMUSCULAR; INTRAVENOUS at 05:56

## 2019-09-28 RX ADMIN — CARVEDILOL 6.25 MG: 6.25 TABLET, FILM COATED ORAL at 10:45

## 2019-09-28 RX ADMIN — PANTOPRAZOLE SODIUM 40 MG: 40 INJECTION, POWDER, LYOPHILIZED, FOR SOLUTION INTRAVENOUS at 23:55

## 2019-09-28 RX ADMIN — CLONIDINE HYDROCHLORIDE 0.2 MG: 0.1 TABLET ORAL at 11:49

## 2019-09-28 RX ADMIN — APIXABAN 2.5 MG: 2.5 TABLET, FILM COATED ORAL at 10:45

## 2019-09-28 RX ADMIN — METRONIDAZOLE 500 MG: 500 INJECTION, SOLUTION INTRAVENOUS at 05:56

## 2019-09-28 RX ADMIN — CLONIDINE HYDROCHLORIDE 0.2 MG: 0.1 TABLET ORAL at 23:47

## 2019-09-28 RX ADMIN — PANTOPRAZOLE SODIUM 40 MG: 40 INJECTION, POWDER, LYOPHILIZED, FOR SOLUTION INTRAVENOUS at 10:41

## 2019-09-28 RX ADMIN — Medication 1 CAPSULE: at 16:22

## 2019-09-28 RX ADMIN — MONTELUKAST SODIUM 10 MG: 10 TABLET, FILM COATED ORAL at 11:49

## 2019-09-28 RX ADMIN — Medication 10 ML: at 10:40

## 2019-09-28 RX ADMIN — METRONIDAZOLE 500 MG: 500 INJECTION, SOLUTION INTRAVENOUS at 23:52

## 2019-09-28 RX ADMIN — LEVOTHYROXINE SODIUM 50 MCG: 25 TABLET ORAL at 05:56

## 2019-09-28 RX ADMIN — SODIUM CHLORIDE: 9 INJECTION, SOLUTION INTRAVENOUS at 05:55

## 2019-09-28 RX ADMIN — MORPHINE SULFATE 2 MG: 2 INJECTION, SOLUTION INTRAMUSCULAR; INTRAVENOUS at 05:56

## 2019-09-28 RX ADMIN — ASPIRIN 81 MG: 81 TABLET, COATED ORAL at 10:45

## 2019-09-28 RX ADMIN — METRONIDAZOLE 500 MG: 500 INJECTION, SOLUTION INTRAVENOUS at 16:23

## 2019-09-28 RX ADMIN — CARVEDILOL 6.25 MG: 6.25 TABLET, FILM COATED ORAL at 16:22

## 2019-09-28 RX ADMIN — DESMOPRESSIN ACETATE 40 MG: 0.2 TABLET ORAL at 16:22

## 2019-09-28 RX ADMIN — FUROSEMIDE 80 MG: 10 INJECTION, SOLUTION INTRAMUSCULAR; INTRAVENOUS at 22:34

## 2019-09-28 RX ADMIN — HYDROCODONE BITARTRATE AND ACETAMINOPHEN 1 TABLET: 5; 325 TABLET ORAL at 00:09

## 2019-09-28 RX ADMIN — LORAZEPAM 0.5 MG: 0.5 TABLET ORAL at 22:24

## 2019-09-28 ASSESSMENT — PAIN SCALES - GENERAL
PAINLEVEL_OUTOF10: 9
PAINLEVEL_OUTOF10: 8

## 2019-09-29 ENCOUNTER — APPOINTMENT (OUTPATIENT)
Dept: GENERAL RADIOLOGY | Age: 74
DRG: 469 | End: 2019-09-29
Payer: COMMERCIAL

## 2019-09-29 LAB
A/G RATIO: 1.2 (ref 1.1–2.2)
ALBUMIN SERPL-MCNC: 3.9 G/DL (ref 3.4–5)
ALP BLD-CCNC: 43 U/L (ref 40–129)
ALT SERPL-CCNC: 13 U/L (ref 10–40)
ANION GAP SERPL CALCULATED.3IONS-SCNC: 12 MMOL/L (ref 3–16)
ANION GAP SERPL CALCULATED.3IONS-SCNC: 14 MMOL/L (ref 3–16)
AST SERPL-CCNC: 20 U/L (ref 15–37)
BILIRUB SERPL-MCNC: <0.2 MG/DL (ref 0–1)
BUN BLDV-MCNC: 43 MG/DL (ref 7–20)
BUN BLDV-MCNC: 50 MG/DL (ref 7–20)
C-REACTIVE PROTEIN: 60.6 MG/L (ref 0–5.1)
CALCIUM SERPL-MCNC: 8.6 MG/DL (ref 8.3–10.6)
CALCIUM SERPL-MCNC: 8.9 MG/DL (ref 8.3–10.6)
CHLORIDE BLD-SCNC: 101 MMOL/L (ref 99–110)
CHLORIDE BLD-SCNC: 104 MMOL/L (ref 99–110)
CO2: 23 MMOL/L (ref 21–32)
CO2: 27 MMOL/L (ref 21–32)
CREAT SERPL-MCNC: 2.1 MG/DL (ref 0.6–1.2)
CREAT SERPL-MCNC: 2.5 MG/DL (ref 0.6–1.2)
GFR AFRICAN AMERICAN: 23
GFR AFRICAN AMERICAN: 28
GFR NON-AFRICAN AMERICAN: 19
GFR NON-AFRICAN AMERICAN: 23
GLOBULIN: 3.2 G/DL
GLUCOSE BLD-MCNC: 142 MG/DL (ref 70–99)
GLUCOSE BLD-MCNC: 95 MG/DL (ref 70–99)
HCT VFR BLD CALC: 26.1 % (ref 36–48)
HCT VFR BLD CALC: 26.8 % (ref 36–48)
HCT VFR BLD CALC: 29.8 % (ref 36–48)
HEMOGLOBIN: 8.2 G/DL (ref 12–16)
HEMOGLOBIN: 8.5 G/DL (ref 12–16)
HEMOGLOBIN: 9.1 G/DL (ref 12–16)
MCH RBC QN AUTO: 24.3 PG (ref 26–34)
MCHC RBC AUTO-ENTMCNC: 31.5 G/DL (ref 31–36)
MCV RBC AUTO: 77.1 FL (ref 80–100)
PDW BLD-RTO: 19.3 % (ref 12.4–15.4)
PLATELET # BLD: 243 K/UL (ref 135–450)
PMV BLD AUTO: 8.9 FL (ref 5–10.5)
POTASSIUM SERPL-SCNC: 4.1 MMOL/L (ref 3.5–5.1)
POTASSIUM SERPL-SCNC: 4.2 MMOL/L (ref 3.5–5.1)
RBC # BLD: 3.48 M/UL (ref 4–5.2)
SODIUM BLD-SCNC: 139 MMOL/L (ref 136–145)
SODIUM BLD-SCNC: 142 MMOL/L (ref 136–145)
TOTAL PROTEIN: 7.1 G/DL (ref 6.4–8.2)
WBC # BLD: 11.7 K/UL (ref 4–11)

## 2019-09-29 PROCEDURE — 94660 CPAP INITIATION&MGMT: CPT

## 2019-09-29 PROCEDURE — 71045 X-RAY EXAM CHEST 1 VIEW: CPT

## 2019-09-29 PROCEDURE — 2580000003 HC RX 258: Performed by: INTERNAL MEDICINE

## 2019-09-29 PROCEDURE — 86140 C-REACTIVE PROTEIN: CPT

## 2019-09-29 PROCEDURE — 2060000000 HC ICU INTERMEDIATE R&B

## 2019-09-29 PROCEDURE — 2700000000 HC OXYGEN THERAPY PER DAY

## 2019-09-29 PROCEDURE — 85014 HEMATOCRIT: CPT

## 2019-09-29 PROCEDURE — 6370000000 HC RX 637 (ALT 250 FOR IP): Performed by: INTERNAL MEDICINE

## 2019-09-29 PROCEDURE — 85027 COMPLETE CBC AUTOMATED: CPT

## 2019-09-29 PROCEDURE — 6360000002 HC RX W HCPCS: Performed by: INTERNAL MEDICINE

## 2019-09-29 PROCEDURE — 2500000003 HC RX 250 WO HCPCS: Performed by: INTERNAL MEDICINE

## 2019-09-29 PROCEDURE — 85018 HEMOGLOBIN: CPT

## 2019-09-29 PROCEDURE — 80053 COMPREHEN METABOLIC PANEL: CPT

## 2019-09-29 PROCEDURE — 36415 COLL VENOUS BLD VENIPUNCTURE: CPT

## 2019-09-29 PROCEDURE — 94761 N-INVAS EAR/PLS OXIMETRY MLT: CPT

## 2019-09-29 PROCEDURE — C9113 INJ PANTOPRAZOLE SODIUM, VIA: HCPCS | Performed by: INTERNAL MEDICINE

## 2019-09-29 RX ORDER — FUROSEMIDE 10 MG/ML
40 INJECTION INTRAMUSCULAR; INTRAVENOUS ONCE
Status: COMPLETED | OUTPATIENT
Start: 2019-09-29 | End: 2019-09-29

## 2019-09-29 RX ORDER — LORAZEPAM 2 MG/ML
0.5 INJECTION INTRAMUSCULAR EVERY 6 HOURS PRN
Status: DISCONTINUED | OUTPATIENT
Start: 2019-09-29 | End: 2019-10-02 | Stop reason: HOSPADM

## 2019-09-29 RX ADMIN — LEVOTHYROXINE SODIUM 50 MCG: 25 TABLET ORAL at 06:04

## 2019-09-29 RX ADMIN — CARVEDILOL 6.25 MG: 6.25 TABLET, FILM COATED ORAL at 16:35

## 2019-09-29 RX ADMIN — LORAZEPAM 0.5 MG: 0.5 TABLET ORAL at 21:12

## 2019-09-29 RX ADMIN — HYDROCODONE BITARTRATE AND ACETAMINOPHEN 1 TABLET: 5; 325 TABLET ORAL at 06:14

## 2019-09-29 RX ADMIN — METRONIDAZOLE 500 MG: 500 INJECTION, SOLUTION INTRAVENOUS at 21:12

## 2019-09-29 RX ADMIN — CITALOPRAM HYDROBROMIDE 40 MG: 20 TABLET ORAL at 09:31

## 2019-09-29 RX ADMIN — ASPIRIN 81 MG: 81 TABLET, COATED ORAL at 09:31

## 2019-09-29 RX ADMIN — DESMOPRESSIN ACETATE 40 MG: 0.2 TABLET ORAL at 18:23

## 2019-09-29 RX ADMIN — CLONIDINE HYDROCHLORIDE 0.2 MG: 0.1 TABLET ORAL at 20:27

## 2019-09-29 RX ADMIN — CARVEDILOL 6.25 MG: 6.25 TABLET, FILM COATED ORAL at 09:31

## 2019-09-29 RX ADMIN — Medication 1 CAPSULE: at 16:35

## 2019-09-29 RX ADMIN — LORAZEPAM 0.5 MG: 2 INJECTION INTRAMUSCULAR; INTRAVENOUS at 16:38

## 2019-09-29 RX ADMIN — Medication 10 ML: at 09:32

## 2019-09-29 RX ADMIN — Medication 1 CAPSULE: at 09:31

## 2019-09-29 RX ADMIN — METRONIDAZOLE 500 MG: 500 INJECTION, SOLUTION INTRAVENOUS at 15:10

## 2019-09-29 RX ADMIN — HYDROCODONE BITARTRATE AND ACETAMINOPHEN 1 TABLET: 5; 325 TABLET ORAL at 20:28

## 2019-09-29 RX ADMIN — CLONIDINE HYDROCHLORIDE 0.2 MG: 0.1 TABLET ORAL at 09:31

## 2019-09-29 RX ADMIN — APIXABAN 2.5 MG: 2.5 TABLET, FILM COATED ORAL at 20:28

## 2019-09-29 RX ADMIN — HYDROCODONE BITARTRATE AND ACETAMINOPHEN 1 TABLET: 5; 325 TABLET ORAL at 01:31

## 2019-09-29 RX ADMIN — METRONIDAZOLE 500 MG: 500 INJECTION, SOLUTION INTRAVENOUS at 06:05

## 2019-09-29 RX ADMIN — MONTELUKAST SODIUM 10 MG: 10 TABLET, FILM COATED ORAL at 09:31

## 2019-09-29 RX ADMIN — PANTOPRAZOLE SODIUM 40 MG: 40 INJECTION, POWDER, LYOPHILIZED, FOR SOLUTION INTRAVENOUS at 09:31

## 2019-09-29 RX ADMIN — APIXABAN 2.5 MG: 2.5 TABLET, FILM COATED ORAL at 09:31

## 2019-09-29 RX ADMIN — PANTOPRAZOLE SODIUM 40 MG: 40 INJECTION, POWDER, LYOPHILIZED, FOR SOLUTION INTRAVENOUS at 20:27

## 2019-09-29 RX ADMIN — LORAZEPAM 0.5 MG: 2 INJECTION INTRAMUSCULAR; INTRAVENOUS at 00:32

## 2019-09-29 RX ADMIN — CIPROFLOXACIN 200 MG: 2 INJECTION, SOLUTION INTRAVENOUS at 21:10

## 2019-09-29 RX ADMIN — FUROSEMIDE 40 MG: 10 INJECTION, SOLUTION INTRAMUSCULAR; INTRAVENOUS at 15:11

## 2019-09-29 ASSESSMENT — PAIN SCALES - GENERAL
PAINLEVEL_OUTOF10: 0
PAINLEVEL_OUTOF10: 5
PAINLEVEL_OUTOF10: 0
PAINLEVEL_OUTOF10: 4
PAINLEVEL_OUTOF10: 2
PAINLEVEL_OUTOF10: 5
PAINLEVEL_OUTOF10: 1
PAINLEVEL_OUTOF10: 2

## 2019-09-30 ENCOUNTER — ANESTHESIA (OUTPATIENT)
Dept: ENDOSCOPY | Age: 74
DRG: 469 | End: 2019-09-30
Payer: COMMERCIAL

## 2019-09-30 ENCOUNTER — ANESTHESIA EVENT (OUTPATIENT)
Dept: ENDOSCOPY | Age: 74
DRG: 469 | End: 2019-09-30
Payer: COMMERCIAL

## 2019-09-30 VITALS
RESPIRATION RATE: 6 BRPM | DIASTOLIC BLOOD PRESSURE: 70 MMHG | SYSTOLIC BLOOD PRESSURE: 166 MMHG | OXYGEN SATURATION: 100 %

## 2019-09-30 LAB
ANION GAP SERPL CALCULATED.3IONS-SCNC: 9 MMOL/L (ref 3–16)
BUN BLDV-MCNC: 34 MG/DL (ref 7–20)
CALCIUM SERPL-MCNC: 8.6 MG/DL (ref 8.3–10.6)
CHLORIDE BLD-SCNC: 101 MMOL/L (ref 99–110)
CO2: 33 MMOL/L (ref 21–32)
CREAT SERPL-MCNC: 1.4 MG/DL (ref 0.6–1.2)
GFR AFRICAN AMERICAN: 45
GFR NON-AFRICAN AMERICAN: 37
GLUCOSE BLD-MCNC: 120 MG/DL (ref 70–99)
HCT VFR BLD CALC: 24.7 % (ref 36–48)
HCT VFR BLD CALC: 25.2 % (ref 36–48)
HCT VFR BLD CALC: 27.3 % (ref 36–48)
HEMOGLOBIN: 7.9 G/DL (ref 12–16)
HEMOGLOBIN: 8 G/DL (ref 12–16)
HEMOGLOBIN: 8.6 G/DL (ref 12–16)
POTASSIUM SERPL-SCNC: 3.5 MMOL/L (ref 3.5–5.1)
SODIUM BLD-SCNC: 143 MMOL/L (ref 136–145)

## 2019-09-30 PROCEDURE — 85018 HEMOGLOBIN: CPT

## 2019-09-30 PROCEDURE — 7100000000 HC PACU RECOVERY - FIRST 15 MIN: Performed by: INTERNAL MEDICINE

## 2019-09-30 PROCEDURE — 85014 HEMATOCRIT: CPT

## 2019-09-30 PROCEDURE — 2500000003 HC RX 250 WO HCPCS: Performed by: NURSE ANESTHETIST, CERTIFIED REGISTERED

## 2019-09-30 PROCEDURE — 6360000002 HC RX W HCPCS: Performed by: INTERNAL MEDICINE

## 2019-09-30 PROCEDURE — 3609012800 HC EGD DIAGNOSTIC ONLY: Performed by: INTERNAL MEDICINE

## 2019-09-30 PROCEDURE — 2580000003 HC RX 258: Performed by: INTERNAL MEDICINE

## 2019-09-30 PROCEDURE — 80048 BASIC METABOLIC PNL TOTAL CA: CPT

## 2019-09-30 PROCEDURE — 2709999900 HC NON-CHARGEABLE SUPPLY: Performed by: INTERNAL MEDICINE

## 2019-09-30 PROCEDURE — 2060000000 HC ICU INTERMEDIATE R&B

## 2019-09-30 PROCEDURE — 6370000000 HC RX 637 (ALT 250 FOR IP): Performed by: INTERNAL MEDICINE

## 2019-09-30 PROCEDURE — 3700000001 HC ADD 15 MINUTES (ANESTHESIA): Performed by: INTERNAL MEDICINE

## 2019-09-30 PROCEDURE — 3700000000 HC ANESTHESIA ATTENDED CARE: Performed by: INTERNAL MEDICINE

## 2019-09-30 PROCEDURE — 0DJ08ZZ INSPECTION OF UPPER INTESTINAL TRACT, VIA NATURAL OR ARTIFICIAL OPENING ENDOSCOPIC: ICD-10-PCS | Performed by: INTERNAL MEDICINE

## 2019-09-30 PROCEDURE — 6370000000 HC RX 637 (ALT 250 FOR IP): Performed by: ANESTHESIOLOGY

## 2019-09-30 PROCEDURE — 7100000001 HC PACU RECOVERY - ADDTL 15 MIN: Performed by: INTERNAL MEDICINE

## 2019-09-30 PROCEDURE — 36415 COLL VENOUS BLD VENIPUNCTURE: CPT

## 2019-09-30 PROCEDURE — 2500000003 HC RX 250 WO HCPCS: Performed by: INTERNAL MEDICINE

## 2019-09-30 PROCEDURE — C9113 INJ PANTOPRAZOLE SODIUM, VIA: HCPCS | Performed by: INTERNAL MEDICINE

## 2019-09-30 PROCEDURE — 2580000003 HC RX 258: Performed by: NURSE ANESTHETIST, CERTIFIED REGISTERED

## 2019-09-30 PROCEDURE — 6360000002 HC RX W HCPCS: Performed by: NURSE ANESTHETIST, CERTIFIED REGISTERED

## 2019-09-30 RX ORDER — SODIUM CHLORIDE 9 MG/ML
INJECTION, SOLUTION INTRAVENOUS CONTINUOUS
Status: DISCONTINUED | OUTPATIENT
Start: 2019-09-30 | End: 2019-10-02 | Stop reason: HOSPADM

## 2019-09-30 RX ORDER — SODIUM CHLORIDE 9 MG/ML
INJECTION, SOLUTION INTRAVENOUS CONTINUOUS PRN
Status: DISCONTINUED | OUTPATIENT
Start: 2019-09-30 | End: 2019-09-30 | Stop reason: SDUPTHER

## 2019-09-30 RX ORDER — FUROSEMIDE 10 MG/ML
40 INJECTION INTRAMUSCULAR; INTRAVENOUS ONCE
Status: COMPLETED | OUTPATIENT
Start: 2019-09-30 | End: 2019-09-30

## 2019-09-30 RX ORDER — IPRATROPIUM BROMIDE AND ALBUTEROL SULFATE 2.5; .5 MG/3ML; MG/3ML
1 SOLUTION RESPIRATORY (INHALATION) ONCE
Status: COMPLETED | OUTPATIENT
Start: 2019-09-30 | End: 2019-09-30

## 2019-09-30 RX ORDER — PROPOFOL 10 MG/ML
INJECTION, EMULSION INTRAVENOUS PRN
Status: DISCONTINUED | OUTPATIENT
Start: 2019-09-30 | End: 2019-09-30 | Stop reason: SDUPTHER

## 2019-09-30 RX ORDER — LIDOCAINE HYDROCHLORIDE 20 MG/ML
INJECTION, SOLUTION EPIDURAL; INFILTRATION; INTRACAUDAL; PERINEURAL PRN
Status: DISCONTINUED | OUTPATIENT
Start: 2019-09-30 | End: 2019-09-30 | Stop reason: SDUPTHER

## 2019-09-30 RX ADMIN — CARVEDILOL 6.25 MG: 6.25 TABLET, FILM COATED ORAL at 16:14

## 2019-09-30 RX ADMIN — METRONIDAZOLE 500 MG: 500 INJECTION, SOLUTION INTRAVENOUS at 16:14

## 2019-09-30 RX ADMIN — CITALOPRAM HYDROBROMIDE 40 MG: 20 TABLET ORAL at 11:41

## 2019-09-30 RX ADMIN — Medication 10 ML: at 11:42

## 2019-09-30 RX ADMIN — HYDROCODONE BITARTRATE AND ACETAMINOPHEN 1 TABLET: 5; 325 TABLET ORAL at 01:35

## 2019-09-30 RX ADMIN — IRON SUCROSE 200 MG: 20 INJECTION, SOLUTION INTRAVENOUS at 12:17

## 2019-09-30 RX ADMIN — CIPROFLOXACIN 200 MG: 2 INJECTION, SOLUTION INTRAVENOUS at 20:26

## 2019-09-30 RX ADMIN — HYDROCODONE BITARTRATE AND ACETAMINOPHEN 1 TABLET: 5; 325 TABLET ORAL at 20:20

## 2019-09-30 RX ADMIN — LORAZEPAM 0.5 MG: 2 INJECTION INTRAMUSCULAR; INTRAVENOUS at 00:16

## 2019-09-30 RX ADMIN — SODIUM CHLORIDE: 9 INJECTION, SOLUTION INTRAVENOUS at 10:03

## 2019-09-30 RX ADMIN — METRONIDAZOLE 500 MG: 500 INJECTION, SOLUTION INTRAVENOUS at 11:45

## 2019-09-30 RX ADMIN — CLONIDINE HYDROCHLORIDE 0.2 MG: 0.1 TABLET ORAL at 20:25

## 2019-09-30 RX ADMIN — PROPOFOL 40 MG: 10 INJECTION, EMULSION INTRAVENOUS at 10:34

## 2019-09-30 RX ADMIN — ASPIRIN 81 MG: 81 TABLET, COATED ORAL at 11:41

## 2019-09-30 RX ADMIN — APIXABAN 2.5 MG: 2.5 TABLET, FILM COATED ORAL at 20:25

## 2019-09-30 RX ADMIN — SODIUM CHLORIDE: 9 INJECTION, SOLUTION INTRAVENOUS at 10:20

## 2019-09-30 RX ADMIN — LIDOCAINE HYDROCHLORIDE 100 MG: 20 INJECTION, SOLUTION EPIDURAL; INFILTRATION; INTRACAUDAL; PERINEURAL at 10:27

## 2019-09-30 RX ADMIN — PANTOPRAZOLE SODIUM 40 MG: 40 INJECTION, POWDER, LYOPHILIZED, FOR SOLUTION INTRAVENOUS at 23:28

## 2019-09-30 RX ADMIN — PROPOFOL 60 MG: 10 INJECTION, EMULSION INTRAVENOUS at 10:30

## 2019-09-30 RX ADMIN — CLONIDINE HYDROCHLORIDE 0.2 MG: 0.1 TABLET ORAL at 11:44

## 2019-09-30 RX ADMIN — Medication 1 CAPSULE: at 11:44

## 2019-09-30 RX ADMIN — APIXABAN 2.5 MG: 2.5 TABLET, FILM COATED ORAL at 11:42

## 2019-09-30 RX ADMIN — IPRATROPIUM BROMIDE AND ALBUTEROL SULFATE 1 AMPULE: .5; 3 SOLUTION RESPIRATORY (INHALATION) at 10:14

## 2019-09-30 RX ADMIN — LEVOTHYROXINE SODIUM 50 MCG: 25 TABLET ORAL at 11:42

## 2019-09-30 RX ADMIN — DESMOPRESSIN ACETATE 40 MG: 0.2 TABLET ORAL at 18:22

## 2019-09-30 RX ADMIN — CARVEDILOL 6.25 MG: 6.25 TABLET, FILM COATED ORAL at 11:41

## 2019-09-30 RX ADMIN — PANTOPRAZOLE SODIUM 40 MG: 40 INJECTION, POWDER, LYOPHILIZED, FOR SOLUTION INTRAVENOUS at 11:42

## 2019-09-30 RX ADMIN — FUROSEMIDE 40 MG: 10 INJECTION, SOLUTION INTRAMUSCULAR; INTRAVENOUS at 11:42

## 2019-09-30 RX ADMIN — LORAZEPAM 0.5 MG: 0.5 TABLET ORAL at 20:25

## 2019-09-30 RX ADMIN — HYDROCODONE BITARTRATE AND ACETAMINOPHEN 1 TABLET: 5; 325 TABLET ORAL at 12:16

## 2019-09-30 RX ADMIN — MONTELUKAST SODIUM 10 MG: 10 TABLET, FILM COATED ORAL at 11:42

## 2019-09-30 RX ADMIN — Medication 1 CAPSULE: at 16:14

## 2019-09-30 RX ADMIN — PROPOFOL 60 MG: 10 INJECTION, EMULSION INTRAVENOUS at 10:27

## 2019-09-30 ASSESSMENT — PAIN SCALES - GENERAL
PAINLEVEL_OUTOF10: 6
PAINLEVEL_OUTOF10: 3
PAINLEVEL_OUTOF10: 2
PAINLEVEL_OUTOF10: 2
PAINLEVEL_OUTOF10: 5
PAINLEVEL_OUTOF10: 5
PAINLEVEL_OUTOF10: 4
PAINLEVEL_OUTOF10: 6

## 2019-09-30 ASSESSMENT — ENCOUNTER SYMPTOMS: SHORTNESS OF BREATH: 1

## 2019-09-30 ASSESSMENT — PAIN - FUNCTIONAL ASSESSMENT: PAIN_FUNCTIONAL_ASSESSMENT: 0-10

## 2019-09-30 NOTE — ANESTHESIA POSTPROCEDURE EVALUATION
Department of Anesthesiology  Postprocedure Note    Patient: Bruce Cerrato  MRN: [id number]  YOB: 1945  Date of evaluation: 9/30/2019  Time:  3:29 PM     Procedure Summary     Date:  09/30/19 Room / Location:  [facility] ENDO 05 / [facility] ENDOSCOPY    Anesthesia Start:  3534 Anesthesia Stop:  1278    Procedure:  EGD DIAGNOSTIC ONLY (N/A Abdomen) Diagnosis:  (Abdominal Pain)    Surgeon:  Jennifer Ridley MD Responsible Provider:  Patrizia Fenton MD    Anesthesia Type:  TIVA ASA Status:  3          Anesthesia Type: TIVA    Adriana Phase I: Adriana Score: 8    Adriana Phase II:      Last vitals: Reviewed and per EMR flowsheets.        Anesthesia Post Evaluation    Patient location during evaluation: PACU  Patient participation: complete - patient participated  Level of consciousness: awake  Airway patency: patent  Nausea & Vomiting: no vomiting  Complications: no  Cardiovascular status: hemodynamically stable  Respiratory status: acceptable  Hydration status: euvolemic

## 2019-09-30 NOTE — ANESTHESIA PRE PROCEDURE
Department of Anesthesiology  Preprocedure Note       Name:  Diane Lilly   Age:  69 y.o.  :  1945                                          MRN:  [id number]         Date:  2019      Surgeon: Danielle Haider):  Giuliano Matthew MD    Procedure: EGD DIAGNOSTIC ONLY (N/A Abdomen)    Medications prior to admission:   Prior to Admission medications    Medication Sig Start Date End Date Taking? Authorizing Provider   LORazepam (ATIVAN) 0.5 MG tablet Take 1 tablet by mouth daily as needed for Anxiety for up to 30 days.  9/23/19 10/23/19 Yes Sb Enciso MD   lansoprazole (PREVACID) 15 MG delayed release capsule Take 1 capsule by mouth daily 19  Yes Juan Pablo Webber, MD   lisinopril (PRINIVIL;ZESTRIL) 10 MG tablet TAKE ONE TABLET BY MOUTH DAILY 19  Yes Juan Pablo Webber, MD   montelukast (SINGULAIR) 10 MG tablet Take 1 tablet by mouth daily 19  Yes Juan Pablo Webber, MD   apixaban (ELIQUIS) 5 MG TABS tablet TAKE ONE TABLET BY MOUTH TWICE A DAY 19  Yes Juan Pablo Webber, MD   carvedilol (COREG) 25 MG tablet TAKE ONE TABLET BY MOUTH TWICE A DAY WITH MEALS 19  Yes Juan Pablo Webber MD   hydrALAZINE (APRESOLINE) 50 MG tablet TAKE ONE TABLET BY MOUTH THREE TIMES A DAY 19  Yes Juan Pablo Webber, MD   atorvastatin (LIPITOR) 40 MG tablet Take 1 tablet by mouth daily 19  Yes Juan Pablo Webber, MD   citalopram (CELEXA) 40 MG tablet TAKE ONE TABLET BY MOUTH DAILY 19  Yes Juan Pablo Webber, MD   cloNIDine (CATAPRES) 0.2 MG tablet Take 1 tablet by mouth 2 times daily 19  Yes Juan Pablo Webber, MD   ipratropium-albuterol (DUONEB) 0.5-2.5 (3) MG/3ML SOLN nebulizer solution Inhale 3 mLs into the lungs every 4 hours as needed for Shortness of Breath 19  Yes Juan Pablo Webber MD   levothyroxine (SYNTHROID) 50 MCG tablet TAKE ONE TABLET BY MOUTH DAILY 19  Yes Juan Pablo Webber MD   torsemide (DEMADEX) 20 MG tablet TAKE ONE TABLET BY MOUTH DAILY 19  Yes Juan Pablo Webber MD   Fluticasone-Umeclidin-Vilant (TRELEGY ELLIPTA) 876-34.1-28 MCG/INH AEPB Inhale 1 puff into the lungs daily 8/13/19  Yes Fariha Guardado MD   aspirin 81 MG tablet Take 1 tablet by mouth daily 7/26/19  Yes Suresh Barger MD   Arformoterol Tartrate (BROVANA) 15 MCG/2ML NEBU Take 1 ampule by nebulization 2 times daily 7/26/19  Yes Tammie Jones MD   budesonide (PULMICORT) 0.5 MG/2ML nebulizer suspension Take 2 mLs by nebulization 2 times daily 7/26/19  Yes Tammie Jones MD   lidocaine (XYLOCAINE) 2 % jelly Apply small amount topically to affected area every 8 hours when necessary pain 9/19/19   Maria E Webber MD       Current medications:    Current Facility-Administered Medications   Medication Dose Route Frequency Provider Last Rate Last Dose    LORazepam (ATIVAN) injection 0.5 mg  0.5 mg Intravenous Q6H PRN Jaswinder Durham MD   0.5 mg at 09/30/19 0016    levalbuterol (Arlene Cornea) nebulizer solution 1.25 mg  1.25 mg Nebulization Q4H PRN Jaswinder Durham MD   1.25 mg at 09/28/19 2251    apixaban (ELIQUIS) tablet 2.5 mg  2.5 mg Oral BID Faith Dorsey MD   2.5 mg at 09/29/19 2028    ciprofloxacin (CIPRO) IVPB 200 mg  200 mg Intravenous Q24H Mekhi Ramírez MD   Stopped at 09/29/19 2257    carvedilol (COREG) tablet 6.25 mg  6.25 mg Oral BID  Mekhi Ramírez MD   6.25 mg at 09/29/19 1635    pantoprazole (PROTONIX) injection 40 mg  40 mg Intravenous BID Megha Aragon MD   40 mg at 09/29/19 2027    HYDROcodone-acetaminophen (Manpreet Brennan) 5-325 MG per tablet 1 tablet  1 tablet Oral Q4H PRN eMkhi Ramírez MD   1 tablet at 09/30/19 0135    morphine (PF) injection 2 mg  2 mg Intravenous Q4H PRN Mekhi Ramírez MD   2 mg at 09/28/19 1040    LORazepam (ATIVAN) tablet 0.5 mg  0.5 mg Oral Daily PRN Mekhi Ramírez MD   0.5 mg at 09/29/19 2112    loperamide (IMODIUM) capsule 2 mg  2 mg Oral 4x Daily PRN GIULIANO Torre - CNP   2 mg at 09/27/19 0453    aspirin EC tablet 81 mg  81 mg Oral Daily Mekhi Ramírez MD   81 mg at 09/29/19 0931    atorvastatin

## 2019-10-01 LAB
ANION GAP SERPL CALCULATED.3IONS-SCNC: 7 MMOL/L (ref 3–16)
BASE EXCESS VENOUS: 13 MMOL/L (ref -3–3)
BUN BLDV-MCNC: 23 MG/DL (ref 7–20)
CALCIUM SERPL-MCNC: 9.1 MG/DL (ref 8.3–10.6)
CARBOXYHEMOGLOBIN: 1.8 % (ref 0–1.5)
CHLORIDE BLD-SCNC: 100 MMOL/L (ref 99–110)
CO2: 37 MMOL/L (ref 21–32)
CREAT SERPL-MCNC: 1.1 MG/DL (ref 0.6–1.2)
GFR AFRICAN AMERICAN: 59
GFR NON-AFRICAN AMERICAN: 49
GLUCOSE BLD-MCNC: 134 MG/DL (ref 70–99)
HCO3 VENOUS: 38.1 MMOL/L (ref 23–29)
HCT VFR BLD CALC: 26.2 % (ref 36–48)
HCT VFR BLD CALC: 26.8 % (ref 36–48)
HEMOGLOBIN: 8.1 G/DL (ref 12–16)
HEMOGLOBIN: 8.5 G/DL (ref 12–16)
METHEMOGLOBIN VENOUS: 0.5 %
O2 CONTENT, VEN: 12 VOL %
O2 SAT, VEN: 100 %
O2 THERAPY: ABNORMAL
PCO2, VEN: 52.1 MMHG (ref 40–50)
PH VENOUS: 7.47 (ref 7.35–7.45)
PHOSPHORUS: 1.7 MG/DL (ref 2.5–4.9)
PO2, VEN: 168 MMHG (ref 25–40)
POTASSIUM SERPL-SCNC: 3.5 MMOL/L (ref 3.5–5.1)
SODIUM BLD-SCNC: 144 MMOL/L (ref 136–145)
TCO2 CALC VENOUS: 89 MMOL/L

## 2019-10-01 PROCEDURE — 36415 COLL VENOUS BLD VENIPUNCTURE: CPT

## 2019-10-01 PROCEDURE — 6360000002 HC RX W HCPCS: Performed by: INTERNAL MEDICINE

## 2019-10-01 PROCEDURE — 2580000003 HC RX 258: Performed by: INTERNAL MEDICINE

## 2019-10-01 PROCEDURE — 2700000000 HC OXYGEN THERAPY PER DAY

## 2019-10-01 PROCEDURE — C9113 INJ PANTOPRAZOLE SODIUM, VIA: HCPCS | Performed by: INTERNAL MEDICINE

## 2019-10-01 PROCEDURE — 2500000003 HC RX 250 WO HCPCS: Performed by: INTERNAL MEDICINE

## 2019-10-01 PROCEDURE — 2060000000 HC ICU INTERMEDIATE R&B

## 2019-10-01 PROCEDURE — 85018 HEMOGLOBIN: CPT

## 2019-10-01 PROCEDURE — 80048 BASIC METABOLIC PNL TOTAL CA: CPT

## 2019-10-01 PROCEDURE — 85014 HEMATOCRIT: CPT

## 2019-10-01 PROCEDURE — 6370000000 HC RX 637 (ALT 250 FOR IP): Performed by: INTERNAL MEDICINE

## 2019-10-01 PROCEDURE — 84100 ASSAY OF PHOSPHORUS: CPT

## 2019-10-01 PROCEDURE — 82803 BLOOD GASES ANY COMBINATION: CPT

## 2019-10-01 PROCEDURE — 94660 CPAP INITIATION&MGMT: CPT

## 2019-10-01 PROCEDURE — 94640 AIRWAY INHALATION TREATMENT: CPT

## 2019-10-01 PROCEDURE — 94761 N-INVAS EAR/PLS OXIMETRY MLT: CPT

## 2019-10-01 RX ORDER — CIPROFLOXACIN 2 MG/ML
400 INJECTION, SOLUTION INTRAVENOUS EVERY 12 HOURS
Status: DISCONTINUED | OUTPATIENT
Start: 2019-10-01 | End: 2019-10-02 | Stop reason: HOSPADM

## 2019-10-01 RX ADMIN — Medication 10 ML: at 20:06

## 2019-10-01 RX ADMIN — Medication 1 CAPSULE: at 08:21

## 2019-10-01 RX ADMIN — CLONIDINE HYDROCHLORIDE 0.2 MG: 0.1 TABLET ORAL at 20:06

## 2019-10-01 RX ADMIN — APIXABAN 5 MG: 5 TABLET, FILM COATED ORAL at 20:06

## 2019-10-01 RX ADMIN — PANTOPRAZOLE SODIUM 40 MG: 40 INJECTION, POWDER, LYOPHILIZED, FOR SOLUTION INTRAVENOUS at 20:06

## 2019-10-01 RX ADMIN — ASPIRIN 81 MG: 81 TABLET, COATED ORAL at 08:21

## 2019-10-01 RX ADMIN — CLONIDINE HYDROCHLORIDE 0.2 MG: 0.1 TABLET ORAL at 08:21

## 2019-10-01 RX ADMIN — LORAZEPAM 0.5 MG: 0.5 TABLET ORAL at 17:39

## 2019-10-01 RX ADMIN — IRON SUCROSE 200 MG: 20 INJECTION, SOLUTION INTRAVENOUS at 12:19

## 2019-10-01 RX ADMIN — METRONIDAZOLE 500 MG: 500 INJECTION, SOLUTION INTRAVENOUS at 23:58

## 2019-10-01 RX ADMIN — METRONIDAZOLE 500 MG: 500 INJECTION, SOLUTION INTRAVENOUS at 05:47

## 2019-10-01 RX ADMIN — APIXABAN 2.5 MG: 2.5 TABLET, FILM COATED ORAL at 08:22

## 2019-10-01 RX ADMIN — CITALOPRAM HYDROBROMIDE 40 MG: 20 TABLET ORAL at 08:21

## 2019-10-01 RX ADMIN — CARVEDILOL 6.25 MG: 6.25 TABLET, FILM COATED ORAL at 08:21

## 2019-10-01 RX ADMIN — CIPROFLOXACIN 400 MG: 2 INJECTION, SOLUTION INTRAVENOUS at 17:32

## 2019-10-01 RX ADMIN — PANTOPRAZOLE SODIUM 40 MG: 40 INJECTION, POWDER, LYOPHILIZED, FOR SOLUTION INTRAVENOUS at 08:21

## 2019-10-01 RX ADMIN — LEVOTHYROXINE SODIUM 50 MCG: 25 TABLET ORAL at 05:47

## 2019-10-01 RX ADMIN — Medication 10 ML: at 08:22

## 2019-10-01 RX ADMIN — CARVEDILOL 6.25 MG: 6.25 TABLET, FILM COATED ORAL at 17:33

## 2019-10-01 RX ADMIN — IPRATROPIUM BROMIDE AND ALBUTEROL SULFATE 3 ML: .5; 3 SOLUTION RESPIRATORY (INHALATION) at 07:58

## 2019-10-01 RX ADMIN — Medication 1 CAPSULE: at 17:33

## 2019-10-01 RX ADMIN — METRONIDAZOLE 500 MG: 500 INJECTION, SOLUTION INTRAVENOUS at 15:39

## 2019-10-01 RX ADMIN — DESMOPRESSIN ACETATE 40 MG: 0.2 TABLET ORAL at 17:33

## 2019-10-01 RX ADMIN — MONTELUKAST SODIUM 10 MG: 10 TABLET, FILM COATED ORAL at 08:21

## 2019-10-01 RX ADMIN — METRONIDAZOLE 500 MG: 500 INJECTION, SOLUTION INTRAVENOUS at 00:51

## 2019-10-01 ASSESSMENT — PAIN SCALES - GENERAL
PAINLEVEL_OUTOF10: 0

## 2019-10-02 VITALS
TEMPERATURE: 96.9 F | DIASTOLIC BLOOD PRESSURE: 49 MMHG | HEART RATE: 60 BPM | HEIGHT: 60 IN | RESPIRATION RATE: 18 BRPM | SYSTOLIC BLOOD PRESSURE: 145 MMHG | BODY MASS INDEX: 34.71 KG/M2 | WEIGHT: 176.81 LBS | OXYGEN SATURATION: 100 %

## 2019-10-02 LAB
ANION GAP SERPL CALCULATED.3IONS-SCNC: 8 MMOL/L (ref 3–16)
BUN BLDV-MCNC: 20 MG/DL (ref 7–20)
CALCIUM SERPL-MCNC: 9.3 MG/DL (ref 8.3–10.6)
CHLORIDE BLD-SCNC: 102 MMOL/L (ref 99–110)
CO2: 34 MMOL/L (ref 21–32)
CREAT SERPL-MCNC: 1 MG/DL (ref 0.6–1.2)
GFR AFRICAN AMERICAN: >60
GFR NON-AFRICAN AMERICAN: 54
GLUCOSE BLD-MCNC: 133 MG/DL (ref 70–99)
HCT VFR BLD CALC: 25.9 % (ref 36–48)
HCT VFR BLD CALC: 26.1 % (ref 36–48)
HCT VFR BLD CALC: 26.9 % (ref 36–48)
HEMOGLOBIN: 8 G/DL (ref 12–16)
HEMOGLOBIN: 8.1 G/DL (ref 12–16)
HEMOGLOBIN: 8.3 G/DL (ref 12–16)
POTASSIUM SERPL-SCNC: 3.6 MMOL/L (ref 3.5–5.1)
SODIUM BLD-SCNC: 144 MMOL/L (ref 136–145)

## 2019-10-02 PROCEDURE — 36415 COLL VENOUS BLD VENIPUNCTURE: CPT

## 2019-10-02 PROCEDURE — 85018 HEMOGLOBIN: CPT

## 2019-10-02 PROCEDURE — 2580000003 HC RX 258: Performed by: INTERNAL MEDICINE

## 2019-10-02 PROCEDURE — 6370000000 HC RX 637 (ALT 250 FOR IP): Performed by: INTERNAL MEDICINE

## 2019-10-02 PROCEDURE — 6360000002 HC RX W HCPCS: Performed by: INTERNAL MEDICINE

## 2019-10-02 PROCEDURE — 2700000000 HC OXYGEN THERAPY PER DAY

## 2019-10-02 PROCEDURE — 80048 BASIC METABOLIC PNL TOTAL CA: CPT

## 2019-10-02 PROCEDURE — 2500000003 HC RX 250 WO HCPCS: Performed by: INTERNAL MEDICINE

## 2019-10-02 PROCEDURE — 85014 HEMATOCRIT: CPT

## 2019-10-02 PROCEDURE — C9113 INJ PANTOPRAZOLE SODIUM, VIA: HCPCS | Performed by: INTERNAL MEDICINE

## 2019-10-02 PROCEDURE — 94760 N-INVAS EAR/PLS OXIMETRY 1: CPT

## 2019-10-02 RX ORDER — CARVEDILOL 6.25 MG/1
6.25 TABLET ORAL 2 TIMES DAILY WITH MEALS
Qty: 60 TABLET | Refills: 3 | Status: ON HOLD | OUTPATIENT
Start: 2019-10-02 | End: 2019-11-07 | Stop reason: SDUPTHER

## 2019-10-02 RX ORDER — FUROSEMIDE 40 MG/1
TABLET ORAL
Status: DISCONTINUED
Start: 2019-10-02 | End: 2019-10-02 | Stop reason: HOSPADM

## 2019-10-02 RX ORDER — FUROSEMIDE 40 MG/1
40 TABLET ORAL DAILY
Status: DISCONTINUED | OUTPATIENT
Start: 2019-10-02 | End: 2019-10-02 | Stop reason: HOSPADM

## 2019-10-02 RX ORDER — FUROSEMIDE 40 MG/1
40 TABLET ORAL DAILY
Qty: 60 TABLET | Refills: 3 | Status: ON HOLD | OUTPATIENT
Start: 2019-10-03 | End: 2019-11-07 | Stop reason: SDUPTHER

## 2019-10-02 RX ORDER — HYDROCORTISONE 0.5 %
CREAM (GRAM) TOPICAL 3 TIMES DAILY PRN
Status: DISCONTINUED | OUTPATIENT
Start: 2019-10-02 | End: 2019-10-02 | Stop reason: HOSPADM

## 2019-10-02 RX ADMIN — MONTELUKAST SODIUM 10 MG: 10 TABLET, FILM COATED ORAL at 08:27

## 2019-10-02 RX ADMIN — ASPIRIN 81 MG: 81 TABLET, COATED ORAL at 08:26

## 2019-10-02 RX ADMIN — CITALOPRAM HYDROBROMIDE 40 MG: 20 TABLET ORAL at 08:26

## 2019-10-02 RX ADMIN — LEVOTHYROXINE SODIUM 50 MCG: 25 TABLET ORAL at 06:51

## 2019-10-02 RX ADMIN — CIPROFLOXACIN 400 MG: 2 INJECTION, SOLUTION INTRAVENOUS at 04:15

## 2019-10-02 RX ADMIN — CARVEDILOL 6.25 MG: 6.25 TABLET, FILM COATED ORAL at 08:26

## 2019-10-02 RX ADMIN — HYDROCODONE BITARTRATE AND ACETAMINOPHEN 1 TABLET: 5; 325 TABLET ORAL at 08:34

## 2019-10-02 RX ADMIN — APIXABAN 5 MG: 5 TABLET, FILM COATED ORAL at 08:26

## 2019-10-02 RX ADMIN — MENTHOL, METHYL SALICYLATE: 10; 15 CREAM TOPICAL at 12:50

## 2019-10-02 RX ADMIN — Medication 1 CAPSULE: at 08:26

## 2019-10-02 RX ADMIN — CLONIDINE HYDROCHLORIDE 0.2 MG: 0.1 TABLET ORAL at 08:26

## 2019-10-02 RX ADMIN — PANTOPRAZOLE SODIUM 40 MG: 40 INJECTION, POWDER, LYOPHILIZED, FOR SOLUTION INTRAVENOUS at 08:26

## 2019-10-02 RX ADMIN — FUROSEMIDE 40 MG: 40 TABLET ORAL at 08:34

## 2019-10-02 RX ADMIN — METRONIDAZOLE 500 MG: 500 INJECTION, SOLUTION INTRAVENOUS at 06:50

## 2019-10-02 RX ADMIN — IRON SUCROSE 200 MG: 20 INJECTION, SOLUTION INTRAVENOUS at 11:20

## 2019-10-02 RX ADMIN — Medication 10 ML: at 08:28

## 2019-10-02 ASSESSMENT — PAIN SCALES - GENERAL
PAINLEVEL_OUTOF10: 0
PAINLEVEL_OUTOF10: 5
PAINLEVEL_OUTOF10: 5
PAINLEVEL_OUTOF10: 0
PAINLEVEL_OUTOF10: 6

## 2019-10-11 ENCOUNTER — TELEPHONE (OUTPATIENT)
Dept: FAMILY MEDICINE CLINIC | Age: 74
End: 2019-10-11

## 2019-10-11 DIAGNOSIS — Z87.19 HISTORY OF GI BLEED: ICD-10-CM

## 2019-10-11 DIAGNOSIS — N18.30 CKD (CHRONIC KIDNEY DISEASE), STAGE III (HCC): Primary | ICD-10-CM

## 2019-10-11 DIAGNOSIS — I10 ESSENTIAL HYPERTENSION: ICD-10-CM

## 2019-10-11 DIAGNOSIS — I25.10 CORONARY ARTERY DISEASE INVOLVING NATIVE CORONARY ARTERY OF NATIVE HEART WITHOUT ANGINA PECTORIS: ICD-10-CM

## 2019-10-11 DIAGNOSIS — E03.9 HYPOTHYROIDISM, UNSPECIFIED TYPE: ICD-10-CM

## 2019-10-11 DIAGNOSIS — J44.1 COPD EXACERBATION (HCC): ICD-10-CM

## 2019-10-11 DIAGNOSIS — I48.0 PAROXYSMAL ATRIAL FIBRILLATION (HCC): ICD-10-CM

## 2019-10-11 DIAGNOSIS — I73.9 PAD (PERIPHERAL ARTERY DISEASE) (HCC): ICD-10-CM

## 2019-10-11 DIAGNOSIS — Z13.31 POSITIVE DEPRESSION SCREENING: ICD-10-CM

## 2019-10-11 DIAGNOSIS — K21.9 GASTROESOPHAGEAL REFLUX DISEASE, ESOPHAGITIS PRESENCE NOT SPECIFIED: ICD-10-CM

## 2019-10-11 LAB
A/G RATIO: 2.2 (ref 1.1–2.2)
ALBUMIN SERPL-MCNC: 4.1 G/DL (ref 3.4–5)
ALP BLD-CCNC: 38 U/L (ref 40–129)
ALT SERPL-CCNC: 11 U/L (ref 10–40)
ANION GAP SERPL CALCULATED.3IONS-SCNC: 13 MMOL/L (ref 3–16)
AST SERPL-CCNC: 14 U/L (ref 15–37)
BASOPHILS ABSOLUTE: 0 K/UL (ref 0–0.2)
BASOPHILS RELATIVE PERCENT: 0.4 %
BILIRUB SERPL-MCNC: <0.2 MG/DL (ref 0–1)
BUN BLDV-MCNC: 24 MG/DL (ref 7–20)
CALCIUM SERPL-MCNC: 9.5 MG/DL (ref 8.3–10.6)
CHLORIDE BLD-SCNC: 99 MMOL/L (ref 99–110)
CHOLESTEROL, TOTAL: 158 MG/DL (ref 0–199)
CO2: 31 MMOL/L (ref 21–32)
CREAT SERPL-MCNC: 1.9 MG/DL (ref 0.6–1.2)
EOSINOPHILS ABSOLUTE: 0.2 K/UL (ref 0–0.6)
EOSINOPHILS RELATIVE PERCENT: 2.3 %
ESTIMATED AVERAGE GLUCOSE: 111.2 MG/DL
GFR AFRICAN AMERICAN: 31
GFR NON-AFRICAN AMERICAN: 26
GLOBULIN: 1.9 G/DL
GLUCOSE BLD-MCNC: 111 MG/DL (ref 70–99)
HBA1C MFR BLD: 5.5 %
HCT VFR BLD CALC: 29.3 % (ref 36–48)
HDLC SERPL-MCNC: 48 MG/DL (ref 40–60)
HEMOGLOBIN: 9 G/DL (ref 12–16)
LDL CHOLESTEROL CALCULATED: 80 MG/DL
LYMPHOCYTES ABSOLUTE: 0.9 K/UL (ref 1–5.1)
LYMPHOCYTES RELATIVE PERCENT: 10.7 %
MCH RBC QN AUTO: 25.8 PG (ref 26–34)
MCHC RBC AUTO-ENTMCNC: 30.8 G/DL (ref 31–36)
MCV RBC AUTO: 83.6 FL (ref 80–100)
MONOCYTES ABSOLUTE: 0.8 K/UL (ref 0–1.3)
MONOCYTES RELATIVE PERCENT: 10.1 %
NEUTROPHILS ABSOLUTE: 6.2 K/UL (ref 1.7–7.7)
NEUTROPHILS RELATIVE PERCENT: 76.5 %
PDW BLD-RTO: 23.3 % (ref 12.4–15.4)
PLATELET # BLD: 293 K/UL (ref 135–450)
PMV BLD AUTO: 9.2 FL (ref 5–10.5)
POTASSIUM SERPL-SCNC: 5.1 MMOL/L (ref 3.5–5.1)
RBC # BLD: 3.51 M/UL (ref 4–5.2)
SODIUM BLD-SCNC: 143 MMOL/L (ref 136–145)
TOTAL PROTEIN: 6 G/DL (ref 6.4–8.2)
TRIGL SERPL-MCNC: 151 MG/DL (ref 0–150)
TSH SERPL DL<=0.05 MIU/L-ACNC: 1.53 UIU/ML (ref 0.27–4.2)
VLDLC SERPL CALC-MCNC: 30 MG/DL
WBC # BLD: 8 K/UL (ref 4–11)

## 2019-10-12 RX ORDER — LANSOPRAZOLE 15 MG/1
15 CAPSULE, DELAYED RELEASE ORAL DAILY
Qty: 90 CAPSULE | Refills: 1 | Status: SHIPPED | OUTPATIENT
Start: 2019-10-12 | End: 2019-11-09 | Stop reason: SDUPTHER

## 2019-10-12 RX ORDER — LISINOPRIL 10 MG/1
TABLET ORAL
Qty: 30 TABLET | Refills: 0 | Status: SHIPPED | OUTPATIENT
Start: 2019-10-12 | End: 2019-10-17 | Stop reason: SDUPTHER

## 2019-10-12 RX ORDER — IPRATROPIUM BROMIDE AND ALBUTEROL SULFATE 2.5; .5 MG/3ML; MG/3ML
1 SOLUTION RESPIRATORY (INHALATION) EVERY 4 HOURS PRN
Qty: 360 ML | Refills: 5 | Status: SHIPPED | OUTPATIENT
Start: 2019-10-12 | End: 2019-10-19 | Stop reason: SDUPTHER

## 2019-10-12 RX ORDER — CITALOPRAM 40 MG/1
TABLET ORAL
Qty: 30 TABLET | Refills: 1 | Status: SHIPPED | OUTPATIENT
Start: 2019-10-12 | End: 2019-11-09 | Stop reason: SDUPTHER

## 2019-10-12 RX ORDER — MONTELUKAST SODIUM 10 MG/1
10 TABLET ORAL DAILY
Qty: 30 TABLET | Refills: 5 | Status: SHIPPED | OUTPATIENT
Start: 2019-10-12 | End: 2019-11-09 | Stop reason: SDUPTHER

## 2019-10-12 RX ORDER — HYDRALAZINE HYDROCHLORIDE 50 MG/1
TABLET, FILM COATED ORAL
Qty: 90 TABLET | Refills: 0 | Status: SHIPPED | OUTPATIENT
Start: 2019-10-12 | End: 2020-02-10 | Stop reason: SDUPTHER

## 2019-10-12 RX ORDER — LEVOTHYROXINE SODIUM 0.05 MG/1
TABLET ORAL
Qty: 90 TABLET | Refills: 1 | Status: SHIPPED | OUTPATIENT
Start: 2019-10-12 | End: 2019-11-09 | Stop reason: SDUPTHER

## 2019-10-12 RX ORDER — LORAZEPAM 0.5 MG/1
0.5 TABLET ORAL DAILY PRN
Qty: 30 TABLET | Refills: 0 | OUTPATIENT
Start: 2019-10-12 | End: 2019-11-11

## 2019-10-12 RX ORDER — CLONIDINE HYDROCHLORIDE 0.2 MG/1
0.2 TABLET ORAL 2 TIMES DAILY
Qty: 60 TABLET | Refills: 3 | Status: SHIPPED | OUTPATIENT
Start: 2019-10-12 | End: 2019-11-09 | Stop reason: SDUPTHER

## 2019-10-12 RX ORDER — ATORVASTATIN CALCIUM 40 MG/1
40 TABLET, FILM COATED ORAL DAILY
Qty: 30 TABLET | Refills: 3 | Status: SHIPPED | OUTPATIENT
Start: 2019-10-12 | End: 2019-11-09 | Stop reason: SDUPTHER

## 2019-10-15 ENCOUNTER — TELEPHONE (OUTPATIENT)
Dept: PULMONOLOGY | Age: 74
End: 2019-10-15

## 2019-10-16 DIAGNOSIS — N18.30 CKD (CHRONIC KIDNEY DISEASE), STAGE III (HCC): ICD-10-CM

## 2019-10-16 LAB
ANION GAP SERPL CALCULATED.3IONS-SCNC: 15 MMOL/L (ref 3–16)
BUN BLDV-MCNC: 36 MG/DL (ref 7–20)
CALCIUM SERPL-MCNC: 9.3 MG/DL (ref 8.3–10.6)
CHLORIDE BLD-SCNC: 102 MMOL/L (ref 99–110)
CO2: 29 MMOL/L (ref 21–32)
CREAT SERPL-MCNC: 2.1 MG/DL (ref 0.6–1.2)
GFR AFRICAN AMERICAN: 28
GFR NON-AFRICAN AMERICAN: 23
GLUCOSE BLD-MCNC: 121 MG/DL (ref 70–99)
POTASSIUM SERPL-SCNC: 4.8 MMOL/L (ref 3.5–5.1)
SODIUM BLD-SCNC: 146 MMOL/L (ref 136–145)

## 2019-10-17 ENCOUNTER — HOSPITAL ENCOUNTER (EMERGENCY)
Age: 74
Discharge: HOME | End: 2019-10-17
Attending: EMERGENCY MEDICINE
Payer: COMMERCIAL

## 2019-10-17 ENCOUNTER — APPOINTMENT (OUTPATIENT)
Dept: GENERAL RADIOLOGY | Age: 74
End: 2019-10-17
Payer: COMMERCIAL

## 2019-10-17 VITALS
TEMPERATURE: 97.8 F | RESPIRATION RATE: 20 BRPM | WEIGHT: 170 LBS | DIASTOLIC BLOOD PRESSURE: 96 MMHG | HEART RATE: 95 BPM | BODY MASS INDEX: 33.38 KG/M2 | OXYGEN SATURATION: 100 % | HEIGHT: 60 IN | SYSTOLIC BLOOD PRESSURE: 176 MMHG

## 2019-10-17 DIAGNOSIS — I10 ESSENTIAL HYPERTENSION: ICD-10-CM

## 2019-10-17 DIAGNOSIS — J44.1 COPD EXACERBATION (HCC): Primary | ICD-10-CM

## 2019-10-17 LAB
ANION GAP SERPL CALCULATED.3IONS-SCNC: 9 MMOL/L (ref 3–16)
BASE EXCESS VENOUS: 2.7 MMOL/L (ref -3–3)
BASOPHILS ABSOLUTE: 0.1 K/UL (ref 0–0.2)
BASOPHILS RELATIVE PERCENT: 0.9 %
BUN BLDV-MCNC: 23 MG/DL (ref 7–20)
CALCIUM SERPL-MCNC: 9.4 MG/DL (ref 8.3–10.6)
CARBOXYHEMOGLOBIN: 1.3 % (ref 0–1.5)
CHLORIDE BLD-SCNC: 103 MMOL/L (ref 99–110)
CO2: 30 MMOL/L (ref 21–32)
CREAT SERPL-MCNC: 1.5 MG/DL (ref 0.6–1.2)
EKG ATRIAL RATE: 75 BPM
EKG DIAGNOSIS: NORMAL
EKG P AXIS: 72 DEGREES
EKG P-R INTERVAL: 136 MS
EKG Q-T INTERVAL: 410 MS
EKG QRS DURATION: 72 MS
EKG QTC CALCULATION (BAZETT): 457 MS
EKG R AXIS: 61 DEGREES
EKG T AXIS: 30 DEGREES
EKG VENTRICULAR RATE: 75 BPM
EOSINOPHILS ABSOLUTE: 0.1 K/UL (ref 0–0.6)
EOSINOPHILS RELATIVE PERCENT: 1.3 %
GFR AFRICAN AMERICAN: 41
GFR NON-AFRICAN AMERICAN: 34
GLUCOSE BLD-MCNC: 111 MG/DL (ref 70–99)
HCO3 VENOUS: 28.5 MMOL/L (ref 23–29)
HCT VFR BLD CALC: 32.1 % (ref 36–48)
HEMOGLOBIN: 9.8 G/DL (ref 12–16)
LYMPHOCYTES ABSOLUTE: 1.3 K/UL (ref 1–5.1)
LYMPHOCYTES RELATIVE PERCENT: 16.6 %
MCH RBC QN AUTO: 25.3 PG (ref 26–34)
MCHC RBC AUTO-ENTMCNC: 30.6 G/DL (ref 31–36)
MCV RBC AUTO: 82.9 FL (ref 80–100)
METHEMOGLOBIN VENOUS: 0.3 %
MONOCYTES ABSOLUTE: 0.6 K/UL (ref 0–1.3)
MONOCYTES RELATIVE PERCENT: 7.5 %
NEUTROPHILS ABSOLUTE: 5.6 K/UL (ref 1.7–7.7)
NEUTROPHILS RELATIVE PERCENT: 73.7 %
O2 CONTENT, VEN: 15 VOL %
O2 SAT, VEN: 98 %
O2 THERAPY: ABNORMAL
PCO2, VEN: 49.3 MMHG (ref 40–50)
PDW BLD-RTO: 22.3 % (ref 12.4–15.4)
PH VENOUS: 7.38 (ref 7.35–7.45)
PLATELET # BLD: 300 K/UL (ref 135–450)
PMV BLD AUTO: 8.9 FL (ref 5–10.5)
PO2, VEN: 122.4 MMHG (ref 25–40)
POTASSIUM SERPL-SCNC: 5.4 MMOL/L (ref 3.5–5.1)
PRO-BNP: 1785 PG/ML (ref 0–124)
RBC # BLD: 3.87 M/UL (ref 4–5.2)
SODIUM BLD-SCNC: 142 MMOL/L (ref 136–145)
TCO2 CALC VENOUS: 30 MMOL/L
TROPONIN: <0.01 NG/ML
WBC # BLD: 7.6 K/UL (ref 4–11)

## 2019-10-17 PROCEDURE — 84484 ASSAY OF TROPONIN QUANT: CPT

## 2019-10-17 PROCEDURE — 93005 ELECTROCARDIOGRAM TRACING: CPT | Performed by: EMERGENCY MEDICINE

## 2019-10-17 PROCEDURE — 6370000000 HC RX 637 (ALT 250 FOR IP): Performed by: EMERGENCY MEDICINE

## 2019-10-17 PROCEDURE — 71046 X-RAY EXAM CHEST 2 VIEWS: CPT

## 2019-10-17 PROCEDURE — 2700000000 HC OXYGEN THERAPY PER DAY

## 2019-10-17 PROCEDURE — 94640 AIRWAY INHALATION TREATMENT: CPT

## 2019-10-17 PROCEDURE — 82803 BLOOD GASES ANY COMBINATION: CPT

## 2019-10-17 PROCEDURE — 80048 BASIC METABOLIC PNL TOTAL CA: CPT

## 2019-10-17 PROCEDURE — 85025 COMPLETE CBC W/AUTO DIFF WBC: CPT

## 2019-10-17 PROCEDURE — 99285 EMERGENCY DEPT VISIT HI MDM: CPT

## 2019-10-17 PROCEDURE — 83880 ASSAY OF NATRIURETIC PEPTIDE: CPT

## 2019-10-17 PROCEDURE — 94761 N-INVAS EAR/PLS OXIMETRY MLT: CPT

## 2019-10-17 PROCEDURE — 93010 ELECTROCARDIOGRAM REPORT: CPT | Performed by: INTERNAL MEDICINE

## 2019-10-17 RX ORDER — BUTALBITAL, ACETAMINOPHEN AND CAFFEINE 50; 325; 40 MG/1; MG/1; MG/1
1 TABLET ORAL ONCE
Status: COMPLETED | OUTPATIENT
Start: 2019-10-17 | End: 2019-10-17

## 2019-10-17 RX ORDER — LISINOPRIL 10 MG/1
TABLET ORAL
Qty: 30 TABLET | Refills: 1 | Status: ON HOLD | OUTPATIENT
Start: 2019-10-17 | End: 2019-11-07 | Stop reason: HOSPADM

## 2019-10-17 RX ORDER — BUTALBITAL, ACETAMINOPHEN AND CAFFEINE 50; 325; 40 MG/1; MG/1; MG/1
1 TABLET ORAL EVERY 6 HOURS PRN
Qty: 16 TABLET | Refills: 0 | Status: ON HOLD | OUTPATIENT
Start: 2019-10-17 | End: 2021-05-30

## 2019-10-17 RX ORDER — PREDNISONE 20 MG/1
40 TABLET ORAL DAILY
Qty: 10 TABLET | Refills: 0 | Status: SHIPPED | OUTPATIENT
Start: 2019-10-17 | End: 2019-10-22

## 2019-10-17 RX ORDER — PREDNISONE 20 MG/1
60 TABLET ORAL ONCE
Status: COMPLETED | OUTPATIENT
Start: 2019-10-17 | End: 2019-10-17

## 2019-10-17 RX ORDER — IPRATROPIUM BROMIDE AND ALBUTEROL SULFATE 2.5; .5 MG/3ML; MG/3ML
1 SOLUTION RESPIRATORY (INHALATION) ONCE
Status: COMPLETED | OUTPATIENT
Start: 2019-10-17 | End: 2019-10-17

## 2019-10-17 RX ADMIN — PREDNISONE 60 MG: 20 TABLET ORAL at 09:27

## 2019-10-17 RX ADMIN — IPRATROPIUM BROMIDE AND ALBUTEROL SULFATE 1 AMPULE: .5; 3 SOLUTION RESPIRATORY (INHALATION) at 08:58

## 2019-10-17 RX ADMIN — BUTALBITAL, ACETAMINOPHEN AND CAFFEINE 1 TABLET: 50; 325; 40 TABLET ORAL at 09:27

## 2019-10-17 ASSESSMENT — PAIN DESCRIPTION - LOCATION: LOCATION: HEAD;NECK

## 2019-10-17 ASSESSMENT — ENCOUNTER SYMPTOMS
COUGH: 1
SHORTNESS OF BREATH: 1
WHEEZING: 1
CONSTIPATION: 0
DIARRHEA: 0
NAUSEA: 0
ABDOMINAL PAIN: 0
EYES NEGATIVE: 1

## 2019-10-17 ASSESSMENT — PAIN SCALES - GENERAL
PAINLEVEL_OUTOF10: 8
PAINLEVEL_OUTOF10: 8

## 2019-10-17 ASSESSMENT — PAIN DESCRIPTION - PAIN TYPE: TYPE: ACUTE PAIN

## 2019-10-17 ASSESSMENT — PAIN DESCRIPTION - FREQUENCY: FREQUENCY: CONTINUOUS

## 2019-10-17 ASSESSMENT — PAIN DESCRIPTION - DESCRIPTORS: DESCRIPTORS: SHARP

## 2019-10-18 RX ORDER — FLUTICASONE FUROATE AND VILANTEROL 100; 25 UG/1; UG/1
1 POWDER RESPIRATORY (INHALATION) DAILY
Qty: 1 EACH | Refills: 5 | Status: SHIPPED | OUTPATIENT
Start: 2019-10-18 | End: 2019-10-19 | Stop reason: SDUPTHER

## 2019-10-19 DIAGNOSIS — J44.1 COPD EXACERBATION (HCC): ICD-10-CM

## 2019-10-21 RX ORDER — LORAZEPAM 0.5 MG/1
0.5 TABLET ORAL DAILY PRN
Qty: 30 TABLET | Refills: 0 | OUTPATIENT
Start: 2019-10-21 | End: 2019-11-20

## 2019-10-21 RX ORDER — IPRATROPIUM BROMIDE AND ALBUTEROL SULFATE 2.5; .5 MG/3ML; MG/3ML
1 SOLUTION RESPIRATORY (INHALATION) EVERY 4 HOURS PRN
Qty: 360 ML | Refills: 5 | Status: SHIPPED | OUTPATIENT
Start: 2019-10-21 | End: 2020-11-11 | Stop reason: SDUPTHER

## 2019-10-25 ENCOUNTER — OFFICE VISIT (OUTPATIENT)
Dept: FAMILY MEDICINE CLINIC | Age: 74
End: 2019-10-25
Payer: COMMERCIAL

## 2019-10-25 ENCOUNTER — OFFICE VISIT (OUTPATIENT)
Dept: PULMONOLOGY | Age: 74
End: 2019-10-25
Payer: COMMERCIAL

## 2019-10-25 VITALS
HEART RATE: 77 BPM | WEIGHT: 177 LBS | OXYGEN SATURATION: 94 % | SYSTOLIC BLOOD PRESSURE: 136 MMHG | BODY MASS INDEX: 34.57 KG/M2 | DIASTOLIC BLOOD PRESSURE: 88 MMHG

## 2019-10-25 DIAGNOSIS — M54.2 CHRONIC NECK PAIN: ICD-10-CM

## 2019-10-25 DIAGNOSIS — I48.0 PAROXYSMAL ATRIAL FIBRILLATION (HCC): ICD-10-CM

## 2019-10-25 DIAGNOSIS — I10 ESSENTIAL HYPERTENSION: Primary | ICD-10-CM

## 2019-10-25 DIAGNOSIS — Z23 NEED FOR INFLUENZA VACCINATION: Primary | ICD-10-CM

## 2019-10-25 DIAGNOSIS — J43.2 CENTRILOBULAR EMPHYSEMA (HCC): ICD-10-CM

## 2019-10-25 DIAGNOSIS — G89.29 CHRONIC NECK PAIN: ICD-10-CM

## 2019-10-25 DIAGNOSIS — I10 ESSENTIAL HYPERTENSION: ICD-10-CM

## 2019-10-25 DIAGNOSIS — F41.9 ANXIETY: ICD-10-CM

## 2019-10-25 DIAGNOSIS — I25.119 CORONARY ARTERY DISEASE INVOLVING NATIVE CORONARY ARTERY OF NATIVE HEART WITH ANGINA PECTORIS (HCC): ICD-10-CM

## 2019-10-25 PROBLEM — R94.39 ABNORMAL CARDIOVASCULAR STRESS TEST: Status: RESOLVED | Noted: 2017-09-13 | Resolved: 2019-10-25

## 2019-10-25 PROBLEM — J96.01 ACUTE RESPIRATORY FAILURE WITH HYPOXIA AND HYPERCAPNIA (HCC): Status: RESOLVED | Noted: 2018-09-23 | Resolved: 2019-10-25

## 2019-10-25 PROBLEM — J96.02 ACUTE RESPIRATORY FAILURE WITH HYPOXIA AND HYPERCAPNIA (HCC): Status: RESOLVED | Noted: 2018-09-23 | Resolved: 2019-10-25

## 2019-10-25 PROBLEM — N17.9 ACUTE RENAL FAILURE (ARF) (HCC): Status: RESOLVED | Noted: 2019-09-25 | Resolved: 2019-10-25

## 2019-10-25 LAB
A/G RATIO: 2.9 (ref 1.1–2.2)
ALBUMIN SERPL-MCNC: 4.6 G/DL (ref 3.4–5)
ALP BLD-CCNC: 37 U/L (ref 40–129)
ALT SERPL-CCNC: 9 U/L (ref 10–40)
ANION GAP SERPL CALCULATED.3IONS-SCNC: 12 MMOL/L (ref 3–16)
AST SERPL-CCNC: 14 U/L (ref 15–37)
BASOPHILS ABSOLUTE: 0 K/UL (ref 0–0.2)
BASOPHILS RELATIVE PERCENT: 0.4 %
BILIRUB SERPL-MCNC: <0.2 MG/DL (ref 0–1)
BUN BLDV-MCNC: 31 MG/DL (ref 7–20)
CALCIUM SERPL-MCNC: 9.4 MG/DL (ref 8.3–10.6)
CHLORIDE BLD-SCNC: 102 MMOL/L (ref 99–110)
CO2: 27 MMOL/L (ref 21–32)
CREAT SERPL-MCNC: 1.5 MG/DL (ref 0.6–1.2)
EOSINOPHILS ABSOLUTE: 0.1 K/UL (ref 0–0.6)
EOSINOPHILS RELATIVE PERCENT: 1.4 %
GFR AFRICAN AMERICAN: 41
GFR NON-AFRICAN AMERICAN: 34
GLOBULIN: 1.6 G/DL
GLUCOSE BLD-MCNC: 101 MG/DL (ref 70–99)
HCT VFR BLD CALC: 29.9 % (ref 36–48)
HEMOGLOBIN: 9.4 G/DL (ref 12–16)
LYMPHOCYTES ABSOLUTE: 1.1 K/UL (ref 1–5.1)
LYMPHOCYTES RELATIVE PERCENT: 16.1 %
MCH RBC QN AUTO: 26 PG (ref 26–34)
MCHC RBC AUTO-ENTMCNC: 31.5 G/DL (ref 31–36)
MCV RBC AUTO: 82.8 FL (ref 80–100)
MONOCYTES ABSOLUTE: 0.5 K/UL (ref 0–1.3)
MONOCYTES RELATIVE PERCENT: 7.9 %
NEUTROPHILS ABSOLUTE: 5.1 K/UL (ref 1.7–7.7)
NEUTROPHILS RELATIVE PERCENT: 74.2 %
PDW BLD-RTO: 21.7 % (ref 12.4–15.4)
PLATELET # BLD: 225 K/UL (ref 135–450)
PMV BLD AUTO: 9.9 FL (ref 5–10.5)
POTASSIUM SERPL-SCNC: 5.3 MMOL/L (ref 3.5–5.1)
RBC # BLD: 3.61 M/UL (ref 4–5.2)
SODIUM BLD-SCNC: 141 MMOL/L (ref 136–145)
TOTAL PROTEIN: 6.2 G/DL (ref 6.4–8.2)
WBC # BLD: 6.9 K/UL (ref 4–11)

## 2019-10-25 PROCEDURE — 3023F SPIROM DOC REV: CPT | Performed by: FAMILY MEDICINE

## 2019-10-25 PROCEDURE — G8598 ASA/ANTIPLAT THER USED: HCPCS | Performed by: FAMILY MEDICINE

## 2019-10-25 PROCEDURE — G8417 CALC BMI ABV UP PARAM F/U: HCPCS | Performed by: FAMILY MEDICINE

## 2019-10-25 PROCEDURE — 90471 IMMUNIZATION ADMIN: CPT | Performed by: INTERNAL MEDICINE

## 2019-10-25 PROCEDURE — 1036F TOBACCO NON-USER: CPT | Performed by: FAMILY MEDICINE

## 2019-10-25 PROCEDURE — 1123F ACP DISCUSS/DSCN MKR DOCD: CPT | Performed by: FAMILY MEDICINE

## 2019-10-25 PROCEDURE — 3017F COLORECTAL CA SCREEN DOC REV: CPT | Performed by: FAMILY MEDICINE

## 2019-10-25 PROCEDURE — G8399 PT W/DXA RESULTS DOCUMENT: HCPCS | Performed by: FAMILY MEDICINE

## 2019-10-25 PROCEDURE — G8926 SPIRO NO PERF OR DOC: HCPCS | Performed by: FAMILY MEDICINE

## 2019-10-25 PROCEDURE — 99214 OFFICE O/P EST MOD 30 MIN: CPT | Performed by: FAMILY MEDICINE

## 2019-10-25 PROCEDURE — G8427 DOCREV CUR MEDS BY ELIG CLIN: HCPCS | Performed by: FAMILY MEDICINE

## 2019-10-25 PROCEDURE — 1090F PRES/ABSN URINE INCON ASSESS: CPT | Performed by: FAMILY MEDICINE

## 2019-10-25 PROCEDURE — 1111F DSCHRG MED/CURRENT MED MERGE: CPT | Performed by: FAMILY MEDICINE

## 2019-10-25 PROCEDURE — 99999 PR OFFICE/OUTPT VISIT,PROCEDURE ONLY: CPT | Performed by: INTERNAL MEDICINE

## 2019-10-25 PROCEDURE — 4040F PNEUMOC VAC/ADMIN/RCVD: CPT | Performed by: FAMILY MEDICINE

## 2019-10-25 PROCEDURE — 90653 IIV ADJUVANT VACCINE IM: CPT | Performed by: INTERNAL MEDICINE

## 2019-10-25 PROCEDURE — G8482 FLU IMMUNIZE ORDER/ADMIN: HCPCS | Performed by: FAMILY MEDICINE

## 2019-10-25 RX ORDER — LORAZEPAM 1 MG/1
1 TABLET ORAL DAILY PRN
Qty: 30 TABLET | Refills: 0 | Status: SHIPPED | OUTPATIENT
Start: 2019-10-25 | End: 2019-12-04 | Stop reason: SDUPTHER

## 2019-10-28 ASSESSMENT — ENCOUNTER SYMPTOMS
BLOOD IN STOOL: 0
COUGH: 0

## 2019-10-29 DIAGNOSIS — E87.5 HYPERKALEMIA: ICD-10-CM

## 2019-10-29 DIAGNOSIS — D64.9 ANEMIA, UNSPECIFIED TYPE: Primary | ICD-10-CM

## 2019-10-30 ENCOUNTER — TELEPHONE (OUTPATIENT)
Dept: PULMONOLOGY | Age: 74
End: 2019-10-30

## 2019-10-30 ENCOUNTER — HOSPITAL ENCOUNTER (OUTPATIENT)
Dept: WOUND CARE | Age: 74
Discharge: HOME | End: 2019-10-30
Payer: COMMERCIAL

## 2019-10-30 ENCOUNTER — OFFICE VISIT (OUTPATIENT)
Dept: CARDIOLOGY CLINIC | Age: 74
End: 2019-10-30
Payer: COMMERCIAL

## 2019-10-30 VITALS
WEIGHT: 174 LBS | BODY MASS INDEX: 34.16 KG/M2 | HEIGHT: 60 IN | OXYGEN SATURATION: 98 % | SYSTOLIC BLOOD PRESSURE: 154 MMHG | HEART RATE: 68 BPM | DIASTOLIC BLOOD PRESSURE: 78 MMHG

## 2019-10-30 VITALS
SYSTOLIC BLOOD PRESSURE: 185 MMHG | RESPIRATION RATE: 20 BRPM | TEMPERATURE: 97.5 F | HEART RATE: 70 BPM | DIASTOLIC BLOOD PRESSURE: 75 MMHG

## 2019-10-30 DIAGNOSIS — E87.5 HYPERKALEMIA: ICD-10-CM

## 2019-10-30 DIAGNOSIS — R06.02 SOB (SHORTNESS OF BREATH): ICD-10-CM

## 2019-10-30 DIAGNOSIS — D64.9 ANEMIA, UNSPECIFIED TYPE: ICD-10-CM

## 2019-10-30 DIAGNOSIS — D64.9 CHRONIC ANEMIA: Primary | ICD-10-CM

## 2019-10-30 DIAGNOSIS — Z43.3 COLOSTOMY CARE (HCC): Primary | ICD-10-CM

## 2019-10-30 DIAGNOSIS — I48.0 PAROXYSMAL ATRIAL FIBRILLATION (HCC): ICD-10-CM

## 2019-10-30 DIAGNOSIS — I65.23 CAROTID ARTERY STENOSIS WITHOUT CEREBRAL INFARCTION, BILATERAL: ICD-10-CM

## 2019-10-30 DIAGNOSIS — I50.32 CHRONIC DIASTOLIC (CONGESTIVE) HEART FAILURE (HCC): ICD-10-CM

## 2019-10-30 DIAGNOSIS — I25.118 CORONARY ARTERY DISEASE OF NATIVE ARTERY OF NATIVE HEART WITH STABLE ANGINA PECTORIS (HCC): ICD-10-CM

## 2019-10-30 DIAGNOSIS — I10 ESSENTIAL HYPERTENSION: ICD-10-CM

## 2019-10-30 DIAGNOSIS — I73.9 PAD (PERIPHERAL ARTERY DISEASE) (HCC): ICD-10-CM

## 2019-10-30 LAB
HEMOGLOBIN: 10.2 G/DL (ref 12–16)
POTASSIUM SERPL-SCNC: 4 MMOL/L (ref 3.5–5.1)

## 2019-10-30 PROCEDURE — 99213 OFFICE O/P EST LOW 20 MIN: CPT | Performed by: NURSE PRACTITIONER

## 2019-10-30 PROCEDURE — 1111F DSCHRG MED/CURRENT MED MERGE: CPT | Performed by: NURSE PRACTITIONER

## 2019-10-30 PROCEDURE — G8427 DOCREV CUR MEDS BY ELIG CLIN: HCPCS | Performed by: NURSE PRACTITIONER

## 2019-10-30 PROCEDURE — G8417 CALC BMI ABV UP PARAM F/U: HCPCS | Performed by: NURSE PRACTITIONER

## 2019-10-30 PROCEDURE — 99211 OFF/OP EST MAY X REQ PHY/QHP: CPT

## 2019-10-30 PROCEDURE — 3017F COLORECTAL CA SCREEN DOC REV: CPT | Performed by: NURSE PRACTITIONER

## 2019-10-30 PROCEDURE — 4040F PNEUMOC VAC/ADMIN/RCVD: CPT | Performed by: NURSE PRACTITIONER

## 2019-10-30 PROCEDURE — G8399 PT W/DXA RESULTS DOCUMENT: HCPCS | Performed by: NURSE PRACTITIONER

## 2019-10-30 PROCEDURE — G8598 ASA/ANTIPLAT THER USED: HCPCS | Performed by: NURSE PRACTITIONER

## 2019-10-30 PROCEDURE — G8482 FLU IMMUNIZE ORDER/ADMIN: HCPCS | Performed by: NURSE PRACTITIONER

## 2019-10-30 PROCEDURE — 1036F TOBACCO NON-USER: CPT | Performed by: NURSE PRACTITIONER

## 2019-10-30 PROCEDURE — 1090F PRES/ABSN URINE INCON ASSESS: CPT | Performed by: NURSE PRACTITIONER

## 2019-10-30 PROCEDURE — 99215 OFFICE O/P EST HI 40 MIN: CPT | Performed by: NURSE PRACTITIONER

## 2019-10-30 PROCEDURE — 1123F ACP DISCUSS/DSCN MKR DOCD: CPT | Performed by: NURSE PRACTITIONER

## 2019-10-30 RX ORDER — PREDNISONE 20 MG/1
TABLET ORAL
Qty: 17 TABLET | Refills: 0 | Status: SHIPPED | OUTPATIENT
Start: 2019-10-30 | End: 2019-11-19 | Stop reason: ALTCHOICE

## 2019-10-30 RX ORDER — AZITHROMYCIN 250 MG/1
250 TABLET, FILM COATED ORAL SEE ADMIN INSTRUCTIONS
Qty: 6 TABLET | Refills: 0 | Status: SHIPPED | OUTPATIENT
Start: 2019-10-30 | End: 2019-11-04

## 2019-10-30 ASSESSMENT — PAIN DESCRIPTION - LOCATION
LOCATION: OTHER (COMMENT)
LOCATION: OTHER (COMMENT)

## 2019-10-30 ASSESSMENT — PAIN DESCRIPTION - ORIENTATION
ORIENTATION: MID
ORIENTATION: MID

## 2019-10-30 ASSESSMENT — PAIN DESCRIPTION - PAIN TYPE
TYPE: ACUTE PAIN
TYPE: ACUTE PAIN

## 2019-10-30 ASSESSMENT — PAIN DESCRIPTION - DESCRIPTORS
DESCRIPTORS: BURNING;DISCOMFORT
DESCRIPTORS: BURNING

## 2019-10-30 ASSESSMENT — PAIN DESCRIPTION - PROGRESSION
CLINICAL_PROGRESSION: NOT CHANGED
CLINICAL_PROGRESSION: NOT CHANGED

## 2019-10-30 ASSESSMENT — PAIN SCALES - GENERAL
PAINLEVEL_OUTOF10: 5
PAINLEVEL_OUTOF10: 5

## 2019-10-30 ASSESSMENT — PAIN - FUNCTIONAL ASSESSMENT
PAIN_FUNCTIONAL_ASSESSMENT: ACTIVITIES ARE NOT PREVENTED
PAIN_FUNCTIONAL_ASSESSMENT: ACTIVITIES ARE NOT PREVENTED

## 2019-10-30 ASSESSMENT — PAIN DESCRIPTION - ONSET
ONSET: ON-GOING
ONSET: ON-GOING

## 2019-10-30 ASSESSMENT — PAIN DESCRIPTION - FREQUENCY
FREQUENCY: INTERMITTENT
FREQUENCY: INTERMITTENT

## 2019-11-05 ENCOUNTER — APPOINTMENT (OUTPATIENT)
Dept: GENERAL RADIOLOGY | Age: 74
DRG: 194 | End: 2019-11-05
Payer: COMMERCIAL

## 2019-11-05 ENCOUNTER — APPOINTMENT (OUTPATIENT)
Dept: CT IMAGING | Age: 74
DRG: 194 | End: 2019-11-05
Payer: COMMERCIAL

## 2019-11-05 ENCOUNTER — HOSPITAL ENCOUNTER (INPATIENT)
Age: 74
LOS: 2 days | Discharge: HOME | DRG: 194 | End: 2019-11-07
Attending: EMERGENCY MEDICINE | Admitting: INTERNAL MEDICINE
Payer: COMMERCIAL

## 2019-11-05 DIAGNOSIS — J81.0 ACUTE PULMONARY EDEMA (HCC): Primary | ICD-10-CM

## 2019-11-05 DIAGNOSIS — I16.0 HYPERTENSIVE URGENCY: ICD-10-CM

## 2019-11-05 DIAGNOSIS — I50.9 ACUTE ON CHRONIC CONGESTIVE HEART FAILURE, UNSPECIFIED HEART FAILURE TYPE (HCC): ICD-10-CM

## 2019-11-05 LAB
A/G RATIO: 1.3 (ref 1.1–2.2)
ALBUMIN SERPL-MCNC: 4 G/DL (ref 3.4–5)
ALP BLD-CCNC: 44 U/L (ref 40–129)
ALT SERPL-CCNC: 17 U/L (ref 10–40)
ANION GAP SERPL CALCULATED.3IONS-SCNC: 15 MMOL/L (ref 3–16)
AST SERPL-CCNC: 21 U/L (ref 15–37)
BASE EXCESS ARTERIAL: 8.4 MMOL/L (ref -3–3)
BASOPHILS ABSOLUTE: 0.1 K/UL (ref 0–0.2)
BASOPHILS RELATIVE PERCENT: 0.4 %
BILIRUB SERPL-MCNC: <0.2 MG/DL (ref 0–1)
BUN BLDV-MCNC: 26 MG/DL (ref 7–20)
CALCIUM SERPL-MCNC: 9.7 MG/DL (ref 8.3–10.6)
CARBOXYHEMOGLOBIN ARTERIAL: 1.2 % (ref 0–1.5)
CHLORIDE BLD-SCNC: 98 MMOL/L (ref 99–110)
CO2: 31 MMOL/L (ref 21–32)
CREAT SERPL-MCNC: 1.2 MG/DL (ref 0.6–1.2)
EOSINOPHILS ABSOLUTE: 0 K/UL (ref 0–0.6)
EOSINOPHILS RELATIVE PERCENT: 0.2 %
GFR AFRICAN AMERICAN: 53
GFR NON-AFRICAN AMERICAN: 44
GLOBULIN: 3 G/DL
GLUCOSE BLD-MCNC: 156 MG/DL (ref 70–99)
HCO3 ARTERIAL: 34.9 MMOL/L (ref 21–29)
HCT VFR BLD CALC: 34.2 % (ref 36–48)
HEMOGLOBIN, ART, EXTENDED: 10.3 G/DL (ref 12–16)
HEMOGLOBIN: 10.5 G/DL (ref 12–16)
LACTIC ACID: 1.5 MMOL/L (ref 0.4–2)
LYMPHOCYTES ABSOLUTE: 1.8 K/UL (ref 1–5.1)
LYMPHOCYTES RELATIVE PERCENT: 12.7 %
MCH RBC QN AUTO: 25.8 PG (ref 26–34)
MCHC RBC AUTO-ENTMCNC: 30.8 G/DL (ref 31–36)
MCV RBC AUTO: 83.7 FL (ref 80–100)
METHEMOGLOBIN ARTERIAL: 0.8 %
MONOCYTES ABSOLUTE: 0.8 K/UL (ref 0–1.3)
MONOCYTES RELATIVE PERCENT: 5.6 %
NEUTROPHILS ABSOLUTE: 11.6 K/UL (ref 1.7–7.7)
NEUTROPHILS RELATIVE PERCENT: 81.1 %
O2 CONTENT ARTERIAL: 14 ML/DL
O2 SAT, ARTERIAL: 98.5 %
O2 THERAPY: ABNORMAL
PCO2 ARTERIAL: 61.7 MMHG (ref 35–45)
PDW BLD-RTO: 21.7 % (ref 12.4–15.4)
PH ARTERIAL: 7.37 (ref 7.35–7.45)
PLATELET # BLD: 312 K/UL (ref 135–450)
PMV BLD AUTO: 8.8 FL (ref 5–10.5)
PO2 ARTERIAL: 109 MMHG (ref 75–108)
POTASSIUM REFLEX MAGNESIUM: 4.1 MMOL/L (ref 3.5–5.1)
PRO-BNP: 3266 PG/ML (ref 0–124)
RBC # BLD: 4.08 M/UL (ref 4–5.2)
SODIUM BLD-SCNC: 144 MMOL/L (ref 136–145)
TCO2 ARTERIAL: 36.8 MMOL/L
TOTAL PROTEIN: 7 G/DL (ref 6.4–8.2)
TROPONIN: <0.01 NG/ML
WBC # BLD: 14.3 K/UL (ref 4–11)

## 2019-11-05 PROCEDURE — 71250 CT THORAX DX C-: CPT

## 2019-11-05 PROCEDURE — 71045 X-RAY EXAM CHEST 1 VIEW: CPT

## 2019-11-05 PROCEDURE — 6360000002 HC RX W HCPCS: Performed by: EMERGENCY MEDICINE

## 2019-11-05 PROCEDURE — 94640 AIRWAY INHALATION TREATMENT: CPT

## 2019-11-05 PROCEDURE — 99291 CRITICAL CARE FIRST HOUR: CPT

## 2019-11-05 PROCEDURE — 6370000000 HC RX 637 (ALT 250 FOR IP): Performed by: INTERNAL MEDICINE

## 2019-11-05 PROCEDURE — 87040 BLOOD CULTURE FOR BACTERIA: CPT

## 2019-11-05 PROCEDURE — 93005 ELECTROCARDIOGRAM TRACING: CPT | Performed by: EMERGENCY MEDICINE

## 2019-11-05 PROCEDURE — 2000000000 HC ICU R&B

## 2019-11-05 PROCEDURE — 6370000000 HC RX 637 (ALT 250 FOR IP): Performed by: EMERGENCY MEDICINE

## 2019-11-05 PROCEDURE — 6370000000 HC RX 637 (ALT 250 FOR IP): Performed by: PHYSICIAN ASSISTANT

## 2019-11-05 PROCEDURE — 36415 COLL VENOUS BLD VENIPUNCTURE: CPT

## 2019-11-05 PROCEDURE — 36600 WITHDRAWAL OF ARTERIAL BLOOD: CPT

## 2019-11-05 PROCEDURE — 2500000003 HC RX 250 WO HCPCS: Performed by: INTERNAL MEDICINE

## 2019-11-05 PROCEDURE — 2700000000 HC OXYGEN THERAPY PER DAY

## 2019-11-05 PROCEDURE — 83605 ASSAY OF LACTIC ACID: CPT

## 2019-11-05 PROCEDURE — 6360000002 HC RX W HCPCS: Performed by: INTERNAL MEDICINE

## 2019-11-05 PROCEDURE — 6360000002 HC RX W HCPCS: Performed by: PHYSICIAN ASSISTANT

## 2019-11-05 PROCEDURE — 85025 COMPLETE CBC W/AUTO DIFF WBC: CPT

## 2019-11-05 PROCEDURE — 80053 COMPREHEN METABOLIC PANEL: CPT

## 2019-11-05 PROCEDURE — 82803 BLOOD GASES ANY COMBINATION: CPT

## 2019-11-05 PROCEDURE — 83880 ASSAY OF NATRIURETIC PEPTIDE: CPT

## 2019-11-05 PROCEDURE — 84484 ASSAY OF TROPONIN QUANT: CPT

## 2019-11-05 PROCEDURE — 96375 TX/PRO/DX INJ NEW DRUG ADDON: CPT

## 2019-11-05 PROCEDURE — 2580000003 HC RX 258: Performed by: INTERNAL MEDICINE

## 2019-11-05 PROCEDURE — 96365 THER/PROPH/DIAG IV INF INIT: CPT

## 2019-11-05 PROCEDURE — 94660 CPAP INITIATION&MGMT: CPT

## 2019-11-05 PROCEDURE — 94761 N-INVAS EAR/PLS OXIMETRY MLT: CPT

## 2019-11-05 RX ORDER — ACETYLCYSTEINE 200 MG/ML
600 SOLUTION ORAL; RESPIRATORY (INHALATION) 2 TIMES DAILY
Status: DISCONTINUED | OUTPATIENT
Start: 2019-11-05 | End: 2019-11-06

## 2019-11-05 RX ORDER — LEVOTHYROXINE SODIUM 0.05 MG/1
50 TABLET ORAL DAILY
Status: DISCONTINUED | OUTPATIENT
Start: 2019-11-06 | End: 2019-11-07 | Stop reason: HOSPADM

## 2019-11-05 RX ORDER — METHYLPREDNISOLONE SODIUM SUCCINATE 125 MG/2ML
125 INJECTION, POWDER, LYOPHILIZED, FOR SOLUTION INTRAMUSCULAR; INTRAVENOUS ONCE
Status: COMPLETED | OUTPATIENT
Start: 2019-11-05 | End: 2019-11-05

## 2019-11-05 RX ORDER — ATORVASTATIN CALCIUM 40 MG/1
40 TABLET, FILM COATED ORAL DAILY
Status: DISCONTINUED | OUTPATIENT
Start: 2019-11-06 | End: 2019-11-07 | Stop reason: HOSPADM

## 2019-11-05 RX ORDER — CLONIDINE HYDROCHLORIDE 0.1 MG/1
0.2 TABLET ORAL 2 TIMES DAILY
Status: DISCONTINUED | OUTPATIENT
Start: 2019-11-05 | End: 2019-11-07 | Stop reason: HOSPADM

## 2019-11-05 RX ORDER — HYDROCODONE BITARTRATE AND ACETAMINOPHEN 5; 325 MG/1; MG/1
2 TABLET ORAL EVERY 4 HOURS PRN
Status: DISCONTINUED | OUTPATIENT
Start: 2019-11-05 | End: 2019-11-07 | Stop reason: HOSPADM

## 2019-11-05 RX ORDER — LISINOPRIL 10 MG/1
10 TABLET ORAL DAILY
Status: DISCONTINUED | OUTPATIENT
Start: 2019-11-06 | End: 2019-11-07 | Stop reason: HOSPADM

## 2019-11-05 RX ORDER — SODIUM CHLORIDE 0.9 % (FLUSH) 0.9 %
10 SYRINGE (ML) INJECTION PRN
Status: DISCONTINUED | OUTPATIENT
Start: 2019-11-05 | End: 2019-11-07 | Stop reason: HOSPADM

## 2019-11-05 RX ORDER — MORPHINE SULFATE 2 MG/ML
2 INJECTION, SOLUTION INTRAMUSCULAR; INTRAVENOUS
Status: DISCONTINUED | OUTPATIENT
Start: 2019-11-05 | End: 2019-11-06

## 2019-11-05 RX ORDER — FUROSEMIDE 10 MG/ML
40 INJECTION INTRAMUSCULAR; INTRAVENOUS ONCE
Status: COMPLETED | OUTPATIENT
Start: 2019-11-05 | End: 2019-11-05

## 2019-11-05 RX ORDER — LACTOBACILLUS RHAMNOSUS GG 10B CELL
1 CAPSULE ORAL
Status: DISCONTINUED | OUTPATIENT
Start: 2019-11-06 | End: 2019-11-07 | Stop reason: HOSPADM

## 2019-11-05 RX ORDER — CITALOPRAM 20 MG/1
40 TABLET ORAL DAILY
Status: DISCONTINUED | OUTPATIENT
Start: 2019-11-06 | End: 2019-11-07 | Stop reason: HOSPADM

## 2019-11-05 RX ORDER — HYDRALAZINE HYDROCHLORIDE 50 MG/1
50 TABLET, FILM COATED ORAL EVERY 8 HOURS SCHEDULED
Status: DISCONTINUED | OUTPATIENT
Start: 2019-11-05 | End: 2019-11-07 | Stop reason: HOSPADM

## 2019-11-05 RX ORDER — ACETAMINOPHEN 325 MG/1
650 TABLET ORAL EVERY 4 HOURS PRN
Status: DISCONTINUED | OUTPATIENT
Start: 2019-11-05 | End: 2019-11-07 | Stop reason: HOSPADM

## 2019-11-05 RX ORDER — METHYLPREDNISOLONE SODIUM SUCCINATE 40 MG/ML
40 INJECTION, POWDER, LYOPHILIZED, FOR SOLUTION INTRAMUSCULAR; INTRAVENOUS EVERY 12 HOURS
Status: DISCONTINUED | OUTPATIENT
Start: 2019-11-05 | End: 2019-11-06

## 2019-11-05 RX ORDER — MORPHINE SULFATE 4 MG/ML
4 INJECTION, SOLUTION INTRAMUSCULAR; INTRAVENOUS
Status: DISCONTINUED | OUTPATIENT
Start: 2019-11-05 | End: 2019-11-06

## 2019-11-05 RX ORDER — LEVOFLOXACIN 5 MG/ML
250 INJECTION, SOLUTION INTRAVENOUS EVERY 24 HOURS
Status: DISCONTINUED | OUTPATIENT
Start: 2019-11-07 | End: 2019-11-07 | Stop reason: HOSPADM

## 2019-11-05 RX ORDER — FAMOTIDINE 20 MG/1
20 TABLET, FILM COATED ORAL 2 TIMES DAILY PRN
Status: DISCONTINUED | OUTPATIENT
Start: 2019-11-05 | End: 2019-11-05

## 2019-11-05 RX ORDER — FUROSEMIDE 10 MG/ML
40 INJECTION INTRAMUSCULAR; INTRAVENOUS 2 TIMES DAILY
Status: DISCONTINUED | OUTPATIENT
Start: 2019-11-06 | End: 2019-11-07

## 2019-11-05 RX ORDER — ASPIRIN 81 MG/1
81 TABLET ORAL DAILY
Status: DISCONTINUED | OUTPATIENT
Start: 2019-11-06 | End: 2019-11-07 | Stop reason: HOSPADM

## 2019-11-05 RX ORDER — CARVEDILOL 6.25 MG/1
6.25 TABLET ORAL 2 TIMES DAILY WITH MEALS
Status: DISCONTINUED | OUTPATIENT
Start: 2019-11-06 | End: 2019-11-07 | Stop reason: HOSPADM

## 2019-11-05 RX ORDER — POTASSIUM CHLORIDE 7.45 MG/ML
10 INJECTION INTRAVENOUS PRN
Status: DISCONTINUED | OUTPATIENT
Start: 2019-11-05 | End: 2019-11-07 | Stop reason: HOSPADM

## 2019-11-05 RX ORDER — NICOTINE 21 MG/24HR
1 PATCH, TRANSDERMAL 24 HOURS TRANSDERMAL DAILY PRN
Status: DISCONTINUED | OUTPATIENT
Start: 2019-11-05 | End: 2019-11-05

## 2019-11-05 RX ORDER — SODIUM CHLORIDE 0.9 % (FLUSH) 0.9 %
10 SYRINGE (ML) INJECTION EVERY 12 HOURS SCHEDULED
Status: DISCONTINUED | OUTPATIENT
Start: 2019-11-05 | End: 2019-11-07 | Stop reason: HOSPADM

## 2019-11-05 RX ORDER — HYDROCODONE BITARTRATE AND ACETAMINOPHEN 5; 325 MG/1; MG/1
1 TABLET ORAL EVERY 4 HOURS PRN
Status: DISCONTINUED | OUTPATIENT
Start: 2019-11-05 | End: 2019-11-07 | Stop reason: HOSPADM

## 2019-11-05 RX ORDER — ALBUTEROL SULFATE 2.5 MG/3ML
5 SOLUTION RESPIRATORY (INHALATION) ONCE
Status: COMPLETED | OUTPATIENT
Start: 2019-11-05 | End: 2019-11-05

## 2019-11-05 RX ORDER — POTASSIUM CHLORIDE 20 MEQ/1
40 TABLET, EXTENDED RELEASE ORAL PRN
Status: DISCONTINUED | OUTPATIENT
Start: 2019-11-05 | End: 2019-11-07 | Stop reason: HOSPADM

## 2019-11-05 RX ORDER — IPRATROPIUM BROMIDE AND ALBUTEROL SULFATE 2.5; .5 MG/3ML; MG/3ML
1 SOLUTION RESPIRATORY (INHALATION)
Status: COMPLETED | OUTPATIENT
Start: 2019-11-05 | End: 2019-11-05

## 2019-11-05 RX ORDER — LEVOFLOXACIN 5 MG/ML
500 INJECTION, SOLUTION INTRAVENOUS EVERY 24 HOURS
Status: DISCONTINUED | OUTPATIENT
Start: 2019-11-05 | End: 2019-11-05

## 2019-11-05 RX ORDER — IPRATROPIUM BROMIDE AND ALBUTEROL SULFATE 2.5; .5 MG/3ML; MG/3ML
1 SOLUTION RESPIRATORY (INHALATION)
Status: DISCONTINUED | OUTPATIENT
Start: 2019-11-05 | End: 2019-11-07 | Stop reason: HOSPADM

## 2019-11-05 RX ORDER — LORAZEPAM 1 MG/1
1 TABLET ORAL DAILY PRN
Status: DISCONTINUED | OUTPATIENT
Start: 2019-11-05 | End: 2019-11-07 | Stop reason: HOSPADM

## 2019-11-05 RX ORDER — MAGNESIUM SULFATE 1 G/100ML
1 INJECTION INTRAVENOUS PRN
Status: DISCONTINUED | OUTPATIENT
Start: 2019-11-05 | End: 2019-11-07 | Stop reason: HOSPADM

## 2019-11-05 RX ORDER — LEVOFLOXACIN 5 MG/ML
500 INJECTION, SOLUTION INTRAVENOUS ONCE
Status: COMPLETED | OUTPATIENT
Start: 2019-11-06 | End: 2019-11-06

## 2019-11-05 RX ORDER — FAMOTIDINE 20 MG/1
20 TABLET, FILM COATED ORAL DAILY PRN
Status: DISCONTINUED | OUTPATIENT
Start: 2019-11-05 | End: 2019-11-07 | Stop reason: HOSPADM

## 2019-11-05 RX ORDER — ONDANSETRON 2 MG/ML
4 INJECTION INTRAMUSCULAR; INTRAVENOUS EVERY 6 HOURS PRN
Status: DISCONTINUED | OUTPATIENT
Start: 2019-11-05 | End: 2019-11-07 | Stop reason: HOSPADM

## 2019-11-05 RX ADMIN — METHYLPREDNISOLONE SODIUM SUCCINATE 125 MG: 125 INJECTION, POWDER, FOR SOLUTION INTRAMUSCULAR; INTRAVENOUS at 16:40

## 2019-11-05 RX ADMIN — DEXTROSE MONOHYDRATE 7.5 MG/HR: 50 INJECTION, SOLUTION INTRAVENOUS at 23:06

## 2019-11-05 RX ADMIN — APIXABAN 5 MG: 5 TABLET, FILM COATED ORAL at 23:38

## 2019-11-05 RX ADMIN — IPRATROPIUM BROMIDE AND ALBUTEROL SULFATE 1 AMPULE: .5; 3 SOLUTION RESPIRATORY (INHALATION) at 23:56

## 2019-11-05 RX ADMIN — HYDRALAZINE HYDROCHLORIDE 50 MG: 50 TABLET, FILM COATED ORAL at 23:38

## 2019-11-05 RX ADMIN — NITROGLYCERIN 1 INCH: 20 OINTMENT TOPICAL at 16:54

## 2019-11-05 RX ADMIN — IPRATROPIUM BROMIDE AND ALBUTEROL SULFATE 1 AMPULE: .5; 3 SOLUTION RESPIRATORY (INHALATION) at 16:55

## 2019-11-05 RX ADMIN — DEXTROSE MONOHYDRATE 5 MG/HR: 50 INJECTION, SOLUTION INTRAVENOUS at 19:37

## 2019-11-05 RX ADMIN — DEXTROSE MONOHYDRATE 5 MG/HR: 50 INJECTION, SOLUTION INTRAVENOUS at 21:31

## 2019-11-05 RX ADMIN — IPRATROPIUM BROMIDE AND ALBUTEROL SULFATE 1 AMPULE: .5; 3 SOLUTION RESPIRATORY (INHALATION) at 16:26

## 2019-11-05 RX ADMIN — ACETYLCYSTEINE 600 MG: 200 SOLUTION ORAL; RESPIRATORY (INHALATION) at 23:56

## 2019-11-05 RX ADMIN — FUROSEMIDE 40 MG: 10 INJECTION, SOLUTION INTRAMUSCULAR; INTRAVENOUS at 16:52

## 2019-11-05 RX ADMIN — ALBUTEROL SULFATE 5 MG: 2.5 SOLUTION RESPIRATORY (INHALATION) at 16:14

## 2019-11-05 RX ADMIN — ACETAMINOPHEN 650 MG: 325 TABLET ORAL at 23:38

## 2019-11-05 RX ADMIN — Medication 10 ML: at 23:38

## 2019-11-05 RX ADMIN — CLONIDINE HYDROCHLORIDE 0.2 MG: 0.1 TABLET ORAL at 23:38

## 2019-11-05 RX ADMIN — LEVOFLOXACIN 500 MG: 5 INJECTION, SOLUTION INTRAVENOUS at 23:38

## 2019-11-05 RX ADMIN — IPRATROPIUM BROMIDE AND ALBUTEROL SULFATE 1 AMPULE: .5; 3 SOLUTION RESPIRATORY (INHALATION) at 16:14

## 2019-11-05 RX ADMIN — METHYLPREDNISOLONE SODIUM SUCCINATE 40 MG: 40 INJECTION, POWDER, FOR SOLUTION INTRAMUSCULAR; INTRAVENOUS at 23:38

## 2019-11-05 ASSESSMENT — PAIN DESCRIPTION - PAIN TYPE
TYPE: ACUTE PAIN

## 2019-11-05 ASSESSMENT — PAIN DESCRIPTION - ONSET
ONSET: PROGRESSIVE
ONSET: PROGRESSIVE
ONSET: ON-GOING

## 2019-11-05 ASSESSMENT — PAIN DESCRIPTION - DESCRIPTORS
DESCRIPTORS: DISCOMFORT;ACHING
DESCRIPTORS: PRESSURE
DESCRIPTORS: DULL

## 2019-11-05 ASSESSMENT — PAIN DESCRIPTION - ORIENTATION: ORIENTATION: DISTAL

## 2019-11-05 ASSESSMENT — PAIN - FUNCTIONAL ASSESSMENT
PAIN_FUNCTIONAL_ASSESSMENT: PREVENTS OR INTERFERES SOME ACTIVE ACTIVITIES AND ADLS
PAIN_FUNCTIONAL_ASSESSMENT: PREVENTS OR INTERFERES SOME ACTIVE ACTIVITIES AND ADLS
PAIN_FUNCTIONAL_ASSESSMENT: ACTIVITIES ARE NOT PREVENTED

## 2019-11-05 ASSESSMENT — PAIN DESCRIPTION - PROGRESSION
CLINICAL_PROGRESSION: GRADUALLY IMPROVING
CLINICAL_PROGRESSION: GRADUALLY WORSENING
CLINICAL_PROGRESSION: GRADUALLY WORSENING

## 2019-11-05 ASSESSMENT — ENCOUNTER SYMPTOMS
SHORTNESS OF BREATH: 1
WHEEZING: 0
ABDOMINAL PAIN: 0
SORE THROAT: 0
NAUSEA: 0
VOMITING: 0
EYES NEGATIVE: 1
BACK PAIN: 0
COUGH: 0
GASTROINTESTINAL NEGATIVE: 1
CHEST TIGHTNESS: 1

## 2019-11-05 ASSESSMENT — PAIN SCALES - GENERAL
PAINLEVEL_OUTOF10: 4
PAINLEVEL_OUTOF10: 8
PAINLEVEL_OUTOF10: 4
PAINLEVEL_OUTOF10: 0

## 2019-11-05 ASSESSMENT — PAIN DESCRIPTION - LOCATION
LOCATION: CHEST
LOCATION: CHEST
LOCATION: HEAD

## 2019-11-06 ENCOUNTER — HOSPITAL ENCOUNTER (OUTPATIENT)
Dept: WOUND CARE | Age: 74
Discharge: HOME | End: 2019-11-06
Payer: COMMERCIAL

## 2019-11-06 ENCOUNTER — APPOINTMENT (OUTPATIENT)
Dept: GENERAL RADIOLOGY | Age: 74
DRG: 194 | End: 2019-11-06
Payer: COMMERCIAL

## 2019-11-06 LAB
ANION GAP SERPL CALCULATED.3IONS-SCNC: 11 MMOL/L (ref 3–16)
BASOPHILS ABSOLUTE: 0 K/UL (ref 0–0.2)
BASOPHILS RELATIVE PERCENT: 0.2 %
BUN BLDV-MCNC: 31 MG/DL (ref 7–20)
CALCIUM SERPL-MCNC: 9.6 MG/DL (ref 8.3–10.6)
CHLORIDE BLD-SCNC: 93 MMOL/L (ref 99–110)
CO2: 33 MMOL/L (ref 21–32)
CREAT SERPL-MCNC: 1.3 MG/DL (ref 0.6–1.2)
EKG ATRIAL RATE: 109 BPM
EKG DIAGNOSIS: NORMAL
EKG P AXIS: 28 DEGREES
EKG P-R INTERVAL: 118 MS
EKG Q-T INTERVAL: 322 MS
EKG QRS DURATION: 64 MS
EKG QTC CALCULATION (BAZETT): 433 MS
EKG R AXIS: 61 DEGREES
EKG T AXIS: 142 DEGREES
EKG VENTRICULAR RATE: 109 BPM
EOSINOPHILS ABSOLUTE: 0 K/UL (ref 0–0.6)
EOSINOPHILS RELATIVE PERCENT: 0 %
GFR AFRICAN AMERICAN: 49
GFR NON-AFRICAN AMERICAN: 40
GLUCOSE BLD-MCNC: 127 MG/DL (ref 70–99)
HCT VFR BLD CALC: 30.5 % (ref 36–48)
HEMOGLOBIN: 9.7 G/DL (ref 12–16)
LYMPHOCYTES ABSOLUTE: 0.5 K/UL (ref 1–5.1)
LYMPHOCYTES RELATIVE PERCENT: 4.2 %
MAGNESIUM: 2.1 MG/DL (ref 1.8–2.4)
MCH RBC QN AUTO: 26.3 PG (ref 26–34)
MCHC RBC AUTO-ENTMCNC: 32 G/DL (ref 31–36)
MCV RBC AUTO: 82.2 FL (ref 80–100)
MONOCYTES ABSOLUTE: 0.2 K/UL (ref 0–1.3)
MONOCYTES RELATIVE PERCENT: 1.4 %
NEUTROPHILS ABSOLUTE: 12 K/UL (ref 1.7–7.7)
NEUTROPHILS RELATIVE PERCENT: 94.2 %
PDW BLD-RTO: 21.3 % (ref 12.4–15.4)
PLATELET # BLD: 250 K/UL (ref 135–450)
PMV BLD AUTO: 8.7 FL (ref 5–10.5)
POTASSIUM REFLEX MAGNESIUM: 4.3 MMOL/L (ref 3.5–5.1)
RBC # BLD: 3.7 M/UL (ref 4–5.2)
REPORT: NORMAL
RESPIRATORY PANEL PCR: NORMAL
SODIUM BLD-SCNC: 137 MMOL/L (ref 136–145)
WBC # BLD: 12.7 K/UL (ref 4–11)

## 2019-11-06 PROCEDURE — 83735 ASSAY OF MAGNESIUM: CPT

## 2019-11-06 PROCEDURE — 97535 SELF CARE MNGMENT TRAINING: CPT

## 2019-11-06 PROCEDURE — 6360000002 HC RX W HCPCS: Performed by: INTERNAL MEDICINE

## 2019-11-06 PROCEDURE — 2060000000 HC ICU INTERMEDIATE R&B

## 2019-11-06 PROCEDURE — 97161 PT EVAL LOW COMPLEX 20 MIN: CPT

## 2019-11-06 PROCEDURE — 2700000000 HC OXYGEN THERAPY PER DAY

## 2019-11-06 PROCEDURE — 36415 COLL VENOUS BLD VENIPUNCTURE: CPT

## 2019-11-06 PROCEDURE — 80048 BASIC METABOLIC PNL TOTAL CA: CPT

## 2019-11-06 PROCEDURE — 6370000000 HC RX 637 (ALT 250 FOR IP): Performed by: INTERNAL MEDICINE

## 2019-11-06 PROCEDURE — 2580000003 HC RX 258: Performed by: INTERNAL MEDICINE

## 2019-11-06 PROCEDURE — 2500000003 HC RX 250 WO HCPCS: Performed by: INTERNAL MEDICINE

## 2019-11-06 PROCEDURE — 97166 OT EVAL MOD COMPLEX 45 MIN: CPT

## 2019-11-06 PROCEDURE — 99255 IP/OBS CONSLTJ NEW/EST HI 80: CPT | Performed by: INTERNAL MEDICINE

## 2019-11-06 PROCEDURE — 97530 THERAPEUTIC ACTIVITIES: CPT

## 2019-11-06 PROCEDURE — 97116 GAIT TRAINING THERAPY: CPT

## 2019-11-06 PROCEDURE — APPNB15 APP NON BILLABLE TIME 0-15 MINS: Performed by: NURSE PRACTITIONER

## 2019-11-06 PROCEDURE — 6370000000 HC RX 637 (ALT 250 FOR IP): Performed by: NURSE PRACTITIONER

## 2019-11-06 PROCEDURE — 87798 DETECT AGENT NOS DNA AMP: CPT

## 2019-11-06 PROCEDURE — 94660 CPAP INITIATION&MGMT: CPT

## 2019-11-06 PROCEDURE — 87633 RESP VIRUS 12-25 TARGETS: CPT

## 2019-11-06 PROCEDURE — 87581 M.PNEUMON DNA AMP PROBE: CPT

## 2019-11-06 PROCEDURE — 85025 COMPLETE CBC W/AUTO DIFF WBC: CPT

## 2019-11-06 PROCEDURE — 94760 N-INVAS EAR/PLS OXIMETRY 1: CPT

## 2019-11-06 PROCEDURE — 87486 CHLMYD PNEUM DNA AMP PROBE: CPT

## 2019-11-06 PROCEDURE — 94640 AIRWAY INHALATION TREATMENT: CPT

## 2019-11-06 PROCEDURE — 93010 ELECTROCARDIOGRAM REPORT: CPT | Performed by: INTERNAL MEDICINE

## 2019-11-06 PROCEDURE — 71045 X-RAY EXAM CHEST 1 VIEW: CPT

## 2019-11-06 RX ORDER — SODIUM CHLORIDE 0.9 % (FLUSH) 0.9 %
10 SYRINGE (ML) INJECTION PRN
Status: CANCELLED | OUTPATIENT
Start: 2019-11-06

## 2019-11-06 RX ORDER — SODIUM CHLORIDE 0.9 % (FLUSH) 0.9 %
10 SYRINGE (ML) INJECTION EVERY 12 HOURS SCHEDULED
Status: CANCELLED | OUTPATIENT
Start: 2019-11-06

## 2019-11-06 RX ORDER — PREDNISONE 20 MG/1
40 TABLET ORAL DAILY
Status: DISCONTINUED | OUTPATIENT
Start: 2019-11-06 | End: 2019-11-07 | Stop reason: HOSPADM

## 2019-11-06 RX ADMIN — LEVOFLOXACIN 250 MG: 5 INJECTION, SOLUTION INTRAVENOUS at 23:22

## 2019-11-06 RX ADMIN — LISINOPRIL 10 MG: 10 TABLET ORAL at 10:41

## 2019-11-06 RX ADMIN — IPRATROPIUM BROMIDE AND ALBUTEROL SULFATE 1 AMPULE: .5; 3 SOLUTION RESPIRATORY (INHALATION) at 13:05

## 2019-11-06 RX ADMIN — ACETAMINOPHEN 650 MG: 325 TABLET ORAL at 10:52

## 2019-11-06 RX ADMIN — LEVOTHYROXINE SODIUM 50 MCG: 50 TABLET ORAL at 10:40

## 2019-11-06 RX ADMIN — HYDRALAZINE HYDROCHLORIDE 50 MG: 50 TABLET, FILM COATED ORAL at 14:36

## 2019-11-06 RX ADMIN — Medication 10 ML: at 10:42

## 2019-11-06 RX ADMIN — CLONIDINE HYDROCHLORIDE 0.2 MG: 0.1 TABLET ORAL at 22:03

## 2019-11-06 RX ADMIN — HYDRALAZINE HYDROCHLORIDE 50 MG: 50 TABLET, FILM COATED ORAL at 22:03

## 2019-11-06 RX ADMIN — CARVEDILOL 6.25 MG: 6.25 TABLET, FILM COATED ORAL at 10:41

## 2019-11-06 RX ADMIN — Medication 1 CAPSULE: at 10:41

## 2019-11-06 RX ADMIN — FUROSEMIDE 40 MG: 10 INJECTION, SOLUTION INTRAMUSCULAR; INTRAVENOUS at 17:09

## 2019-11-06 RX ADMIN — ACETYLCYSTEINE 600 MG: 200 SOLUTION ORAL; RESPIRATORY (INHALATION) at 08:13

## 2019-11-06 RX ADMIN — Medication 10 ML: at 22:03

## 2019-11-06 RX ADMIN — CITALOPRAM HYDROBROMIDE 40 MG: 20 TABLET ORAL at 10:40

## 2019-11-06 RX ADMIN — MOMETASONE FUROATE AND FORMOTEROL FUMARATE DIHYDRATE 2 PUFF: 100; 5 AEROSOL RESPIRATORY (INHALATION) at 20:35

## 2019-11-06 RX ADMIN — IPRATROPIUM BROMIDE AND ALBUTEROL SULFATE 1 AMPULE: .5; 3 SOLUTION RESPIRATORY (INHALATION) at 08:13

## 2019-11-06 RX ADMIN — MOMETASONE FUROATE AND FORMOTEROL FUMARATE DIHYDRATE 2 PUFF: 100; 5 AEROSOL RESPIRATORY (INHALATION) at 09:25

## 2019-11-06 RX ADMIN — APIXABAN 5 MG: 5 TABLET, FILM COATED ORAL at 10:40

## 2019-11-06 RX ADMIN — CARVEDILOL 6.25 MG: 6.25 TABLET, FILM COATED ORAL at 17:09

## 2019-11-06 RX ADMIN — APIXABAN 5 MG: 5 TABLET, FILM COATED ORAL at 22:03

## 2019-11-06 RX ADMIN — ATORVASTATIN CALCIUM 40 MG: 40 TABLET, FILM COATED ORAL at 10:40

## 2019-11-06 RX ADMIN — FUROSEMIDE 40 MG: 10 INJECTION, SOLUTION INTRAMUSCULAR; INTRAVENOUS at 10:40

## 2019-11-06 RX ADMIN — PREDNISONE 40 MG: 20 TABLET ORAL at 10:41

## 2019-11-06 RX ADMIN — TIOTROPIUM BROMIDE 18 MCG: 18 CAPSULE ORAL; RESPIRATORY (INHALATION) at 08:13

## 2019-11-06 RX ADMIN — CLONIDINE HYDROCHLORIDE 0.2 MG: 0.1 TABLET ORAL at 10:40

## 2019-11-06 RX ADMIN — ASPIRIN 81 MG: 81 TABLET, COATED ORAL at 10:41

## 2019-11-06 RX ADMIN — DEXTROSE MONOHYDRATE 5 MG/HR: 50 INJECTION, SOLUTION INTRAVENOUS at 02:46

## 2019-11-06 RX ADMIN — HYDRALAZINE HYDROCHLORIDE 50 MG: 50 TABLET, FILM COATED ORAL at 06:12

## 2019-11-06 RX ADMIN — IPRATROPIUM BROMIDE AND ALBUTEROL SULFATE 1 AMPULE: .5; 3 SOLUTION RESPIRATORY (INHALATION) at 16:38

## 2019-11-06 RX ADMIN — IPRATROPIUM BROMIDE AND ALBUTEROL SULFATE 1 AMPULE: .5; 3 SOLUTION RESPIRATORY (INHALATION) at 20:34

## 2019-11-06 ASSESSMENT — PAIN SCALES - GENERAL
PAINLEVEL_OUTOF10: 5
PAINLEVEL_OUTOF10: 0

## 2019-11-07 VITALS
BODY MASS INDEX: 32.9 KG/M2 | RESPIRATION RATE: 16 BRPM | WEIGHT: 178.79 LBS | OXYGEN SATURATION: 98 % | TEMPERATURE: 97.1 F | SYSTOLIC BLOOD PRESSURE: 124 MMHG | HEART RATE: 74 BPM | DIASTOLIC BLOOD PRESSURE: 73 MMHG | HEIGHT: 62 IN

## 2019-11-07 LAB
ANION GAP SERPL CALCULATED.3IONS-SCNC: 15 MMOL/L (ref 3–16)
BASOPHILS ABSOLUTE: 0 K/UL (ref 0–0.2)
BASOPHILS RELATIVE PERCENT: 0 %
BUN BLDV-MCNC: 56 MG/DL (ref 7–20)
CALCIUM SERPL-MCNC: 9.1 MG/DL (ref 8.3–10.6)
CHLORIDE BLD-SCNC: 93 MMOL/L (ref 99–110)
CO2: 31 MMOL/L (ref 21–32)
CREAT SERPL-MCNC: 1.7 MG/DL (ref 0.6–1.2)
EOSINOPHILS ABSOLUTE: 0 K/UL (ref 0–0.6)
EOSINOPHILS RELATIVE PERCENT: 0 %
GFR AFRICAN AMERICAN: 36
GFR NON-AFRICAN AMERICAN: 30
GLUCOSE BLD-MCNC: 141 MG/DL (ref 70–99)
HCT VFR BLD CALC: 29.1 % (ref 36–48)
HEMOGLOBIN: 9.2 G/DL (ref 12–16)
LYMPHOCYTES ABSOLUTE: 1.1 K/UL (ref 1–5.1)
LYMPHOCYTES RELATIVE PERCENT: 7.2 %
MAGNESIUM: 2 MG/DL (ref 1.8–2.4)
MCH RBC QN AUTO: 26.2 PG (ref 26–34)
MCHC RBC AUTO-ENTMCNC: 31.5 G/DL (ref 31–36)
MCV RBC AUTO: 83.1 FL (ref 80–100)
MONOCYTES ABSOLUTE: 1.2 K/UL (ref 0–1.3)
MONOCYTES RELATIVE PERCENT: 7.3 %
NEUTROPHILS ABSOLUTE: 13.6 K/UL (ref 1.7–7.7)
NEUTROPHILS RELATIVE PERCENT: 85.5 %
PDW BLD-RTO: 21.5 % (ref 12.4–15.4)
PHOSPHORUS: 3.6 MG/DL (ref 2.5–4.9)
PLATELET # BLD: 259 K/UL (ref 135–450)
PMV BLD AUTO: 8.9 FL (ref 5–10.5)
POTASSIUM SERPL-SCNC: 4.1 MMOL/L (ref 3.5–5.1)
RBC # BLD: 3.5 M/UL (ref 4–5.2)
SODIUM BLD-SCNC: 139 MMOL/L (ref 136–145)
WBC # BLD: 15.9 K/UL (ref 4–11)

## 2019-11-07 PROCEDURE — 94761 N-INVAS EAR/PLS OXIMETRY MLT: CPT

## 2019-11-07 PROCEDURE — 6370000000 HC RX 637 (ALT 250 FOR IP): Performed by: INTERNAL MEDICINE

## 2019-11-07 PROCEDURE — 6370000000 HC RX 637 (ALT 250 FOR IP): Performed by: NURSE PRACTITIONER

## 2019-11-07 PROCEDURE — 84100 ASSAY OF PHOSPHORUS: CPT

## 2019-11-07 PROCEDURE — 94660 CPAP INITIATION&MGMT: CPT

## 2019-11-07 PROCEDURE — 6360000002 HC RX W HCPCS: Performed by: INTERNAL MEDICINE

## 2019-11-07 PROCEDURE — 83735 ASSAY OF MAGNESIUM: CPT

## 2019-11-07 PROCEDURE — 80048 BASIC METABOLIC PNL TOTAL CA: CPT

## 2019-11-07 PROCEDURE — 36415 COLL VENOUS BLD VENIPUNCTURE: CPT

## 2019-11-07 PROCEDURE — 2580000003 HC RX 258: Performed by: INTERNAL MEDICINE

## 2019-11-07 PROCEDURE — 2700000000 HC OXYGEN THERAPY PER DAY

## 2019-11-07 PROCEDURE — 99233 SBSQ HOSP IP/OBS HIGH 50: CPT | Performed by: INTERNAL MEDICINE

## 2019-11-07 PROCEDURE — 94640 AIRWAY INHALATION TREATMENT: CPT

## 2019-11-07 PROCEDURE — 85025 COMPLETE CBC W/AUTO DIFF WBC: CPT

## 2019-11-07 RX ORDER — LACTOBACILLUS RHAMNOSUS GG 10B CELL
1 CAPSULE ORAL
Qty: 30 CAPSULE | Refills: 0 | Status: SHIPPED | OUTPATIENT
Start: 2019-11-08 | End: 2019-11-27 | Stop reason: ALTCHOICE

## 2019-11-07 RX ORDER — CARVEDILOL 12.5 MG/1
6.25 TABLET ORAL 2 TIMES DAILY WITH MEALS
Qty: 30 TABLET | Refills: 0 | Status: SHIPPED | OUTPATIENT
Start: 2019-11-07 | End: 2020-02-10 | Stop reason: SDUPTHER

## 2019-11-07 RX ORDER — LEVOFLOXACIN 250 MG/1
250 TABLET ORAL DAILY
Qty: 4 TABLET | Refills: 0 | Status: SHIPPED | OUTPATIENT
Start: 2019-11-07 | End: 2019-11-11

## 2019-11-07 RX ORDER — FUROSEMIDE 40 MG/1
40 TABLET ORAL DAILY
Status: DISCONTINUED | OUTPATIENT
Start: 2019-11-08 | End: 2019-11-07 | Stop reason: HOSPADM

## 2019-11-07 RX ORDER — FUROSEMIDE 40 MG/1
40 TABLET ORAL DAILY
Qty: 30 TABLET | Refills: 0 | Status: SHIPPED | OUTPATIENT
Start: 2019-11-07 | End: 2019-11-14 | Stop reason: SDUPTHER

## 2019-11-07 RX ADMIN — HYDRALAZINE HYDROCHLORIDE 50 MG: 50 TABLET, FILM COATED ORAL at 05:52

## 2019-11-07 RX ADMIN — APIXABAN 5 MG: 5 TABLET, FILM COATED ORAL at 09:12

## 2019-11-07 RX ADMIN — ATORVASTATIN CALCIUM 40 MG: 40 TABLET, FILM COATED ORAL at 09:12

## 2019-11-07 RX ADMIN — CLONIDINE HYDROCHLORIDE 0.2 MG: 0.1 TABLET ORAL at 09:12

## 2019-11-07 RX ADMIN — Medication 1 CAPSULE: at 09:12

## 2019-11-07 RX ADMIN — IPRATROPIUM BROMIDE AND ALBUTEROL SULFATE 1 AMPULE: .5; 3 SOLUTION RESPIRATORY (INHALATION) at 11:33

## 2019-11-07 RX ADMIN — Medication 10 ML: at 09:12

## 2019-11-07 RX ADMIN — CARVEDILOL 6.25 MG: 6.25 TABLET, FILM COATED ORAL at 09:12

## 2019-11-07 RX ADMIN — ASPIRIN 81 MG: 81 TABLET, COATED ORAL at 09:12

## 2019-11-07 RX ADMIN — MOMETASONE FUROATE AND FORMOTEROL FUMARATE DIHYDRATE 2 PUFF: 100; 5 AEROSOL RESPIRATORY (INHALATION) at 07:55

## 2019-11-07 RX ADMIN — PREDNISONE 40 MG: 20 TABLET ORAL at 09:12

## 2019-11-07 RX ADMIN — IPRATROPIUM BROMIDE AND ALBUTEROL SULFATE 1 AMPULE: .5; 3 SOLUTION RESPIRATORY (INHALATION) at 07:55

## 2019-11-07 RX ADMIN — LEVOTHYROXINE SODIUM 50 MCG: 50 TABLET ORAL at 09:12

## 2019-11-07 RX ADMIN — FUROSEMIDE 40 MG: 10 INJECTION, SOLUTION INTRAMUSCULAR; INTRAVENOUS at 09:12

## 2019-11-07 RX ADMIN — LISINOPRIL 10 MG: 10 TABLET ORAL at 09:12

## 2019-11-07 RX ADMIN — CITALOPRAM HYDROBROMIDE 40 MG: 20 TABLET ORAL at 09:12

## 2019-11-07 ASSESSMENT — PAIN SCALES - GENERAL
PAINLEVEL_OUTOF10: 0

## 2019-11-08 ENCOUNTER — TELEPHONE (OUTPATIENT)
Dept: FAMILY MEDICINE CLINIC | Age: 74
End: 2019-11-08

## 2019-11-08 ENCOUNTER — FOLLOWUP TELEPHONE ENCOUNTER (OUTPATIENT)
Dept: INPATIENT UNIT | Age: 74
End: 2019-11-08

## 2019-11-08 ENCOUNTER — FOLLOWUP TELEPHONE ENCOUNTER (OUTPATIENT)
Dept: ADMINISTRATIVE | Age: 74
End: 2019-11-08

## 2019-11-10 LAB
BLOOD CULTURE, ROUTINE: NORMAL
CULTURE, BLOOD 2: NORMAL

## 2019-11-14 ENCOUNTER — TELEPHONE (OUTPATIENT)
Dept: PULMONOLOGY | Age: 74
End: 2019-11-14

## 2019-11-14 ENCOUNTER — OFFICE VISIT (OUTPATIENT)
Dept: FAMILY MEDICINE CLINIC | Age: 74
End: 2019-11-14
Payer: COMMERCIAL

## 2019-11-14 VITALS
WEIGHT: 176.8 LBS | SYSTOLIC BLOOD PRESSURE: 134 MMHG | BODY MASS INDEX: 32.54 KG/M2 | OXYGEN SATURATION: 98 % | HEIGHT: 62 IN | HEART RATE: 74 BPM | DIASTOLIC BLOOD PRESSURE: 82 MMHG | TEMPERATURE: 97.8 F | RESPIRATION RATE: 26 BRPM

## 2019-11-14 DIAGNOSIS — N17.9 AKI (ACUTE KIDNEY INJURY) (HCC): ICD-10-CM

## 2019-11-14 DIAGNOSIS — I48.0 PAROXYSMAL ATRIAL FIBRILLATION (HCC): ICD-10-CM

## 2019-11-14 DIAGNOSIS — I10 ESSENTIAL HYPERTENSION: ICD-10-CM

## 2019-11-14 DIAGNOSIS — J81.0 ACUTE PULMONARY EDEMA (HCC): Primary | ICD-10-CM

## 2019-11-14 DIAGNOSIS — I50.32 CHRONIC DIASTOLIC HEART FAILURE (HCC): ICD-10-CM

## 2019-11-14 DIAGNOSIS — D64.9 ANEMIA, UNSPECIFIED TYPE: ICD-10-CM

## 2019-11-14 PROBLEM — J96.22 ACUTE ON CHRONIC RESPIRATORY FAILURE WITH HYPOXIA AND HYPERCAPNIA (HCC): Status: RESOLVED | Noted: 2018-06-10 | Resolved: 2019-11-14

## 2019-11-14 PROBLEM — J96.21 ACUTE ON CHRONIC RESPIRATORY FAILURE WITH HYPOXIA AND HYPERCAPNIA (HCC): Status: RESOLVED | Noted: 2018-06-10 | Resolved: 2019-11-14

## 2019-11-14 PROBLEM — J96.21 ACUTE ON CHRONIC RESPIRATORY FAILURE WITH HYPOXIA (HCC): Status: RESOLVED | Noted: 2018-03-05 | Resolved: 2019-11-14

## 2019-11-14 PROCEDURE — G8482 FLU IMMUNIZE ORDER/ADMIN: HCPCS | Performed by: FAMILY MEDICINE

## 2019-11-14 PROCEDURE — 4040F PNEUMOC VAC/ADMIN/RCVD: CPT | Performed by: FAMILY MEDICINE

## 2019-11-14 PROCEDURE — 1123F ACP DISCUSS/DSCN MKR DOCD: CPT | Performed by: FAMILY MEDICINE

## 2019-11-14 PROCEDURE — G8399 PT W/DXA RESULTS DOCUMENT: HCPCS | Performed by: FAMILY MEDICINE

## 2019-11-14 PROCEDURE — 1111F DSCHRG MED/CURRENT MED MERGE: CPT | Performed by: FAMILY MEDICINE

## 2019-11-14 PROCEDURE — 3017F COLORECTAL CA SCREEN DOC REV: CPT | Performed by: FAMILY MEDICINE

## 2019-11-14 PROCEDURE — 1090F PRES/ABSN URINE INCON ASSESS: CPT | Performed by: FAMILY MEDICINE

## 2019-11-14 PROCEDURE — 1036F TOBACCO NON-USER: CPT | Performed by: FAMILY MEDICINE

## 2019-11-14 PROCEDURE — 99214 OFFICE O/P EST MOD 30 MIN: CPT | Performed by: FAMILY MEDICINE

## 2019-11-14 PROCEDURE — G8417 CALC BMI ABV UP PARAM F/U: HCPCS | Performed by: FAMILY MEDICINE

## 2019-11-14 PROCEDURE — G8427 DOCREV CUR MEDS BY ELIG CLIN: HCPCS | Performed by: FAMILY MEDICINE

## 2019-11-14 PROCEDURE — G8598 ASA/ANTIPLAT THER USED: HCPCS | Performed by: FAMILY MEDICINE

## 2019-11-14 RX ORDER — FUROSEMIDE 40 MG/1
40 TABLET ORAL 2 TIMES DAILY
Qty: 60 TABLET | Refills: 2 | Status: SHIPPED | OUTPATIENT
Start: 2019-11-14 | End: 2020-01-22 | Stop reason: SDUPTHER

## 2019-11-15 DIAGNOSIS — I10 ESSENTIAL HYPERTENSION: ICD-10-CM

## 2019-11-15 DIAGNOSIS — N17.9 AKI (ACUTE KIDNEY INJURY) (HCC): ICD-10-CM

## 2019-11-15 DIAGNOSIS — D64.9 ANEMIA, UNSPECIFIED TYPE: ICD-10-CM

## 2019-11-15 LAB
A/G RATIO: 1.6 (ref 1.1–2.2)
ALBUMIN SERPL-MCNC: 3.8 G/DL (ref 3.4–5)
ALP BLD-CCNC: 39 U/L (ref 40–129)
ALT SERPL-CCNC: 15 U/L (ref 10–40)
ANION GAP SERPL CALCULATED.3IONS-SCNC: 15 MMOL/L (ref 3–16)
AST SERPL-CCNC: 15 U/L (ref 15–37)
BASOPHILS ABSOLUTE: 0 K/UL (ref 0–0.2)
BASOPHILS RELATIVE PERCENT: 0.3 %
BILIRUB SERPL-MCNC: <0.2 MG/DL (ref 0–1)
BUN BLDV-MCNC: 31 MG/DL (ref 7–20)
CALCIUM SERPL-MCNC: 10 MG/DL (ref 8.3–10.6)
CHLORIDE BLD-SCNC: 95 MMOL/L (ref 99–110)
CO2: 32 MMOL/L (ref 21–32)
CREAT SERPL-MCNC: 1.1 MG/DL (ref 0.6–1.2)
EOSINOPHILS ABSOLUTE: 0.1 K/UL (ref 0–0.6)
EOSINOPHILS RELATIVE PERCENT: 0.5 %
GFR AFRICAN AMERICAN: 59
GFR NON-AFRICAN AMERICAN: 49
GLOBULIN: 2.4 G/DL
GLUCOSE BLD-MCNC: 77 MG/DL (ref 70–99)
HCT VFR BLD CALC: 34.7 % (ref 36–48)
HEMOGLOBIN: 10.9 G/DL (ref 12–16)
LYMPHOCYTES ABSOLUTE: 1.9 K/UL (ref 1–5.1)
LYMPHOCYTES RELATIVE PERCENT: 16.3 %
MCH RBC QN AUTO: 26.3 PG (ref 26–34)
MCHC RBC AUTO-ENTMCNC: 31.2 G/DL (ref 31–36)
MCV RBC AUTO: 84.2 FL (ref 80–100)
MONOCYTES ABSOLUTE: 1 K/UL (ref 0–1.3)
MONOCYTES RELATIVE PERCENT: 8.5 %
NEUTROPHILS ABSOLUTE: 8.6 K/UL (ref 1.7–7.7)
NEUTROPHILS RELATIVE PERCENT: 74.4 %
PDW BLD-RTO: 21.4 % (ref 12.4–15.4)
PLATELET # BLD: 262 K/UL (ref 135–450)
PMV BLD AUTO: 9.4 FL (ref 5–10.5)
POTASSIUM SERPL-SCNC: 4.2 MMOL/L (ref 3.5–5.1)
RBC # BLD: 4.13 M/UL (ref 4–5.2)
SODIUM BLD-SCNC: 142 MMOL/L (ref 136–145)
TOTAL PROTEIN: 6.2 G/DL (ref 6.4–8.2)
WBC # BLD: 11.6 K/UL (ref 4–11)

## 2019-11-19 ENCOUNTER — OFFICE VISIT (OUTPATIENT)
Dept: CARDIOLOGY CLINIC | Age: 74
End: 2019-11-19
Payer: COMMERCIAL

## 2019-11-19 VITALS
HEIGHT: 62 IN | DIASTOLIC BLOOD PRESSURE: 72 MMHG | WEIGHT: 173 LBS | OXYGEN SATURATION: 97 % | SYSTOLIC BLOOD PRESSURE: 134 MMHG | HEART RATE: 88 BPM | BODY MASS INDEX: 31.83 KG/M2

## 2019-11-19 DIAGNOSIS — I48.0 PAROXYSMAL ATRIAL FIBRILLATION (HCC): Primary | ICD-10-CM

## 2019-11-19 DIAGNOSIS — D64.9 CHRONIC ANEMIA: ICD-10-CM

## 2019-11-19 DIAGNOSIS — K92.2 RECURRENT GASTROINTESTINAL HEMORRHAGE: ICD-10-CM

## 2019-11-19 PROCEDURE — G8482 FLU IMMUNIZE ORDER/ADMIN: HCPCS | Performed by: INTERNAL MEDICINE

## 2019-11-19 PROCEDURE — 3017F COLORECTAL CA SCREEN DOC REV: CPT | Performed by: INTERNAL MEDICINE

## 2019-11-19 PROCEDURE — 1090F PRES/ABSN URINE INCON ASSESS: CPT | Performed by: INTERNAL MEDICINE

## 2019-11-19 PROCEDURE — G8598 ASA/ANTIPLAT THER USED: HCPCS | Performed by: INTERNAL MEDICINE

## 2019-11-19 PROCEDURE — G8399 PT W/DXA RESULTS DOCUMENT: HCPCS | Performed by: INTERNAL MEDICINE

## 2019-11-19 PROCEDURE — 1123F ACP DISCUSS/DSCN MKR DOCD: CPT | Performed by: INTERNAL MEDICINE

## 2019-11-19 PROCEDURE — 4040F PNEUMOC VAC/ADMIN/RCVD: CPT | Performed by: INTERNAL MEDICINE

## 2019-11-19 PROCEDURE — 93000 ELECTROCARDIOGRAM COMPLETE: CPT | Performed by: INTERNAL MEDICINE

## 2019-11-19 PROCEDURE — 1036F TOBACCO NON-USER: CPT | Performed by: INTERNAL MEDICINE

## 2019-11-19 PROCEDURE — G8417 CALC BMI ABV UP PARAM F/U: HCPCS | Performed by: INTERNAL MEDICINE

## 2019-11-19 PROCEDURE — 99214 OFFICE O/P EST MOD 30 MIN: CPT | Performed by: INTERNAL MEDICINE

## 2019-11-19 PROCEDURE — G8427 DOCREV CUR MEDS BY ELIG CLIN: HCPCS | Performed by: INTERNAL MEDICINE

## 2019-11-19 PROCEDURE — 1111F DSCHRG MED/CURRENT MED MERGE: CPT | Performed by: INTERNAL MEDICINE

## 2019-11-20 DIAGNOSIS — I48.0 PAROXYSMAL ATRIAL FIBRILLATION (HCC): ICD-10-CM

## 2019-11-20 DIAGNOSIS — J81.0 ACUTE PULMONARY EDEMA (HCC): ICD-10-CM

## 2019-11-20 RX ORDER — FUROSEMIDE 40 MG/1
40 TABLET ORAL 2 TIMES DAILY
Qty: 60 TABLET | Refills: 2 | OUTPATIENT
Start: 2019-11-20

## 2019-11-27 ENCOUNTER — OFFICE VISIT (OUTPATIENT)
Dept: PULMONOLOGY | Age: 74
End: 2019-11-27
Payer: COMMERCIAL

## 2019-11-27 VITALS
DIASTOLIC BLOOD PRESSURE: 64 MMHG | WEIGHT: 178 LBS | HEART RATE: 96 BPM | OXYGEN SATURATION: 95 % | HEIGHT: 62 IN | SYSTOLIC BLOOD PRESSURE: 138 MMHG | TEMPERATURE: 97.9 F | BODY MASS INDEX: 32.76 KG/M2 | RESPIRATION RATE: 16 BRPM

## 2019-11-27 DIAGNOSIS — J96.11 CHRONIC RESPIRATORY FAILURE WITH HYPOXIA (HCC): ICD-10-CM

## 2019-11-27 DIAGNOSIS — J43.2 CENTRILOBULAR EMPHYSEMA (HCC): Primary | ICD-10-CM

## 2019-11-27 PROCEDURE — 1111F DSCHRG MED/CURRENT MED MERGE: CPT | Performed by: INTERNAL MEDICINE

## 2019-11-27 PROCEDURE — G8926 SPIRO NO PERF OR DOC: HCPCS | Performed by: INTERNAL MEDICINE

## 2019-11-27 PROCEDURE — 3023F SPIROM DOC REV: CPT | Performed by: INTERNAL MEDICINE

## 2019-11-27 PROCEDURE — G8417 CALC BMI ABV UP PARAM F/U: HCPCS | Performed by: INTERNAL MEDICINE

## 2019-11-27 PROCEDURE — G8482 FLU IMMUNIZE ORDER/ADMIN: HCPCS | Performed by: INTERNAL MEDICINE

## 2019-11-27 PROCEDURE — 1090F PRES/ABSN URINE INCON ASSESS: CPT | Performed by: INTERNAL MEDICINE

## 2019-11-27 PROCEDURE — 1036F TOBACCO NON-USER: CPT | Performed by: INTERNAL MEDICINE

## 2019-11-27 PROCEDURE — G8598 ASA/ANTIPLAT THER USED: HCPCS | Performed by: INTERNAL MEDICINE

## 2019-11-27 PROCEDURE — 4040F PNEUMOC VAC/ADMIN/RCVD: CPT | Performed by: INTERNAL MEDICINE

## 2019-11-27 PROCEDURE — G8427 DOCREV CUR MEDS BY ELIG CLIN: HCPCS | Performed by: INTERNAL MEDICINE

## 2019-11-27 PROCEDURE — 3017F COLORECTAL CA SCREEN DOC REV: CPT | Performed by: INTERNAL MEDICINE

## 2019-11-27 PROCEDURE — G8399 PT W/DXA RESULTS DOCUMENT: HCPCS | Performed by: INTERNAL MEDICINE

## 2019-11-27 PROCEDURE — 99214 OFFICE O/P EST MOD 30 MIN: CPT | Performed by: INTERNAL MEDICINE

## 2019-11-27 PROCEDURE — 1123F ACP DISCUSS/DSCN MKR DOCD: CPT | Performed by: INTERNAL MEDICINE

## 2019-11-29 RX ORDER — AZITHROMYCIN 250 MG/1
250 TABLET, FILM COATED ORAL SEE ADMIN INSTRUCTIONS
Qty: 6 TABLET | Refills: 0 | Status: SHIPPED | OUTPATIENT
Start: 2019-11-29 | End: 2019-12-04

## 2019-11-29 RX ORDER — PREDNISONE 20 MG/1
20 TABLET ORAL DAILY
Qty: 5 TABLET | Refills: 0 | Status: SHIPPED | OUTPATIENT
Start: 2019-11-29 | End: 2019-12-04

## 2019-12-03 ENCOUNTER — TELEPHONE (OUTPATIENT)
Dept: CARDIOLOGY CLINIC | Age: 74
End: 2019-12-03

## 2019-12-03 ENCOUNTER — PATIENT MESSAGE (OUTPATIENT)
Dept: FAMILY MEDICINE CLINIC | Age: 74
End: 2019-12-03

## 2019-12-03 DIAGNOSIS — F41.9 ANXIETY: ICD-10-CM

## 2019-12-04 ENCOUNTER — HOSPITAL ENCOUNTER (OUTPATIENT)
Dept: PULMONOLOGY | Age: 74
Discharge: HOME | End: 2019-12-04
Payer: COMMERCIAL

## 2019-12-04 DIAGNOSIS — J43.2 CENTRILOBULAR EMPHYSEMA (HCC): ICD-10-CM

## 2019-12-04 PROCEDURE — 94726 PLETHYSMOGRAPHY LUNG VOLUMES: CPT | Performed by: INTERNAL MEDICINE

## 2019-12-04 PROCEDURE — 94726 PLETHYSMOGRAPHY LUNG VOLUMES: CPT

## 2019-12-04 PROCEDURE — 94729 DIFFUSING CAPACITY: CPT | Performed by: INTERNAL MEDICINE

## 2019-12-04 PROCEDURE — 6370000000 HC RX 637 (ALT 250 FOR IP): Performed by: INTERNAL MEDICINE

## 2019-12-04 PROCEDURE — 94640 AIRWAY INHALATION TREATMENT: CPT

## 2019-12-04 PROCEDURE — 94060 EVALUATION OF WHEEZING: CPT

## 2019-12-04 PROCEDURE — 94729 DIFFUSING CAPACITY: CPT

## 2019-12-04 PROCEDURE — 94060 EVALUATION OF WHEEZING: CPT | Performed by: INTERNAL MEDICINE

## 2019-12-04 RX ORDER — ALBUTEROL SULFATE 90 UG/1
4 AEROSOL, METERED RESPIRATORY (INHALATION) ONCE
Status: COMPLETED | OUTPATIENT
Start: 2019-12-04 | End: 2019-12-04

## 2019-12-04 RX ORDER — LORAZEPAM 1 MG/1
1 TABLET ORAL DAILY PRN
Qty: 30 TABLET | Refills: 0 | Status: SHIPPED | OUTPATIENT
Start: 2019-12-04 | End: 2020-01-03

## 2019-12-04 RX ADMIN — ALBUTEROL SULFATE 4 PUFF: 90 AEROSOL, METERED RESPIRATORY (INHALATION) at 12:58

## 2019-12-05 ENCOUNTER — TELEPHONE (OUTPATIENT)
Dept: PULMONOLOGY | Age: 74
End: 2019-12-05

## 2019-12-05 LAB
DLCO %PRED: 45 %
DLCO PRED: NORMAL
DLCO/VA %PRED: NORMAL
DLCO/VA PRED: NORMAL
DLCO/VA: NORMAL
DLCO: NORMAL
EXPIRATORY TIME-POST: NORMAL
EXPIRATORY TIME: NORMAL
FEF 25-75% %CHNG: NORMAL
FEF 25-75% %PRED-POST: NORMAL
FEF 25-75% %PRED-PRE: NORMAL
FEF 25-75% PRED: NORMAL
FEF 25-75%-POST: NORMAL
FEF 25-75%-PRE: NORMAL
FEV1 %PRED-POST: 49 %
FEV1 %PRED-PRE: 41 %
FEV1 PRED: NORMAL
FEV1-POST: NORMAL
FEV1-PRE: NORMAL
FEV1/FVC %PRED-POST: NORMAL
FEV1/FVC %PRED-PRE: NORMAL
FEV1/FVC PRED: NORMAL
FEV1/FVC-POST: 77 %
FEV1/FVC-PRE: 71 %
FVC %PRED-POST: NORMAL
FVC %PRED-PRE: NORMAL
FVC PRED: NORMAL
FVC-POST: NORMAL
FVC-PRE: NORMAL
GAW %PRED: NORMAL
GAW PRED: NORMAL
GAW: NORMAL
IC %PRED: NORMAL
IC PRED: NORMAL
IC: NORMAL
MEP: NORMAL
MIP: NORMAL
MVV %PRED-PRE: NORMAL
MVV PRED: NORMAL
MVV-PRE: NORMAL
PEF %PRED-POST: NORMAL
PEF %PRED-PRE: NORMAL
PEF PRED: NORMAL
PEF%CHNG: NORMAL
PEF-POST: NORMAL
PEF-PRE: NORMAL
RAW %PRED: NORMAL
RAW PRED: NORMAL
RAW: NORMAL
RV %PRED: NORMAL
RV PRED: NORMAL
RV: NORMAL
SVC %PRED: NORMAL
SVC PRED: NORMAL
SVC: NORMAL
TLC %PRED: 95 %
TLC PRED: NORMAL
TLC: NORMAL
VA %PRED: NORMAL
VA PRED: NORMAL
VA: NORMAL
VTG %PRED: NORMAL
VTG PRED: NORMAL
VTG: NORMAL

## 2019-12-05 RX ORDER — PREDNISONE 20 MG/1
TABLET ORAL
Qty: 13 TABLET | Refills: 0 | Status: SHIPPED | OUTPATIENT
Start: 2019-12-05 | End: 2020-01-28 | Stop reason: ALTCHOICE

## 2019-12-05 RX ORDER — AZITHROMYCIN 250 MG/1
250 TABLET, FILM COATED ORAL SEE ADMIN INSTRUCTIONS
Qty: 6 TABLET | Refills: 0 | Status: SHIPPED | OUTPATIENT
Start: 2019-12-05 | End: 2019-12-10

## 2019-12-05 ASSESSMENT — PULMONARY FUNCTION TESTS
FEV1_PERCENT_PREDICTED_POST: 49
FEV1/FVC_POST: 77
FEV1_PERCENT_PREDICTED_PRE: 41
FEV1/FVC_PRE: 71

## 2019-12-09 ENCOUNTER — TELEPHONE (OUTPATIENT)
Dept: PULMONOLOGY | Age: 74
End: 2019-12-09

## 2019-12-09 ENCOUNTER — TELEPHONE (OUTPATIENT)
Dept: CARDIOLOGY CLINIC | Age: 74
End: 2019-12-09

## 2019-12-09 DIAGNOSIS — I48.0 PAROXYSMAL ATRIAL FIBRILLATION (HCC): Primary | ICD-10-CM

## 2019-12-10 ENCOUNTER — NURSE ONLY (OUTPATIENT)
Dept: CARDIOLOGY CLINIC | Age: 74
End: 2019-12-10
Payer: COMMERCIAL

## 2019-12-10 DIAGNOSIS — I48.0 PAROXYSMAL ATRIAL FIBRILLATION (HCC): ICD-10-CM

## 2019-12-10 DIAGNOSIS — I25.10 CORONARY ARTERY DISEASE INVOLVING NATIVE CORONARY ARTERY OF NATIVE HEART, ANGINA PRESENCE UNSPECIFIED: Primary | ICD-10-CM

## 2019-12-10 PROCEDURE — 93000 ELECTROCARDIOGRAM COMPLETE: CPT | Performed by: INTERNAL MEDICINE

## 2019-12-10 RX ORDER — SODIUM CHLORIDE 0.9 % (FLUSH) 0.9 %
10 SYRINGE (ML) INJECTION EVERY 12 HOURS SCHEDULED
Status: CANCELLED | OUTPATIENT
Start: 2019-12-11

## 2019-12-10 RX ORDER — SODIUM CHLORIDE 9 MG/ML
INJECTION, SOLUTION INTRAVENOUS CONTINUOUS
Status: CANCELLED | OUTPATIENT
Start: 2019-12-11

## 2019-12-10 RX ORDER — SODIUM CHLORIDE 0.9 % (FLUSH) 0.9 %
10 SYRINGE (ML) INJECTION PRN
Status: CANCELLED | OUTPATIENT
Start: 2019-12-11

## 2019-12-11 ENCOUNTER — HOSPITAL ENCOUNTER (OUTPATIENT)
Dept: CARDIAC CATH/INVASIVE PROCEDURES | Age: 74
Discharge: HOME | End: 2019-12-11
Payer: COMMERCIAL

## 2019-12-17 ENCOUNTER — HOSPITAL ENCOUNTER (OUTPATIENT)
Dept: CARDIAC REHAB | Age: 74
Setting detail: THERAPIES SERIES
Discharge: HOME | End: 2019-12-17
Payer: COMMERCIAL

## 2019-12-17 DIAGNOSIS — J43.2 CENTRILOBULAR EMPHYSEMA (HCC): ICD-10-CM

## 2019-12-26 ENCOUNTER — TELEPHONE (OUTPATIENT)
Dept: FAMILY MEDICINE CLINIC | Age: 74
End: 2019-12-26

## 2020-01-21 ENCOUNTER — TELEPHONE (OUTPATIENT)
Dept: PULMONOLOGY | Age: 75
End: 2020-01-21

## 2020-01-21 NOTE — TELEPHONE ENCOUNTER
Called alli per Dr Ирина Servin request  We received a CMN to be signed which indicated testing and results from 7/22/19   [facility] wants to know where they obtained these results  Our last testing shown for Chandler Negrete was a 6MWT done 10/5/18. If this was testing performed by Anusha Gomez,  [facility] does not accept their independent testing.

## 2020-01-22 RX ORDER — FUROSEMIDE 40 MG/1
40 TABLET ORAL 2 TIMES DAILY
Qty: 180 TABLET | Refills: 1 | Status: SHIPPED | OUTPATIENT
Start: 2020-01-22 | End: 2020-08-26 | Stop reason: SDUPTHER

## 2020-01-22 RX ORDER — CITALOPRAM 40 MG/1
TABLET ORAL
Qty: 90 TABLET | Refills: 0 | OUTPATIENT
Start: 2020-01-22

## 2020-01-22 RX ORDER — ATORVASTATIN CALCIUM 40 MG/1
40 TABLET, FILM COATED ORAL DAILY
Qty: 90 TABLET | Refills: 1 | OUTPATIENT
Start: 2020-01-22

## 2020-01-22 RX ORDER — LANSOPRAZOLE 15 MG/1
15 CAPSULE, DELAYED RELEASE ORAL DAILY
Qty: 90 CAPSULE | Refills: 1 | Status: SHIPPED | OUTPATIENT
Start: 2020-01-22 | End: 2020-08-26 | Stop reason: SDUPTHER

## 2020-01-22 NOTE — TELEPHONE ENCOUNTER
I show patient should not need a refill on some of these medications yet. I called the pharmacy to verify and was told patient just picked up citalopram 5 days ago, she has a refill still for atorvastatin and he needs to call a different pharmacy to clarify the other meds. I willl call Phoebe Patel at the pharmacy back in 30 mins.

## 2020-01-27 ENCOUNTER — TELEPHONE (OUTPATIENT)
Dept: FAMILY MEDICINE CLINIC | Age: 75
End: 2020-01-27

## 2020-01-27 NOTE — TELEPHONE ENCOUNTER
I called pt back and she stated that her transportation service called our office today after she set up a ride with them for her appt tomorrow that I scheduled for her with our NP. Pt states that [facility] transportation dept told her that they spoke with Anum Paige to confirm if pt medically needed to get in tomorrow for evaluation or if the appt medically could wait until Wed. [facility] told the pt that Anum Paige with our front staff told them it could wait. I had to call back to the pt's transportation service through [facility] and I was on the phone with them for 28min and 38 sec rescheduling pt's transportation. [facility] transportation service said they had the documentation where Anum Paige told them it was okay to push out till Wed. However, this was not okay and front staff did not check with a provider or MA prior to giving that information. Pt has possible infection near her colostomy sight that we wanted to evaluate for her today but she had no way in. Pt has been running fevers, and c/o SOB with physical activity, flank pain, and cough. This is an appt that can not wait until Wed due to pt's concerning symptoms. Ref # for pt's transportation is [id number]    Pt was given reference number and told her transportation should arrive tomorrow between 2:20pm and 2:50pm to pick her up and get her here for her 320 appt. Pt was informed that she needs to call 911 and or go to the ED asap if symptoms worsen between now and her appt time tomorrow.

## 2020-01-27 NOTE — TELEPHONE ENCOUNTER
Thank you Miguel Delgado. I called and spoke with pt and scheduled her for tomorrow with you at 320pm. Pt informed to call 911 or go straight to the ED if SOB or any symptoms worsen.

## 2020-01-28 ENCOUNTER — HOSPITAL ENCOUNTER (OUTPATIENT)
Dept: GENERAL RADIOLOGY | Age: 75
Discharge: HOME | End: 2020-01-28
Payer: COMMERCIAL

## 2020-01-28 ENCOUNTER — OFFICE VISIT (OUTPATIENT)
Dept: FAMILY MEDICINE CLINIC | Age: 75
End: 2020-01-28
Payer: COMMERCIAL

## 2020-01-28 ENCOUNTER — HOSPITAL ENCOUNTER (OUTPATIENT)
Age: 75
Discharge: HOME | End: 2020-01-28
Payer: COMMERCIAL

## 2020-01-28 VITALS
TEMPERATURE: 98.2 F | DIASTOLIC BLOOD PRESSURE: 70 MMHG | OXYGEN SATURATION: 93 % | WEIGHT: 172 LBS | RESPIRATION RATE: 26 BRPM | SYSTOLIC BLOOD PRESSURE: 170 MMHG | BODY MASS INDEX: 31.65 KG/M2 | HEIGHT: 62 IN | HEART RATE: 82 BPM

## 2020-01-28 DIAGNOSIS — I10 ESSENTIAL HYPERTENSION: ICD-10-CM

## 2020-01-28 DIAGNOSIS — D64.9 ANEMIA, UNSPECIFIED TYPE: ICD-10-CM

## 2020-01-28 LAB
ANION GAP SERPL CALCULATED.3IONS-SCNC: 14 MMOL/L (ref 3–16)
BASOPHILS ABSOLUTE: 0 K/UL (ref 0–0.2)
BASOPHILS RELATIVE PERCENT: 0.5 %
BILIRUBIN, POC: ABNORMAL
BLOOD URINE, POC: ABNORMAL
BUN BLDV-MCNC: 33 MG/DL (ref 7–20)
CALCIUM SERPL-MCNC: 10.4 MG/DL (ref 8.3–10.6)
CHLORIDE BLD-SCNC: 92 MMOL/L (ref 99–110)
CLARITY, POC: CLEAR
CO2: 34 MMOL/L (ref 21–32)
COLOR, POC: YELLOW
CREAT SERPL-MCNC: 1.7 MG/DL (ref 0.6–1.2)
EOSINOPHILS ABSOLUTE: 0.2 K/UL (ref 0–0.6)
EOSINOPHILS RELATIVE PERCENT: 1.7 %
FERRITIN: 37.2 NG/ML (ref 15–150)
GFR AFRICAN AMERICAN: 36
GFR NON-AFRICAN AMERICAN: 30
GLUCOSE BLD-MCNC: 100 MG/DL (ref 70–99)
GLUCOSE URINE, POC: ABNORMAL
HCT VFR BLD CALC: 30.5 % (ref 36–48)
HEMOGLOBIN: 9.3 G/DL (ref 12–16)
INFLUENZA A ANTIGEN, POC: NEGATIVE
INFLUENZA B ANTIGEN, POC: NEGATIVE
IRON SATURATION: 10 % (ref 15–50)
IRON: 38 UG/DL (ref 37–145)
KETONES, POC: ABNORMAL
LEUKOCYTE EST, POC: ABNORMAL
LYMPHOCYTES ABSOLUTE: 1.4 K/UL (ref 1–5.1)
LYMPHOCYTES RELATIVE PERCENT: 15.7 %
MCH RBC QN AUTO: 23.6 PG (ref 26–34)
MCHC RBC AUTO-ENTMCNC: 30.6 G/DL (ref 31–36)
MCV RBC AUTO: 77 FL (ref 80–100)
MONOCYTES ABSOLUTE: 0.6 K/UL (ref 0–1.3)
MONOCYTES RELATIVE PERCENT: 6.7 %
NEUTROPHILS ABSOLUTE: 6.9 K/UL (ref 1.7–7.7)
NEUTROPHILS RELATIVE PERCENT: 75.4 %
NITRITE, POC: ABNORMAL
PDW BLD-RTO: 18.2 % (ref 12.4–15.4)
PH, POC: 6
PLATELET # BLD: 358 K/UL (ref 135–450)
PMV BLD AUTO: 9.2 FL (ref 5–10.5)
POTASSIUM SERPL-SCNC: 4.1 MMOL/L (ref 3.5–5.1)
PROTEIN, POC: ABNORMAL
RBC # BLD: 3.95 M/UL (ref 4–5.2)
SODIUM BLD-SCNC: 140 MMOL/L (ref 136–145)
SPECIFIC GRAVITY, POC: 1.02
TOTAL IRON BINDING CAPACITY: 380 UG/DL (ref 260–445)
UROBILINOGEN, POC: 0.2
WBC # BLD: 9.2 K/UL (ref 4–11)

## 2020-01-28 PROCEDURE — G8926 SPIRO NO PERF OR DOC: HCPCS | Performed by: NURSE PRACTITIONER

## 2020-01-28 PROCEDURE — 81002 URINALYSIS NONAUTO W/O SCOPE: CPT | Performed by: NURSE PRACTITIONER

## 2020-01-28 PROCEDURE — 4040F PNEUMOC VAC/ADMIN/RCVD: CPT | Performed by: NURSE PRACTITIONER

## 2020-01-28 PROCEDURE — 1090F PRES/ABSN URINE INCON ASSESS: CPT | Performed by: NURSE PRACTITIONER

## 2020-01-28 PROCEDURE — 3017F COLORECTAL CA SCREEN DOC REV: CPT | Performed by: NURSE PRACTITIONER

## 2020-01-28 PROCEDURE — G8399 PT W/DXA RESULTS DOCUMENT: HCPCS | Performed by: NURSE PRACTITIONER

## 2020-01-28 PROCEDURE — 1123F ACP DISCUSS/DSCN MKR DOCD: CPT | Performed by: NURSE PRACTITIONER

## 2020-01-28 PROCEDURE — 3023F SPIROM DOC REV: CPT | Performed by: NURSE PRACTITIONER

## 2020-01-28 PROCEDURE — 71046 X-RAY EXAM CHEST 2 VIEWS: CPT

## 2020-01-28 PROCEDURE — G8482 FLU IMMUNIZE ORDER/ADMIN: HCPCS | Performed by: NURSE PRACTITIONER

## 2020-01-28 PROCEDURE — G8427 DOCREV CUR MEDS BY ELIG CLIN: HCPCS | Performed by: NURSE PRACTITIONER

## 2020-01-28 PROCEDURE — 99214 OFFICE O/P EST MOD 30 MIN: CPT | Performed by: NURSE PRACTITIONER

## 2020-01-28 PROCEDURE — 87804 INFLUENZA ASSAY W/OPTIC: CPT | Performed by: NURSE PRACTITIONER

## 2020-01-28 PROCEDURE — G8417 CALC BMI ABV UP PARAM F/U: HCPCS | Performed by: NURSE PRACTITIONER

## 2020-01-28 PROCEDURE — 1036F TOBACCO NON-USER: CPT | Performed by: NURSE PRACTITIONER

## 2020-01-28 RX ORDER — PREDNISONE 20 MG/1
TABLET ORAL
Qty: 13 TABLET | Refills: 0 | Status: SHIPPED | OUTPATIENT
Start: 2020-01-28 | End: 2020-02-13 | Stop reason: ALTCHOICE

## 2020-01-28 ASSESSMENT — ENCOUNTER SYMPTOMS
SINUS PRESSURE: 0
NAUSEA: 1
VOMITING: 0
COUGH: 1
SHORTNESS OF BREATH: 1
SORE THROAT: 0
CONSTIPATION: 0
SINUS PAIN: 0
WHEEZING: 1
ABDOMINAL PAIN: 1
RHINORRHEA: 1
DIARRHEA: 1

## 2020-01-28 ASSESSMENT — PATIENT HEALTH QUESTIONNAIRE - PHQ9
SUM OF ALL RESPONSES TO PHQ9 QUESTIONS 1 & 2: 0
SUM OF ALL RESPONSES TO PHQ QUESTIONS 1-9: 0
SUM OF ALL RESPONSES TO PHQ QUESTIONS 1-9: 0
2. FEELING DOWN, DEPRESSED OR HOPELESS: 0
1. LITTLE INTEREST OR PLEASURE IN DOING THINGS: 0

## 2020-01-28 NOTE — PROGRESS NOTES
1/28/2020    This is a 69 y.o. female   Chief Complaint   Patient presents with    Flank Pain     Pt c/o possible flank pain for one week.  Fever     Pt c/o running a fever of 102 this past Saturday off an on and at night time.  Fatigue     pt c/o being extremly fatigued    Rash     pt c/o rash around her colostomy site that consist of red bumps that are painful    . HPI  Reports that she was in [place] last week and her daughter there had infleunza A. She started with a fever last Tuesday 1/21/20 has been off and on. Returned back to [place] on Sat. Has COPD, but reports that cough and shortness of breath is worse with activity. Does not have shortness of breath at rest. Has been taking medications per Dr. eJrry Magallanes. She is taking spiriva and another inhaler that she can not remember. Insurance would not cover the trelegy. Using the duoneb BID. Dr. Malini Oscar last note has patient taking budesonide nebulizer and formoterol nebulizer but patient reports that she is on spiriva and another hand held inhaler. Wears O2 daily. Mostly 3L but sometimes will go to 4-5L. Has been taking her furosemide 40 mg BID. Does not check weight daily. Denies edema. She will eat and feel nauseated. Will have epigastric pains after eating. Been taking a probiotic daily. Has had diarrhea for the past 4 days. He has a colostomy. Followed with Dr Javi Mcnally, but not seen recently. She reports that her stoma bleeds. This has been going on for 2 years. Has skin irritation around stoma. Has tried hydrocortisone cream in the past, not recently, and that has helped some. HTN- reports that she is taking all medications. History of anemia. Would like labs checked again.       Patient Active Problem List   Diagnosis    Fracture, metacarpal, neck    PAD (peripheral artery disease) ([address]    CAD (coronary artery disease)    HTN (hypertension)    Diastolic dysfunction    RLS (restless legs syndrome)    History of tobacco use  Chronic obstructive pulmonary disease (HCC)    Centrilobular emphysema (HCC)    Colovesical fistula    Diverticulitis large intestine w/o perforation or abscess w/bleeding    Large bowel obstruction ([address]    ANGIE treated with BiPAP    CKD (chronic kidney disease), stage III - sees Dr. Jay Jasso COPD, severe ([address]    Colonic obstruction ([address]    Carotid artery stenosis without cerebral infarction, bilateral    Colostomy care ([address]    Dyspnea and respiratory abnormalities    Hyperlipidemia    Paroxysmal atrial fibrillation (HCC)    Hypothyroidism, unspecified    Hypertensive emergency          Current Outpatient Medications   Medication Sig Dispense Refill    lansoprazole (PREVACID) 15 MG delayed release capsule Take 1 capsule by mouth daily 90 capsule 1    furosemide (LASIX) 40 MG tablet Take 1 tablet by mouth 2 times daily 180 tablet 1    LORazepam (ATIVAN) 1 MG tablet Take 1 tablet by mouth every 8 hours as needed for Anxiety for up to 30 days.  30 tablet 0    predniSONE (DELTASONE) 20 MG tablet Take 40 mg daily for 4 days, then 20 mg daily for 3 days, then 10 mg daily for 3 days, then stop 13 tablet 0    montelukast (SINGULAIR) 10 MG tablet Take 1 tablet by mouth daily 30 tablet 5    apixaban (ELIQUIS) 5 MG TABS tablet TAKE ONE TABLET BY MOUTH TWICE A  tablet 0    atorvastatin (LIPITOR) 40 MG tablet Take 1 tablet by mouth daily 90 tablet 1    citalopram (CELEXA) 40 MG tablet TAKE ONE TABLET BY MOUTH DAILY 90 tablet 0    cloNIDine (CATAPRES) 0.2 MG tablet Take 1 tablet by mouth 2 times daily 180 tablet 0    levothyroxine (SYNTHROID) 50 MCG tablet TAKE ONE TABLET BY MOUTH DAILY 90 tablet 0    lidocaine (XYLOCAINE) 2 % jelly Apply small amount topically to affected area every 8 hours when necessary pain 1 Tube 0    ipratropium-albuterol (DUONEB) 0.5-2.5 (3) MG/3ML SOLN nebulizer solution Inhale 3 mLs into the lungs every 4 hours as needed for Shortness of Breath 360 mL 5    butalbital-acetaminophen-caffeine (FIORICET, ESGIC) -40 MG per tablet Take 1 tablet by mouth every 6 hours as needed for Headaches 16 tablet 0    fluticasone-umeclidin-vilant (TRELEGY ELLIPTA) 100-62.5-25 MCG/INH AEPB Inhale 1 puff into the lungs daily 1 each 0    hydrALAZINE (APRESOLINE) 50 MG tablet TAKE ONE TABLET BY MOUTH THREE TIMES A DAY 90 tablet 0    aspirin 81 MG tablet Take 1 tablet by mouth daily 30 tablet 3    carvedilol (COREG) 12.5 MG tablet Take 0.5 tablets by mouth 2 times daily (with meals) 30 tablet 0     No current facility-administered medications for this visit. Allergies   Allergen Reactions    Iv Dye [Iodides]      Flushed, broke out and difficulty breathing       Review of Systems   Constitutional: Positive for activity change, fatigue and fever. HENT: Positive for congestion and rhinorrhea. Negative for postnasal drip, sinus pressure, sinus pain and sore throat. Respiratory: Positive for cough, shortness of breath and wheezing. Cardiovascular: Negative for chest pain, palpitations and leg swelling. Gastrointestinal: Positive for abdominal pain, diarrhea and nausea. Negative for constipation and vomiting. Genitourinary: Positive for flank pain. Negative for difficulty urinating, dysuria, frequency and pelvic pain. Neurological: Negative for dizziness and headaches. Vitals:    01/28/20 1527 01/28/20 1530   BP: (!) 158/62 (!) 170/64   Site: Right Upper Arm Left Upper Arm   Position: Sitting Sitting   Cuff Size: Large Adult Medium Adult   Pulse: 82    Resp: 26    Temp: 98.2 °F (36.8 °C)    TempSrc: Oral    SpO2: 93%    Weight: 172 lb (78 kg)    Height: 5' 2\" (1.575 m)        Body mass index is 31.46 kg/m².      Wt Readings from Last 3 Encounters:   01/28/20 172 lb (78 kg)   11/27/19 178 lb (80.7 kg)   11/19/19 173 lb (78.5 kg)       BP Readings from Last 3 Encounters:   01/28/20 (!) 170/64   11/27/19 138/64   11/19/19 134/72       Physical Exam  Vitals signs and nursing note reviewed. Constitutional:       General: She is not in acute distress. Appearance: She is well-developed. HENT:      Head: Normocephalic and atraumatic. Right Ear: Tympanic membrane, ear canal and external ear normal. Tympanic membrane is not erythematous or bulging. Left Ear: Tympanic membrane, ear canal and external ear normal. Tympanic membrane is not erythematous or bulging. Nose:      Right Sinus: No maxillary sinus tenderness or frontal sinus tenderness. Left Sinus: No maxillary sinus tenderness or frontal sinus tenderness. Mouth/Throat:      Pharynx: Uvula midline. Posterior oropharyngeal erythema present. No oropharyngeal exudate. Neck:      Musculoskeletal: Neck supple. Cardiovascular:      Rate and Rhythm: Normal rate and regular rhythm. Heart sounds: Murmur present. Systolic murmur present with a grade of 2/6. Pulmonary:      Effort: Pulmonary effort is normal. No respiratory distress. Breath sounds: Examination of the right-upper field reveals wheezing. Examination of the left-upper field reveals wheezing. Examination of the right-middle field reveals wheezing. Examination of the left-middle field reveals wheezing. Examination of the right-lower field reveals decreased breath sounds. Examination of the left-lower field reveals decreased breath sounds. Decreased breath sounds and wheezing present. No rhonchi. Abdominal:      Comments: LLQ with colostomy. Stoma pink. Colostomy bag intact. Musculoskeletal:      Right lower leg: No edema. Left lower leg: No edema. Lymphadenopathy:      Head:      Right side of head: No submandibular adenopathy. Left side of head: No submandibular adenopathy. Cervical:      Right cervical: No superficial cervical adenopathy. Left cervical: No superficial cervical adenopathy. Skin:     General: Skin is warm and dry.    Neurological:      Mental Status: She is alert and oriented to

## 2020-01-29 LAB — URINE CULTURE, ROUTINE: NORMAL

## 2020-01-30 RX ORDER — FERROUS SULFATE 325(65) MG
325 TABLET ORAL
Qty: 30 TABLET | Refills: 2 | Status: ON HOLD | OUTPATIENT
Start: 2020-01-30 | End: 2020-02-21 | Stop reason: SDUPTHER

## 2020-02-03 ENCOUNTER — TELEPHONE (OUTPATIENT)
Dept: PULMONOLOGY | Age: 75
End: 2020-02-03

## 2020-02-03 NOTE — TELEPHONE ENCOUNTER
What dose of prednisone is she on? What symptoms is she having and for how long have they been present? What is the liter flow on her oxygen? Which inhalers and nebulizers is she using?

## 2020-02-03 NOTE — TELEPHONE ENCOUNTER
Prednisone dose- 20mg tapered dose, right now taking 2 tablets daily. Tomorrow will start 1 daily. Symptoms: When she is sitting she is fine but when she walks around she is having a hard time, SOB, low grade fever, no cough. Been going on for a month, just getting worse. Liter flow: sitting down she keeps it on 3lpm, moves it to 4 or 5 when up walking around. Inhalers & nebulizers: Using both, do not seem to help as much as they used too. Using nebulizer twice daily. Breo inhaler using up to three times daily. Spriva inahler just once daily. Pt believes SOB is caused by fluid retention, says she is taking water pills.

## 2020-02-03 NOTE — TELEPHONE ENCOUNTER
The patient was unable to keep her appointment today, 2/3/2020, her transportation did not show up. She has been on the steroids for four days now without much success. Pulse oximetry shows 98 with sitting but when she walks it drops into the 70's and 80's. No  pneumonia on chest xray. Please advise.

## 2020-02-10 ENCOUNTER — OFFICE VISIT (OUTPATIENT)
Dept: FAMILY MEDICINE CLINIC | Age: 75
End: 2020-02-10
Payer: COMMERCIAL

## 2020-02-10 VITALS
SYSTOLIC BLOOD PRESSURE: 200 MMHG | WEIGHT: 175 LBS | OXYGEN SATURATION: 99 % | TEMPERATURE: 99.6 F | BODY MASS INDEX: 32.2 KG/M2 | HEART RATE: 88 BPM | DIASTOLIC BLOOD PRESSURE: 60 MMHG | HEIGHT: 62 IN | RESPIRATION RATE: 26 BRPM

## 2020-02-10 PROCEDURE — 1123F ACP DISCUSS/DSCN MKR DOCD: CPT | Performed by: FAMILY MEDICINE

## 2020-02-10 PROCEDURE — G8417 CALC BMI ABV UP PARAM F/U: HCPCS | Performed by: FAMILY MEDICINE

## 2020-02-10 PROCEDURE — 93000 ELECTROCARDIOGRAM COMPLETE: CPT | Performed by: FAMILY MEDICINE

## 2020-02-10 PROCEDURE — 3017F COLORECTAL CA SCREEN DOC REV: CPT | Performed by: FAMILY MEDICINE

## 2020-02-10 PROCEDURE — 1090F PRES/ABSN URINE INCON ASSESS: CPT | Performed by: FAMILY MEDICINE

## 2020-02-10 PROCEDURE — G8427 DOCREV CUR MEDS BY ELIG CLIN: HCPCS | Performed by: FAMILY MEDICINE

## 2020-02-10 PROCEDURE — G8399 PT W/DXA RESULTS DOCUMENT: HCPCS | Performed by: FAMILY MEDICINE

## 2020-02-10 PROCEDURE — 36415 COLL VENOUS BLD VENIPUNCTURE: CPT | Performed by: FAMILY MEDICINE

## 2020-02-10 PROCEDURE — 4040F PNEUMOC VAC/ADMIN/RCVD: CPT | Performed by: FAMILY MEDICINE

## 2020-02-10 PROCEDURE — G8482 FLU IMMUNIZE ORDER/ADMIN: HCPCS | Performed by: FAMILY MEDICINE

## 2020-02-10 PROCEDURE — 99214 OFFICE O/P EST MOD 30 MIN: CPT | Performed by: FAMILY MEDICINE

## 2020-02-10 PROCEDURE — 1036F TOBACCO NON-USER: CPT | Performed by: FAMILY MEDICINE

## 2020-02-10 RX ORDER — LORAZEPAM 1 MG/1
1 TABLET ORAL DAILY PRN
Qty: 30 TABLET | Refills: 0 | Status: SHIPPED | OUTPATIENT
Start: 2020-02-10 | End: 2020-03-13 | Stop reason: SDUPTHER

## 2020-02-10 RX ORDER — CARVEDILOL 12.5 MG/1
12.5 TABLET ORAL 2 TIMES DAILY WITH MEALS
Qty: 60 TABLET | Refills: 0 | Status: SHIPPED | OUTPATIENT
Start: 2020-02-10 | End: 2020-03-12 | Stop reason: SDUPTHER

## 2020-02-10 RX ORDER — HYDRALAZINE HYDROCHLORIDE 100 MG/1
TABLET, FILM COATED ORAL
Qty: 90 TABLET | Refills: 2 | Status: SHIPPED | OUTPATIENT
Start: 2020-02-10 | End: 2020-08-26 | Stop reason: SDUPTHER

## 2020-02-10 NOTE — PROGRESS NOTES
Paroxysmal atrial fibrillation (HCC)    Hypothyroidism, unspecified    Hypertensive emergency        Past Medical History:   Diagnosis Date    CAD (coronary artery disease)     Nonobstructive on cath in 2017    [facility]     [facility] care ([facility] 2018    Peristomal irritation and early leaking    COPD (chronic obstructive pulmonary disease) ([facility]     Hyperlipidemia     Hypertension     Kidney disease     Stage 3    Peripheral vascular disease [facility]     Rectal fissure 2014    surgery pending    Restless legs syndrome     Sleep apnea     O2 3 liters at hs    Unspecified sleep apnea        Past Surgical History:   Procedure Laterality Date    CARDIAC CATHETERIZATION  2017    Dr. Galen Klein  2018    CORONARY ARTERY BYPASS GRAFT      Legs & Carotid    HERNIA REPAIR      UPPER GASTROINTESTINAL ENDOSCOPY N/A 2019    EGD DIAGNOSTIC ONLY performed by Raf Acosta MD at [address] History     Socioeconomic History    Marital status:      Spouse name: Not on file    Number of children: 6    Years of education: 15    Highest education level: Not on file   Occupational History    Not on file   Social Needs    Financial resource strain: Not on file    Food insecurity:     Worry: Not on file     Inability: Not on file    Transportation needs:     Medical: Not on file     Non-medical: Not on file   Tobacco Use    Smoking status: Former Smoker     Packs/day: 3.00     Years: 30.00     Pack years: 90.00     Start date: 1963     Last attempt to quit: 1992     Years since quittin.0    Smokeless tobacco: Never Used    Tobacco comment: QUIT 20 YRS AGO   Substance and Sexual Activity    Alcohol use: No     Alcohol/week: 0.0 standard drinks    Drug use: No    Sexual activity: Not Currently   Lifestyle    Physical activity:     Days per week: Not on file     Minutes per session: Not on file    Stress: Not on file   Relationships    Social connections:     Talks on phone: Not on file     Gets together: Not on file     Attends Congregation service: Not on file     Active member of club or organization: Not on file     Attends meetings of clubs or organizations: Not on file     Relationship status: Not on file    Intimate partner violence:     Fear of current or ex partner: Not on file     Emotionally abused: Not on file     Physically abused: Not on file     Forced sexual activity: Not on file   Other Topics Concern    Not on file   Social History Narrative    Not on file       Family History   Problem Relation Age of Onset    Heart Disease Mother     Heart Disease Father     Heart Disease Sister     Cancer Brother        Current Outpatient Medications   Medication Sig Dispense Refill    carvedilol (COREG) 12.5 MG tablet Take 1 tablet by mouth 2 times daily (with meals) 60 tablet 0    hydrALAZINE (APRESOLINE) 100 MG tablet TAKE ONE TABLET BY MOUTH THREE TIMES A DAY 90 tablet 2    LORazepam (ATIVAN) 1 MG tablet Take 1 tablet by mouth daily as needed for Anxiety for up to 30 days.  30 tablet 0    ferrous sulfate 325 (65 Fe) MG tablet Take 1 tablet by mouth daily (with breakfast) 30 tablet 2    tiotropium (SPIRIVA RESPIMAT) 2.5 MCG/ACT AERS inhaler Inhale 2 puffs into the lungs daily      predniSONE (DELTASONE) 20 MG tablet Take 40 mg daily for 4 days, then 20 mg daily for 3 days, then 10 mg daily for 3 days, then stop 13 tablet 0    lidocaine (XYLOCAINE) 2 % jelly Apply small amount topically to affected area every 8 hours when necessary pain 1 Tube 0    lansoprazole (PREVACID) 15 MG delayed release capsule Take 1 capsule by mouth daily 90 capsule 1    furosemide (LASIX) 40 MG tablet Take 1 tablet by mouth 2 times daily 180 tablet 1    montelukast (SINGULAIR) 10 MG tablet Take 1 tablet by mouth daily 30 tablet 5    apixaban (ELIQUIS) 5 MG TABS tablet TAKE ONE TABLET BY MOUTH TWICE A  tablet 0    atorvastatin (LIPITOR) 40 MG tablet Take 1 tablet by mouth daily 90 tablet 1    citalopram (CELEXA) 40 MG tablet TAKE ONE TABLET BY MOUTH DAILY 90 tablet 0    cloNIDine (CATAPRES) 0.2 MG tablet Take 1 tablet by mouth 2 times daily 180 tablet 0    levothyroxine (SYNTHROID) 50 MCG tablet TAKE ONE TABLET BY MOUTH DAILY 90 tablet 0    ipratropium-albuterol (DUONEB) 0.5-2.5 (3) MG/3ML SOLN nebulizer solution Inhale 3 mLs into the lungs every 4 hours as needed for Shortness of Breath 360 mL 5    butalbital-acetaminophen-caffeine (FIORICET, ESGIC) -40 MG per tablet Take 1 tablet by mouth every 6 hours as needed for Headaches 16 tablet 0    aspirin 81 MG tablet Take 1 tablet by mouth daily 30 tablet 3     No current facility-administered medications for this visit. Allergies   Allergen Reactions    Iv Dye [Iodides]      Flushed, broke out and difficulty breathing       BP (!) 200/60 (Site: Left Upper Arm, Position: Supine, Cuff Size: Medium Adult)   Pulse 88   Temp 99.6 °F (37.6 °C) (Tympanic)   Resp 26   Ht 5' 2\" (1.575 m)   Wt 175 lb (79.4 kg)   LMP 05/01/2006 (Approximate)   SpO2 99%   BMI 32.01 kg/m²     Physical Exam  Constitutional:       Appearance: She is well-developed. HENT:      Head: Normocephalic and atraumatic. Neck:      Musculoskeletal: Normal range of motion. Cardiovascular:      Rate and Rhythm: Normal rate. Rhythm irregular. Heart sounds: Normal heart sounds. Pulmonary:      Effort: Pulmonary effort is normal.      Comments: Prolonged expiratory phase  Musculoskeletal: Normal range of motion. General: No tenderness. Skin:     General: Skin is warm and dry. Findings: No rash. Neurological:      Mental Status: She is alert and oriented to person, place, and time. Deep Tendon Reflexes: Reflexes are normal and symmetric.        Wt Readings from Last 3 Encounters:   02/10/20 175 lb (79.4 kg)   01/28/20 172 lb (78 kg)   11/27/19 178 lb (80.7 kg)       BP Readings from Last 3 Encounters:   02/10/20 (!) 200/60   01/28/20 (!) 170/70   11/27/19 138/64         Assessment/Plan:  Jessica White was seen today for shortness of breath, hypertension and chest pain. Diagnoses and all orders for this visit:    Essential hypertension  Significantly elevated here today. Her EKG does not show any concerning changes. Increase carvedilol and hydralazine. For worsening symptoms go to the ER. Monitor blood pressure at home  -     carvedilol (COREG) 12.5 MG tablet; Take 1 tablet by mouth 2 times daily (with meals)  -     hydrALAZINE (APRESOLINE) 100 MG tablet; TAKE ONE TABLET BY MOUTH THREE TIMES A DAY    Chest pain, unspecified type  As above EKG without significant changes. Check stat troponin. Echo dobutamine stress test  -     EKG 12 Lead  -     TROPONIN; Future  -     ECHO (Dobutamine) Pharmacological Stress Test; Future    Shortness of breath  This is difficult to tease out with her elevated blood pressure and her comorbid COPD. She does not seem to be having any significant wheezing. She recently did not respond well to prednisone. No DVT or PE risk factors. We will also work-up her anemia as a potential cause. -     ECHO (Dobutamine) Pharmacological Stress Test; Future    Blood in stool  Had a colonoscopy without remarkable finding less than a year ago. She is on a blood thinner. Check CBC. She needs to see GI again  -     CBC; Future    Iron deficiency anemia due to chronic blood loss  -     CBC; Future    Anxiety  -     LORazepam (ATIVAN) 1 MG tablet; Take 1 tablet by mouth daily as needed for Anxiety for up to 30 days.     Follow-up in 4 weeks for shortness of breath

## 2020-02-11 ENCOUNTER — OFFICE VISIT (OUTPATIENT)
Dept: PULMONOLOGY | Age: 75
End: 2020-02-11
Payer: COMMERCIAL

## 2020-02-11 ENCOUNTER — PATIENT MESSAGE (OUTPATIENT)
Dept: FAMILY MEDICINE CLINIC | Age: 75
End: 2020-02-11

## 2020-02-11 VITALS
HEART RATE: 69 BPM | DIASTOLIC BLOOD PRESSURE: 84 MMHG | SYSTOLIC BLOOD PRESSURE: 122 MMHG | OXYGEN SATURATION: 91 % | HEIGHT: 60 IN | WEIGHT: 175 LBS | TEMPERATURE: 97.8 F | BODY MASS INDEX: 34.36 KG/M2 | RESPIRATION RATE: 16 BRPM

## 2020-02-11 DIAGNOSIS — D50.9 IRON DEFICIENCY ANEMIA, UNSPECIFIED IRON DEFICIENCY ANEMIA TYPE: ICD-10-CM

## 2020-02-11 LAB
HCT VFR BLD CALC: 27.2 % (ref 36–48)
HEMOGLOBIN: 8.3 G/DL (ref 12–16)
MCH RBC QN AUTO: 23.7 PG (ref 26–34)
MCHC RBC AUTO-ENTMCNC: 30.5 G/DL (ref 31–36)
MCV RBC AUTO: 77.8 FL (ref 80–100)
PDW BLD-RTO: 19.9 % (ref 12.4–15.4)
PLATELET # BLD: 269 K/UL (ref 135–450)
PMV BLD AUTO: 9.3 FL (ref 5–10.5)
RBC # BLD: 3.5 M/UL (ref 4–5.2)
TROPONIN: <0.01 NG/ML
WBC # BLD: 10.6 K/UL (ref 4–11)

## 2020-02-11 PROCEDURE — G8427 DOCREV CUR MEDS BY ELIG CLIN: HCPCS | Performed by: INTERNAL MEDICINE

## 2020-02-11 PROCEDURE — 1123F ACP DISCUSS/DSCN MKR DOCD: CPT | Performed by: INTERNAL MEDICINE

## 2020-02-11 PROCEDURE — G8926 SPIRO NO PERF OR DOC: HCPCS | Performed by: INTERNAL MEDICINE

## 2020-02-11 PROCEDURE — G8417 CALC BMI ABV UP PARAM F/U: HCPCS | Performed by: INTERNAL MEDICINE

## 2020-02-11 PROCEDURE — 1036F TOBACCO NON-USER: CPT | Performed by: INTERNAL MEDICINE

## 2020-02-11 PROCEDURE — G8482 FLU IMMUNIZE ORDER/ADMIN: HCPCS | Performed by: INTERNAL MEDICINE

## 2020-02-11 PROCEDURE — 1090F PRES/ABSN URINE INCON ASSESS: CPT | Performed by: INTERNAL MEDICINE

## 2020-02-11 PROCEDURE — 3017F COLORECTAL CA SCREEN DOC REV: CPT | Performed by: INTERNAL MEDICINE

## 2020-02-11 PROCEDURE — G8399 PT W/DXA RESULTS DOCUMENT: HCPCS | Performed by: INTERNAL MEDICINE

## 2020-02-11 PROCEDURE — 99214 OFFICE O/P EST MOD 30 MIN: CPT | Performed by: INTERNAL MEDICINE

## 2020-02-11 PROCEDURE — 4040F PNEUMOC VAC/ADMIN/RCVD: CPT | Performed by: INTERNAL MEDICINE

## 2020-02-11 PROCEDURE — 3023F SPIROM DOC REV: CPT | Performed by: INTERNAL MEDICINE

## 2020-02-11 NOTE — PROGRESS NOTES
been running a fever and c/o bilateral shoulder pain. Overall feels horrible. Chronically sleeps on 3 pillows. Has noted episodes of PND. Episodes of occasional heart racing (has not been too frequent). Denies cough, dizziness, syncope, edema , weight change. BRB in her colostomy 2 days ago. O2 increased to 4-5 L at home d/t worsening SOB    Overall feels terrible and seems to be worsening as time goes on. Last week has had significant worsening symptoms and fever    Review of Systems:  Constitutional: Denies night sweats or fever. + significant fatigue and weakness  HEENT: Denies new visual changes, ringing in ears, nosebleeds,nasal congestion  Respiratory: + worsening SOB, orthopnea/PND  Cardiovascular: see HPI  GI: Denies diarrhea, constipation, abdominal pain, change in bowel habits+ frequent nausea recently + blood in colostomy few days ago  : Denies urinary frequency, urgency, incontinence, hematuria or dysuria. Skin: Denies rash, hives, or cyanosis  Musculoskeletal: + chronic joint pains (shoulder and neck the worst)  Neurological: Denies syncope or TIA-like symptoms.   Psychiatric: Denies anxiety, insomnia or depression     Past Medical History:   Diagnosis Date    CAD (coronary artery disease)     Nonobstructive on cath in 9/2017    [facility]     [facility] care ([facility] 4/25/2018    Peristomal irritation and early leaking    COPD (chronic obstructive pulmonary disease) ([facility]     Hyperlipidemia     Hypertension     Kidney disease     Stage 3    Peripheral vascular disease ([facility]     Rectal fissure 11/2014    surgery pending    Restless legs syndrome     Sleep apnea     O2 3 liters at hs    Unspecified sleep apnea      Past Surgical History:   Procedure Laterality Date    CARDIAC CATHETERIZATION  09/13/2017    Dr. Marisa Gaona  03/09/2018    CORONARY ARTERY BYPASS GRAFT      Legs & Carotid    HERNIA REPAIR      UPPER GASTROINTESTINAL citalopram (CELEXA) 40 MG tablet TAKE ONE TABLET BY MOUTH DAILY 90 tablet 0    cloNIDine (CATAPRES) 0.2 MG tablet Take 1 tablet by mouth 2 times daily 180 tablet 0    levothyroxine (SYNTHROID) 50 MCG tablet TAKE ONE TABLET BY MOUTH DAILY 90 tablet 0    ipratropium-albuterol (DUONEB) 0.5-2.5 (3) MG/3ML SOLN nebulizer solution Inhale 3 mLs into the lungs every 4 hours as needed for Shortness of Breath 360 mL 5    butalbital-acetaminophen-caffeine (FIORICET, ESGIC) -40 MG per tablet Take 1 tablet by mouth every 6 hours as needed for Headaches 16 tablet 0    aspirin 81 MG tablet Take 1 tablet by mouth daily 30 tablet 3     Facility-Administered Medications Ordered in Other Visits   Medication Dose Route Frequency Provider Last Rate Last Dose    aspirin chewable tablet 324 mg  324 mg Oral Once Chava Bernestine Hodgkins, PA        ondansetron (ZOFRAN) injection 4 mg  4 mg Intravenous Q30 Min PRN Chava Bernestine Hodgkins, PA         Physical Exam:   /70   Pulse 75   Ht 5' (1.524 m)   Wt 172 lb (78 kg)   LMP 05/01/2006 (Approximate)   SpO2 99%   BMI 33.59 kg/m²   Wt Readings from Last 2 Encounters:   02/13/20 182 lb 1.6 oz (82.6 kg)   02/13/20 172 lb (78 kg)     Constitutional: She is oriented to person, place, and time. She is chronically ill and frail in appearance. Having difficult time staying awake. O2 in place  HEENT: Normocephalic and atraumatic. Sclerae anicteric. No xanthelasmas. EOM's intact. + pale in color  Neck: Supple. No JVD present. Carotid upstrokes full. No thyromegaly present. No lymphadenopathy present. Cardiovascular: RRR, normal S1 and S2; II/VI systolic murmur  Pulmonary/Chest: Effort normal.  Lungs with diminished breath sounds bilaterally. Chest wall nontender  Abdominal: soft, nontender, nondistended. + bowel sounds; no hepatomegaly   Extremities: No edema, cyanosis, or clubbing. Pulses are 2+ radial/carotid/DP/PT bilaterally. Cap refill brisk. Neurological: No focal deficit.    Skin: Skin is artery appears to have a 50-79%     Arterial dopplers LE 8/28/2017:  Right Impression   Right ELOISE: 1.1. This is consistent with no significant PAD at rest.   Right first toe/brachial index not calculated due to calcified vessels. Continuous wave Doppler is normal throughout the right lower extremity. Arterial waveforms for digits 1-5 appear normal.   Left Impression   Left ELOISE: 1.1. This is consistent with no significant PAD at rest.   Left first toe/brachial index not calculated due to calcified vessels. Continuous wave Doppler is normal throughout the left lower extremity. Arterial waveforms for digits 1-5 appear normal.     4/3/19 EGD/Colonoscopy  ([facility])        Impression:       - Normal esophagus.       - Erythematous mucosa in the antrum. Biopsied.       - Normal examined duodenum.                                                       Impression:       - Preparation of the colon was poor.       - The entire examined colon is normal.       - Stool in the entire examined colon.       - No specimens collected    Assessment:    1. Shortness of breath  -multifactorial but largely d/t chronic lung disease  -suspect some decompensation of CHF d/t anemia  -on chronic O2  -has had progressive worsening along with cough and fever over last few weeks    2. Precordial pain/coronary artery disease of native artery of native heart with other form of angina pectoris ([facility]  -having increasing episodes of angina type chest pain   -worsening over last few weeks  -has known CAD:50% mid LAD lesion noted on cath in 9/2017  -? Worsened by anemia     3. Chronic diastolic (congestive) heart failure (HCC)  -suspect some decompensation given her worsening sxs  -on diuretic  -on carvedilol  -no longer on ACE/ARB  -not on aldactone d/t CKD  -low sodium diet and fluid restriction  -LVEF 55-60% on last echo    4.  Chronic anemia  -H/O multiple admissions with GI bleed and anemia  -on Eliquis  -has discussed Watchman with Dr. Simba Yanes in the past: she does not want to think about until she is feeling better    5. Carotid artery stenosis without cerebral infarction, bilateral  -abnormal carotid US in past; follow up MRI neck shows no significant blockage  -continue statin    6. Paroxysmal atrial fibrillation (HCC)  -on BB  -on long term anticoagulation (Eliquis)     7. Essential hypertension  -controlled  -goal BP < 130/80  -continue medical management: on multiple antihypertensives    8. PAD (peripheral artery disease) ([address]  -peripheral angiogram in 2014: mild-mod disease  -S/P prior aorto-bifem graft  -continue statin and ASA    9. Chronic obstructive pulmonary disease  -on home O2  -follows pulmonary      Plan:     Given her multiple complaints and worsening symptoms of SOB, fever, cough chest pain recommend she proceed to ED for further evaluation. Follow up pending her hospital course    Return for pending hospital course. Thanks for allowing me to participate in the care of this patient.       GIULIANO [facility], [address] [place] [address] [address] [place] [address]) [place], [address] [place] [address] Srinath Woodall [facility]  Office: [phone]  Fax: [phone]

## 2020-02-11 NOTE — PROGRESS NOTES
Chief complaint  This is a 69y.o. year old female  who presents with a chief complaint of   Chief Complaint   Patient presents with    Follow-up     emphysema        HPI  72-year-old woman with severe COPD (FEV1 0.86 L, 41% predicted), chronic hypoxic respiratory failure and ANGIE presents for follow-up. She says over the past week her shortness of breath has worsened. She is short of breath just walking a few steps. Her breathing improves when she is at rest.  She denies cough or sputum production. She was prescribed a course of prednisone by her primary physician, but has noticed no improvement with that. She is using Breo and Spiriva daily. She uses DuoNebs 2-3 times a day and albuterol inhaler about 3-4 times a day. She does not feel that they help her breathing. She increased her oxygen to 4 L.     Past Medical History:   Diagnosis Date    CAD (coronary artery disease)     Nonobstructive on cath in 9/2017    [facility]     Colostomy care ([facility] 4/25/2018    Peristomal irritation and early leaking    COPD (chronic obstructive pulmonary disease) ([facility])     Hyperlipidemia     Hypertension     Kidney disease     Stage 3    Peripheral vascular disease ([facility]     Rectal fissure 11/2014    surgery pending    Restless legs syndrome     Sleep apnea     O2 3 liters at hs    Unspecified sleep apnea        Past Surgical History:   Procedure Laterality Date    CARDIAC CATHETERIZATION  09/13/2017    Dr. Trinidad Bailey  03/09/2018    CORONARY ARTERY BYPASS GRAFT      Legs & Carotid    HERNIA REPAIR      UPPER GASTROINTESTINAL ENDOSCOPY N/A 9/30/2019    EGD DIAGNOSTIC ONLY performed by Reji Rene MD at [address] [place] [address]       Current Outpatient Medications   Medication Sig Dispense Refill    diclofenac sodium 1 % GEL Apply 2 g topically 4 times daily 2 Tube 1    carvedilol (COREG) 12.5 MG tablet Take 1 tablet by mouth 2 times daily (with meals) 60 Heart Disease Father     Heart Disease Sister     Cancer Brother        Social History     Socioeconomic History    Marital status:      Spouse name: Not on file    Number of children: [place]    Years of education: 15    Highest education level: Not on file   Occupational History    Not on file   Social Needs    Financial resource strain: Not on file    Food insecurity:     Worry: Not on file     Inability: Not on file    Transportation needs:     Medical: Not on file     Non-medical: Not on file   Tobacco Use    Smoking status: Former Smoker     Packs/day: 3.00     Years: 30.00     Pack years: 90.00     Start date: 1963     Last attempt to quit: 1992     Years since quittin.0    Smokeless tobacco: Never Used    Tobacco comment: QUIT 20 YRS AGO   Substance and Sexual Activity    Alcohol use: No     Alcohol/week: 0.0 standard drinks    Drug use: No    Sexual activity: Not Currently   Lifestyle    Physical activity:     Days per week: Not on file     Minutes per session: Not on file    Stress: Not on file   Relationships    Social connections:     Talks on phone: Not on file     Gets together: Not on file     Attends Restorationist service: Not on file     Active member of club or organization: Not on file     Attends meetings of clubs or organizations: Not on file     Relationship status: Not on file    Intimate partner violence:     Fear of current or ex partner: Not on file     Emotionally abused: Not on file     Physically abused: Not on file     Forced sexual activity: Not on file   Other Topics Concern    Not on file   Social History Narrative    Not on file       Immunization History   Administered Date(s) Administered    Influenza, High Dose (Fluzone 65 yrs and older) 2015, 2018    Influenza, Quadv, 6 mo and older, IM, PF (Flulaval, Fluarix) 2018    Influenza, Triv, inactivated, subunit, adjuvanted, IM (Fluad 65 yrs and older) 10/25/2019    Pneumococcal Conjugate 13-valent Ascencion Garcia) 01/17/2018    Pneumococcal Conjugate 7-valent (Prevnar7) 12/30/2013       ROS:  GENERAL:  No fevers, no chills, +3lb weight loss in 3 months   RESPIRATORY:  + shortness of breath, no cough      PHYSICAL EXAM:  Vitals:    02/11/20 1444   BP: 122/84   Site: Left Upper Arm   Position: Sitting   Cuff Size: Medium Adult   Pulse: 69   Resp: 16   Temp: 97.8 °F (36.6 °C)   TempSrc: Oral   SpO2: 91%   Weight: 175 lb (79.4 kg)   Height: 5' (1.524 m)   on 4L NC    Gen: Well developed; well nourished  Eyes: No scleral icterus. No conjunctival injection. ENT:  Oropharynx clear. External appearance of ears and nose normal.  Neck: Trachea midline. No obvious mass. No visible thyroid enlargement    Respiratory: Clear to auscultation bilaterally, no accessory muscle use  Cardiovascular: Regular rate and rhythm, murmur at the left heart border. No edema. Gastrointestinal: Soft, non-tender. No hernia  Skin: Warm and dry. No rashes or ulcers on visible areas. Normal texture and turgor  Lymphatic: No cervical LAD. No supraclavicular LAD. Musculoskeletal: No cyanosis, clubbing or joint deformity.     Psychiatric: Normal mood and affect; exhibits normal insight and judgement     Data reviewed:  Pulmonary Function Testing (12/4/19)  FVC 1.4 L at 58% predicted ---> 1.52L at 63% predicted  FEV1 0.78 L at 41% predicted -----> 0.92L at 49% predicted  FEV1/FVC ratio at 56%----> 61%  TLC 4.24 L at 95% predicted  VC 2.45L at 102% predicted  RV/TLC at 42% predicted  DLCO 8.63 at 45% predicted  DLCO/VA 3.51L at 82 % predicted    Overall: Severe lower airflow obstruction without significant reversal; lung volumes are normal; diffusing capacity is moderately reduced, but normalizes when averaged over lung volumes (compared to the study in July 2015 slow vital capacity has significantly increased and diffusing capacity has significantly decreased; other findings have not significantly changed)    Pulmonary

## 2020-02-12 ENCOUNTER — TELEPHONE (OUTPATIENT)
Dept: FAMILY MEDICINE CLINIC | Age: 75
End: 2020-02-12

## 2020-02-12 NOTE — TELEPHONE ENCOUNTER
I left Jocelyn Chairez a MyChart message but wanted to ensure we speak with her - she reports chest pain with walking. I saw her recently and she reports it is worse. Please advise her to go to ER as soon as possible. Thanks.

## 2020-02-13 ENCOUNTER — OFFICE VISIT (OUTPATIENT)
Dept: CARDIOLOGY CLINIC | Age: 75
End: 2020-02-13
Payer: COMMERCIAL

## 2020-02-13 ENCOUNTER — HOSPITAL ENCOUNTER (INPATIENT)
Age: 75
LOS: 6 days | Discharge: HOME | DRG: 198 | End: 2020-02-21
Attending: INTERNAL MEDICINE | Admitting: INTERNAL MEDICINE
Payer: COMMERCIAL

## 2020-02-13 ENCOUNTER — APPOINTMENT (OUTPATIENT)
Dept: GENERAL RADIOLOGY | Age: 75
DRG: 198 | End: 2020-02-13
Payer: COMMERCIAL

## 2020-02-13 VITALS
OXYGEN SATURATION: 99 % | BODY MASS INDEX: 33.77 KG/M2 | WEIGHT: 172 LBS | DIASTOLIC BLOOD PRESSURE: 70 MMHG | HEIGHT: 60 IN | HEART RATE: 75 BPM | SYSTOLIC BLOOD PRESSURE: 124 MMHG

## 2020-02-13 PROBLEM — R07.9 CHEST PAIN: Status: ACTIVE | Noted: 2020-02-13

## 2020-02-13 LAB
ANION GAP SERPL CALCULATED.3IONS-SCNC: 15 MMOL/L (ref 3–16)
BASOPHILS ABSOLUTE: 0.1 K/UL (ref 0–0.2)
BASOPHILS RELATIVE PERCENT: 1.3 %
BUN BLDV-MCNC: 21 MG/DL (ref 7–20)
CALCIUM SERPL-MCNC: 9.6 MG/DL (ref 8.3–10.6)
CHLORIDE BLD-SCNC: 97 MMOL/L (ref 99–110)
CO2: 28 MMOL/L (ref 21–32)
CREAT SERPL-MCNC: 1 MG/DL (ref 0.6–1.2)
EKG ATRIAL RATE: 74 BPM
EKG DIAGNOSIS: NORMAL
EKG P AXIS: 48 DEGREES
EKG P-R INTERVAL: 130 MS
EKG Q-T INTERVAL: 412 MS
EKG QRS DURATION: 76 MS
EKG QTC CALCULATION (BAZETT): 457 MS
EKG R AXIS: 39 DEGREES
EKG T AXIS: 167 DEGREES
EKG VENTRICULAR RATE: 74 BPM
EOSINOPHILS ABSOLUTE: 0.1 K/UL (ref 0–0.6)
EOSINOPHILS RELATIVE PERCENT: 0.8 %
FERRITIN: 70.9 NG/ML (ref 15–150)
GFR AFRICAN AMERICAN: >60
GFR NON-AFRICAN AMERICAN: 54
GLUCOSE BLD-MCNC: 99 MG/DL (ref 70–99)
HCG QUALITATIVE: NEGATIVE
HCT VFR BLD CALC: 27.7 % (ref 36–48)
HCT VFR BLD CALC: 28.3 % (ref 36–48)
HEMOGLOBIN: 8.7 G/DL (ref 12–16)
IMMATURE RETIC FRACT: 0.53 (ref 0.21–0.37)
IRON SATURATION: 11 % (ref 15–50)
IRON: 36 UG/DL (ref 37–145)
LYMPHOCYTES ABSOLUTE: 0.9 K/UL (ref 1–5.1)
LYMPHOCYTES RELATIVE PERCENT: 8.8 %
MCH RBC QN AUTO: 23.7 PG (ref 26–34)
MCHC RBC AUTO-ENTMCNC: 30.6 G/DL (ref 31–36)
MCV RBC AUTO: 77.6 FL (ref 80–100)
MONOCYTES ABSOLUTE: 0.8 K/UL (ref 0–1.3)
MONOCYTES RELATIVE PERCENT: 7.3 %
NEUTROPHILS ABSOLUTE: 8.5 K/UL (ref 1.7–7.7)
NEUTROPHILS RELATIVE PERCENT: 81.8 %
PDW BLD-RTO: 19.9 % (ref 12.4–15.4)
PLATELET # BLD: 240 K/UL (ref 135–450)
PMV BLD AUTO: 8.6 FL (ref 5–10.5)
POTASSIUM REFLEX MAGNESIUM: 3.8 MMOL/L (ref 3.5–5.1)
PRO-BNP: 2490 PG/ML (ref 0–124)
RAPID INFLUENZA  B AGN: NEGATIVE
RAPID INFLUENZA A AGN: NEGATIVE
RBC # BLD: 3.65 M/UL (ref 4–5.2)
REPORT: NORMAL
RESPIRATORY PANEL PCR: NORMAL
RETICULOCYTE ABSOLUTE COUNT: 0.17 M/UL (ref 0.02–0.1)
RETICULOCYTE COUNT PCT: 4.87 % (ref 0.5–2.18)
SODIUM BLD-SCNC: 140 MMOL/L (ref 136–145)
TOTAL IRON BINDING CAPACITY: 338 UG/DL (ref 260–445)
TROPONIN: <0.01 NG/ML
WBC # BLD: 10.4 K/UL (ref 4–11)

## 2020-02-13 PROCEDURE — 83880 ASSAY OF NATRIURETIC PEPTIDE: CPT

## 2020-02-13 PROCEDURE — 4040F PNEUMOC VAC/ADMIN/RCVD: CPT | Performed by: NURSE PRACTITIONER

## 2020-02-13 PROCEDURE — 99214 OFFICE O/P EST MOD 30 MIN: CPT | Performed by: NURSE PRACTITIONER

## 2020-02-13 PROCEDURE — 85025 COMPLETE CBC W/AUTO DIFF WBC: CPT

## 2020-02-13 PROCEDURE — 6370000000 HC RX 637 (ALT 250 FOR IP): Performed by: NURSE PRACTITIONER

## 2020-02-13 PROCEDURE — 80048 BASIC METABOLIC PNL TOTAL CA: CPT

## 2020-02-13 PROCEDURE — 84703 CHORIONIC GONADOTROPIN ASSAY: CPT

## 2020-02-13 PROCEDURE — 36415 COLL VENOUS BLD VENIPUNCTURE: CPT

## 2020-02-13 PROCEDURE — 6370000000 HC RX 637 (ALT 250 FOR IP)

## 2020-02-13 PROCEDURE — 3017F COLORECTAL CA SCREEN DOC REV: CPT | Performed by: NURSE PRACTITIONER

## 2020-02-13 PROCEDURE — G8482 FLU IMMUNIZE ORDER/ADMIN: HCPCS | Performed by: NURSE PRACTITIONER

## 2020-02-13 PROCEDURE — 99285 EMERGENCY DEPT VISIT HI MDM: CPT

## 2020-02-13 PROCEDURE — 85045 AUTOMATED RETICULOCYTE COUNT: CPT

## 2020-02-13 PROCEDURE — G8417 CALC BMI ABV UP PARAM F/U: HCPCS | Performed by: NURSE PRACTITIONER

## 2020-02-13 PROCEDURE — 84484 ASSAY OF TROPONIN QUANT: CPT

## 2020-02-13 PROCEDURE — G0378 HOSPITAL OBSERVATION PER HR: HCPCS

## 2020-02-13 PROCEDURE — 6370000000 HC RX 637 (ALT 250 FOR IP): Performed by: INTERNAL MEDICINE

## 2020-02-13 PROCEDURE — 0100U HC RESPIRPTHGN MULT REV TRANS & AMP PRB TECH 21 TRGT: CPT

## 2020-02-13 PROCEDURE — 93005 ELECTROCARDIOGRAM TRACING: CPT | Performed by: INTERNAL MEDICINE

## 2020-02-13 PROCEDURE — 93005 ELECTROCARDIOGRAM TRACING: CPT | Performed by: EMERGENCY MEDICINE

## 2020-02-13 PROCEDURE — 6370000000 HC RX 637 (ALT 250 FOR IP): Performed by: PHYSICIAN ASSISTANT

## 2020-02-13 PROCEDURE — G8399 PT W/DXA RESULTS DOCUMENT: HCPCS | Performed by: NURSE PRACTITIONER

## 2020-02-13 PROCEDURE — 1036F TOBACCO NON-USER: CPT | Performed by: NURSE PRACTITIONER

## 2020-02-13 PROCEDURE — G8427 DOCREV CUR MEDS BY ELIG CLIN: HCPCS | Performed by: NURSE PRACTITIONER

## 2020-02-13 PROCEDURE — 71046 X-RAY EXAM CHEST 2 VIEWS: CPT

## 2020-02-13 PROCEDURE — 83550 IRON BINDING TEST: CPT

## 2020-02-13 PROCEDURE — 6360000002 HC RX W HCPCS: Performed by: INTERNAL MEDICINE

## 2020-02-13 PROCEDURE — 93010 ELECTROCARDIOGRAM REPORT: CPT | Performed by: INTERNAL MEDICINE

## 2020-02-13 PROCEDURE — 83540 ASSAY OF IRON: CPT

## 2020-02-13 PROCEDURE — 82728 ASSAY OF FERRITIN: CPT

## 2020-02-13 PROCEDURE — 96374 THER/PROPH/DIAG INJ IV PUSH: CPT

## 2020-02-13 PROCEDURE — 1123F ACP DISCUSS/DSCN MKR DOCD: CPT | Performed by: NURSE PRACTITIONER

## 2020-02-13 PROCEDURE — 2580000003 HC RX 258: Performed by: INTERNAL MEDICINE

## 2020-02-13 PROCEDURE — 87804 INFLUENZA ASSAY W/OPTIC: CPT

## 2020-02-13 PROCEDURE — 1090F PRES/ABSN URINE INCON ASSESS: CPT | Performed by: NURSE PRACTITIONER

## 2020-02-13 RX ORDER — IPRATROPIUM BROMIDE AND ALBUTEROL SULFATE 2.5; .5 MG/3ML; MG/3ML
1 SOLUTION RESPIRATORY (INHALATION) EVERY 4 HOURS PRN
Status: DISCONTINUED | OUTPATIENT
Start: 2020-02-13 | End: 2020-02-21 | Stop reason: HOSPADM

## 2020-02-13 RX ORDER — HYDRALAZINE HYDROCHLORIDE 50 MG/1
100 TABLET, FILM COATED ORAL EVERY 8 HOURS SCHEDULED
Status: DISCONTINUED | OUTPATIENT
Start: 2020-02-13 | End: 2020-02-21 | Stop reason: HOSPADM

## 2020-02-13 RX ORDER — ASPIRIN 81 MG/1
324 TABLET, CHEWABLE ORAL ONCE
Status: COMPLETED | OUTPATIENT
Start: 2020-02-13 | End: 2020-02-13

## 2020-02-13 RX ORDER — LEVOTHYROXINE SODIUM 0.05 MG/1
50 TABLET ORAL
Status: DISCONTINUED | OUTPATIENT
Start: 2020-02-14 | End: 2020-02-21 | Stop reason: HOSPADM

## 2020-02-13 RX ORDER — BUTALBITAL, ACETAMINOPHEN AND CAFFEINE 50; 325; 40 MG/1; MG/1; MG/1
1 TABLET ORAL EVERY 6 HOURS PRN
Status: DISCONTINUED | OUTPATIENT
Start: 2020-02-13 | End: 2020-02-21 | Stop reason: HOSPADM

## 2020-02-13 RX ORDER — FERROUS SULFATE TAB EC 324 MG (65 MG FE EQUIVALENT) 324 (65 FE) MG
324 TABLET DELAYED RESPONSE ORAL
Status: DISCONTINUED | OUTPATIENT
Start: 2020-02-14 | End: 2020-02-14

## 2020-02-13 RX ORDER — SODIUM CHLORIDE 0.9 % (FLUSH) 0.9 %
10 SYRINGE (ML) INJECTION PRN
Status: DISCONTINUED | OUTPATIENT
Start: 2020-02-13 | End: 2020-02-21 | Stop reason: HOSPADM

## 2020-02-13 RX ORDER — ONDANSETRON 2 MG/ML
4 INJECTION INTRAMUSCULAR; INTRAVENOUS EVERY 6 HOURS PRN
Status: DISCONTINUED | OUTPATIENT
Start: 2020-02-13 | End: 2020-02-21 | Stop reason: HOSPADM

## 2020-02-13 RX ORDER — ASPIRIN 81 MG/1
81 TABLET, CHEWABLE ORAL DAILY
Status: DISCONTINUED | OUTPATIENT
Start: 2020-02-14 | End: 2020-02-14

## 2020-02-13 RX ORDER — FUROSEMIDE 40 MG/1
40 TABLET ORAL 2 TIMES DAILY
Status: DISCONTINUED | OUTPATIENT
Start: 2020-02-13 | End: 2020-02-21 | Stop reason: HOSPADM

## 2020-02-13 RX ORDER — MONTELUKAST SODIUM 10 MG/1
10 TABLET ORAL DAILY
Status: DISCONTINUED | OUTPATIENT
Start: 2020-02-14 | End: 2020-02-21 | Stop reason: HOSPADM

## 2020-02-13 RX ORDER — SODIUM CHLORIDE 0.9 % (FLUSH) 0.9 %
10 SYRINGE (ML) INJECTION EVERY 12 HOURS SCHEDULED
Status: DISCONTINUED | OUTPATIENT
Start: 2020-02-13 | End: 2020-02-21 | Stop reason: HOSPADM

## 2020-02-13 RX ORDER — CARVEDILOL 12.5 MG/1
12.5 TABLET ORAL 2 TIMES DAILY WITH MEALS
Status: DISCONTINUED | OUTPATIENT
Start: 2020-02-13 | End: 2020-02-21 | Stop reason: HOSPADM

## 2020-02-13 RX ORDER — PANTOPRAZOLE SODIUM 40 MG/1
40 TABLET, DELAYED RELEASE ORAL
Status: DISCONTINUED | OUTPATIENT
Start: 2020-02-14 | End: 2020-02-14

## 2020-02-13 RX ORDER — ACETAMINOPHEN 325 MG/1
650 TABLET ORAL EVERY 4 HOURS PRN
Status: DISCONTINUED | OUTPATIENT
Start: 2020-02-13 | End: 2020-02-21 | Stop reason: HOSPADM

## 2020-02-13 RX ORDER — LORAZEPAM 1 MG/1
1 TABLET ORAL DAILY PRN
Status: DISCONTINUED | OUTPATIENT
Start: 2020-02-13 | End: 2020-02-21 | Stop reason: HOSPADM

## 2020-02-13 RX ORDER — ONDANSETRON 2 MG/ML
4 INJECTION INTRAMUSCULAR; INTRAVENOUS EVERY 30 MIN PRN
Status: DISCONTINUED | OUTPATIENT
Start: 2020-02-13 | End: 2020-02-13

## 2020-02-13 RX ORDER — CITALOPRAM 20 MG/1
40 TABLET ORAL DAILY
Status: DISCONTINUED | OUTPATIENT
Start: 2020-02-14 | End: 2020-02-21 | Stop reason: HOSPADM

## 2020-02-13 RX ADMIN — NITROGLYCERIN 0.5 INCH: 20 OINTMENT TOPICAL at 18:37

## 2020-02-13 RX ADMIN — FUROSEMIDE 40 MG: 40 TABLET ORAL at 16:08

## 2020-02-13 RX ADMIN — ONDANSETRON 4 MG: 2 INJECTION INTRAMUSCULAR; INTRAVENOUS at 21:14

## 2020-02-13 RX ADMIN — LORAZEPAM 1 MG: 1 TABLET ORAL at 20:53

## 2020-02-13 RX ADMIN — HYDRALAZINE HYDROCHLORIDE 100 MG: 50 TABLET, FILM COATED ORAL at 16:08

## 2020-02-13 RX ADMIN — LIDOCAINE HYDROCHLORIDE: 20 SOLUTION ORAL; TOPICAL at 20:52

## 2020-02-13 RX ADMIN — HYDRALAZINE HYDROCHLORIDE 100 MG: 50 TABLET, FILM COATED ORAL at 20:53

## 2020-02-13 RX ADMIN — ASPIRIN 81 MG 324 MG: 81 TABLET ORAL at 13:08

## 2020-02-13 RX ADMIN — CARVEDILOL 12.5 MG: 12.5 TABLET, FILM COATED ORAL at 16:08

## 2020-02-13 RX ADMIN — APIXABAN 5 MG: 5 TABLET, FILM COATED ORAL at 20:53

## 2020-02-13 RX ADMIN — SODIUM CHLORIDE, PRESERVATIVE FREE 10 ML: 5 INJECTION INTRAVENOUS at 20:55

## 2020-02-13 ASSESSMENT — PAIN SCALES - GENERAL
PAINLEVEL_OUTOF10: 5
PAINLEVEL_OUTOF10: 6
PAINLEVEL_OUTOF10: 5
PAINLEVEL_OUTOF10: 3
PAINLEVEL_OUTOF10: 0
PAINLEVEL_OUTOF10: 6
PAINLEVEL_OUTOF10: 7
PAINLEVEL_OUTOF10: 0
PAINLEVEL_OUTOF10: 0

## 2020-02-13 ASSESSMENT — PAIN DESCRIPTION - LOCATION
LOCATION: CHEST

## 2020-02-13 ASSESSMENT — PAIN DESCRIPTION - PAIN TYPE
TYPE: ACUTE PAIN

## 2020-02-13 ASSESSMENT — PAIN - FUNCTIONAL ASSESSMENT
PAIN_FUNCTIONAL_ASSESSMENT: PREVENTS OR INTERFERES SOME ACTIVE ACTIVITIES AND ADLS
PAIN_FUNCTIONAL_ASSESSMENT: PREVENTS OR INTERFERES SOME ACTIVE ACTIVITIES AND ADLS

## 2020-02-13 ASSESSMENT — PAIN DESCRIPTION - FREQUENCY
FREQUENCY: CONTINUOUS

## 2020-02-13 ASSESSMENT — PAIN DESCRIPTION - ORIENTATION
ORIENTATION: MID

## 2020-02-13 ASSESSMENT — PAIN DESCRIPTION - PROGRESSION
CLINICAL_PROGRESSION: GRADUALLY IMPROVING
CLINICAL_PROGRESSION: NOT CHANGED

## 2020-02-13 ASSESSMENT — PAIN DESCRIPTION - DESCRIPTORS
DESCRIPTORS: PRESSURE
DESCRIPTORS: TIGHTNESS
DESCRIPTORS: PRESSURE;SHARP

## 2020-02-13 ASSESSMENT — PAIN DESCRIPTION - ONSET
ONSET: ON-GOING
ONSET: SUDDEN
ONSET: ON-GOING

## 2020-02-13 ASSESSMENT — HEART SCORE: ECG: 1

## 2020-02-13 NOTE — H&P
Hospital Medicine History & Physical      PCP: Jennifer Morocho MD    Date of Admission: 2/13/2020    Date of Service: Pt seen/examined on 2/13/2020 and placed in obs. Chief Complaint:  Chest pain. History Of Present Illness: The patient is a 69 y.o. female w complex medical Hx, notable for CAD s/p CABG, diverticulitis s/p colectomy and colostomy in place, DMII, Iron deficiency anemia with Hx of blood in ostomy bag, severe PVD s/p prior angiography andbypass, on eliquis, who presents to [facility] sent from cardiology office today. Pt reports she has COPD and SOB is not new for her. But over the past 10 days she started with worsening dyspnea, now so severe that she can not even make it to the bathroom without severe symptoms. Associated with this she reports fever every day and sometimes twice daily for the past week. No cough. Also reports chest pain - retrosternal - largely exertional, but also noticed it several times even at rest.     She saw her PCP - flu swab was negative at the office. Saw her pulmonologist - recs for repeat ECHO, lungs sounded ok. Saw her cardiologist today and sent to ED. Moreover, pt reports occasional dark blood in her ostomy bag happening intermittently over the past several weeks. She had EGD and Coloscopy via ostomy in [place] - no source of bleeding identified then. She has never had prior capsule endoscopy.        Past Medical History:        Diagnosis Date    CAD (coronary artery disease)     Nonobstructive on cath in 9/2017    [facility]     Colostomy care ([facility] 4/25/2018    Peristomal irritation and early leaking    COPD [facility]chronic obstructive pulmonary disease) [facility]     Hyperlipidemia     Hypertension     Kidney disease     Stage 3    Peripheral vascular disease ([facility]     Rectal fissure 11/2014    surgery pending    Restless legs syndrome     Sleep apnea     O2 3 liters at hs    Unspecified sleep apnea        Past Surgical History:        Procedure Laterality Date    CARDIAC CATHETERIZATION  09/13/2017    Dr. Galen Klein  03/09/2018    CORONARY ARTERY BYPASS GRAFT      Legs & Carotid    HERNIA REPAIR      UPPER GASTROINTESTINAL ENDOSCOPY N/A 9/30/2019    EGD DIAGNOSTIC ONLY performed by Raf Acosta MD at [address] [place] [address]       Medications Prior to Admission:    Prior to Admission medications    Medication Sig Start Date End Date Taking? Authorizing Provider   diclofenac sodium 1 % GEL Apply 2 g topically 4 times daily 2/11/20  Yes Marene Notice Day, MD   carvedilol (COREG) 12.5 MG tablet Take 1 tablet by mouth 2 times daily (with meals) 2/10/20 3/11/20 Yes Marene Notice Day, MD   hydrALAZINE (APRESOLINE) 100 MG tablet TAKE ONE TABLET BY MOUTH THREE TIMES A DAY 2/10/20  Yes Marene Notice Day, MD   LORazepam (ATIVAN) 1 MG tablet Take 1 tablet by mouth daily as needed for Anxiety for up to 30 days.  2/10/20 3/11/20 Yes Marene Notice Day, MD   ferrous sulfate 325 (65 Fe) MG tablet Take 1 tablet by mouth daily (with breakfast) 1/30/20  Yes GIULIANO Cabrera CNP   tiotropium (SPIRIVA RESPIMAT) 2.5 MCG/ACT AERS inhaler Inhale 2 puffs into the lungs daily   Yes Historical Provider, MD   lidocaine (XYLOCAINE) 2 % jelly Apply small amount topically to affected area every 8 hours when necessary pain 1/28/20  Yes GIULIANO Cabrera CNP   lansoprazole (PREVACID) 15 MG delayed release capsule Take 1 capsule by mouth daily 1/22/20  Yes Marene Notice Day, MD   furosemide (LASIX) 40 MG tablet Take 1 tablet by mouth 2 times daily 1/22/20  Yes Marene Notice Day, MD   montelukast (SINGULAIR) 10 MG tablet Take 1 tablet by mouth daily 11/11/19  Yes Marene Notice Day, MD   apixaban (ELIQUIS) 5 MG TABS tablet TAKE ONE TABLET BY MOUTH TWICE A DAY 11/11/19  Yes Marene Notice Day, MD   citalopram (CELEXA) 40 MG tablet TAKE ONE TABLET BY MOUTH DAILY 11/11/19  Yes Nicole Webber MD   cloNIDine (CATAPRES) 0.2 MG tablet Take 1 tablet by mouth 2 times daily 11/11/19  Yes Nicole Webber MD   levothyroxine (SYNTHROID) 50 MCG tablet TAKE ONE TABLET BY MOUTH DAILY 11/11/19  Yes Nicole Webber MD   ipratropium-albuterol (DUONEB) 0.5-2.5 (3) MG/3ML SOLN nebulizer solution Inhale 3 mLs into the lungs every 4 hours as needed for Shortness of Breath 10/21/19  Yes Nicole Webber MD   butalbital-acetaminophen-caffeine (FIORICET, ESGIC) -72 MG per tablet Take 1 tablet by mouth every 6 hours as needed for Headaches 10/17/19  Yes HENRIQUE Mulligan, DO   aspirin 81 MG tablet Take 1 tablet by mouth daily 7/26/19  Yes Danilo Flood MD       Allergies: Iv dye [iodides]    Social History:  The patient currently lives at home. TOBACCO:   reports that she quit smoking about 28 years ago. She started smoking about 57 years ago. She has a 90.00 pack-year smoking history. She has never used smokeless tobacco.  ETOH:   reports no history of alcohol use. Family History:  Reviewed in detail and negative for DM, Early CAD, Cancer, CVA. Positive as follows:        Problem Relation Age of Onset    Heart Disease Mother     Heart Disease Father     Heart Disease Sister     Cancer Brother        REVIEW OF SYSTEMS:   As noted in the HPI. All other systems reviewed and negative. PHYSICAL EXAM:    BP (!) 165/68   Pulse 76   Temp 98.1 °F (36.7 °C) (Oral)   Resp 16   Ht 5' (1.524 m)   Wt 182 lb 1.6 oz (82.6 kg)   LMP 05/01/2006 (Approximate)   SpO2 96%   BMI 35.56 kg/m²     General appearance: No apparent distress appears stated age and cooperative. HEENT Normal cephalic, atraumatic without obvious deformity. Pupils equal, round, and reactive to light. Extra ocular muscles intact. Conjunctivae/corneas clear. Neck: Supple, No jugular venous distention/bruits.   Trachea midline without thyromegaly or adenopathy with full range of motion. Lungs: Clear to auscultation, bilaterally without Rales/Wheezes/Rhonchi with good respiratory effort. Heart: Regular rate and rhythm with Normal S1/S2 without murmurs, rubs or gallops, point of maximum impulse non-displaced  Abdomen: Soft, non-tender or non-distended without rigidity or guarding and positive bowel sounds all four quadrants. Ostomy in place  Extremities: No clubbing, cyanosis, trace pitting edema bilaterally. Full range of motion without deformity and normal gait intact. Skin: Skin color, texture, turgor normal.  No rashes or lesions. Neurologic: Alert and oriented X 3, neurovascularly intact with sensory/motor intact upper extremities/lower extremities, bilaterally. Cranial nerves: II-XII intact, grossly non-focal.  Mental status: Alert, oriented, thought content appropriate. Capillary Refill: Acceptable  < 3 seconds  Peripheral Pulses: +3 Easily felt, not easily obliterated with pressure        CBC   Recent Labs     02/11/20  1614 02/13/20  1132   WBC 10.6 10.4   HGB 8.3* 8.7*   HCT 27.2* 28.3*    240      RENAL  Recent Labs     02/13/20  1132      K 3.8   CL 97*   CO2 28   BUN 21*   CREATININE 1.0     LFT'S  No results for input(s): AST, ALT, ALB, BILIDIR, BILITOT, ALKPHOS in the last 72 hours. COAG  No results for input(s): INR in the last 72 hours.   CARDIAC ENZYMES  Recent Labs     02/10/20  1758 02/13/20  1132   TROPONINI <0.01 <0.01       U/A:    Lab Results   Component Value Date    NITRITE neg 01/28/2020    COLORU yellow 01/28/2020    COLORU YELLOW 09/25/2019    WBCUA 369 09/25/2019    RBCUA 6 09/25/2019    MUCUS 1+ 07/28/2015    BACTERIA 2+ 08/08/2016    CLARITYU clear 01/28/2020    CLARITYU TURBID 09/25/2019    SPECGRAV 1.025 01/28/2020    SPECGRAV 1.021 09/25/2019    LEUKOCYTESUR small 01/28/2020    LEUKOCYTESUR LARGE 09/25/2019    BLOODU neg 01/28/2020    BLOODU Negative 09/25/2019    GLUCOSEU neg 01/28/2020    GLUCOSEU Negative 09/25/2019    AMORPHOUS 4+ 03/02/2015       ABG    Lab Results   Component Value Date    QPJ3JXH 34.9 11/05/2019    BEART 8.4 11/05/2019    X3IEYSKS 98.5 11/05/2019    PHART 7.371 11/05/2019    TUJ7PKW 61.7 11/05/2019    PO2ART 109.0 11/05/2019    ZMU8USE 36.8 11/05/2019           Active Hospital Problems    Diagnosis Date Noted    Chest pain [R07.9] 02/13/2020         PHYSICIANS CERTIFICATION:    I certify that Concha Horne is expected to be hospitalized for more than 2 midnights based on the following assessment and plan:      ASSESSMENT/PLAN:      Chest pain - exertional.   - stress test in 2017 positive. Prior CABG. No recent angiography. Plan:    - cycle troponin.   - repeat echo    - cardiology eval.       COPD with chronic hypox RF - stable  Cont PTA regimen of inhalers. Febrile Illness  Suspect viral process. Recheck Flu and Viral resp panel. Anemia - iron deficiency - acute on chronic. With Hx of ostomy blood loss. Will ask GI to eval for repeat endoscopy vs capsule study. Check Iron studies. DMII - cont PTA regimen. Add ISS.  CCD. Paroxysmal Afib  Oct2018 EKG. Sinus this admit. Cont eliquis. Follows with Dr Juani Pope - being evaluated for Watchman due to ostomy blood loss on eliquis and ASA. PVD s/p prior angioplasty and bypass. On ASA . Statin. Stable. DVT Prophylaxis: eliquis  Diet: Diet NPO Effective Now  Code Status: Prior    Dispo - Obs. Danny Muir MD    Thank you René Bilyl MD for the opportunity to be involved in this patient's care. If you have any questions or concerns please feel free to contact me at [phone].

## 2020-02-13 NOTE — PROGRESS NOTES
Medication Reconciliation    List of medications patient is currently taking is complete. Source of information: 1. Conversation with patient at bedside                                      2. EPIC records      Allergies  Iv dye [iodides]     Notes regarding home medications:   1. Patient received all of her morning home medications prior to arrival to the emergency department today.     Esther Gutierrez PharmD, BCPS  2/13/2020 1:55 PM

## 2020-02-13 NOTE — PROGRESS NOTES
4 Eyes Skin Assessment     The patient is being assess for  Admission    I agree that 2 RN's have performed a thorough Head to Toe Skin Assessment on the patient. ALL assessment sites listed below have been assessed. Areas assessed by both nurses:   [x]   Head, Face, and Ears   [x]   Shoulders, Back, and Chest  [x]   Arms, Elbows, and Hands   [x]   Coccyx, Sacrum, and IschIum  [x]   Legs, Feet, and Heels        Does the Patient have Skin Breakdown?   No         Sander Prevention initiated:  NA   Wound Care Orders initiated:  NA      [facility] nurse consulted for Pressure Injury (Stage 3,4, Unstageable, DTI, NWPT, and Complex wounds), New and Established Ostomies:  Yes LUQ COLOSTOMY  Nurse 1 eSignature: Electronically signed by Tiny Flowers RN on 2/13/20 at 4:43 PM    **SHARE this note so that the co-signing nurse is able to place an eSignature**    Nurse 2 eSignature: Electronically signed by Thomas Alvarenga RN on 2/13/20 at 4:46 PM

## 2020-02-13 NOTE — ED PROVIDER NOTES
Past Surgical History:   Procedure Laterality Date    CARDIAC CATHETERIZATION  09/13/2017    Dr. Wells Rang  03/09/2018    CORONARY ARTERY BYPASS GRAFT      Legs & Carotid    HERNIA REPAIR      UPPER GASTROINTESTINAL ENDOSCOPY N/A 9/30/2019    EGD DIAGNOSTIC ONLY performed by Tata Sandoval MD at [address] [place] [address]  (may include discharge medications prescribed in the ED)  Current Outpatient Rx   Medication Sig Dispense Refill    diclofenac sodium 1 % GEL Apply 2 g topically 4 times daily 2 Tube 1    carvedilol (COREG) 12.5 MG tablet Take 1 tablet by mouth 2 times daily (with meals) 60 tablet 0    hydrALAZINE (APRESOLINE) 100 MG tablet TAKE ONE TABLET BY MOUTH THREE TIMES A DAY 90 tablet 2    LORazepam (ATIVAN) 1 MG tablet Take 1 tablet by mouth daily as needed for Anxiety for up to 30 days.  30 tablet 0    ferrous sulfate 325 (65 Fe) MG tablet Take 1 tablet by mouth daily (with breakfast) 30 tablet 2    tiotropium (SPIRIVA RESPIMAT) 2.5 MCG/ACT AERS inhaler Inhale 2 puffs into the lungs daily      lidocaine (XYLOCAINE) 2 % jelly Apply small amount topically to affected area every 8 hours when necessary pain 1 Tube 0    lansoprazole (PREVACID) 15 MG delayed release capsule Take 1 capsule by mouth daily 90 capsule 1    furosemide (LASIX) 40 MG tablet Take 1 tablet by mouth 2 times daily 180 tablet 1    montelukast (SINGULAIR) 10 MG tablet Take 1 tablet by mouth daily 30 tablet 5    apixaban (ELIQUIS) 5 MG TABS tablet TAKE ONE TABLET BY MOUTH TWICE A  tablet 0    citalopram (CELEXA) 40 MG tablet TAKE ONE TABLET BY MOUTH DAILY 90 tablet 0    cloNIDine (CATAPRES) 0.2 MG tablet Take 1 tablet by mouth 2 times daily 180 tablet 0    levothyroxine (SYNTHROID) 50 MCG tablet TAKE ONE TABLET BY MOUTH DAILY 90 tablet 0    ipratropium-albuterol (DUONEB) 0.5-2.5 (3) MG/3ML SOLN nebulizer solution Inhale 3 mLs into the lungs every 4 hours as needed file     Family History   Problem Relation Age of Onset    Heart Disease Mother     Heart Disease Father     Heart Disease Sister     Cancer Brother        PHYSICAL EXAM    VITAL SIGNS: BP (!) 165/68   Pulse 76   Temp 98.1 °F (36.7 °C) (Oral)   Resp 16   Ht 5' (1.524 m)   Wt 182 lb 1.6 oz (82.6 kg)   LMP 05/01/2006 (Approximate)   SpO2 96%   BMI 35.56 kg/m²    Constitutional:  Well developed, well nourished, no acute distress   HENT:  Atraumatic, moist mucus membranes  Neck: supple, no JVD   Respiratory:  Lungs clear to auscultation bilaterally, no wheezing, no retractions   Cardiovascular:  regular rate, no murmurs  Vascular: Radial and DP pulses 2+ and equal bilaterally  GI:  Soft, nontender, normal bowel sounds  Musculoskeletal:  no lower extremity edema, no lower extremity asymmetry, no calf tenderness, no thigh tenderness, no acute deformities  Integument:  Skin warm and dry, no petechiae   Neurologic:  Alert & oriented, no slurred speech  Psych: Pleasant affect, no hallucinations    EKG    Please see the physician note for EKG interpretation. RADIOLOGY/PROCEDURES    XR CHEST STANDARD (2 VW)   Final Result   No acute cardiopulmonary process demonstrated             ED COURSE & MEDICAL DECISION MAKING    Pertinent Labs & Imaging studies reviewed and interpreted. (See chart for details)  The patient was immediately placed on the cardiac monitor. IV access obtained. ASA was given. See chart for details of medications given during the ED stay.     Vitals:    02/13/20 1122 02/13/20 1128   BP:  (!) 165/68   Pulse: 76    Resp: 16    Temp: 98.1 °F (36.7 °C)    TempSrc: Oral    SpO2: 96%    Weight: 182 lb 1.6 oz (82.6 kg)    Height: 5' (1.524 m)        Differential Diagnosis: Acute Coronary Syndrome, Congestive Heart Failure, Thoracic Dissection, Pericarditis, Pericardial Effusion, Pulmonary Embolism, Pneumonia, Pneumothorax, Ischemic Bowel, Bowel Obstruction, PUD, GERD, Acute Cholecystitis, Pancreatitis, Hepatitis, Colitis, other    CRITICAL CARE NOTE:  There was a high probability of clinically significant life-threatening deterioration of the patient's condition requiring my urgent intervention. Total critical care time was at least 15 minutes. This includes vital sign monitoring, pulse oximetry monitoring, telemetry monitoring, clinical response to the IV medications, reviewing the nursing notes, consultation time, dictation/documentation time, and interpretation of the labwork. This excludes any separately billable procedures performed. Patient is afebrile and nontoxic in appearance. Patient is on 3 L nasal cannula at home, she is saturating 96% on 3 L in the ED. Labs reveal no leukocytosis. Hemoglobin 8.7, above recent baseline. Metabolic panel unremarkable. CXR findings as above. EKG interpreted by physician. Troponin negative. BNP 2490, elevated. Patient does have history of orthopnea and PND, however she states that this is no worse than usual.  No interstitial edema noted on chest x-ray. Despite her elevated BNP I have low suspicion for CHF exacerbation at this time. Patient also has no wheezing on exam, low suspicion for COPD exacerbation. Patient will need admission for further evaluation of her chest pain and exertional dyspnea. Patient's HEART score is 6, moderate risk. Consultation with hospitalist at 1:20 PM: I contacted Dr. Kelley Nash via [address] [place] [address] for admission. FINAL IMPRESSION    1. Chest pain, unspecified type    2.  SCOTT (dyspnea on exertion)        PLAN  Admission to the hospital    (Please note that this note was completed with a voice recognition program.  Every attempt was made to edit the dictations, but inevitably there remain words that are mis-transcribed.)        Otis Cuadrama  02/13/20 1321

## 2020-02-14 ENCOUNTER — APPOINTMENT (OUTPATIENT)
Dept: CT IMAGING | Age: 75
DRG: 198 | End: 2020-02-14
Payer: COMMERCIAL

## 2020-02-14 LAB
ALBUMIN SERPL-MCNC: 3.6 G/DL (ref 3.4–5)
ANION GAP SERPL CALCULATED.3IONS-SCNC: 11 MMOL/L (ref 3–16)
BASOPHILS ABSOLUTE: 0 K/UL (ref 0–0.2)
BASOPHILS RELATIVE PERCENT: 0.5 %
BUN BLDV-MCNC: 23 MG/DL (ref 7–20)
CALCIUM SERPL-MCNC: 9.1 MG/DL (ref 8.3–10.6)
CHLORIDE BLD-SCNC: 98 MMOL/L (ref 99–110)
CO2: 31 MMOL/L (ref 21–32)
CREAT SERPL-MCNC: 1.3 MG/DL (ref 0.6–1.2)
EKG ATRIAL RATE: 80 BPM
EKG DIAGNOSIS: NORMAL
EKG P AXIS: 78 DEGREES
EKG P-R INTERVAL: 136 MS
EKG Q-T INTERVAL: 424 MS
EKG QRS DURATION: 72 MS
EKG QTC CALCULATION (BAZETT): 489 MS
EKG R AXIS: 61 DEGREES
EKG T AXIS: 138 DEGREES
EKG VENTRICULAR RATE: 80 BPM
EOSINOPHILS ABSOLUTE: 0.1 K/UL (ref 0–0.6)
EOSINOPHILS RELATIVE PERCENT: 0.9 %
GFR AFRICAN AMERICAN: 49
GFR NON-AFRICAN AMERICAN: 40
GLUCOSE BLD-MCNC: 125 MG/DL (ref 70–99)
HCT VFR BLD CALC: 24 % (ref 36–48)
HCT VFR BLD CALC: 25.3 % (ref 36–48)
HEMOGLOBIN: 7.4 G/DL (ref 12–16)
HEMOGLOBIN: 7.8 G/DL (ref 12–16)
LV EF: 58 %
LVEF MODALITY: NORMAL
LYMPHOCYTES ABSOLUTE: 1.2 K/UL (ref 1–5.1)
LYMPHOCYTES RELATIVE PERCENT: 11.8 %
MCH RBC QN AUTO: 23.7 PG (ref 26–34)
MCHC RBC AUTO-ENTMCNC: 30.8 G/DL (ref 31–36)
MCV RBC AUTO: 77.1 FL (ref 80–100)
MONOCYTES ABSOLUTE: 0.7 K/UL (ref 0–1.3)
MONOCYTES RELATIVE PERCENT: 6.6 %
NEUTROPHILS ABSOLUTE: 8.2 K/UL (ref 1.7–7.7)
NEUTROPHILS RELATIVE PERCENT: 80.2 %
PDW BLD-RTO: 20.3 % (ref 12.4–15.4)
PHOSPHORUS: 4.1 MG/DL (ref 2.5–4.9)
PLATELET # BLD: 235 K/UL (ref 135–450)
PMV BLD AUTO: 8.5 FL (ref 5–10.5)
POTASSIUM SERPL-SCNC: 4.2 MMOL/L (ref 3.5–5.1)
RBC # BLD: 3.28 M/UL (ref 4–5.2)
SODIUM BLD-SCNC: 140 MMOL/L (ref 136–145)
TROPONIN: <0.01 NG/ML
WBC # BLD: 10.2 K/UL (ref 4–11)

## 2020-02-14 PROCEDURE — 84484 ASSAY OF TROPONIN QUANT: CPT

## 2020-02-14 PROCEDURE — 36415 COLL VENOUS BLD VENIPUNCTURE: CPT

## 2020-02-14 PROCEDURE — 6370000000 HC RX 637 (ALT 250 FOR IP): Performed by: INTERNAL MEDICINE

## 2020-02-14 PROCEDURE — 2580000003 HC RX 258: Performed by: INTERNAL MEDICINE

## 2020-02-14 PROCEDURE — 80069 RENAL FUNCTION PANEL: CPT

## 2020-02-14 PROCEDURE — C9113 INJ PANTOPRAZOLE SODIUM, VIA: HCPCS | Performed by: INTERNAL MEDICINE

## 2020-02-14 PROCEDURE — 85018 HEMOGLOBIN: CPT

## 2020-02-14 PROCEDURE — 93306 TTE W/DOPPLER COMPLETE: CPT

## 2020-02-14 PROCEDURE — 94640 AIRWAY INHALATION TREATMENT: CPT

## 2020-02-14 PROCEDURE — 2700000000 HC OXYGEN THERAPY PER DAY

## 2020-02-14 PROCEDURE — 94660 CPAP INITIATION&MGMT: CPT

## 2020-02-14 PROCEDURE — 85025 COMPLETE CBC W/AUTO DIFF WBC: CPT

## 2020-02-14 PROCEDURE — 94761 N-INVAS EAR/PLS OXIMETRY MLT: CPT

## 2020-02-14 PROCEDURE — 74176 CT ABD & PELVIS W/O CONTRAST: CPT

## 2020-02-14 PROCEDURE — 96375 TX/PRO/DX INJ NEW DRUG ADDON: CPT

## 2020-02-14 PROCEDURE — 99223 1ST HOSP IP/OBS HIGH 75: CPT | Performed by: INTERNAL MEDICINE

## 2020-02-14 PROCEDURE — 96376 TX/PRO/DX INJ SAME DRUG ADON: CPT

## 2020-02-14 PROCEDURE — 93010 ELECTROCARDIOGRAM REPORT: CPT | Performed by: INTERNAL MEDICINE

## 2020-02-14 PROCEDURE — 6360000002 HC RX W HCPCS: Performed by: INTERNAL MEDICINE

## 2020-02-14 PROCEDURE — 85014 HEMATOCRIT: CPT

## 2020-02-14 PROCEDURE — G0378 HOSPITAL OBSERVATION PER HR: HCPCS

## 2020-02-14 RX ORDER — PANTOPRAZOLE SODIUM 40 MG/10ML
40 INJECTION, POWDER, LYOPHILIZED, FOR SOLUTION INTRAVENOUS 2 TIMES DAILY
Status: DISCONTINUED | OUTPATIENT
Start: 2020-02-14 | End: 2020-02-20

## 2020-02-14 RX ORDER — TRAMADOL HYDROCHLORIDE 50 MG/1
100 TABLET ORAL EVERY 6 HOURS PRN
Status: DISCONTINUED | OUTPATIENT
Start: 2020-02-14 | End: 2020-02-17

## 2020-02-14 RX ORDER — TRAMADOL HYDROCHLORIDE 50 MG/1
50 TABLET ORAL EVERY 6 HOURS PRN
Status: DISCONTINUED | OUTPATIENT
Start: 2020-02-14 | End: 2020-02-17

## 2020-02-14 RX ORDER — SODIUM CHLORIDE 9 MG/ML
10 INJECTION INTRAVENOUS 2 TIMES DAILY
Status: DISCONTINUED | OUTPATIENT
Start: 2020-02-14 | End: 2020-02-20

## 2020-02-14 RX ADMIN — CARVEDILOL 12.5 MG: 12.5 TABLET, FILM COATED ORAL at 17:25

## 2020-02-14 RX ADMIN — PANTOPRAZOLE SODIUM 40 MG: 40 INJECTION, POWDER, FOR SOLUTION INTRAVENOUS at 14:27

## 2020-02-14 RX ADMIN — FERROUS SULFATE TAB EC 324 MG (65 MG FE EQUIVALENT) 324 MG: 324 (65 FE) TABLET DELAYED RESPONSE at 08:43

## 2020-02-14 RX ADMIN — CITALOPRAM HYDROBROMIDE 40 MG: 20 TABLET ORAL at 08:43

## 2020-02-14 RX ADMIN — TRAMADOL HYDROCHLORIDE 50 MG: 50 TABLET, FILM COATED ORAL at 18:27

## 2020-02-14 RX ADMIN — LEVOTHYROXINE SODIUM 50 MCG: 0.05 TABLET ORAL at 06:52

## 2020-02-14 RX ADMIN — FUROSEMIDE 40 MG: 40 TABLET ORAL at 17:25

## 2020-02-14 RX ADMIN — ASPIRIN 81 MG 81 MG: 81 TABLET ORAL at 08:43

## 2020-02-14 RX ADMIN — HYDRALAZINE HYDROCHLORIDE 100 MG: 50 TABLET, FILM COATED ORAL at 14:27

## 2020-02-14 RX ADMIN — HYDRALAZINE HYDROCHLORIDE 100 MG: 50 TABLET, FILM COATED ORAL at 20:56

## 2020-02-14 RX ADMIN — MONTELUKAST SODIUM 10 MG: 10 TABLET, FILM COATED ORAL at 08:43

## 2020-02-14 RX ADMIN — CARVEDILOL 12.5 MG: 12.5 TABLET, FILM COATED ORAL at 08:43

## 2020-02-14 RX ADMIN — HYDRALAZINE HYDROCHLORIDE 100 MG: 50 TABLET, FILM COATED ORAL at 05:24

## 2020-02-14 RX ADMIN — PANTOPRAZOLE SODIUM 40 MG: 40 TABLET, DELAYED RELEASE ORAL at 06:52

## 2020-02-14 RX ADMIN — SODIUM CHLORIDE, PRESERVATIVE FREE 10 ML: 5 INJECTION INTRAVENOUS at 21:11

## 2020-02-14 RX ADMIN — IRON SUCROSE 200 MG: 20 INJECTION, SOLUTION INTRAVENOUS at 17:25

## 2020-02-14 RX ADMIN — TIOTROPIUM BROMIDE INHALATION SPRAY 2 PUFF: 3.12 SPRAY, METERED RESPIRATORY (INHALATION) at 09:18

## 2020-02-14 RX ADMIN — Medication 10 ML: at 15:03

## 2020-02-14 RX ADMIN — NITROGLYCERIN 0.5 INCH: 20 OINTMENT TOPICAL at 05:24

## 2020-02-14 RX ADMIN — APIXABAN 5 MG: 5 TABLET, FILM COATED ORAL at 08:42

## 2020-02-14 RX ADMIN — LORAZEPAM 1 MG: 1 TABLET ORAL at 16:40

## 2020-02-14 RX ADMIN — Medication 10 ML: at 21:12

## 2020-02-14 RX ADMIN — PANTOPRAZOLE SODIUM 40 MG: 40 INJECTION, POWDER, FOR SOLUTION INTRAVENOUS at 20:56

## 2020-02-14 RX ADMIN — FUROSEMIDE 40 MG: 40 TABLET ORAL at 08:43

## 2020-02-14 RX ADMIN — SODIUM CHLORIDE, PRESERVATIVE FREE 10 ML: 5 INJECTION INTRAVENOUS at 15:02

## 2020-02-14 RX ADMIN — BUTALBITAL, ACETAMINOPHEN AND CAFFEINE 1 TABLET: 50; 325; 40 TABLET ORAL at 08:43

## 2020-02-14 RX ADMIN — NITROGLYCERIN 0.5 INCH: 20 OINTMENT TOPICAL at 00:21

## 2020-02-14 ASSESSMENT — PAIN DESCRIPTION - LOCATION
LOCATION: CHEST

## 2020-02-14 ASSESSMENT — PAIN SCALES - GENERAL
PAINLEVEL_OUTOF10: 2
PAINLEVEL_OUTOF10: 0
PAINLEVEL_OUTOF10: 4
PAINLEVEL_OUTOF10: 4
PAINLEVEL_OUTOF10: 0
PAINLEVEL_OUTOF10: 5
PAINLEVEL_OUTOF10: 2
PAINLEVEL_OUTOF10: 0

## 2020-02-14 ASSESSMENT — PAIN DESCRIPTION - DESCRIPTORS
DESCRIPTORS: CONSTANT;DULL

## 2020-02-14 ASSESSMENT — PAIN DESCRIPTION - PAIN TYPE
TYPE: ACUTE PAIN

## 2020-02-14 ASSESSMENT — PAIN DESCRIPTION - PROGRESSION
CLINICAL_PROGRESSION: GRADUALLY IMPROVING
CLINICAL_PROGRESSION: NOT CHANGED
CLINICAL_PROGRESSION: RAPIDLY IMPROVING

## 2020-02-14 ASSESSMENT — PAIN DESCRIPTION - ONSET
ONSET: ON-GOING

## 2020-02-14 ASSESSMENT — PAIN DESCRIPTION - ORIENTATION
ORIENTATION: MID

## 2020-02-14 ASSESSMENT — PAIN DESCRIPTION - FREQUENCY
FREQUENCY: CONTINUOUS

## 2020-02-14 NOTE — CARE COORDINATION
Ostomy Referral Progress Note      NAME:  [address] [place] [address] RECORD NUMBER:  [id number]  AGE: 69 y.o. GENDER:  female  :  1945  TODAY'S DATE:  2020    Leeroy Multani is a 69 y.o. female referred by:   [x] Physician  [] Nursing  [] Other:     Established, 5years old    PAST MEDICAL HISTORY:        Diagnosis Date    CAD (coronary artery disease)     Nonobstructive on cath in 2017    [facility]est[facility] failure) [facility]     Colostomy care ([facility] 2018    Peristomal irritation and early leaking    [facility]     Hyperlipidemia     Hypertension     Kidney disease     Stage 3    Peripheral vascular disease ([facility]     Rectal fissure 2014    surgery pending    Restless legs syndrome     Sleep apnea     O2 3 liters at hs    Unspecified sleep apnea        MEDICATIONS:    No current facility-administered medications on file prior to encounter. Current Outpatient Medications on File Prior to Encounter   Medication Sig Dispense Refill    diclofenac sodium 1 % GEL Apply 2 g topically 4 times daily 2 Tube 1    carvedilol (COREG) 12.5 MG tablet Take 1 tablet by mouth 2 times daily (with meals) 60 tablet 0    hydrALAZINE (APRESOLINE) 100 MG tablet TAKE ONE TABLET BY MOUTH THREE TIMES A DAY 90 tablet 2    LORazepam (ATIVAN) 1 MG tablet Take 1 tablet by mouth daily as needed for Anxiety for up to 30 days.  30 tablet 0    ferrous sulfate 325 (65 Fe) MG tablet Take 1 tablet by mouth daily (with breakfast) 30 tablet 2    tiotropium (SPIRIVA RESPIMAT) 2.5 MCG/ACT AERS inhaler Inhale 2 puffs into the lungs daily      lidocaine (XYLOCAINE) 2 % jelly Apply small amount topically to affected area every 8 hours when necessary pain 1 Tube 0    lansoprazole (PREVACID) 15 MG delayed release capsule Take 1 capsule by mouth daily 90 capsule 1    furosemide (LASIX) 40 MG tablet Take 1 tablet by mouth 2 times daily 180 tablet 1    montelukast (SINGULAIR) 10 MG tablet Take 1 tablet by mouth daily 30 tablet 5    apixaban (ELIQUIS) 5 MG TABS tablet TAKE ONE TABLET BY MOUTH TWICE A  tablet 0    citalopram (CELEXA) 40 MG tablet TAKE ONE TABLET BY MOUTH DAILY 90 tablet 0    cloNIDine (CATAPRES) 0.2 MG tablet Take 1 tablet by mouth 2 times daily 180 tablet 0    levothyroxine (SYNTHROID) 50 MCG tablet TAKE ONE TABLET BY MOUTH DAILY 90 tablet 0    ipratropium-albuterol (DUONEB) 0.5-2.5 (3) MG/3ML SOLN nebulizer solution Inhale 3 mLs into the lungs every 4 hours as needed for Shortness of Breath 360 mL 5    butalbital-acetaminophen-caffeine (FIORICET, ESGIC) -40 MG per tablet Take 1 tablet by mouth every 6 hours as needed for Headaches 16 tablet 0    aspirin 81 MG tablet Take 1 tablet by mouth daily 30 tablet 3       ALLERGIES:    Allergies   Allergen Reactions    Iv Dye [Iodides]      Flushed, broke out and difficulty breathing       PAST SURGICAL HISTORY:    Past Surgical History:   Procedure Laterality Date    CARDIAC CATHETERIZATION  2017    Dr. Mason Nick  2018    CORONARY ARTERY BYPASS GRAFT      Legs & Carotid    HERNIA REPAIR      UPPER GASTROINTESTINAL ENDOSCOPY N/A 2019    EGD DIAGNOSTIC ONLY performed by Giovanni Helm MD at [facility]:    family history includes Cancer in her brother; Heart Disease in her father, mother, and sister.     SOCIAL HISTORY:    Social History     Tobacco Use    Smoking status: Former Smoker     Packs/day: 3.00     Years: 30.00     Pack years: 90.00     Start date: 1963     Last attempt to quit: 1992     Years since quittin.0    Smokeless tobacco: Never Used    Tobacco comment: QUIT 20 YRS AGO   Substance Use Topics    Alcohol use: No     Alcohol/week: 0.0 standard drinks    Drug use: No       LABS:  WBC:    Lab Results   Component Value Date    WBC 10.2 Descending/sigmoid (Active)   Stomal Appliance 1 piece 2/14/2020  3:31 PM   Stoma  Assessment Red;Moist;Protrudes 2/14/2020  3:31 PM   Mucocutaneous Junction Intact 2/14/2020  3:31 PM   Peristomal Assessment Pink 2/14/2020  3:31 PM   Treatment Site care; Bag change 2/14/2020  3:31 PM   Stool Appearance Formed; Soft 2/14/2020  3:31 PM   Stool Color Unk Forward 2/14/2020  3:31 PM   Stool Amount Medium 2/14/2020  8:00 AM   Number of days: 181     Pt awake and alert sitting up in bed. Pt states she has had ostomy for 2 years due diverticulitis. Pt currently in a 1-piece ostomy bag. Pt states she uses dorita at home with barrier ring and barrier strips. Pt bag removed to assess stoma. Pt stoma pink, moist and protrudes. No actually bleeding from stoma itself. Stool brown and formed. Pt has granulomas around stoma that are sensitive and start bleeding as soon as touched. Site care performed. Pt states she uses power at home to help with sensitivity. Very little skin breakdown noted around actual stoma. Encouraged pt to see an ostomy nurse outpatient and pt was not receptive to my recommendation. Think patient would benefit attending our outpatient wound care clinic. Would have pt continue current practice at home with stoma powder. Pt also states she excessively wipes around stoma to get \"all of the stool gone. \" I do believe pt could possibly be irritating site around stoma.  Pt placed in a thin barrier ring, 1-piece ostomy bag, with barrier strips with good seal.    Intake/Output Summary (Last 24 hours) at 2/14/2020 1533  Last data filed at 2/14/2020 1332  Gross per 24 hour   Intake 840 ml   Output --   Net 840 ml         Plan   Plan for Ostomy Care:  Coloplast SenSura Sycamore flat 1 pc #[id number]  [employer] protective Seal thin #[id number]  [place] Powder #[id number]  JJBVF GHNBVBL EPTCUXZ Strips #[id number]    Ostomy Plan of Care  [x] Supplies/Instructions left in room  [] Patient using home supplies  [] Brand/supplies at bedside Coloplast  [] Current

## 2020-02-14 NOTE — CONSULTS
[address] [facility]  1945 February 14, 2020    Reason for Consult: Chest Pain    CC: Chest Pain    HPI:  The patient is 69 y.o. female with a past medical history significant for chronic anemia, severe COPD with chronic hypoxic respiratory failure, ANGIE on Bipap, PAD, CAD and paroxysmal atrial fibrillation who was sent to the hospital for chest pain concerning for angina. She is being considered for a Watchman device due to GI bleeding into her ostomy. Karli Lainez states that she was seeing Mary Espinal CNP in the office yesterday and complained of chest pain. \"It was hard to breath\". On admission, she was given GI cocktails and she says this improved her pain significantly. She notes that just changing position in the bed yesterday would give her pain in the center of her chest. She was nauseous. She relates that her breathing is not good. This has been going on for over a month per her recollection. Today, she denies any chest pain. Review of Systems:  Constitutional: No fatigue, weakness, night sweats or fever. HEENT: No new vision difficulties or ringing in the ears. Respiratory: Chronic SOB, PND, orthopnea. No cough. Cardiovascular: See HPI   GI: No n/v, diarrhea, constipation, abdominal pain or changes in bowel habits. No melena, no hematochezia  : No urinary frequency, urgency, incontinence, hematuria or dysuria. Skin: No cyanosis or skin lesions. Musculoskeletal: No new muscle or joint pain. Neurological: No syncope or TIA-like symptoms.   Psychiatric: No anxiety, insomnia or depression     Past Medical History:   Diagnosis Date    CAD (coronary artery disease)     Nonobstructive on cath in 9/2017    [facility]     Col[facility] care ([facility] 4/25/2018    Peristomal irritation and early leaking    COPD [facility]ch[facility] obstructive [facility] disease) ([facility]     Hyperlipidemia     Hypertension     Kidney disease     Stage 3    [facility]     Rectal fissure 2014    surgery pending    Restless legs syndrome     Sleep apnea     O2 3 liters at hs    Unspecified sleep apnea      Past Surgical History:   Procedure Laterality Date    CARDIAC CATHETERIZATION  2017    Dr. Trinidad Bailey  2018    CORONARY ARTERY BYPASS GRAFT      Legs & Carotid    HERNIA REPAIR      UPPER GASTROINTESTINAL ENDOSCOPY N/A 2019    EGD DIAGNOSTIC ONLY performed by Reji Rene MD at [facility]     Family History   Problem Relation Age of Onset    Heart Disease Mother     Heart Disease Father     Heart Disease Sister     Cancer Brother      Social History     Tobacco Use    Smoking status: Former Smoker     Packs/day: 3.00     Years: 30.00     Pack years: 90.00     Start date: 1963     Last attempt to quit: 1992     Years since quittin.0    Smokeless tobacco: Never Used    Tobacco comment: QUIT 20 YRS AGO   Substance Use Topics    Alcohol use: No     Alcohol/week: 0.0 standard drinks    Drug use: No       Allergies   Allergen Reactions    Iv Dye [Iodides]      Flushed, broke out and difficulty breathing     Current Facility-Administered Medications   Medication Dose Route Frequency Provider Last Rate Last Dose    aluminum & magnesium hydroxide-simethicone (MAALOX) 30 mL, lidocaine viscous hcl (XYLOCAINE) 5 mL (GI COCKTAIL)   Oral 4x Daily PRN Tabby Jeronimo MD        pantoprazole (PROTONIX) injection 40 mg  40 mg Intravenous BID Tabby Jeronimo MD        And    sodium chloride (PF) 0.9 % injection 10 mL  10 mL Intravenous BID Tabby Jeronimo MD        apixaban (ELIQUIS) tablet 5 mg  5 mg Oral BID Tabby Jeronimo MD   5 mg at 20 0842    aspirin chewable tablet 81 mg  81 mg Oral Daily Yasmin Martinez MD   81 mg at 20 0843    butalbital-acetaminophen-caffeine (FIORICET, ESGIC) per tablet 1 tablet  1 tablet Oral Q6H (37.1 °C) (Oral)   Resp 18   Ht 5' (1.524 m)   Wt 173 lb 8 oz (78.7 kg)   LMP 05/01/2006 (Approximate)   SpO2 98%   BMI 33.88 kg/m²     Intake/Output Summary (Last 24 hours) at 2/14/2020 1254  Last data filed at 2/13/2020 2350  Gross per 24 hour   Intake 480 ml   Output --   Net 480 ml     Wt Readings from Last 2 Encounters:   02/14/20 173 lb 8 oz (78.7 kg)   02/13/20 172 lb (78 kg)     Constitutional: She is oriented to person, place, and time. She appears well-developed and well-nourished. In no acute distress. Head: Normocephalic and atraumatic. Neck: Neck supple. No JVD present. Carotid bruit is not present. No mass and no thyromegaly present. No lymphadenopathy present. Cardiovascular: Normal rate, regular rhythm, normal heart sounds and intact distal pulses. Exam reveals no gallop and no friction rub. No murmur heard. Pulmonary/Chest: Effort normal and breath sounds normal. No respiratory distress. She has no wheezes, rhonchi or rales. Abdominal: Soft, non-tender. Bowel sounds and aorta are normal. She exhibits no organomegaly, mass or bruit. Extremities: No edema, cyanosis, or clubbing. Pulses are 2+ radial/carotid/dorsalis pedis and posterior tibial bilaterally. Neurological: She is alert and oriented to person, place, and time. She has normal reflexes. No cranial nerve deficit. Coordination normal.   Skin: Skin is warm and dry. There is no rash or diaphoresis. Psychiatric: She has a normal mood and affect.  Her speech is normal and behavior is normal.     EKG Interpretation: Sinus rhythm with nonspecific ST changes, LVH, PVC's    Lab Review:   Lab Results   Component Value Date    TRIG 151 10/11/2019    HDL 48 10/11/2019    LDLCALC 80 10/11/2019    LDLDIRECT 120 01/09/2018    LABVLDL 30 10/11/2019     Lab Results   Component Value Date     02/14/2020    K 4.2 02/14/2020    K 3.8 02/13/2020    BUN 23 02/14/2020    CREATININE 1.3 02/14/2020     Recent Labs     02/13/20  1132 have much in the way of CAD in nay event. I am not sure what is causing her worsening dyspnea. It does appear that she has severe COPD. She may ultimately need a right heart catheterization but she has no significant valvular heart disease to explain this. Her murmur is one of aortic sclerosis and not severe stenosis. I would not plan on any further cardiac work-up at this time. She remains severely anemic but has been undergoing a Watchman evaluation given her high CHADS Vasc score. I would defer to Pulmonary Medicine and the primary team for further work-up.

## 2020-02-14 NOTE — PLAN OF CARE
Problem: Falls - Risk of:  Goal: Will remain free from falls  Description  Will remain free from falls  Outcome: Ongoing  Goal: Absence of physical injury  Description  Absence of physical injury  Outcome: Ongoing  Note:   No falls this shift. PT in bed with non-skid footwear on and alarm applied. Belongings and call light are placed within reach. PT calls appropriately for needs and is rounded on frequently. Bed placed in lowest position with siderails up and wheels locked. Will continue to monitor. Problem: Pain:  Goal: Pain level will decrease  Description  Pain level will decrease  Outcome: Ongoing  Goal: Control of acute pain  Description  Control of acute pain  Outcome: Ongoing  Goal: Control of chronic pain  Description  Control of chronic pain  Outcome: Ongoing  Note:   Pain/discomfort being managed with analgesics and nitro per MD orders. Pt able to express presence and absence of pain and rate pain appropriately using numerical scale.       Problem: Cardiac:  Goal: Ability to maintain vital signs within normal range will improve  Description  Ability to maintain vital signs within normal range will improve  Outcome: Ongoing  Goal: Cardiovascular alteration will improve  Description  Cardiovascular alteration will improve  Outcome: Ongoing     Problem: Health Behavior:  Goal: Will modify at least one risk factor affecting health status  Description  Will modify at least one risk factor affecting health status  Outcome: Ongoing  Goal: Identification of resources available to assist in meeting health care needs will improve  Description  Identification of resources available to assist in meeting health care needs will improve  Outcome: Ongoing     Problem: Physical Regulation:  Goal: Complications related to the disease process, condition or treatment will be avoided or minimized  Description  Complications related to the disease process, condition or treatment will be avoided or minimized  Outcome:

## 2020-02-14 NOTE — PRE-PROCEDURE INSTRUCTIONS
In preparation for [employer] myoview stress test in am 2/15/2020:  No caffeine after 2000 2/14/2020 (Includes all regular and decaf coffee, cola, tea, chocolate). Please avoid Fioricet, as it contains caffeine,   No solid foods after 0500 2/15/2020,  Patient may have water as desired/ordered up until time of test.  Please be sure repeat H/H is drawn. Thank you.

## 2020-02-14 NOTE — PROGRESS NOTES
Hospitalist Progress Note      PCP: Aureliano Dietrich MD    Date of Admission: 2/13/2020    Chief Complaint: chest pain, cough, fever, feeling unwell. Subjective:     Chest pain resolved after GI cocktail and has not recurred. Complains of severe dyspnea this am.     Wore BiPAP last night. Medications:  Reviewed    Infusion Medications   Scheduled Medications    pantoprazole  40 mg Intravenous BID    And    sodium chloride (PF)  10 mL Intravenous BID    apixaban  5 mg Oral BID    aspirin  81 mg Oral Daily    carvedilol  12.5 mg Oral BID WC    citalopram  40 mg Oral Daily    ferrous sulfate  324 mg Oral Daily with breakfast    furosemide  40 mg Oral BID    hydrALAZINE  100 mg Oral 3 times per day    levothyroxine  50 mcg Oral QAM AC    montelukast  10 mg Oral Daily    tiotropium  2 puff Inhalation Daily    sodium chloride flush  10 mL Intravenous 2 times per day     PRN Meds: GI cocktail, butalbital-acetaminophen-caffeine, ipratropium-albuterol, LORazepam, perflutren lipid microspheres, sodium chloride flush, magnesium hydroxide, ondansetron, acetaminophen      Intake/Output Summary (Last 24 hours) at 2/14/2020 1305  Last data filed at 2/13/2020 2350  Gross per 24 hour   Intake 480 ml   Output --   Net 480 ml       Physical Exam Performed:    BP (!) 179/84   Pulse 74   Temp 98.7 °F (37.1 °C) (Oral)   Resp 18   Ht 5' (1.524 m)   Wt 173 lb 8 oz (78.7 kg)   LMP 05/01/2006 (Approximate)   SpO2 98%   BMI 33.88 kg/m²     General appearance: No apparent distress appears stated age and cooperative. HEENT Normal cephalic, atraumatic without obvious deformity. Pupils equal, round, and reactive to light. Extra ocular muscles intact. Conjunctivae/corneas clear. Neck: Supple, No jugular venous distention/bruits. Trachea midline without thyromegaly or adenopathy with full range of motion.   Lungs: Clear to auscultation, bilaterally without Rales/Wheezes/Rhonchi with good respiratory effort. Heart: Regular rate and rhythm with Normal S1/S2 without murmurs, rubs or gallops, point of maximum impulse non-displaced  Abdomen: Soft, non-tender or non-distended without rigidity or guarding and positive bowel sounds all four quadrants. Ostomy in place  Extremities: No clubbing, cyanosis, trace pitting edema bilaterally. Full range of motion without deformity and normal gait intact. Skin: Skin color, texture, turgor normal.  No rashes or lesions. Neurologic: Alert and oriented X 3, neurovascularly intact with sensory/motor intact upper extremities/lower extremities, bilaterally. Cranial nerves: II-XII intact, grossly non-focal.  Mental status: Alert, oriented, thought content appropriate. Capillary Refill: Acceptable  < 3 seconds  Peripheral Pulses: +3 Easily felt, not easily obliterated with pressure       Labs:   Recent Labs     02/11/20  1614 02/13/20  1132 02/14/20  0549   WBC 10.6 10.4 10.2   HGB 8.3* 8.7* 7.8*   HCT 27.2* 27.7*  28.3* 25.3*    240 235     Recent Labs     02/13/20  1132 02/14/20  0549    140   K 3.8 4.2   CL 97* 98*   CO2 28 31   BUN 21* 23*   CREATININE 1.0 1.3*   CALCIUM 9.6 9.1   PHOS  --  4.1     No results for input(s): AST, ALT, BILIDIR, BILITOT, ALKPHOS in the last 72 hours. No results for input(s): INR in the last 72 hours. Recent Labs     02/13/20  1132 02/13/20  1906 02/14/20  0125   TROPONINI <0.01  <0.01 <0.01 <0.01       Urinalysis:      Lab Results   Component Value Date    NITRU Negative 09/25/2019    WBCUA 369 09/25/2019    BACTERIA 2+ 08/08/2016    RBCUA 6 09/25/2019    BLOODU neg 01/28/2020    BLOODU Negative 09/25/2019    SPECGRAV 1.025 01/28/2020    SPECGRAV 1.021 09/25/2019    GLUCOSEU neg 01/28/2020    GLUCOSEU Negative 09/25/2019       Radiology:  CT ABDOMEN PELVIS WO CONTRAST Additional Contrast? None   Final Result   Small pleural effusions with septal thickening at the lung bases, compatible   with changes of early pulmonary edema.

## 2020-02-14 NOTE — CONSULTS
Dr. Jaziel Aparicio at [facility]  After I obtained informed consent, the scope was passed under direct vision. Throughout the procedure, the patient's blood pressure, pulse, and oxygen saturations were monitored continuously. The Endoscope was introduced through the sigmoid colostomy and advanced to the cecum, identified by appendiceal orifice and ileocecal valve. The colonoscopy was performed without difficulty. The patient tolerated the procedure well.  The quality of the bowel preparation was poor  Impression: Preparation of the colon was poor, the entire examined colon is normal, stool in the entire examined colon, no specimens collected      PAST MEDICAL, SURGICAL, FAMILY, and SOCIAL HISTORY     Past Medical History:   Diagnosis Date    CAD (coronary artery disease)     Nonobstructive on cath in 9/2017    CHF (congestive heart failure) ([address]     Colostomy care ([address] 4/25/2018    Peristomal irritation and early leaking    COPD (chronic obstructive pulmonary disease) ([address]     Hyperlipidemia     Hypertension     Kidney disease     Stage 3    Peripheral vascular disease ([address]     Rectal fissure 11/2014    surgery pending    Restless legs syndrome     Sleep apnea     O2 3 liters at hs    Unspecified sleep apnea      Past Surgical History:   Procedure Laterality Date    CARDIAC CATHETERIZATION  09/13/2017    Dr. Odonnell Height  03/09/2018    CORONARY ARTERY BYPASS GRAFT      Legs & Carotid    HERNIA REPAIR      UPPER GASTROINTESTINAL ENDOSCOPY N/A 9/30/2019    EGD DIAGNOSTIC ONLY performed by Lionel Myers MD at [address] [place] [address]     Family History   Problem Relation Age of Onset    Heart Disease Mother     Heart Disease Father     Heart Disease Sister     Cancer Brother      Social History     Socioeconomic History    Marital status:      Spouse name: Not on file    Number of children: 6    Years of education: 15    Highest education level: Not on ASSESSMENT AND RECOMMENDATIONS   69 y.o. 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 We have been consulted regarding bleeding from ostomy, anemia    IMPRESSION/RECOMMENDATIONS:  1. Acute on chronic anemia: Suspect multifactorial with CKD and occult GI bleeding in setting of anticoagulation therapy. Iron studies show borderline iron deficiency. Recent EGD 9/19 negative for bleeding source. Colonoscopy 4/19 negative for bleeding source but prep was poor. Given current respiratory status patient likely will not tolerate a bowel prep and sedation for an endoscopy procedure. She is currently on 4 L of oxygen with orthopnea. Her hemoglobin currently appears to be close to her baseline over past 5 months. Will treat medically for now with IV Venofer. She has been evaluated by Cardiology for possible Watchman's given likely a poor candidate for anticoagulation therapy  2. Superficial bleeding at colostomy stoma: Will consult wound care to eval and treat    Further input and recommendations by Dr. Marielle Betancourt to follow. If you have any questions or need any further information, please feel free to contact our consult team.  Thank you for allowing us to participate in the care of Michelle Krishnamurthy. The note was completed using Dragon voice recognition transcription. Every effort was made to ensure accuracy; however, inadvertent transcription errors may be present despite my best efforts to edit errors. Luigi Conn PA-C    Attending physician addendum:      I have personally seen and examined the patient, reviewed the patient's medical record and pertinent labs and clinical imaging.   I have personally staffed the case with SHILOH Garcia. I agree with her consultation note, exam findings, assessment and plans  as written above. I have made appropriate modifications and edited her assessment and plan where needed to reflect my impression and plans for this patient. 70 yo presenting to ED with CC: exertional CP and SCOTT. Has prior hx of borderline ZION. Patient with prior hx of sigmoid diverticulitis complicated by a sigmoid stricture requiring a diverting colostomy. She has had intermittent outlet bleeding from her stoma. The blood is typically bright red. She has \" irritation\" at her stoma. She is on Eliquis and ASA. She has had prior EGD and colonoscopy that were negative. BP (!) 179/84   Pulse 74   Temp 98.7 °F (37.1 °C) (Oral)   Resp 18   Ht 5' (1.524 m)   Wt 173 lb 8 oz (78.7 kg)   LMP 2006 (Approximate)   SpO2 98%   BMI 33.88 kg/m²      Gen: NAD  CV- RRR  Abd- LUQ ostomy with brown stool and some reactive nodular mucosa noted at the 6 oclock position on her colostomy site. Lungs- CTAB    Labs and imaging reviewed. Impression: 69 y.o. 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 We have been consulted regardin) Exertional Chest pain; BNP elevated and CE negative. Prior hx of CABG and prior stress test in 2017 positive. No GERD symptoms and improved with GI cocktail and Nitroglycerin paste. 2) Iron deficiency anemia and hx of bleeding from colostomy: Patient with borderline ZION. She has had prior EGD and colonoscopy in 2019 that were unrevealing. Patient is on ASA and Eliquis. Suspect her bleeding may be outlet bleeding from her stoma site.   Less liklely

## 2020-02-15 PROBLEM — D64.9 SYMPTOMATIC ANEMIA: Status: ACTIVE | Noted: 2020-02-15

## 2020-02-15 LAB
ALBUMIN SERPL-MCNC: 3.5 G/DL (ref 3.4–5)
ANION GAP SERPL CALCULATED.3IONS-SCNC: 13 MMOL/L (ref 3–16)
ANISOCYTOSIS: ABNORMAL
BANDED NEUTROPHILS RELATIVE PERCENT: 2 % (ref 0–7)
BASOPHILS ABSOLUTE: 0 K/UL (ref 0–0.2)
BASOPHILS RELATIVE PERCENT: 0 %
BUN BLDV-MCNC: 20 MG/DL (ref 7–20)
CALCIUM SERPL-MCNC: 9.2 MG/DL (ref 8.3–10.6)
CHLORIDE BLD-SCNC: 92 MMOL/L (ref 99–110)
CO2: 35 MMOL/L (ref 21–32)
CREAT SERPL-MCNC: 1.1 MG/DL (ref 0.6–1.2)
CRENATED RBC'S: ABNORMAL
EOSINOPHILS ABSOLUTE: 0 K/UL (ref 0–0.6)
EOSINOPHILS RELATIVE PERCENT: 0 %
GFR AFRICAN AMERICAN: 59
GFR NON-AFRICAN AMERICAN: 49
GLUCOSE BLD-MCNC: 113 MG/DL (ref 70–99)
HCT VFR BLD CALC: 25.3 % (ref 36–48)
HEMOGLOBIN: 7.8 G/DL (ref 12–16)
HYPOCHROMIA: ABNORMAL
LYMPHOCYTES ABSOLUTE: 1 K/UL (ref 1–5.1)
LYMPHOCYTES RELATIVE PERCENT: 8 %
MCH RBC QN AUTO: 23.7 PG (ref 26–34)
MCHC RBC AUTO-ENTMCNC: 30.9 G/DL (ref 31–36)
MCV RBC AUTO: 76.6 FL (ref 80–100)
MICROCYTES: ABNORMAL
MONOCYTES ABSOLUTE: 1 K/UL (ref 0–1.3)
MONOCYTES RELATIVE PERCENT: 8 %
NEUTROPHILS ABSOLUTE: 10.2 K/UL (ref 1.7–7.7)
NEUTROPHILS RELATIVE PERCENT: 82 %
OVALOCYTES: ABNORMAL
PDW BLD-RTO: 19.9 % (ref 12.4–15.4)
PHOSPHORUS: 3.9 MG/DL (ref 2.5–4.9)
PLATELET # BLD: 207 K/UL (ref 135–450)
PMV BLD AUTO: 9.6 FL (ref 5–10.5)
POIKILOCYTES: ABNORMAL
POLYCHROMASIA: ABNORMAL
POTASSIUM SERPL-SCNC: 3.8 MMOL/L (ref 3.5–5.1)
PROCALCITONIN: 0.07 NG/ML (ref 0–0.15)
RBC # BLD: 3.3 M/UL (ref 4–5.2)
SODIUM BLD-SCNC: 140 MMOL/L (ref 136–145)
STOMATOCYTES: ABNORMAL
TEAR DROP CELLS: ABNORMAL
WBC # BLD: 12.1 K/UL (ref 4–11)

## 2020-02-15 PROCEDURE — 94760 N-INVAS EAR/PLS OXIMETRY 1: CPT

## 2020-02-15 PROCEDURE — 6370000000 HC RX 637 (ALT 250 FOR IP): Performed by: INTERNAL MEDICINE

## 2020-02-15 PROCEDURE — 84145 PROCALCITONIN (PCT): CPT

## 2020-02-15 PROCEDURE — 1200000000 HC SEMI PRIVATE

## 2020-02-15 PROCEDURE — 2580000003 HC RX 258: Performed by: INTERNAL MEDICINE

## 2020-02-15 PROCEDURE — 6360000002 HC RX W HCPCS: Performed by: INTERNAL MEDICINE

## 2020-02-15 PROCEDURE — 94660 CPAP INITIATION&MGMT: CPT

## 2020-02-15 PROCEDURE — 85025 COMPLETE CBC W/AUTO DIFF WBC: CPT

## 2020-02-15 PROCEDURE — C9113 INJ PANTOPRAZOLE SODIUM, VIA: HCPCS | Performed by: INTERNAL MEDICINE

## 2020-02-15 PROCEDURE — 36415 COLL VENOUS BLD VENIPUNCTURE: CPT

## 2020-02-15 PROCEDURE — 96376 TX/PRO/DX INJ SAME DRUG ADON: CPT

## 2020-02-15 PROCEDURE — 80069 RENAL FUNCTION PANEL: CPT

## 2020-02-15 PROCEDURE — 94640 AIRWAY INHALATION TREATMENT: CPT

## 2020-02-15 PROCEDURE — 2700000000 HC OXYGEN THERAPY PER DAY

## 2020-02-15 RX ORDER — IPRATROPIUM BROMIDE AND ALBUTEROL SULFATE 2.5; .5 MG/3ML; MG/3ML
SOLUTION RESPIRATORY (INHALATION)
Status: DISPENSED
Start: 2020-02-15 | End: 2020-02-15

## 2020-02-15 RX ORDER — CLONIDINE HYDROCHLORIDE 0.1 MG/1
0.2 TABLET ORAL 3 TIMES DAILY
Status: DISCONTINUED | OUTPATIENT
Start: 2020-02-15 | End: 2020-02-21 | Stop reason: HOSPADM

## 2020-02-15 RX ADMIN — SODIUM CHLORIDE, PRESERVATIVE FREE 10 ML: 5 INJECTION INTRAVENOUS at 21:40

## 2020-02-15 RX ADMIN — TRAMADOL HYDROCHLORIDE 100 MG: 50 TABLET, FILM COATED ORAL at 08:32

## 2020-02-15 RX ADMIN — PANTOPRAZOLE SODIUM 40 MG: 40 INJECTION, POWDER, FOR SOLUTION INTRAVENOUS at 21:40

## 2020-02-15 RX ADMIN — TIOTROPIUM BROMIDE INHALATION SPRAY 2 PUFF: 3.12 SPRAY, METERED RESPIRATORY (INHALATION) at 08:45

## 2020-02-15 RX ADMIN — CLONIDINE HYDROCHLORIDE 0.2 MG: 0.1 TABLET ORAL at 16:16

## 2020-02-15 RX ADMIN — LEVOTHYROXINE SODIUM 50 MCG: 0.05 TABLET ORAL at 06:04

## 2020-02-15 RX ADMIN — CITALOPRAM HYDROBROMIDE 40 MG: 20 TABLET ORAL at 08:27

## 2020-02-15 RX ADMIN — IRON SUCROSE 200 MG: 20 INJECTION, SOLUTION INTRAVENOUS at 13:44

## 2020-02-15 RX ADMIN — CLONIDINE HYDROCHLORIDE 0.2 MG: 0.1 TABLET ORAL at 21:40

## 2020-02-15 RX ADMIN — FUROSEMIDE 40 MG: 40 TABLET ORAL at 16:16

## 2020-02-15 RX ADMIN — SODIUM CHLORIDE, PRESERVATIVE FREE 10 ML: 5 INJECTION INTRAVENOUS at 08:28

## 2020-02-15 RX ADMIN — PANTOPRAZOLE SODIUM 40 MG: 40 INJECTION, POWDER, FOR SOLUTION INTRAVENOUS at 08:27

## 2020-02-15 RX ADMIN — IPRATROPIUM BROMIDE AND ALBUTEROL SULFATE 3 ML: .5; 3 SOLUTION RESPIRATORY (INHALATION) at 00:58

## 2020-02-15 RX ADMIN — CARVEDILOL 12.5 MG: 12.5 TABLET, FILM COATED ORAL at 08:28

## 2020-02-15 RX ADMIN — HYDRALAZINE HYDROCHLORIDE 100 MG: 50 TABLET, FILM COATED ORAL at 06:04

## 2020-02-15 RX ADMIN — FUROSEMIDE 40 MG: 40 TABLET ORAL at 08:28

## 2020-02-15 RX ADMIN — HYDRALAZINE HYDROCHLORIDE 100 MG: 50 TABLET, FILM COATED ORAL at 13:44

## 2020-02-15 RX ADMIN — Medication 10 ML: at 21:40

## 2020-02-15 RX ADMIN — Medication 10 ML: at 08:28

## 2020-02-15 RX ADMIN — HYDRALAZINE HYDROCHLORIDE 100 MG: 50 TABLET, FILM COATED ORAL at 21:40

## 2020-02-15 RX ADMIN — CARVEDILOL 12.5 MG: 12.5 TABLET, FILM COATED ORAL at 16:16

## 2020-02-15 RX ADMIN — MONTELUKAST SODIUM 10 MG: 10 TABLET, FILM COATED ORAL at 08:28

## 2020-02-15 ASSESSMENT — PAIN DESCRIPTION - PROGRESSION
CLINICAL_PROGRESSION: NOT CHANGED

## 2020-02-15 ASSESSMENT — PAIN DESCRIPTION - ONSET: ONSET: ON-GOING

## 2020-02-15 ASSESSMENT — PAIN DESCRIPTION - DESCRIPTORS: DESCRIPTORS: PRESSURE

## 2020-02-15 ASSESSMENT — PAIN SCALES - GENERAL
PAINLEVEL_OUTOF10: 0
PAINLEVEL_OUTOF10: 0
PAINLEVEL_OUTOF10: 7
PAINLEVEL_OUTOF10: 0

## 2020-02-15 ASSESSMENT — PAIN DESCRIPTION - LOCATION: LOCATION: CHEST

## 2020-02-15 ASSESSMENT — PAIN DESCRIPTION - PAIN TYPE: TYPE: ACUTE PAIN

## 2020-02-15 ASSESSMENT — PAIN DESCRIPTION - ORIENTATION: ORIENTATION: MID

## 2020-02-15 ASSESSMENT — PAIN - FUNCTIONAL ASSESSMENT: PAIN_FUNCTIONAL_ASSESSMENT: PREVENTS OR INTERFERES SOME ACTIVE ACTIVITIES AND ADLS

## 2020-02-15 ASSESSMENT — PAIN DESCRIPTION - FREQUENCY: FREQUENCY: CONTINUOUS

## 2020-02-15 NOTE — PLAN OF CARE
Problem: Falls - Risk of:  Goal: Will remain free from falls  Description  Will remain free from falls  2/15/2020 1600 by Nasir Marie RN  Outcome: Ongoing     Problem: Falls - Risk of:  Goal: Absence of physical injury  Description  Absence of physical injury  2/15/2020 1600 by Nasir Marie RN  Outcome: Ongoing     Problem: Pain:  Goal: Pain level will decrease  Description  Pain level will decrease  2/15/2020 1600 by Nasir Marie RN  Outcome: Ongoing     Problem: Pain:  Goal: Control of acute pain  Description  Control of acute pain  2/15/2020 1600 by Nasir Marie RN  Outcome: Ongoing     Problem: Pain:  Goal: Control of chronic pain  Description  Control of chronic pain  2/15/2020 1600 by Nasir Marie RN  Outcome: Ongoing     Problem: Cardiac:  Goal: Cardiovascular alteration will improve  Description  Cardiovascular alteration will improve  2/15/2020 1600 by Nasir Marie RN  Outcome: Ongoing     Problem: Health Behavior:  Goal: Will modify at least one risk factor affecting health status  Description  Will modify at least one risk factor affecting health status  2/15/2020 1600 by Nasir Marie RN  Outcome: Ongoing

## 2020-02-15 NOTE — PROGRESS NOTES
Gastroenterology Progress Note    Levon Huerta is a 69 y.o. female patient. Hospitalization Day:0    SUBJECTIVE: The patient was seen yesterday by the stoma nurse. There were granulomas noted inferiorly along her colostomy. These were very friable and bled on contact. The patient has had no further bleeding from her stoma. She is somewhat less short of breath this morning. She is otherwise without new complaints    ROS:  Gastrointestinal ROS: positive for - blood in stools    Physical    VITALS:  BP (!) 202/47   Pulse 99   Temp 98 °F (36.7 °C) (Oral)   Resp 18   Ht 5' (1.524 m)   Wt 174 lb 2.6 oz (79 kg)   LMP 2006 (Approximate)   SpO2 98%   BMI 34.01 kg/m²   TEMPERATURE:  Current - Temp: 98 °F (36.7 °C); Max - Temp  Av.1 °F (36.7 °C)  Min: 97.7 °F (36.5 °C)  Max: 98.7 °F (37.1 °C)    General:  Alert and oriented,  No apparent distress, mild tachypnea  Skin- without jaundice  Eyes: anicteric sclera  Cardiac: tachy, Nl s1s2, without murmurs  Lungs CTA Bilaterally, normal effort  Abdomen soft, ND, NT, no HSM, Bowel sounds normal  Ext: without edema  Neuro: no asterixis     Data    Data Review:    Recent Labs     20  1132 20  0549 20  1350 02/15/20  0448   WBC 10.4 10.2  --  12.1*   HGB 8.7* 7.8* 7.4* 7.8*   HCT 27.7*  28.3* 25.3* 24.0* 25.3*   MCV 77.6* 77.1*  --  76.6*    235  --  207     Recent Labs     20  1132 20  0549 02/15/20  0448    140 140   K 3.8 4.2 3.8   CL 97* 98* 92*   CO2 28 31 35*   PHOS  --  4.1 3.9   BUN 21* 23* 20   CREATININE 1.0 1.3* 1.1     No results for input(s): AST, ALT, ALB, BILIDIR, BILITOT, ALKPHOS in the last 72 hours. No results for input(s): LIPASE, AMYLASE in the last 72 hours. No results for input(s): PROTIME, INR in the last 72 hours.     Radiology Review:    CT ABDOMEN PELVIS WO CONTRAST Additional Contrast? None   Final Result   Small pleural effusions with septal thickening at the lung bases, compatible with changes of early pulmonary edema. Small hiatal hernia with nonspecific thickening at the GE junction. Postsurgical change from left lower quadrant ostomy and aortobifemoral   bypass. Appearance is similar. XR CHEST STANDARD (2 VW)   Final Result   No acute cardiopulmonary process demonstrated         NM Cardiac Stress Test Nuclear Imaging    (Results Pending)         ASSESSMENT: 69 y.o. 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 We have been consulted regardin) Exertional Chest pain; BNP elevated and CE negative. Echo with mild diastolic dysfunction, moderate TR and MR and pulmonary HTN. Prior hx of CABG and prior stress test in 2017 positive. No GERD symptoms and improved with GI cocktail and Nitroglycerin paste. 2) Iron deficiency anemia and hx of bleeding from colostomy: Patient with borderline ZION. She has had prior EGD and colonoscopy in 2019 that were unrevealing. Patient is on ASA and Eliquis. Stoma nurse saw and I concur that we suspect her bleeding may be outlet bleeding from her stoma site. Less liklely occult colonic or small bowel source of bleeding     Plan:  Stoma care for suspected outlet bleeding from reactive tissue irritation at her stoma site  Trend H and H   IV Venofer daily while inpatient and convert to PO Ferrous sulfate bid at D/C  Cardiology and pulm to address management of Pulm HTN and diastolic dysfunction. Advance diet. Will sign off   Call with ? Continue PPI daily. Thank you for allowing me to participate in the care of your patient. Please feel free to contact me with any concerns.   [address] [place] [address] Enr[facility] El[facility], DO

## 2020-02-15 NOTE — PLAN OF CARE
Problem: Falls - Risk of:  Goal: Will remain free from falls  Description  Will remain free from falls  2/15/2020 0439 by Christian Donohue RN  Outcome: Ongoing  2/14/2020 2030 by David Fenton RN  Outcome: Ongoing  Goal: Absence of physical injury  Description  Absence of physical injury  2/15/2020 0439 by Christian Donohue RN  Outcome: Ongoing  2/14/2020 2030 by David Fenton RN  Outcome: Ongoing     Problem: Pain:  Goal: Pain level will decrease  Description  Pain level will decrease  2/15/2020 0439 by Christian Donohue RN  Outcome: Ongoing  2/14/2020 2030 by David Fenton RN  Outcome: Ongoing  Goal: Control of acute pain  Description  Control of acute pain  2/15/2020 0439 by Christian Donohue RN  Outcome: Ongoing  2/14/2020 2030 by David Fenton RN  Outcome: Ongoing  Goal: Control of chronic pain  Description  Control of chronic pain  2/15/2020 0439 by Christian Donohue RN  Outcome: Ongoing  2/14/2020 2030 by David Fenton RN  Outcome: Ongoing     Problem: Cardiac:  Goal: Ability to maintain vital signs within normal range will improve  Description  Ability to maintain vital signs within normal range will improve  2/15/2020 0439 by Christian Donohue RN  Outcome: Ongoing  2/14/2020 2030 by David Fenton RN  Outcome: Ongoing  Goal: Cardiovascular alteration will improve  Description  Cardiovascular alteration will improve  2/15/2020 0439 by Christian Donohue RN  Outcome: Ongoing  2/14/2020 2030 by David Fenton RN  Outcome: Ongoing     Problem: Health Behavior:  Goal: Will modify at least one risk factor affecting health status  Description  Will modify at least one risk factor affecting health status  2/15/2020 0439 by Christian Donohue RN  Outcome: Ongoing  2/14/2020 2030 by David Fenton RN  Outcome: Ongoing  Goal: Identification of resources available to assist in meeting health care needs will improve  Description  Identification of resources available to assist in meeting health care needs will improve  2/15/2020 0439 by Alondra Strickland RN  Outcome: Ongoing  2/14/2020 2030 by Alessandro Ward RN  Outcome: Ongoing     Problem: Physical Regulation:  Goal: Complications related to the disease process, condition or treatment will be avoided or minimized  Description  Complications related to the disease process, condition or treatment will be avoided or minimized  2/15/2020 0439 by Alondra Strickland RN  Outcome: Ongoing  2/14/2020 2030 by Alessandro Ward RN  Outcome: Ongoing     Problem: Discharge Planning:  Goal: Discharged to appropriate level of care  Description  Discharged to appropriate level of care  2/15/2020 0439 by Alondra Strickland RN  Outcome: Ongoing  2/14/2020 2030 by Alessandro Ward RN  Outcome: Ongoing     Problem: Cardiac Output - Decreased:  Goal: Hemodynamic stability will improve  Description  Hemodynamic stability will improve  2/15/2020 0439 by Alondra Strickland RN  Outcome: Ongoing  2/14/2020 2030 by Alessandro Ward RN  Outcome: Ongoing     Problem: Serum Glucose Level - Abnormal:  Goal: Ability to maintain appropriate glucose levels will improve  Description  Ability to maintain appropriate glucose levels will improve  2/15/2020 0439 by Alondra Strickland RN  Outcome: Ongoing  2/14/2020 2030 by Alessandro Ward RN  Outcome: Ongoing     Problem: Pain:  Goal: Control of acute pain  Description  Control of acute pain  2/15/2020 0439 by Alondra Strickland RN  Outcome: Ongoing  2/14/2020 2030 by Alessandro Ward RN  Outcome: Ongoing  Goal: Control of chronic pain  Description  Control of chronic pain  2/15/2020 0439 by Alondra Strickland RN  Outcome: Ongoing  2/14/2020 2030 by Alessandro Ward RN  Outcome: Ongoing     Problem: Tissue Perfusion - Cardiopulmonary, Altered:  Goal: Absence of angina  Description  Absence of angina  2/15/2020 0439 by Alondra Strickland RN  Outcome: Ongoing  2/14/2020 2030 by Alessandro Ward RN  Outcome:

## 2020-02-15 NOTE — PROGRESS NOTES
with septal thickening at the lung bases, compatible   with changes of early pulmonary edema. Small hiatal hernia with nonspecific thickening at the GE junction. Postsurgical change from left lower quadrant ostomy and aortobifemoral   bypass. Appearance is similar. XR CHEST STANDARD (2 VW)   Final Result   No acute cardiopulmonary process demonstrated         NM Cardiac Stress Test Nuclear Imaging    (Results Pending)           Assessment/Plan:    Active Hospital Problems    Diagnosis    Chest pain [R07.9]         Chest pain - exertional, improved with nitrate, but resolved with GI cocktail - sugestive of upper GI process. - stress test in 2017 positive. Prior CABG. No recent angiography. Plan:               - cycle troponin - negative. - repeat echo - reassuring.               - cardiology eval - recs noted     - ordered stress test - pending today        COPD with chronic hypox RF - stable  Cont PTA regimen of inhalers.        Febrile Illness - fever resolved. Procalcitonin negative  Suspect viral process. Recheck Flu and Viral resp panel - both negative.           Anemia - iron deficiency - acute on chronic. With Hx of ostomy blood loss. GI eval underway. Hgb 8.7 to 7.8 7.4 and up to 7.8   Appears that she had some peristomal bleeidng - now resolved - no endoscopy planned this admit. Pt on eliquis and ASA - Im holding both . If Hgb stable, resume eliquis and ok for discharge. Pending stress test results.           DMII - cont PTA regimen. Add ISS.  CCD.         Paroxysmal Afib  Oct2018 EKG. Sinus this admit. Cont eliquis - see above. Follows with Dr Radha Mckeon - being evaluated for Watchman due to ostomy blood loss on eliquis and ASA.         PVD s/p prior angioplasty and bypass. On ASA . Statin. Stable.        Chronic Diastolic CHF   Pt sent from cardiology office due to SOB and chest pain. ECHO pending.    Cont PTA lasix regimen.           DVT

## 2020-02-16 LAB
ALBUMIN SERPL-MCNC: 3.4 G/DL (ref 3.4–5)
ANION GAP SERPL CALCULATED.3IONS-SCNC: 12 MMOL/L (ref 3–16)
BASE EXCESS ARTERIAL: 14.2 MMOL/L (ref -3–3)
BASOPHILS ABSOLUTE: 0 K/UL (ref 0–0.2)
BASOPHILS RELATIVE PERCENT: 0.3 %
BUN BLDV-MCNC: 20 MG/DL (ref 7–20)
CALCIUM SERPL-MCNC: 9.2 MG/DL (ref 8.3–10.6)
CARBOXYHEMOGLOBIN ARTERIAL: 1.8 % (ref 0–1.5)
CHLORIDE BLD-SCNC: 90 MMOL/L (ref 99–110)
CO2: 38 MMOL/L (ref 21–32)
CREAT SERPL-MCNC: 1.2 MG/DL (ref 0.6–1.2)
EOSINOPHILS ABSOLUTE: 0.2 K/UL (ref 0–0.6)
EOSINOPHILS RELATIVE PERCENT: 1.5 %
GFR AFRICAN AMERICAN: 53
GFR NON-AFRICAN AMERICAN: 44
GLUCOSE BLD-MCNC: 120 MG/DL (ref 70–99)
HCO3 ARTERIAL: 40.4 MMOL/L (ref 21–29)
HCT VFR BLD CALC: 24.8 % (ref 36–48)
HEMOGLOBIN, ART, EXTENDED: 7.5 G/DL (ref 12–16)
HEMOGLOBIN: 7.7 G/DL (ref 12–16)
LYMPHOCYTES ABSOLUTE: 0.9 K/UL (ref 1–5.1)
LYMPHOCYTES RELATIVE PERCENT: 8.1 %
MCH RBC QN AUTO: 23.7 PG (ref 26–34)
MCHC RBC AUTO-ENTMCNC: 30.9 G/DL (ref 31–36)
MCV RBC AUTO: 76.9 FL (ref 80–100)
METHEMOGLOBIN ARTERIAL: 0.8 %
MONOCYTES ABSOLUTE: 0.8 K/UL (ref 0–1.3)
MONOCYTES RELATIVE PERCENT: 7.1 %
NEUTROPHILS ABSOLUTE: 9.6 K/UL (ref 1.7–7.7)
NEUTROPHILS RELATIVE PERCENT: 83 %
O2 CONTENT ARTERIAL: 10 ML/DL
O2 SAT, ARTERIAL: 98.9 %
O2 THERAPY: ABNORMAL
PCO2 ARTERIAL: 65.9 MMHG (ref 35–45)
PDW BLD-RTO: 20.3 % (ref 12.4–15.4)
PH ARTERIAL: 7.4 (ref 7.35–7.45)
PHOSPHORUS: 4.1 MG/DL (ref 2.5–4.9)
PLATELET # BLD: 235 K/UL (ref 135–450)
PMV BLD AUTO: 8.4 FL (ref 5–10.5)
PO2 ARTERIAL: 108 MMHG (ref 75–108)
POTASSIUM SERPL-SCNC: 3.5 MMOL/L (ref 3.5–5.1)
RBC # BLD: 3.22 M/UL (ref 4–5.2)
SODIUM BLD-SCNC: 140 MMOL/L (ref 136–145)
TCO2 ARTERIAL: 42.4 MMOL/L
WBC # BLD: 11.6 K/UL (ref 4–11)

## 2020-02-16 PROCEDURE — 85025 COMPLETE CBC W/AUTO DIFF WBC: CPT

## 2020-02-16 PROCEDURE — 94660 CPAP INITIATION&MGMT: CPT

## 2020-02-16 PROCEDURE — 1200000000 HC SEMI PRIVATE

## 2020-02-16 PROCEDURE — 94640 AIRWAY INHALATION TREATMENT: CPT

## 2020-02-16 PROCEDURE — 82803 BLOOD GASES ANY COMBINATION: CPT

## 2020-02-16 PROCEDURE — 36600 WITHDRAWAL OF ARTERIAL BLOOD: CPT

## 2020-02-16 PROCEDURE — C9113 INJ PANTOPRAZOLE SODIUM, VIA: HCPCS | Performed by: INTERNAL MEDICINE

## 2020-02-16 PROCEDURE — 2580000003 HC RX 258: Performed by: INTERNAL MEDICINE

## 2020-02-16 PROCEDURE — 80069 RENAL FUNCTION PANEL: CPT

## 2020-02-16 PROCEDURE — 6370000000 HC RX 637 (ALT 250 FOR IP): Performed by: INTERNAL MEDICINE

## 2020-02-16 PROCEDURE — 36415 COLL VENOUS BLD VENIPUNCTURE: CPT

## 2020-02-16 PROCEDURE — 6360000002 HC RX W HCPCS: Performed by: INTERNAL MEDICINE

## 2020-02-16 RX ADMIN — CARVEDILOL 12.5 MG: 12.5 TABLET, FILM COATED ORAL at 17:48

## 2020-02-16 RX ADMIN — CARVEDILOL 12.5 MG: 12.5 TABLET, FILM COATED ORAL at 09:34

## 2020-02-16 RX ADMIN — CLONIDINE HYDROCHLORIDE 0.2 MG: 0.1 TABLET ORAL at 14:24

## 2020-02-16 RX ADMIN — HYDRALAZINE HYDROCHLORIDE 100 MG: 50 TABLET, FILM COATED ORAL at 05:44

## 2020-02-16 RX ADMIN — ONDANSETRON 4 MG: 2 INJECTION INTRAMUSCULAR; INTRAVENOUS at 01:31

## 2020-02-16 RX ADMIN — PANTOPRAZOLE SODIUM 40 MG: 40 INJECTION, POWDER, FOR SOLUTION INTRAVENOUS at 21:23

## 2020-02-16 RX ADMIN — CLONIDINE HYDROCHLORIDE 0.2 MG: 0.1 TABLET ORAL at 09:34

## 2020-02-16 RX ADMIN — PANTOPRAZOLE SODIUM 40 MG: 40 INJECTION, POWDER, FOR SOLUTION INTRAVENOUS at 09:33

## 2020-02-16 RX ADMIN — SODIUM CHLORIDE, PRESERVATIVE FREE 10 ML: 5 INJECTION INTRAVENOUS at 21:23

## 2020-02-16 RX ADMIN — LEVOTHYROXINE SODIUM 50 MCG: 0.05 TABLET ORAL at 05:44

## 2020-02-16 RX ADMIN — SODIUM CHLORIDE, PRESERVATIVE FREE 10 ML: 5 INJECTION INTRAVENOUS at 09:35

## 2020-02-16 RX ADMIN — FUROSEMIDE 40 MG: 40 TABLET ORAL at 17:49

## 2020-02-16 RX ADMIN — FUROSEMIDE 40 MG: 40 TABLET ORAL at 09:34

## 2020-02-16 RX ADMIN — Medication 10 ML: at 21:23

## 2020-02-16 RX ADMIN — IRON SUCROSE 200 MG: 20 INJECTION, SOLUTION INTRAVENOUS at 14:24

## 2020-02-16 RX ADMIN — MONTELUKAST SODIUM 10 MG: 10 TABLET, FILM COATED ORAL at 09:34

## 2020-02-16 RX ADMIN — CITALOPRAM HYDROBROMIDE 40 MG: 20 TABLET ORAL at 09:34

## 2020-02-16 RX ADMIN — LORAZEPAM 1 MG: 1 TABLET ORAL at 17:52

## 2020-02-16 RX ADMIN — HYDRALAZINE HYDROCHLORIDE 100 MG: 50 TABLET, FILM COATED ORAL at 14:24

## 2020-02-16 RX ADMIN — TIOTROPIUM BROMIDE INHALATION SPRAY 2 PUFF: 3.12 SPRAY, METERED RESPIRATORY (INHALATION) at 09:56

## 2020-02-16 RX ADMIN — CLONIDINE HYDROCHLORIDE 0.2 MG: 0.1 TABLET ORAL at 22:11

## 2020-02-16 RX ADMIN — Medication 10 ML: at 09:34

## 2020-02-16 ASSESSMENT — PAIN SCALES - GENERAL
PAINLEVEL_OUTOF10: 0

## 2020-02-16 NOTE — PLAN OF CARE
Problem: Falls - Risk of:  Goal: Will remain free from falls  Description  Will remain free from falls  2/16/2020 1047 by Stefano Dow RN  Outcome: Ongoing     Problem: Falls - Risk of:  Goal: Absence of physical injury  Description  Absence of physical injury  2/16/2020 1047 by Stefano Dow RN  Outcome: Ongoing     Problem: Pain:  Goal: Pain level will decrease  Description  Pain level will decrease  2/16/2020 1047 by Stefano Dow RN  Outcome: Ongoing     Problem: Pain:  Goal: Control of acute pain  Description  Control of acute pain  2/16/2020 1047 by Stefano Dow RN  Outcome: Ongoing     Problem: Pain:  Goal: Control of chronic pain  Description  Control of chronic pain  2/16/2020 1047 by Stefano Dow RN  Outcome: Ongoing     Problem: Cardiac:  Goal: Ability to maintain vital signs within normal range will improve  Description  Ability to maintain vital signs within normal range will improve  2/16/2020 1047 by Stefano Dow RN  Outcome: Ongoing     Problem: Cardiac:  Goal: Cardiovascular alteration will improve  Description  Cardiovascular alteration will improve  2/16/2020 1047 by Stefano Dow RN  Outcome: Ongoing     Problem: Health Behavior:  Goal: Will modify at least one risk factor affecting health status  Description  Will modify at least one risk factor affecting health status  2/16/2020 1047 by Stefano Dow RN  Outcome: Ongoing     Problem: Health Behavior:  Goal: Identification of resources available to assist in meeting health care needs will improve  Description  Identification of resources available to assist in meeting health care needs will improve  2/16/2020 1047 by Stefano Dow RN  Outcome: Ongoing     Problem: Physical Regulation:  Goal: Complications related to the disease process, condition or treatment will be avoided or minimized  Description  Complications related to the disease process, condition or treatment will be avoided or minimized  2/16/2020 1047 by Ariane Rivas, RN  Outcome: Ongoing

## 2020-02-16 NOTE — PROGRESS NOTES
Hospitalist Progress Note      PCP: Gali Akins MD    Date of Admission: 2/13/2020    Chief Complaint: chest pain, cough, fever, feeling unwell. Subjective:     Chest pain resolved after GI cocktail and has not recurred. Dyspnea gets severe and complains of severe generalized weakness - basically staying in bed all the time. With little activity that she does get, goes right back to bed and asking for BiPAP. Medications:  Reviewed    Infusion Medications   Scheduled Medications    cloNIDine  0.2 mg Oral TID    pantoprazole  40 mg Intravenous BID    And    sodium chloride (PF)  10 mL Intravenous BID    iron sucrose  200 mg Intravenous Q24H    carvedilol  12.5 mg Oral BID WC    citalopram  40 mg Oral Daily    furosemide  40 mg Oral BID    hydrALAZINE  100 mg Oral 3 times per day    levothyroxine  50 mcg Oral QAM AC    montelukast  10 mg Oral Daily    tiotropium  2 puff Inhalation Daily    sodium chloride flush  10 mL Intravenous 2 times per day     PRN Meds: GI cocktail, regadenoson, traMADol **OR** traMADol, butalbital-acetaminophen-caffeine, ipratropium-albuterol, LORazepam, perflutren lipid microspheres, sodium chloride flush, magnesium hydroxide, ondansetron, acetaminophen      Intake/Output Summary (Last 24 hours) at 2/16/2020 1302  Last data filed at 2/16/2020 0733  Gross per 24 hour   Intake 240 ml   Output 1100 ml   Net -860 ml       Physical Exam Performed:    /64   Pulse 71   Temp 98.3 °F (36.8 °C) (Oral)   Resp 22   Ht 5' (1.524 m)   Wt 177 lb 11.1 oz (80.6 kg)   LMP 05/01/2006 (Approximate)   SpO2 92%   BMI 34.70 kg/m²     General appearance: No apparent distress appears stated age and cooperative. HEENT Normal cephalic, atraumatic without obvious deformity. Pupils equal, round, and reactive to light. Extra ocular muscles intact. Conjunctivae/corneas clear. Neck: Supple, No jugular venous distention/bruits.   Trachea midline without thyromegaly or PELVIS WO CONTRAST Additional Contrast? None   Final Result   Small pleural effusions with septal thickening at the lung bases, compatible   with changes of early pulmonary edema. Small hiatal hernia with nonspecific thickening at the GE junction. Postsurgical change from left lower quadrant ostomy and aortobifemoral   bypass. Appearance is similar. XR CHEST STANDARD (2 VW)   Final Result   No acute cardiopulmonary process demonstrated         NM Cardiac Stress Test Nuclear Imaging    (Results Pending)           Assessment/Plan:    Active Hospital Problems    Diagnosis    Symptomatic anemia [D64.9]    Chest pain [R07.9]         Chest pain - exertional, improved with nitrate, but resolved with GI cocktail - sugestive of likely upper GI process. - stress test in 2017 positive. Prior CABG. No recent angiography. Plan:               - cycle troponin - negative. - repeat echo - reassuring.               - cardiology eval - recs noted     - ordered stress test - pending - will be done Monday.           COPD with chronic hypox RF - stable  Cont PTA regimen of inhalers. Overnight BiPap  ABG - stable compensated chronic hypercapnia.        Febrile Illness - fever resolved. Procalcitonin negative  Suspect viral process. Recheck Flu and Viral resp panel - both negative.           Anemia - iron deficiency - acute on chronic. With Hx of ostomy blood loss. GI eval underway. Hgb 8.7 to 7.8 7.4 and up to 7.8   Appears that she had some peristomal bleeidng - now resolved - no endoscopy planned this admit. Pt on eliquis and ASA - I'm holding both till Hgb stable. If Hgb stable, resume eliquis and ok for discharge. Pending stress test results.           DMII - cont PTA regimen. Add ISS.  CCD.         Paroxysmal Afib  Oct2018 EKG. Sinus this admit. Cont eliquis - on hold as above.   Follows with Dr Iglesias Free - being evaluated for Watchman due to ostomy blood loss on eliquis and ASA.         PVD s/p prior angioplasty and bypass. On ASA  - on hold as above. Statin. Stable.          Chronic Diastolic CHF   Pt sent to ED from cardiology office due to SOB and chest pain. ECHO reassuring   Cont PTA PO lasix regimen.           DVT Prophylaxis: SCD, eliquis on hold  Diet: Diet general  Code Status: Prior     Dispo - to inpatient. Pending tress test Monday. I believe she is recovering from viral illness and her symptoms are exacerbated by acute on chronic anemia.      If stress test negative encourage PTOT and reassess dispo home vs ECF on discharge early next week.          Nyla Medina MD

## 2020-02-16 NOTE — PLAN OF CARE
Problem: Falls - Risk of:  Goal: Will remain free from falls  Description  Will remain free from falls  2/16/2020 0215 by Lito Franco RN  Outcome: Ongoing  2/15/2020 1600 by Ariane Rivas RN  Outcome: Ongoing  Goal: Absence of physical injury  Description  Absence of physical injury  2/16/2020 0215 by Lito Franco RN  Outcome: Ongoing  2/15/2020 1600 by Ariane Rivas RN  Outcome: Ongoing     Problem: Pain:  Goal: Pain level will decrease  Description  Pain level will decrease  2/16/2020 0215 by Lito Franco RN  Outcome: Ongoing  2/15/2020 1600 by Ariane Rivas RN  Outcome: Ongoing  Goal: Control of acute pain  Description  Control of acute pain  2/16/2020 0215 by Lito Franco RN  Outcome: Ongoing  2/15/2020 1600 by Ariane Rivas RN  Outcome: Ongoing  Goal: Control of chronic pain  Description  Control of chronic pain  2/16/2020 0215 by Lito Franco RN  Outcome: Ongoing  2/15/2020 1600 by Ariane Rivas RN  Outcome: Ongoing     Problem: Cardiac:  Goal: Ability to maintain vital signs within normal range will improve  Description  Ability to maintain vital signs within normal range will improve  2/16/2020 0215 by Lito Franco RN  Outcome: Ongoing  2/15/2020 1600 by Ariane Rivas RN  Outcome: Ongoing  Goal: Cardiovascular alteration will improve  Description  Cardiovascular alteration will improve  2/16/2020 0215 by Lito Franco RN  Outcome: Ongoing  2/15/2020 1600 by Ariane Rivas RN  Outcome: Ongoing     Problem: Health Behavior:  Goal: Will modify at least one risk factor affecting health status  Description  Will modify at least one risk factor affecting health status  2/16/2020 0215 by Lito Franco RN  Outcome: Ongoing  2/15/2020 1600 by Ariane Rivas RN  Outcome: Ongoing  Goal: Identification of resources available to assist in meeting health care needs will improve  Description  Identification of resources available to assist in meeting health

## 2020-02-17 LAB
ABO/RH: NORMAL
ALBUMIN SERPL-MCNC: 3.2 G/DL (ref 3.4–5)
ANION GAP SERPL CALCULATED.3IONS-SCNC: 12 MMOL/L (ref 3–16)
ANTIBODY SCREEN: NORMAL
BASOPHILS ABSOLUTE: 0 K/UL (ref 0–0.2)
BASOPHILS RELATIVE PERCENT: 0.2 %
BLOOD BANK DISPENSE STATUS: NORMAL
BLOOD BANK PRODUCT CODE: NORMAL
BPU ID: NORMAL
BUN BLDV-MCNC: 35 MG/DL (ref 7–20)
CALCIUM SERPL-MCNC: 9 MG/DL (ref 8.3–10.6)
CHLORIDE BLD-SCNC: 91 MMOL/L (ref 99–110)
CO2: 35 MMOL/L (ref 21–32)
CREAT SERPL-MCNC: 2.2 MG/DL (ref 0.6–1.2)
DESCRIPTION BLOOD BANK: NORMAL
EOSINOPHILS ABSOLUTE: 0.2 K/UL (ref 0–0.6)
EOSINOPHILS RELATIVE PERCENT: 1.7 %
GFR AFRICAN AMERICAN: 27
GFR NON-AFRICAN AMERICAN: 22
GLUCOSE BLD-MCNC: 118 MG/DL (ref 70–99)
HCT VFR BLD CALC: 22.6 % (ref 36–48)
HCT VFR BLD CALC: 22.6 % (ref 36–48)
HEMOGLOBIN: 7 G/DL (ref 12–16)
HEMOGLOBIN: 7 G/DL (ref 12–16)
LYMPHOCYTES ABSOLUTE: 0.9 K/UL (ref 1–5.1)
LYMPHOCYTES RELATIVE PERCENT: 8.6 %
MCH RBC QN AUTO: 24.3 PG (ref 26–34)
MCHC RBC AUTO-ENTMCNC: 31.1 G/DL (ref 31–36)
MCV RBC AUTO: 78 FL (ref 80–100)
MONOCYTES ABSOLUTE: 0.8 K/UL (ref 0–1.3)
MONOCYTES RELATIVE PERCENT: 7 %
NEUTROPHILS ABSOLUTE: 9 K/UL (ref 1.7–7.7)
NEUTROPHILS RELATIVE PERCENT: 82.5 %
PDW BLD-RTO: 20 % (ref 12.4–15.4)
PHOSPHORUS: 4 MG/DL (ref 2.5–4.9)
PLATELET # BLD: 216 K/UL (ref 135–450)
PMV BLD AUTO: 8.8 FL (ref 5–10.5)
POTASSIUM SERPL-SCNC: 3.5 MMOL/L (ref 3.5–5.1)
RBC # BLD: 2.9 M/UL (ref 4–5.2)
SODIUM BLD-SCNC: 138 MMOL/L (ref 136–145)
WBC # BLD: 10.9 K/UL (ref 4–11)

## 2020-02-17 PROCEDURE — C9113 INJ PANTOPRAZOLE SODIUM, VIA: HCPCS | Performed by: INTERNAL MEDICINE

## 2020-02-17 PROCEDURE — 6360000002 HC RX W HCPCS: Performed by: INTERNAL MEDICINE

## 2020-02-17 PROCEDURE — 86900 BLOOD TYPING SEROLOGIC ABO: CPT

## 2020-02-17 PROCEDURE — 2580000003 HC RX 258: Performed by: FAMILY MEDICINE

## 2020-02-17 PROCEDURE — 80069 RENAL FUNCTION PANEL: CPT

## 2020-02-17 PROCEDURE — 1200000000 HC SEMI PRIVATE

## 2020-02-17 PROCEDURE — 86850 RBC ANTIBODY SCREEN: CPT

## 2020-02-17 PROCEDURE — 86923 COMPATIBILITY TEST ELECTRIC: CPT

## 2020-02-17 PROCEDURE — 36430 TRANSFUSION BLD/BLD COMPNT: CPT

## 2020-02-17 PROCEDURE — 85014 HEMATOCRIT: CPT

## 2020-02-17 PROCEDURE — 94761 N-INVAS EAR/PLS OXIMETRY MLT: CPT

## 2020-02-17 PROCEDURE — P9016 RBC LEUKOCYTES REDUCED: HCPCS

## 2020-02-17 PROCEDURE — 2700000000 HC OXYGEN THERAPY PER DAY

## 2020-02-17 PROCEDURE — 85018 HEMOGLOBIN: CPT

## 2020-02-17 PROCEDURE — 6370000000 HC RX 637 (ALT 250 FOR IP): Performed by: INTERNAL MEDICINE

## 2020-02-17 PROCEDURE — 2580000003 HC RX 258: Performed by: INTERNAL MEDICINE

## 2020-02-17 PROCEDURE — 94640 AIRWAY INHALATION TREATMENT: CPT

## 2020-02-17 PROCEDURE — 86901 BLOOD TYPING SEROLOGIC RH(D): CPT

## 2020-02-17 PROCEDURE — 85025 COMPLETE CBC W/AUTO DIFF WBC: CPT

## 2020-02-17 PROCEDURE — 36415 COLL VENOUS BLD VENIPUNCTURE: CPT

## 2020-02-17 RX ORDER — 0.9 % SODIUM CHLORIDE 0.9 %
20 INTRAVENOUS SOLUTION INTRAVENOUS ONCE
Status: COMPLETED | OUTPATIENT
Start: 2020-02-17 | End: 2020-02-17

## 2020-02-17 RX ORDER — TRAMADOL HYDROCHLORIDE 50 MG/1
50 TABLET ORAL EVERY 12 HOURS PRN
Status: DISCONTINUED | OUTPATIENT
Start: 2020-02-17 | End: 2020-02-21 | Stop reason: HOSPADM

## 2020-02-17 RX ORDER — TRAMADOL HYDROCHLORIDE 50 MG/1
100 TABLET ORAL EVERY 12 HOURS PRN
Status: DISCONTINUED | OUTPATIENT
Start: 2020-02-17 | End: 2020-02-21 | Stop reason: HOSPADM

## 2020-02-17 RX ADMIN — CARVEDILOL 12.5 MG: 12.5 TABLET, FILM COATED ORAL at 18:09

## 2020-02-17 RX ADMIN — HYDRALAZINE HYDROCHLORIDE 100 MG: 50 TABLET, FILM COATED ORAL at 20:59

## 2020-02-17 RX ADMIN — MONTELUKAST SODIUM 10 MG: 10 TABLET, FILM COATED ORAL at 09:30

## 2020-02-17 RX ADMIN — CLONIDINE HYDROCHLORIDE 0.2 MG: 0.1 TABLET ORAL at 21:00

## 2020-02-17 RX ADMIN — PANTOPRAZOLE SODIUM 40 MG: 40 INJECTION, POWDER, FOR SOLUTION INTRAVENOUS at 21:00

## 2020-02-17 RX ADMIN — CLONIDINE HYDROCHLORIDE 0.2 MG: 0.1 TABLET ORAL at 10:45

## 2020-02-17 RX ADMIN — CITALOPRAM HYDROBROMIDE 40 MG: 20 TABLET ORAL at 09:23

## 2020-02-17 RX ADMIN — PANTOPRAZOLE SODIUM 40 MG: 40 INJECTION, POWDER, FOR SOLUTION INTRAVENOUS at 09:22

## 2020-02-17 RX ADMIN — LORAZEPAM 1 MG: 1 TABLET ORAL at 14:05

## 2020-02-17 RX ADMIN — TIOTROPIUM BROMIDE INHALATION SPRAY 2 PUFF: 3.12 SPRAY, METERED RESPIRATORY (INHALATION) at 09:59

## 2020-02-17 RX ADMIN — Medication 10 ML: at 09:22

## 2020-02-17 RX ADMIN — CARVEDILOL 12.5 MG: 12.5 TABLET, FILM COATED ORAL at 09:22

## 2020-02-17 RX ADMIN — SODIUM CHLORIDE, PRESERVATIVE FREE 10 ML: 5 INJECTION INTRAVENOUS at 09:31

## 2020-02-17 RX ADMIN — IRON SUCROSE 200 MG: 20 INJECTION, SOLUTION INTRAVENOUS at 14:05

## 2020-02-17 RX ADMIN — SODIUM CHLORIDE 20 ML: 9 INJECTION, SOLUTION INTRAVENOUS at 16:32

## 2020-02-17 RX ADMIN — SODIUM CHLORIDE, PRESERVATIVE FREE 10 ML: 5 INJECTION INTRAVENOUS at 21:00

## 2020-02-17 RX ADMIN — Medication 10 ML: at 20:59

## 2020-02-17 RX ADMIN — LEVOTHYROXINE SODIUM 50 MCG: 0.05 TABLET ORAL at 09:22

## 2020-02-17 ASSESSMENT — PAIN SCALES - GENERAL
PAINLEVEL_OUTOF10: 0
PAINLEVEL_OUTOF10: 0

## 2020-02-17 NOTE — PRE-PROCEDURE INSTRUCTIONS
Prep for stress test on 2/18/2020 pending Hgb following transfusion today. No caffeine or decaffeinated products after 20:00 this evening. Hold solid food after 05:00 on 2/18/2020; may have water as desired / ordered.   Electronically signed by Nury Isabel RN on 2/17/2020 at 12:12 PM

## 2020-02-17 NOTE — PROGRESS NOTES
Hospitalist Progress Note      PCP: Stevo Novak MD    Date of Admission: 2/13/2020    Chief Complaint: chest pain, cough, fever, feeling unwell. Subjective:   Pt S/E. Her chest pain has resolved, but still feels very weak. hgb down to 7.0 today. The only bleeding is minimal at the stoma site. Stools are brown. Medications:  Reviewed    Infusion Medications   Scheduled Medications    cloNIDine  0.2 mg Oral TID    pantoprazole  40 mg Intravenous BID    And    sodium chloride (PF)  10 mL Intravenous BID    iron sucrose  200 mg Intravenous Q24H    carvedilol  12.5 mg Oral BID WC    citalopram  40 mg Oral Daily    furosemide  40 mg Oral BID    hydrALAZINE  100 mg Oral 3 times per day    levothyroxine  50 mcg Oral QAM AC    montelukast  10 mg Oral Daily    tiotropium  2 puff Inhalation Daily    sodium chloride flush  10 mL Intravenous 2 times per day     PRN Meds: GI cocktail, regadenoson, traMADol **OR** traMADol, butalbital-acetaminophen-caffeine, ipratropium-albuterol, LORazepam, perflutren lipid microspheres, sodium chloride flush, magnesium hydroxide, ondansetron, acetaminophen      Intake/Output Summary (Last 24 hours) at 2/17/2020 0929  Last data filed at 2/16/2020 2007  Gross per 24 hour   Intake 240 ml   Output 1150 ml   Net -910 ml       Physical Exam Performed:    BP (!) 103/53   Pulse 72   Temp 98.1 °F (36.7 °C) (Axillary)   Resp 18   Ht 5' (1.524 m)   Wt 177 lb 11.1 oz (80.6 kg)   LMP 05/01/2006 (Approximate)   SpO2 99%   BMI 34.70 kg/m²     General appearance: No apparent distress appears comfortable  HEENT Normal cephalic, atraumatic without obvious deformity. Pupils equal, round, and reactive to light. Extra ocular muscles intact. Conjunctivae/corneas clear. Neck: Supple, No jugular venous distention/bruits. Trachea midline   Lungs: Clear to auscultation, bilaterally without Rales/Wheezes/Rhonchi with good respiratory effort.   Heart: Regular rate and rhythm with Normal S1/S2 without murmurs, rubs or gallops  Abdomen: Soft, non-tender or non-distended without rigidity or guarding and positive bowel sounds all four quadrants. Ostomy in place with soft brown stool in the bag, no bleeding or melanotic stools  Extremities: No clubbing, cyanosis, trace pitting edema bilaterally. Full range of motion without deformity   Skin: Skin color, texture, turgor normal.  No rashes or lesions. Neurologic: Alert and oriented X 3, neurovascularly intact with sensory/motor intact upper extremities/lower extremities, bilaterally. Cranial nerves: II-XII intact, grossly non-focal.  Mental status: Alert, oriented, thought content appropriate. Capillary Refill: Acceptable  < 3 seconds  Peripheral Pulses: +3 Easily felt, not easily obliterated with pressure       Labs:   Recent Labs     02/15/20  0448 02/16/20  0445 02/17/20  0442   WBC 12.1* 11.6* 10.9   HGB 7.8* 7.7* 7.0*   HCT 25.3* 24.8* 22.6*    235 216     Recent Labs     02/15/20  0448 02/16/20  0445 02/17/20  0442    140 138   K 3.8 3.5 3.5   CL 92* 90* 91*   CO2 35* 38* 35*   BUN 20 20 35*   CREATININE 1.1 1.2 2.2*   CALCIUM 9.2 9.2 9.0   PHOS 3.9 4.1 4.0     No results for input(s): AST, ALT, BILIDIR, BILITOT, ALKPHOS in the last 72 hours. No results for input(s): INR in the last 72 hours. No results for input(s): Erven Sell in the last 72 hours. Urinalysis:      Lab Results   Component Value Date    NITRU Negative 09/25/2019    WBCUA 369 09/25/2019    BACTERIA 2+ 08/08/2016    RBCUA 6 09/25/2019    BLOODU neg 01/28/2020    BLOODU Negative 09/25/2019    SPECGRAV 1.025 01/28/2020    SPECGRAV 1.021 09/25/2019    GLUCOSEU neg 01/28/2020    GLUCOSEU Negative 09/25/2019       Radiology:  CT ABDOMEN PELVIS WO CONTRAST Additional Contrast? None   Final Result   Small pleural effusions with septal thickening at the lung bases, compatible   with changes of early pulmonary edema.       Small hiatal hernia with nonspecific thickening at the GE junction. Postsurgical change from left lower quadrant ostomy and aortobifemoral   bypass. Appearance is similar. XR CHEST STANDARD (2 VW)   Final Result   No acute cardiopulmonary process demonstrated         NM Cardiac Stress Test Nuclear Imaging    (Results Pending)           Assessment/Plan:    Active Hospital Problems    Diagnosis    Symptomatic anemia [D64.9]    Chest pain [R07.9]       Anemia - iron deficiency - acute on chronic. With Hx of ostomy blood loss. GI eval underway. Hgb 8.7 -> 7.8 -> 7.0  - will transfuse 1 unit prbcs today  Appears that she had some peristomal bleeidng - now resolved - no endoscopy planned this admit. Pt on eliquis and ASA - will hold eliquis at discahrge and restart asa if hgb remains stable  - has received venofer, Fe po at dc     RAMÍREZ  - crt baseline 1.1 -1.3, now 2.1  - hopefully will improve with transfusion   - Avoid nephrotoxins  - check: UACS, Dylan/UCrt, uric acid, TSH, U osmol  - follow renal function tests; if no improvement will get renal US     Chest pain - exertional, improved with nitrate, but resolved with GI cocktail - sugestive of likely upper GI process. - stress test in 2017 positive  Prior CABG  No recent angiography  - cycle troponin - negative  - repeat echo - reassuring  - cardiology eval - recs noted    - stress test canceled due to anemia     COPD with chronic hypox RF - stable  Cont PTA regimen of inhalers. Overnight BiPap  ABG - stable compensated chronic hypercapnia.      Febrile Illness - fever resolved. Procalcitonin negative  Suspect viral process. Recheck Flu and Viral resp panel - both negative.      DMII - cont PTA regimen. Add ISS.  CCD.     Paroxysmal Afib  Oct 2018 EKG. Sinus this admit.   eliquis - on hold as above. Follows with Dr Mike Caputo - being evaluated for Watchman due to ostomy blood loss on eliquis and ASA.      PVD s/p prior angioplasty and bypass.    On ASA  - on hold as

## 2020-02-17 NOTE — CARE COORDINATION
INITIAL CASE MANAGEMENT ASSESSMENT    Reviewed chart, met with patient to assess possible discharge needs. Explained Case Management role/services. Living Situation: Lives in an apartment and her granddaughter lives with her. There are 5-6 steps to enter with a rail. ADLs:  Independent in bathing and dressing , ambulates independently and does not use an assisted device. DME:  [employer] , oxygen from [address] has bi pap. PT/OT Recs:  Not ordered      Active Services:  N/A      Transportation:  [employer] of she takes a cab when she has the money. Medications:  [employer] no issues    PCP:  Dr. Sudheer Powell      HD/PD:  N/A     PLAN/COMMENTS:  Goal:  Return home with home care assistance. Patient tells cm she is starting to have a difficult time with ambulation due to her breathing and she feels she needs additional help at home. Patient is requesting home care especially therapy. The Plan for Transition of Care is related to the following treatment goals: return home to get therapy to help with my breathing. Patient is requesting a portable concentrator, will need orders to support that request.      The Patient and/or patient representative granddaughter  was provided with a choice of provider and agrees   with the discharge plan. [x] Yes [] No    Freedom of choice list was provided with basic dialogue that supports the patient's individualized plan of care/goals, treatment preferences and shares the quality data associated with the providers. [x] Yes [] No    SW/CM provided contact information for patient or family to call with any questions. SW/CM will follow and assist as needed.     Electronically signed by Varsha Strong on 2/17/2020 at 4:31 PM

## 2020-02-18 ENCOUNTER — APPOINTMENT (OUTPATIENT)
Dept: GENERAL RADIOLOGY | Age: 75
DRG: 198 | End: 2020-02-18
Payer: COMMERCIAL

## 2020-02-18 LAB
ALBUMIN SERPL-MCNC: 3.2 G/DL (ref 3.4–5)
ANION GAP SERPL CALCULATED.3IONS-SCNC: 11 MMOL/L (ref 3–16)
BASOPHILS ABSOLUTE: 0 K/UL (ref 0–0.2)
BASOPHILS RELATIVE PERCENT: 0.4 %
BUN BLDV-MCNC: 43 MG/DL (ref 7–20)
CALCIUM SERPL-MCNC: 9 MG/DL (ref 8.3–10.6)
CHLORIDE BLD-SCNC: 88 MMOL/L (ref 99–110)
CO2: 33 MMOL/L (ref 21–32)
CREAT SERPL-MCNC: 1.7 MG/DL (ref 0.6–1.2)
EOSINOPHILS ABSOLUTE: 0.2 K/UL (ref 0–0.6)
EOSINOPHILS RELATIVE PERCENT: 1.6 %
GFR AFRICAN AMERICAN: 36
GFR NON-AFRICAN AMERICAN: 30
GLUCOSE BLD-MCNC: 114 MG/DL (ref 70–99)
HCT VFR BLD CALC: 27.1 % (ref 36–48)
HEMOGLOBIN: 8.6 G/DL (ref 12–16)
LYMPHOCYTES ABSOLUTE: 0.9 K/UL (ref 1–5.1)
LYMPHOCYTES RELATIVE PERCENT: 9 %
MCH RBC QN AUTO: 25.1 PG (ref 26–34)
MCHC RBC AUTO-ENTMCNC: 31.6 G/DL (ref 31–36)
MCV RBC AUTO: 79.6 FL (ref 80–100)
MONOCYTES ABSOLUTE: 0.6 K/UL (ref 0–1.3)
MONOCYTES RELATIVE PERCENT: 6.5 %
NEUTROPHILS ABSOLUTE: 8.2 K/UL (ref 1.7–7.7)
NEUTROPHILS RELATIVE PERCENT: 82.5 %
PDW BLD-RTO: 19.5 % (ref 12.4–15.4)
PHOSPHORUS: 3.7 MG/DL (ref 2.5–4.9)
PLATELET # BLD: 214 K/UL (ref 135–450)
PMV BLD AUTO: 8.7 FL (ref 5–10.5)
POTASSIUM SERPL-SCNC: 4 MMOL/L (ref 3.5–5.1)
RBC # BLD: 3.41 M/UL (ref 4–5.2)
SODIUM BLD-SCNC: 132 MMOL/L (ref 136–145)
WBC # BLD: 10 K/UL (ref 4–11)

## 2020-02-18 PROCEDURE — 2700000000 HC OXYGEN THERAPY PER DAY

## 2020-02-18 PROCEDURE — 6360000002 HC RX W HCPCS: Performed by: INTERNAL MEDICINE

## 2020-02-18 PROCEDURE — 6370000000 HC RX 637 (ALT 250 FOR IP): Performed by: INTERNAL MEDICINE

## 2020-02-18 PROCEDURE — 80069 RENAL FUNCTION PANEL: CPT

## 2020-02-18 PROCEDURE — 36415 COLL VENOUS BLD VENIPUNCTURE: CPT

## 2020-02-18 PROCEDURE — 99223 1ST HOSP IP/OBS HIGH 75: CPT | Performed by: INTERNAL MEDICINE

## 2020-02-18 PROCEDURE — C9113 INJ PANTOPRAZOLE SODIUM, VIA: HCPCS | Performed by: INTERNAL MEDICINE

## 2020-02-18 PROCEDURE — 2580000003 HC RX 258: Performed by: FAMILY MEDICINE

## 2020-02-18 PROCEDURE — 94660 CPAP INITIATION&MGMT: CPT

## 2020-02-18 PROCEDURE — 85025 COMPLETE CBC W/AUTO DIFF WBC: CPT

## 2020-02-18 PROCEDURE — 2580000003 HC RX 258: Performed by: INTERNAL MEDICINE

## 2020-02-18 PROCEDURE — 1200000000 HC SEMI PRIVATE

## 2020-02-18 PROCEDURE — 71045 X-RAY EXAM CHEST 1 VIEW: CPT

## 2020-02-18 PROCEDURE — 94640 AIRWAY INHALATION TREATMENT: CPT

## 2020-02-18 PROCEDURE — 94761 N-INVAS EAR/PLS OXIMETRY MLT: CPT

## 2020-02-18 RX ORDER — BUDESONIDE AND FORMOTEROL FUMARATE DIHYDRATE 160; 4.5 UG/1; UG/1
2 AEROSOL RESPIRATORY (INHALATION) 2 TIMES DAILY
Status: DISCONTINUED | OUTPATIENT
Start: 2020-02-18 | End: 2020-02-21 | Stop reason: HOSPADM

## 2020-02-18 RX ORDER — SODIUM CHLORIDE 9 MG/ML
INJECTION, SOLUTION INTRAVENOUS CONTINUOUS
Status: DISCONTINUED | OUTPATIENT
Start: 2020-02-18 | End: 2020-02-19

## 2020-02-18 RX ORDER — METHYLPREDNISOLONE SODIUM SUCCINATE 40 MG/ML
40 INJECTION, POWDER, LYOPHILIZED, FOR SOLUTION INTRAMUSCULAR; INTRAVENOUS EVERY 12 HOURS
Status: DISCONTINUED | OUTPATIENT
Start: 2020-02-18 | End: 2020-02-20

## 2020-02-18 RX ADMIN — PANTOPRAZOLE SODIUM 40 MG: 40 INJECTION, POWDER, FOR SOLUTION INTRAVENOUS at 20:52

## 2020-02-18 RX ADMIN — TIOTROPIUM BROMIDE INHALATION SPRAY 2 PUFF: 3.12 SPRAY, METERED RESPIRATORY (INHALATION) at 11:17

## 2020-02-18 RX ADMIN — BUDESONIDE AND FORMOTEROL FUMARATE DIHYDRATE 2 PUFF: 160; 4.5 AEROSOL RESPIRATORY (INHALATION) at 20:20

## 2020-02-18 RX ADMIN — HYDRALAZINE HYDROCHLORIDE 100 MG: 50 TABLET, FILM COATED ORAL at 06:35

## 2020-02-18 RX ADMIN — IPRATROPIUM BROMIDE AND ALBUTEROL SULFATE 3 ML: .5; 3 SOLUTION RESPIRATORY (INHALATION) at 11:17

## 2020-02-18 RX ADMIN — CARVEDILOL 12.5 MG: 12.5 TABLET, FILM COATED ORAL at 09:55

## 2020-02-18 RX ADMIN — METHYLPREDNISOLONE SODIUM SUCCINATE 40 MG: 40 INJECTION, POWDER, FOR SOLUTION INTRAMUSCULAR; INTRAVENOUS at 16:15

## 2020-02-18 RX ADMIN — CARVEDILOL 12.5 MG: 12.5 TABLET, FILM COATED ORAL at 16:15

## 2020-02-18 RX ADMIN — LEVOTHYROXINE SODIUM 50 MCG: 0.05 TABLET ORAL at 06:35

## 2020-02-18 RX ADMIN — CITALOPRAM HYDROBROMIDE 40 MG: 20 TABLET ORAL at 09:56

## 2020-02-18 RX ADMIN — PANTOPRAZOLE SODIUM 40 MG: 40 INJECTION, POWDER, FOR SOLUTION INTRAVENOUS at 09:55

## 2020-02-18 RX ADMIN — HYDRALAZINE HYDROCHLORIDE 100 MG: 50 TABLET, FILM COATED ORAL at 20:52

## 2020-02-18 RX ADMIN — CLONIDINE HYDROCHLORIDE 0.2 MG: 0.1 TABLET ORAL at 14:36

## 2020-02-18 RX ADMIN — IPRATROPIUM BROMIDE AND ALBUTEROL SULFATE 3 ML: .5; 3 SOLUTION RESPIRATORY (INHALATION) at 21:59

## 2020-02-18 RX ADMIN — MONTELUKAST SODIUM 10 MG: 10 TABLET, FILM COATED ORAL at 09:55

## 2020-02-18 RX ADMIN — IRON SUCROSE 200 MG: 20 INJECTION, SOLUTION INTRAVENOUS at 16:15

## 2020-02-18 RX ADMIN — SODIUM CHLORIDE, PRESERVATIVE FREE 10 ML: 5 INJECTION INTRAVENOUS at 20:53

## 2020-02-18 RX ADMIN — SODIUM CHLORIDE: 9 INJECTION, SOLUTION INTRAVENOUS at 09:55

## 2020-02-18 RX ADMIN — HYDRALAZINE HYDROCHLORIDE 100 MG: 50 TABLET, FILM COATED ORAL at 14:36

## 2020-02-18 RX ADMIN — Medication 10 ML: at 20:52

## 2020-02-18 RX ADMIN — Medication 10 ML: at 10:06

## 2020-02-18 RX ADMIN — CLONIDINE HYDROCHLORIDE 0.2 MG: 0.1 TABLET ORAL at 20:52

## 2020-02-18 RX ADMIN — CLONIDINE HYDROCHLORIDE 0.2 MG: 0.1 TABLET ORAL at 09:55

## 2020-02-18 NOTE — PROGRESS NOTES
Hospitalist Progress Note      PCP: Lisa Luna MD    Date of Admission: 2/13/2020    Chief Complaint: chest pain, cough, fever, feeling unwell. Subjective: Pt S/E. Her chest pain has resolved, but pt now reports feeling like she can't get a good breath. She is currently on 4L O2, her home dose. She states she woke up this way. She denies cough, wheezing.   hgb down 8.6 today. She reports some bleeding with her colostomy bag change yesterday. Stools are not bloody or melanotic. Medications:  Reviewed    Infusion Medications    [Held by provider] sodium chloride 100 mL/hr at 02/18/20 0955     Scheduled Medications    cloNIDine  0.2 mg Oral TID    pantoprazole  40 mg Intravenous BID    And    sodium chloride (PF)  10 mL Intravenous BID    iron sucrose  200 mg Intravenous Q24H    carvedilol  12.5 mg Oral BID WC    citalopram  40 mg Oral Daily    [Held by provider] furosemide  40 mg Oral BID    hydrALAZINE  100 mg Oral 3 times per day    levothyroxine  50 mcg Oral QAM AC    montelukast  10 mg Oral Daily    tiotropium  2 puff Inhalation Daily    sodium chloride flush  10 mL Intravenous 2 times per day     PRN Meds: traMADol **OR** traMADol, GI cocktail, regadenoson, butalbital-acetaminophen-caffeine, ipratropium-albuterol, LORazepam, perflutren lipid microspheres, sodium chloride flush, magnesium hydroxide, ondansetron, acetaminophen      Intake/Output Summary (Last 24 hours) at 2/18/2020 1329  Last data filed at 2/18/2020 1021  Gross per 24 hour   Intake 917.08 ml   Output 550 ml   Net 367.08 ml        Physical Exam Performed:    /62   Pulse 80   Temp 98.1 °F (36.7 °C)   Resp 19   Ht 5' (1.524 m)   Wt 178 lb 9.2 oz (81 kg)   LMP 05/01/2006 (Approximate)   SpO2 98%   BMI 34.88 kg/m²     General appearance: No apparent distress, appears comfortable  HEENT Normal cephalic, atraumatic without obvious deformity. Pupils equal, round, and reactive to light.   Extra ocular muscles - both negative.      DMII - cont PTA regimen. Add ISS.  CCD.     Paroxysmal Afib  Oct 2018 EKG. eliquis - on hold as above. Follows with Dr Salome Bear - being evaluated for Watchman due to ostomy blood loss on eliquis and ASA.      PVD s/p prior angioplasty and bypass. On ASA  - on hold as above. Statin. Stable.      Chronic Diastolic CHF   Pt sent to ED from cardiology office due to SOB and chest pain. ECHO reassuring   Cont PTA PO lasix regimen.    Lasix on hold as above     DVT Prophylaxis: SCD, eliquis on hold  Diet: Diet general  Code Status: Prior     Dispo - to inpatient.

## 2020-02-18 NOTE — CONSULTS
REASON FOR CONSULTATION/CC: shortness of breath       Consult at request of Evelyn Hong DO for shortness of breath     PCP: Molly Alvarenga MD  Established Pulmonologist: Dr. Erazo Epp: Keya Brantley is a 69y.o. year old female with a history of copd who presents with     She was having increasing shortness of breath as out patient on Breo and Spiriva with o2 at 4L. There was a concern this was not secondary to COPD and echo was ordered. This has been on going for ~ 1 month. She was on prednisone prior to admission without improvement. She was admitted 3 days ago for shortness of breath and chest pain. She had negaitve work up but continues ot have shortness of breath. No cough or wheezing. PAST MEDICAL HISTORY:  Past Medical History:   Diagnosis Date    CAD (coronary artery disease)     Nonobstructive on cath in 9/2017    [facility]estive [facility] failure[facility]     Colostomy care ([facility] [address] 4/25/2018    Peristomal irritation and early leaking    COPD (chronic obstructive pulmonary disease) ([facility])     Hyperlipidemia     Hypertension     Kidney disease     Stage 3    Peripheral vascular disease ([facility]     Rectal fissure 11/2014    surgery pending    Restless legs syndrome     Sleep apnea     O2 3 liters at hs    Unspecified sleep apnea        PAST SURGICAL HISTORY:  Past Surgical History:   Procedure Laterality Date    CARDIAC CATHETERIZATION  09/13/2017    Dr. Irina Dodd  03/09/2018    CORONARY ARTERY BYPASS GRAFT      Legs & Carotid    HERNIA REPAIR      UPPER GASTROINTESTINAL ENDOSCOPY N/A 9/30/2019    EGD DIAGNOSTIC ONLY performed by Christianne Gooden MD at . [place] [facility]:  family history includes Cancer in her brother; Heart Disease in her father, mother, and sister. SOCIAL HISTORY:   reports that she quit smoking about 28 years ago. She started smoking about 57 years ago.  She has a 90.00 pack-year smoking history. She has never used smokeless tobacco.    Scheduled Meds:   cloNIDine  0.2 mg Oral TID    pantoprazole  40 mg Intravenous BID    And    sodium chloride (PF)  10 mL Intravenous BID    iron sucrose  200 mg Intravenous Q24H    carvedilol  12.5 mg Oral BID WC    citalopram  40 mg Oral Daily    [Held by provider] furosemide  40 mg Oral BID    hydrALAZINE  100 mg Oral 3 times per day    levothyroxine  50 mcg Oral QAM AC    montelukast  10 mg Oral Daily    tiotropium  2 puff Inhalation Daily    sodium chloride flush  10 mL Intravenous 2 times per day       Continuous Infusions:   [Held by provider] sodium chloride 100 mL/hr at 02/18/20 0955       PRN Meds:  traMADol **OR** traMADol, GI cocktail, regadenoson, butalbital-acetaminophen-caffeine, ipratropium-albuterol, LORazepam, perflutren lipid microspheres, sodium chloride flush, magnesium hydroxide, ondansetron, acetaminophen    ALLERGIES:  Patient is allergic to iv dye [iodides]. REVIEW OF SYSTEMS:  Constitutional: Negative for fever   HENT: Negative for sore throat  Eyes: Negative for redness   Respiratory: ++ for dyspnea, - cough  Cardiovascular: Negative for chest pain  Gastrointestinal: Negative for vomiting, diarrhea   Genitourinary: Negative for hematuria   Musculoskeletal: Negative for arthralgias   Skin: Negative for rash  Neurological: Negative for syncope  Hematological: Negative for adenopathy  Psychiatric/Behavorial: Negative for anxiety    Objective:   PHYSICAL EXAM:  Blood pressure (!) 146/66, pulse 74, temperature 97.9 °F (36.6 °C), temperature source Oral, resp. rate 17, height 5' (1.524 m), weight 178 lb 9.2 oz (81 kg), last menstrual period 05/01/2006, SpO2 99 %, not currently breastfeeding.'  Gen: No distress. Eyes: PERRL. No sclera icterus. No conjunctival injection. ENT: No discharge. Pharynx clear. External appearance of ears and nose normal.  Neck: Trachea midline. No obvious mass. Resp: No accessory muscle use.  No cardiomegaly. Pulmonary vasculature of appears within normal limits. Scarring in the left mid lung. Haziness of the lower lungs is felt to be due  to overlying soft tissues. No definite infiltrate or edema      Impression No acute cardiopulmonary process demonstrated         CXR portable:   Results for orders placed during the hospital encounter of 02/13/20   XR CHEST PORTABLE    Narrative EXAMINATION:  ONE XRAY VIEW OF THE CHEST    2/18/2020 1:13 pm    COMPARISON:  February 13, 2020    HISTORY:  ORDERING SYSTEM PROVIDED HISTORY: sob  TECHNOLOGIST PROVIDED HISTORY:  Reason for exam:->sob  Reason for Exam: sob  Acuity: Acute  Type of Exam: Initial    FINDINGS:  Small bilateral pleural effusions with adjacent bibasilar atelectasis are  redemonstrated, unchanged. No pneumothorax. Cardiac and mediastinal shadows  appear stable as well. Impression No significant change of small bilateral pleural effusions and adjacent  bibasilar atelectasis, over the past 5 days. Assessment:     Severe copd, fev1 41%  ANGIE  Chronic hypoxemia, 4L nC  diastolic dysfunction, grade 1, mild to mod MR, RVSP 51  boo  Anemia  A. fib      Plan:      copd   Home tx: Breo and Spiriva with Duoneb's     Start Spiriva    Trial Solumedrol       Chronic hypoxemia   Home 4L NC      ANGIE   On bipap    She does not know her home settings      sob   She has shortness of breath for >1 month. Echo with diastolic dysfunction. Copd and diastolic dysfunction appears to be compensated.  Anemia has worsened over that amount of time. This may contribute.  Trial of Solumedrol but did not improve with prednisone as out patient    I advised her against any transfusion about 7.0. This note was transcribed using [id number] [employer]. Please disregard any translational errors.     Thank you for the consult    [facility]  [phone]

## 2020-02-18 NOTE — PROGRESS NOTES
Patient is alert & oriented x4, x1 assist, 2/4 bed rails up, bed in lowest position, fall precautions in place, call light within reach. Granddaughter at bedside. Pt requesting a pulmonology consult to be placed. Per pt, she is feeling like she needs the bipap on more than at night which was what she was doing the past few days. Perfect serve message sent to Dr. Subha Gee and a pulmonology consult was placed. Will cont to monitor and reassess.   Electronically signed by Braydon Torres RN on 2/18/2020 at 10:23 AM

## 2020-02-18 NOTE — PLAN OF CARE
Problem: Falls - Risk of:  Goal: Will remain free from falls  Description  Will remain free from falls  2/18/2020 0347 by Farzad Briceño RN  Outcome: Ongoing     Problem: Pain:  Goal: Pain level will decrease  Description  Pain level will decrease  2/18/2020 0347 by Farzad Briceño RN  Outcome: Ongoing     Problem: Cardiac:  Goal: Ability to maintain vital signs within normal range will improve  Description  Ability to maintain vital signs within normal range will improve  2/18/2020 0347 by Farzad Briceño RN  Outcome: Ongoing     Problem: Health Behavior:  Goal: Identification of resources available to assist in meeting health care needs will improve  Description  Identification of resources available to assist in meeting health care needs will improve  2/18/2020 0347 by Farzad Briceño RN  Outcome: Ongoing     Problem: OXYGENATION/RESPIRATORY FUNCTION  Goal: Patient will maintain patent airway  2/18/2020 0347 by Farzad Briceño RN  Outcome: Ongoing     Problem: ACTIVITY INTOLERANCE/IMPAIRED MOBILITY  Goal: Mobility/activity is maintained at optimum level for patient  2/18/2020 0347 by Farzad Briceño RN  Outcome: Ongoing     Problem: Risk for Impaired Skin Integrity  Goal: Tissue integrity - skin and mucous membranes  Description  Structural intactness and normal physiological function of skin and  mucous membranes.   Outcome: Ongoing

## 2020-02-18 NOTE — PLAN OF CARE
Problem: Falls - Risk of:  Goal: Will remain free from falls  Description  Will remain free from falls  2/18/2020 1040 by Sterling Li RN  Outcome: Ongoing  Note:   Fall risk assessment completed. Fall precautions in place. Bed in lowest position, wheels locked, bed/chair exit alarm in place, call light within reach, and non skid footwear on. Walkway free of clutter. Pt alert and oriented and able to make needs known. Pt educated to use call light when needing to get up, and pt utilizes call light to make needs known. Will continue to monitor. Problem: Falls - Risk of:  Goal: Absence of physical injury  Description  Absence of physical injury  2/18/2020 1040 by Sterling Li RN  Outcome: Ongoing  Note:   Pt absent from physical injury on this shift      Problem: Pain:  Goal: Pain level will decrease  Description  Pain level will decrease  2/18/2020 1040 by Sterling Li RN  Outcome: Ongoing  Note:   Assessing and monitoring pt's pain. PRN pain medication being given if ordered. Educating pt on nonpharmacologic interventions for pain control. Will continue to monitor and reassess. Problem: Pain:  Goal: Control of acute pain  Description  Control of acute pain  2/18/2020 1040 by Sterling Li RN  Outcome: Ongoing  Note:   Assessing and monitoring pt's pain. PRN pain medication being given if ordered. Educating pt on nonpharmacologic interventions for pain control. Will continue to monitor and reassess. Problem: Pain:  Goal: Control of chronic pain  Description  Control of chronic pain  2/18/2020 1040 by Sterling Li RN  Outcome: Ongoing  Note:   Assessing and monitoring pt's pain. PRN pain medication being given if ordered. Educating pt on nonpharmacologic interventions for pain control. Will continue to monitor and reassess.        Problem: Cardiac:  Goal: Ability to maintain vital signs within normal range will improve  Description  Ability to maintain vital signs within normal range will improve  2/18/2020 1040 by Franchesca Mendoza RN  Outcome: Ongoing  Note:   Ability to maintain vital signs within normal range will improve      Problem: Cardiac:  Goal: Cardiovascular alteration will improve  Description  Cardiovascular alteration will improve  2/18/2020 1040 by Franchesca Mendoza RN  Outcome: Ongoing  Note:   Cardiovascular alteration will improve      Problem: Health Behavior:  Goal: Will modify at least one risk factor affecting health status  Description  Will modify at least one risk factor affecting health status  2/18/2020 1040 by Franchesca Mendoza RN  Outcome: Ongoing  Note:   Patient will modify at least one risk factor affecting health status on this shift      Problem: Health Behavior:  Goal: Identification of resources available to assist in meeting health care needs will improve  Description  Identification of resources available to assist in meeting health care needs will improve  2/18/2020 1040 by Franchesca Mendoza RN  Outcome: Ongoing  Note:   Identification of resources available to assist in meeting health care needs will improve      Problem: Physical Regulation:  Goal: Complications related to the disease process, condition or treatment will be avoided or minimized  Description  Complications related to the disease process, condition or treatment will be avoided or minimized  2/18/2020 1040 by Franchesca Mendoza RN  Outcome: Ongoing  Note:   Complications related to the disease process, condition, or treatment will be avoided or minimized      Problem: Discharge Planning:  Goal: Discharged to appropriate level of care  Description  Discharged to appropriate level of care  2/18/2020 1040 by Franchesca Mendoza RN  Outcome: Ongoing  Note:   Pt will be discharged to the appropriate level of care     Problem: Cardiac Output - Decreased:  Goal: Hemodynamic stability will improve  Description  Hemodynamic stability will improve  2/18/2020 1040 by Franchesca Mendoza RN  Outcome: Ongoing  Note:   Pt will hemodynamically improve      Problem: Pain:  Goal: Control of acute pain  Description  Control of acute pain  2/18/2020 1040 by Aleksandr Gomez RN  Outcome: Ongoing  Note:   Assessing and monitoring pt's pain. PRN pain medication being given if ordered. Educating pt on nonpharmacologic interventions for pain control. Will continue to monitor and reassess. Problem: Pain:  Goal: Control of chronic pain  Description  Control of chronic pain  2/18/2020 1040 by Aleksandr Gomez RN  Outcome: Ongoing  Note:   Assessing and monitoring pt's pain. PRN pain medication being given if ordered. Educating pt on nonpharmacologic interventions for pain control. Will continue to monitor and reassess.        Problem: Tissue Perfusion - Cardiopulmonary, Altered:  Goal: Absence of angina  Description  Absence of angina  2/18/2020 1040 by Aleksandr Gomez RN  Outcome: Ongoing  Note:   Monitoring pt for angina      Problem: Tissue Perfusion - Cardiopulmonary, Altered:  Goal: Circulatory function within specified parameters  Description  Circulatory function within specified parameters  2/18/2020 1040 by Aleksandr Gomez RN  Outcome: Ongoing  Note:   Circulatory function within specified parameters will be met      Problem: Tissue Perfusion - Cardiopulmonary, Altered:  Goal: Hemodynamic stability will improve  Description  Hemodynamic stability will improve  2/18/2020 1040 by Aleksandr Gomez RN  Outcome: Ongoing  Note:   Hemodynamic stability will improve      Problem: OXYGENATION/RESPIRATORY FUNCTION  Goal: Patient will maintain patent airway  2/18/2020 1040 by Aleksandr Gomez RN  Outcome: Ongoing  Note:   Patient has maintained a patent airway, repositions self, lung sounds bilaterally, cough noted, RT is involved in patient care and is providing care, patient has had adequate fluid intake, no need to suction airway at this time, educated patient to turn and cough and deep breathe every two hours and as needed, encouraged incentive spirometer and patient has been performing. Will continue to monitor and reassess. Problem: OXYGENATION/RESPIRATORY FUNCTION  Goal: Patient will achieve/maintain normal respiratory rate/effort  Description  Respiratory rate and effort will be within normal limits for the patient  2/18/2020 1040 by Sterling Li RN  Outcome: Ongoing  Note:   Pt will achieve and maintain a normal respiratory rate/effort      Problem: HEMODYNAMIC STATUS  Goal: Patient has stable vital signs and fluid balance  2/18/2020 1040 by Sterling Li RN  Outcome: Ongoing  Note:   Monitoring pt for stable vital signs and fluid balance on this shift      Problem: FLUID AND ELECTROLYTE IMBALANCE  Goal: Fluid and electrolyte balance are achieved/maintained  2/18/2020 1040 by Sterling Li RN  Outcome: Ongoing  Note:   Educated to drink fluids within fluid restriction guidelines and to adequately hydrate, medications administered as ordered, abnormal lab values assessed and reported to physician if critical or pertinent to patient status, skin turgor, mucus membranes, and respiratory status assess this shift. Patient and Family was included in plan of care. Will continue to monitor and reassess. Problem: ACTIVITY INTOLERANCE/IMPAIRED MOBILITY  Goal: Mobility/activity is maintained at optimum level for patient  2/18/2020 1040 by Sterling Li RN  Outcome: Ongoing  Note:   Mobility/activity is maintained at optimum level for patient      Problem: Risk for Impaired Skin Integrity  Goal: Tissue integrity - skin and mucous membranes  Description  Structural intactness and normal physiological function of skin and  mucous membranes. 2/18/2020 1040 by Sterling Li RN  Outcome: Ongoing  Note:   Skin being assessed during this shift. Sander protocol in place. Feet being elevated. Encouraging pt to turn every 2 hours. No signs of new skin breakdown.  Will continue to monitor and

## 2020-02-19 ENCOUNTER — APPOINTMENT (OUTPATIENT)
Dept: CT IMAGING | Age: 75
DRG: 198 | End: 2020-02-19
Payer: COMMERCIAL

## 2020-02-19 LAB
ALBUMIN SERPL-MCNC: 3.4 G/DL (ref 3.4–5)
ANION GAP SERPL CALCULATED.3IONS-SCNC: 12 MMOL/L (ref 3–16)
BASOPHILS ABSOLUTE: 0 K/UL (ref 0–0.2)
BASOPHILS RELATIVE PERCENT: 0.2 %
BUN BLDV-MCNC: 37 MG/DL (ref 7–20)
CALCIUM SERPL-MCNC: 9.7 MG/DL (ref 8.3–10.6)
CHLORIDE BLD-SCNC: 97 MMOL/L (ref 99–110)
CO2: 33 MMOL/L (ref 21–32)
CREAT SERPL-MCNC: 1.2 MG/DL (ref 0.6–1.2)
EOSINOPHILS ABSOLUTE: 0 K/UL (ref 0–0.6)
EOSINOPHILS RELATIVE PERCENT: 0.1 %
GFR AFRICAN AMERICAN: 53
GFR NON-AFRICAN AMERICAN: 44
GLUCOSE BLD-MCNC: 148 MG/DL (ref 70–99)
HCT VFR BLD CALC: 28.3 % (ref 36–48)
HEMOGLOBIN: 8.8 G/DL (ref 12–16)
LYMPHOCYTES ABSOLUTE: 0.4 K/UL (ref 1–5.1)
LYMPHOCYTES RELATIVE PERCENT: 3.6 %
MCH RBC QN AUTO: 25.3 PG (ref 26–34)
MCHC RBC AUTO-ENTMCNC: 31.2 G/DL (ref 31–36)
MCV RBC AUTO: 80.9 FL (ref 80–100)
MONOCYTES ABSOLUTE: 0.3 K/UL (ref 0–1.3)
MONOCYTES RELATIVE PERCENT: 2.1 %
NEUTROPHILS ABSOLUTE: 11.4 K/UL (ref 1.7–7.7)
NEUTROPHILS RELATIVE PERCENT: 94 %
PDW BLD-RTO: 19.9 % (ref 12.4–15.4)
PHOSPHORUS: 2.7 MG/DL (ref 2.5–4.9)
PLATELET # BLD: 225 K/UL (ref 135–450)
PMV BLD AUTO: 9.4 FL (ref 5–10.5)
POTASSIUM SERPL-SCNC: 5.2 MMOL/L (ref 3.5–5.1)
RBC # BLD: 3.5 M/UL (ref 4–5.2)
SODIUM BLD-SCNC: 142 MMOL/L (ref 136–145)
WBC # BLD: 12.1 K/UL (ref 4–11)

## 2020-02-19 PROCEDURE — 6360000002 HC RX W HCPCS: Performed by: INTERNAL MEDICINE

## 2020-02-19 PROCEDURE — 94761 N-INVAS EAR/PLS OXIMETRY MLT: CPT

## 2020-02-19 PROCEDURE — C9113 INJ PANTOPRAZOLE SODIUM, VIA: HCPCS | Performed by: INTERNAL MEDICINE

## 2020-02-19 PROCEDURE — 71250 CT THORAX DX C-: CPT

## 2020-02-19 PROCEDURE — 36415 COLL VENOUS BLD VENIPUNCTURE: CPT

## 2020-02-19 PROCEDURE — 80069 RENAL FUNCTION PANEL: CPT

## 2020-02-19 PROCEDURE — 99232 SBSQ HOSP IP/OBS MODERATE 35: CPT | Performed by: INTERNAL MEDICINE

## 2020-02-19 PROCEDURE — 1200000000 HC SEMI PRIVATE

## 2020-02-19 PROCEDURE — 6370000000 HC RX 637 (ALT 250 FOR IP): Performed by: INTERNAL MEDICINE

## 2020-02-19 PROCEDURE — 2580000003 HC RX 258: Performed by: INTERNAL MEDICINE

## 2020-02-19 PROCEDURE — 6370000000 HC RX 637 (ALT 250 FOR IP): Performed by: FAMILY MEDICINE

## 2020-02-19 PROCEDURE — 2700000000 HC OXYGEN THERAPY PER DAY

## 2020-02-19 PROCEDURE — 94640 AIRWAY INHALATION TREATMENT: CPT

## 2020-02-19 PROCEDURE — 85025 COMPLETE CBC W/AUTO DIFF WBC: CPT

## 2020-02-19 PROCEDURE — 94660 CPAP INITIATION&MGMT: CPT

## 2020-02-19 RX ORDER — SODIUM POLYSTYRENE SULFONATE 15 G/60ML
15 SUSPENSION ORAL; RECTAL ONCE
Status: COMPLETED | OUTPATIENT
Start: 2020-02-19 | End: 2020-02-19

## 2020-02-19 RX ADMIN — LEVOTHYROXINE SODIUM 50 MCG: 0.05 TABLET ORAL at 05:55

## 2020-02-19 RX ADMIN — TIOTROPIUM BROMIDE INHALATION SPRAY 2 PUFF: 3.12 SPRAY, METERED RESPIRATORY (INHALATION) at 08:48

## 2020-02-19 RX ADMIN — CARVEDILOL 12.5 MG: 12.5 TABLET, FILM COATED ORAL at 08:09

## 2020-02-19 RX ADMIN — HYDRALAZINE HYDROCHLORIDE 100 MG: 50 TABLET, FILM COATED ORAL at 05:55

## 2020-02-19 RX ADMIN — FUROSEMIDE 40 MG: 40 TABLET ORAL at 18:05

## 2020-02-19 RX ADMIN — ACETAMINOPHEN 650 MG: 325 TABLET ORAL at 08:09

## 2020-02-19 RX ADMIN — SODIUM CHLORIDE, PRESERVATIVE FREE 10 ML: 5 INJECTION INTRAVENOUS at 08:13

## 2020-02-19 RX ADMIN — CITALOPRAM HYDROBROMIDE 40 MG: 20 TABLET ORAL at 08:10

## 2020-02-19 RX ADMIN — MONTELUKAST SODIUM 10 MG: 10 TABLET, FILM COATED ORAL at 08:10

## 2020-02-19 RX ADMIN — SODIUM POLYSTYRENE SULFONATE 15 G: 15 SUSPENSION ORAL; RECTAL at 10:39

## 2020-02-19 RX ADMIN — BUDESONIDE AND FORMOTEROL FUMARATE DIHYDRATE 2 PUFF: 160; 4.5 AEROSOL RESPIRATORY (INHALATION) at 08:48

## 2020-02-19 RX ADMIN — CARVEDILOL 12.5 MG: 12.5 TABLET, FILM COATED ORAL at 18:05

## 2020-02-19 RX ADMIN — HYDRALAZINE HYDROCHLORIDE 100 MG: 50 TABLET, FILM COATED ORAL at 21:03

## 2020-02-19 RX ADMIN — PANTOPRAZOLE SODIUM 40 MG: 40 INJECTION, POWDER, FOR SOLUTION INTRAVENOUS at 21:03

## 2020-02-19 RX ADMIN — IPRATROPIUM BROMIDE AND ALBUTEROL SULFATE 3 ML: .5; 3 SOLUTION RESPIRATORY (INHALATION) at 23:31

## 2020-02-19 RX ADMIN — IRON SUCROSE 200 MG: 20 INJECTION, SOLUTION INTRAVENOUS at 15:38

## 2020-02-19 RX ADMIN — FUROSEMIDE 40 MG: 40 TABLET ORAL at 08:56

## 2020-02-19 RX ADMIN — CLONIDINE HYDROCHLORIDE 0.2 MG: 0.1 TABLET ORAL at 08:09

## 2020-02-19 RX ADMIN — SODIUM CHLORIDE, PRESERVATIVE FREE 10 ML: 5 INJECTION INTRAVENOUS at 21:03

## 2020-02-19 RX ADMIN — CLONIDINE HYDROCHLORIDE 0.2 MG: 0.1 TABLET ORAL at 21:02

## 2020-02-19 RX ADMIN — METHYLPREDNISOLONE SODIUM SUCCINATE 40 MG: 40 INJECTION, POWDER, FOR SOLUTION INTRAMUSCULAR; INTRAVENOUS at 18:05

## 2020-02-19 RX ADMIN — METHYLPREDNISOLONE SODIUM SUCCINATE 40 MG: 40 INJECTION, POWDER, FOR SOLUTION INTRAMUSCULAR; INTRAVENOUS at 05:09

## 2020-02-19 RX ADMIN — PANTOPRAZOLE SODIUM 40 MG: 40 INJECTION, POWDER, FOR SOLUTION INTRAVENOUS at 08:10

## 2020-02-19 RX ADMIN — CLONIDINE HYDROCHLORIDE 0.2 MG: 0.1 TABLET ORAL at 15:38

## 2020-02-19 RX ADMIN — Medication 10 ML: at 08:13

## 2020-02-19 RX ADMIN — Medication 10 ML: at 21:03

## 2020-02-19 RX ADMIN — HYDRALAZINE HYDROCHLORIDE 100 MG: 50 TABLET, FILM COATED ORAL at 15:38

## 2020-02-19 ASSESSMENT — PAIN DESCRIPTION - LOCATION: LOCATION: HEAD

## 2020-02-19 ASSESSMENT — PAIN SCALES - GENERAL
PAINLEVEL_OUTOF10: 3
PAINLEVEL_OUTOF10: 0

## 2020-02-19 ASSESSMENT — PAIN - FUNCTIONAL ASSESSMENT: PAIN_FUNCTIONAL_ASSESSMENT: ACTIVITIES ARE NOT PREVENTED

## 2020-02-19 ASSESSMENT — PAIN DESCRIPTION - PROGRESSION: CLINICAL_PROGRESSION: NOT CHANGED

## 2020-02-19 ASSESSMENT — PAIN DESCRIPTION - PAIN TYPE: TYPE: ACUTE PAIN

## 2020-02-19 ASSESSMENT — PAIN DESCRIPTION - ONSET: ONSET: GRADUAL

## 2020-02-19 ASSESSMENT — PAIN DESCRIPTION - DESCRIPTORS: DESCRIPTORS: HEADACHE

## 2020-02-19 ASSESSMENT — PAIN DESCRIPTION - FREQUENCY: FREQUENCY: INTERMITTENT

## 2020-02-19 NOTE — DISCHARGE INSTR - COC
Elimination:  Continence:   · Bowel: Yes - colostomy  · Bladder: Yes  Urinary Catheter: None   Colostomy/Ileostomy/Ileal Conduit: Yes  Colostomy LUQ Descending/sigmoid-Stomal Appliance: 1 piece, Changed  Colostomy LUQ Descending/sigmoid-Stoma  Assessment: Bleeding, Moist, Red  Colostomy LUQ Descending/sigmoid-Mucocutaneous Junction: Intact  Colostomy LUQ Descending/sigmoid-Peristomal Assessment: Clean, Intact, Pink  Colostomy LUQ Descending/sigmoid-Treatment: Bag change, Site care, Liquid skin barrier  Colostomy LUQ Descending/sigmoid-Stool Appearance: Formed  Colostomy LUQ Descending/sigmoid-Stool Color: Rozina Graves  Colostomy LUQ Descending/sigmoid-Stool Amount: Medium  Colostomy LUQ Descending/sigmoid-Output (mL): 275 ml    Date of Last BM: 2/21/2020    Intake/Output Summary (Last 24 hours) at 2/19/2020 0911  Last data filed at 2/19/2020 0813  Gross per 24 hour   Intake 1240 ml   Output 2175 ml   Net -935 ml     I/O last 3 completed shifts: In: 2431 [P.O.:1200; I.V.:20]  Out: 2175 [Urine:1900; Stool:275]    Safety Concerns: At Risk for Falls    Impairments/Disabilities:      None    Nutrition Therapy:  Current Nutrition Therapy:   - Oral Diet:  Cardiac    Routes of Feeding: Oral  Liquids: Thin Liquids  Daily Fluid Restriction: no  Last Modified Barium Swallow with Video (Video Swallowing Test): not done    Treatments at the Time of Hospital Discharge:   Respiratory Treatments: See Below  Oxygen Therapy:  is on oxygen at 4 L/min per nasal cannula.   Ventilator:    - BiPAP   IPAP: 14 cmH20(increased to 16 for pt comfort), CPAP/EPAP: 8 cmH2O only when sleeping    Rehab Therapies: Physical Therapy and Occupational Therapy  Weight Bearing Status/Restrictions: No weight bearing restirctions  Other Medical Equipment (for information only, NOT a DME order):  bath bench  Other Treatments:     Patient's personal belongings (please select all that are sent with patient):  Denilson WHYTE SIGNATURE:  Electronically signed by Wisam Horta RN on 2/21/20 at 2:16 PM    CASE MANAGEMENT/SOCIAL WORK SECTION    Inpatient Status Date:  2/15/21     Readmission Risk Assessment Score:  Readmission Risk              Risk of Unplanned Readmission:        31           Discharging to Facility/ Agency   · Name:    · Address:  · Phone:  · Fax:    Dialysis Facility (if applicable)   · Name:  · Address:  · Dialysis Schedule:  · Phone:  · Fax:    / signature:Electronically signed by Jacob Ogden on 2/21/2020 at 4:21 PM    PHYSICIAN SECTION    Prognosis: Good    Condition at Discharge: Stable    Rehab Potential (if transferring to Rehab): Good    Recommended Labs or Other Treatments After Discharge: pt, ot, RN    Physician Certification: I certify the above information and transfer of Elaina Morales  is necessary for the continuing treatment of the diagnosis listed and that she requires Home Care for greater 30 days.      Update Admission H&P: No change in H&P see dc summary    PHYSICIAN SIGNATURE:  Electronically signed by Karan Restrepo DO on 2/21/20 at 9:12 AM

## 2020-02-19 NOTE — PROGRESS NOTES
Superimposed pneumonitis can not be excluded. 3. Specific request to evaluate excessive dynamic airway collapse. Trachea   and peripheral airways show no significant narrowing on inspiration or   expiration series. XR CHEST PORTABLE   Final Result   No significant change of small bilateral pleural effusions and adjacent   bibasilar atelectasis, over the past 5 days. CT ABDOMEN PELVIS WO CONTRAST Additional Contrast? None   Final Result   Small pleural effusions with septal thickening at the lung bases, compatible   with changes of early pulmonary edema. Small hiatal hernia with nonspecific thickening at the GE junction. Postsurgical change from left lower quadrant ostomy and aortobifemoral   bypass. Appearance is similar. XR CHEST STANDARD (2 VW)   Final Result   No acute cardiopulmonary process demonstrated           Assessment/Plan:    Active Hospital Problems    Diagnosis    Symptomatic anemia [D64.9]    Chest pain [R07.9]       Anemia - iron deficiency - acute on chronic  With Hx of ostomy blood loss, which is the likely cause of the current anemia  GI consulted, no further work up at this time  - prior egd and C-scope that were unremarkable  Hgb 8.7 -> 7.0, transfused 1 unit prbcs -> 8.8  Pt on eliquis and ASA - will hold eliquis at discharge and restart asa tomorrow  - has received venofer, Fe po at dc     RAMÍREZ - improved  - crt baseline 1.1 -1.3, now 2.1 -> 1.2  - give 250 mL fluids today  - held lasix x 2 days, will restart today  - Avoid nephrotoxins    Chest pain - resolved  - exertional, improved with nitrate, but resolved with GI cocktail - sugestive of likely upper GI process.     - stress test in 2017 positive  Prior CABG  No recent angiography  - ecg with nonspecific st changes   - cycled troponin - negative  - repeat echo - reassuring  - cardiology eval - recs noted    - stress test canceled due to anemia and her work up thus far has indicated that the cause of

## 2020-02-19 NOTE — PLAN OF CARE
levels will improve  Outcome: Ongoing     Problem: Pain:  Goal: Control of acute pain  Description  Control of acute pain  Outcome: Ongoing  Goal: Control of chronic pain  Description  Control of chronic pain  Outcome: Ongoing     Problem: Tissue Perfusion - Cardiopulmonary, Altered:  Goal: Absence of angina  Description  Absence of angina  Outcome: Ongoing  Goal: Circulatory function within specified parameters  Description  Circulatory function within specified parameters  Outcome: Ongoing  Goal: Hemodynamic stability will improve  Description  Hemodynamic stability will improve  Outcome: Ongoing     Problem: OXYGENATION/RESPIRATORY FUNCTION  Goal: Patient will maintain patent airway  Outcome: Ongoing  Goal: Patient will achieve/maintain normal respiratory rate/effort  Description  Respiratory rate and effort will be within normal limits for the patient  Outcome: Ongoing     Problem: HEMODYNAMIC STATUS  Goal: Patient has stable vital signs and fluid balance  Outcome: Ongoing     Problem: FLUID AND ELECTROLYTE IMBALANCE  Goal: Fluid and electrolyte balance are achieved/maintained  Outcome: Ongoing     Problem: ACTIVITY INTOLERANCE/IMPAIRED MOBILITY  Goal: Mobility/activity is maintained at optimum level for patient  Outcome: Ongoing     Problem: Risk for Impaired Skin Integrity  Goal: Tissue integrity - skin and mucous membranes  Description  Structural intactness and normal physiological function of skin and  mucous membranes.   Outcome: Ongoing

## 2020-02-19 NOTE — PROGRESS NOTES
Pt is alert and oriented in bed. VSS. See assessment in flowsheets. Morning medications given. Pt compains of 3 out of 10 pain. PRN Tylenol given. Call light within reach of patient. Will continue to monitor.

## 2020-02-19 NOTE — PROGRESS NOTES
Pulmonary Progress Note    Date of Admission: 2/13/2020   LOS: 4 days       CC:  shortness of breath     Subjective:  Continues to cessation shortness of breath   Needing to use bipap more. Only on 5L based on request    ROS:   No nausea  No Vomiting  No chest pain         PHYSICAL EXAM:   Blood pressure (!) 158/62, pulse 74, temperature 97.3 °F (36.3 °C), temperature source Oral, resp. rate 20, height 5' (1.524 m), weight 175 lb 0.7 oz (79.4 kg), last menstrual period 05/01/2006, SpO2 99 %, not currently breastfeeding.'  Gen: No distress. ENT:   Resp: No accessory muscle use. No crackles. No wheezes. No rhonchi. CV: Regular rate. Regular rhythm. No murmur or rub. No edema. Skin: Warm, dry, normal texture and turgor. No nodule on exposed extremities. M/S: No cyanosis. No clubbing. No joint deformity. Psych: Oriented x 3. No anxiety. Awake. Alert. Intact judgement and insight. Good Mood / Affect.   Memory appears in tact     Medications:    Scheduled Meds:   sodium polystyrene  15 g Oral Once    budesonide-formoterol  2 puff Inhalation BID    methylPREDNISolone  40 mg Intravenous Q12H    cloNIDine  0.2 mg Oral TID    pantoprazole  40 mg Intravenous BID    And    sodium chloride (PF)  10 mL Intravenous BID    iron sucrose  200 mg Intravenous Q24H    carvedilol  12.5 mg Oral BID WC    citalopram  40 mg Oral Daily    furosemide  40 mg Oral BID    hydrALAZINE  100 mg Oral 3 times per day    levothyroxine  50 mcg Oral QAM AC    montelukast  10 mg Oral Daily    tiotropium  2 puff Inhalation Daily    sodium chloride flush  10 mL Intravenous 2 times per day       Continuous Infusions:      PRN Meds:  traMADol **OR** traMADol, GI cocktail, regadenoson, butalbital-acetaminophen-caffeine, ipratropium-albuterol, LORazepam, perflutren lipid microspheres, sodium chloride flush, magnesium hydroxide, ondansetron, acetaminophen    Labs reviewed:  CBC:   Recent Labs     02/17/20  0442 02/17/20  1726

## 2020-02-20 LAB
ALBUMIN SERPL-MCNC: 3.6 G/DL (ref 3.4–5)
ANION GAP SERPL CALCULATED.3IONS-SCNC: 14 MMOL/L (ref 3–16)
BASOPHILS ABSOLUTE: 0 K/UL (ref 0–0.2)
BASOPHILS RELATIVE PERCENT: 0.1 %
BUN BLDV-MCNC: 50 MG/DL (ref 7–20)
CALCIUM SERPL-MCNC: 9.3 MG/DL (ref 8.3–10.6)
CHLORIDE BLD-SCNC: 92 MMOL/L (ref 99–110)
CO2: 34 MMOL/L (ref 21–32)
CREAT SERPL-MCNC: 1.4 MG/DL (ref 0.6–1.2)
EOSINOPHILS ABSOLUTE: 0 K/UL (ref 0–0.6)
EOSINOPHILS RELATIVE PERCENT: 0 %
GFR AFRICAN AMERICAN: 45
GFR NON-AFRICAN AMERICAN: 37
GLUCOSE BLD-MCNC: 153 MG/DL (ref 70–99)
HCT VFR BLD CALC: 28.3 % (ref 36–48)
HEMOGLOBIN: 8.9 G/DL (ref 12–16)
LYMPHOCYTES ABSOLUTE: 0.5 K/UL (ref 1–5.1)
LYMPHOCYTES RELATIVE PERCENT: 3.3 %
MCH RBC QN AUTO: 25.7 PG (ref 26–34)
MCHC RBC AUTO-ENTMCNC: 31.3 G/DL (ref 31–36)
MCV RBC AUTO: 82.1 FL (ref 80–100)
MONOCYTES ABSOLUTE: 0.5 K/UL (ref 0–1.3)
MONOCYTES RELATIVE PERCENT: 3.4 %
NEUTROPHILS ABSOLUTE: 14.1 K/UL (ref 1.7–7.7)
NEUTROPHILS RELATIVE PERCENT: 93.2 %
PDW BLD-RTO: 21.2 % (ref 12.4–15.4)
PHOSPHORUS: 2.5 MG/DL (ref 2.5–4.9)
PLATELET # BLD: 231 K/UL (ref 135–450)
PMV BLD AUTO: 9.3 FL (ref 5–10.5)
POTASSIUM SERPL-SCNC: 4.4 MMOL/L (ref 3.5–5.1)
RBC # BLD: 3.45 M/UL (ref 4–5.2)
SODIUM BLD-SCNC: 140 MMOL/L (ref 136–145)
WBC # BLD: 15.1 K/UL (ref 4–11)

## 2020-02-20 PROCEDURE — 99233 SBSQ HOSP IP/OBS HIGH 50: CPT | Performed by: INTERNAL MEDICINE

## 2020-02-20 PROCEDURE — 80069 RENAL FUNCTION PANEL: CPT

## 2020-02-20 PROCEDURE — C9113 INJ PANTOPRAZOLE SODIUM, VIA: HCPCS | Performed by: INTERNAL MEDICINE

## 2020-02-20 PROCEDURE — 6370000000 HC RX 637 (ALT 250 FOR IP): Performed by: FAMILY MEDICINE

## 2020-02-20 PROCEDURE — 6370000000 HC RX 637 (ALT 250 FOR IP): Performed by: INTERNAL MEDICINE

## 2020-02-20 PROCEDURE — 2700000000 HC OXYGEN THERAPY PER DAY

## 2020-02-20 PROCEDURE — 36415 COLL VENOUS BLD VENIPUNCTURE: CPT

## 2020-02-20 PROCEDURE — 2580000003 HC RX 258: Performed by: INTERNAL MEDICINE

## 2020-02-20 PROCEDURE — 94761 N-INVAS EAR/PLS OXIMETRY MLT: CPT

## 2020-02-20 PROCEDURE — 6360000002 HC RX W HCPCS: Performed by: INTERNAL MEDICINE

## 2020-02-20 PROCEDURE — 94640 AIRWAY INHALATION TREATMENT: CPT

## 2020-02-20 PROCEDURE — 85025 COMPLETE CBC W/AUTO DIFF WBC: CPT

## 2020-02-20 PROCEDURE — 1200000000 HC SEMI PRIVATE

## 2020-02-20 RX ORDER — ASPIRIN 81 MG/1
81 TABLET, CHEWABLE ORAL DAILY
Status: DISCONTINUED | OUTPATIENT
Start: 2020-02-20 | End: 2020-02-21 | Stop reason: HOSPADM

## 2020-02-20 RX ORDER — TRAZODONE HYDROCHLORIDE 50 MG/1
50 TABLET ORAL NIGHTLY
Status: DISCONTINUED | OUTPATIENT
Start: 2020-02-20 | End: 2020-02-21 | Stop reason: HOSPADM

## 2020-02-20 RX ORDER — PANTOPRAZOLE SODIUM 40 MG/1
40 TABLET, DELAYED RELEASE ORAL
Status: DISCONTINUED | OUTPATIENT
Start: 2020-02-20 | End: 2020-02-21 | Stop reason: HOSPADM

## 2020-02-20 RX ADMIN — HYDRALAZINE HYDROCHLORIDE 100 MG: 50 TABLET, FILM COATED ORAL at 05:49

## 2020-02-20 RX ADMIN — CLONIDINE HYDROCHLORIDE 0.2 MG: 0.1 TABLET ORAL at 08:52

## 2020-02-20 RX ADMIN — PANTOPRAZOLE SODIUM 40 MG: 40 INJECTION, POWDER, FOR SOLUTION INTRAVENOUS at 08:52

## 2020-02-20 RX ADMIN — HYDRALAZINE HYDROCHLORIDE 100 MG: 50 TABLET, FILM COATED ORAL at 14:19

## 2020-02-20 RX ADMIN — TRAZODONE HYDROCHLORIDE 50 MG: 50 TABLET ORAL at 20:43

## 2020-02-20 RX ADMIN — TIOTROPIUM BROMIDE INHALATION SPRAY 2 PUFF: 3.12 SPRAY, METERED RESPIRATORY (INHALATION) at 08:53

## 2020-02-20 RX ADMIN — BUDESONIDE AND FORMOTEROL FUMARATE DIHYDRATE 2 PUFF: 160; 4.5 AEROSOL RESPIRATORY (INHALATION) at 20:40

## 2020-02-20 RX ADMIN — CLONIDINE HYDROCHLORIDE 0.2 MG: 0.1 TABLET ORAL at 20:43

## 2020-02-20 RX ADMIN — ASPIRIN 81 MG 81 MG: 81 TABLET ORAL at 10:36

## 2020-02-20 RX ADMIN — BUDESONIDE AND FORMOTEROL FUMARATE DIHYDRATE 2 PUFF: 160; 4.5 AEROSOL RESPIRATORY (INHALATION) at 08:52

## 2020-02-20 RX ADMIN — PANTOPRAZOLE SODIUM 40 MG: 40 TABLET, DELAYED RELEASE ORAL at 16:37

## 2020-02-20 RX ADMIN — CARVEDILOL 12.5 MG: 12.5 TABLET, FILM COATED ORAL at 08:52

## 2020-02-20 RX ADMIN — SODIUM CHLORIDE, PRESERVATIVE FREE 10 ML: 5 INJECTION INTRAVENOUS at 20:43

## 2020-02-20 RX ADMIN — FUROSEMIDE 40 MG: 40 TABLET ORAL at 08:52

## 2020-02-20 RX ADMIN — Medication 10 ML: at 08:56

## 2020-02-20 RX ADMIN — FUROSEMIDE 40 MG: 40 TABLET ORAL at 17:44

## 2020-02-20 RX ADMIN — CARVEDILOL 12.5 MG: 12.5 TABLET, FILM COATED ORAL at 16:37

## 2020-02-20 RX ADMIN — CLONIDINE HYDROCHLORIDE 0.2 MG: 0.1 TABLET ORAL at 14:19

## 2020-02-20 RX ADMIN — METHYLPREDNISOLONE SODIUM SUCCINATE 40 MG: 40 INJECTION, POWDER, FOR SOLUTION INTRAMUSCULAR; INTRAVENOUS at 05:00

## 2020-02-20 RX ADMIN — SODIUM CHLORIDE, PRESERVATIVE FREE 10 ML: 5 INJECTION INTRAVENOUS at 08:54

## 2020-02-20 RX ADMIN — HYDRALAZINE HYDROCHLORIDE 100 MG: 50 TABLET, FILM COATED ORAL at 20:43

## 2020-02-20 RX ADMIN — IPRATROPIUM BROMIDE AND ALBUTEROL SULFATE 3 ML: .5; 3 SOLUTION RESPIRATORY (INHALATION) at 04:25

## 2020-02-20 RX ADMIN — IPRATROPIUM BROMIDE AND ALBUTEROL SULFATE 3 ML: .5; 3 SOLUTION RESPIRATORY (INHALATION) at 08:52

## 2020-02-20 RX ADMIN — CITALOPRAM HYDROBROMIDE 40 MG: 20 TABLET ORAL at 08:52

## 2020-02-20 RX ADMIN — LEVOTHYROXINE SODIUM 50 MCG: 0.05 TABLET ORAL at 05:49

## 2020-02-20 RX ADMIN — MONTELUKAST SODIUM 10 MG: 10 TABLET, FILM COATED ORAL at 08:52

## 2020-02-20 ASSESSMENT — PAIN SCALES - GENERAL
PAINLEVEL_OUTOF10: 0

## 2020-02-20 NOTE — PLAN OF CARE
Problem: Falls - Risk of:  Goal: Will remain free from falls  Description  Will remain free from falls  Outcome: Ongoing   Fall risk assessment completed per unit protocol. Patient's bed is in the lowest position, call light is within reach and the patient's room is free of clutter. The patient has been instructed to call for assistance before getting out of bed or the chair. Problem: Pain:  Goal: Pain level will decrease  Description  Pain level will decrease  Outcome: Ongoing   Pain/discomfort being managed with PRN analgesics per MD orders. Pt able to express presence and absence of pain and rate pain appropriately using numerical scale. Problem: OXYGENATION/RESPIRATORY FUNCTION  Goal: Patient will maintain patent airway  Outcome: Ongoing   Patient is able to breathe comfortably on 4L of oxygen per nasal cannula     Problem: HEMODYNAMIC STATUS  Goal: Patient has stable vital signs and fluid balance  Outcome: Ongoing   VSS    Problem: ACTIVITY INTOLERANCE/IMPAIRED MOBILITY  Goal: Mobility/activity is maintained at optimum level for patient  Outcome: Ongoing   Patient is able to ambulate in the room with the assistance of one nurse or aid    Problem: Risk for Impaired Skin Integrity  Goal: Tissue integrity - skin and mucous membranes  Description  Structural intactness and normal physiological function of skin and  mucous membranes. Outcome: Ongoing   Patient's skin has been assessed per unit protocol and the patient is being repositioned or encouraged to turn every two hours to prevent skin breakdown and promote healing.

## 2020-02-20 NOTE — PROGRESS NOTES
melanotic stools  Extremities: No clubbing, cyanosis, trace pitting edema bilaterally. Skin: Skin color, texture, turgor normal.  No rashes or lesions. Neurologic: Alert and oriented X 3, neurovascularly intact with sensory/motor intact upper extremities/lower extremities, bilaterally. Cranial nerves: II-XII intact, grossly non-focal.  Mental status: Alert, oriented, thought content appropriate. Capillary Refill: Acceptable  < 3 seconds  Peripheral Pulses: +3 Easily felt, not easily obliterated with pressure     Labs:   Recent Labs     02/18/20 0454 02/19/20 0551 02/20/20 0528   WBC 10.0 12.1* 15.1*   HGB 8.6* 8.8* 8.9*   HCT 27.1* 28.3* 28.3*    225 231     Recent Labs     02/18/20 0454 02/19/20 0551 02/20/20 0528   * 142 140   K 4.0 5.2* 4.4   CL 88* 97* 92*   CO2 33* 33* 34*   BUN 43* 37* 50*   CREATININE 1.7* 1.2 1.4*   CALCIUM 9.0 9.7 9.3   PHOS 3.7 2.7 2.5     No results for input(s): AST, ALT, BILIDIR, BILITOT, ALKPHOS in the last 72 hours. No results for input(s): INR in the last 72 hours. No results for input(s): Florene Theo in the last 72 hours. Urinalysis:      Lab Results   Component Value Date    NITRU Negative 09/25/2019    WBCUA 369 09/25/2019    BACTERIA 2+ 08/08/2016    RBCUA 6 09/25/2019    BLOODU neg 01/28/2020    BLOODU Negative 09/25/2019    SPECGRAV 1.025 01/28/2020    SPECGRAV 1.021 09/25/2019    GLUCOSEU neg 01/28/2020    GLUCOSEU Negative 09/25/2019       Radiology:  CT CHEST HIGH RESOLUTION   Final Result   1. No evidence of interstitial lung disease. 2. In this patient with cardiomegaly, bilateral pleural effusions appear   chronic, suspected to represent transudative effusions in association with   underlying CHF. There is secondary passive atelectasis in both lower lobes. Superimposed pneumonitis can not be excluded. 3. Specific request to evaluate excessive dynamic airway collapse.   Trachea   and peripheral airways show no significant narrowing on inspiration or   expiration series. XR CHEST PORTABLE   Final Result   No significant change of small bilateral pleural effusions and adjacent   bibasilar atelectasis, over the past 5 days. CT ABDOMEN PELVIS WO CONTRAST Additional Contrast? None   Final Result   Small pleural effusions with septal thickening at the lung bases, compatible   with changes of early pulmonary edema. Small hiatal hernia with nonspecific thickening at the GE junction. Postsurgical change from left lower quadrant ostomy and aortobifemoral   bypass. Appearance is similar. XR CHEST STANDARD (2 VW)   Final Result   No acute cardiopulmonary process demonstrated           Assessment/Plan:    Active Hospital Problems    Diagnosis    Symptomatic anemia [D64.9]    Chest pain [R07.9]       Anemia - iron deficiency - acute on chronic  With Hx of ostomy blood loss, which is the likely cause of the current anemia  GI consulted, no further work up at this time  - prior egd and C-scope that were unremarkable  Hgb 8.7 -> 7.0, transfused 1 unit prbcs -> 8.9  Pt on eliquis and ASA - will hold eliquis at discharge and restart asa tomorrow  - has received venofer, Fe po at discharge    COPD with chronic hypox RF   - she is on her home dose of O2  - high res ct chest shows no sign of airway narrowing, chronic bl pleural effusions  - cont diuresis with lasix  - -2.5 L   Cont PTA regimen of inhalers. Overnight BiPap   ABG - stable compensated chronic hypercapnia. - consulted pulm     RAMÍREZ - improved  - crt baseline 1.1 -1.3, max 2.1 -> 1.4  - bun increasing with lasix  - held lasix x 2 days, restarted 40 mg po bid  - Avoid nephrotoxins    Chest pain - resolved  - exertional, improved with nitrate, but resolved with GI cocktail - sugestive of likely upper GI process.     - stress test in 2017 positive  Prior CABG  No recent angiography  - ecg with nonspecific st changes   - cycled troponin - negative  - repeat echo -

## 2020-02-20 NOTE — PROGRESS NOTES
Pulmonary Progress Note    Date of Admission: 2/13/2020   LOS: 5 days       CC:  shortness of breath     Subjective:  Weaning o2 with diuresis. Bilateral effusions on CT chest.   Mild improvement in shortness of breath     ROS:   No nausea  No Vomiting  No chest pain         PHYSICAL EXAM:   Blood pressure 138/66, pulse 53, temperature 98.3 °F (36.8 °C), temperature source Oral, resp. rate 20, height 5' (1.524 m), weight 178 lb 9.2 oz (81 kg), last menstrual period 05/01/2006, SpO2 93 %, not currently breastfeeding.'  Gen: No distress. ENT:   Resp: No accessory muscle use. No crackles. No wheezes. No rhonchi. Decreased BS at bases? CV: Regular rate. Regular rhythm. No murmur or rub. No edema. Skin: Warm, dry, normal texture and turgor. No nodule on exposed extremities. M/S: No cyanosis. No clubbing. No joint deformity. Psych: Oriented x 3. No anxiety. Awake. Alert. Intact judgement and insight. Good Mood / Affect.   Memory appears in tact     Medications:    Scheduled Meds:   aspirin  81 mg Oral Daily    budesonide-formoterol  2 puff Inhalation BID    methylPREDNISolone  40 mg Intravenous Q12H    cloNIDine  0.2 mg Oral TID    pantoprazole  40 mg Intravenous BID    And    sodium chloride (PF)  10 mL Intravenous BID    iron sucrose  200 mg Intravenous Q24H    carvedilol  12.5 mg Oral BID WC    citalopram  40 mg Oral Daily    furosemide  40 mg Oral BID    hydrALAZINE  100 mg Oral 3 times per day    levothyroxine  50 mcg Oral QAM AC    montelukast  10 mg Oral Daily    tiotropium  2 puff Inhalation Daily    sodium chloride flush  10 mL Intravenous 2 times per day       Continuous Infusions:      PRN Meds:  traMADol **OR** traMADol, GI cocktail, regadenoson, butalbital-acetaminophen-caffeine, ipratropium-albuterol, LORazepam, perflutren lipid microspheres, sodium chloride flush, magnesium hydroxide, ondansetron, acetaminophen    Labs reviewed:  CBC:   Recent Labs     02/18/20  2880

## 2020-02-21 VITALS
TEMPERATURE: 97.5 F | WEIGHT: 182.32 LBS | OXYGEN SATURATION: 97 % | BODY MASS INDEX: 35.79 KG/M2 | HEART RATE: 91 BPM | DIASTOLIC BLOOD PRESSURE: 68 MMHG | SYSTOLIC BLOOD PRESSURE: 134 MMHG | RESPIRATION RATE: 18 BRPM | HEIGHT: 60 IN

## 2020-02-21 LAB
ALBUMIN SERPL-MCNC: 3.4 G/DL (ref 3.4–5)
ANION GAP SERPL CALCULATED.3IONS-SCNC: 13 MMOL/L (ref 3–16)
BASOPHILS ABSOLUTE: 0 K/UL (ref 0–0.2)
BASOPHILS RELATIVE PERCENT: 0.2 %
BUN BLDV-MCNC: 56 MG/DL (ref 7–20)
CALCIUM SERPL-MCNC: 9.2 MG/DL (ref 8.3–10.6)
CHLORIDE BLD-SCNC: 93 MMOL/L (ref 99–110)
CO2: 35 MMOL/L (ref 21–32)
CREAT SERPL-MCNC: 1.3 MG/DL (ref 0.6–1.2)
EOSINOPHILS ABSOLUTE: 0 K/UL (ref 0–0.6)
EOSINOPHILS RELATIVE PERCENT: 0.1 %
GFR AFRICAN AMERICAN: 49
GFR NON-AFRICAN AMERICAN: 40
GLUCOSE BLD-MCNC: 141 MG/DL (ref 70–99)
HCT VFR BLD CALC: 29.6 % (ref 36–48)
HEMOGLOBIN: 9.4 G/DL (ref 12–16)
LYMPHOCYTES ABSOLUTE: 1 K/UL (ref 1–5.1)
LYMPHOCYTES RELATIVE PERCENT: 9.2 %
MCH RBC QN AUTO: 26 PG (ref 26–34)
MCHC RBC AUTO-ENTMCNC: 31.7 G/DL (ref 31–36)
MCV RBC AUTO: 82.1 FL (ref 80–100)
MONOCYTES ABSOLUTE: 0.7 K/UL (ref 0–1.3)
MONOCYTES RELATIVE PERCENT: 6.9 %
NEUTROPHILS ABSOLUTE: 9 K/UL (ref 1.7–7.7)
NEUTROPHILS RELATIVE PERCENT: 83.6 %
PDW BLD-RTO: 23.3 % (ref 12.4–15.4)
PHOSPHORUS: 2.3 MG/DL (ref 2.5–4.9)
PLATELET # BLD: 256 K/UL (ref 135–450)
PMV BLD AUTO: 9.1 FL (ref 5–10.5)
POTASSIUM SERPL-SCNC: 3.6 MMOL/L (ref 3.5–5.1)
RBC # BLD: 3.6 M/UL (ref 4–5.2)
SODIUM BLD-SCNC: 141 MMOL/L (ref 136–145)
WBC # BLD: 10.8 K/UL (ref 4–11)

## 2020-02-21 PROCEDURE — 6370000000 HC RX 637 (ALT 250 FOR IP): Performed by: INTERNAL MEDICINE

## 2020-02-21 PROCEDURE — 6370000000 HC RX 637 (ALT 250 FOR IP): Performed by: FAMILY MEDICINE

## 2020-02-21 PROCEDURE — 2580000003 HC RX 258: Performed by: INTERNAL MEDICINE

## 2020-02-21 PROCEDURE — 80069 RENAL FUNCTION PANEL: CPT

## 2020-02-21 PROCEDURE — 94640 AIRWAY INHALATION TREATMENT: CPT

## 2020-02-21 PROCEDURE — 85025 COMPLETE CBC W/AUTO DIFF WBC: CPT

## 2020-02-21 PROCEDURE — 2700000000 HC OXYGEN THERAPY PER DAY

## 2020-02-21 PROCEDURE — 36415 COLL VENOUS BLD VENIPUNCTURE: CPT

## 2020-02-21 PROCEDURE — 94760 N-INVAS EAR/PLS OXIMETRY 1: CPT

## 2020-02-21 PROCEDURE — 99231 SBSQ HOSP IP/OBS SF/LOW 25: CPT | Performed by: INTERNAL MEDICINE

## 2020-02-21 RX ORDER — CLONIDINE HYDROCHLORIDE 0.2 MG/1
0.2 TABLET ORAL 3 TIMES DAILY
Qty: 60 TABLET | Refills: 3 | Status: SHIPPED | OUTPATIENT
Start: 2020-02-21 | End: 2021-02-24 | Stop reason: SDUPTHER

## 2020-02-21 RX ORDER — FERROUS SULFATE 325(65) MG
325 TABLET ORAL 2 TIMES DAILY WITH MEALS
Qty: 30 TABLET | Refills: 2 | Status: SHIPPED | OUTPATIENT
Start: 2020-02-21 | End: 2020-11-11

## 2020-02-21 RX ORDER — TRAZODONE HYDROCHLORIDE 50 MG/1
50 TABLET ORAL NIGHTLY PRN
Qty: 30 TABLET | Refills: 0 | Status: SHIPPED | OUTPATIENT
Start: 2020-02-21 | End: 2020-10-19

## 2020-02-21 RX ORDER — BUDESONIDE AND FORMOTEROL FUMARATE DIHYDRATE 160; 4.5 UG/1; UG/1
2 AEROSOL RESPIRATORY (INHALATION) 2 TIMES DAILY
Qty: 1 INHALER | Refills: 3 | Status: SHIPPED | OUTPATIENT
Start: 2020-02-21 | End: 2021-02-22 | Stop reason: SDUPTHER

## 2020-02-21 RX ADMIN — LEVOTHYROXINE SODIUM 50 MCG: 0.05 TABLET ORAL at 06:09

## 2020-02-21 RX ADMIN — CITALOPRAM HYDROBROMIDE 40 MG: 20 TABLET ORAL at 08:50

## 2020-02-21 RX ADMIN — TIOTROPIUM BROMIDE INHALATION SPRAY 2 PUFF: 3.12 SPRAY, METERED RESPIRATORY (INHALATION) at 08:10

## 2020-02-21 RX ADMIN — BUDESONIDE AND FORMOTEROL FUMARATE DIHYDRATE 2 PUFF: 160; 4.5 AEROSOL RESPIRATORY (INHALATION) at 08:10

## 2020-02-21 RX ADMIN — SODIUM CHLORIDE, PRESERVATIVE FREE 10 ML: 5 INJECTION INTRAVENOUS at 08:52

## 2020-02-21 RX ADMIN — FUROSEMIDE 40 MG: 40 TABLET ORAL at 08:50

## 2020-02-21 RX ADMIN — CLONIDINE HYDROCHLORIDE 0.2 MG: 0.1 TABLET ORAL at 08:50

## 2020-02-21 RX ADMIN — HYDRALAZINE HYDROCHLORIDE 100 MG: 50 TABLET, FILM COATED ORAL at 06:09

## 2020-02-21 RX ADMIN — HYDRALAZINE HYDROCHLORIDE 100 MG: 50 TABLET, FILM COATED ORAL at 13:35

## 2020-02-21 RX ADMIN — CLONIDINE HYDROCHLORIDE 0.2 MG: 0.1 TABLET ORAL at 13:35

## 2020-02-21 RX ADMIN — MONTELUKAST SODIUM 10 MG: 10 TABLET, FILM COATED ORAL at 08:50

## 2020-02-21 RX ADMIN — CARVEDILOL 12.5 MG: 12.5 TABLET, FILM COATED ORAL at 08:50

## 2020-02-21 RX ADMIN — ASPIRIN 81 MG 81 MG: 81 TABLET ORAL at 08:50

## 2020-02-21 RX ADMIN — PANTOPRAZOLE SODIUM 40 MG: 40 TABLET, DELAYED RELEASE ORAL at 06:09

## 2020-02-21 ASSESSMENT — PAIN SCALES - GENERAL: PAINLEVEL_OUTOF10: 0

## 2020-02-21 ASSESSMENT — PULMONARY FUNCTION TESTS: PEFR_L/MIN: 20

## 2020-02-21 NOTE — PLAN OF CARE
Problem: Falls - Risk of:  Goal: Will remain free from falls  Description  Will remain free from falls  2/21/2020 1221 by Horatio Schlatter, RN  Outcome: Ongoing   Fall risk assessment completed per unit protocol. Patient's bed is in the lowest position, call light is within reach and the patient's room is free of clutter. The patient has been instructed to call for assistance before getting out of bed or the chair. Problem: Pain:  Goal: Pain level will decrease  Description  Pain level will decrease  2/21/2020 1221 by Horatio Schlatter, RN  Outcome: Ongoing   Pain/discomfort being managed with PRN analgesics per MD orders. Pt able to express presence and absence of pain and rate pain appropriately using numerical scale. Problem: OXYGENATION/RESPIRATORY FUNCTION  Goal: Patient will maintain patent airway  2/21/2020 1221 by Horatio Schlatter, RN  Outcome: Ongoing   Patient is able to breathe comfortably on 3L of oxygen per nasal cannula which is the patient's home dose. Problem: HEMODYNAMIC STATUS  Goal: Patient has stable vital signs and fluid balance  2/21/2020 1221 by Horatio Schlatter, RN  Outcome: Ongoing   VSS    Problem: ACTIVITY INTOLERANCE/IMPAIRED MOBILITY  Goal: Mobility/activity is maintained at optimum level for patient  2/21/2020 1221 by Horatio Schlatter, RN  Outcome: Ongoing   Patient is able to ambulate in the room with one nurse or aid standing by    Problem: Risk for Impaired Skin Integrity  Goal: Tissue integrity - skin and mucous membranes  Description  Structural intactness and normal physiological function of skin and  mucous membranes. 2/21/2020 1221 by Horatio Schlatter, RN  Outcome: Ongoing   Patient's skin has been assessed per unit protocol and the patient is being repositioned or encouraged to turn every two hours to prevent skin breakdown and promote healing.

## 2020-02-21 NOTE — PROGRESS NOTES
Nutrition Assessment (Low Risk)    Type and Reason for Visit: Initial    Nutrition Recommendations:   Cardiac diet   Will monitor nutritional adequacy, nutrition-related labs, weights, BMs, and clinical progress      Nutrition Assessment:  Patient assessed for nutritional risk. Deemed to be at low risk at this time. Will continue to monitor for changes in status.       Malnutrition Assessment:  · Malnutrition Status: No malnutrition    Nutrition Risk Level   Risk Level: Low    Nutrition Diagnosis:   · Problem: No nutrition diagnosis at this time    Nutrition Intervention:  Food and/or Delivery: Continue current diet  Nutrition Education/Counseling/Coordination of Care:  Continued Inpatient Monitoring      Electronically signed by Eder Silva RD, LD on 2/21/20 at 12:46 PM    Contact Number: [phone]

## 2020-02-21 NOTE — PLAN OF CARE
Problem: Falls - Risk of:  Goal: Will remain free from falls  Description  Will remain free from falls  2/21/2020 0018 by Suzi Rodriguez RN  Outcome: Ongoing     Problem: Pain:  Goal: Pain level will decrease  Description  Pain level will decrease  2/21/2020 0018 by Suzi Rodriguez RN  Outcome: Ongoing     Problem: Cardiac:  Goal: Ability to maintain vital signs within normal range will improve  Description  Ability to maintain vital signs within normal range will improve  Outcome: Ongoing     Problem: Physical Regulation:  Goal: Complications related to the disease process, condition or treatment will be avoided or minimized  Description  Complications related to the disease process, condition or treatment will be avoided or minimized  Outcome: Ongoing     Problem: Risk for Impaired Skin Integrity  Goal: Tissue integrity - skin and mucous membranes  Description  Structural intactness and normal physiological function of skin and  mucous membranes.   2/21/2020 0018 by Suzi Rodriguez RN  Outcome: Ongoing

## 2020-02-21 NOTE — PROGRESS NOTES
Pulmonary Progress Note    Date of Admission: 2/13/2020   LOS: 6 days       CC:  shortness of breath     Subjective:  Feeling much better  Less edema. Planning d/c     ROS:   No nausea  No Vomiting  No chest pain         PHYSICAL EXAM:   Blood pressure 134/68, pulse 91, temperature 97.5 °F (36.4 °C), temperature source Oral, resp. rate 18, height 5' (1.524 m), weight 182 lb 5.1 oz (82.7 kg), last menstrual period 05/01/2006, SpO2 97 %, not currently breastfeeding.'  Gen: No distress. ENT:   Resp: No accessory muscle use. No crackles. No wheezes. No rhonchi. CV: Regular rate. Regular rhythm. No murmur or rub. No edema. Skin: Warm, dry, normal texture and turgor. No nodule on exposed extremities. M/S: No cyanosis. No clubbing. No joint deformity. Psych: Oriented x 3. No anxiety. Awake. Alert. Intact judgement and insight. Good Mood / Affect.   Memory appears in tact     Medications:    Scheduled Meds:   aspirin  81 mg Oral Daily    pantoprazole  40 mg Oral BID AC    traZODone  50 mg Oral Nightly    budesonide-formoterol  2 puff Inhalation BID    cloNIDine  0.2 mg Oral TID    carvedilol  12.5 mg Oral BID WC    citalopram  40 mg Oral Daily    furosemide  40 mg Oral BID    hydrALAZINE  100 mg Oral 3 times per day    levothyroxine  50 mcg Oral QAM AC    montelukast  10 mg Oral Daily    tiotropium  2 puff Inhalation Daily    sodium chloride flush  10 mL Intravenous 2 times per day       Continuous Infusions:      PRN Meds:  traMADol **OR** traMADol, GI cocktail, regadenoson, butalbital-acetaminophen-caffeine, ipratropium-albuterol, LORazepam, perflutren lipid microspheres, sodium chloride flush, magnesium hydroxide, ondansetron, acetaminophen    Labs reviewed:  CBC:   Recent Labs     02/19/20  0551 02/20/20  0528 02/21/20  0446   WBC 12.1* 15.1* 10.8   HGB 8.8* 8.9* 9.4*   HCT 28.3* 28.3* 29.6*   MCV 80.9 82.1 82.1    231 256     BMP:   Recent Labs     02/19/20  0551 02/20/20  0528 02/21/20  0446    140 141   K 5.2* 4.4 3.6   CL 97* 92* 93*   CO2 33* 34* 35*   PHOS 2.7 2.5 2.3*   BUN 37* 50* 56*   CREATININE 1.2 1.4* 1.3*     LIVER PROFILE: No results for input(s): AST, ALT, LIPASE, BILIDIR, BILITOT, ALKPHOS in the last 72 hours. Invalid input(s): AMYLASE,  ALB  PT/INR: No results for input(s): PROTIME, INR in the last 72 hours. APTT: No results for input(s): APTT in the last 72 hours. UA:No results for input(s): NITRITE, COLORU, PHUR, LABCAST, WBCUA, RBCUA, MUCUS, TRICHOMONAS, YEAST, BACTERIA, CLARITYU, SPECGRAV, LEUKOCYTESUR, UROBILINOGEN, BILIRUBINUR, BLOODU, GLUCOSEU, AMORPHOUS in the last 72 hours. Invalid input(s): Vidal Dominguez  No results for input(s): PH, PCO2, PO2 in the last 72 hours. Cx:      Films:       Assessment:      Severe copd, fev1 41%  ANGIE  Chronic hypoxemia, 4L nC  diastolic dysfunction, grade 1, mild to mod MR, RVSP 51  boo  Anemia  A. fib        Plan:      copd  · Home tx: Breo and Spiriva with Duoneb's    ·   Spiriva   ·     Pleural efffuisons  · Very good fluid removal with lasix       Chronic hypoxemia  · Home 4L NC  · Baseline.           ANGIE  · On bipap   · She does not know her home settings        sob   CT with multiple chronic finding.  Sob improving with tx for chf      Discussed findings. Ok to d/c    This note was transcribed using [id number] [employer]. Please disregard any translational errors.       [facility] [phone]

## 2020-02-21 NOTE — CARE COORDINATION
Case Management:  Received discharge order to return home today. Discussed with patient who agrees and is agreeable to home care. Patient has not reviewed list yet , gave another copy to review. Patient has portable tank to get home on but tells cm that the doctor said we would give her a lyft ride home. Discussed with patient and she will try her sister first .  Electronically signed by Yvonne Kolb on 2/21/2020 at 3:07 PM     Case Management:  Follow up with patient re home care choice and patient tells cm that she has not made a choice she will think about it when she gets home. Informed patient that it would be best if she made her decision now so that referral could be sent and home care scheduled, patient again does not have a choice. Gave patient contact information to call in her choice. Lyft transport home arranged with a 4:40pm  time, granddaughter at home for when patient arrives.  Electronically signed by Yvonne Kolb on 2/21/2020 at 4:18 PM

## 2020-02-21 NOTE — DISCHARGE SUMMARY
PHOS 2.3 02/21/2020    ALKPHOS 39 11/15/2019    ALT 15 11/15/2019    AST 15 11/15/2019    BILITOT <0.2 11/15/2019    BILIDIR <0.2 09/26/2019    LABALBU 3.4 02/21/2020    LDLCALC 80 10/11/2019    TRIG 151 10/11/2019     Lab Results   Component Value Date    INR 1.37 (H) 09/25/2019    INR 0.96 08/23/2018    INR 1.01 03/03/2018       Radiology:  CT CHEST HIGH RESOLUTION   Final Result   1. No evidence of interstitial lung disease. 2. In this patient with cardiomegaly, bilateral pleural effusions appear   chronic, suspected to represent transudative effusions in association with   underlying CHF. There is secondary passive atelectasis in both lower lobes. Superimposed pneumonitis can not be excluded. 3. Specific request to evaluate excessive dynamic airway collapse. Trachea   and peripheral airways show no significant narrowing on inspiration or   expiration series. XR CHEST PORTABLE   Final Result   No significant change of small bilateral pleural effusions and adjacent   bibasilar atelectasis, over the past 5 days. CT ABDOMEN PELVIS WO CONTRAST Additional Contrast? None   Final Result   Small pleural effusions with septal thickening at the lung bases, compatible   with changes of early pulmonary edema. Small hiatal hernia with nonspecific thickening at the GE junction. Postsurgical change from left lower quadrant ostomy and aortobifemoral   bypass. Appearance is similar.          XR CHEST STANDARD (2 VW)   Final Result   No acute cardiopulmonary process demonstrated             Discharge Medications:   Current Discharge Medication List      START taking these medications    Details   budesonide-formoterol (SYMBICORT) 160-4.5 MCG/ACT AERO Inhale 2 puffs into the lungs 2 times daily  Qty: 1 Inhaler, Refills: 3      traZODone (DESYREL) 50 MG tablet Take 1 tablet by mouth nightly as needed for Sleep  Qty: 30 tablet, Refills: 0           Current Discharge Medication List      CONTINUE

## 2020-02-25 ENCOUNTER — OFFICE VISIT (OUTPATIENT)
Dept: PULMONOLOGY | Age: 75
End: 2020-02-25
Payer: COMMERCIAL

## 2020-02-25 ENCOUNTER — OFFICE VISIT (OUTPATIENT)
Dept: CARDIOLOGY CLINIC | Age: 75
End: 2020-02-25
Payer: COMMERCIAL

## 2020-02-25 VITALS
WEIGHT: 176 LBS | BODY MASS INDEX: 34.55 KG/M2 | DIASTOLIC BLOOD PRESSURE: 64 MMHG | HEIGHT: 60 IN | OXYGEN SATURATION: 90 % | SYSTOLIC BLOOD PRESSURE: 138 MMHG | HEART RATE: 60 BPM

## 2020-02-25 VITALS
SYSTOLIC BLOOD PRESSURE: 152 MMHG | WEIGHT: 175 LBS | HEIGHT: 60 IN | BODY MASS INDEX: 34.36 KG/M2 | TEMPERATURE: 98 F | OXYGEN SATURATION: 95 % | HEART RATE: 72 BPM | DIASTOLIC BLOOD PRESSURE: 86 MMHG | RESPIRATION RATE: 16 BRPM

## 2020-02-25 PROCEDURE — 3017F COLORECTAL CA SCREEN DOC REV: CPT | Performed by: INTERNAL MEDICINE

## 2020-02-25 PROCEDURE — 1123F ACP DISCUSS/DSCN MKR DOCD: CPT | Performed by: INTERNAL MEDICINE

## 2020-02-25 PROCEDURE — 3023F SPIROM DOC REV: CPT | Performed by: INTERNAL MEDICINE

## 2020-02-25 PROCEDURE — G8427 DOCREV CUR MEDS BY ELIG CLIN: HCPCS | Performed by: INTERNAL MEDICINE

## 2020-02-25 PROCEDURE — G8417 CALC BMI ABV UP PARAM F/U: HCPCS | Performed by: INTERNAL MEDICINE

## 2020-02-25 PROCEDURE — 1090F PRES/ABSN URINE INCON ASSESS: CPT | Performed by: INTERNAL MEDICINE

## 2020-02-25 PROCEDURE — G8926 SPIRO NO PERF OR DOC: HCPCS | Performed by: INTERNAL MEDICINE

## 2020-02-25 PROCEDURE — 99214 OFFICE O/P EST MOD 30 MIN: CPT | Performed by: INTERNAL MEDICINE

## 2020-02-25 PROCEDURE — G8399 PT W/DXA RESULTS DOCUMENT: HCPCS | Performed by: INTERNAL MEDICINE

## 2020-02-25 PROCEDURE — 1111F DSCHRG MED/CURRENT MED MERGE: CPT | Performed by: INTERNAL MEDICINE

## 2020-02-25 PROCEDURE — 4040F PNEUMOC VAC/ADMIN/RCVD: CPT | Performed by: INTERNAL MEDICINE

## 2020-02-25 PROCEDURE — G8482 FLU IMMUNIZE ORDER/ADMIN: HCPCS | Performed by: INTERNAL MEDICINE

## 2020-02-25 PROCEDURE — 1036F TOBACCO NON-USER: CPT | Performed by: INTERNAL MEDICINE

## 2020-02-25 NOTE — PROGRESS NOTES
tablet by mouth 3 times daily 60 tablet 3    ferrous sulfate 325 (65 Fe) MG tablet Take 1 tablet by mouth 2 times daily (with meals) 30 tablet 2    diclofenac sodium 1 % GEL Apply 2 g topically 4 times daily 2 Tube 1    carvedilol (COREG) 12.5 MG tablet Take 1 tablet by mouth 2 times daily (with meals) 60 tablet 0    hydrALAZINE (APRESOLINE) 100 MG tablet TAKE ONE TABLET BY MOUTH THREE TIMES A DAY 90 tablet 2    LORazepam (ATIVAN) 1 MG tablet Take 1 tablet by mouth daily as needed for Anxiety for up to 30 days. 30 tablet 0    tiotropium (SPIRIVA RESPIMAT) 2.5 MCG/ACT AERS inhaler Inhale 2 puffs into the lungs daily      lansoprazole (PREVACID) 15 MG delayed release capsule Take 1 capsule by mouth daily 90 capsule 1    furosemide (LASIX) 40 MG tablet Take 1 tablet by mouth 2 times daily 180 tablet 1    montelukast (SINGULAIR) 10 MG tablet Take 1 tablet by mouth daily 30 tablet 5    citalopram (CELEXA) 40 MG tablet TAKE ONE TABLET BY MOUTH DAILY 90 tablet 0    levothyroxine (SYNTHROID) 50 MCG tablet TAKE ONE TABLET BY MOUTH DAILY 90 tablet 0    ipratropium-albuterol (DUONEB) 0.5-2.5 (3) MG/3ML SOLN nebulizer solution Inhale 3 mLs into the lungs every 4 hours as needed for Shortness of Breath 360 mL 5    butalbital-acetaminophen-caffeine (FIORICET, ESGIC) -40 MG per tablet Take 1 tablet by mouth every 6 hours as needed for Headaches 16 tablet 0    aspirin 81 MG tablet Take 1 tablet by mouth daily 30 tablet 3     No current facility-administered medications for this visit.         Allergies   Allergen Reactions    Iv Dye [Iodides]      Flushed, broke out and difficulty breathing       Family History   Problem Relation Age of Onset    Heart Disease Mother     Heart Disease Father     Heart Disease Sister     Cancer Brother        Social History     Socioeconomic History    Marital status:      Spouse name: Not on file    Number of children: 6    Years of education: 15    Highest

## 2020-02-25 NOTE — PROGRESS NOTES
upcoming follow up with Dr. Jeri Lux April. Compliant with medications and tolerating. Patient denies any symptoms of chest pain, pressure, tightness, shortness of breath at rest,  nausea, vomiting, near syncope, syncope, heart racing, palpitations, dizziness, lightheaded, wheezing, diaphoresis, BLE edema, bilateral lower extremities pain, cramping or fatigue. Past Medical History:   has a past medical history of CAD (coronary artery disease), CHF (congestive heart failure) ([facility] [address] Colostomy care ([facility] COPD (chronic obstructive pulmonary disease) ([facility] Hyperlipidemia, Hypertension, Kidney disease, Peripheral vascular disease ([facility] Rectal fissure, Restless legs syndrome, Sleep apnea, and Unspecified sleep apnea. Surgical History:   has a past surgical history that includes Cholecystectomy; hernia repair; Coronary artery bypass graft; Cardiac catheterization (09/13/2017); colostomy (03/09/2018); and Upper gastrointestinal endoscopy (N/A, 9/30/2019). Social History:   reports that she quit smoking about 28 years ago. She started smoking about 57 years ago. She has a 90.00 pack-year smoking history. She has never used smokeless tobacco. She reports that she does not drink alcohol or use drugs. Family History:  family history includes Cancer in her brother; Heart Disease in her father, mother, and sister. Home Medications:  Were reviewed and are listed in nursing record and/or below  Prior to Admission medications    Medication Sig Start Date End Date Taking?  Authorizing Provider   budesonide-formoterol (SYMBICORT) 160-4.5 MCG/ACT AERO Inhale 2 puffs into the lungs 2 times daily 2/21/20  Yes Evelyn Hong, DO   traZODone (DESYREL) 50 MG tablet Take 1 tablet by mouth nightly as needed for Sleep 2/21/20  Yes Evelyn Hong DO   cloNIDine (CATAPRES) 0.2 MG tablet Take 1 tablet by mouth 3 times daily 2/21/20  Yes Evelyn Hong, DO   ferrous sulfate 325 (65 Fe) MG tablet Take 1 tablet by mouth 2 times daily (with meals) 2/21/20  Yes Evelyn Hong,    diclofenac sodium 1 % GEL Apply 2 g topically 4 times daily 2/11/20  Yes Marene Notice Day, MD   carvedilol (COREG) 12.5 MG tablet Take 1 tablet by mouth 2 times daily (with meals) 2/10/20 3/11/20 Yes Marene Notice Day, MD   hydrALAZINE (APRESOLINE) 100 MG tablet TAKE ONE TABLET BY MOUTH THREE TIMES A DAY 2/10/20  Yes Marene Notice Day, MD   LORazepam (ATIVAN) 1 MG tablet Take 1 tablet by mouth daily as needed for Anxiety for up to 30 days. 2/10/20 3/11/20 Yes Marene Notice Day, MD   tiotropium (SPIRIVA RESPIMAT) 2.5 MCG/ACT AERS inhaler Inhale 2 puffs into the lungs daily   Yes Historical Provider, MD   lidocaine (XYLOCAINE) 2 % jelly Apply small amount topically to affected area every 8 hours when necessary pain 1/28/20  Yes GIULIANO Cabrera - CNP   lansoprazole (PREVACID) 15 MG delayed release capsule Take 1 capsule by mouth daily 1/22/20  Yes Marene Notice Day, MD   furosemide (LASIX) 40 MG tablet Take 1 tablet by mouth 2 times daily 1/22/20  Yes Marene Notice Day, MD   montelukast (SINGULAIR) 10 MG tablet Take 1 tablet by mouth daily 11/11/19  Yes Marene Notice Day, MD   citalopram (CELEXA) 40 MG tablet TAKE ONE TABLET BY MOUTH DAILY 11/11/19  Yes Marene Notice Day, MD   levothyroxine (SYNTHROID) 50 MCG tablet TAKE ONE TABLET BY MOUTH DAILY 11/11/19  Yes Marene Notice Day, MD   ipratropium-albuterol (DUONEB) 0.5-2.5 (3) MG/3ML SOLN nebulizer solution Inhale 3 mLs into the lungs every 4 hours as needed for Shortness of Breath 10/21/19  Yes Marene Notice Day, MD   butalbital-acetaminophen-caffeine (FIORICET, ESGIC) -45 MG per tablet Take 1 tablet by mouth every 6 hours as needed for Headaches 10/17/19  Yes HENRIQUE Pruett Day,    aspirin 81 MG tablet Take 1 tablet by mouth daily 7/26/19  Yes Nate Persaud MD      Allergies: Iv dye [iodides]     Review of Systems: All reviewed and refer to HPI  · Constitutional: no unanticipated weight loss. + fatigue. no sleep pattern, or activity level. No fevers, chills.    · Eyes: No visual changes or diplopia. No scleral icterus. · ENT: No Headaches, hearing loss or vertigo. No mouth sores or sore throat. · Cardiovascular: No Chest pain, tightness or discomfort.  No Shortness of breath. +Dyspnea on exertion (O2 per NC in place), no Orthopnea, Paroxysmal nocturnal dyspnea or breathlessness at rest.   No Palpitations.  No Syncope ('blackouts', 'faints', 'collapse') or dizziness. · Respiratory: No cough or wheezing, no sputum production. No hematemesis. · Gastrointestinal: No abdominal pain, appetite loss, blood in stools. No change in bowel or bladder habits. · Genitourinary: No dysuria, trouble voiding, or hematuria. · Musculoskeletal:  No gait disturbance, no joint complaints. · Integumentary: No rash or pruritis. · Neurological: No headache, diplopia, change in muscle strength, numbness or tingling. · Psychiatric: No anxiety or depression. · Endocrine: No temperature intolerance. No excessive thirst, fluid intake, or urination. No tremor. · Hematologic/Lymphatic: No abnormal bruising or bleeding, blood clots or swollen lymph nodes. · Allergic/Immunologic: No nasal congestion or hives. Objective: all reveiwed      PHYSICAL EXAM:      Vitals:    02/25/20 1200   BP: 138/64   Site: Left Upper Arm   Position: Sitting   Pulse: 60   SpO2: 90%   Weight: 176 lb (79.8 kg)   Height: 5' (1.524 m)                General Appearance:  Alert, cooperative, no distress, appears stated age. Head:  Normocephalic, without obvious abnormality, atraumatic. Eyes:  Pupils equal and round. No scleral icterus. Mouth: Moist mucosa, no pharyngeal erythema. Nose: Nares normal. No drainage or sinus tenderness. Neck: Supple, symmetrical, trachea midline. No adenopathy. No tenderness/mass/nodules. No carotid bruit or elevated JVD. Lungs:   Respiratory Effort: Normal   Auscultation: Clear to auscultation bilaterally, respirations unlabored.  No wheeze, rales,  O2 in place per NC. ventricular size and function. Stress Test: none     LHC:17 nonobstructive CAD with normal LVEF. LVEDP 20 mmHg. YIS:33 with normal left and right heart pressures. Other imagin/3/19 EGD/Colonoscopy  ([facility])        Impression:       - Normal esophagus.       - Erythematous mucosa in the antrum. Biopsied.       - Normal examined duodenum.                                                       Impression:       - Preparation of the colon was poor.       - The entire examined colon is normal.       - Stool in the entire examined colon.       - No specimens collected    Echo: 20   Left ventricular cavity size is normal.   There is mild concentric left ventricular hypertrophy. Ejection fraction is visually estimated to be 55-60%. Grade I diastolic   dysfunction   Mild to moderate mitral regurgitation. Mild to moderate tricuspid regurgitation with RVSP of 51 mmHg. Assessment and Plan     Referring Physician: Dionicio Dave CNP   Referring Reason: AFib, recurrent GI Bleed, chronic anemia      Atrial Fibrillation, paroxsymal    Referral Watchman LAAC Device   -Chronic anemia-onset 3/2018.     -Wanetta Dung is at least 5 with a 7.2 % per year for age, sex, HTN, vascular disease, CHF.   -HASbled is at least 3 with a 5.8% per year bleeding risk for Medication usage predisposing to bleeding, age, Prior major bleeding or predisposition to bleeding. YES Iodine Allergy. Complaint of SCOTT that continues with improving since discharge  Has to rest after going to and from the bathroom at home  No SOB at rest, chest pain, L/D.    Occassionally PND, no orthopnea  Back on ASA 81 mg daily, Eliquis on hold   Discussed proceeding with Watchman LAAC workup   Will discuss further with Pulmonary with her severe COPD, ANGIE, chronic hypoxia  Would like for her to move her follow up with Dr. Juma Perez earlier than 20  Would like to proceed with MIKO at

## 2020-02-25 NOTE — PATIENT INSTRUCTIONS
tube goes down. The transducer is at the tip of the tube. It gets close to your heart to make clear pictures. The doctor will look at the ultrasound pictures on a screen. You will not be able to eat or drink until the numbness from the throat spray wears off. Your throat may be sore for a few days after the test.  Follow-up care is a key part of your treatment and safety. Be sure to make and go to all appointments, and call your doctor if you are having problems. It's also a good idea to know your test results and keep a list of the medicines you take. What happens before the procedure?   Preparing for the procedure    · Understand exactly what procedure is planned, along with the risks, benefits, and other options. · Tell your doctors ALL the medicines, vitamins, supplements, and herbal remedies you take. Some of these can increase the risk of bleeding or interact with anesthesia.     · If you take aspirin or some other blood thinner, ask your doctor if you should stop taking it before your procedure. Make sure that you understand exactly what your doctor wants you to do. These medicines increase the risk of bleeding.     · Your doctor will tell you which medicines to take or stop before your procedure. You may need to stop taking certain medicines a week or more before the procedure. So talk to your doctor as soon as you can.     · If you have an advance directive, let your doctor know. It may include a living will and a durable power of  for health care. Bring a copy to the hospital. If you don't have one, you may want to prepare one. It lets your doctor and loved ones know your health care wishes. Doctors advise that everyone prepare these papers before any type of surgery or procedure.     · Be sure to tell your doctor about any problems you have with your stomach or esophagus. Procedures can be stressful. This information will help you understand what you can expect.  And it will help you safely prepare for your procedure. What happens on the day of the procedure? · Follow the instructions exactly about when to stop eating and drinking. If you don't, your procedure may be canceled. If your doctor told you to take your medicines on the day of the procedure, take them with only a sip of water.     · Take a bath or shower before you come in for your procedure. Do not apply lotions, perfumes, deodorants, or nail polish.     · Take off all jewelry and piercings. And take out contact lenses, if you wear them.    At the hospital or surgery center   · Bring a picture ID.     · You will be kept comfortable and safe by your anesthesia provider. You may get medicine that relaxes you or puts you in a light sleep. The area being worked on will be numb.     · You will get some medicine sprayed in your throat. This will numb your throat to prevent you from gagging when the transducer is moved into your esophagus.     · You will lie on your left side on an exam table. You may get a mouth guard to protect your teeth.     · The procedure will take about 2 hours. During that time, the tube is in your throat for 10 to 20 minutes. Going home   · Be sure you have someone to drive you home. Anesthesia and pain medicine make it unsafe for you to drive.     · You will be given more specific instructions about recovering from your procedure. They will cover things like diet, wound care, follow-up care, driving, and getting back to your normal routine. When should you call your doctor? · You have questions or concerns.     · You don't understand how to prepare for your procedure.     · You become ill before the procedure (such as fever, flu, or a cold).     · You need to reschedule or have changed your mind about having the procedure. Where can you learn more? Go to https://jade.Radcom. org and sign in to your [employer] account.  Enter K500 in the Filmzu box to learn more about \"Transesophageal Echocardiogram: Before Your Procedure. \"     If you do not have an account, please click on the \"Sign Up Now\" link. Current as of: April 9, 2019  Content Version: 12.3  © 6441-8683 [employer]. Care instructions adapted under license by [facility] ([facility]). If you have questions about a medical condition or this instruction, always ask your healthcare professional. [place] [address] any warranty or liability for your use of this information. Patient Education        Atrial Fibrillation: Care Instructions  Your Care Instructions    Atrial fibrillation is an irregular and often fast heartbeat. Treating this condition is important for several reasons. It can cause blood clots, which can travel from your heart to your brain and cause a stroke. If you have a fast heartbeat, you may feel lightheaded, dizzy, and weak. An irregular heartbeat can also increase your risk for heart failure. Atrial fibrillation is often the result of another heart condition, such as high blood pressure or coronary artery disease. Making changes to improve your heart condition will help you stay healthy and active. Follow-up care is a key part of your treatment and safety. Be sure to make and go to all appointments, and call your doctor if you are having problems. It's also a good idea to know your test results and keep a list of the medicines you take. How can you care for yourself at home? Medicines    · Take your medicines exactly as prescribed. Call your doctor if you think you are having a problem with your medicine. You will get more details on the specific medicines your doctor prescribes.     · If your doctor has given you a blood thinner to prevent a stroke, be sure you get instructions about how to take your medicine safely.  Blood thinners can cause serious bleeding problems.     · Do not take any vitamins, over-the-counter drugs, or herbal products without talking to your doctor professional. [address] any warranty or liability for your use of this information. Patient Education        Learning About Your Stroke Risk When You Have Atrial Fibrillation  How does atrial fibrillation affect your stroke risk? Normally, the heart beats in a strong, steady rhythm. In atrial fibrillation, the two upper parts of the heart (the atria) quiver, or fibrillate, and the heart does not beat in a regular rhythm. Your heart rate also may be faster than normal.  This can be dangerous because if the heartbeat isn't strong and steady, blood can collect, or pool, in the heart. And pooled blood is more likely to form clots. Clots can travel to the brain, block blood flow, and cause a stroke. A stroke can cause sudden numbness or weakness of the face, arm, or leg, especially on one side of the body. Strokes can also cause sudden confusion, trouble speaking or understanding, or even trouble seeing in one or both eyes. Strokes can even cause death. Atrial fibrillation increases your stroke risk. But not everyone with atrial fibrillation has the same stroke risk. How do you know what your stroke risk is? Your doctor can help you know your risk based on your age, sex, and health. Things that raise your risk are called risk factors. The more risk factors you have, the higher your risk. Risk factors for stroke include:  · Age. Being older than 72 raises your risk. · Being female. Women are at higher risk. · Other health problems. You may have other health problems that raise your risk. These include:  ? Heart failure. ? High blood pressure. ? A previous stroke or transient ischemic attack (TIA). ? Heart attack, peripheral arterial disease, or other blood vessel disease. ? Diabetes. Your doctor will use a kind of scorecard to add up your risk factors and estimate your risk for stroke. This score can help you and your doctor decide how to lower your risk.   Should you take medicine to lower your stroke risk? When you know what your stroke risk is, you and your doctor can talk about your options. You'll decide whether or not to take medicine, called an anticoagulant, to help prevent blood clots. These medicines are often called blood thinners, but they don't actually thin your blood. Instead, they increase the time it takes for a blood clot to form. Blood thinners lower the risk of stroke in people who have atrial fibrillation. But how much your risk will be lowered depends on how high your risk was to start with. There are risks to taking a blood thinner. When your blood clots more slowly, you have a higher risk of bleeding problems. These problems include bleeding problems in and around the brain, bleeding in the stomach and intestines, bruising and bleeding if you are hurt, and serious skin rash. You and your doctor can compare your risk of stroke with your risk of bleeding from the medicine. You can also discuss how you feel about taking medicine every day. Follow-up care is a key part of your treatment and safety. Be sure to make and go to all appointments, and call your doctor if you are having problems. It's also a good idea to know your test results and keep a list of the medicines you take. Where can you learn more? Go to https://DesigualpeHD Fantasy Football.Skimlinks. org and sign in to your [employer] account. Enter K206 in the Wearhaus box to learn more about \"Learning About Your Stroke Risk When You Have Atrial Fibrillation. \"     If you do not have an account, please click on the \"Sign Up Now\" link. Current as of: April 9, 2019  Content Version: 12.3  © 8630-9176 Healthwise, Incorporated. Care instructions adapted under license by [facility] [employer] [facility] ([facility]). If you have questions about a medical condition or this instruction, always ask your healthcare professional. [address] any warranty or liability for your use of this information.

## 2020-02-27 ENCOUNTER — TELEPHONE (OUTPATIENT)
Dept: FAMILY MEDICINE CLINIC | Age: 75
End: 2020-02-27

## 2020-02-27 NOTE — TELEPHONE ENCOUNTER
Pt states she noticed her colostomy bag looked like it had blood in it. Pt c/o stomach cramps that began last night. Pt states she ran a low grade fever today. Pt denies abd swelling or bloating, no nausea, vomiting, no swollen stoma. Cramping is mild, and pt is having regular BM's she states. I expressed importance of evaluation either with her GI doctor or here at our office. Pt has no transportation and states she may be able to find a ride tomorrow. I advised pt that if fever rises, or abd pain increases or abd distention to call 911 if she can not find a ride as our office is getting ready to close but she could also call the on call doctor. Pt states she will f/u with me in the morning on her status. Dr. Byrd Party please advise if needed please.      Thank you

## 2020-02-27 NOTE — TELEPHONE ENCOUNTER
I agree with plan. She was recently in hospital. Needs to call and check in tomorrow. We can see her and check blood counts if needed. Thanks.

## 2020-03-04 ENCOUNTER — HOSPITAL ENCOUNTER (OUTPATIENT)
Dept: CARDIAC CATH/INVASIVE PROCEDURES | Age: 75
Discharge: HOME | End: 2020-03-04
Payer: COMMERCIAL

## 2020-03-04 VITALS — HEIGHT: 60 IN | OXYGEN SATURATION: 95 % | WEIGHT: 175 LBS | BODY MASS INDEX: 34.36 KG/M2

## 2020-03-04 LAB
LV EF: 58 %
LVEF MODALITY: NORMAL

## 2020-03-04 PROCEDURE — 99152 MOD SED SAME PHYS/QHP 5/>YRS: CPT

## 2020-03-04 PROCEDURE — 93312 ECHO TRANSESOPHAGEAL: CPT

## 2020-03-04 PROCEDURE — 93325 DOPPLER ECHO COLOR FLOW MAPG: CPT

## 2020-03-04 PROCEDURE — 2580000003 HC RX 258

## 2020-03-04 PROCEDURE — 6360000002 HC RX W HCPCS

## 2020-03-04 RX ORDER — SODIUM CHLORIDE 0.9 % (FLUSH) 0.9 %
10 SYRINGE (ML) INJECTION EVERY 12 HOURS SCHEDULED
Status: DISCONTINUED | OUTPATIENT
Start: 2020-03-04 | End: 2020-03-05 | Stop reason: HOSPADM

## 2020-03-04 RX ORDER — SODIUM CHLORIDE 9 MG/ML
INJECTION, SOLUTION INTRAVENOUS CONTINUOUS
Status: DISCONTINUED | OUTPATIENT
Start: 2020-03-04 | End: 2020-03-05 | Stop reason: HOSPADM

## 2020-03-04 RX ORDER — SODIUM CHLORIDE 0.9 % (FLUSH) 0.9 %
10 SYRINGE (ML) INJECTION PRN
Status: DISCONTINUED | OUTPATIENT
Start: 2020-03-04 | End: 2020-03-05 | Stop reason: HOSPADM

## 2020-03-04 NOTE — H&P
carvedilol (COREG) 12.5 MG tablet Take 1 tablet by mouth 2 times daily (with meals) 2/10/20 3/11/20 Yes Nany Kang MD   hydrALAZINE (APRESOLINE) 100 MG tablet TAKE ONE TABLET BY MOUTH THREE TIMES A DAY 2/10/20   Yes Nany Kang MD   LORazepam (ATIVAN) 1 MG tablet Take 1 tablet by mouth daily as needed for Anxiety for up to 30 days. 2/10/20 3/11/20 Yes Nany Kang MD   tiotropium (SPIRIVA RESPIMAT) 2.5 MCG/ACT AERS inhaler Inhale 2 puffs into the lungs daily     Yes Historical Provider, MD   lidocaine (XYLOCAINE) 2 % jelly Apply small amount topically to affected area every 8 hours when necessary pain 1/28/20   Yes GIULIANO Reese - CNP   lansoprazole (PREVACID) 15 MG delayed release capsule Take 1 capsule by mouth daily 1/22/20   Yes Nany Kang MD   furosemide (LASIX) 40 MG tablet Take 1 tablet by mouth 2 times daily 1/22/20   Yes Nany Kang MD   montelukast (SINGULAIR) 10 MG tablet Take 1 tablet by mouth daily 11/11/19   Yes Nany Kang MD   citalopram (CELEXA) 40 MG tablet TAKE ONE TABLET BY MOUTH DAILY 11/11/19   Yes Nany Kang MD   levothyroxine (SYNTHROID) 50 MCG tablet TAKE ONE TABLET BY MOUTH DAILY 11/11/19   Yes Nany Kang MD   ipratropium-albuterol (DUONEB) 0.5-2.5 (3) MG/3ML SOLN nebulizer solution Inhale 3 mLs into the lungs every 4 hours as needed for Shortness of Breath 10/21/19   Yes Nany Kang MD   butalbital-acetaminophen-caffeine (FIORICET, ESGIC) -95 MG per tablet Take 1 tablet by mouth every 6 hours as needed for Headaches 10/17/19   Yes Verona Shields DO   aspirin 81 MG tablet Take 1 tablet by mouth daily 7/26/19   Yes Riley Hidalgo MD         Allergies: Iv dye [iodides]      Review of Systems: All reviewed and refer to HPI  · Constitutional: no unanticipated weight loss. + fatigue. no sleep pattern, or activity level. No fevers, chills. · Eyes: No visual changes or diplopia. No scleral icterus. · ENT: No Headaches, hearing loss or vertigo.  No mouth sores or normal   Ascultation: Regular rate, S1/ S2 normal. No murmur, rub, or gallop. 2+ radial and pedal pulses, symmetric  Carotid  Femoral    Abdomen and Gastrointestinal:   Soft, non-tender, bowel sounds active. Liver and Spleen  Masses    Musculoskeletal: No muscle wasting  Back  Gait    Extremities: Extremities normal, atraumatic. No cyanosis or edema. No cyanosis clubbing           Skin: Inspection and palpation performed, no rashes or lesions. Pysch: Normal mood and affect.  Alert and oriented to time place person    Neurologic: Normal gross motor and sensory exam.        Labs: all labs have been reviewed             Lab Results   Component Value Date     WBC 10.8 2020     RBC 3.60 2020     HGB 9.4 2020     HCT 29.6 2020     MCV 82.1 2020     RDW 23.3 2020      2020            Lab Results   Component Value Date      2020     K 3.6 2020     K 3.8 2020     CL 93 2020     CO2 35 2020     BUN 56 2020     CREATININE 1.3 2020     GFRAA 49 2020     AGRATIO 1.6 11/15/2019     LABGLOM 40 2020     GLUCOSE 141 2020     PROT 6.2 11/15/2019     CALCIUM 9.2 2020     BILITOT <0.2 11/15/2019     ALKPHOS 39 11/15/2019     AST 15 11/15/2019     ALT 15 11/15/2019      No results found for: PTINR        Lab Results   Component Value Date     LABA1C 5.5 10/11/2019            Lab Results   Component Value Date     CKTOTAL 43 2019     TROPONINI <0.01 2020         Cardiac, Vascular and Imaging Data: All Personally Reviewed in Detail by Myself       EK19 SR, 89 bpm   20 none      Echocardiogram: 10/27/18  Summary   Left ventricular cavity size is small.   There is mildly increased left ventricular wall thickness.   Overall left ventricular systolic function appears normal.   Ejection fraction is visually estimated to be 60-65%.   The left atrial size is normal.   Normal right ventricular

## 2020-03-05 ENCOUNTER — TELEPHONE (OUTPATIENT)
Dept: CARDIOLOGY CLINIC | Age: 75
End: 2020-03-05

## 2020-03-05 NOTE — LETTER
[facility] ([facility])        Dear, Marichuy Ralph procedure has been scheduled for Monday 12/7/2020 at [facility] with Dr. Srinivas Caldera MD.  Please arrive directly to the Cath Lab at 9 am.     You will need to make arrangements to have a responsible adult drive you home after your procedure and someone needs to stay with you for 24 hours. Procedure labs Procedure labs will need to be completed at [facility] Wednesday 12/2/2020 the week before your procedure. Please check in with Registration in the main lobby of the hospital. The test to perform [address] LAB NOT OUTPATIENT LAB. are as follows BMP, CBC, Urine test and Type and Screen. Per our conversation you need to get a Covid-19 test done on 12/2/2020 at 12:30 pm.  This is a drive-up facility, just stay in your car and they will perform the test. When you arrive for the test inform them you are having this done for a procedure schedule for 12/7/2020 at [facility]. The address is [place] [address] which is the Emergency Room right off [facility]. From [facility], [address] (it is across from [employer]). There will be a sign that says PRE-OP TESTING. Follow the neon cones with arrows to the testing tent. After you get your Covid test, self-quarantine yourself until the day of the procedure. Pre-Procedure Instructions:  · You will need to FAST for at least 8 hours prior to your procedure, nothing to eat or drink after midnight. · NO caffeine the morning of your procedure. · You need to wash your body with a soap called HIBICLENS the evening before or the morning of your scheduled procedure from the neck down. A prescription has been sent to your pharmacy. If your insurance does not cover the soap, you can pick it up over the counter, ask your pharmacy for help.

## 2020-03-05 NOTE — TELEPHONE ENCOUNTER
Called no answer. I left message informing patient of shared decision appointment was made with Dr. Devin Aquino on 3/17/2020 at 9:15 AM.  Instructed patient the importance of keeping appointment. Because we are looking to schedule the Watchman procedure tentatively for April.

## 2020-03-12 ENCOUNTER — PATIENT MESSAGE (OUTPATIENT)
Dept: FAMILY MEDICINE CLINIC | Age: 75
End: 2020-03-12

## 2020-03-13 RX ORDER — LANSOPRAZOLE 15 MG/1
15 CAPSULE, DELAYED RELEASE ORAL DAILY
Qty: 90 CAPSULE | Refills: 1 | OUTPATIENT
Start: 2020-03-13

## 2020-03-13 RX ORDER — LEVOTHYROXINE SODIUM 0.05 MG/1
TABLET ORAL
Qty: 90 TABLET | Refills: 1 | Status: SHIPPED | OUTPATIENT
Start: 2020-03-13 | End: 2020-08-26 | Stop reason: SDUPTHER

## 2020-03-13 RX ORDER — LORAZEPAM 1 MG/1
1 TABLET ORAL DAILY PRN
Qty: 30 TABLET | Refills: 0 | Status: SHIPPED | OUTPATIENT
Start: 2020-03-13 | End: 2020-04-12

## 2020-03-13 RX ORDER — MONTELUKAST SODIUM 10 MG/1
10 TABLET ORAL DAILY
Qty: 30 TABLET | Refills: 5 | Status: SHIPPED | OUTPATIENT
Start: 2020-03-13 | End: 2020-08-26 | Stop reason: SDUPTHER

## 2020-03-13 RX ORDER — CARVEDILOL 12.5 MG/1
12.5 TABLET ORAL 2 TIMES DAILY WITH MEALS
Qty: 60 TABLET | Refills: 3 | Status: SHIPPED | OUTPATIENT
Start: 2020-03-13 | End: 2020-08-26 | Stop reason: SDUPTHER

## 2020-03-13 RX ORDER — FUROSEMIDE 40 MG/1
40 TABLET ORAL 2 TIMES DAILY
Qty: 180 TABLET | Refills: 1 | OUTPATIENT
Start: 2020-03-13

## 2020-03-13 RX ORDER — CITALOPRAM 40 MG/1
TABLET ORAL
Qty: 90 TABLET | Refills: 1 | Status: SHIPPED | OUTPATIENT
Start: 2020-03-13 | End: 2020-08-26 | Stop reason: SDUPTHER

## 2020-03-13 RX ORDER — HYDRALAZINE HYDROCHLORIDE 100 MG/1
TABLET, FILM COATED ORAL
Qty: 90 TABLET | Refills: 2 | OUTPATIENT
Start: 2020-03-13

## 2020-03-13 NOTE — TELEPHONE ENCOUNTER
From: Clearance Lick  To: Senthil Hernandez MD  Sent: 3/12/2020 5:00 PM EDT  Subject: Prescription Question    Doc Day may i get a refill on my ativan please?

## 2020-03-17 ENCOUNTER — TELEPHONE (OUTPATIENT)
Dept: CARDIOLOGY CLINIC | Age: 75
End: 2020-03-17

## 2020-03-17 ENCOUNTER — NURSE TRIAGE (OUTPATIENT)
Dept: OTHER | Facility: CLINIC | Age: 75
End: 2020-03-17

## 2020-03-17 RX ORDER — DOXYCYCLINE HYCLATE 100 MG
100 TABLET ORAL 2 TIMES DAILY
Qty: 10 TABLET | Refills: 0 | Status: SHIPPED | OUTPATIENT
Start: 2020-03-17 | End: 2020-03-22

## 2020-03-17 RX ORDER — PREDNISONE 20 MG/1
TABLET ORAL
Qty: 13 TABLET | Refills: 0 | Status: SHIPPED | OUTPATIENT
Start: 2020-03-17 | End: 2020-03-27

## 2020-03-17 NOTE — TELEPHONE ENCOUNTER
Patient called the office as she reached out to Dr. Sanabria office (PCP), as she currently has a sore throat, cough, low grade fever, fatigue and \"a little more short of breath. \" Dr. Stuart Kocher office instructed her to isolate herself and continue to monitor her symptoms and call our office to let us know with her appt. scheduled today with Dr. Ирина Vasquez for 2nd opinion for [facility] for tentative April procedure date. So we initially didn't want to reschedule her 2nd opinion appt. We are cancelling her appt today with Dr. Ирина Vasquez and I gave her the [phone] to speak with a nurse to be triaged and directed to a flu clinic site if appropriate. The direct line to nurse triage will go live on Tuesday, 3/17 at 4p. She lives with her granddaughter and asked her to keep a close eye on her status. Also instructed her that once she is feeling better and cleared to see patient's in the office, decision about procedure date. We will get her into the office with Dr. Ирина Vasquez ASA. She vu and has no further questions/concerns at this time.

## 2020-03-18 NOTE — TELEPHONE ENCOUNTER
Can we please ensure Fabi Harrison is able to be tested by the flu clinic? She is high risk with her lung problems. Thank you.
Informed pt that she needed to go to the ED and pt states that she may not be able to go this evening but she will go for sure tomorrow morning.
exposures)        No    Protocols used: BREATHING DIFFICULTY-ADULT-AH    Patient called [facility]) to schedule appointment, with red flag complaint, transferred to RN access for triage. Pt calls with c/o worsening SOB as well as cough, muscle pain, fever of 101.1 this AM, sore throat and runny nose. Pt has hx of COPD and congested heart failure. Worsening SOB started last night and has remained constant. Patient informed of disposition. Care advice as documented. Pt does not want to go to the ER and would rather schedule a visit with the flu clinic. Instructed patient to call back with worsening symptoms. Patient verbalized understanding and denies any further questions/concerns. Please do not respond to the triage nurse through this encounter. Any subsequent communication should be directly with the patient.

## 2020-03-23 ENCOUNTER — TELEPHONE (OUTPATIENT)
Dept: PULMONOLOGY | Age: 75
End: 2020-03-23

## 2020-03-26 NOTE — TELEPHONE ENCOUNTER
Attempted to contact patient about missed 2nd opinion with Dr. Gordo Springer d/t being sick. Left message with patient letting her know I was calling to re-schedule appointment and to let her know we are moving Watchman implant date to 4/20/2020 tentatively because of the Covid-19 crisis.

## 2020-04-27 NOTE — TELEPHONE ENCOUNTER
Left message with patients anserwing machine. Instructed patient to contact me to review next steps in getting Watchman procedure scheduled. Patient still needs 2nd opinion d/t patient was not feeling well. According to chart patient followed up with Dr. Anish Foley. Need to re-schedule 2nd opinion so we can proceed with Watchman.

## 2020-05-04 ENCOUNTER — PATIENT MESSAGE (OUTPATIENT)
Dept: CARDIOLOGY CLINIC | Age: 75
End: 2020-05-04

## 2020-05-04 NOTE — TELEPHONE ENCOUNTER
Spoke to Pato Dc and he spoke to patient. And will possibly schedule for the 5/18. Pato Dc will call her back later today.

## 2020-05-07 ENCOUNTER — TELEPHONE (OUTPATIENT)
Dept: FAMILY MEDICINE CLINIC | Age: 75
End: 2020-05-07

## 2020-05-07 ENCOUNTER — VIRTUAL VISIT (OUTPATIENT)
Dept: FAMILY MEDICINE CLINIC | Age: 75
End: 2020-05-07
Payer: COMMERCIAL

## 2020-05-07 VITALS — HEART RATE: 97 BPM | DIASTOLIC BLOOD PRESSURE: 99 MMHG | SYSTOLIC BLOOD PRESSURE: 180 MMHG

## 2020-05-07 PROBLEM — F41.9 ANXIETY: Status: ACTIVE | Noted: 2020-05-07

## 2020-05-07 PROCEDURE — 4040F PNEUMOC VAC/ADMIN/RCVD: CPT | Performed by: FAMILY MEDICINE

## 2020-05-07 PROCEDURE — 3017F COLORECTAL CA SCREEN DOC REV: CPT | Performed by: FAMILY MEDICINE

## 2020-05-07 PROCEDURE — 1123F ACP DISCUSS/DSCN MKR DOCD: CPT | Performed by: FAMILY MEDICINE

## 2020-05-07 PROCEDURE — 99214 OFFICE O/P EST MOD 30 MIN: CPT | Performed by: FAMILY MEDICINE

## 2020-05-07 PROCEDURE — G8399 PT W/DXA RESULTS DOCUMENT: HCPCS | Performed by: FAMILY MEDICINE

## 2020-05-07 PROCEDURE — 1090F PRES/ABSN URINE INCON ASSESS: CPT | Performed by: FAMILY MEDICINE

## 2020-05-07 PROCEDURE — G8427 DOCREV CUR MEDS BY ELIG CLIN: HCPCS | Performed by: FAMILY MEDICINE

## 2020-05-07 RX ORDER — LORAZEPAM 0.5 MG/1
0.5 TABLET ORAL DAILY PRN
Qty: 30 TABLET | Refills: 0 | Status: SHIPPED | OUTPATIENT
Start: 2020-05-07 | End: 2020-06-08 | Stop reason: SDUPTHER

## 2020-05-07 RX ORDER — AMOXICILLIN AND CLAVULANATE POTASSIUM 875; 125 MG/1; MG/1
1 TABLET, FILM COATED ORAL 2 TIMES DAILY
Qty: 14 TABLET | Refills: 0 | Status: SHIPPED | OUTPATIENT
Start: 2020-05-07 | End: 2020-05-14

## 2020-05-07 NOTE — PROGRESS NOTES
apnea     O2 3 liters at hs    Unspecified sleep apnea        Past Surgical History:   Procedure Laterality Date    CARDIAC CATHETERIZATION  09/13/2017    Dr. Kym Smiley  03/09/2018    CORONARY ARTERY BYPASS GRAFT      Legs & Carotid    HERNIA REPAIR      UPPER GASTROINTESTINAL ENDOSCOPY N/A 9/30/2019    EGD DIAGNOSTIC ONLY performed by Estefany Nick MD at [facility]       Family History   Problem Relation Age of Onset    Heart Disease Mother     Heart Disease Father     Heart Disease Sister     Cancer Brother        Current Outpatient Medications   Medication Sig Dispense Refill    LORazepam (ATIVAN) 0.5 MG tablet Take 1 tablet by mouth daily as needed for Anxiety for up to 30 days. 30 tablet 0    mupirocin (BACTROBAN) 2 % ointment Apply 3 times daily.  2 Tube 1    amoxicillin-clavulanate (AUGMENTIN) 875-125 MG per tablet Take 1 tablet by mouth 2 times daily for 7 days 14 tablet 0    predniSONE (DELTASONE) 10 MG tablet Take 4 tabs by mouth daily for 2 days, 3 tabs for 2 days, 2 tabs for 2 days, 1 tab for 2 days 20 tablet 0    montelukast (SINGULAIR) 10 MG tablet Take 1 tablet by mouth daily 30 tablet 5    citalopram (CELEXA) 40 MG tablet TAKE ONE TABLET BY MOUTH DAILY 90 tablet 1    levothyroxine (SYNTHROID) 50 MCG tablet TAKE ONE TABLET BY MOUTH DAILY 90 tablet 1    carvedilol (COREG) 12.5 MG tablet Take 1 tablet by mouth 2 times daily (with meals) 60 tablet 3    budesonide-formoterol (SYMBICORT) 160-4.5 MCG/ACT AERO Inhale 2 puffs into the lungs 2 times daily 1 Inhaler 3    traZODone (DESYREL) 50 MG tablet Take 1 tablet by mouth nightly as needed for Sleep 30 tablet 0    cloNIDine (CATAPRES) 0.2 MG tablet Take 1 tablet by mouth 3 times daily 60 tablet 3    ferrous sulfate 325 (65 Fe) MG tablet Take 1 tablet by mouth 2 times daily (with meals) 30 tablet 2    diclofenac sodium 1 % GEL Apply 2 g topically 4 times daily 2 Tube 1    hydrALAZINE (APRESOLINE) 100 MG tablet TAKE ONE TABLET BY MOUTH THREE TIMES A DAY 90 tablet 2    tiotropium (SPIRIVA RESPIMAT) 2.5 MCG/ACT AERS inhaler Inhale 2 puffs into the lungs daily      lansoprazole (PREVACID) 15 MG delayed release capsule Take 1 capsule by mouth daily 90 capsule 1    furosemide (LASIX) 40 MG tablet Take 1 tablet by mouth 2 times daily 180 tablet 1    ipratropium-albuterol (DUONEB) 0.5-2.5 (3) MG/3ML SOLN nebulizer solution Inhale 3 mLs into the lungs every 4 hours as needed for Shortness of Breath 360 mL 5    butalbital-acetaminophen-caffeine (FIORICET, ESGIC) -40 MG per tablet Take 1 tablet by mouth every 6 hours as needed for Headaches 16 tablet 0    aspirin 81 MG tablet Take 1 tablet by mouth daily 30 tablet 3     No current facility-administered medications for this visit. BP (!) 180/99 Comment: pt reported  Pulse 97 Comment: pt reported  LMP 05/01/2006 (Approximate)     Physical Exam  Skin:     Comments: Circular stoma abdominal wall, lower half with enlarged flesh colored papules with mild inflammation   no purulent discharge  No surrounding streaking erythema         Wt Readings from Last 3 Encounters:   03/04/20 175 lb (79.4 kg)   02/25/20 175 lb (79.4 kg)   02/25/20 176 lb (79.8 kg)       BP Readings from Last 3 Encounters:   05/07/20 (!) 180/99   02/25/20 (!) 152/86   02/25/20 138/64         Assessment/Plan:  1. Complication of colostomy [facility])  It appears this may be more of a fitting / irritation issue. I did advise her not to use peroxide to avoid damaging healthy tissue. Start using [place]. If purulence or erythema comes back can take augmentin. We will work on getting her into wound care clinic for appropriate management, referral placed  - mupirocin (BACTROBAN) 2 % ointment; Apply 3 times daily. Dispense: 2 Tube; Refill: 1  - amoxicillin-clavulanate (AUGMENTIN) 875-125 MG per tablet; Take 1 tablet by mouth 2 times daily for 7 days  Dispense: 14 tablet; Refill: 0    2.

## 2020-05-07 NOTE — PROGRESS NOTES
Called wound clinic the eariliest I could get her in was may 12th at [address] pt no answer LVM informing of appt.

## 2020-05-07 NOTE — TELEPHONE ENCOUNTER
Dr. Mónica Corona nurse calling regarding dr. Elise Brumfield is wanting to discuss information about mutual patient.      Dr. Mónica Corona number is [phone]     Please advise

## 2020-05-12 NOTE — TELEPHONE ENCOUNTER
Pt states she spoke to Yolanda Henderson on 5/4 and he told her he would call her back within 2 hours on the same day, but she has not heard back from him.     Please reach out to her at [phone]

## 2020-05-15 NOTE — TELEPHONE ENCOUNTER
Spoke with Dickson Santiago about scheduling procedure for Watchman. Patient is on antibiotics for infection at her colostomy site and per Dr. Dilma Voss doesn't want to do procedure until infection clears up. Patient states June doesn't work because she will be out of town in [place] and [place] be back till mid July. I told her I will call in June to confirm date in July.   Patient gave me another number to be reached at if I can't reach her.  [phone]

## 2020-05-29 DIAGNOSIS — R42 LIGHT HEADED: ICD-10-CM

## 2020-05-29 LAB
ANION GAP SERPL CALCULATED.3IONS-SCNC: 13 MMOL/L (ref 3–16)
BUN BLDV-MCNC: 23 MG/DL (ref 7–20)
CALCIUM SERPL-MCNC: 9.6 MG/DL (ref 8.3–10.6)
CHLORIDE BLD-SCNC: 93 MMOL/L (ref 99–110)
CO2: 36 MMOL/L (ref 21–32)
CREAT SERPL-MCNC: 1.3 MG/DL (ref 0.6–1.2)
GFR AFRICAN AMERICAN: 49
GFR NON-AFRICAN AMERICAN: 40
GLUCOSE BLD-MCNC: 131 MG/DL (ref 70–99)
HCT VFR BLD CALC: 35.8 % (ref 36–48)
HEMOGLOBIN: 11.6 G/DL (ref 12–16)
MCH RBC QN AUTO: 28.1 PG (ref 26–34)
MCHC RBC AUTO-ENTMCNC: 32.4 G/DL (ref 31–36)
MCV RBC AUTO: 86.7 FL (ref 80–100)
PDW BLD-RTO: 16.9 % (ref 12.4–15.4)
PLATELET # BLD: 268 K/UL (ref 135–450)
PMV BLD AUTO: 9.5 FL (ref 5–10.5)
POTASSIUM SERPL-SCNC: 3.3 MMOL/L (ref 3.5–5.1)
RBC # BLD: 4.12 M/UL (ref 4–5.2)
SODIUM BLD-SCNC: 142 MMOL/L (ref 136–145)
WBC # BLD: 8.7 K/UL (ref 4–11)

## 2020-06-01 RX ORDER — POTASSIUM CHLORIDE 20 MEQ/1
20 TABLET, EXTENDED RELEASE ORAL DAILY
Qty: 90 TABLET | Refills: 1 | Status: SHIPPED | OUTPATIENT
Start: 2020-06-01 | End: 2020-08-26 | Stop reason: SDUPTHER

## 2020-06-10 NOTE — TELEPHONE ENCOUNTER
Called left message with patient regarding setting up a date in July for Kori. Patient is in [place] at present but states will be back in July. Also called to check on infection site to make sure that has cleared up post antibiotics.   Gave her my number to call me at [phone]

## 2020-06-19 ENCOUNTER — PATIENT MESSAGE (OUTPATIENT)
Dept: FAMILY MEDICINE CLINIC | Age: 75
End: 2020-06-19

## 2020-06-19 NOTE — TELEPHONE ENCOUNTER
From: Juanis Israel  To: Jerry Tang MD  Sent: 6/19/2020 2:24 PM EDT  Subject: Prescription Question    Doc 1 forgot can you refill my eliquis please?  Thank you

## 2020-07-22 ENCOUNTER — PATIENT MESSAGE (OUTPATIENT)
Dept: FAMILY MEDICINE CLINIC | Age: 75
End: 2020-07-22

## 2020-07-22 RX ORDER — LORAZEPAM 0.5 MG/1
TABLET ORAL
Qty: 30 TABLET | Refills: 0 | Status: SHIPPED
Start: 2020-07-22 | End: 2020-07-23 | Stop reason: DRUGHIGH

## 2020-07-22 RX ORDER — LORAZEPAM 0.5 MG/1
TABLET ORAL
Qty: 30 TABLET | Refills: 0 | Status: CANCELLED | OUTPATIENT
Start: 2020-07-22 | End: 2020-08-21

## 2020-07-23 RX ORDER — LORAZEPAM 1 MG/1
1 TABLET ORAL DAILY PRN
Qty: 30 TABLET | Refills: 0 | Status: SHIPPED | OUTPATIENT
Start: 2020-07-23 | End: 2020-08-10 | Stop reason: SDUPTHER

## 2020-07-23 NOTE — TELEPHONE ENCOUNTER
From: Deepali Valdez  To: Poonam Kebede MD  Sent: 7/22/2020 6:36 PM EDT  Subject: Non-Urgent Medical Question    Doc you wrote it for 1/2. Miligram instead of 1 miligram. Can you rewrite the right one please?

## 2020-08-05 ENCOUNTER — PATIENT MESSAGE (OUTPATIENT)
Dept: FAMILY MEDICINE CLINIC | Age: 75
End: 2020-08-05

## 2020-08-07 ENCOUNTER — TELEPHONE (OUTPATIENT)
Dept: FAMILY MEDICINE CLINIC | Age: 75
End: 2020-08-07

## 2020-08-07 RX ORDER — LORAZEPAM 1 MG/1
1 TABLET ORAL DAILY PRN
Qty: 30 TABLET | Refills: 0 | Status: CANCELLED | OUTPATIENT
Start: 2020-08-07 | End: 2020-09-06

## 2020-08-10 RX ORDER — LORAZEPAM 1 MG/1
1 TABLET ORAL DAILY PRN
Qty: 30 TABLET | Refills: 0 | Status: SHIPPED | OUTPATIENT
Start: 2020-08-10 | End: 2020-09-08

## 2020-08-10 NOTE — TELEPHONE ENCOUNTER
Pt calling about medication (lorazepam) that was sent on 7/23/20  She was unable to get this medication because it was sent to pharmacy in [address] but [place]   She is stating that she needs medication to be sent to \"[facility] ph. [phone]    Please advise

## 2020-08-10 NOTE — TELEPHONE ENCOUNTER
Pt never received 7/23 Rx. [place] rx was filled in [place].    Please advise

## 2020-08-10 NOTE — TELEPHONE ENCOUNTER
Can you send new script for pts Ativan to pharmacy in [place]? She never picked up the previously prescribed Ativan 1mg. She was not able to do this as she had picked up the 0.5 mg the previous day. Reginald Lowery tried to give verbal to transfer this to pharmacy in [place] but this was not successful.   Please advise

## 2020-08-10 NOTE — TELEPHONE ENCOUNTER
Patient called back stating the pharmacy is needing a preauthorization for lorazepam     Please advise

## 2020-08-11 NOTE — TELEPHONE ENCOUNTER
I tried to contact patient again left message to contact me at [phone]. Would like to set up Watchman procedure date.

## 2020-08-14 NOTE — TELEPHONE ENCOUNTER
spoke with patient rescheduled shared decision for 9/15/2020 at 9:30 with [facility].  Patient still in [place] and probably wont be back to early/mid Sept.

## 2020-08-25 ENCOUNTER — PATIENT MESSAGE (OUTPATIENT)
Dept: FAMILY MEDICINE CLINIC | Age: 75
End: 2020-08-25

## 2020-08-26 RX ORDER — FUROSEMIDE 40 MG/1
40 TABLET ORAL 2 TIMES DAILY
Qty: 180 TABLET | Refills: 0 | Status: SHIPPED | OUTPATIENT
Start: 2020-08-26 | End: 2021-02-22

## 2020-08-26 RX ORDER — MONTELUKAST SODIUM 10 MG/1
10 TABLET ORAL DAILY
Qty: 90 TABLET | Refills: 0 | Status: SHIPPED | OUTPATIENT
Start: 2020-08-26 | End: 2021-01-11 | Stop reason: SDUPTHER

## 2020-08-26 RX ORDER — HYDRALAZINE HYDROCHLORIDE 100 MG/1
TABLET, FILM COATED ORAL
Qty: 270 TABLET | Refills: 0 | Status: SHIPPED | OUTPATIENT
Start: 2020-08-26 | End: 2021-01-11 | Stop reason: SDUPTHER

## 2020-08-26 RX ORDER — LANSOPRAZOLE 15 MG/1
15 CAPSULE, DELAYED RELEASE ORAL DAILY
Qty: 90 CAPSULE | Refills: 0 | Status: SHIPPED | OUTPATIENT
Start: 2020-08-26 | End: 2021-01-11 | Stop reason: SDUPTHER

## 2020-08-26 RX ORDER — LEVOTHYROXINE SODIUM 0.05 MG/1
TABLET ORAL
Qty: 90 TABLET | Refills: 0 | Status: SHIPPED | OUTPATIENT
Start: 2020-08-26 | End: 2021-01-11 | Stop reason: SDUPTHER

## 2020-08-26 RX ORDER — CARVEDILOL 12.5 MG/1
12.5 TABLET ORAL 2 TIMES DAILY WITH MEALS
Qty: 180 TABLET | Refills: 0 | Status: SHIPPED | OUTPATIENT
Start: 2020-08-26 | End: 2020-11-12 | Stop reason: SDUPTHER

## 2020-08-26 RX ORDER — POTASSIUM CHLORIDE 20 MEQ/1
20 TABLET, EXTENDED RELEASE ORAL DAILY
Qty: 90 TABLET | Refills: 0 | Status: SHIPPED | OUTPATIENT
Start: 2020-08-26 | End: 2020-11-11

## 2020-08-26 RX ORDER — CITALOPRAM 40 MG/1
TABLET ORAL
Qty: 90 TABLET | Refills: 0 | Status: SHIPPED | OUTPATIENT
Start: 2020-08-26 | End: 2021-01-11 | Stop reason: SDUPTHER

## 2020-08-26 NOTE — TELEPHONE ENCOUNTER
From: Celine Yeung  To: Chase Gaucher, MD  Sent: 8/25/2020 6:26 PM EDT  Subject: Non-Urgent Medical Question    Could you please [facility] these in to [facility]?  I need refills on all Of my medications including lidocaine please and thank you very much

## 2020-09-04 NOTE — TELEPHONE ENCOUNTER
Attempted to contact patient to see if we are still good for Sept. Date for emmie. Patient has been out of town for several months. Left message to contact me to check on status of her return from [place].

## 2020-09-09 NOTE — TELEPHONE ENCOUNTER
Called left message for patient to contact me regarding the Watchman procedure. Patient has a share decision appointment with Dr. Renetta Farrell on 9/15/2020. Patient has been stuck in [place] since [place] started but last conversation with patient she will be back for the appointment and potentially schedule the Watchman procedure on 9/21/2020.

## 2020-09-17 NOTE — TELEPHONE ENCOUNTER
Patient called and cancelled her appointment with Dr. Vyas Doing because she has a fever and an infection in her stoma.  States she will call back to reschedule

## 2020-09-24 NOTE — TELEPHONE ENCOUNTER
Called all 3 phone numbers I have to reach patient to reschedule her appointment with Dr. Jostin Jerez for shared decision. I left messages to contact me to set up appointment.

## 2020-10-06 RX ORDER — LORAZEPAM 1 MG/1
TABLET ORAL
Qty: 30 TABLET | Refills: 0 | Status: SHIPPED | OUTPATIENT
Start: 2020-10-06 | End: 2020-11-12 | Stop reason: SDUPTHER

## 2020-10-06 NOTE — TELEPHONE ENCOUNTER
Left message with patients sister which will reach out to patient to see if she is still interested any pursuing Watchman. Patient still needs a shared decision appointment.

## 2020-10-19 ENCOUNTER — VIRTUAL VISIT (OUTPATIENT)
Dept: FAMILY MEDICINE CLINIC | Age: 75
End: 2020-10-19
Payer: COMMERCIAL

## 2020-10-19 PROBLEM — D50.0 IRON DEFICIENCY ANEMIA DUE TO CHRONIC BLOOD LOSS: Status: ACTIVE | Noted: 2020-10-19

## 2020-10-19 PROBLEM — Z87.19 HISTORY OF GI BLEED: Status: ACTIVE | Noted: 2020-10-19

## 2020-10-19 PROCEDURE — G8399 PT W/DXA RESULTS DOCUMENT: HCPCS | Performed by: FAMILY MEDICINE

## 2020-10-19 PROCEDURE — 1123F ACP DISCUSS/DSCN MKR DOCD: CPT | Performed by: FAMILY MEDICINE

## 2020-10-19 PROCEDURE — 1090F PRES/ABSN URINE INCON ASSESS: CPT | Performed by: FAMILY MEDICINE

## 2020-10-19 PROCEDURE — 4040F PNEUMOC VAC/ADMIN/RCVD: CPT | Performed by: FAMILY MEDICINE

## 2020-10-19 PROCEDURE — G8427 DOCREV CUR MEDS BY ELIG CLIN: HCPCS | Performed by: FAMILY MEDICINE

## 2020-10-19 PROCEDURE — 99214 OFFICE O/P EST MOD 30 MIN: CPT | Performed by: FAMILY MEDICINE

## 2020-10-19 PROCEDURE — 3017F COLORECTAL CA SCREEN DOC REV: CPT | Performed by: FAMILY MEDICINE

## 2020-10-19 RX ORDER — TRAZODONE HYDROCHLORIDE 50 MG/1
50 TABLET ORAL NIGHTLY PRN
Qty: 30 TABLET | Refills: 5 | Status: SHIPPED | OUTPATIENT
Start: 2020-10-19 | End: 2021-02-24

## 2020-10-19 NOTE — PROGRESS NOTES
10/19/2020    This is a 70 y.o. female     HPI  Kelly Reynoso has concerns today for fatigue  - she recently returned from 4 months in [place] with her son  - she reports the fatigue, exhaustion, and feeling light-headed have been going on for months  - typically gets about 4 hours of sleep. Sometimes naps during the day. She's had this pattern for a long time  - at night sh e takes melatonin and sometimes ativan. Has trouble staying asleep mostly    HypoT  Lab Results   Component Value Date    TSH 1.53 10/11/2019   takes synthroid 50mcg daily    Hx of GI bleed  - Had prior hospitalization when in [place]. She's on Eliquis for a fib and is scheduled for a Watchman procedure in November    She has COPD. She reports her breathing is at baseline.  She is on supplemental oxygen    Has chronic neck pain and would like referral to PT    Review of Systems   As per HPI, otherwise negative    Past Medical History:   Diagnosis Date    CAD (coronary artery disease)     Nonobstructive on cath in 9/2017    [facility]est[facility]     Colostomy care ([facility] 4/25/2018    Peristomal irritation and early leaking    COPD (chronic obstructive pulmonary disease) (HCC)     Hyperlipidemia     Hypertension     Kidney disease     Stage 3    Peripheral vascular disease ([address]     Rectal fissure 11/2014    surgery pending    Restless legs syndrome     Sleep apnea     O2 3 liters at hs    Unspecified sleep apnea        Past Surgical History:   Procedure Laterality Date    CARDIAC CATHETERIZATION  09/13/2017    Dr. Sidra Quiroga  03/09/2018    CORONARY ARTERY BYPASS GRAFT      Legs & Carotid    HERNIA REPAIR      UPPER GASTROINTESTINAL ENDOSCOPY N/A 9/30/2019    EGD DIAGNOSTIC ONLY performed by Thad Da Silva MD at [address] [place] [address]       Family History   Problem Relation Age of Onset    Heart Disease Mother     Heart Disease Father     Heart Disease Sister     Cancer Brother        Current Outpatient Medications   Medication Sig Dispense Refill    traZODone (DESYREL) 50 MG tablet Take 1 tablet by mouth nightly as needed for Sleep 30 tablet 5    LORazepam (ATIVAN) 1 MG tablet TAKE ONE TABLET BY MOUTH DAILY AS NEEDED FOR ANXIETY 30 tablet 0    apixaban (ELIQUIS) 5 MG TABS tablet Take 1 tablet by mouth 2 times daily 180 tablet 0    carvedilol (COREG) 12.5 MG tablet Take 1 tablet by mouth 2 times daily (with meals) 180 tablet 0    citalopram (CELEXA) 40 MG tablet TAKE ONE TABLET BY MOUTH DAILY 90 tablet 0    furosemide (LASIX) 40 MG tablet Take 1 tablet by mouth 2 times daily 180 tablet 0    hydrALAZINE (APRESOLINE) 100 MG tablet TAKE ONE TABLET BY MOUTH THREE TIMES A  tablet 0    lansoprazole (PREVACID) 15 MG delayed release capsule Take 1 capsule by mouth daily 90 capsule 0    levothyroxine (SYNTHROID) 50 MCG tablet TAKE ONE TABLET BY MOUTH DAILY 90 tablet 0    montelukast (SINGULAIR) 10 MG tablet Take 1 tablet by mouth daily 90 tablet 0    potassium chloride (KLOR-CON M) 20 MEQ extended release tablet Take 1 tablet by mouth daily 90 tablet 0    lidocaine (XYLOCAINE) 2 % jelly Apply small amount topically to affected area every 8 hours when necessary pain 1 Tube 0    atorvastatin (LIPITOR) 40 MG tablet TAKE ONE TABLET BY MOUTH DAILY 90 tablet 1    SPIRIVA RESPIMAT 2.5 MCG/ACT AERS inhaler INHALE TWO PUFFS BY MOUTH DAILY 3 Inhaler 3    predniSONE (DELTASONE) 10 MG tablet Take 4 tabs by mouth daily for 2 days, 3 tabs for 2 days, 2 tabs for 2 days, 1 tab for 2 days 20 tablet 0    budesonide-formoterol (SYMBICORT) 160-4.5 MCG/ACT AERO Inhale 2 puffs into the lungs 2 times daily 1 Inhaler 3    cloNIDine (CATAPRES) 0.2 MG tablet Take 1 tablet by mouth 3 times daily 60 tablet 3    ferrous sulfate 325 (65 Fe) MG tablet Take 1 tablet by mouth 2 times daily (with meals) 30 tablet 2    diclofenac sodium 1 % GEL Apply 2 g topically 4 times daily 2 Tube 1    ipratropium-albuterol (DUONEB) 0.5-2.5 (3) MG/3ML SOLN nebulizer solution Inhale 3 mLs into the lungs every 4 hours as needed for Shortness of Breath 360 mL 5    butalbital-acetaminophen-caffeine (FIORICET, ESGIC) -40 MG per tablet Take 1 tablet by mouth every 6 hours as needed for Headaches 16 tablet 0    aspirin 81 MG tablet Take 1 tablet by mouth daily 30 tablet 3     No current facility-administered medications for this visit. LMP 05/01/2006 (Approximate)     Physical Exam  Constitutional:       Appearance: She is well-developed. HENT:      Head: Normocephalic and atraumatic. Pulmonary:      Effort: Pulmonary effort is normal.   Skin:     Coloration: Skin is not pale. Neurological:      Mental Status: She is alert and oriented to person, place, and time. Wt Readings from Last 3 Encounters:   03/04/20 175 lb (79.4 kg)   02/25/20 175 lb (79.4 kg)   02/25/20 176 lb (79.8 kg)       BP Readings from Last 3 Encounters:   05/07/20 (!) 180/99   02/25/20 (!) 152/86   02/25/20 138/64         Assessment/Plan:  1. Fatigue, unspecified type  Could be multiple factors. She has a hx of GI bleed, low iron, anemia, hypothyroidism. We will check these levels. We discussed sleep at length. Start trazodone PRN. Advised getting a more routine sleep schedule and trying to avoid lengthy daytime naps as well  - Comprehensive Metabolic Panel; Future  - TSH WITH REFLEX TO FT4; Future  - FERRITIN; Future  - CBC; Future  - Vitamin D 25 Hydroxy; Future    2. Hypothyroidism, unspecified type  Check TSH  - TSH WITH REFLEX TO FT4; Future    3. Paroxysmal atrial fibrillation (HCC)  On Eliquis. Scheduled for watchman next month due to hx of GI bleed    4. History of GI bleed  As above. Check levels. High risk for recurrence due to being on anticoagulation  - FERRITIN; Future  - CBC; Future    5. Iron deficiency anemia due to chronic blood loss  - FERRITIN; Future  - CBC; Future    6.  Sleep disturbance  Reviewed potential SEs of medication  -

## 2020-10-20 ENCOUNTER — PATIENT MESSAGE (OUTPATIENT)
Dept: FAMILY MEDICINE CLINIC | Age: 75
End: 2020-10-20

## 2020-10-20 NOTE — TELEPHONE ENCOUNTER
From: Kaylin Wood  To: Natasha Brown MD  Sent: 10/20/2020 11:22 AM EDT  Subject: Visit Follow-Up Question    Doc i forgot at our virtual to tell you that i think i have a uti. Im holding fluid even though im taking my water pills as i should. could it be my kidneys arent working well enough to get rid of this fluid? I even cut down on intake.  Thank you

## 2020-10-23 DIAGNOSIS — E03.9 HYPOTHYROIDISM, UNSPECIFIED TYPE: ICD-10-CM

## 2020-10-23 DIAGNOSIS — Z87.19 HISTORY OF GI BLEED: ICD-10-CM

## 2020-10-23 DIAGNOSIS — R53.83 FATIGUE, UNSPECIFIED TYPE: ICD-10-CM

## 2020-10-23 DIAGNOSIS — M79.89 LEG SWELLING: ICD-10-CM

## 2020-10-23 DIAGNOSIS — D50.0 IRON DEFICIENCY ANEMIA DUE TO CHRONIC BLOOD LOSS: ICD-10-CM

## 2020-10-23 DIAGNOSIS — R30.0 DYSURIA: ICD-10-CM

## 2020-10-23 LAB
A/G RATIO: 1.7 (ref 1.1–2.2)
ALBUMIN SERPL-MCNC: 4 G/DL (ref 3.4–5)
ALP BLD-CCNC: 45 U/L (ref 40–129)
ALT SERPL-CCNC: 14 U/L (ref 10–40)
ANION GAP SERPL CALCULATED.3IONS-SCNC: 9 MMOL/L (ref 3–16)
AST SERPL-CCNC: 16 U/L (ref 15–37)
BILIRUB SERPL-MCNC: 0.3 MG/DL (ref 0–1)
BILIRUBIN URINE: NEGATIVE
BLOOD, URINE: NEGATIVE
BUN BLDV-MCNC: 24 MG/DL (ref 7–20)
CALCIUM SERPL-MCNC: 9.5 MG/DL (ref 8.3–10.6)
CHLORIDE BLD-SCNC: 95 MMOL/L (ref 99–110)
CLARITY: ABNORMAL
CO2: 37 MMOL/L (ref 21–32)
COLOR: YELLOW
CREAT SERPL-MCNC: 1.2 MG/DL (ref 0.6–1.2)
EPITHELIAL CELLS, UA: 3 /HPF (ref 0–5)
FERRITIN: 72.9 NG/ML (ref 15–150)
GFR AFRICAN AMERICAN: 53
GFR NON-AFRICAN AMERICAN: 44
GLOBULIN: 2.4 G/DL
GLUCOSE BLD-MCNC: 103 MG/DL (ref 70–99)
GLUCOSE URINE: NEGATIVE MG/DL
HCT VFR BLD CALC: 30 % (ref 36–48)
HEMOGLOBIN: 9.5 G/DL (ref 12–16)
HYALINE CASTS: 8 /LPF (ref 0–8)
KETONES, URINE: NEGATIVE MG/DL
LEUKOCYTE ESTERASE, URINE: ABNORMAL
MCH RBC QN AUTO: 26.3 PG (ref 26–34)
MCHC RBC AUTO-ENTMCNC: 31.5 G/DL (ref 31–36)
MCV RBC AUTO: 83.4 FL (ref 80–100)
MICROSCOPIC EXAMINATION: YES
NITRITE, URINE: NEGATIVE
PDW BLD-RTO: 17.3 % (ref 12.4–15.4)
PH UA: 5.5 (ref 5–8)
PLATELET # BLD: 275 K/UL (ref 135–450)
PMV BLD AUTO: 9.8 FL (ref 5–10.5)
POTASSIUM SERPL-SCNC: 4.4 MMOL/L (ref 3.5–5.1)
PRO-BNP: 1997 PG/ML (ref 0–124)
PROTEIN UA: ABNORMAL MG/DL
RBC # BLD: 3.6 M/UL (ref 4–5.2)
RBC UA: 3 /HPF (ref 0–4)
SODIUM BLD-SCNC: 141 MMOL/L (ref 136–145)
SPECIFIC GRAVITY UA: 1.02 (ref 1–1.03)
TOTAL PROTEIN: 6.4 G/DL (ref 6.4–8.2)
TSH REFLEX FT4: 2.94 UIU/ML (ref 0.27–4.2)
URINE TYPE: ABNORMAL
UROBILINOGEN, URINE: 0.2 E.U./DL
VITAMIN D 25-HYDROXY: 37.8 NG/ML
WBC # BLD: 7.8 K/UL (ref 4–11)
WBC UA: 107 /HPF (ref 0–5)

## 2020-10-25 LAB — URINE CULTURE, ROUTINE: NORMAL

## 2020-10-26 ENCOUNTER — TELEPHONE (OUTPATIENT)
Dept: FAMILY MEDICINE CLINIC | Age: 75
End: 2020-10-26

## 2020-10-26 NOTE — TELEPHONE ENCOUNTER
Please let Sonia Santana know:  - urine testing shows no infection  - blood work shows possible extra fluid causing leg swelling, recommend taking extra lasix pill for 3 days. Call if not improving  - it also shows her anemia is a little worse. Need to repeat in 1 week to ensure this is not a sign of a slow bleed starting (order placed). Thanks.

## 2020-10-30 ENCOUNTER — OFFICE VISIT (OUTPATIENT)
Dept: PRIMARY CARE CLINIC | Age: 75
End: 2020-10-30
Payer: COMMERCIAL

## 2020-10-30 DIAGNOSIS — D50.0 IRON DEFICIENCY ANEMIA DUE TO CHRONIC BLOOD LOSS: ICD-10-CM

## 2020-10-30 DIAGNOSIS — M79.89 LEG SWELLING: ICD-10-CM

## 2020-10-30 LAB
A/G RATIO: 1.5 (ref 1.1–2.2)
ALBUMIN SERPL-MCNC: 4 G/DL (ref 3.4–5)
ALP BLD-CCNC: 46 U/L (ref 40–129)
ALT SERPL-CCNC: 16 U/L (ref 10–40)
ANION GAP SERPL CALCULATED.3IONS-SCNC: 10 MMOL/L (ref 3–16)
AST SERPL-CCNC: 17 U/L (ref 15–37)
BILIRUB SERPL-MCNC: <0.2 MG/DL (ref 0–1)
BUN BLDV-MCNC: 27 MG/DL (ref 7–20)
CALCIUM SERPL-MCNC: 9.4 MG/DL (ref 8.3–10.6)
CHLORIDE BLD-SCNC: 95 MMOL/L (ref 99–110)
CO2: 38 MMOL/L (ref 21–32)
CREAT SERPL-MCNC: 1.1 MG/DL (ref 0.6–1.2)
GFR AFRICAN AMERICAN: 59
GFR NON-AFRICAN AMERICAN: 49
GLOBULIN: 2.6 G/DL
GLUCOSE BLD-MCNC: 107 MG/DL (ref 70–99)
HEMOGLOBIN: 9.8 G/DL (ref 12–16)
POTASSIUM SERPL-SCNC: 4.1 MMOL/L (ref 3.5–5.1)
SODIUM BLD-SCNC: 143 MMOL/L (ref 136–145)
TOTAL PROTEIN: 6.6 G/DL (ref 6.4–8.2)

## 2020-10-30 PROCEDURE — 99211 OFF/OP EST MAY X REQ PHY/QHP: CPT | Performed by: NURSE PRACTITIONER

## 2020-10-30 NOTE — PROGRESS NOTES
Kamlesh Marshall received a viral test for COVID-19. They were educated on isolation and quarantine as appropriate. For any symptoms, they were directed to seek care from their PCP, given contact information to establish with a doctor, directed to an urgent care or the emergency room.

## 2020-10-30 NOTE — PROGRESS NOTES
TABLET BY MOUTH THREE TIMES A  tablet 0    lansoprazole (PREVACID) 15 MG delayed release capsule Take 1 capsule by mouth daily 90 capsule 0    levothyroxine (SYNTHROID) 50 MCG tablet TAKE ONE TABLET BY MOUTH DAILY 90 tablet 0    montelukast (SINGULAIR) 10 MG tablet Take 1 tablet by mouth daily 90 tablet 0    potassium chloride (KLOR-CON M) 20 MEQ extended release tablet Take 1 tablet by mouth daily 90 tablet 0    atorvastatin (LIPITOR) 40 MG tablet TAKE ONE TABLET BY MOUTH DAILY 90 tablet 1    SPIRIVA RESPIMAT 2.5 MCG/ACT AERS inhaler INHALE TWO PUFFS BY MOUTH DAILY 3 Inhaler 3    predniSONE (DELTASONE) 10 MG tablet Take 4 tabs by mouth daily for 2 days, 3 tabs for 2 days, 2 tabs for 2 days, 1 tab for 2 days 20 tablet 0    budesonide-formoterol (SYMBICORT) 160-4.5 MCG/ACT AERO Inhale 2 puffs into the lungs 2 times daily 1 Inhaler 3    cloNIDine (CATAPRES) 0.2 MG tablet Take 1 tablet by mouth 3 times daily 60 tablet 3    ferrous sulfate 325 (65 Fe) MG tablet Take 1 tablet by mouth 2 times daily (with meals) 30 tablet 2    ipratropium-albuterol (DUONEB) 0.5-2.5 (3) MG/3ML SOLN nebulizer solution Inhale 3 mLs into the lungs every 4 hours as needed for Shortness of Breath 360 mL 5    butalbital-acetaminophen-caffeine (FIORICET, ESGIC) -40 MG per tablet Take 1 tablet by mouth every 6 hours as needed for Headaches 16 tablet 0    aspirin 81 MG tablet Take 1 tablet by mouth daily 30 tablet 3     No current facility-administered medications for this visit. Review of Systems:  · Constitutional: no unanticipated weight loss. There's been no change in energy level, sleep pattern, or activity level. No fevers, chills. · Eyes: No visual changes or diplopia. No scleral icterus. · ENT: No Headaches, hearing loss or vertigo. No mouth sores or sore throat. · Cardiovascular: as reviewed in HPI  · Respiratory: No cough or wheezing, no sputum production. No hematemesis.     · Gastrointestinal: No abdominal pain, appetite loss, blood in stools. No change in bowel or bladder habits. · Genitourinary: No dysuria, trouble voiding, or hematuria. · Musculoskeletal:  No gait disturbance, no joint complaints. · Integumentary: No rash or pruritis. · Neurological: No headache, diplopia, change in muscle strength, numbness or tingling. · Psychiatric: No anxiety or depression. · Endocrine: No temperature intolerance. No excessive thirst, fluid intake, or urination. No tremor. · Hematologic/Lymphatic: No abnormal bruising or bleeding, blood clots or swollen lymph nodes. · Allergic/Immunologic: No nasal congestion or hives. Physical Exam:   /60 Comment: repeat  Pulse 60   Temp 98.7 °F (37.1 °C)   Ht 5' (1.524 m)   Wt 173 lb (78.5 kg) Comment: with shoes  LMP 05/01/2006 (Approximate)   SpO2 99%   BMI 33.79 kg/m²   Wt Readings from Last 3 Encounters:   03/04/20 175 lb (79.4 kg)   02/25/20 175 lb (79.4 kg)   02/25/20 176 lb (79.8 kg)     Constitutional: She is oriented to person, place, and time. She appears well-developed and well-nourished. In no acute distress. Head: Normocephalic and atraumatic. Pupils equal and round. Neck: Neck supple. No JVP or carotid bruit appreciated. No mass and no thyromegaly present. No lymphadenopathy present. Cardiovascular: Normal rate. Normal heart sounds. Exam reveals no gallop and no friction rub. 2/6 systolic murmur heard at the base of the heart. Pulmonary/Chest: Effort normal and breath sounds normal. No respiratory distress. She has no wheezes, rhonchi or rales. Abdominal: Soft, non-tender. Bowel sounds are normal. She exhibits no organomegaly, mass or bruit. Extremities: No edema, cyanosis, or clubbing. Pulses are 2+ radial/dorsalis pedis/posterior tibial/carotid bilaterally. Neurological: No gross cranial nerve deficit. Coordination normal.   Skin: Skin is warm and dry. There is no rash or diaphoresis.    Psychiatric: She has a normal mood and affect. Her speech is normal and behavior is normal.     Lab Review:   Lab Results   Component Value Date    TRIG 151 10/11/2019    HDL 48 10/11/2019    LDLCALC 80 10/11/2019    LDLDIRECT 120 01/09/2018    LABVLDL 30 10/11/2019     Lab Results   Component Value Date    BUN 24 10/23/2020    CREATININE 1.2 10/23/2020       EKG Interpretation: 11/2/20 Sinus  Rhythm, Voltage criteria for LVH  (R(aVF) exceeds 1.50 mV). -ST depression   +   Diffuse nonspecific T-abnormality  -Seen with left ventricular hypertrophy (strain) or digitalis effect -consider ischemia. ~60 bpm.     Image Review:     TTE:2/14/20   Left ventricular cavity size is normal.   There is mild concentric left ventricular hypertrophy. Ejection fraction is visually estimated to be 55-60%. Grade I diastolic dysfunction   Mild to moderate mitral regurgitation. Mild to moderate tricuspid regurgitation with RVSP of 51 mmHg. MIKO (pre Watchman)3/4/20   PEG DIMENSIONS    0 Degree 19 mm x 27 mm   45 Degree 17 mm x 24 mm   90 degree 17 mm x 22 mm   135 degree 21 x 22 mm      Ejection fraction is visually estimated to be 55-60%. Mitral valve is structurally normal.   Mild mitral regurgitation is present. There is no evidence of mass or thrombus in the left atrium or appendage. Assessment/Plan:     Paroxsymal atrial fibrillation with recurrent GI bleed, chornic anemia who presents today per my partner Dr. Rai Doshi for shared decision making for consideration for [facility] device. [place] is at least 4 with a 4.8 per year stoke risk for age, sex, HTN, vascular disease. HASbled is at least 4 with a 8.9% per year bleeding risk for HTN, prior bleed/predisposition bleed, age and medication predisposing to bleed. She denies any signs of symptoms of abnormal bruising or bleeding  She denies any symptoms of TIA/CVA  Medical therapy includes Eliquis, Coreg. EKG today Sinus  Rhythm, Voltage criteria for LVH  (R(aVF) exceeds 1.50 mV).   -ST depression   +   Diffuse nonspecific T-abnormality  -Seen with left ventricular hypertrophy (strain) or digitalis effect -consider ischemia. ~60bpm.   She will start Pulmonary Rehab per Dr. Jennifer Sorensen's recommendations prior to [facility]   She will also need follow up with Pulmonary prior    Atrial fibrillation - Patient is high risk for stroke or systemic thromboembolism. Treatment options for atrial fibrillation discussed including rate control, anticoagulation, antiarrhythmics, cardioversion and possible ablation. she is a poor long-term anticoagulation candidate. Specifically regarding risk of anticoagulation patient has:  High risk of recurrent falls, Occupation related high bleeding risk, Need for prolonged dual anti platelet therapy and Poor compliance with anticoagulant therapy    Continuing work-up for the watchman procedure seems appropriate. she is a candidate for short term anticoagulation therapy. We have discussed patient unique stroke and bleeding risk both on and off oral-anticoagulation, and the rationale for this referral.  Based on both stroke and bleeding risk, a shared decision has been made to pursue closure of the left atrial appendage as an alternative to oral anticoagulant therapy for stroke prophylaxis and to reduce their long term risk of incidence of bleeding. Diastolic CHF  Medical therapy includes BB, lasix    HTN  BP is stable today on coreg, hydralazine, clonidine therapy. BMP remains abnormal but currently stable. COPD, chronic respiratory failure with hypoxia-oxygen/ANGIE on BiPAP  -I have recommended that she sees her pulmonologist before undergoing watchman therapy to assess her pulmonary risk during the procedure  CKD/chronic anemia   10/23/20 BUN/Cr 24/1.2 with GFR-ELKE 44, H/H 9.5/30.0. remains on Eliquis for AFib and ASA for vascular disease. PAD s/p revascularization  non obstructive CAD    HLD, unspecified on high potency statin with Lipitor 40 mg daily. Managed per Dr. John Almonte Cardiologist     We will plan to let Francis Alejandre RN Watchman Coordinator follow up with patient. -    Thank you very much for allowing me to participate in the care of your patient. Please do not hesitate to contact me if you have any questions. Sincerely,  Kurtis Christianson MD      [address] [place] [address]  Ph: [phone]  Fax: [phone]    This note was scribed in the presence of Dr. Donald Torres MD by Martha Nesbitt RN. Physician Attestation:  The scribes documentation has been prepared under my direction and personally reviewed by me in its entirety. I confirm that the note above accurately reflects all work, treatment, procedures, and medical decision making performed by me.

## 2020-11-01 LAB — SARS-COV-2, NAA: NOT DETECTED

## 2020-11-02 ENCOUNTER — OFFICE VISIT (OUTPATIENT)
Dept: CARDIOLOGY CLINIC | Age: 75
End: 2020-11-02
Payer: COMMERCIAL

## 2020-11-02 VITALS
DIASTOLIC BLOOD PRESSURE: 60 MMHG | HEART RATE: 60 BPM | TEMPERATURE: 98.7 F | SYSTOLIC BLOOD PRESSURE: 138 MMHG | BODY MASS INDEX: 33.96 KG/M2 | WEIGHT: 173 LBS | OXYGEN SATURATION: 99 % | HEIGHT: 60 IN

## 2020-11-02 PROCEDURE — 1090F PRES/ABSN URINE INCON ASSESS: CPT | Performed by: INTERNAL MEDICINE

## 2020-11-02 PROCEDURE — G8417 CALC BMI ABV UP PARAM F/U: HCPCS | Performed by: INTERNAL MEDICINE

## 2020-11-02 PROCEDURE — G8427 DOCREV CUR MEDS BY ELIG CLIN: HCPCS | Performed by: INTERNAL MEDICINE

## 2020-11-02 PROCEDURE — 1036F TOBACCO NON-USER: CPT | Performed by: INTERNAL MEDICINE

## 2020-11-02 PROCEDURE — 93000 ELECTROCARDIOGRAM COMPLETE: CPT | Performed by: INTERNAL MEDICINE

## 2020-11-02 PROCEDURE — 4040F PNEUMOC VAC/ADMIN/RCVD: CPT | Performed by: INTERNAL MEDICINE

## 2020-11-02 PROCEDURE — 1123F ACP DISCUSS/DSCN MKR DOCD: CPT | Performed by: INTERNAL MEDICINE

## 2020-11-02 PROCEDURE — 3017F COLORECTAL CA SCREEN DOC REV: CPT | Performed by: INTERNAL MEDICINE

## 2020-11-02 PROCEDURE — 99214 OFFICE O/P EST MOD 30 MIN: CPT | Performed by: INTERNAL MEDICINE

## 2020-11-02 PROCEDURE — G8399 PT W/DXA RESULTS DOCUMENT: HCPCS | Performed by: INTERNAL MEDICINE

## 2020-11-02 PROCEDURE — G8484 FLU IMMUNIZE NO ADMIN: HCPCS | Performed by: INTERNAL MEDICINE

## 2020-11-03 NOTE — RESULT ENCOUNTER NOTE

## 2020-11-03 NOTE — RESULT ENCOUNTER NOTE

## 2020-11-04 NOTE — PROGRESS NOTES
[facility] PULMONARY REHABILITATION ORDER  Medical Director:  Dr. Jeison Maldonado  (X)Phase II Pulmonary Rehabilitation [facility] Facility-based, supervised exercise with SpO2 / HR monitoring and Oxygen Titration (if necessary) each session, 2-3 times weekly, with individualized education sessions. Patient Name: Sarah Simmons  : 1945  Referring Physician: Dr. Dorado Child   Date: 2020    Medically Necessary Pulmonary Rehab for:   COPD (from my dictation or the PFT report), which is GOLD Stage 3. Physician Prescribed Exercise:  Length of program:  Up to 36 sessions, subject to insurance limitations. Program to include aerobic endurance conditioning, resistance training (RT), step training (ST), flexibility training, and education (all relevant topics including psychosocial assessment). FITT Principles + Progression for Exercise Prescription (also found on the ITP):     Frequency: 2-3x / wk for up to 36 sessions    Intensity:   Set from Initial 6MWT (Feet achieved converted to METs)   Type:          Aerobic and Strength (Treadmill, AD, NuStep, SciFit, UBE, RT, ST)   Time:        15-60 min. of Treatment; Aerobic, RT, ST, Rests and Education  Progression:  1-2 minute increase in Time, per Type, per session, 5-20% increase in Intensity per week if SpO2 >88 AND Areli RPE/RPD is <14      Note:  These are guidelines. The Pulmonary Rehab staff may adjust the treatment to suit the patient's individual needs, goals, oxygen saturation and functional level. Plan of Care:  Pulmonary Rehab aerobic endurance and strength training sessions for a total of 30-91 min/day, including Education time, 2-3 days/week with suggested supplemented matching minutes of walking at home on most days not participating in Pulmonary Rehab. Patient is willing to cooperate and participate in the plan of care.  Patient is physically able, motivated, and willing to participate in MD, and I expect this patient to improve in a reasonable and predictable time frame. Other Program Components:   (X)6 Minute Walk Test (6 MWT) at program initiation and discharge   (X)Evaluation for oxygen needs while exercising   (X)Titrate Oxygen to maintain >88 SpO2   (X)Stop Exercise or Apply Oxygen if patient desaturates <88%   (x)May continue in the Maintenance Program upon completion    Measurable Endurance Goal:    -Aerobic endurance goal to be measured in minutes. Start endurance training per patient tolerance at 1-15 minutes per exercise type, progressing to a total of 31-60 minutes using various modes of training (see Exercise Prescription on Individualized Treatment Plan 'ITP'). -Measurable Muscular Strength Goal: Starting at 1-3 lbs x 8 reps, progressing daily/weekly to 5-10 lbs x 15 reps    NOTE: Stevie Moura tobacco History:   Social History     Tobacco Use   Smoking Status Former Smoker    Packs/day: 3.00    Years: 30.00    Pack years: 90.00    Start date: 1963    Last attempt to quit: 1992    Years since quittin.7   Smokeless Tobacco Never Used   Tobacco Comment    QUIT 21 YRS AGO     If patient is a current smoker, patient will participate in tobacco cessation program during Pulmonary Rehab.       Physician Signature/Date/Time:

## 2020-11-05 ENCOUNTER — HOSPITAL ENCOUNTER (OUTPATIENT)
Dept: CARDIAC REHAB | Age: 75
Setting detail: THERAPIES SERIES
Discharge: HOME | End: 2020-11-05
Payer: COMMERCIAL

## 2020-11-05 ENCOUNTER — TELEPHONE (OUTPATIENT)
Dept: PULMONOLOGY | Age: 75
End: 2020-11-05

## 2020-11-05 PROCEDURE — 94618 PULMONARY STRESS TESTING: CPT | Performed by: INTERNAL MEDICINE

## 2020-11-05 PROCEDURE — 94618 PULMONARY STRESS TESTING: CPT

## 2020-11-05 NOTE — PROCEDURES
[facility] Cardiopulmonary Rehabilitation   Six Minute Walk Test    Kameron  YOB: 1945  Account Number: [de-identified]  AGE: 70 y.o.     OP test [x]   Pulmonary Rehab PRE []  POST   []    Diagnosis: COPD     Referring Physician: Dr. Vera Brar    Gender []  male [x] female AGE:75     Height:5 ft 0 in 152 cm    Weight:171 lbs  77 kg  Blood Pressure 158/70    Medications affecting heart rate:  Coreg 12.5 mg BID, Duo Neb 0.5-2.5 (3) mg/3ml Q4H PRN      Supplemental O2 during the test []  no [x] yes 2 lpm     [x] continuous []  intermittent    Device : [x] NC []  HFNC    []  oximizer  [] mask      Laps completed: 3   (1 lap = 100 ft)        Baseline (resting) Walking End of Test (recovery)      Heart Rate 70   110  73    BP  158/70   170/72  160/70    Dyspnea 2   7  2 (Areli scale)    Fatigue 6   7  6 (Areli scale)    SpO2  84%RA    O2 97% 2L   89% 2L   99% 2L      Stopped or paused before 6 mins? []  no [x] yes How long? 2 minutes and 2 seconds    Symptoms at end of exercise:[] angina  [x] dizziness (slight)   [x]  hip, leg, calf, back pain       []  no complaints []  other    Number of laps: 3 (x 30.48 meters) + final partial lap: 0     Total distance walked in 6 mins: 91.4 meters    Predicted distance: 388.7 meters  Percent predicted:23.5%              [] normal       [x] reduced     Summary: This is an outpatient 6 minute walk test, performed on supplemental oxygen. The patient ambulated 91 meters which is abnormal- 24-% predicted. Her, heart rate response was normal, the blood pressure response was normal, oxygen saturations were abnormal as she was hypoxic at rest on room air. The patient desaturated to 84% while stationary on room air, and was placed on 2 L of oxygen to keep saturations above 90%. The degree of symptoms based on the Areli Dyspnea/Fatigue scale were increased with testing. This test does indicate the need for supplemental oxygen.           Naresh Burnette,   Pulmonary

## 2020-11-05 NOTE — PROGRESS NOTES
[facility] Cardiopulmonary Rehabilitation    Qualifying Data for Home Oxygen        Resting Oxygen Saturation on Room Air:   84%    Exercise Oxygen Saturation on Room Air:  Needed O2 at rest, so did not exercise without O2.       Resting Oxygen Saturation on Oxygen:  97% on 2L      Exercise Oxygen Saturation on Oxygen:  89% on 2L

## 2020-11-05 NOTE — TELEPHONE ENCOUNTER
Patient said that she had left side rib pain that started this morning feels like someone had punched her and goes around to her back. She is having some shortness of breath thinks this is from holding a lot of fluid. She is taking 3 Lasix for 3 days and has went down a few pounds.

## 2020-11-11 ENCOUNTER — HOSPITAL ENCOUNTER (OUTPATIENT)
Dept: GENERAL RADIOLOGY | Age: 75
Discharge: HOME | End: 2020-11-11
Payer: COMMERCIAL

## 2020-11-11 ENCOUNTER — PATIENT MESSAGE (OUTPATIENT)
Dept: FAMILY MEDICINE CLINIC | Age: 75
End: 2020-11-11

## 2020-11-11 ENCOUNTER — HOSPITAL ENCOUNTER (OUTPATIENT)
Age: 75
Discharge: HOME | End: 2020-11-11
Payer: COMMERCIAL

## 2020-11-11 ENCOUNTER — OFFICE VISIT (OUTPATIENT)
Dept: PULMONOLOGY | Age: 75
End: 2020-11-11
Payer: COMMERCIAL

## 2020-11-11 VITALS
HEIGHT: 60 IN | TEMPERATURE: 98.4 F | OXYGEN SATURATION: 95 % | HEART RATE: 66 BPM | WEIGHT: 172.6 LBS | DIASTOLIC BLOOD PRESSURE: 60 MMHG | RESPIRATION RATE: 12 BRPM | SYSTOLIC BLOOD PRESSURE: 130 MMHG | BODY MASS INDEX: 33.89 KG/M2

## 2020-11-11 PROCEDURE — 90471 IMMUNIZATION ADMIN: CPT | Performed by: INTERNAL MEDICINE

## 2020-11-11 PROCEDURE — 3023F SPIROM DOC REV: CPT | Performed by: INTERNAL MEDICINE

## 2020-11-11 PROCEDURE — 1090F PRES/ABSN URINE INCON ASSESS: CPT | Performed by: INTERNAL MEDICINE

## 2020-11-11 PROCEDURE — 99214 OFFICE O/P EST MOD 30 MIN: CPT | Performed by: INTERNAL MEDICINE

## 2020-11-11 PROCEDURE — G8926 SPIRO NO PERF OR DOC: HCPCS | Performed by: INTERNAL MEDICINE

## 2020-11-11 PROCEDURE — 1123F ACP DISCUSS/DSCN MKR DOCD: CPT | Performed by: INTERNAL MEDICINE

## 2020-11-11 PROCEDURE — G8484 FLU IMMUNIZE NO ADMIN: HCPCS | Performed by: INTERNAL MEDICINE

## 2020-11-11 PROCEDURE — 71046 X-RAY EXAM CHEST 2 VIEWS: CPT

## 2020-11-11 PROCEDURE — G8399 PT W/DXA RESULTS DOCUMENT: HCPCS | Performed by: INTERNAL MEDICINE

## 2020-11-11 PROCEDURE — 1036F TOBACCO NON-USER: CPT | Performed by: INTERNAL MEDICINE

## 2020-11-11 PROCEDURE — 90694 VACC AIIV4 NO PRSRV 0.5ML IM: CPT | Performed by: INTERNAL MEDICINE

## 2020-11-11 PROCEDURE — 3017F COLORECTAL CA SCREEN DOC REV: CPT | Performed by: INTERNAL MEDICINE

## 2020-11-11 PROCEDURE — G8417 CALC BMI ABV UP PARAM F/U: HCPCS | Performed by: INTERNAL MEDICINE

## 2020-11-11 PROCEDURE — 4040F PNEUMOC VAC/ADMIN/RCVD: CPT | Performed by: INTERNAL MEDICINE

## 2020-11-11 PROCEDURE — G8427 DOCREV CUR MEDS BY ELIG CLIN: HCPCS | Performed by: INTERNAL MEDICINE

## 2020-11-11 RX ORDER — ALBUTEROL SULFATE 90 UG/1
2 AEROSOL, METERED RESPIRATORY (INHALATION) EVERY 4 HOURS PRN
Qty: 1 INHALER | Refills: 5 | Status: SHIPPED | OUTPATIENT
Start: 2020-11-11 | End: 2021-02-22 | Stop reason: SDUPTHER

## 2020-11-11 RX ORDER — IPRATROPIUM BROMIDE AND ALBUTEROL SULFATE 2.5; .5 MG/3ML; MG/3ML
1 SOLUTION RESPIRATORY (INHALATION) EVERY 4 HOURS PRN
Qty: 360 ML | Refills: 5 | Status: SHIPPED | OUTPATIENT
Start: 2020-11-11 | End: 2021-05-12 | Stop reason: SDUPTHER

## 2020-11-11 RX ORDER — FLUTICASONE FUROATE AND VILANTEROL TRIFENATATE 100; 25 UG/1; UG/1
1 POWDER RESPIRATORY (INHALATION) DAILY
Qty: 1 EACH | Refills: 5 | Status: SHIPPED | OUTPATIENT
Start: 2020-11-11 | End: 2021-05-12

## 2020-11-11 NOTE — PROGRESS NOTES
Chief complaint  This is a 70y.o. year old female  who presents with a chief complaint of   Chief Complaint   Patient presents with    Pre-op Exam     [address] procedure scheduled for 11/16 with Dr. Tank Huynh of Breath       HPI  82-year-old woman with severe COPD (FEV1 0.92 L, 49% predicted), chronic hypoxic respiratory failure and ANGIE presents for follow-up. She is supposed to undergo watchman procedure in a few days. She admits that she is not very active. She says she is unable to stand for long periods of time because of chronic pain. She is short of breath when she showers or get stressed. She has an occasional nonproductive cough. She has been using Symbicort, Breo and Spiriva as well as Ventolin and DuoNebs. She uses 2 L of oxygen with her portable tanks and 5 L of oxygen at home.     Past Medical History:   Diagnosis Date    CAD (coronary artery disease)     Nonobstructive on cath in 9/2017    CH[facility]congestive heart failure) [facility]     Colostomy care ([facility] 4/25/2018    Peristomal irritation and early leaking    COPD (chronic obstructive pulmonary disease) ([facility]     Hyperlipidemia     Hypertension     Kidney disease     Stage 3    Peripheral vascular disease ([facility]     Rectal fissure 11/2014    surgery pending    Restless legs syndrome     Sleep apnea     O2 3 liters at hs    Unspecified sleep apnea        Past Surgical History:   Procedure Laterality Date    CARDIAC CATHETERIZATION  09/13/2017    Dr. Mike Lassiter  03/09/2018    CORONARY ARTERY BYPASS GRAFT      Legs & Carotid    HERNIA REPAIR      UPPER GASTROINTESTINAL ENDOSCOPY N/A 9/30/2019    EGD DIAGNOSTIC ONLY performed by Cricket Nevarez MD at [address] [place] [address]       Current Outpatient Medications   Medication Sig Dispense Refill    ipratropium-albuterol (DUONEB) 0.5-2.5 (3) MG/3ML SOLN nebulizer solution Inhale 3 mLs into the lungs every 4 hours as needed for Shortness of Breath 360 mL 5    fluticasone-vilanterol (BREO ELLIPTA) 100-25 MCG/INH AEPB inhaler Inhale 1 puff into the lungs daily 1 each 5    tiotropium (SPIRIVA RESPIMAT) 2.5 MCG/ACT AERS inhaler Inhale 2 puffs into the lungs daily 1 Inhaler 5    albuterol sulfate HFA (VENTOLIN HFA) 108 (90 Base) MCG/ACT inhaler Inhale 2 puffs into the lungs every 4 hours as needed for Wheezing or Shortness of Breath 1 Inhaler 5    traZODone (DESYREL) 50 MG tablet Take 1 tablet by mouth nightly as needed for Sleep 30 tablet 5    lidocaine (XYLOCAINE) 2 % jelly Apply small amount topically to affected area every 8 hours when necessary pain 1 Tube 11    diclofenac sodium (VOLTAREN) 1 % GEL Apply 2 g topically 4 times daily 2 Tube 1    apixaban (ELIQUIS) 5 MG TABS tablet Take 1 tablet by mouth 2 times daily 180 tablet 0    carvedilol (COREG) 12.5 MG tablet Take 1 tablet by mouth 2 times daily (with meals) 180 tablet 0    citalopram (CELEXA) 40 MG tablet TAKE ONE TABLET BY MOUTH DAILY 90 tablet 0    furosemide (LASIX) 40 MG tablet Take 1 tablet by mouth 2 times daily 180 tablet 0    hydrALAZINE (APRESOLINE) 100 MG tablet TAKE ONE TABLET BY MOUTH THREE TIMES A  tablet 0    lansoprazole (PREVACID) 15 MG delayed release capsule Take 1 capsule by mouth daily 90 capsule 0    levothyroxine (SYNTHROID) 50 MCG tablet TAKE ONE TABLET BY MOUTH DAILY 90 tablet 0    montelukast (SINGULAIR) 10 MG tablet Take 1 tablet by mouth daily 90 tablet 0    atorvastatin (LIPITOR) 40 MG tablet TAKE ONE TABLET BY MOUTH DAILY 90 tablet 1    SPIRIVA RESPIMAT 2.5 MCG/ACT AERS inhaler INHALE TWO PUFFS BY MOUTH DAILY 3 Inhaler 3    budesonide-formoterol (SYMBICORT) 160-4.5 MCG/ACT AERO Inhale 2 puffs into the lungs 2 times daily 1 Inhaler 3    cloNIDine (CATAPRES) 0.2 MG tablet Take 1 tablet by mouth 3 times daily (Patient taking differently: Take 0.2 mg by mouth 3 times daily Patient said she is only taking twice a day) 60 tablet 3    butalbital-acetaminophen-caffeine sexual activity: Not on file   Other Topics Concern    Not on file   Social History Narrative    Not on file       Immunization History   Administered Date(s) Administered    Influenza, High Dose (Fluzone 65 yrs and older) 12/16/2015, 01/17/2018    Influenza, [place], 6 mo and older, IM, PF (Flulaval, Fluarix) 09/24/2018    Influenza, Quadv, adjuvanted, 65 yrs +, IM, PF (Fluad) 11/11/2020    Influenza, Triv, inactivated, subunit, adjuvanted, IM (Fluad 65 yrs and older) 10/25/2019    Pneumococcal Conjugate 13-valent ([id number]) 01/17/2018    Pneumococcal Conjugate 7-valent (Prevnar7) 12/30/2013       ROS:  GENERAL:  No fevers, no chills, +3b weight loss in 8 months   RESPIRATORY:  + exertional shortness of breath, +occasional cough      PHYSICAL EXAM:  Vitals:    11/11/20 1355   BP: 130/60   Site: Left Upper Arm   Position: Sitting   Cuff Size: Medium Adult   Pulse: 66   Resp: 12   Temp: 98.4 °F (36.9 °C)   TempSrc: Oral   SpO2: 95%   Weight: 172 lb 9.6 oz (78.3 kg)   Height: 5' (1.524 m)   on 2L NC    Gen: Well developed; well nourished  Eyes: No scleral icterus. No conjunctival injection. ENT:  Oropharynx clear. External appearance of ears and nose normal.  Neck: Trachea midline. No obvious mass. No visible thyroid enlargement    Respiratory: Faint crackles over left lower lung zone, no accessory muscle use  Cardiovascular: Regular rate and rhythm, murmur at the left heart border. No edema. Gastrointestinal: Soft, non-tender. No hernia  Skin: Warm and dry. No rashes or ulcers on visible areas. Normal texture and turgor  Lymphatic: No cervical LAD. No supraclavicular LAD. Musculoskeletal: No cyanosis, clubbing or joint deformity.     Psychiatric: Normal mood and affect; exhibits normal insight and judgement     Data reviewed:  Pulmonary Function Testing (12/4/19)  FVC 1.4 L at 58% predicted ---> 1.52L at 63% predicted  FEV1 0.78 L at 41% predicted -----> 0.92L at 49% predicted  FEV1/FVC ratio at 56%----> 61%  TLC 4.24 L at 95% predicted  VC 2.45L at 102% predicted  RV/TLC at 42% predicted  DLCO 8.63 at 45% predicted  DLCO/VA 3.51L at 82 % predicted    Overall: Severe lower airflow obstruction without significant reversal; lung volumes are normal; diffusing capacity is moderately reduced, but normalizes when averaged over lung volumes (compared to the study in July 2015 slow vital capacity has significantly increased and diffusing capacity has significantly decreased; other findings have not significantly changed)        Six minute walk test:  10/27/16: Walked 303m, 70% predicted, normal; michael saturation 89%  4/19/18- Walked 213m, 51.5% predicted; michael saturation 89%  10/5/18- Walked 183m, 47.5% predicted; michael saturation 87% on RA at rest---> 94% on 2L with ambulation  11/5/20- Walked 91.4m, 23.5% predicted; michael saturation 84% on RA at rest---> 89% on 2L with ambulation      Images and reports of chest imaging were reviewed by me. My interpretation is:  CXR (11/11/20): Trace bilateral pleural effusions  Chest CT (8/24/18): No LAD; centrilobular emphysema; bi-apical scarring; granuloma in the medial right lower lobe; scarring/atelectasis in the lingula and left lower lobe  Chest CT (11/5/19): No LAD; trace bilateral pleural effusions; centrilobular emphysema; granuloma in the medial right lower lobe; diffuse ground glass opacity; septal thickening in the bilateral upper lobes and bilateral lower lobes  Chest CT (2/19/20): No LAD; bilateral pleural effusions (increased compared to November 2019); centrilobular emphysema; subtle ground glass opacities greatest in the upper lobes; bilateral lower lobe atelectasis        ECHO (2/13/20)  Summary   Left ventricular cavity size is normal.   There is mild concentric left ventricular hypertrophy. Ejection fraction is visually estimated to be 55-60%. Grade I diastolic   dysfunction   Mild to moderate mitral regurgitation.    Mild to moderate tricuspid regurgitation with

## 2020-11-11 NOTE — TELEPHONE ENCOUNTER
From: Jose R Pittman  To: Victor Hugo Don MD  Sent: 11/11/2020 4:43 PM EST  Subject: Non-Urgent Medical Question    May i please have a refill on ativan and coreg?  Thank you doc

## 2020-11-12 ENCOUNTER — TELEPHONE (OUTPATIENT)
Dept: CARDIOLOGY CLINIC | Age: 75
End: 2020-11-12

## 2020-11-12 RX ORDER — LORAZEPAM 1 MG/1
TABLET ORAL
Qty: 30 TABLET | Refills: 0 | Status: SHIPPED | OUTPATIENT
Start: 2020-11-12 | End: 2020-12-09 | Stop reason: SDUPTHER

## 2020-11-12 RX ORDER — CARVEDILOL 12.5 MG/1
12.5 TABLET ORAL 2 TIMES DAILY WITH MEALS
Qty: 180 TABLET | Refills: 0 | Status: SHIPPED | OUTPATIENT
Start: 2020-11-12 | End: 2021-01-11 | Stop reason: SDUPTHER

## 2020-11-12 NOTE — TELEPHONE ENCOUNTER
Pt called she is having a Watchman procedure done on 11/16. Her pulmonologist Dr Bud Leal took an xray of her lungs and shows she has some fluid built up. He wanted her to call Dr Dimitri Gonzalez to see if she could be put on a a water pill.     Alisha #[phone]

## 2020-11-12 NOTE — TELEPHONE ENCOUNTER
Please review CXR. Would you like her to adjust Lasix. Currently on Lasix 40 mg BID. Candida Reynoso. I spoke with you about her yesterday.

## 2020-11-12 NOTE — TELEPHONE ENCOUNTER
Spoke to patient and she states she is already on a water pill but Dr. Vineet Saavedra was wondering if Dr. Mary Saeed could adjust her dose?

## 2020-11-16 ENCOUNTER — TELEPHONE (OUTPATIENT)
Dept: PULMONOLOGY | Age: 75
End: 2020-11-16

## 2020-11-16 NOTE — TELEPHONE ENCOUNTER
Alisha calls in. Would like to fly out to [place] December 15 and return December 28. Wanted to know if Dr. Fortunato KHAN [facility] thought it would be ok for her to fly? Please advise.

## 2020-11-18 ENCOUNTER — TELEPHONE (OUTPATIENT)
Dept: PULMONOLOGY | Age: 75
End: 2020-11-18

## 2020-11-19 RX ORDER — CHLORHEXIDINE GLUCONATE 4 G/100ML
SOLUTION TOPICAL
Qty: 1 BOTTLE | Refills: 0 | Status: SHIPPED | OUTPATIENT
Start: 2020-12-02 | End: 2020-12-07

## 2020-11-19 NOTE — TELEPHONE ENCOUNTER
Dr. Palomino Dress Day 11/14/19 referred for Watchman procedure.   Dr. Haylee Lynch consulted on 11/19/2019  Dr. Saul Rosenberg shared decision on 11/2/2020  MIKO 3/4/2020  Insurance approved per [facility]  KAYLIN DOE  Sister Shaye Laureano to bring to hospital [phone]

## 2020-12-03 ENCOUNTER — OFFICE VISIT (OUTPATIENT)
Dept: PRIMARY CARE CLINIC | Age: 75
End: 2020-12-03
Payer: COMMERCIAL

## 2020-12-03 DIAGNOSIS — I48.0 PAROXYSMAL A-FIB (HCC): ICD-10-CM

## 2020-12-03 DIAGNOSIS — Z01.818 PRE-OP TESTING: ICD-10-CM

## 2020-12-03 LAB
ANION GAP SERPL CALCULATED.3IONS-SCNC: 8 MMOL/L (ref 3–16)
BUN BLDV-MCNC: 19 MG/DL (ref 7–20)
CALCIUM SERPL-MCNC: 9.7 MG/DL (ref 8.3–10.6)
CHLORIDE BLD-SCNC: 96 MMOL/L (ref 99–110)
CO2: 36 MMOL/L (ref 21–32)
CREAT SERPL-MCNC: 0.9 MG/DL (ref 0.6–1.2)
GFR AFRICAN AMERICAN: >60
GFR NON-AFRICAN AMERICAN: >60
GLUCOSE BLD-MCNC: 104 MG/DL (ref 70–99)
HCT VFR BLD CALC: 30.3 % (ref 36–48)
HEMOGLOBIN: 9.5 G/DL (ref 12–16)
MCH RBC QN AUTO: 25 PG (ref 26–34)
MCHC RBC AUTO-ENTMCNC: 31.2 G/DL (ref 31–36)
MCV RBC AUTO: 80.1 FL (ref 80–100)
PDW BLD-RTO: 17.6 % (ref 12.4–15.4)
PLATELET # BLD: 275 K/UL (ref 135–450)
PMV BLD AUTO: 9.1 FL (ref 5–10.5)
POTASSIUM SERPL-SCNC: 4.4 MMOL/L (ref 3.5–5.1)
RBC # BLD: 3.79 M/UL (ref 4–5.2)
SODIUM BLD-SCNC: 140 MMOL/L (ref 136–145)
WBC # BLD: 8.5 K/UL (ref 4–11)

## 2020-12-03 PROCEDURE — 99211 OFF/OP EST MAY X REQ PHY/QHP: CPT | Performed by: NURSE PRACTITIONER

## 2020-12-03 PROCEDURE — G8417 CALC BMI ABV UP PARAM F/U: HCPCS | Performed by: NURSE PRACTITIONER

## 2020-12-03 PROCEDURE — G8428 CUR MEDS NOT DOCUMENT: HCPCS | Performed by: NURSE PRACTITIONER

## 2020-12-03 NOTE — PROGRESS NOTES
Patient presented to [facility] drive up clinic for preop testing. Patient was swabbed and given information advising them to remain isolated until procedure date.

## 2020-12-04 ENCOUNTER — TELEPHONE (OUTPATIENT)
Dept: CARDIOLOGY CLINIC | Age: 75
End: 2020-12-04

## 2020-12-04 DIAGNOSIS — Z01.818 PRE-OP TESTING: ICD-10-CM

## 2020-12-04 DIAGNOSIS — I48.0 PAROXYSMAL A-FIB (HCC): Primary | ICD-10-CM

## 2020-12-04 RX ORDER — LEVOFLOXACIN 500 MG/1
500 TABLET, FILM COATED ORAL DAILY
Qty: 7 TABLET | Refills: 0 | Status: SHIPPED | OUTPATIENT
Start: 2020-12-04 | End: 2020-12-11

## 2020-12-04 NOTE — TELEPHONE ENCOUNTER
Subjective:     Interval History: He has been off BiPAP today. No neurologic improvement. Continues to be sleepy. No movement of right side. Discussed goals of care with daughter at bedside.     Current Facility-Administered Medications   Medication Dose Route Frequency Provider Last Rate Last Dose    aspirin suppository 300 mg  300 mg Rectal Daily Naseem Rutledge MD   300 mg at 07/21/18 0923    atorvastatin tablet 40 mg  40 mg Per NG tube QHS Carrington Garcia MD   40 mg at 07/20/18 2159    busPIRone tablet 5 mg  5 mg Per NG tube Daily Carrington Garcia MD   5 mg at 07/21/18 0924    cloNIDine 0.3 mg/24 hr td ptwk 1 patch  1 patch Transdermal Q7 Days Carrington Garcia MD   Stopped at 07/18/18 0900    clopidogrel tablet 75 mg  75 mg Per NG tube Daily Carrington Garcia MD   75 mg at 07/21/18 0924    dextrose 50% injection 12.5 g  12.5 g Intravenous PRN Carrington Garcia MD        enoxaparin injection 40 mg  40 mg Subcutaneous Daily Carrington Garcia MD   40 mg at 07/20/18 1616    glucagon (human recombinant) injection 1 mg  1 mg Intramuscular PRN Carrington Garcia MD        insulin aspart U-100 pen 0-5 Units  0-5 Units Subcutaneous PRN Carrington Garcia MD   1 Units at 07/20/18 1731    metoprolol injection 5 mg  5 mg Intravenous Q5 Min PRN Naseem Rutledge MD        metoprolol succinate (TOPROL-XL) 24 hr tablet 50 mg  50 mg Oral Daily Carrington Garcia MD   Stopped at 07/21/18 0900    paroxetine tablet 40 mg  40 mg Per NG tube QAM Carrington Garcia MD   40 mg at 07/21/18 0623    pneumoc 13-sherif conj-dip cr(PF) 0.5 mL  0.5 mL Intramuscular vaccine x 1 dose Naseem Rutledge MD        polyethylene glycol packet 17 g  17 g Oral Daily Naseem Rutledge MD   17 g at 07/21/18 0924    sodium chloride 0.9% flush 3 mL  3 mL Intravenous Q8H Naseem Rutledge MD   3 mL at 07/20/18 0653    sodium chloride 0.9% flush 3 mL  3 mL Intravenous Q8H Naseem Rutledge MD   3 mL at 07/21/18 0623    sulfur hexafluoride microspheres injection 2.4 mL  2.4 mL Intravenous ONCE PRN  Spoke with patient about UTI. Dr. David Alex ordered Levaquin and script was sent to patients pharmacy. Jf Tapia MD        traZODone tablet 150 mg  150 mg Per NG tube QHS Carrington Garcia MD   150 mg at 07/20/18 0218       Review of Systems   Unable to perform ROS: Mental status change     Objective:     Vital Signs (Most Recent):  Temp: 97.9 °F (36.6 °C) (07/21/18 1200)  Pulse: 84 (07/21/18 1330)  Resp: (!) 26 (07/21/18 1330)  BP: 138/60 (07/21/18 1330)  SpO2: 100 % (07/21/18 1330) Vital Signs (24h Range):  Temp:  [97.9 °F (36.6 °C)-99.3 °F (37.4 °C)] 97.9 °F (36.6 °C)  Pulse:  [76-96] 84  Resp:  [18-32] 26  SpO2:  [92 %-100 %] 100 %  BP: (103-164)/(54-85) 138/60     Weight: 82.1 kg (181 lb)  Body mass index is 27.52 kg/m².    Physical Exam     Patient not able to hold conversation. Attempted to discuss if he would want a PEG tube and he is unable to accurately answer my questions. No movement on right side. Spontaneous movements on left.     Significant Labs: All pertinent lab results from the past 24 hours have been reviewed.    Significant Imaging: I have reviewed and interpreted all pertinent imaging results/findings within the past 24 hours.

## 2020-12-05 LAB — SARS-COV-2, NAA: NOT DETECTED

## 2020-12-07 ENCOUNTER — ANESTHESIA (OUTPATIENT)
Dept: CARDIAC CATH/INVASIVE PROCEDURES | Age: 75
End: 2020-12-07

## 2020-12-07 ENCOUNTER — HOSPITAL ENCOUNTER (OUTPATIENT)
Dept: CARDIAC CATH/INVASIVE PROCEDURES | Age: 75
Discharge: HOME | DRG: 463 | End: 2020-12-07
Admitting: ANESTHESIOLOGY
Payer: COMMERCIAL

## 2020-12-07 ENCOUNTER — ANESTHESIA EVENT (OUTPATIENT)
Dept: CARDIAC CATH/INVASIVE PROCEDURES | Age: 75
End: 2020-12-07

## 2020-12-07 VITALS
RESPIRATION RATE: 14 BRPM | TEMPERATURE: 98.8 F | SYSTOLIC BLOOD PRESSURE: 204 MMHG | HEIGHT: 60 IN | HEART RATE: 63 BPM | OXYGEN SATURATION: 96 % | BODY MASS INDEX: 33.77 KG/M2 | DIASTOLIC BLOOD PRESSURE: 77 MMHG | WEIGHT: 172 LBS

## 2020-12-07 LAB
ABO/RH: NORMAL
ANTIBODY SCREEN: NORMAL
BACTERIA: ABNORMAL /HPF
BILIRUBIN URINE: NEGATIVE
BILIRUBIN URINE: NEGATIVE
BLOOD, URINE: NEGATIVE
BLOOD, URINE: NEGATIVE
CLARITY: ABNORMAL
CLARITY: CLEAR
COLOR: YELLOW
COLOR: YELLOW
EKG ATRIAL RATE: 63 BPM
EKG DIAGNOSIS: NORMAL
EKG P AXIS: 63 DEGREES
EKG P-R INTERVAL: 142 MS
EKG Q-T INTERVAL: 490 MS
EKG QRS DURATION: 80 MS
EKG QTC CALCULATION (BAZETT): 501 MS
EKG R AXIS: 31 DEGREES
EKG T AXIS: 85 DEGREES
EKG VENTRICULAR RATE: 63 BPM
EPITHELIAL CELLS, UA: 5 /HPF (ref 0–5)
GLUCOSE URINE: NEGATIVE MG/DL
GLUCOSE URINE: NEGATIVE MG/DL
HYALINE CASTS: 6 /LPF (ref 0–8)
KETONES, URINE: NEGATIVE MG/DL
KETONES, URINE: NEGATIVE MG/DL
LEUKOCYTE ESTERASE, URINE: ABNORMAL
LEUKOCYTE ESTERASE, URINE: NEGATIVE
MICROSCOPIC EXAMINATION: NORMAL
MICROSCOPIC EXAMINATION: YES
NITRITE, URINE: NEGATIVE
NITRITE, URINE: NEGATIVE
PH UA: 5.5 (ref 5–8)
PH UA: 6 (ref 5–8)
PROTEIN UA: 30 MG/DL
PROTEIN UA: NEGATIVE MG/DL
RBC UA: 7 /HPF (ref 0–4)
SPECIFIC GRAVITY UA: 1.02 (ref 1–1.03)
SPECIFIC GRAVITY UA: 1.02 (ref 1–1.03)
URINE TYPE: ABNORMAL
URINE TYPE: NORMAL
UROBILINOGEN, URINE: 0.2 E.U./DL
UROBILINOGEN, URINE: 0.2 E.U./DL
WBC UA: 484 /HPF (ref 0–5)

## 2020-12-07 PROCEDURE — 86900 BLOOD TYPING SEROLOGIC ABO: CPT

## 2020-12-07 PROCEDURE — 99212 OFFICE O/P EST SF 10 MIN: CPT

## 2020-12-07 PROCEDURE — 81001 URINALYSIS AUTO W/SCOPE: CPT

## 2020-12-07 PROCEDURE — 93010 ELECTROCARDIOGRAM REPORT: CPT | Performed by: INTERNAL MEDICINE

## 2020-12-07 PROCEDURE — 2580000003 HC RX 258

## 2020-12-07 PROCEDURE — 2500000003 HC RX 250 WO HCPCS

## 2020-12-07 PROCEDURE — 6360000002 HC RX W HCPCS

## 2020-12-07 PROCEDURE — 93005 ELECTROCARDIOGRAM TRACING: CPT | Performed by: INTERNAL MEDICINE

## 2020-12-07 PROCEDURE — 81003 URINALYSIS AUTO W/O SCOPE: CPT

## 2020-12-07 PROCEDURE — 87086 URINE CULTURE/COLONY COUNT: CPT

## 2020-12-07 PROCEDURE — 86850 RBC ANTIBODY SCREEN: CPT

## 2020-12-07 PROCEDURE — 86901 BLOOD TYPING SEROLOGIC RH(D): CPT

## 2020-12-07 PROCEDURE — 6370000000 HC RX 637 (ALT 250 FOR IP)

## 2020-12-07 RX ORDER — FENTANYL CITRATE 50 UG/ML
50 INJECTION, SOLUTION INTRAMUSCULAR; INTRAVENOUS EVERY 5 MIN PRN
Status: DISCONTINUED | OUTPATIENT
Start: 2020-12-07 | End: 2020-12-08 | Stop reason: HOSPADM

## 2020-12-07 RX ORDER — OXYCODONE HYDROCHLORIDE 5 MG/1
5 TABLET ORAL PRN
Status: ACTIVE | OUTPATIENT
Start: 2020-12-07 | End: 2020-12-07

## 2020-12-07 RX ORDER — FENTANYL CITRATE 50 UG/ML
25 INJECTION, SOLUTION INTRAMUSCULAR; INTRAVENOUS EVERY 5 MIN PRN
Status: DISCONTINUED | OUTPATIENT
Start: 2020-12-07 | End: 2020-12-08 | Stop reason: HOSPADM

## 2020-12-07 RX ORDER — MORPHINE SULFATE 2 MG/ML
2 INJECTION, SOLUTION INTRAMUSCULAR; INTRAVENOUS EVERY 5 MIN PRN
Status: DISCONTINUED | OUTPATIENT
Start: 2020-12-07 | End: 2020-12-08 | Stop reason: HOSPADM

## 2020-12-07 RX ORDER — OXYCODONE HYDROCHLORIDE 10 MG/1
10 TABLET ORAL PRN
Status: ACTIVE | OUTPATIENT
Start: 2020-12-07 | End: 2020-12-07

## 2020-12-07 RX ORDER — ONDANSETRON 2 MG/ML
4 INJECTION INTRAMUSCULAR; INTRAVENOUS
Status: ACTIVE | OUTPATIENT
Start: 2020-12-07 | End: 2020-12-07

## 2020-12-07 RX ORDER — MORPHINE SULFATE 2 MG/ML
1 INJECTION, SOLUTION INTRAMUSCULAR; INTRAVENOUS EVERY 5 MIN PRN
Status: DISCONTINUED | OUTPATIENT
Start: 2020-12-07 | End: 2020-12-08 | Stop reason: HOSPADM

## 2020-12-07 RX ORDER — MEPERIDINE HYDROCHLORIDE 25 MG/ML
12.5 INJECTION INTRAMUSCULAR; INTRAVENOUS; SUBCUTANEOUS EVERY 5 MIN PRN
Status: DISCONTINUED | OUTPATIENT
Start: 2020-12-07 | End: 2020-12-08 | Stop reason: HOSPADM

## 2020-12-07 RX ORDER — SODIUM CHLORIDE 0.9 % (FLUSH) 0.9 %
10 SYRINGE (ML) INJECTION PRN
Status: DISCONTINUED | OUTPATIENT
Start: 2020-12-07 | End: 2020-12-08 | Stop reason: HOSPADM

## 2020-12-07 RX ORDER — SODIUM CHLORIDE 9 MG/ML
INJECTION, SOLUTION INTRAVENOUS CONTINUOUS
Status: DISCONTINUED | OUTPATIENT
Start: 2020-12-07 | End: 2020-12-08 | Stop reason: HOSPADM

## 2020-12-07 ASSESSMENT — ENCOUNTER SYMPTOMS
DYSPNEA ACTIVITY LEVEL: AFTER AMBULATING 1 FLIGHT OF STAIRS
SHORTNESS OF BREATH: 1

## 2020-12-07 ASSESSMENT — COPD QUESTIONNAIRES: CAT_SEVERITY: SEVERE

## 2020-12-07 ASSESSMENT — LIFESTYLE VARIABLES: SMOKING_STATUS: 0

## 2020-12-07 NOTE — ANESTHESIA PRE PROCEDURE
Department of Anesthesiology  Preprocedure Note       Name:  Cassia Ackerman   Age:  70 y.o.  :  1945                                          MRN:  [id number]         Date:  2020      Surgeon: * Surgery not found *    Procedure:     Medications prior to admission:   Prior to Admission medications    Medication Sig Start Date End Date Taking?  Authorizing Provider   levoFLOXacin (LEVAQUIN) 500 MG tablet Take 1 tablet by mouth daily for 7 days 20  Robin Valencia MD   chlorhexidine (HIBICLENS) 4 % external liquid Wash from neck down the night before or morning of the procedure 20  Robin Valencia MD   LORazepam (ATIVAN) 1 MG tablet TAKE ONE TABLET BY MOUTH DAILY AS NEEDED FOR ANXIETY 20  Nikole Webber MD   carvedilol (COREG) 12.5 MG tablet Take 1 tablet by mouth 2 times daily (with meals) 20   Nikole Webber MD   ipratropium-albuterol (DUONEB) 0.5-2.5 (3) MG/3ML SOLN nebulizer solution Inhale 3 mLs into the lungs every 4 hours as needed for Shortness of Breath 20   Ariana Preston MD   fluticasone-vilanterol (BREO ELLIPTA) 100-25 MCG/INH AEPB inhaler Inhale 1 puff into the lungs daily 20   Ariana Preston MD   tiotropium (SPIRIVA RESPIMAT) 2.5 MCG/ACT AERS inhaler Inhale 2 puffs into the lungs daily 20   Ariana Preston MD   albuterol sulfate HFA (VENTOLIN HFA) 108 (90 Base) MCG/ACT inhaler Inhale 2 puffs into the lungs every 4 hours as needed for Wheezing or Shortness of Breath 20   Ariana Preston MD   traZODone (DESYREL) 50 MG tablet Take 1 tablet by mouth nightly as needed for Sleep 10/19/20   Nikole Webber MD   lidocaine (XYLOCAINE) 2 % jelly Apply small amount topically to affected area every 8 hours when necessary pain 10/19/20   Nikole Webber MD   diclofenac sodium (VOLTAREN) 1 % GEL Apply 2 g topically 4 times daily 10/19/20   Nikole Webber MD   apixaban Tapan Singer) 5 MG TABS tablet Take 1 tablet by mouth 2 times daily 20   Marin Richard MD citalopram (CELEXA) 40 MG tablet TAKE ONE TABLET BY MOUTH DAILY 8/26/20   Gin Cadena Lawanda, MD   furosemide (LASIX) 40 MG tablet Take 1 tablet by mouth 2 times daily 8/26/20   Gin Cadena Day, MD   hydrALAZINE (APRESOLINE) 100 MG tablet TAKE ONE TABLET BY MOUTH THREE TIMES A DAY 8/26/20   Gin Cadena Day, MD   lansoprazole (PREVACID) 15 MG delayed release capsule Take 1 capsule by mouth daily 8/26/20   Gin Cadena Day, MD   levothyroxine (SYNTHROID) 50 MCG tablet TAKE ONE TABLET BY MOUTH DAILY 8/26/20   Gin Cadena Day, MD   montelukast (SINGULAIR) 10 MG tablet Take 1 tablet by mouth daily 8/26/20   Gin Cadena Day, MD   atorvastatin (LIPITOR) 40 MG tablet TAKE ONE TABLET BY MOUTH DAILY 7/15/20   Gin Cadena Day, MD   SPIRIVA RESPIMAT 2.5 MCG/ACT AERS inhaler INHALE TWO PUFFS BY MOUTH DAILY 5/27/20   Pepe Lutz MD   budesonide-formoterol [facility]) 160-4.5 MCG/ACT AERO Inhale 2 puffs into the lungs 2 times daily 2/21/20   Evelyn Hong DO   cloNIDine (CATAPRES) 0.2 MG tablet Take 1 tablet by mouth 3 times daily  Patient taking differently: Take 0.2 mg by mouth 3 times daily Patient said she is only taking twice a day 2/21/20   Evelyn Hong DO   butalbital-acetaminophen-caffeine (FIORICET, ESGIC) -40 MG per tablet Take 1 tablet by mouth every 6 hours as needed for Headaches 10/17/19   HENRIQUE Lanza DO   aspirin 81 MG tablet Take 1 tablet by mouth daily 7/26/19   Suresh Cutler MD       Current medications:    Current Outpatient Medications   Medication Sig Dispense Refill    levoFLOXacin (LEVAQUIN) 500 MG tablet Take 1 tablet by mouth daily for 7 days 7 tablet 0    chlorhexidine (HIBICLENS) 4 % external liquid Wash from neck down the night before or morning of the procedure 1 Bottle 0    LORazepam (ATIVAN) 1 MG tablet TAKE ONE TABLET BY MOUTH DAILY AS NEEDED FOR ANXIETY 30 tablet 0    carvedilol (COREG) 12.5 MG tablet Take 1 tablet by mouth 2 times daily (with meals) 180 tablet 0    ipratropium-albuterol (DUONEB) 0.5-2.5 (3) MG/3ML SOLN nebulizer solution Inhale 3 mLs into the lungs every 4 hours as needed for Shortness of Breath 360 mL 5    fluticasone-vilanterol (BREO ELLIPTA) 100-25 MCG/INH AEPB inhaler Inhale 1 puff into the lungs daily 1 each 5    tiotropium (SPIRIVA RESPIMAT) 2.5 MCG/ACT AERS inhaler Inhale 2 puffs into the lungs daily 1 Inhaler 5    albuterol sulfate HFA (VENTOLIN HFA) 108 (90 Base) MCG/ACT inhaler Inhale 2 puffs into the lungs every 4 hours as needed for Wheezing or Shortness of Breath 1 Inhaler 5    traZODone (DESYREL) 50 MG tablet Take 1 tablet by mouth nightly as needed for Sleep 30 tablet 5    lidocaine (XYLOCAINE) 2 % jelly Apply small amount topically to affected area every 8 hours when necessary pain 1 Tube 11    diclofenac sodium (VOLTAREN) 1 % GEL Apply 2 g topically 4 times daily 2 Tube 1    apixaban (ELIQUIS) 5 MG TABS tablet Take 1 tablet by mouth 2 times daily 180 tablet 0    citalopram (CELEXA) 40 MG tablet TAKE ONE TABLET BY MOUTH DAILY 90 tablet 0    furosemide (LASIX) 40 MG tablet Take 1 tablet by mouth 2 times daily 180 tablet 0    hydrALAZINE (APRESOLINE) 100 MG tablet TAKE ONE TABLET BY MOUTH THREE TIMES A  tablet 0    lansoprazole (PREVACID) 15 MG delayed release capsule Take 1 capsule by mouth daily 90 capsule 0    levothyroxine (SYNTHROID) 50 MCG tablet TAKE ONE TABLET BY MOUTH DAILY 90 tablet 0    montelukast (SINGULAIR) 10 MG tablet Take 1 tablet by mouth daily 90 tablet 0    atorvastatin (LIPITOR) 40 MG tablet TAKE ONE TABLET BY MOUTH DAILY 90 tablet 1    SPIRIVA RESPIMAT 2.5 MCG/ACT AERS inhaler INHALE TWO PUFFS BY MOUTH DAILY 3 Inhaler 3    budesonide-formoterol (SYMBICORT) 160-4.5 MCG/ACT AERO Inhale 2 puffs into the lungs 2 times daily 1 Inhaler 3    cloNIDine (CATAPRES) 0.2 MG tablet Take 1 tablet by mouth 3 times daily (Patient taking differently: Take 0.2 mg by mouth 3 times daily Patient said she is only taking twice a day) 60 tablet 3    butalbital-acetaminophen-caffeine (FIORICET, ESGIC) -40 MG per tablet Take 1 tablet by mouth every 6 hours as needed for Headaches 16 tablet 0    aspirin 81 MG tablet Take 1 tablet by mouth daily 30 tablet 3     Current Facility-Administered Medications   Medication Dose Route Frequency Provider Last Rate Last Dose    sodium chloride flush 0.9 % injection 10 mL  10 mL Intravenous PRN Jen Vance MD        0.9 % sodium chloride infusion   Intravenous Continuous Jen Vance MD        ceFAZolin (ANCEF) 2 g in dextrose 5 % 100 mL IVPB  2 g Intravenous On Call to [address] [place] [address] MD Cristóbal           Allergies:     Allergies   Allergen Reactions    Iv Dye [Iodides]      Flushed, broke out and difficulty breathing       Problem List:    Patient Active Problem List   Diagnosis Code    Fracture, metacarpal, neck S62.339A    PAD (peripheral artery disease) (HCC) I73.9    CAD (coronary artery disease) I25.10    HTN (hypertension) R65    Diastolic dysfunction V73.58    RLS (restless legs syndrome) G25.81    History of tobacco use Z87.891    Centrilobular emphysema (HCC) J43.2    Colovesical fistula N32.1    Diverticulitis large intestine w/o perforation or abscess w/bleeding K57.33    Large bowel obstruction ([address] K56.609    ANGIE treated with BiPAP G47.33    CKD (chronic kidney disease), stage III - sees Dr. Stacey Chapa N18.30    COPD, severe ([address] J44.9    Colonic obstruction ([address] K56.609    Carotid artery stenosis without cerebral infarction, bilateral I65.23    Colostomy care ([facility] Z43.3    Dyspnea and respiratory abnormalities R06.00, R06.89    Hyperlipidemia E78.5    Paroxysmal atrial fibrillation (HCC) I48.0    Hypothyroidism, unspecified E03.9    Chest pain R07.9    Symptomatic anemia D64.9    Anxiety F41.9    History of GI bleed Z87.19    Iron deficiency anemia due to chronic blood loss D50.0       Past Medical History:        Diagnosis Date    CAD (coronary artery disease)     Nonobstructive on cath in 2017    CHF (congestive heart failure) ([facility])     Colostomy care ([facility] 2018    Peristomal irritation and early leaking    COPD (chronic obstructive pulmonary disease) ([facility])     Hyperlipidemia     Hypertension     Kidney disease     Stage 3    Peripheral vascular disease ([facility]     Rectal fissure 2014    surgery pending    Restless legs syndrome     Sleep apnea     O2 3 liters at hs    Unspecified sleep apnea        Past Surgical History:        Procedure Laterality Date    CARDIAC CATHETERIZATION  2017    Dr. Zacarias Panchal  2018    CORONARY ARTERY BYPASS GRAFT      Legs & Carotid    HERNIA REPAIR      UPPER GASTROINTESTINAL ENDOSCOPY N/A 2019    EGD DIAGNOSTIC ONLY performed by Etienne Cordon MD at [address] History:    Social History     Tobacco Use    Smoking status: Former Smoker     Packs/day: 3.00     Years: 30.00     Pack years: 90.00     Start date: 1963     Last attempt to quit: 1992     Years since quittin.8    Smokeless tobacco: Never Used    Tobacco comment: QUIT 20 YRS AGO   Substance Use Topics    Alcohol use: No     Alcohol/week: 0.0 standard drinks                                Counseling given: Not Answered  Comment: QUIT 20 YRS AGO      Vital Signs (Current): There were no vitals filed for this visit.                                            BP Readings from Last 3 Encounters:   20 130/60   20 138/60   20 (!) 180/99       NPO Status:                                                                                 BMI:   Wt Readings from Last 3 Encounters:   20 172 lb 9.6 oz (78.3 kg)   20 173 lb (78.5 kg)   20 175 lb (79.4 kg)     There is no height or weight on file to calculate BMI.    CBC:   Lab Results   Component Value Date    WBC 8.5 2020    RBC 3.79 2020    HGB 9.5 2020    HCT 30.3 2020    MCV 80.1 2020    RDW 17.6 12/03/2020     12/03/2020       CMP:   Lab Results   Component Value Date     12/03/2020    K 4.4 12/03/2020    K 3.8 02/13/2020    CL 96 12/03/2020    CO2 36 12/03/2020    BUN 19 12/03/2020    CREATININE 0.9 12/03/2020    GFRAA >60 12/03/2020    AGRATIO 1.5 10/30/2020    LABGLOM >60 12/03/2020    GLUCOSE 104 12/03/2020    PROT 6.6 10/30/2020    CALCIUM 9.7 12/03/2020    BILITOT <0.2 10/30/2020    ALKPHOS 46 10/30/2020    AST 17 10/30/2020    ALT 16 10/30/2020       POC Tests: No results for input(s): POCGLU, POCNA, POCK, POCCL, POCBUN, POCHEMO, POCHCT in the last 72 hours.     Coags:   Lab Results   Component Value Date    PROTIME 15.6 09/25/2019    INR 1.37 09/25/2019    APTT 29.2 03/02/2018       HCG (If Applicable):   Lab Results   Component Value Date    PREGTESTUR Negative 07/28/2015        ABGs:   Lab Results   Component Value Date    PHART 7.404 02/16/2020    PO2ART 108.0 02/16/2020    HTP7JGG 65.9 02/16/2020    CLA3YLA 40.4 02/16/2020    BEART 14.2 02/16/2020    S9LVKIWG 98.9 02/16/2020        Type & Screen (If Applicable):  No results found for: LABABO, LABRH    Drug/Infectious Status (If Applicable):  No results found for: HIV, HEPCAB    COVID-19 Screening (If Applicable):   Lab Results   Component Value Date    COVID19 NOT DETECTED 12/03/2020         Anesthesia Evaluation  Patient summary reviewed and Nursing notes reviewed no history of anesthetic complications:   Airway: Mallampati: II  TM distance: >3 FB   Neck ROM: full  Mouth opening: > = 3 FB Dental:      Comment: No loose teeth    Pulmonary:   (+) COPD (FEV1 0.92L): severe,  shortness of breath:  sleep apnea:  decreased breath sounds,      (-) pneumonia, asthma, recent URI and not a current smoker                          ROS comment: Chronic hypoxic respiratory failure    90 pack year history   Cardiovascular:  Exercise tolerance: poor (<4 METS),   (+) hypertension:, CAD:, CHF:, SCOTT: after ambulating 1 flight of stairs, hyperlipidemia    (-) pacemaker, valvular problems/murmurs, past MI, CABG/stent, dysrhythmias,  angina, orthopnea and PND      Rhythm: irregular                      Neuro/Psych:      (-) seizures, neuromuscular disease, TIA, CVA, headaches, psychiatric history and depression/anxiety            GI/Hepatic/Renal:        (-) hiatal hernia, GERD, PUD, hepatitis, liver disease, no renal disease, bowel prep and no morbid obesity       Endo/Other:    (+) hypothyroidism::., no malignancy/cancer. (-) diabetes mellitus, hyperthyroidism, blood dyscrasia, arthritis, no electrolyte abnormalities, no malignancy/cancer               Abdominal:           Vascular:   + PVD, aortic or cerebral, .  - DVT. Anesthesia Plan      general     ASA 3       Induction: intravenous. MIPS: Prophylactic antiemetics administered. Anesthetic plan and risks discussed with patient. Plan discussed with CRNA. Merline Hurst MD   12/7/2020      This pre-anesthesia assessment may be used as a history and physical.    DOS STAFF ADDENDUM:    Pt seen and examined, chart reviewed (including anesthesia, drug and allergy history). No interval changes to history and physical examination. Anesthetic plan, risks, benefits, alternatives, and personnel involved discussed with patient. Patient verbalized an understanding and agrees to proceed.       Merline Hurst MD  December 7, 2020  10:14 AM

## 2020-12-09 ENCOUNTER — PATIENT MESSAGE (OUTPATIENT)
Dept: FAMILY MEDICINE CLINIC | Age: 75
End: 2020-12-09

## 2020-12-09 LAB — URINE CULTURE, ROUTINE: NORMAL

## 2020-12-09 RX ORDER — LORAZEPAM 1 MG/1
TABLET ORAL
Qty: 30 TABLET | Refills: 0 | Status: SHIPPED | OUTPATIENT
Start: 2020-12-09 | End: 2021-02-01 | Stop reason: SDUPTHER

## 2020-12-09 RX ORDER — PETROLATUM 42 G/100G
OINTMENT TOPICAL
Qty: 1 TUBE | Refills: 5 | Status: SHIPPED | OUTPATIENT
Start: 2020-12-09

## 2020-12-09 NOTE — TELEPHONE ENCOUNTER
Attempted to schedule patient for 12/21 but patient will be out of town would like to schedule Watchman for 1/4/2021 to arrive at 10 AM

## 2020-12-09 NOTE — TELEPHONE ENCOUNTER
From: Melissa Oliver  To: Nicky Sam MD  Sent: 12/9/2020 11:26 AM EST  Subject: Non-Urgent Medical Question    Im going [place] of [place] on 11th for holiday. Could i please get refill on ativan and also something for very dry skin. Thank you. [facility]rmacy [facility] [address].  Thank you

## 2020-12-21 NOTE — TELEPHONE ENCOUNTER
Spoke with patient today and informed her of the procedure that is being scheduled on 1/4/2020 at 10 AM arrival.  Lab work and Covid test has been order instructed to have done at [facility] on 12/30/2020. Monae Mcdowell informed to get Hibiclens bath at their Pharmacy. Valencia Martinez procedure instructions were sent to email [email or web address] patient to contact me with any questions prior to procedure.  Informed she will need to self-quarantine until day of procedure after he gets COVID test.

## 2020-12-29 RX ORDER — METHYLPREDNISOLONE SODIUM SUCCINATE 125 MG/2ML
60 INJECTION, POWDER, LYOPHILIZED, FOR SOLUTION INTRAMUSCULAR; INTRAVENOUS ONCE
Status: DISCONTINUED | OUTPATIENT
Start: 2021-01-04 | End: 2020-12-29

## 2020-12-30 ENCOUNTER — TELEPHONE (OUTPATIENT)
Dept: PULMONOLOGY | Age: 75
End: 2020-12-30

## 2020-12-30 NOTE — TELEPHONE ENCOUNTER
I spoke with the patient. She just got back from a trip/being out of town. She stated [facility] notified her and she paid some money toward her bill, but not enough. I asked her if she had O2 tanks in the home. She does, however they are not filled. I advised her to call [email or web address] to try to have them deliver her a tank after asking advice from my . Patient verbalized understanding.

## 2021-01-04 ENCOUNTER — ANESTHESIA (OUTPATIENT)
Dept: CARDIAC CATH/INVASIVE PROCEDURES | Age: 76
DRG: 175 | End: 2021-01-04
Payer: COMMERCIAL

## 2021-01-04 ENCOUNTER — HOSPITAL ENCOUNTER (OUTPATIENT)
Dept: CARDIAC CATH/INVASIVE PROCEDURES | Age: 76
Discharge: HOME | DRG: 175 | End: 2021-01-04
Attending: INTERNAL MEDICINE | Admitting: INTERNAL MEDICINE
Payer: COMMERCIAL

## 2021-01-04 ENCOUNTER — ANESTHESIA EVENT (OUTPATIENT)
Dept: CARDIAC CATH/INVASIVE PROCEDURES | Age: 76
DRG: 175 | End: 2021-01-04
Payer: COMMERCIAL

## 2021-01-04 VITALS
TEMPERATURE: 96.8 F | SYSTOLIC BLOOD PRESSURE: 182 MMHG | DIASTOLIC BLOOD PRESSURE: 96 MMHG | RESPIRATION RATE: 4 BRPM | OXYGEN SATURATION: 90 %

## 2021-01-04 VITALS
RESPIRATION RATE: 17 BRPM | DIASTOLIC BLOOD PRESSURE: 53 MMHG | OXYGEN SATURATION: 91 % | TEMPERATURE: 97.5 F | SYSTOLIC BLOOD PRESSURE: 150 MMHG | HEART RATE: 67 BPM

## 2021-01-04 DIAGNOSIS — I65.23 CAROTID ARTERY STENOSIS WITHOUT CEREBRAL INFARCTION, BILATERAL: ICD-10-CM

## 2021-01-04 PROBLEM — I48.91 ATRIAL FIBRILLATION (HCC): Status: ACTIVE | Noted: 2021-01-04

## 2021-01-04 PROBLEM — Z95.818 PRESENCE OF WATCHMAN LEFT ATRIAL APPENDAGE CLOSURE DEVICE: Status: ACTIVE | Noted: 2021-01-04

## 2021-01-04 LAB
ABO/RH: NORMAL
ANION GAP SERPL CALCULATED.3IONS-SCNC: 12 MMOL/L (ref 3–16)
ANION GAP SERPL CALCULATED.3IONS-SCNC: 9 MMOL/L (ref 3–16)
ANTIBODY SCREEN: NORMAL
BILIRUBIN URINE: ABNORMAL
BLOOD, URINE: NEGATIVE
BUN BLDV-MCNC: 16 MG/DL (ref 7–20)
BUN BLDV-MCNC: 17 MG/DL (ref 7–20)
CALCIUM SERPL-MCNC: 8.6 MG/DL (ref 8.3–10.6)
CALCIUM SERPL-MCNC: 9 MG/DL (ref 8.3–10.6)
CHLORIDE BLD-SCNC: 95 MMOL/L (ref 99–110)
CHLORIDE BLD-SCNC: 99 MMOL/L (ref 99–110)
CLARITY: ABNORMAL
CO2: 32 MMOL/L (ref 21–32)
CO2: 33 MMOL/L (ref 21–32)
COLOR: YELLOW
COMMENT UA: ABNORMAL
CREAT SERPL-MCNC: 0.9 MG/DL (ref 0.6–1.2)
CREAT SERPL-MCNC: 1 MG/DL (ref 0.6–1.2)
EPITHELIAL CELLS, UA: 9 /HPF (ref 0–5)
GFR AFRICAN AMERICAN: >60
GFR AFRICAN AMERICAN: >60
GFR NON-AFRICAN AMERICAN: 54
GFR NON-AFRICAN AMERICAN: >60
GLUCOSE BLD-MCNC: 104 MG/DL (ref 70–99)
GLUCOSE BLD-MCNC: 141 MG/DL (ref 70–99)
GLUCOSE URINE: NEGATIVE MG/DL
HCT VFR BLD CALC: 29.1 % (ref 36–48)
HCT VFR BLD CALC: 29.1 % (ref 36–48)
HEMOGLOBIN: 9.2 G/DL (ref 12–16)
HEMOGLOBIN: 9.2 G/DL (ref 12–16)
HYALINE CASTS: ABNORMAL /LPF (ref 0–2)
KETONES, URINE: NEGATIVE MG/DL
LEUKOCYTE ESTERASE, URINE: NEGATIVE
MCH RBC QN AUTO: 24.7 PG (ref 26–34)
MCH RBC QN AUTO: 24.7 PG (ref 26–34)
MCHC RBC AUTO-ENTMCNC: 31.5 G/DL (ref 31–36)
MCHC RBC AUTO-ENTMCNC: 31.7 G/DL (ref 31–36)
MCV RBC AUTO: 78.1 FL (ref 80–100)
MCV RBC AUTO: 78.4 FL (ref 80–100)
MICROSCOPIC EXAMINATION: YES
NITRITE, URINE: NEGATIVE
PDW BLD-RTO: 17.8 % (ref 12.4–15.4)
PDW BLD-RTO: 18.2 % (ref 12.4–15.4)
PH UA: 5.5 (ref 5–8)
PLATELET # BLD: 278 K/UL (ref 135–450)
PLATELET # BLD: 282 K/UL (ref 135–450)
PMV BLD AUTO: 8.6 FL (ref 5–10.5)
PMV BLD AUTO: 9 FL (ref 5–10.5)
POC ACT LR: 245 SEC
POC ACT LR: 352 SEC
POTASSIUM SERPL-SCNC: 3.9 MMOL/L (ref 3.5–5.1)
POTASSIUM SERPL-SCNC: 4.3 MMOL/L (ref 3.5–5.1)
PROTEIN UA: NEGATIVE MG/DL
RBC # BLD: 3.72 M/UL (ref 4–5.2)
RBC # BLD: 3.72 M/UL (ref 4–5.2)
RBC UA: ABNORMAL /HPF (ref 0–4)
SARS-COV-2, NAAT: NOT DETECTED
SODIUM BLD-SCNC: 139 MMOL/L (ref 136–145)
SODIUM BLD-SCNC: 141 MMOL/L (ref 136–145)
SPECIFIC GRAVITY UA: 1.02 (ref 1–1.03)
URINE TYPE: ABNORMAL
UROBILINOGEN, URINE: 1 E.U./DL
WBC # BLD: 7.1 K/UL (ref 4–11)
WBC # BLD: 7.7 K/UL (ref 4–11)
WBC UA: 3 /HPF (ref 0–5)

## 2021-01-04 PROCEDURE — 86850 RBC ANTIBODY SCREEN: CPT

## 2021-01-04 PROCEDURE — 2580000003 HC RX 258: Performed by: INTERNAL MEDICINE

## 2021-01-04 PROCEDURE — C1769 GUIDE WIRE: HCPCS

## 2021-01-04 PROCEDURE — 86901 BLOOD TYPING SEROLOGIC RH(D): CPT

## 2021-01-04 PROCEDURE — 2780000010 HC IMPLANT OTHER

## 2021-01-04 PROCEDURE — 7100000001 HC PACU RECOVERY - ADDTL 15 MIN

## 2021-01-04 PROCEDURE — 6360000002 HC RX W HCPCS: Performed by: NURSE ANESTHETIST, CERTIFIED REGISTERED

## 2021-01-04 PROCEDURE — U0002 COVID-19 LAB TEST NON-CDC: HCPCS

## 2021-01-04 PROCEDURE — 80048 BASIC METABOLIC PNL TOTAL CA: CPT

## 2021-01-04 PROCEDURE — 93308 TTE F-UP OR LMTD: CPT

## 2021-01-04 PROCEDURE — C1894 INTRO/SHEATH, NON-LASER: HCPCS

## 2021-01-04 PROCEDURE — 2580000003 HC RX 258

## 2021-01-04 PROCEDURE — 85027 COMPLETE CBC AUTOMATED: CPT

## 2021-01-04 PROCEDURE — 3700000001 HC ADD 15 MINUTES (ANESTHESIA)

## 2021-01-04 PROCEDURE — 81001 URINALYSIS AUTO W/SCOPE: CPT

## 2021-01-04 PROCEDURE — 6360000004 HC RX CONTRAST MEDICATION: Performed by: INTERNAL MEDICINE

## 2021-01-04 PROCEDURE — 2720000010 HC SURG SUPPLY STERILE

## 2021-01-04 PROCEDURE — 02L73DK OCCLUSION OF LEFT ATRIAL APPENDAGE WITH INTRALUMINAL DEVICE, PERCUTANEOUS APPROACH: ICD-10-PCS | Performed by: INTERNAL MEDICINE

## 2021-01-04 PROCEDURE — 93355 ECHO TRANSESOPHAGEAL (TEE): CPT

## 2021-01-04 PROCEDURE — 7100000000 HC PACU RECOVERY - FIRST 15 MIN

## 2021-01-04 PROCEDURE — 3700000000 HC ANESTHESIA ATTENDED CARE

## 2021-01-04 PROCEDURE — 6360000002 HC RX W HCPCS: Performed by: INTERNAL MEDICINE

## 2021-01-04 PROCEDURE — 2500000003 HC RX 250 WO HCPCS

## 2021-01-04 PROCEDURE — B24BZZ4 ULTRASONOGRAPHY OF HEART WITH AORTA, TRANSESOPHAGEAL: ICD-10-PCS | Performed by: INTERNAL MEDICINE

## 2021-01-04 PROCEDURE — 2500000003 HC RX 250 WO HCPCS: Performed by: NURSE ANESTHETIST, CERTIFIED REGISTERED

## 2021-01-04 PROCEDURE — 6360000002 HC RX W HCPCS

## 2021-01-04 PROCEDURE — 33340 PERQ CLSR TCAT L ATR APNDGE: CPT | Performed by: INTERNAL MEDICINE

## 2021-01-04 PROCEDURE — 85347 COAGULATION TIME ACTIVATED: CPT

## 2021-01-04 PROCEDURE — C1760 CLOSURE DEV, VASC: HCPCS

## 2021-01-04 PROCEDURE — 86900 BLOOD TYPING SEROLOGIC ABO: CPT

## 2021-01-04 PROCEDURE — 33340 PERQ CLSR TCAT L ATR APNDGE: CPT

## 2021-01-04 PROCEDURE — 2580000003 HC RX 258: Performed by: NURSE ANESTHETIST, CERTIFIED REGISTERED

## 2021-01-04 RX ORDER — CLONIDINE HYDROCHLORIDE 0.1 MG/1
0.2 TABLET ORAL 3 TIMES DAILY
Status: DISCONTINUED | OUTPATIENT
Start: 2021-01-04 | End: 2021-01-04 | Stop reason: HOSPADM

## 2021-01-04 RX ORDER — BUDESONIDE AND FORMOTEROL FUMARATE DIHYDRATE 160; 4.5 UG/1; UG/1
2 AEROSOL RESPIRATORY (INHALATION) 2 TIMES DAILY
Status: DISCONTINUED | OUTPATIENT
Start: 2021-01-04 | End: 2021-01-04 | Stop reason: HOSPADM

## 2021-01-04 RX ORDER — DEXAMETHASONE SODIUM PHOSPHATE 4 MG/ML
INJECTION, SOLUTION INTRA-ARTICULAR; INTRALESIONAL; INTRAMUSCULAR; INTRAVENOUS; SOFT TISSUE PRN
Status: DISCONTINUED | OUTPATIENT
Start: 2021-01-04 | End: 2021-01-04 | Stop reason: SDUPTHER

## 2021-01-04 RX ORDER — SUCCINYLCHOLINE/SOD CL,ISO/PF 200MG/10ML
SYRINGE (ML) INTRAVENOUS PRN
Status: DISCONTINUED | OUTPATIENT
Start: 2021-01-04 | End: 2021-01-04 | Stop reason: SDUPTHER

## 2021-01-04 RX ORDER — BUTALBITAL, ACETAMINOPHEN AND CAFFEINE 50; 325; 40 MG/1; MG/1; MG/1
1 TABLET ORAL EVERY 6 HOURS PRN
Status: DISCONTINUED | OUTPATIENT
Start: 2021-01-04 | End: 2021-01-04 | Stop reason: HOSPADM

## 2021-01-04 RX ORDER — FUROSEMIDE 40 MG/1
40 TABLET ORAL 2 TIMES DAILY
Status: DISCONTINUED | OUTPATIENT
Start: 2021-01-04 | End: 2021-01-04 | Stop reason: HOSPADM

## 2021-01-04 RX ORDER — VECURONIUM BROMIDE 1 MG/ML
INJECTION, POWDER, LYOPHILIZED, FOR SOLUTION INTRAVENOUS PRN
Status: DISCONTINUED | OUTPATIENT
Start: 2021-01-04 | End: 2021-01-04 | Stop reason: SDUPTHER

## 2021-01-04 RX ORDER — FENTANYL CITRATE 50 UG/ML
50 INJECTION, SOLUTION INTRAMUSCULAR; INTRAVENOUS EVERY 5 MIN PRN
Status: DISCONTINUED | OUTPATIENT
Start: 2021-01-04 | End: 2021-01-04

## 2021-01-04 RX ORDER — FENTANYL CITRATE 50 UG/ML
25 INJECTION, SOLUTION INTRAMUSCULAR; INTRAVENOUS EVERY 5 MIN PRN
Status: DISCONTINUED | OUTPATIENT
Start: 2021-01-04 | End: 2021-01-04

## 2021-01-04 RX ORDER — CARVEDILOL 12.5 MG/1
12.5 TABLET ORAL 2 TIMES DAILY WITH MEALS
Status: DISCONTINUED | OUTPATIENT
Start: 2021-01-04 | End: 2021-01-04 | Stop reason: HOSPADM

## 2021-01-04 RX ORDER — MIDAZOLAM HYDROCHLORIDE 1 MG/ML
INJECTION INTRAMUSCULAR; INTRAVENOUS PRN
Status: DISCONTINUED | OUTPATIENT
Start: 2021-01-04 | End: 2021-01-04 | Stop reason: SDUPTHER

## 2021-01-04 RX ORDER — PROTAMINE SULFATE 10 MG/ML
INJECTION, SOLUTION INTRAVENOUS PRN
Status: DISCONTINUED | OUTPATIENT
Start: 2021-01-04 | End: 2021-01-04 | Stop reason: SDUPTHER

## 2021-01-04 RX ORDER — MORPHINE SULFATE 2 MG/ML
1 INJECTION, SOLUTION INTRAMUSCULAR; INTRAVENOUS EVERY 5 MIN PRN
Status: DISCONTINUED | OUTPATIENT
Start: 2021-01-04 | End: 2021-01-04

## 2021-01-04 RX ORDER — OXYCODONE HYDROCHLORIDE 5 MG/1
5 TABLET ORAL PRN
Status: DISCONTINUED | OUTPATIENT
Start: 2021-01-04 | End: 2021-01-04

## 2021-01-04 RX ORDER — LIDOCAINE HYDROCHLORIDE 20 MG/ML
INJECTION, SOLUTION EPIDURAL; INFILTRATION; INTRACAUDAL; PERINEURAL PRN
Status: DISCONTINUED | OUTPATIENT
Start: 2021-01-04 | End: 2021-01-04 | Stop reason: SDUPTHER

## 2021-01-04 RX ORDER — GLYCOPYRROLATE 0.2 MG/ML
INJECTION INTRAMUSCULAR; INTRAVENOUS PRN
Status: DISCONTINUED | OUTPATIENT
Start: 2021-01-04 | End: 2021-01-04 | Stop reason: SDUPTHER

## 2021-01-04 RX ORDER — MORPHINE SULFATE 2 MG/ML
2 INJECTION, SOLUTION INTRAMUSCULAR; INTRAVENOUS EVERY 5 MIN PRN
Status: DISCONTINUED | OUTPATIENT
Start: 2021-01-04 | End: 2021-01-04

## 2021-01-04 RX ORDER — ATORVASTATIN CALCIUM 40 MG/1
1 TABLET, FILM COATED ORAL DAILY
Status: DISCONTINUED | OUTPATIENT
Start: 2021-01-04 | End: 2021-01-04 | Stop reason: HOSPADM

## 2021-01-04 RX ORDER — BUDESONIDE AND FORMOTEROL FUMARATE DIHYDRATE 80; 4.5 UG/1; UG/1
2 AEROSOL RESPIRATORY (INHALATION) 2 TIMES DAILY
Status: DISCONTINUED | OUTPATIENT
Start: 2021-01-04 | End: 2021-01-04 | Stop reason: HOSPADM

## 2021-01-04 RX ORDER — OXYCODONE HYDROCHLORIDE 10 MG/1
10 TABLET ORAL PRN
Status: DISCONTINUED | OUTPATIENT
Start: 2021-01-04 | End: 2021-01-04

## 2021-01-04 RX ORDER — MEPERIDINE HYDROCHLORIDE 25 MG/ML
12.5 INJECTION INTRAMUSCULAR; INTRAVENOUS; SUBCUTANEOUS EVERY 5 MIN PRN
Status: DISCONTINUED | OUTPATIENT
Start: 2021-01-04 | End: 2021-01-04

## 2021-01-04 RX ORDER — DIPHENHYDRAMINE HYDROCHLORIDE 50 MG/ML
25 INJECTION INTRAMUSCULAR; INTRAVENOUS ONCE
Status: DISCONTINUED | OUTPATIENT
Start: 2021-01-04 | End: 2021-01-05 | Stop reason: HOSPADM

## 2021-01-04 RX ORDER — TRAZODONE HYDROCHLORIDE 50 MG/1
50 TABLET ORAL NIGHTLY PRN
Status: DISCONTINUED | OUTPATIENT
Start: 2021-01-04 | End: 2021-01-04 | Stop reason: HOSPADM

## 2021-01-04 RX ORDER — PROPOFOL 10 MG/ML
INJECTION, EMULSION INTRAVENOUS PRN
Status: DISCONTINUED | OUTPATIENT
Start: 2021-01-04 | End: 2021-01-04 | Stop reason: SDUPTHER

## 2021-01-04 RX ORDER — CITALOPRAM 40 MG/1
1 TABLET ORAL DAILY
Status: DISCONTINUED | OUTPATIENT
Start: 2021-01-04 | End: 2021-01-04 | Stop reason: HOSPADM

## 2021-01-04 RX ORDER — IPRATROPIUM BROMIDE AND ALBUTEROL SULFATE 2.5; .5 MG/3ML; MG/3ML
1 SOLUTION RESPIRATORY (INHALATION) EVERY 4 HOURS PRN
Status: DISCONTINUED | OUTPATIENT
Start: 2021-01-04 | End: 2021-01-04 | Stop reason: HOSPADM

## 2021-01-04 RX ORDER — HEPARIN SODIUM 1000 [USP'U]/ML
INJECTION, SOLUTION INTRAVENOUS; SUBCUTANEOUS PRN
Status: DISCONTINUED | OUTPATIENT
Start: 2021-01-04 | End: 2021-01-04 | Stop reason: SDUPTHER

## 2021-01-04 RX ORDER — MONTELUKAST SODIUM 10 MG/1
10 TABLET ORAL DAILY
Status: DISCONTINUED | OUTPATIENT
Start: 2021-01-04 | End: 2021-01-04 | Stop reason: HOSPADM

## 2021-01-04 RX ORDER — ONDANSETRON 2 MG/ML
INJECTION INTRAMUSCULAR; INTRAVENOUS PRN
Status: DISCONTINUED | OUTPATIENT
Start: 2021-01-04 | End: 2021-01-04 | Stop reason: SDUPTHER

## 2021-01-04 RX ORDER — SODIUM CHLORIDE 9 MG/ML
INJECTION, SOLUTION INTRAVENOUS CONTINUOUS
Status: DISCONTINUED | OUTPATIENT
Start: 2021-01-04 | End: 2021-01-05 | Stop reason: HOSPADM

## 2021-01-04 RX ORDER — ASPIRIN 81 MG/1
81 TABLET, CHEWABLE ORAL DAILY
Status: DISCONTINUED | OUTPATIENT
Start: 2021-01-05 | End: 2021-01-04 | Stop reason: HOSPADM

## 2021-01-04 RX ORDER — METHYLPREDNISOLONE SODIUM SUCCINATE 125 MG/2ML
60 INJECTION, POWDER, LYOPHILIZED, FOR SOLUTION INTRAMUSCULAR; INTRAVENOUS ONCE
Status: DISCONTINUED | OUTPATIENT
Start: 2021-01-04 | End: 2021-01-05 | Stop reason: HOSPADM

## 2021-01-04 RX ORDER — SODIUM CHLORIDE 0.9 % (FLUSH) 0.9 %
10 SYRINGE (ML) INJECTION PRN
Status: DISCONTINUED | OUTPATIENT
Start: 2021-01-04 | End: 2021-01-05 | Stop reason: HOSPADM

## 2021-01-04 RX ORDER — FENTANYL CITRATE 50 UG/ML
INJECTION, SOLUTION INTRAMUSCULAR; INTRAVENOUS PRN
Status: DISCONTINUED | OUTPATIENT
Start: 2021-01-04 | End: 2021-01-04 | Stop reason: SDUPTHER

## 2021-01-04 RX ORDER — SODIUM CHLORIDE 9 MG/ML
INJECTION INTRAVENOUS PRN
Status: DISCONTINUED | OUTPATIENT
Start: 2021-01-04 | End: 2021-01-04 | Stop reason: SDUPTHER

## 2021-01-04 RX ORDER — LEVOTHYROXINE SODIUM 0.05 MG/1
1 TABLET ORAL DAILY
Status: DISCONTINUED | OUTPATIENT
Start: 2021-01-04 | End: 2021-01-04 | Stop reason: HOSPADM

## 2021-01-04 RX ORDER — ACETAMINOPHEN 325 MG/1
650 TABLET ORAL EVERY 4 HOURS PRN
Status: DISCONTINUED | OUTPATIENT
Start: 2021-01-04 | End: 2021-01-04 | Stop reason: HOSPADM

## 2021-01-04 RX ORDER — ONDANSETRON 2 MG/ML
4 INJECTION INTRAMUSCULAR; INTRAVENOUS
Status: DISCONTINUED | OUTPATIENT
Start: 2021-01-04 | End: 2021-01-04

## 2021-01-04 RX ADMIN — HEPARIN SODIUM 3000 UNITS: 1000 INJECTION INTRAVENOUS; SUBCUTANEOUS at 14:49

## 2021-01-04 RX ADMIN — VECURONIUM BROMIDE 5 MG: 1 INJECTION, POWDER, LYOPHILIZED, FOR SOLUTION INTRAVENOUS at 14:32

## 2021-01-04 RX ADMIN — MIDAZOLAM 2 MG: 1 INJECTION INTRAMUSCULAR; INTRAVENOUS at 14:24

## 2021-01-04 RX ADMIN — PROPOFOL 120 MG: 10 INJECTION, EMULSION INTRAVENOUS at 14:29

## 2021-01-04 RX ADMIN — ONDANSETRON 4 MG: 2 INJECTION INTRAMUSCULAR; INTRAVENOUS at 14:34

## 2021-01-04 RX ADMIN — SODIUM CHLORIDE 5 ML: 9 INJECTION INTRAMUSCULAR; INTRAVENOUS; SUBCUTANEOUS at 14:32

## 2021-01-04 RX ADMIN — CEFAZOLIN SODIUM 2 G: 10 INJECTION, POWDER, FOR SOLUTION INTRAVENOUS at 14:24

## 2021-01-04 RX ADMIN — LIDOCAINE HYDROCHLORIDE 60 MG: 20 INJECTION, SOLUTION EPIDURAL; INFILTRATION; INTRACAUDAL; PERINEURAL at 14:29

## 2021-01-04 RX ADMIN — SUGAMMADEX 200 MG: 100 INJECTION, SOLUTION INTRAVENOUS at 15:11

## 2021-01-04 RX ADMIN — PROTAMINE SULFATE 15 MG: 10 INJECTION, SOLUTION INTRAVENOUS at 15:13

## 2021-01-04 RX ADMIN — FENTANYL CITRATE 100 MCG: 50 INJECTION INTRAMUSCULAR; INTRAVENOUS at 14:26

## 2021-01-04 RX ADMIN — HEPARIN SODIUM 3000 UNITS: 1000 INJECTION INTRAVENOUS; SUBCUTANEOUS at 15:03

## 2021-01-04 RX ADMIN — Medication 100 MG: at 14:29

## 2021-01-04 RX ADMIN — PROTAMINE SULFATE 1 MG: 10 INJECTION, SOLUTION INTRAVENOUS at 15:11

## 2021-01-04 RX ADMIN — GLYCOPYRROLATE 0.2 MG: 0.2 INJECTION, SOLUTION INTRAMUSCULAR; INTRAVENOUS at 14:34

## 2021-01-04 RX ADMIN — SODIUM CHLORIDE: 9 INJECTION, SOLUTION INTRAVENOUS at 14:23

## 2021-01-04 RX ADMIN — HEPARIN SODIUM 5000 UNITS: 1000 INJECTION INTRAVENOUS; SUBCUTANEOUS at 14:55

## 2021-01-04 RX ADMIN — DEXAMETHASONE SODIUM PHOSPHATE 8 MG: 4 INJECTION, SOLUTION INTRAMUSCULAR; INTRAVENOUS at 14:34

## 2021-01-04 RX ADMIN — IOPAMIDOL 80 ML: 755 INJECTION, SOLUTION INTRAVENOUS at 15:20

## 2021-01-04 ASSESSMENT — PULMONARY FUNCTION TESTS
PIF_VALUE: 1
PIF_VALUE: 24
PIF_VALUE: 23
PIF_VALUE: 24
PIF_VALUE: 23
PIF_VALUE: 24
PIF_VALUE: 2
PIF_VALUE: 26
PIF_VALUE: 14
PIF_VALUE: 23
PIF_VALUE: 24
PIF_VALUE: 29
PIF_VALUE: 23
PIF_VALUE: 23
PIF_VALUE: 24
PIF_VALUE: 23
PIF_VALUE: 23
PIF_VALUE: 26
PIF_VALUE: 2
PIF_VALUE: 23
PIF_VALUE: 23
PIF_VALUE: 24
PIF_VALUE: 23
PIF_VALUE: 24
PIF_VALUE: 24
PIF_VALUE: 23
PIF_VALUE: 24
PIF_VALUE: 1
PIF_VALUE: 25
PIF_VALUE: 23
PIF_VALUE: 2
PIF_VALUE: 23
PIF_VALUE: 24
PIF_VALUE: 24
PIF_VALUE: 23
PIF_VALUE: 1

## 2021-01-04 ASSESSMENT — ENCOUNTER SYMPTOMS
DYSPNEA ACTIVITY LEVEL: AFTER AMBULATING 1 FLIGHT OF STAIRS
SHORTNESS OF BREATH: 1

## 2021-01-04 ASSESSMENT — COPD QUESTIONNAIRES: CAT_SEVERITY: SEVERE

## 2021-01-04 ASSESSMENT — LIFESTYLE VARIABLES: SMOKING_STATUS: 0

## 2021-01-04 NOTE — ANESTHESIA POSTPROCEDURE EVALUATION
Department of Anesthesiology  Postprocedure Note    Patient: Stephen Whyte  MRN: [id number]  YOB: 1945  Date of evaluation: 1/4/2021  Time:  5:30 PM     Procedure Summary     Date: 01/04/21 Room / [facility]    Anesthesia Start: 1424 Anesthesia Stop: 8710    Procedure: WATCHMAN Diagnosis:       Presence of Watchman left atrial appendage closure device      Atrial fibrillation ([facility]    Scheduled Providers:  Responsible Provider: Maribel Arias MD    Anesthesia Type: general ASA Status: 3          Anesthesia Type: general    Adriana Phase I: Adriana Score: 9    Adriana Phase II:      Last vitals: Reviewed and per EMR flowsheets.        Anesthesia Post Evaluation    Patient location during evaluation: PACU  Patient participation: complete - patient participated  Level of consciousness: awake and alert  Pain score: 0  Airway patency: patent  Nausea & Vomiting: no nausea and no vomiting  Complications: no  Cardiovascular status: blood pressure returned to baseline  Respiratory status: acceptable  Hydration status: euvolemic

## 2021-01-04 NOTE — H&P
denies any symptoms of chest pain, pressure, tightness, shortness of breath at rest,  nausea, vomiting, near syncope, syncope, heart racing, palpitations, dizziness, lightheaded, wheezing, diaphoresis, BLE edema, bilateral lower extremities pain, cramping or fatigue.            Past Medical History:   has a past medical history of CAD (coronary artery disease), CHF (congestive heart failure) ([facility] Colostomy care ([facility] COPD (chronic obstructive pulmonary disease) ([facility] Hyperlipidemia, Hypertension, Kidney disease, Peripheral vascular disease ([facility] Rectal fissure, Restless legs syndrome, Sleep apnea, and Unspecified sleep apnea.     Surgical History:   has a past surgical history that includes Cholecystectomy; hernia repair; Coronary artery bypass graft; Cardiac catheterization (09/13/2017); colostomy (03/09/2018); and Upper gastrointestinal endoscopy (N/A, 9/30/2019).    Social History:   reports that she quit smoking about 28 years ago. She started smoking about 57 years ago. She has a 90.00 pack-year smoking history. She has never used smokeless tobacco. She reports that she does not drink alcohol or use drugs.      Family History:  family history includes Cancer in her brother; Heart Disease in her father, mother, and sister.     Home Medications:  Were reviewed and are listed in nursing record and/or below  Home Medications           Prior to Admission medications    Medication Sig Start Date End Date Taking?  Authorizing Provider   budesonide-formoterol (SYMBICORT) 160-4.5 MCG/ACT AERO Inhale 2 puffs into the lungs 2 times daily 2/21/20   Yes Evelyn Hong DO   traZODone (DESYREL) 50 MG tablet Take 1 tablet by mouth nightly as needed for Sleep 2/21/20   Yes Evelyn Hong DO   cloNIDine (CATAPRES) 0.2 MG tablet Take 1 tablet by mouth 3 times daily 2/21/20   Yes Evelyn Hong DO   ferrous sulfate 325 (65 Fe) MG tablet Take 1 tablet by mouth 2 times daily (with meals) 2/21/20   Yes Evelyn Hong DO   diclofenac scleral icterus. · ENT: No Headaches, hearing loss or vertigo. No mouth sores or sore throat. · Cardiovascular: No Chest pain, tightness or discomfort. · No Shortness of breath. +Dyspnea on exertion (O2 per NC in place), no Orthopnea, Paroxysmal nocturnal dyspnea or breathlessness at rest.  · No Palpitations. · No Syncope ('blackouts', 'faints', 'collapse') or dizziness. · Respiratory: No cough or wheezing, no sputum production. No hematemesis. · Gastrointestinal: No abdominal pain, appetite loss, blood in stools. No change in bowel or bladder habits. · Genitourinary: No dysuria, trouble voiding, or hematuria. · Musculoskeletal:  No gait disturbance, no joint complaints. · Integumentary: No rash or pruritis. · Neurological: No headache, diplopia, change in muscle strength, numbness or tingling. · Psychiatric: No anxiety or depression. · Endocrine: No temperature intolerance. No excessive thirst, fluid intake, or urination. No tremor. · Hematologic/Lymphatic: No abnormal bruising or bleeding, blood clots or swollen lymph nodes. · Allergic/Immunologic: No nasal congestion or hives.        Objective: all reveiwed       PHYSICAL EXAM:      There were no vitals filed for this visit.       Vitals       Vitals:     02/25/20 1200   BP: 138/64   Site: Left Upper Arm   Position: Sitting   Pulse: 60   SpO2: 90%   Weight: 176 lb (79.8 kg)   Height: 5' (1.524 m)                           General Appearance:  Alert, cooperative, no distress, appears stated age. Head:  Normocephalic, without obvious abnormality, atraumatic. Eyes:  Pupils equal and round. No scleral icterus. Mouth: Moist mucosa, no pharyngeal erythema. Nose: Nares normal. No drainage or sinus tenderness. Neck: Supple, symmetrical, trachea midline. No adenopathy. No tenderness/mass/nodules. No carotid bruit or elevated JVD.     Lungs:   Respiratory Effort: Normal   Auscultation: Clear to auscultation bilaterally, respirations unlabored. No wheeze, rales,  O2 in place per NC. Chest Wall:  No tenderness or deformity. Cardiovascular:     Pulses  Palpation: normal   Ascultation: Regular rate, S1/ S2 normal. No murmur, rub, or gallop. 2+ radial and pedal pulses, symmetric  Carotid  Femoral    Abdomen and Gastrointestinal:   Soft, non-tender, bowel sounds active. Liver and Spleen  Masses    Musculoskeletal: No muscle wasting  Back  Gait    Extremities: Extremities normal, atraumatic. No cyanosis or edema. No cyanosis clubbing           Skin: Inspection and palpation performed, no rashes or lesions. Pysch: Normal mood and affect.  Alert and oriented to time place person    Neurologic: Normal gross motor and sensory exam.        Labs: all labs have been reviewed             Lab Results   Component Value Date     WBC 10.8 2020     RBC 3.60 2020     HGB 9.4 2020     HCT 29.6 2020     MCV 82.1 2020     RDW 23.3 2020      2020            Lab Results   Component Value Date      2020     K 3.6 2020     K 3.8 2020     CL 93 2020     CO2 35 2020     BUN 56 2020     CREATININE 1.3 2020     GFRAA 49 2020     AGRATIO 1.6 11/15/2019     LABGLOM 40 2020     GLUCOSE 141 2020     PROT 6.2 11/15/2019     CALCIUM 9.2 2020     BILITOT <0.2 11/15/2019     ALKPHOS 39 11/15/2019     AST 15 11/15/2019     ALT 15 11/15/2019      No results found for: PTINR        Lab Results   Component Value Date     LABA1C 5.5 10/11/2019            Lab Results   Component Value Date     CKTOTAL 43 2019     TROPONINI <0.01 2020         Cardiac, Vascular and Imaging Data: All Personally Reviewed in Detail by Myself       EK19 SR, 89 bpm   20 none      Echocardiogram: 10/27/18  Summary   Left ventricular cavity size is small.   There is mildly increased left ventricular wall thickness.   Overall left ventricular systolic procedure was discussed in great length. All alternatives to left atrial appendage closure were discussed. I explained any risk of bleeding, pericardial effusion and tamponade, perforation of the vessel, stroke, myocardial infarction or death. Discussed in depth risk of pericardial effusion, pericardial tamponade, device dislodgement and embolization and need for sternotomy and cardiac surgery. I have discussed and explained the nature, purpose, benefits, risks and alternatives to the planned procedure. The patient and family members understood the risk and benefits and the details of procedure and would like to go ahead with the procedure. The patient gave informed consent. The patient has granted written consent to the procedure(s) and wishes to proceed.       Thank you for allowing us to participate in the care of [place] [address].   Please do not hesitate to contact me if you have any questions.     Emely Koroma MD, MPH

## 2021-01-04 NOTE — ANESTHESIA PRE PROCEDURE
Department of Anesthesiology  Preprocedure Note       Name:  Shabana Stephenson   Age:  70 y.o.  :  1945                                          MRN:  [id number]         Date:  2021      Surgeon: * No surgeons listed *    Procedure:     Medications prior to admission:   Prior to Admission medications    Medication Sig Start Date End Date Taking? Authorizing Provider   LORazepam (ATIVAN) 1 MG tablet TAKE ONE TABLET BY MOUTH DAILY AS NEEDED FOR ANXIETY 20  Lesley Webber, MD   mineral oil-hydrophilic petrolatum (HYDROPHOR) ointment Apply topically as needed.  20   Lesley Webber, MD   carvedilol (COREG) 12.5 MG tablet Take 1 tablet by mouth 2 times daily (with meals) 20   Lesley Webber, MD   ipratropium-albuterol (DUONEB) 0.5-2.5 (3) MG/3ML SOLN nebulizer solution Inhale 3 mLs into the lungs every 4 hours as needed for Shortness of Breath 20   Riya Prior, MD   fluticasone-vilanterol (BREO ELLIPTA) 100-25 MCG/INH AEPB inhaler Inhale 1 puff into the lungs daily 20   Riya Prior, MD   tiotropium (SPIRIVA RESPIMAT) 2.5 MCG/ACT AERS inhaler Inhale 2 puffs into the lungs daily 20   Riya Prior, MD   albuterol sulfate HFA (VENTOLIN HFA) 108 (90 Base) MCG/ACT inhaler Inhale 2 puffs into the lungs every 4 hours as needed for Wheezing or Shortness of Breath 20   Riya Prior, MD   traZODone (DESYREL) 50 MG tablet Take 1 tablet by mouth nightly as needed for Sleep 10/19/20   Lesley Webber, MD   lidocaine (XYLOCAINE) 2 % jelly Apply small amount topically to affected area every 8 hours when necessary pain 10/19/20   Lesley Webber, MD   diclofenac sodium (VOLTAREN) 1 % GEL Apply 2 g topically 4 times daily 10/19/20   Lesley Webber, MD   apixaban [facility]) 5 MG TABS tablet Take 1 tablet by mouth 2 times daily 20   Lesley Webber MD   citalopram (CELEXA) 40 MG tablet TAKE ONE TABLET BY MOUTH DAILY 20   Lesley Webber MD   furosemide (LASIX) 40 MG tablet Take 1 tablet by mouth 2 times daily 8/26/20   Deliciana Lansing Lawanda, MD   hydrALAZINE (APRESOLINE) 100 MG tablet TAKE ONE TABLET BY MOUTH THREE TIMES A DAY 8/26/20   Deliciana Lansing Lawanda, MD   lansoprazole (PREVACID) 15 MG delayed release capsule Take 1 capsule by mouth daily 8/26/20   Delicia Fabrizio Day, MD   levothyroxine (SYNTHROID) 50 MCG tablet TAKE ONE TABLET BY MOUTH DAILY 8/26/20   Delicia Fabrizio Day, MD   montelukast (SINGULAIR) 10 MG tablet Take 1 tablet by mouth daily 8/26/20   Delicia Madill Day, MD   atorvastatin (LIPITOR) 40 MG tablet TAKE ONE TABLET BY MOUTH DAILY 7/15/20   Delicia Fabrizio Day, MD   SPIRIVA RESPIMAT 2.5 MCG/ACT AERS inhaler INHALE TWO PUFFS BY MOUTH DAILY 5/27/20   Diane Terrell MD   budesonide-formoterol [facility]) 160-4.5 MCG/ACT AERO Inhale 2 puffs into the lungs 2 times daily 2/21/20   Evelyn Hong DO   cloNIDine (CATAPRES) 0.2 MG tablet Take 1 tablet by mouth 3 times daily  Patient taking differently: Take 0.2 mg by mouth 3 times daily Patient said she is only taking twice a day 2/21/20   Evelyn Hong DO   butalbital-acetaminophen-caffeine (FIORICET, ESGIC) -40 MG per tablet Take 1 tablet by mouth every 6 hours as needed for Headaches 10/17/19   HENRIQUE Cordova DO   aspirin 81 MG tablet Take 1 tablet by mouth daily 7/26/19   Suresh Parks MD       Current medications:    No current facility-administered medications for this visit. No current outpatient medications on file.      Facility-Administered Medications Ordered in Other Visits   Medication Dose Route Frequency Provider Last Rate Last Admin    sodium chloride flush 0.9 % injection 10 mL  10 mL Intravenous PRN Johanny Leary MD        0.9 % sodium chloride infusion   Intravenous Continuous Johanny Leary MD        ceFAZolin (ANCEF) 2 g in dextrose 5 % 100 mL IVPB  2 g Intravenous On Call to Abigail Ambrocio MD        famotidine (PEPCID) injection 20 mg  20 mg Intravenous Once Johanny Leary MD        diphenhydrAMINE (BENADRYL) injection 25 mg  25 mg Intravenous Once Johanny Leary MD  methylPREDNISolone sodium (SOLU-MEDROL) injection 60 mg  60 mg Intravenous Once Rozina Amaya MD           Allergies:     Allergies   Allergen Reactions    Iv Dye [Iodides]      Flushed, broke out and difficulty breathing       Problem List:    Patient Active Problem List   Diagnosis Code    Fracture, metacarpal, neck S62.339A    PAD (peripheral artery disease) ([facility] I73.9    CAD (coronary artery disease) I25.10    HTN (hypertension) F91    Diastolic dysfunction K43.03    RLS (restless legs syndrome) G25.81    History of tobacco use Z87.891    Centrilobular emphysema ([facility]) J43.2    Colovesical fistula N32.1    Diverticulitis large intestine w/o perforation or abscess w/bleeding K57.33    Large bowel obstruction ([address] K56.609    ANGIE treated with BiPAP G47.33    CKD (chronic kidney disease), stage III - sees Dr. Dameon Hoang N18.30    COPD, severe ([address] J44.9    Colonic obstruction ([facility] K56.609    Carotid artery stenosis without cerebral infarction, bilateral I65.23    Colostomy care ([facility] [address] Z43.3    Dyspnea and respiratory abnormalities R06.00, R06.89    Hyperlipidemia E78.5    Paroxysmal atrial fibrillation ([facility]) I48.0    Hypothyroidism, unspecified E03.9    Chest pain R07.9    Symptomatic anemia D64.9    Anxiety F41.9    History of GI bleed Z87.19    Iron deficiency anemia due to chronic blood loss D50.0    Presence of Watchman left atrial appendage closure device Z95.818       Past Medical History:        Diagnosis Date    CAD (coronary artery disease)     Nonobstructive on cath in 9/2017    CHF (congestive heart failure) ([facility])     Colostomy care ([facility] [address] 4/25/2018    Peristomal irritation and early leaking    COPD (chronic obstructive pulmonary disease) ([facility])     Hyperlipidemia     Hypertension     Kidney disease     Stage 3    Peripheral vascular disease ([address]     Rectal fissure 11/2014    surgery pending    Restless legs syndrome     Sleep apnea     O2 3 liters at hs    Unspecified sleep apnea        Past Surgical History:        Procedure Laterality Date    CARDIAC CATHETERIZATION  2017    Dr. Haley Serna  2018    CORONARY ARTERY BYPASS GRAFT      Legs & Carotid    HERNIA REPAIR      UPPER GASTROINTESTINAL ENDOSCOPY N/A 2019    EGD DIAGNOSTIC ONLY performed by Abdullahi Singh MD at [address] History:    Social History     Tobacco Use    Smoking status: Former Smoker     Packs/day: 3.00     Years: 30.00     Pack years: 90.00     Start date: 1963     Quit date: 1992     Years since quittin.9    Smokeless tobacco: Never Used    Tobacco comment: QUIT 21 YRS AGO   Substance Use Topics    Alcohol use: No     Alcohol/week: 0.0 standard drinks                                Counseling given: Not Answered  Comment: QUIT 20 YRS AGO      Vital Signs (Current): There were no vitals filed for this visit.                                            BP Readings from Last 3 Encounters:   20 (!) 204/77   20 130/60   20 138/60       NPO Status:                                                                                 BMI:   Wt Readings from Last 3 Encounters:   20 172 lb (78 kg)   20 172 lb 9.6 oz (78.3 kg)   20 173 lb (78.5 kg)     There is no height or weight on file to calculate BMI.    CBC:   Lab Results   Component Value Date    WBC 7.1 2021    RBC 3.72 2021    HGB 9.2 2021    HCT 29.1 2021    MCV 78.1 2021    RDW 17.8 2021     2021       CMP:   Lab Results   Component Value Date     2021    K 4.3 2021    K 3.8 2020    CL 95 2021    CO2 32 2021    BUN 17 2021    CREATININE 1.0 2021    GFRAA >60 2021    AGRATIO 1.5 10/30/2020    LABGLOM 54 2021    GLUCOSE 104 2021    PROT 6.6 10/30/2020    CALCIUM 9.0 2021    BILITOT <0.2 10/30/2020    ALKPHOS 46 10/30/2020    AST 17 10/30/2020    ALT 16 10/30/2020       POC Tests: No results for input(s): POCGLU, POCNA, POCK, POCCL, POCBUN, POCHEMO, POCHCT in the last 72 hours.     Coags:   Lab Results   Component Value Date    PROTIME 15.6 09/25/2019    INR 1.37 09/25/2019    APTT 29.2 03/02/2018       HCG (If Applicable):   Lab Results   Component Value Date    PREGTESTUR Negative 07/28/2015        ABGs:   Lab Results   Component Value Date    PHART 7.404 02/16/2020    PO2ART 108.0 02/16/2020    CUT2BFN 65.9 02/16/2020    ISP6RHQ 40.4 02/16/2020    BEART 14.2 02/16/2020    G9UNNUJH 98.9 02/16/2020        Type & Screen (If Applicable):  No results found for: LABABO, LABRH    Drug/Infectious Status (If Applicable):  No results found for: HIV, HEPCAB    COVID-19 Screening (If Applicable):   Lab Results   Component Value Date    COVID19 Not Detected 01/04/2021    COVID19 NOT DETECTED 12/03/2020         Anesthesia Evaluation  Patient summary reviewed and Nursing notes reviewed no history of anesthetic complications:   Airway: Mallampati: II  TM distance: >3 FB   Neck ROM: full  Mouth opening: > = 3 FB Dental:      Comment: No loose teeth    Pulmonary:   (+) COPD (FEV1 0.92L): severe,  shortness of breath:  sleep apnea:  decreased breath sounds,      (-) pneumonia, asthma, recent URI and not a current smoker                          ROS comment: Chronic hypoxic respiratory failure    90 pack year history    On home O2 24/7   Cardiovascular:  Exercise tolerance: poor (<4 METS),   (+) hypertension:, CAD:, CHF:, SCOTT: after ambulating 1 flight of stairs, hyperlipidemia    (-) pacemaker, valvular problems/murmurs, past MI, CABG/stent, dysrhythmias,  angina, orthopnea and PND      Rhythm: irregular                      Neuro/Psych:      (-) seizures, neuromuscular disease, TIA, CVA, headaches, psychiatric history and depression/anxiety            GI/Hepatic/Renal:        (-) hiatal hernia, GERD, PUD, hepatitis, liver disease, no renal disease, bowel prep and no morbid obesity       Endo/Other:    (+) hypothyroidism::., no malignancy/cancer. (-) diabetes mellitus, hyperthyroidism, blood dyscrasia, arthritis, no electrolyte abnormalities, no malignancy/cancer               Abdominal:           Vascular:   + PVD, aortic or cerebral, .  - DVT. Anesthesia Plan      general     ASA 3       Induction: intravenous. MIPS: Prophylactic antiemetics administered. Anesthetic plan and risks discussed with patient. Plan discussed with CRNA. Asael Woodruff MD   1/4/2021      This pre-anesthesia assessment may be used as a history and physical.    DOS STAFF ADDENDUM:    Pt seen and examined, chart reviewed (including anesthesia, drug and allergy history). No interval changes to history and physical examination. Anesthetic plan, risks, benefits, alternatives, and personnel involved discussed with patient. Patient verbalized an understanding and agrees to proceed.       Asael Woodruff MD  January 4, 2021  11:59 AM

## 2021-01-04 NOTE — TELEPHONE ENCOUNTER
1/4/2021 s/p SUCCESSFUL WATCHMAN LAAC PROCEDURE PER DR. Adilene Willingham  successful deployment of left atrial appendage occlusion device with no complication, WATCHMAN device used 27  mm size. --2 week f/u with MILDRED Duvall on 1/19/2021 at 1 PM  --6 month f/u with MILDRED Duvall on 7/6/2021 at 1 PM   --1 year f/u with unable to schedule d/t calendar doesn't go into 1/2022  All will print on discharge after visit summary. --[place],   Post procedure MIKO to be completed 45-59 days post procedure (2/18/2021-3/4/2021). Order placed. Thanks.    Giancarlo YUN, RN Watchman Coordinator

## 2021-01-04 NOTE — DISCHARGE SUMMARY
earlier than expected and can be discharged today. Patient will follow up in 1-2 weeks and will undergo TTE at 45 days following WATCHMAN implant.     Electronically signed by Zamzam Wick MD on 1/4/2021 at 3:29 PM

## 2021-01-04 NOTE — OP NOTE
Operative Note      Patient: Malia Franco  YOB: 1945  MRN: [id number]    Date of Procedure: 1/4/2021    Pre-Op Diagnosis: * PAFIB *    Post-Op Diagnosis: Same        Estimated Blood Loss (mL): Minimal    Complications: None    Implants:  * No implants in log *      Drains:   Colostomy LUQ Descending/sigmoid (Active)       External Urinary Catheter (Active)         Detailed Description of Procedure:     Procedure Performed by: Johanny Leary MD      Procedures performed:     · Percutaneous transcatheter closure of the left atrial appendage with endocardial implant   · Transeptal Puncture  · MIKO      Indication of procedure:  atrial fibrillation, CVA,  High Risk for bleeding  Bleeding Episodes    Patient has been seen by General cardiology and shared decision making has been made for pursuing PEG closure. Patient here for PEG closure with Watchman device. Details of procedure: The patient was brought to the electrophysiology laboratory in stable condition. The patient was in a fasting and non-sedated state. The risks, benefits and alternatives of the procedure were discussed with the patient. The risks including, but not limited to, the risks of vascular injury, bleeding, infection, device malfunction, lead dislodgement, radiation exposure, injury to cardiac and surrounding structures (including pneumothorax), stroke, myocardial infarction and death were discussed in detail. The patient opted to proceed with the device implantation. Written informed consent was signed and placed in the chart. Prophylactic antibiotic was given. The patient was prepped and draped in a sterile fashion. A timeout protocol was completed to identify the patient and the procedure being performed.  Patient underwent general anesthesia by anesthesiology team.       Transesophageal echocardiography was performed and measurements of left atrial appendage, including ostium size and depth were obtained for selection of appropriate watchman device. Decision was made to use a size 27 mm Watchman device based on MIKO measurement. Both groins were prepped in a sterile fashion. We gained access in both femoral veins. Right femoral access was obtained using modified using modified seldinger technique. Patient received a bolus of heparin prior transseptal puncture. Transseptal punctures through intact septum were done using MIKO guidance as well as pressure monitoring , and fluoroscopy in [place] and MEEKS projections. We placed one SL-1 Sheaths inside the left atrium. A long wire was placed into the left superior pulmonary vein. Then the SL sheath was exchanged over the wire with double curve watchman access sheath. Then a pigtail catheter was inserted into the access sheath and was placed inside the left atrial appendage. Angiography of PEG was performed. Pigtail catheter was removed. Then Watchman delivery sheath was inserted into the access sheath. Using fluoroscopy and live MIKO the anterior lobe of the appendage was located and delivery sheath was advanced into this lobe. Then device was implanted into the appendage under fluoroscopy and live MIKO imaging. It was noted that the device had a bit of shoulder in the inferior portion. ATug test was done multiple times to insure device stability   MIKO guidance and 3D guidance revealed device to be well seated did not correct the position. After deployment a tug test was done and stability of device was checked. Position of device was checked. Measurement of device showed appropriate compression of the device. Using color Doppler, no leak around the was noted. PASS criteria for releasing of device were met and device was released. A figure of 8 was used to achieve hemostasis. Patient was extubated and transferred to the floor. No immediate complications noted.        Assessment and Summary:    successful deployment of left atrial appendage occlusion device with no complication    WATCHMAN device used 27 mm size     Angiogram revealed good seal and no thrombus     MIKO confirmed placement and seal    EBL Less than 30 mL  No complications      Plan:     The patient will be admitted and have usual post care  Start anticoagulation  Start ASA  Echocardiogram tomorrow   Patient is participating in the left atrial appendage occlusion/closure registry. [address] is approved by the [employer] for Medicare and Medicaid Services (CMS) to meet the registry requirements outlined in the national coverage decisions for Percutaneous Left Atrial Appendage Closure     Thank you for letting me participate in this patient's care and please do not hesitate to call me with any questions or concerns.      Neema Torrez MD, MPH     [place] [address]  Ph: [phone]  Fax: [phone]    Electronically signed by Neema Torrez MD on 1/4/2021 at 3:25 PM

## 2021-01-04 NOTE — PROGRESS NOTES
PACU Transfer Note    Vitals:    01/04/21 1615   BP: (!) 150/53   Pulse: 67   Resp: 17   Temp: 97.5 °F (36.4 °C)   SpO2: 91%   BP stable; patient wears O2 at home.     In: 600 [I.V.:600]  Out: -     Pain assessment:  none       Report given to Receiving unit RN.    1/4/2021 4:20 PM

## 2021-01-05 ENCOUNTER — APPOINTMENT (OUTPATIENT)
Dept: GENERAL RADIOLOGY | Age: 76
DRG: 175 | End: 2021-01-05
Payer: COMMERCIAL

## 2021-01-05 ENCOUNTER — HOSPITAL ENCOUNTER (INPATIENT)
Age: 76
LOS: 1 days | Discharge: HOME HEALTH | DRG: 175 | End: 2021-01-06
Attending: EMERGENCY MEDICINE | Admitting: INTERNAL MEDICINE
Payer: COMMERCIAL

## 2021-01-05 DIAGNOSIS — G47.33 OSA TREATED WITH BIPAP: ICD-10-CM

## 2021-01-05 DIAGNOSIS — J96.01 ACUTE RESPIRATORY FAILURE WITH HYPOXIA (HCC): Primary | ICD-10-CM

## 2021-01-05 DIAGNOSIS — J81.0 FLASH PULMONARY EDEMA (HCC): ICD-10-CM

## 2021-01-05 DIAGNOSIS — I16.0 HYPERTENSIVE URGENCY: ICD-10-CM

## 2021-01-05 DIAGNOSIS — J81.0 ACUTE PULMONARY EDEMA (HCC): ICD-10-CM

## 2021-01-05 LAB
ANION GAP SERPL CALCULATED.3IONS-SCNC: 13 MMOL/L (ref 3–16)
BASOPHILS ABSOLUTE: 0 K/UL (ref 0–0.2)
BASOPHILS RELATIVE PERCENT: 0.2 %
BUN BLDV-MCNC: 17 MG/DL (ref 7–20)
CALCIUM SERPL-MCNC: 9.2 MG/DL (ref 8.3–10.6)
CHLORIDE BLD-SCNC: 98 MMOL/L (ref 99–110)
CO2: 30 MMOL/L (ref 21–32)
CREAT SERPL-MCNC: 1 MG/DL (ref 0.6–1.2)
EKG ATRIAL RATE: 94 BPM
EKG ATRIAL RATE: 95 BPM
EKG DIAGNOSIS: NORMAL
EKG DIAGNOSIS: NORMAL
EKG P AXIS: 36 DEGREES
EKG P AXIS: 52 DEGREES
EKG P-R INTERVAL: 122 MS
EKG P-R INTERVAL: 128 MS
EKG Q-T INTERVAL: 404 MS
EKG Q-T INTERVAL: 418 MS
EKG QRS DURATION: 92 MS
EKG QRS DURATION: 92 MS
EKG QTC CALCULATION (BAZETT): 505 MS
EKG QTC CALCULATION (BAZETT): 525 MS
EKG R AXIS: 59 DEGREES
EKG R AXIS: 62 DEGREES
EKG T AXIS: -40 DEGREES
EKG T AXIS: -41 DEGREES
EKG VENTRICULAR RATE: 94 BPM
EKG VENTRICULAR RATE: 95 BPM
EOSINOPHILS ABSOLUTE: 0 K/UL (ref 0–0.6)
EOSINOPHILS RELATIVE PERCENT: 0 %
GFR AFRICAN AMERICAN: >60
GFR NON-AFRICAN AMERICAN: 54
GLUCOSE BLD-MCNC: 181 MG/DL (ref 70–99)
HCT VFR BLD CALC: 31 % (ref 36–48)
HEMOGLOBIN: 9.6 G/DL (ref 12–16)
LYMPHOCYTES ABSOLUTE: 0.4 K/UL (ref 1–5.1)
LYMPHOCYTES RELATIVE PERCENT: 3.8 %
MCH RBC QN AUTO: 24.2 PG (ref 26–34)
MCHC RBC AUTO-ENTMCNC: 31 G/DL (ref 31–36)
MCV RBC AUTO: 77.9 FL (ref 80–100)
MONOCYTES ABSOLUTE: 0.3 K/UL (ref 0–1.3)
MONOCYTES RELATIVE PERCENT: 2.7 %
NEUTROPHILS ABSOLUTE: 11.1 K/UL (ref 1.7–7.7)
NEUTROPHILS RELATIVE PERCENT: 93.3 %
PDW BLD-RTO: 17.7 % (ref 12.4–15.4)
PLATELET # BLD: 298 K/UL (ref 135–450)
PMV BLD AUTO: 8.4 FL (ref 5–10.5)
POTASSIUM REFLEX MAGNESIUM: 3.9 MMOL/L (ref 3.5–5.1)
PRO-BNP: 3006 PG/ML (ref 0–124)
RBC # BLD: 3.98 M/UL (ref 4–5.2)
SODIUM BLD-SCNC: 141 MMOL/L (ref 136–145)
TROPONIN: 0.02 NG/ML
WBC # BLD: 11.9 K/UL (ref 4–11)

## 2021-01-05 PROCEDURE — 2700000000 HC OXYGEN THERAPY PER DAY

## 2021-01-05 PROCEDURE — 93005 ELECTROCARDIOGRAM TRACING: CPT | Performed by: EMERGENCY MEDICINE

## 2021-01-05 PROCEDURE — 6370000000 HC RX 637 (ALT 250 FOR IP): Performed by: EMERGENCY MEDICINE

## 2021-01-05 PROCEDURE — 71045 X-RAY EXAM CHEST 1 VIEW: CPT

## 2021-01-05 PROCEDURE — 99284 EMERGENCY DEPT VISIT MOD MDM: CPT

## 2021-01-05 PROCEDURE — 94761 N-INVAS EAR/PLS OXIMETRY MLT: CPT

## 2021-01-05 PROCEDURE — 94640 AIRWAY INHALATION TREATMENT: CPT

## 2021-01-05 PROCEDURE — 83880 ASSAY OF NATRIURETIC PEPTIDE: CPT

## 2021-01-05 PROCEDURE — 6360000002 HC RX W HCPCS: Performed by: INTERNAL MEDICINE

## 2021-01-05 PROCEDURE — 6370000000 HC RX 637 (ALT 250 FOR IP): Performed by: INTERNAL MEDICINE

## 2021-01-05 PROCEDURE — 84484 ASSAY OF TROPONIN QUANT: CPT

## 2021-01-05 PROCEDURE — 85025 COMPLETE CBC W/AUTO DIFF WBC: CPT

## 2021-01-05 PROCEDURE — 94660 CPAP INITIATION&MGMT: CPT

## 2021-01-05 PROCEDURE — 93010 ELECTROCARDIOGRAM REPORT: CPT | Performed by: INTERNAL MEDICINE

## 2021-01-05 PROCEDURE — 2580000003 HC RX 258: Performed by: INTERNAL MEDICINE

## 2021-01-05 PROCEDURE — 2060000000 HC ICU INTERMEDIATE R&B

## 2021-01-05 PROCEDURE — 80048 BASIC METABOLIC PNL TOTAL CA: CPT

## 2021-01-05 RX ORDER — IPRATROPIUM BROMIDE AND ALBUTEROL SULFATE 2.5; .5 MG/3ML; MG/3ML
1 SOLUTION RESPIRATORY (INHALATION) EVERY 4 HOURS PRN
Status: DISCONTINUED | OUTPATIENT
Start: 2021-01-05 | End: 2021-01-06 | Stop reason: HOSPADM

## 2021-01-05 RX ORDER — ACETAMINOPHEN 325 MG/1
650 TABLET ORAL EVERY 4 HOURS PRN
Status: DISCONTINUED | OUTPATIENT
Start: 2021-01-05 | End: 2021-01-06 | Stop reason: HOSPADM

## 2021-01-05 RX ORDER — CARVEDILOL 12.5 MG/1
12.5 TABLET ORAL 2 TIMES DAILY WITH MEALS
Status: DISCONTINUED | OUTPATIENT
Start: 2021-01-05 | End: 2021-01-06 | Stop reason: HOSPADM

## 2021-01-05 RX ORDER — ONDANSETRON 2 MG/ML
4 INJECTION INTRAMUSCULAR; INTRAVENOUS EVERY 4 HOURS PRN
Status: DISCONTINUED | OUTPATIENT
Start: 2021-01-05 | End: 2021-01-06 | Stop reason: HOSPADM

## 2021-01-05 RX ORDER — SODIUM CHLORIDE 0.9 % (FLUSH) 0.9 %
10 SYRINGE (ML) INJECTION EVERY 12 HOURS SCHEDULED
Status: DISCONTINUED | OUTPATIENT
Start: 2021-01-05 | End: 2021-01-06 | Stop reason: HOSPADM

## 2021-01-05 RX ORDER — POLYETHYLENE GLYCOL 3350 17 G/17G
17 POWDER, FOR SOLUTION ORAL DAILY PRN
Status: DISCONTINUED | OUTPATIENT
Start: 2021-01-05 | End: 2021-01-06 | Stop reason: HOSPADM

## 2021-01-05 RX ORDER — FUROSEMIDE 40 MG/1
40 TABLET ORAL 2 TIMES DAILY
Status: CANCELLED | OUTPATIENT
Start: 2021-01-05

## 2021-01-05 RX ORDER — MONTELUKAST SODIUM 10 MG/1
10 TABLET ORAL DAILY
Status: DISCONTINUED | OUTPATIENT
Start: 2021-01-05 | End: 2021-01-06 | Stop reason: HOSPADM

## 2021-01-05 RX ORDER — SODIUM CHLORIDE 0.9 % (FLUSH) 0.9 %
10 SYRINGE (ML) INJECTION PRN
Status: DISCONTINUED | OUTPATIENT
Start: 2021-01-05 | End: 2021-01-06 | Stop reason: HOSPADM

## 2021-01-05 RX ORDER — FUROSEMIDE 10 MG/ML
40 INJECTION INTRAMUSCULAR; INTRAVENOUS 2 TIMES DAILY
Status: DISCONTINUED | OUTPATIENT
Start: 2021-01-05 | End: 2021-01-06 | Stop reason: HOSPADM

## 2021-01-05 RX ORDER — ALBUTEROL SULFATE 2.5 MG/3ML
2.5 SOLUTION RESPIRATORY (INHALATION) EVERY 6 HOURS PRN
Status: DISCONTINUED | OUTPATIENT
Start: 2021-01-05 | End: 2021-01-06 | Stop reason: HOSPADM

## 2021-01-05 RX ORDER — CITALOPRAM 20 MG/1
40 TABLET ORAL DAILY
Status: DISCONTINUED | OUTPATIENT
Start: 2021-01-05 | End: 2021-01-06 | Stop reason: HOSPADM

## 2021-01-05 RX ORDER — BUDESONIDE AND FORMOTEROL FUMARATE DIHYDRATE 80; 4.5 UG/1; UG/1
2 AEROSOL RESPIRATORY (INHALATION) 2 TIMES DAILY
Status: DISCONTINUED | OUTPATIENT
Start: 2021-01-05 | End: 2021-01-06 | Stop reason: HOSPADM

## 2021-01-05 RX ORDER — ASPIRIN 81 MG/1
81 TABLET, CHEWABLE ORAL DAILY
Status: DISCONTINUED | OUTPATIENT
Start: 2021-01-05 | End: 2021-01-06 | Stop reason: HOSPADM

## 2021-01-05 RX ORDER — LORAZEPAM 1 MG/1
1 TABLET ORAL DAILY PRN
Status: DISCONTINUED | OUTPATIENT
Start: 2021-01-05 | End: 2021-01-06 | Stop reason: HOSPADM

## 2021-01-05 RX ORDER — CLONIDINE HYDROCHLORIDE 0.1 MG/1
0.2 TABLET ORAL 3 TIMES DAILY
Status: DISCONTINUED | OUTPATIENT
Start: 2021-01-05 | End: 2021-01-06 | Stop reason: HOSPADM

## 2021-01-05 RX ORDER — BUDESONIDE AND FORMOTEROL FUMARATE DIHYDRATE 160; 4.5 UG/1; UG/1
2 AEROSOL RESPIRATORY (INHALATION) 2 TIMES DAILY
Status: DISCONTINUED | OUTPATIENT
Start: 2021-01-05 | End: 2021-01-05

## 2021-01-05 RX ORDER — PANTOPRAZOLE SODIUM 40 MG/1
40 TABLET, DELAYED RELEASE ORAL
Status: DISCONTINUED | OUTPATIENT
Start: 2021-01-06 | End: 2021-01-06 | Stop reason: HOSPADM

## 2021-01-05 RX ORDER — HYDRALAZINE HYDROCHLORIDE 50 MG/1
100 TABLET, FILM COATED ORAL 3 TIMES DAILY
Status: DISCONTINUED | OUTPATIENT
Start: 2021-01-05 | End: 2021-01-06 | Stop reason: HOSPADM

## 2021-01-05 RX ORDER — ATORVASTATIN CALCIUM 40 MG/1
40 TABLET, FILM COATED ORAL DAILY
Status: DISCONTINUED | OUTPATIENT
Start: 2021-01-05 | End: 2021-01-06 | Stop reason: HOSPADM

## 2021-01-05 RX ORDER — ACETAMINOPHEN 650 MG/1
650 SUPPOSITORY RECTAL EVERY 4 HOURS PRN
Status: DISCONTINUED | OUTPATIENT
Start: 2021-01-05 | End: 2021-01-06 | Stop reason: HOSPADM

## 2021-01-05 RX ORDER — LEVOTHYROXINE SODIUM 0.05 MG/1
50 TABLET ORAL DAILY
Status: DISCONTINUED | OUTPATIENT
Start: 2021-01-06 | End: 2021-01-06 | Stop reason: HOSPADM

## 2021-01-05 RX ADMIN — CLONIDINE HYDROCHLORIDE 0.2 MG: 0.1 TABLET ORAL at 20:44

## 2021-01-05 RX ADMIN — MONTELUKAST 10 MG: 10 TABLET, FILM COATED ORAL at 18:38

## 2021-01-05 RX ADMIN — HYDRALAZINE HYDROCHLORIDE 100 MG: 50 TABLET, FILM COATED ORAL at 18:39

## 2021-01-05 RX ADMIN — Medication 10 ML: at 20:45

## 2021-01-05 RX ADMIN — ATORVASTATIN CALCIUM 40 MG: 40 TABLET, FILM COATED ORAL at 18:39

## 2021-01-05 RX ADMIN — CITALOPRAM HYDROBROMIDE 40 MG: 20 TABLET ORAL at 18:39

## 2021-01-05 RX ADMIN — CARVEDILOL 12.5 MG: 12.5 TABLET, FILM COATED ORAL at 18:35

## 2021-01-05 RX ADMIN — APIXABAN 2.5 MG: 2.5 TABLET, FILM COATED ORAL at 20:44

## 2021-01-05 RX ADMIN — NITROGLYCERIN 0.5 INCH: 20 OINTMENT TOPICAL at 08:23

## 2021-01-05 RX ADMIN — CLONIDINE HYDROCHLORIDE 0.2 MG: 0.1 TABLET ORAL at 18:39

## 2021-01-05 RX ADMIN — FUROSEMIDE 40 MG: 10 INJECTION, SOLUTION INTRAMUSCULAR; INTRAVENOUS at 18:36

## 2021-01-05 RX ADMIN — FUROSEMIDE 40 MG: 10 INJECTION, SOLUTION INTRAMUSCULAR; INTRAVENOUS at 10:26

## 2021-01-05 RX ADMIN — ASPIRIN 81 MG: 81 TABLET, CHEWABLE ORAL at 18:39

## 2021-01-05 RX ADMIN — Medication 10 ML: at 15:02

## 2021-01-05 RX ADMIN — BUDESONIDE AND FORMOTEROL FUMARATE DIHYDRATE 2 PUFF: 80; 4.5 AEROSOL RESPIRATORY (INHALATION) at 20:10

## 2021-01-05 RX ADMIN — HYDRALAZINE HYDROCHLORIDE 100 MG: 50 TABLET, FILM COATED ORAL at 20:44

## 2021-01-05 ASSESSMENT — ENCOUNTER SYMPTOMS
CONSTIPATION: 0
EYE ITCHING: 0
SHORTNESS OF BREATH: 1
COLOR CHANGE: 0
VOMITING: 0
EYE DISCHARGE: 0
COUGH: 1
ABDOMINAL PAIN: 0

## 2021-01-05 ASSESSMENT — PAIN SCALES - GENERAL
PAINLEVEL_OUTOF10: 0
PAINLEVEL_OUTOF10: 0

## 2021-01-05 NOTE — ED NOTES
Patient tolerating 6L nasal cannula. No resp. Distress.  spO2 100%     [place] RN  01/05/21 0100

## 2021-01-05 NOTE — PROGRESS NOTES
Medication Reconciliation    List of medications patient is currently taking is complete. Source of information: 1. Conversation with patient at bedside                                      2. EPIC records      Allergies  Iv dye [iodides]     Notes regarding home medications:   1. Patient did not receive any of her home medications prior to arrival to the emergency department today.     Guilherme Cr, PharmD, BCPS  1/5/2021 12:43 PM

## 2021-01-05 NOTE — H&P
Hospital Medicine History & Physical      PCP: Elizabeth Trent MD    Date of Admission: 1/5/2021    Date of Service: Pt seen/examined on 1/5/2021    Chief Complaint:      Chief Complaint   Patient presents with    Shortness of Breath     Pt had a watchman procedure yesterday and states that she's holding onto too much fluid       History Of Present Illness: The patient is a 70 y.o. female with a past medical history of coronary artery disease, diastolic heart failure, COPD, chronic respiratory failure, ANGIE on BiPAP, peripheral vascular disease, hypertension, hyperlipidemia and A. fib who presents to [facility] with worsening shortness of breath and respiratory distress. Patient had a watchman procedure done on 1/4/2021. Patient thinks she might have got IV fluids during the procedure and presents with dyspnea along with swelling of her hands and legs. In the ER she was in respiratory distress and hypoxic needing 3 L of oxygen and was placed on BiPAP.     Past Medical History:        Diagnosis Date    Atrial fibrillation ([address] 1/4/2021    CAD (coronary artery disease)     Nonobstructive on cath in 9/2017    CHF (congestive heart failure) ([address]     Colostomy care ([address] 4/25/2018    Peristomal irritation and early leaking    COPD (chronic obstructive pulmonary disease) (HCC)     Hyperlipidemia     Hypertension     Kidney disease     Stage 3    Peripheral vascular disease ([address]     Rectal fissure 11/2014    surgery pending    Restless legs syndrome     Sleep apnea     O2 3 liters at hs    Unspecified sleep apnea        Past Surgical History:        Procedure Laterality Date    CARDIAC CATHETERIZATION  09/13/2017    Dr. Stephen Selby  03/09/2018    CORONARY ARTERY BYPASS GRAFT      Legs & Carotid    HERNIA REPAIR      UPPER GASTROINTESTINAL ENDOSCOPY N/A 9/30/2019    EGD DIAGNOSTIC ONLY performed by Jad Delaney MD at [address] [place] [address] Prior to Admission:    Prior to Admission medications    Medication Sig Start Date End Date Taking? Authorizing Provider   apixaban (ELIQUIS) 5 MG TABS tablet Take 0.5 tablets by mouth 2 times daily 1/4/21   Howard Lindsey MD   LORazepam (ATIVAN) 1 MG tablet TAKE ONE TABLET BY MOUTH DAILY AS NEEDED FOR ANXIETY 12/9/20 1/8/21  Jovan Core Lawanda, MD   mineral oil-hydrophilic petrolatum (HYDROPHOR) ointment Apply topically as needed.  12/9/20   Jovan Core Day, MD   carvedilol (COREG) 12.5 MG tablet Take 1 tablet by mouth 2 times daily (with meals) 11/12/20   Jovan Core Day, MD   ipratropium-albuterol (DUONEB) 0.5-2.5 (3) MG/3ML SOLN nebulizer solution Inhale 3 mLs into the lungs every 4 hours as needed for Shortness of Breath 11/11/20   Milo Duffy MD   fluticasone-vilanterol (BREO ELLIPTA) 100-25 MCG/INH AEPB inhaler Inhale 1 puff into the lungs daily 11/11/20   Milo Duffy MD   tiotropium (SPIRIVA RESPIMAT) 2.5 MCG/ACT AERS inhaler Inhale 2 puffs into the lungs daily 11/11/20   Milo Duffy MD   albuterol sulfate HFA (VENTOLIN HFA) 108 (90 Base) MCG/ACT inhaler Inhale 2 puffs into the lungs every 4 hours as needed for Wheezing or Shortness of Breath 11/11/20   Milo Duffy MD   traZODone (DESYREL) 50 MG tablet Take 1 tablet by mouth nightly as needed for Sleep 10/19/20   Heath Saini MD   lidocaine (XYLOCAINE) 2 % jelly Apply small amount topically to affected area every 8 hours when necessary pain 10/19/20   Jovan Core Day, MD   diclofenac sodium (VOLTAREN) 1 % GEL Apply 2 g topically 4 times daily 10/19/20   Jovan Core Day, MD   citalopram (CELEXA) 40 MG tablet TAKE ONE TABLET BY MOUTH DAILY 8/26/20   [facility] Core Day, MD   furosemide (LASIX) 40 MG tablet Take 1 tablet by mouth 2 times daily 8/26/20   Jovan Eric Webber MD   hydrALAZINE (APRESOLINE) 100 MG tablet TAKE ONE TABLET BY MOUTH THREE TIMES A DAY 8/26/20   Jovan Webebr MD   lansoprazole (PREVACID) 15 MG delayed release capsule Take 1 capsule by mouth daily 8/26/20   Heath Saini MD levothyroxine (SYNTHROID) 50 MCG tablet TAKE ONE TABLET BY MOUTH DAILY 8/26/20   Humberto Webber MD   montelukast (SINGULAIR) 10 MG tablet Take 1 tablet by mouth daily 8/26/20   Humberto Webber MD   atorvastatin (LIPITOR) 40 MG tablet TAKE ONE TABLET BY MOUTH DAILY 7/15/20   Humberto Webber MD   SPIRIVA RESPIMAT 2.5 MCG/ACT AERS inhaler INHALE TWO PUFFS BY MOUTH DAILY 5/27/20   Margoth Bland MD   budesonide-formoterol [facility]) 160-4.5 MCG/ACT AERO Inhale 2 puffs into the lungs 2 times daily 2/21/20   Evelyn Hong DO   cloNIDine (CATAPRES) 0.2 MG tablet Take 1 tablet by mouth 3 times daily  Patient taking differently: Take 0.2 mg by mouth 3 times daily Patient said she is only taking twice a day 2/21/20   Evelyn Hong DO   butalbital-acetaminophen-caffeine (FIORICET, ESGIC) -40 MG per tablet Take 1 tablet by mouth every 6 hours as needed for Headaches 10/17/19   HENRIQUE Morales,    aspirin 81 MG tablet Take 1 tablet by mouth daily 7/26/19   Suresh Cornell MD       Allergies: Iv dye [iodides]    Social History:       reports that she quit smoking about 28 years ago. She started smoking about 58 years ago. She has a 90.00 pack-year smoking history. She has never used smokeless tobacco. She reports that she does not drink alcohol or use drugs. Family History:  Reviewed in detail and negative for DM, Early CAD, Cancer, CVA. Positive as follows:        Problem Relation Age of Onset    Heart Disease Mother     Heart Disease Father     Heart Disease Sister     Cancer Brother        REVIEW OF SYSTEMS:   Positive review  noted in the HPI. All other systems reviewed and negative.     PHYSICAL EXAM:    BP (!) 191/73   Pulse 57   Temp 98 °F (36.7 °C)   Resp 25   Ht 5' (1.524 m)   Wt 167 lb (75.8 kg)   LMP 05/01/2006 (Approximate)   SpO2 100%   BMI 32.61 kg/m²   General Appearance: alert and oriented to person, place and time, well developed and well- nourished, in no acute distress  Skin: warm and dry, no rash or erythema  Head: normocephalic and atraumatic  Eyes: pupils equal, round, and reactive to light, extraocular eye movements intact, conjunctivae normal  ENT: tympanic membrane, external ear and ear canal normal bilaterally, nose without deformity, nasal mucosa and turbinates normal without polyps  Neck: supple and non-tender without mass, no thyromegaly or thyroid nodules, no cervical lymphadenopathy  Pulmonary/Chest: Few basal crackles. No wheeze  Cardiovascular: normal rate, regular rhythm, normal S1 and S2, no murmurs, rubs, clicks, or gallops, Peripheral pulses good, Cap refill <3 sec, no carotid bruits  Abdomen: soft, non-tender, non-distended, normal bowel sounds, no masses or organomegaly  Extremities: no cyanosis, clubbing , trace edema  Musculoskeletal: normal range of motion, no joint swelling, deformity or tenderness  Neurologic: reflexes normal and symmetric, no cranial nerve deficit, gait, coordination and speech normal      LABS:    CXR:  I have reviewed the CXR with the following interpretation: Pulmonary edema        CBC   Recent Labs     01/04/21  1033 01/04/21  1650 01/05/21  0507   WBC 7.1 7.7 11.9*   HGB 9.2* 9.2* 9.6*   HCT 29.1* 29.1* 31.0*    278 298      RENAL  Recent Labs     01/04/21  1033 01/04/21  1650 01/05/21  0507    141 141   K 4.3 3.9 3.9   CL 95* 99 98*   CO2 32 33* 30   BUN 17 16 17   CREATININE 1.0 0.9 1.0     LFT'S  No results for input(s): AST, ALT, ALB, BILIDIR, BILITOT, ALKPHOS in the last 72 hours. COAG  No results for input(s): INR in the last 72 hours.   CARDIAC ENZYMES  Recent Labs     01/05/21  0507   TROPONINI 0.02*       U/A:    Lab Results   Component Value Date    NITRITE neg 01/28/2020    COLORU YELLOW 01/04/2021    WBCUA 3 01/04/2021    RBCUA 0-2 01/04/2021    MUCUS 1+ 07/28/2015    BACTERIA RARE 12/07/2020    CLARITYU CLOUDY 01/04/2021    SPECGRAV 1.020 01/04/2021    LEUKOCYTESUR Negative 01/04/2021    BLOODU Negative 01/04/2021    GLUCOSEU Negative 01/04/2021    AMORPHOUS 4+ 03/02/2015       ABG    Lab Results   Component Value Date    DVY6DFM 40.4 02/16/2020    BEART 14.2 02/16/2020    U0XVGPOA 98.9 02/16/2020    PHART 7.404 02/16/2020    LEL9KJY 65.9 02/16/2020    PO2ART 108.0 02/16/2020    YYC0PHX 42.4 02/16/2020       UA:  Recent Labs     01/04/21  1050   COLORU YELLOW   PHUR 5.5   WBCUA 3   RBCUA 0-2   CLARITYU CLOUDY*   SPECGRAV 1.020   LEUKOCYTESUR Negative   UROBILINOGEN 1.0   BILIRUBINUR SMALL*   BLOODU Negative   GLUCOSEU Negative   KETUA Negative       Microbiology:  No results for input(s): LABGRAM, LABANAE, ORG, CXSURG in the last 72 hours. Nasal Culture: No results for input(s): ORG, MRSAPCR in the last 72 hours. Blood Culture: No results for input(s): BC, BLOODCULT2, ORG in the last 72 hours. Fungal Culture:   No results for input(s): FUNGSM in the last 72 hours. No results for input(s): FUNCXBLD in the last 72 hours. CSF Culture:  No results for input(s): COLORCSF, APPEARCSF, CFTUBE, CLOTCSF, WBCCSF, RBCCSF, NEUTCSF, NUMCELLSCSF, LYMPHSCSF, MONOCSF, GLUCCSF, VOLCSF in the last 72 hours. Respiratory Culture:  No results for input(s): Wyvonne Costain in the last 72 hours. AFB:No results for input(s): AFBSMEAR in the last 72 hours. Urine Culture  No results for input(s): LABURIN in the last 72 hours. RADIOLOGY:    XR CHEST PORTABLE   Final Result   Pulmonary edema versus atypical pneumonia.              Previous medical records personally reviewed and analyzed         PHYSICIAN CERTIFICATION    I certify that Sarah Owusu is expected to be hospitalized for >2  midnights based on the following assessment and plan:    ASSESSMENT/PLAN:  Active Hospital Problems    Diagnosis Date Noted    Pulmonary edema, acute ([facility] [J81.0] 01/05/2021     Acute on chronic hypoxic respiratory failure  -Secondary to fluid overload  -Patient was placed on BiPAP on admission we will try to wean off BiPAP to home oxygen    Acute on chronic chronic diastolic heart failure  -Continue IV Lasix  -Monitor STEFAN and daily weight  -Cardiology consulted  -Last echo showed a normal EF    History of A. fib now in sinus. S/p watchman procedure  -Continue Eliquis    COPD with chronic respiratory failure  -Needing 2 L oxygen at baseline  -Continue home inhalers    Hypertension  -Blood pressure and continue home meds    Hyperlipidemia  -Continue statin    Hypothyroidism  -Continue home meds    History of chronic anemia    DVT Prophylaxis: Eliquis  Diet: No diet orders on file  Code Status: Prior      Luis Fernando Torres MD  The note was completed using EMR. Every effort was made to ensure accuracy; however, inadvertent computerized transcription errors may be present. Thank you Reginald Rodriguez MD for the opportunity to be involved in this patient's care. If you have any questions or concerns please feel free to contact me at [phone].

## 2021-01-05 NOTE — PROGRESS NOTES
Pt arrived to room 5121, alert and oriented x4. Pt oriented to room, call light, diet, menu, VS protocol. Pt verbalized understanding. Telemetry verified with CMU.   4 Eyes Skin Assessment     NAME:  Jameel Manuel  YOB: 1945  MEDICAL RECORD NUMBER:  [id number]    The patient is being assess for  Admission    I agree that 2 RN's have performed a thorough Head to Toe Skin Assessment on the patient. ALL assessment sites listed below have been assessed. Areas assessed by both nurses:    Head, Face, Ears, Shoulders, Back, Chest, Arms, Elbows, Hands, Sacrum. Buttock, Coccyx, Ischium and Legs. Feet and Heels        Does the Patient have a Wound?  No noted wound(s)       Sander Prevention initiated:  Yes   Wound Care Orders initiated:  No    Pressure Injury (Stage 3,4, Unstageable, DTI, NWPT, and Complex wounds) if present place consult order under [de-identified] NA    New and Established Ostomies if present place consult order under : Yes  colostomy    Nurse 1 eSignature: Electronically signed by Dianelys Jefferson RN on 1/5/21 at 2:35 PM EST    **SHARE this note so that the co-signing nurse is able to place an eSignature**    Nurse 2 eSignature: Electronically signed by Gera Todd RN on 1/5/21 at 2:36 PM EST

## 2021-01-05 NOTE — ED PROVIDER NOTES
[address] [facility]      Pt Name: Wisam Logan  MRN: [id number]  [place] 1945  Date of evaluation: 1/5/2021  Provider: Stuart Sanchez MD    CHIEF COMPLAINT       Chief Complaint   Patient presents with    Shortness of Breath     Pt had a watchman procedure yesterday and states that she's holding onto too much fluid       HISTORY OF PRESENT ILLNESS    Wisam Logan is a 70 y.o. female who presents to the emergency department with shortness of breath. Patient endorses getting the watchman procedure 1 day prior. States they gave her a bunch of fluid and now she think she is fluid overloaded. Patient presents in mild acute respiratory distress. Endorses dry cough. Endorses arm and leg swelling. States this has happened before when she had CHF. No other associated symptoms. No chest pain    Nursing Notes were reviewed. Including nursing noted for FM, Surgical History, Past Medical History, Social History, vitals, and allergies; agree with all. REVIEW OF SYSTEMS       Review of Systems   Constitutional: Negative for diaphoresis and unexpected weight change. HENT: Negative for congestion and dental problem. Eyes: Negative for discharge and itching. Respiratory: Positive for cough and shortness of breath. Cardiovascular: Negative for chest pain and leg swelling. Gastrointestinal: Negative for abdominal pain, constipation and vomiting. Endocrine: Negative for cold intolerance and heat intolerance. Genitourinary: Negative for vaginal bleeding, vaginal discharge and vaginal pain. Musculoskeletal: Negative for neck pain and neck stiffness. Skin: Negative for color change and pallor. Neurological: Negative for tremors and weakness. Psychiatric/Behavioral: Negative for agitation and behavioral problems. Except as noted above the remainder of the review of systems was reviewed and negative.      PAST MEDICAL HISTORY Past Medical History:   Diagnosis Date    Atrial fibrillation ([facility] 1/4/2021    CAD (coronary artery disease)     Nonobstructive on cath in 9/2017    CHF (congestive heart failure) ([facility]     Colostomy care ([facility] 4/25/2018    Peristomal irritation and early leaking    COPD (chronic obstructive pulmonary disease) (HCC)     Hyperlipidemia     Hypertension     Kidney disease     Stage 3    Peripheral vascular disease ([facility]     Rectal fissure 11/2014    surgery pending    Restless legs syndrome     Sleep apnea     O2 3 liters at     Unspecified sleep apnea        SURGICAL HISTORY       Past Surgical History:   Procedure Laterality Date    CARDIAC CATHETERIZATION  09/13/2017    Dr. Yelena Reynoso  03/09/2018    CORONARY ARTERY BYPASS GRAFT      Legs & Carotid    HERNIA REPAIR      UPPER GASTROINTESTINAL ENDOSCOPY N/A 9/30/2019    EGD DIAGNOSTIC ONLY performed by Don Izquierdo MD at [address] [place] [address]       Previous Medications    ALBUTEROL SULFATE HFA (VENTOLIN HFA) 108 (90 BASE) MCG/ACT INHALER    Inhale 2 puffs into the lungs every 4 hours as needed for Wheezing or Shortness of Breath    APIXABAN (ELIQUIS) 5 MG TABS TABLET    Take 0.5 tablets by mouth 2 times daily    ASPIRIN 81 MG TABLET    Take 1 tablet by mouth daily    ATORVASTATIN (LIPITOR) 40 MG TABLET    TAKE ONE TABLET BY MOUTH DAILY    BUDESONIDE-FORMOTEROL (SYMBICORT) 160-4.5 MCG/ACT AERO    Inhale 2 puffs into the lungs 2 times daily    BUTALBITAL-ACETAMINOPHEN-CAFFEINE (FIORICET, ESGIC) -40 MG PER TABLET    Take 1 tablet by mouth every 6 hours as needed for Headaches    CARVEDILOL (COREG) 12.5 MG TABLET    Take 1 tablet by mouth 2 times daily (with meals)    CITALOPRAM (CELEXA) 40 MG TABLET    TAKE ONE TABLET BY MOUTH DAILY    CLONIDINE (CATAPRES) 0.2 MG TABLET    Take 1 tablet by mouth 3 times daily    DICLOFENAC SODIUM (VOLTAREN) 1 % GEL    Apply 2 g topically 4 times daily FLUTICASONE-VILANTEROL (BREO ELLIPTA) 100-25 MCG/INH AEPB INHALER    Inhale 1 puff into the lungs daily    FUROSEMIDE (LASIX) 40 MG TABLET    Take 1 tablet by mouth 2 times daily    HYDRALAZINE (APRESOLINE) 100 MG TABLET    TAKE ONE TABLET BY MOUTH THREE TIMES A DAY    IPRATROPIUM-ALBUTEROL (DUONEB) 0.5-2.5 (3) MG/3ML SOLN NEBULIZER SOLUTION    Inhale 3 mLs into the lungs every 4 hours as needed for Shortness of Breath    LANSOPRAZOLE (PREVACID) 15 MG DELAYED RELEASE CAPSULE    Take 1 capsule by mouth daily    LEVOTHYROXINE (SYNTHROID) 50 MCG TABLET    TAKE ONE TABLET BY MOUTH DAILY    LIDOCAINE (XYLOCAINE) 2 % JELLY    Apply small amount topically to affected area every 8 hours when necessary pain    LORAZEPAM (ATIVAN) 1 MG TABLET    TAKE ONE TABLET BY MOUTH DAILY AS NEEDED FOR ANXIETY    MINERAL OIL-HYDROPHILIC PETROLATUM (HYDROPHOR) OINTMENT    Apply topically as needed.     MONTELUKAST (SINGULAIR) 10 MG TABLET    Take 1 tablet by mouth daily    SPIRIVA RESPIMAT 2.5 MCG/ACT AERS INHALER    INHALE TWO PUFFS BY MOUTH DAILY    TIOTROPIUM (SPIRIVA RESPIMAT) 2.5 MCG/ACT AERS INHALER    Inhale 2 puffs into the lungs daily    TRAZODONE (DESYREL) 50 MG TABLET    Take 1 tablet by mouth nightly as needed for Sleep       ALLERGIES     Iv dye [iodides]    FAMILY HISTORY        Family History   Problem Relation Age of Onset    Heart Disease Mother     Heart Disease Father     Heart Disease Sister     Cancer Brother        SOCIAL HISTORY       Social History     Socioeconomic History    Marital status:      Spouse name: Not on file    Number of children: 6    Years of education: 15    Highest education level: Not on file   Occupational History    Not on file   Social Needs    Financial resource strain: Not on file    Food insecurity     Worry: Not on file     Inability: Not on file   [employer] needs     Medical: Not on file     Non-medical: Not on file   Tobacco Use    Smoking status: Former Smoker     Packs/day: 3.00     Years: 30.00     Pack years: 90.00     Start date: 1963     Quit date: 1992     Years since quittin.9    Smokeless tobacco: Never Used    Tobacco comment: QUIT 21 YRS AGO   Substance and Sexual Activity    Alcohol use: No     Alcohol/week: 0.0 standard drinks    Drug use: No    Sexual activity: Not Currently   Lifestyle    Physical activity     Days per week: Not on file     Minutes per session: Not on file    Stress: Not on file   Relationships    Social connections     Talks on phone: Not on file     Gets together: Not on file     Attends [employer]ism service: Not on file     Active member of club or organization: Not on file     Attends meetings of clubs or organizations: Not on file     Relationship status: Not on file    Intimate partner violence     Fear of current or ex partner: Not on file     Emotionally abused: Not on file     Physically abused: Not on file     Forced sexual activity: Not on file   Other Topics Concern    Not on file   Social History Narrative    Not on file       PHYSICAL EXAM       ED Triage Vitals [21 0458]   BP Temp Temp src Pulse Resp SpO2 Height Weight   (!) 226/82 98 °F (36.7 °C) -- 105 22 98 % 5' (1.524 m) 167 lb (75.8 kg)       Physical Exam  Vitals signs and nursing note reviewed. Constitutional:       General: She is not in acute distress. Appearance: She is well-developed. She is not ill-appearing, toxic-appearing or diaphoretic. HENT:      Head: Normocephalic and atraumatic. Right Ear: External ear normal.      Left Ear: External ear normal.   Eyes:      General:         Right eye: No discharge. Left eye: No discharge. Conjunctiva/sclera: Conjunctivae normal.      Pupils: Pupils are equal, round, and reactive to light. Neck:      Musculoskeletal: Normal range of motion and neck supple. Cardiovascular:      Rate and Rhythm: Normal rate and regular rhythm. Heart sounds: No murmur. Pulmonary:      Effort: Pulmonary effort is normal. No respiratory distress. Breath sounds: Rales present. No wheezing. Abdominal:      General: Bowel sounds are normal. There is no distension. Palpations: Abdomen is soft. There is no mass. Tenderness: There is no abdominal tenderness. There is no guarding or rebound. Genitourinary:     Comments: Deferred  Musculoskeletal: Normal range of motion. General: No deformity. Right lower leg: Edema present. Left lower leg: Edema present. Skin:     General: Skin is warm. Findings: No erythema or rash. Neurological:      Mental Status: She is alert and oriented to person, place, and time. She is not disoriented. Cranial Nerves: No cranial nerve deficit. Motor: No atrophy or abnormal muscle tone. Coordination: Coordination normal.   Psychiatric:         Behavior: Behavior normal.         Thought Content:  Thought content normal.         DIAGNOSTIC RESULTS     EKG: All EKG's are interpreted by the Emergency Department Physician who either signs or Co-signs this chart in the absence of acardiologist.    EKG shows normal sinus rhythm left atrial enlargement normal axis deviation no ectopy nonspecific EKG changes    RADIOLOGY:   Non-plain film images such as CT, Ultrasoundand MRI are read by the radiologist. Plain radiographic images are visualized and preliminarily interpreted by the emergency physician with the below findings:    Chest x-ray shows pulmonary edema    ED BEDSIDE ULTRASOUND:   Performed by ED Physician - none    LABS:  Labs Reviewed   CBC WITH AUTO DIFFERENTIAL - Abnormal; Notable for the following components:       Result Value    WBC 11.9 (*)     RBC 3.98 (*)     Hemoglobin 9.6 (*)     Hematocrit 31.0 (*)     MCV 77.9 (*)     MCH 24.2 (*)     RDW 17.7 (*)     Neutrophils Absolute 11.1 (*)     Lymphocytes Absolute 0.4 (*)     All other components within normal limits    Narrative:     Performed at:  [facility]  [address], [place] [employer] [employer] [address]   Phone [phone]   BASIC METABOLIC PANEL W/ REFLEX TO MG FOR LOW K - Abnormal; Notable for the following components:    Chloride 98 (*)     Glucose 181 (*)     GFR Non- 54 (*)     All other components within normal limits    Narrative:     Performed at:  [facility]  [address], [email or web address] [address]   Phone [phone]   TROPONIN - Abnormal; Notable for the following components:    Troponin 0.02 (*)     All other components within normal limits    Narrative:     Performed at:  [facility]  [address], [email or web address] [address]   Phone [phone]   BRAIN NATRIURETIC PEPTIDE - Abnormal; Notable for the following components:    Pro-BNP 3,006 (*)     All other components within normal limits    Narrative:     Performed at:  [facility]  [address], [email or web address] [address]   Phone [phone]   BASIC METABOLIC PANEL W/ REFLEX TO MG FOR LOW K - Abnormal; Notable for the following components:    Chloride 93 (*)     CO2 38 (*)     Glucose 114 (*)     BUN 25 (*)     GFR Non- 49 (*)     GFR  59 (*)     All other components within normal limits    Narrative:     Performed at:  [facility]  [address], [email or web address] [address]   Phone [phone]   CBC WITH AUTO DIFFERENTIAL - Abnormal; Notable for the following components:    RBC 3.48 (*)     Hemoglobin 8.4 (*)     Hematocrit 27.3 (*)     MCV 78.6 (*)     MCH 24.3 (*)     MCHC 30.9 (*)     RDW 18.1 (*)     All other components within normal limits    Narrative:     Performed at:  [facility]  [address], [email or web address] [address]   Phone [phone]   MAGNESIUM    Narrative:     Performed at:  [facility]  [address] [place] [address]   Phone [phone]   PROCALCITONIN    Narrative:     Performed at:  [facility]  [address] [place], [place] [address]   Phone [phone]       All other labs were withinnormal range or not returned as of this dictation. EMERGENCY DEPARTMENT COURSE and DIFFERENTIAL DIAGNOSIS/MDM:     PMH, Surgical Hx, FH, Social Hx reviewed by myself (ETOH usage, Tobacco usage, Drug usage reviewed by myself, no pertinent Hx)- No Pertinent Hx     Old records were reviewed by me    81-year-old in acute pulmonary edema. Hypertensive urgency. Flash pulmonary edema. BiPAP and Lasix started. Admission for further inpatient evaluation. CRITICAL CARE TIME   Total Critical Caretime was 39 minutes, excluding separately reportable procedures. There was a high probability of clinically significant/life threatening deterioration in the patient's condition which required my urgent intervention. PROCEDURES:  Unlessotherwise noted below, none    FINAL IMPRESSION      1. Acute respiratory failure with hypoxia ([address]    2.  Acute pulmonary edema ([address]          DISPOSITION/PLAN   DISPOSITION      Admission    (Please note that portions ofthis note were completed with a voice recognition program.  Efforts were made to edit the dictations but occasionally words are mis-transcribed.)    Denita Fernandes MD(electronically signed)  Attending Emergency Physician           Denita Fernandes MD  01/08/21 5351

## 2021-01-05 NOTE — ED NOTES
Hosp. requested that the patient is slowly taken off biPAP after she receives lasix. Patient to be started at 6-8L Nasal cannula. Will continue to monitor patient.      Ari Trotter RN  01/05/21 2207

## 2021-01-05 NOTE — CARE COORDINATION
Requested by bedside RN to assess patient pouch. Pt currently in 1-piece dorita pouch. Pt axox4 and stated she provides her own ostomy care. Pt given supplies.  While inpatient please order:  Ean Almonte Cocoa flat 1 pc #[id number]  [facility] protective Seal thin #[id number]  Cliffton Spatz Barrier Strips #[id number]  Electronically signed by Aidan Chatterjee RN on 1/5/2021 at 2:29 PM

## 2021-01-05 NOTE — ED NOTES
Bed: [facility]  Expected date:   Expected time:   Means of arrival:   Comments:  Lyndsay To 1452, RN  01/05/21 9257

## 2021-01-05 NOTE — TELEPHONE ENCOUNTER
Covid testing scheduled for 2/18/2021 at 10:55  [facility],Third Floor   The morning of your Procedure-MIKO you will park in the hospital parking lot and report directly to the cath lab to check in. DATE: 2/23/2021 TIME: 10:30      Pre-Procedure Instructions:  1. Do not eat or drink anything 8 hours before your procedure. 2. Hold all diabetic medications the morning of the procedure including, Metfomin. 3. If you take Lantus/Levemir only take ½ your normal dose the evening before. 4. All other medications can be taken in the morning with sips of water. 5. Do not hold anticoagulation therapy: warfarin, eliquis, xarelto therapy. 6. Do not use any lotions, creams or perfume the morning of procedure. 7. Please arrive 1 hour prior to procedure time. 8. Please have a responsible adult to drive you home after procedure. It is recommended you do not drive for 24 hours after procedure. 9. Cath lab will provide you with all post procedure instructions. If you have any questions regarding the procedure itself or medications please call [phone] and ask to speak with a nurse. Published on [employer] and e-mail to Pepper Conrad. Placed on office visit summary.

## 2021-01-06 ENCOUNTER — TELEPHONE (OUTPATIENT)
Dept: FAMILY MEDICINE CLINIC | Age: 76
End: 2021-01-06

## 2021-01-06 VITALS
BODY MASS INDEX: 33.24 KG/M2 | DIASTOLIC BLOOD PRESSURE: 44 MMHG | RESPIRATION RATE: 18 BRPM | WEIGHT: 169.31 LBS | SYSTOLIC BLOOD PRESSURE: 117 MMHG | TEMPERATURE: 97.9 F | HEIGHT: 60 IN | HEART RATE: 67 BPM | OXYGEN SATURATION: 97 %

## 2021-01-06 LAB
ANION GAP SERPL CALCULATED.3IONS-SCNC: 8 MMOL/L (ref 3–16)
BASOPHILS ABSOLUTE: 0 K/UL (ref 0–0.2)
BASOPHILS RELATIVE PERCENT: 0.5 %
BUN BLDV-MCNC: 25 MG/DL (ref 7–20)
CALCIUM SERPL-MCNC: 9.2 MG/DL (ref 8.3–10.6)
CHLORIDE BLD-SCNC: 93 MMOL/L (ref 99–110)
CO2: 38 MMOL/L (ref 21–32)
CREAT SERPL-MCNC: 1.1 MG/DL (ref 0.6–1.2)
EOSINOPHILS ABSOLUTE: 0 K/UL (ref 0–0.6)
EOSINOPHILS RELATIVE PERCENT: 0.2 %
GFR AFRICAN AMERICAN: 59
GFR NON-AFRICAN AMERICAN: 49
GLUCOSE BLD-MCNC: 114 MG/DL (ref 70–99)
HCT VFR BLD CALC: 27.3 % (ref 36–48)
HEMOGLOBIN: 8.4 G/DL (ref 12–16)
LYMPHOCYTES ABSOLUTE: 1.5 K/UL (ref 1–5.1)
LYMPHOCYTES RELATIVE PERCENT: 15 %
MAGNESIUM: 1.8 MG/DL (ref 1.8–2.4)
MCH RBC QN AUTO: 24.3 PG (ref 26–34)
MCHC RBC AUTO-ENTMCNC: 30.9 G/DL (ref 31–36)
MCV RBC AUTO: 78.6 FL (ref 80–100)
MONOCYTES ABSOLUTE: 0.6 K/UL (ref 0–1.3)
MONOCYTES RELATIVE PERCENT: 6.1 %
NEUTROPHILS ABSOLUTE: 7.7 K/UL (ref 1.7–7.7)
NEUTROPHILS RELATIVE PERCENT: 78.2 %
PDW BLD-RTO: 18.1 % (ref 12.4–15.4)
PLATELET # BLD: 243 K/UL (ref 135–450)
PMV BLD AUTO: 8.8 FL (ref 5–10.5)
POC ACT LR: 182 SEC
POTASSIUM REFLEX MAGNESIUM: 3.5 MMOL/L (ref 3.5–5.1)
PROCALCITONIN: 0.03 NG/ML (ref 0–0.15)
RBC # BLD: 3.48 M/UL (ref 4–5.2)
SODIUM BLD-SCNC: 139 MMOL/L (ref 136–145)
WBC # BLD: 9.8 K/UL (ref 4–11)

## 2021-01-06 PROCEDURE — 83735 ASSAY OF MAGNESIUM: CPT

## 2021-01-06 PROCEDURE — 6370000000 HC RX 637 (ALT 250 FOR IP): Performed by: INTERNAL MEDICINE

## 2021-01-06 PROCEDURE — 85025 COMPLETE CBC W/AUTO DIFF WBC: CPT

## 2021-01-06 PROCEDURE — 2580000003 HC RX 258: Performed by: INTERNAL MEDICINE

## 2021-01-06 PROCEDURE — 97530 THERAPEUTIC ACTIVITIES: CPT

## 2021-01-06 PROCEDURE — 94761 N-INVAS EAR/PLS OXIMETRY MLT: CPT

## 2021-01-06 PROCEDURE — 36415 COLL VENOUS BLD VENIPUNCTURE: CPT

## 2021-01-06 PROCEDURE — 97166 OT EVAL MOD COMPLEX 45 MIN: CPT

## 2021-01-06 PROCEDURE — 99255 IP/OBS CONSLTJ NEW/EST HI 80: CPT | Performed by: INTERNAL MEDICINE

## 2021-01-06 PROCEDURE — 97162 PT EVAL MOD COMPLEX 30 MIN: CPT

## 2021-01-06 PROCEDURE — 80048 BASIC METABOLIC PNL TOTAL CA: CPT

## 2021-01-06 PROCEDURE — 97116 GAIT TRAINING THERAPY: CPT

## 2021-01-06 PROCEDURE — 97535 SELF CARE MNGMENT TRAINING: CPT

## 2021-01-06 PROCEDURE — 84145 PROCALCITONIN (PCT): CPT

## 2021-01-06 PROCEDURE — 6360000002 HC RX W HCPCS: Performed by: INTERNAL MEDICINE

## 2021-01-06 RX ADMIN — MONTELUKAST 10 MG: 10 TABLET, FILM COATED ORAL at 09:05

## 2021-01-06 RX ADMIN — CARVEDILOL 12.5 MG: 12.5 TABLET, FILM COATED ORAL at 09:05

## 2021-01-06 RX ADMIN — ACETAMINOPHEN 650 MG: 325 TABLET ORAL at 09:27

## 2021-01-06 RX ADMIN — LEVOTHYROXINE SODIUM 50 MCG: 0.05 TABLET ORAL at 09:05

## 2021-01-06 RX ADMIN — CITALOPRAM HYDROBROMIDE 40 MG: 20 TABLET ORAL at 09:04

## 2021-01-06 RX ADMIN — CLONIDINE HYDROCHLORIDE 0.2 MG: 0.1 TABLET ORAL at 09:03

## 2021-01-06 RX ADMIN — HYDRALAZINE HYDROCHLORIDE 100 MG: 50 TABLET, FILM COATED ORAL at 09:05

## 2021-01-06 RX ADMIN — ATORVASTATIN CALCIUM 40 MG: 40 TABLET, FILM COATED ORAL at 09:05

## 2021-01-06 RX ADMIN — HYDRALAZINE HYDROCHLORIDE 100 MG: 50 TABLET, FILM COATED ORAL at 15:27

## 2021-01-06 RX ADMIN — CLONIDINE HYDROCHLORIDE 0.2 MG: 0.1 TABLET ORAL at 15:27

## 2021-01-06 RX ADMIN — PANTOPRAZOLE SODIUM 40 MG: 40 TABLET, DELAYED RELEASE ORAL at 09:05

## 2021-01-06 RX ADMIN — Medication 10 ML: at 09:07

## 2021-01-06 RX ADMIN — CARVEDILOL 12.5 MG: 12.5 TABLET, FILM COATED ORAL at 17:42

## 2021-01-06 RX ADMIN — FUROSEMIDE 40 MG: 10 INJECTION, SOLUTION INTRAMUSCULAR; INTRAVENOUS at 09:03

## 2021-01-06 RX ADMIN — ASPIRIN 81 MG: 81 TABLET, CHEWABLE ORAL at 09:04

## 2021-01-06 RX ADMIN — TIOTROPIUM BROMIDE INHALATION SPRAY 2 PUFF: 3.12 SPRAY, METERED RESPIRATORY (INHALATION) at 08:24

## 2021-01-06 RX ADMIN — APIXABAN 2.5 MG: 2.5 TABLET, FILM COATED ORAL at 09:04

## 2021-01-06 RX ADMIN — BUDESONIDE AND FORMOTEROL FUMARATE DIHYDRATE 2 PUFF: 80; 4.5 AEROSOL RESPIRATORY (INHALATION) at 08:24

## 2021-01-06 ASSESSMENT — PAIN DESCRIPTION - PAIN TYPE: TYPE: ACUTE PAIN

## 2021-01-06 ASSESSMENT — PAIN SCALES - GENERAL
PAINLEVEL_OUTOF10: 5
PAINLEVEL_OUTOF10: 2

## 2021-01-06 ASSESSMENT — PAIN DESCRIPTION - DESCRIPTORS: DESCRIPTORS: HEADACHE

## 2021-01-06 ASSESSMENT — PAIN - FUNCTIONAL ASSESSMENT: PAIN_FUNCTIONAL_ASSESSMENT: ACTIVITIES ARE NOT PREVENTED

## 2021-01-06 ASSESSMENT — PAIN DESCRIPTION - LOCATION: LOCATION: HEAD

## 2021-01-06 NOTE — PROGRESS NOTES
DC order noted. Pharm notified for dc med rec please. I spoke with bedside RN Savannah Ely, she will complete the education mins. HF dc instructions placed on AVS as well as on STEWART as pt will be going home with home care for a visiting nurse. Follow up appt scheduled with PCP for 1/8 at 2:00 for hospital f/u. Pt has an appt with cardiology (which was scheduled PTA) for 1/19 at 1:00 with Carla Santiago NP. Dc wt 169 lbs.

## 2021-01-06 NOTE — PLAN OF CARE
Problem: Falls - Risk of:  Goal: Will remain free from falls  Description: Will remain free from falls  Outcome: Completed  Goal: Absence of physical injury  Description: Absence of physical injury  Outcome: Completed     Problem: Pain:  Goal: Pain level will decrease  Description: Pain level will decrease  Outcome: Completed  Goal: Control of acute pain  Description: Control of acute pain  Outcome: Completed  Goal: Control of chronic pain  Description: Control of chronic pain  Outcome: Completed     Problem: OXYGENATION/RESPIRATORY FUNCTION  Goal: Patient will maintain patent airway  Outcome: Completed  Goal: Patient will achieve/maintain normal respiratory rate/effort  Description: Respiratory rate and effort will be within normal limits for the patient  Outcome: Completed     Problem: HEMODYNAMIC STATUS  Goal: Patient has stable vital signs and fluid balance  Outcome: Completed     Problem: FLUID AND ELECTROLYTE IMBALANCE  Goal: Fluid and electrolyte balance are achieved/maintained  Outcome: Completed     Problem: ACTIVITY INTOLERANCE/IMPAIRED MOBILITY  Goal: Mobility/activity is maintained at optimum level for patient  Outcome: Completed

## 2021-01-06 NOTE — FLOWSHEET NOTE
Readmission Assessment  Number of Days since last admission?: (P) 1-7 days  Previous Disposition: (P) Home with Family  Who is being Interviewed: (P) Patient  What was the patient's/caregiver's perception as to why they think they needed to return back to the hospital?: (P) (Breathing difficulties)  Did you visit your Primary Care Physician after you left the hospital, before you returned this time?: (P) No  Why weren't you able to visit your PCP?: (P) Did not have an appointment  Did you see a specialist, such as Cardiac, Pulmonary, Orthopedic Physician, etc. after you left the hospital?: (P) No  Who advised the patient to return to the hospital?: (P) Self-referral  Does the patient report anything that got in the way of taking their medications?: (P) No    Pt was at the hospital on 1/4/21 for a watchman procedure and returned with difficulty breathing.    Patricia Ortega, [place]  [phone]  Electronically signed by Shahrzad Wilson on 1/6/2021 at 1:01 PM

## 2021-01-06 NOTE — PROGRESS NOTES
Pharmacy Heart Failure Medication Reconciliation Note    Pt discharged from [facility] after admission for SOBChristiano Crowell has a diagnosis of diastolic heart failure (last EF = 55% on 2-14-20). Pertinent Labs:  BMP:   Lab Results   Component Value Date     01/06/2021    K 3.5 01/06/2021    CL 93 01/06/2021    CO2 38 01/06/2021    BUN 25 01/06/2021    CREATININE 1.1 01/06/2021     BNP:   Lab Results   Component Value Date    PROBNP 3,006 01/05/2021    PROBNP 1,997 10/23/2020    PROBNP 2,490 02/13/2020       Patient taking an ACEI / ARB / Entresto:  No: pEf     Patient taking a BETA BLOCKER:  Yes  Patient taking a LOOP DIURETIC: Yes  Patient taking a ALDOSTERONE RECEPTOR ANTAGONIST: No: pEf    Patient has a diagnosis of Atrial Fibrillation: Yes. If yes, patient is on appropriate anticoagulation: Yes    Patient has a diagnosis of type 2 diabetes:  No: n/a.  If yes, patient prescribed the following anti-hyperglycemic medication at discharge:       Corrections to discharge medications include:  none    Discharge Medications:         Medication List      CHANGE how you take these medications    cloNIDine 0.2 MG tablet  Commonly known as: CATAPRES  Take 1 tablet by mouth 3 times daily  What changed:   · when to take this  · additional instructions        CONTINUE taking these medications    albuterol sulfate  (90 Base) MCG/ACT inhaler  Commonly known as: Ventolin HFA  Inhale 2 puffs into the lungs every 4 hours as needed for Wheezing or Shortness of Breath     apixaban 5 MG Tabs tablet  Commonly known as: Eliquis  Take 0.5 tablets by mouth 2 times daily     aspirin 81 MG tablet  Take 1 tablet by mouth daily     atorvastatin 40 MG tablet  Commonly known as: LIPITOR  TAKE ONE TABLET BY MOUTH DAILY     Breo Ellipta 100-25 MCG/INH Aepb inhaler  Generic drug: fluticasone-vilanterol  Inhale 1 puff into the lungs daily     budesonide-formoterol 160-4.5 MCG/ACT Aero  Commonly known as: SYMBICORT  Inhale 2 puffs into the lungs 2 times daily     butalbital-acetaminophen-caffeine -40 MG per tablet  Commonly known as: FIORICET, ESGIC  Take 1 tablet by mouth every 6 hours as needed for Headaches     carvedilol 12.5 MG tablet  Commonly known as: COREG  Take 1 tablet by mouth 2 times daily (with meals)     citalopram 40 MG tablet  Commonly known as: CELEXA  TAKE ONE TABLET BY MOUTH DAILY     diclofenac sodium 1 % Gel  Commonly known as: VOLTAREN  Apply 2 g topically 4 times daily     furosemide 40 MG tablet  Commonly known as: LASIX  Take 1 tablet by mouth 2 times daily     hydrALAZINE 100 MG tablet  Commonly known as: APRESOLINE  TAKE ONE TABLET BY MOUTH THREE TIMES A DAY     ipratropium-albuterol 0.5-2.5 (3) MG/3ML Soln nebulizer solution  Commonly known as: DUONEB  Inhale 3 mLs into the lungs every 4 hours as needed for Shortness of Breath     lansoprazole 15 MG delayed release capsule  Commonly known as: PREVACID  Take 1 capsule by mouth daily     levothyroxine 50 MCG tablet  Commonly known as: SYNTHROID  TAKE ONE TABLET BY MOUTH DAILY     lidocaine 2 % jelly  Commonly known as: XYLOCAINE  Apply small amount topically to affected area every 8 hours when necessary pain     LORazepam 1 MG tablet  Commonly known as: ATIVAN  TAKE ONE TABLET BY MOUTH DAILY AS NEEDED FOR ANXIETY     mineral oil-hydrophilic petrolatum ointment  Apply topically as needed.      montelukast 10 MG tablet  Commonly known as: Singulair  Take 1 tablet by mouth daily     tiotropium 2.5 MCG/ACT Aers inhaler  Commonly known as: Spiriva Respimat  Inhale 2 puffs into the lungs daily     traZODone 50 MG tablet  Commonly known as: DESYREL  Take 1 tablet by mouth nightly as needed for Sleep            Marlene Yu [facility]  Heart Failure Discharge Medication Reconciliation Program  [phone]

## 2021-01-06 NOTE — DISCHARGE INSTR - COC
Continuity of Care Form    Patient Name: Jameel Manuel   :  1945  MRN:  [id number]    Admit date:  2021  Discharge date:  21    Code Status Order: Full Code   Advance Directives:   [facility] Documentation       Date/Time Healthcare Directive Type of Healthcare Directive Copy in [address] [address] Agent's Name Healthcare Agent's Phone Number    21 1419  No, patient does not have an advance directive for healthcare treatment -- -- -- -- --            Admitting Physician:  Travon Hart MD  PCP: Karen Babb MD    Discharging Nurse: Magda Vidal RN, [facility] Unit/Room#: B6H-7314/5121-01  Discharging Unit Phone Number: [phone]    Emergency Contact:   Extended Emergency Contact Information  Primary Emergency Contact: Lexy Osorio \[facility]\"" [address] Phone: [phone]  Mobile Phone: [phone]  Relation: Child  Secondary Emergency Contact: Coco Morton   [place] [address] Phone: [phone]  Mobile Phone: [phone]  Relation: Brother/Sister    Past Surgical History:  Past Surgical History:   Procedure Laterality Date    CARDIAC CATHETERIZATION  2017    Dr. Hernandez Dates  2018    CORONARY ARTERY BYPASS GRAFT      Legs & Carotid    HERNIA REPAIR      UPPER GASTROINTESTINAL ENDOSCOPY N/A 2019    EGD DIAGNOSTIC ONLY performed by Danica Wang MD at [address] [place] [address]       Immunization History:   Immunization History   Administered Date(s) Administered    Influenza, High Dose (Fluzone 65 yrs and older) 2015, 2018    Influenza, Roselia De Dios, 6 mo and older, IM, PF (Flulaval, Fluarix) 2018    Influenza, Quadv, adjuvanted, 65 yrs +, IM, PF (Fluad) 2020    Influenza, Triv, inactivated, subunit, adjuvanted, IM (Fluad 65 yrs and older) 10/25/2019    Pneumococcal Conjugate 13-valent ([id number]) 2018    Pneumococcal Conjugate 7-valent (Alia Hare) 12/30/2013       Active Problems:  Patient Active Problem List   Diagnosis Code    Fracture, metacarpal, neck S62.339A    PAD (peripheral artery disease) ([facility]) I73.9    CAD (coronary artery disease) I25.10    HTN (hypertension) Z69    Diastolic dysfunction W10.19    RLS (restless legs syndrome) G25.81    History of tobacco use Z87.891    Centrilobular emphysema ([facility]) J43.2    Colovesical fistula N32.1    Diverticulitis large intestine w/o perforation or abscess w/bleeding K57.33    Large bowel obstruction ([address] K56.609    ANGIE treated with BiPAP G47.33    CKD (chronic kidney disease), stage III - sees Dr. Nicola Lerma N18.30    COPD, severe ([address] J44.9    Colonic obstruction ([address] K56.609    Carotid artery stenosis without cerebral infarction, bilateral I65.23    Colostomy care ([facility] Z43.3    Dyspnea and respiratory abnormalities R06.00, R06.89    Hyperlipidemia E78.5    Paroxysmal atrial fibrillation (HCC) I48.0    Hypothyroidism, unspecified E03.9    Chest pain R07.9    Symptomatic anemia D64.9    Anxiety F41.9    History of GI bleed Z87.19    Iron deficiency anemia due to chronic blood loss D50.0    Presence of Watchman left atrial appendage closure device Z95.818    Atrial fibrillation ([facility]) I48.91    Pulmonary edema, acute ([facility]) J81.0       Isolation/Infection:   Isolation            No Isolation          Patient Infection Status       Infection Onset Added Last Indicated Last Indicated By Review Planned Expiration Resolved Resolved By    None active    Resolved    COVID-19 Rule Out 01/04/21 01/04/21 01/04/21 COVID-19 (Ordered)   01/04/21 Rule-Out Test Resulted            Nurse Assessment:  Last Vital Signs: BP (!) 134/51   Pulse 60   Temp 97.8 °F (36.6 °C) (Oral)   Resp 20   Ht 5' (1.524 m)   Wt 169 lb 5 oz (76.8 kg)   LMP 05/01/2006 (Approximate)   SpO2 98%   BMI 33.07 kg/m²     Last documented pain score (0-10 scale): Pain Level: 2  Last Weight:   Wt Readings from Last 1 Encounters:   01/06/21 169 lb 5 oz (76.8 kg)     Mental Status:  oriented and alert    IV Access:  - None    Nursing Mobility/ADLs:  Walking   Assisted  Transfer  Assisted  Bathing  Independent  Dressing  Independent  Toileting  Assisted  Feeding  Independent  Med Admin  Assisted  Med Delivery   whole    Wound Care Documentation and Therapy:        Elimination:  Continence:   · Bowel: Yes  · Bladder: Yes  Urinary Catheter: None   Colostomy/Ileostomy/Ileal Conduit: Yes       Date of Last BM: 1/6/21    Intake/Output Summary (Last 24 hours) at 1/6/2021 1301  Last data filed at 1/6/2021 1005  Gross per 24 hour   Intake 330 ml   Output 875 ml   Net -545 ml     I/O last 3 completed shifts: In: 210 [P.O.:210]  Out: 575 [Urine:575]    Safety Concerns:     None    Impairments/Disabilities:      None    Nutrition Therapy:  Current Nutrition Therapy:   - Oral Diet:  Cardiac and Low Sodium (2gm)    Routes of Feeding: Oral  Liquids: Thin Liquids  Daily Fluid Restriction: yes - amount 1500  Last Modified Barium Swallow with Video (Video Swallowing Test): not done    Treatments at the Time of Hospital Discharge:   Respiratory Treatments: see MAR  Oxygen Therapy:  is on oxygen at 3 L/min per nasal cannula. Ventilator:    - CPAP   only when sleeping    Rehab Therapies: Physical Therapy and Occupational Therapy  Weight Bearing Status/Restrictions: No weight bearing restirctions  Other Medical Equipment (for information only, NOT a DME order):  walker  Other Treatments: see POC    Heart Failure Instructions for Daily Management  Patient was treated for acute on chronic diastolic heart failure. she  will require the following:     Please weigh daily on the same scale and approximately the same time of day. Report weight gain of 3 pounds/day or 5 pounds/week to : Maya Solomon [phone] and Jaelyn [address] [phone].  Please use hospital discharge weight as baseline reference.     Please monitor for signs and symptoms of and report to MD:  o Worsening Heart Failure: sudden weight gain, shortness of breath, lower extremity or general edema/swelling, abdominal bloating/swelling, inability to lie flat, intolerance to usual activity, or cough (especially at night). Report these finding even if no increase in weight.  o Dehydration:  having difficulty or a decrease in urination, dizziness, worsening fatigue, or new onset/worsening of generalized weakness.  Please continue a LOW SODIUM diet and LIMIT fluid intake to 48 - 64 ounces ( 1.5 - 2 liters) per day.  Call Tabby Lilly [phone] and [address] [phone] and/or Hardeep Gisella Mai @ [phone] with any questions or concerns.  Please continue heart failure education to patient and family/support system.  See After Visit Summary for hospital follow up appointment details.  Consider spiritual care referral for support and/or completion of advance directives [phone].  Consider: [place] [id number] telehealth program if patient agreeable and able to participate, palliative care consult for ongoing goals of care, end of life, and/or chronic disease management discussions and referral to [facility] [phone]) once SNF/HHC complete.       Patient's personal belongings (please select all that are sent with patient):  None    RN SIGNATURE:  Electronically signed by Stone Griffith RN on 1/6/21 at 3:56 PM EST    CASE MANAGEMENT/SOCIAL WORK SECTION    Inpatient Status Date: 1/05/2021    Readmission Risk Assessment Score:  Readmission Risk              Risk of Unplanned Readmission:        18           Discharging to Facility/ Agency   · Name: [facility]  · Address:  · Phone:[phone]  · Fax:[phone]  ·     / signature: Electronically signed by Aida Shin on 1/6/2021 at 1:30 PM      PHYSICIAN SECTION    Prognosis: Fair    Condition at Discharge: Stable    Rehab Potential (if transferring to Rehab): Fair    Recommended Labs or Other Treatments After Discharge: Continue meds as prescribed    Physician Certification: I certify the above information and transfer of Conchis Adame  is necessary for the continuing treatment of the diagnosis listed and that she requires Home Care for less 30 days.      Update Admission H&P: No change in H&P    PHYSICIAN SIGNATURE:  Electronically signed by José Jimenez MD on 1/6/21 at 1:01 PM EST

## 2021-01-06 NOTE — PROGRESS NOTES
Discharge instructions reviewed with patient; verbalizes understanding of medications, follow up appointments, daily weights, diet and signs of worsening condition to report to doctor. Denies any further questions/concerns. Patient awaiting sister's arrival to transport her to home.

## 2021-01-06 NOTE — CARE COORDINATION
[facility] received referral from  for [employer]. Patient already received a Rollator through her current insurance benefit in November 2018 and is not eligible for a new one at this time. [employer] rep delivered the [place] to the patient. Patient wanted a backless SC due to the width of her tub. [facility] does not have backless SC. [employer] rep advised that both SC's are the same width. Patient agreed to [place] w/Back. Notified  via .     Thank you for the referral.  Electronically signed by Mat Carlos on 1/6/2021 at 3:37 PM Cell ph# [phone]

## 2021-01-06 NOTE — PROGRESS NOTES
limited by reported fatigue, though O2 sats remained in mid-high 90s throughout session on 3L O2. Will cont to see pt on acute in order to address the above deficits and maximize pt's functional performance. Anticipate safe d/c home with assist PRN from granddtr and home OT to ensure safe transition home. Prognosis: Good  Decision Making: Medium Complexity  History: see above  Exam: fxl transfers, fxl mobility, ADLs, ROM/MMT  Assistance / Modification: supervision  OT Education: OT Role;Plan of Care;Energy Conservation;Transfer Training  REQUIRES OT FOLLOW UP: Yes  Activity Tolerance  Activity Tolerance: Patient limited by fatigue  Activity Tolerance: O2 sats remained in high 90s throughout session on 3L O2  Safety Devices  Safety Devices in place: Yes  Type of devices: Nurse notified;Call light within reach; Patient at risk for falls; Left in chair;Gait belt           Patient Diagnosis(es): The primary encounter diagnosis was Acute respiratory failure with hypoxia ([address] Diagnoses of Acute pulmonary edema ([address] Flash pulmonary edema ([address] Hypertensive urgency, and ANGIE treated with BiPAP were also pertinent to this visit. has a past medical history of Atrial fibrillation ([address] CAD (coronary artery disease), CHF (congestive heart failure) ([address] Colostomy care ([address] COPD (chronic obstructive pulmonary disease) ([address] Hyperlipidemia, Hypertension, Kidney disease, Peripheral vascular disease ([address] Rectal fissure, Restless legs syndrome, Sleep apnea, and Unspecified sleep apnea. has a past surgical history that includes Cholecystectomy; hernia repair; Coronary artery bypass graft; Cardiac catheterization (09/13/2017); colostomy (03/09/2018); and Upper gastrointestinal endoscopy (N/A, 9/30/2019).            Restrictions  Position Activity Restriction  Other position/activity restrictions: 3L O2 via NC    Subjective   General  Chart Reviewed: Yes  Patient assessed for rehabilitation services?: Yes  Additional Pertinent Hx: per H&P: \"The patient is a 70 y.o. female with a past medical history of coronary artery disease, diastolic heart failure, COPD, chronic respiratory failure, ANGIE on BiPAP, peripheral vascular disease, hypertension, hyperlipidemia and A. fib who presents to [facility] with worsening shortness of breath and respiratory distress. Patient had a watchman procedure done on 1/4/2021. Patient thinks she might have got IV fluids during the procedure and presents with dyspnea along with swelling of her hands and legs. In the ER she was in respiratory distress and hypoxic needing 3 L of oxygen and was placed on BiPAP. \"  Family / Caregiver Present: No  Referring Practitioner: Deanne Chaparro  Diagnosis: pulmonary edema, acute  Subjective  Subjective: pt met b/s for OT eval/tx with PT. pt in bed, agreeable to therapy.  pt denied pain but reports feeling weak and \"off\"    Social/Functional History  Social/Functional History  Lives With: Family(granddtr)  Type of Home: Apartment  Home Layout: One level  Home Access: Stairs to enter with rails  Entrance Stairs - Number of Steps: 5 + 4  Bathroom Shower/Tub: Tub/Shower unit  Bathroom Toilet: Standard  Home Equipment: Oxygen(2-3L at rest, 5L with activity; otherwise no prior DME)  ADL Assistance: Independent  Homemaking Assistance: Needs assistance(granddtr completes)  Ambulation Assistance: Independent  Transfer Assistance: Independent  Active : Yes(car is broken down, uses [facility] transport)  Additional Comments: denies falls       Objective   Vision: Within Functional Limits  Hearing: Within functional limits    Orientation  Overall Orientation Status: Within Functional Limits     Balance  Sitting Balance: Independent  Standing Balance: Supervision  Functional Mobility  Functional - Mobility Device: No device  Activity: To/from bathroom  Assist Level: Supervision  Functional Mobility Comments: pt completed fxl mobility from bed > bathroom > recliner with supervision without device.  pt steady, though fatigues quickly  Toilet Transfers  Toilet - Technique: Ambulating  Equipment Used: Grab bars  Toilet Transfer: Supervision  ADL  Grooming: Supervision  LE Dressing: Supervision(pt donned footies sitting EOB)  Toileting: Supervision(pt voided urine and performed pericare seated with supervision)  Additional Comments: anticipate pt would require overall supervision for ADLs based on ROM, strength, endurance this session  Tone RUE  RUE Tone: Normotonic  Tone LUE  LUE Tone: Normotonic  Coordination  Movements Are Fluid And Coordinated: Yes     Bed mobility  Supine to Sit: Supervision(HOB elevated)  Scooting: Supervision  Transfers  Sit to stand: Supervision  Stand to sit: Supervision  Transfer Comments: without device     Cognition  Overall Cognitive Status: WFL                 LUE AROM (degrees)  LUE AROM : WFL  Left Hand AROM (degrees)  Left Hand AROM: WFL  RUE AROM (degrees)  RUE AROM : WFL  Right Hand AROM (degrees)  Right Hand AROM: WFL  LUE Strength  Gross LUE Strength: Exceptions to [facility]  LUE Strength Comment: grossly 3+/5  RUE Strength  Gross RUE Strength: Exceptions to [facility]  RUE Strength Comment: grossly 3+/5           Plan   Plan  Times per week: 3-5  Times per day: Daily  Current Treatment Recommendations: Strengthening, Endurance Training, ROM, Balance Training, Functional Mobility Training, Safety Education & Training, Equipment Evaluation, Education, & procurement, Patient/Caregiver Education & Training, Self-Care / ADL    AM-[facility] Score        [facility] Inpatient Daily Activity Raw Score: 21 (01/06/21 1402)  AM-[facility] Inpatient ADL T-Scale Score : 44.27 (01/06/21 1402)  ADL Inpatient CMS 0-100% Score: 32.79 (01/06/21 1402)  ADL Inpatient CMS G-Code Modifier : Mitzi García (01/06/21 1402)    Goals  Short term goals  Time Frame for Short term goals: prior to d/c  Short term goal 1: Pt will complete fxl transfers independently  Short term goal 2: Pt will complete fxl mobility independently  Short term goal 3: Pt will toilet independently  Short term goal 4: Pt will groom independently  Short term goal 5: Pt will perform feeding & grooming independently  Long term goals  Time Frame for Long term goals : LTG=STG  Patient Goals   Patient goals : to go home       Therapy Time   Individual Concurrent Group Co-treatment   Time In 1310         Time Out 1350         Minutes [address] [place], [place]/L [address]

## 2021-01-06 NOTE — CONSULTS
[facility]  Cardiology Consult    Madeline Hall  1945 January 6, 2021      Reason for Referral: chf    CC: Shortness of breath      Subjective:     History of Present Illness:    Madeline Hall is a 70 y.o. patient with a PMH significant for congestive heart failure, atrial fibrillation recent left atrial appendage closure presented with complaints of shortness of breath. Shortness of breath started after left atrial appendage closure. She was discharged home in stable condition comes in with increasing shortness of breath shortness of breath at rest made worse by exertion not relieved by rest.  No chest pain palpitation diaphoresis dizziness or loss of consciousness. Shortness of breath 1 day in duration      Past Medical History:   has a past medical history of Atrial fibrillation ([facility] [address] CAD (coronary artery disease), CHF (congestive heart failure) ([facility] [address] Colostomy care ([facility] [address] COPD (chronic obstructive pulmonary disease) ([facility] [address] Hyperlipidemia, Hypertension, Kidney disease, Peripheral vascular disease ([facility] [address] Rectal fissure, Restless legs syndrome, Sleep apnea, and Unspecified sleep apnea. Surgical History:   has a past surgical history that includes Cholecystectomy; hernia repair; Coronary artery bypass graft; Cardiac catheterization (09/13/2017); colostomy (03/09/2018); and Upper gastrointestinal endoscopy (N/A, 9/30/2019). Social History:   reports that she quit smoking about 28 years ago. She started smoking about 58 years ago. She has a 90.00 pack-year smoking history. She has never used smokeless tobacco. She reports that she does not drink alcohol or use drugs. Family History:  family history includes Cancer in her brother; Heart Disease in her father, mother, and sister. Home Medications:  Were reviewed and are listed in nursing record and/or below  Prior to Admission medications    Medication Sig Start Date End Date Taking?  Authorizing Provider   apixaban (ELIQUIS) 5 MG TABS tablet Take 0.5 tablets by mouth 2 times daily 1/4/21  Yes Kelly Arthur MD   LORazepam (ATIVAN) 1 MG tablet TAKE ONE TABLET BY MOUTH DAILY AS NEEDED FOR ANXIETY 12/9/20 1/8/21 Yes Bekah Webber MD   mineral oil-hydrophilic petrolatum (HYDROPHOR) ointment Apply topically as needed.  12/9/20  Yes Bekah Webber MD   carvedilol (COREG) 12.5 MG tablet Take 1 tablet by mouth 2 times daily (with meals) 11/12/20  Yes Bekah Webber MD   ipratropium-albuterol (DUONEB) 0.5-2.5 (3) MG/3ML SOLN nebulizer solution Inhale 3 mLs into the lungs every 4 hours as needed for Shortness of Breath 11/11/20  Yes Curtis Teixeira MD   fluticasone-vilanterol (BREO ELLIPTA) 100-25 MCG/INH AEPB inhaler Inhale 1 puff into the lungs daily 11/11/20  Yes Curtis Teixeira MD   tiotropium (SPIRIVA RESPIMAT) 2.5 MCG/ACT AERS inhaler Inhale 2 puffs into the lungs daily 11/11/20  Yes Curtis Teixeira, MD   albuterol sulfate HFA (VENTOLIN HFA) 108 (90 Base) MCG/ACT inhaler Inhale 2 puffs into the lungs every 4 hours as needed for Wheezing or Shortness of Breath 11/11/20  Yes Curtis Teixeira MD   traZODone (DESYREL) 50 MG tablet Take 1 tablet by mouth nightly as needed for Sleep 10/19/20  Yes Bekah Webber MD   lidocaine (XYLOCAINE) 2 % jelly Apply small amount topically to affected area every 8 hours when necessary pain 10/19/20  Yes Kym Welch MD   diclofenac sodium (VOLTAREN) 1 % GEL Apply 2 g topically 4 times daily 10/19/20  Yes Bekah Webber MD   citalopram (CELEXA) 40 MG tablet TAKE ONE TABLET BY MOUTH DAILY 8/26/20  Yes Bekah Webber MD   furosemide (LASIX) 40 MG tablet Take 1 tablet by mouth 2 times daily 8/26/20  Yes Bekah Webber MD   hydrALAZINE (APRESOLINE) 100 MG tablet TAKE ONE TABLET BY MOUTH THREE TIMES A DAY 8/26/20  Yes Bekah Webber MD   lansoprazole (PREVACID) 15 MG delayed release capsule Take 1 capsule by mouth daily 8/26/20  Yes Bekah Webber MD   levothyroxine (SYNTHROID) 50 MCG tablet TAKE ONE TABLET BY MOUTH DAILY 8/26/20  Yes Bekah Webber MD montelukast (SINGULAIR) 10 MG tablet Take 1 tablet by mouth daily 8/26/20  Yes Lesley Webber MD   atorvastatin (LIPITOR) 40 MG tablet TAKE ONE TABLET BY MOUTH DAILY 7/15/20  Yes Lesley Webber MD   budesonide-formoterol [facility]) 160-4.5 MCG/ACT AERO Inhale 2 puffs into the lungs 2 times daily 2/21/20  Yes Evelyn Hong,    cloNIDine (CATAPRES) 0.2 MG tablet Take 1 tablet by mouth 3 times daily  Patient taking differently: Take 0.2 mg by mouth 2 times daily Patient said she is only taking twice a day 2/21/20  Yes Evelyn Hong, DO   butalbital-acetaminophen-caffeine (FIORICET, ESGIC) -40 MG per tablet Take 1 tablet by mouth every 6 hours as needed for Headaches 10/17/19  Yes HENRIQUE Madden, DO   aspirin 81 MG tablet Take 1 tablet by mouth daily 7/26/19  Yes Suresh De Souza MD        CURRENT Medications:      tiotropium (SPIRIVA RESPIMAT) 2.5 MCG/ACT inhaler 2 puff, Daily      montelukast (SINGULAIR) tablet 10 mg, Daily      LORazepam (ATIVAN) tablet 1 mg, Daily PRN      levothyroxine (SYNTHROID) tablet 50 mcg, Daily      pantoprazole (PROTONIX) tablet 40 mg, QAM AC      ipratropium-albuterol (DUONEB) nebulizer solution 3 mL, Q4H PRN      hydrALAZINE (APRESOLINE) tablet 100 mg, TID      budesonide-formoterol (SYMBICORT) 80-4.5 MCG/ACT inhaler 2 puff, BID      cloNIDine (CATAPRES) tablet 0.2 mg, TID      citalopram (CELEXA) tablet 40 mg, Daily      carvedilol (COREG) tablet 12.5 mg, BID WC      atorvastatin (LIPITOR) tablet 40 mg, Daily      aspirin chewable tablet 81 mg, Daily      apixaban (ELIQUIS) tablet 2.5 mg, BID      albuterol (PROVENTIL) nebulizer solution 2.5 mg, Q6H PRN      sodium chloride flush 0.9 % injection 10 mL, 2 times per day      sodium chloride flush 0.9 % injection 10 mL, PRN      ondansetron (ZOFRAN) injection 4 mg, Q4H PRN      polyethylene glycol (GLYCOLAX) packet 17 g, Daily PRN      acetaminophen (TYLENOL) tablet 650 mg, Q4H PRN    Or      acetaminophen (TYLENOL) suppository 650 mg, Q4H PRN      furosemide (LASIX) injection 40 mg, BID        Allergies: Iv dye [iodides]           Review of Systems: SEE HPI   · Constitutional: no unanticipated weight loss. There's been no change in energy level, sleep pattern, or activity level. No fevers, chills. · Eyes: No visual changes or diplopia. No scleral icterus. · ENT: No Headaches, hearing loss or vertigo. No mouth sores or sore throat. · Cardiovascular: No Chest pain, tightness or discomfort.  Shortness of breath. No Dyspnea on exertion, Orthopnea, Paroxysmal nocturnal dyspnea or breathlessness at rest.   No Palpitations.  No Syncope ('blackouts', 'faints', 'collapse') or dizziness. · Respiratory: No cough or wheezing, no sputum production. No hematemesis. · Gastrointestinal: No abdominal pain, appetite loss, blood in stools. No change in bowel or bladder habits. · Genitourinary: No dysuria, trouble voiding, or hematuria. · Musculoskeletal:  No gait disturbance, no joint complaints. · Integumentary: No rash or pruritis. · Neurological: No headache, diplopia, change in muscle strength, numbness or tingling. · Psychiatric: No anxiety or depression. · Endocrine: No temperature intolerance. No excessive thirst, fluid intake, or urination. No tremor. · Hematologic/Lymphatic: No abnormal bruising or bleeding, blood clots or swollen lymph nodes. · Allergic/Immunologic: No nasal congestion or hives. Objective:     PHYSICAL EXAM:      Vitals:    01/06/21 0858   BP: (!) 134/51   Pulse: 60   Resp: 20   Temp: 97.8 °F (36.6 °C)   SpO2: 98%    Weight: 169 lb 5 oz (76.8 kg)       General Appearance:  Alert, cooperative, no distress, appears stated age. Head:  Normocephalic, without obvious abnormality, atraumatic. Eyes:  Pupils equal and round. No scleral icterus. Mouth: Moist mucosa, no pharyngeal erythema. Nose: Nares normal. No drainage or sinus tenderness.    Neck: Supple, symmetrical, trachea midline. No adenopathy. No tenderness/mass/nodules. No carotid bruit or elevated JVD. Lungs:   Respiratory Effort: Normal   Auscultation: Clear to auscultation bilaterally, respirations unlabored. No wheeze, rales   Chest Wall:  No tenderness or deformity. Cardiovascular:    Pulses  Palpation: normal   Ascultation: Regular rate, S1/ S2 normal. No murmur, rub, or gallop. 2+ radial and pedal pulses, symmetric  Carotid  Femoral   Abdomen and Gastrointestinal:   Soft, non-tender, bowel sounds active. Liver and Spleen  Masses   Musculoskeletal: No muscle wasting  Back  Gait   Extremities: Extremities normal, atraumatic. No cyanosis or edema. No cyanosis clubbing       Skin: Inspection and palpation performed, no rashes or lesions. Pysch: Normal mood and affect.  Alert and oriented to time place person   Neurologic: Normal gross motor and sensory exam.       Labs     All labs have been reviewed    Lab Results   Component Value Date    WBC 9.8 01/06/2021    RBC 3.48 01/06/2021    HGB 8.4 01/06/2021    HCT 27.3 01/06/2021    MCV 78.6 01/06/2021    RDW 18.1 01/06/2021     01/06/2021     Lab Results   Component Value Date     01/06/2021    K 3.5 01/06/2021    CL 93 01/06/2021    CO2 38 01/06/2021    BUN 25 01/06/2021    CREATININE 1.1 01/06/2021    GFRAA 59 01/06/2021    AGRATIO 1.5 10/30/2020    LABGLOM 49 01/06/2021    GLUCOSE 114 01/06/2021    PROT 6.6 10/30/2020    CALCIUM 9.2 01/06/2021    BILITOT <0.2 10/30/2020    ALKPHOS 46 10/30/2020    AST 17 10/30/2020    ALT 16 10/30/2020     No results found for: PTINR  Lab Results   Component Value Date    LABA1C 5.5 10/11/2019     Lab Results   Component Value Date    CKTOTAL 43 09/26/2019    TROPONINI 0.02 (H) 01/05/2021       Cardiac, Vascular and Imaging Data all Personally Reviewed in Detail by Myself      EKG: Baseline artifactNormal sinus rhythmMinimal voltage criteria for LVH, may be normal variantNonspecific ST and T wave abnormalityProlonged QTAbnormal ECGWhen compared with ECG of 05-JAN-2021 05:10,No significant change was foundConfirmed by ROHIT GARCES, Lily Keating ([id number]) on 1/5/2021 12:49:58 PM    Echocardiogram:  Left ventricular cavity size is small. There is mildly increased left ventricular wall thickness. Overall left ventricular systolic function appears normal.   Ejection fraction is visually estimated to be 60-65%. The left atrial size is normal.   Normal right ventricular size and function. Assessment and Plan     -Acute diastolic congestive heart failure. Ambulate   Continue diuretic therapy. Patient has improved. She probably can be discharged today. Paroxysmal atrial fibrillation with bleeding complication. Left atrial appendage closure with watchman device. Patient will follow up with us in office in cardiology clinic. Thank you for allowing us to participate in the care of [place] [id number]. Please do not hesitate to contact me if you have any questions.     Kelly Arthur MD, MPH    [address] Torres StreetSrinath Sorensen [place] [address]  Ph: [phone]  Fax: [phone]    1/6/2021 1:01 PM

## 2021-01-06 NOTE — CARE COORDINATION
Discharge Planning:  KELSY was able to determined that this pt received her O2 through [employer]. [employer] stated that they are the company that provides the bipap supplies to this pt. [employer] informed this SW that this pt is eligible for a new bipap but would need orders and a previous sleep study. KELSY contacted dr. Juan José Talamantes office to obtain the sleep study and then faxed the orders along with demographics and the sleep study to [facility] at [phone]. KELSY met with pt to notify pt that [facility] will be calling her to arrange her new bipap. No other needs noted at this time.   Jennifer Villalpando, [place]  [phone]  Electronically signed by Deysi Stevens on 1/6/2021 at 4:38 PM

## 2021-01-06 NOTE — DISCHARGE SUMMARY
Hospitalist Discharge Summary    Patient ID:  Sindy Adams  [id number]  89 y.o.  1945    Admit date: 1/5/2021    Discharge date: 1/6/2021    Disposition: home    Admission Diagnoses:   Patient Active Problem List   Diagnosis    Fracture, metacarpal, neck    [facility]) [facility]    CAD (coronary artery disease)    HTN (hypertension)    Diastolic dysfunction    RLS (restless legs syndrome)    History of tobacco use    Centrilobular emphysema (HCC)    Colovesical fistula    Diverticulitis large intestine w/o perforation or abscess w/bleeding    Large bowel obstruction [facility]    ANGIE treated with BiPAP    CKD (chronic kidney disease), stage III - sees Dr. Albert Cage    COPD, severe ([facility]    Colonic obstruction [facility]    Carotid artery stenosis without cerebral infarction, bilateral    [facility]    Dyspnea and respiratory abnormalities    Hyperlipidemia    Paroxysmal atrial fibrillation (HCC)    Hypothyroidism, unspecified    Chest pain    Symptomatic anemia    Anxiety    History of GI bleed    Iron deficiency anemia due to chronic blood loss    Presence of Watchman left atrial appendage closure device    Atrial fibrillation (HCC)    Pulmonary edema, acute ([facility]       Discharge Diagnoses: Active Problems:    Pulmonary edema, acute (HCC)  Resolved Problems:    * No resolved hospital problems. *      Code Status:  Full Code    Condition:  Stable    Discharge Diet: Diet:  DIET LOW SODIUM 2 GM; 1500 ml    PCP to do list:  Routine follow up    Hospital Course:     Acute on chronic hypoxic resp failure, acute on chronic diastolic HF  70 y. o. female with a past medical history of coronary artery disease, diastolic heart failure, COPD, chronic respiratory failure, ANGIE on BiPAP, peripheral vascular disease, hypertension, hyperlipidemia and A. fib who presents to [facility] with worsening shortness of breath and respiratory distress.  Patient had a watchman procedure done on 1/4/2021. Swapna Hodges thinks she might have got IV fluids during the procedure and presents with dyspnea along with swelling of her hands and legs.  In the ER she was in respiratory distress and hypoxic and was placed on BiPAP. On 3L at baseline and uses BiPAP for ANGIE. Got IV lasix with resolution of symptoms. Seen by Dr. Glorious Olszewski. Limited TTE 1/4 post watchman with normal EF, no effusion. Procal low. A. fib   Sinus here.  S/p watchman procedure 1/4. Seen by Dr. Glorious Olszewski. Continue Eliquis     COPD with chronic respiratory failure  On 2-3 L oxygen at baseline. Not in acute exacerbation. Continue home inhalers    ANGIE on BiPAP  Per SW patient left her BiPAP in [place]. Order placed for replacement machine.      Hypertension  Stable. Continue home meds. Hyperlipidemia  Continue statin. Hypothyroid  Continue home synthroid.     History of chronic anemia  Stable. Discharge Medications:   Current Discharge Medication List        Current Discharge Medication List        Current Discharge Medication List      CONTINUE these medications which have NOT CHANGED    Details   apixaban (ELIQUIS) 5 MG TABS tablet Take 0.5 tablets by mouth 2 times daily  Qty: 180 tablet, Refills: 0    Associated Diagnoses: Carotid artery stenosis without cerebral infarction, bilateral      LORazepam (ATIVAN) 1 MG tablet TAKE ONE TABLET BY MOUTH DAILY AS NEEDED FOR ANXIETY  Qty: 30 tablet, Refills: 0    Associated Diagnoses: Anxiety      mineral oil-hydrophilic petrolatum (HYDROPHOR) ointment Apply topically as needed.   Qty: 1 Tube, Refills: 5      carvedilol (COREG) 12.5 MG tablet Take 1 tablet by mouth 2 times daily (with meals)  Qty: 180 tablet, Refills: 0    Associated Diagnoses: Essential hypertension      ipratropium-albuterol (DUONEB) 0.5-2.5 (3) MG/3ML SOLN nebulizer solution Inhale 3 mLs into the lungs every 4 hours as needed for Shortness of Breath  Qty: 360 mL, Refills: 5      fluticasone-vilanterol (BREO ELLIPTA) 100-25 MCG/INH AEPB inhaler Inhale 1 puff into the lungs daily  Qty: 1 each, Refills: 5      tiotropium (SPIRIVA RESPIMAT) 2.5 MCG/ACT AERS inhaler Inhale 2 puffs into the lungs daily  Qty: 1 Inhaler, Refills: 5      albuterol sulfate HFA (VENTOLIN HFA) 108 (90 Base) MCG/ACT inhaler Inhale 2 puffs into the lungs every 4 hours as needed for Wheezing or Shortness of Breath  Qty: 1 Inhaler, Refills: 5      traZODone (DESYREL) 50 MG tablet Take 1 tablet by mouth nightly as needed for Sleep  Qty: 30 tablet, Refills: 5    Associated Diagnoses: Sleep disturbance      lidocaine (XYLOCAINE) 2 % jelly Apply small amount topically to affected area every 8 hours when necessary pain  Qty: 1 Tube, Refills: 11      diclofenac sodium (VOLTAREN) 1 % GEL Apply 2 g topically 4 times daily  Qty: 2 Tube, Refills: 1    Associated Diagnoses: Chronic neck pain      citalopram (CELEXA) 40 MG tablet TAKE ONE TABLET BY MOUTH DAILY  Qty: 90 tablet, Refills: 0    Associated Diagnoses: Positive depression screening      furosemide (LASIX) 40 MG tablet Take 1 tablet by mouth 2 times daily  Qty: 180 tablet, Refills: 0    Associated Diagnoses: Acute pulmonary edema (HCC)      hydrALAZINE (APRESOLINE) 100 MG tablet TAKE ONE TABLET BY MOUTH THREE TIMES A DAY  Qty: 270 tablet, Refills: 0    Associated Diagnoses: Essential hypertension      lansoprazole (PREVACID) 15 MG delayed release capsule Take 1 capsule by mouth daily  Qty: 90 capsule, Refills: 0    Associated Diagnoses: Gastroesophageal reflux disease, esophagitis presence not specified      levothyroxine (SYNTHROID) 50 MCG tablet TAKE ONE TABLET BY MOUTH DAILY  Qty: 90 tablet, Refills: 0    Associated Diagnoses: Hypothyroidism, unspecified type      montelukast (SINGULAIR) 10 MG tablet Take 1 tablet by mouth daily  Qty: 90 tablet, Refills: 0    Associated Diagnoses: Centrilobular emphysema (HCC)      atorvastatin (LIPITOR) 40 MG tablet TAKE ONE TABLET BY MOUTH DAILY  Qty: 90 tablet, Refills: 1 budesonide-formoterol (SYMBICORT) 160-4.5 MCG/ACT AERO Inhale 2 puffs into the lungs 2 times daily  Qty: 1 Inhaler, Refills: 3      cloNIDine (CATAPRES) 0.2 MG tablet Take 1 tablet by mouth 3 times daily  Qty: 60 tablet, Refills: 3      butalbital-acetaminophen-caffeine (FIORICET, ESGIC) -40 MG per tablet Take 1 tablet by mouth every 6 hours as needed for Headaches  Qty: 16 tablet, Refills: 0      aspirin 81 MG tablet Take 1 tablet by mouth daily  Qty: 30 tablet, Refills: 3           Current Discharge Medication List          Procedures: None     Assessment on Discharge: Stable, improved     Discharge Exam:  BP (!) 134/51   Pulse 60   Temp 97.8 °F (36.6 °C) (Oral)   Resp 20   Ht 5' (1.524 m)   Wt 169 lb 5 oz (76.8 kg)   LMP 05/01/2006 (Approximate)   SpO2 98%   BMI 33.07 kg/m²     General appearance: No apparent distress, appears stated age and cooperative. HEENT: Pupils equal, round, and reactive to light. Conjunctivae/corneas clear. Neck: Supple, with full range of motion. Trachea midline. Respiratory:  Normal respiratory effort. Clear to auscultation, bilaterally without Rales/Wheezes/Rhonchi. Cardiovascular: Regular rate and rhythm with normal S1/S2 without murmurs, rubs or gallops. Abdomen: Soft, non-tender, non-distended with normal bowel sounds. Ostomy in place  Musculoskelatal: No clubbing, cyanosis or edema bilaterally. Full range of motion without deformity. Skin: Skin color, texture, turgor normal.  No rashes or lesions. Neurologic:  Neurovascularly intact without any focal sensory/motor deficits.  Cranial nerves: II-XII intact, grossly non-focal.  Psychiatric: Alert and oriented, thought content appropriate, normal insight    Pertinent Studies During Hospital Stay:    Radiology:  Echocardiogram Transesophageal    Result Date: 1/5/2021  Transesophageal Echocardiography Report (MIKO)  Demographics   Patient Name       Montse Quinones   Date of Study      01/04/2021       Gender Female   Patient Number     [id number]       Date of Birth        1945   Visit Number       [id number]        Age                  70 year(s)   Accession Number   [id number]       Room Number          [facility]   Corporate ID       [id number]         Sonographer          GIGI Key, RD, T   Ordering Physician Julisa Weeks MD  Interpreting         [address] [place] [address].                                      Physician            Frandy Gamez MD  Procedure Type of Study   MIKO procedure:ECHOCARDIOGRAM TRANSESOPHAGEAL. Procedure Date Date: 01/04/2021 Start: 02:37 PM Study Location: OR Technical Quality: Adequate visualization Indications:Atrial fibrillation. Patient Status: Routine BP: 157/68 mmHg MIKO Performed By: the attending and the sonographer  Type of Anesthesia: General anesthesia   Conclusions   Summary  There is no significant pericardial effusion at the start of the procedure. The left atrium and appendage appear free of thrombus. Appendage ostial measurements  43 degrees 1.77cm  92 degrees 1.77cm  97 degrees 1.82cm  154 degrees 1.88cm  175 degrees 1.94cm  A 27mm device is deployed into the left atrial appendage. The device appears  adequately compressed. There is no evidence of leak by color flow. There is no change in effusion upon completion of the procedure. Signature   ------------------------------------------------------------------  Electronically signed by Frandy Gamez MD (Interpreting  physician) on 01/05/2021 at 11:13 AM  ------------------------------------------------------------------   Findings   Left Atrium  The left atrium and appendage appear free of thrombus. Appendage ostial measurements  43 degrees 1.77cm  92 degrees 1.77cm  97 degrees 1.82cm  154 degrees 1.88cm  175 degrees 1.94cm  A 27mm device is deployed into the left atrial appendage. The device appears  adequately compressed.  There is no evidence of leak by color flow. Pericardial Effusion  There is no significant pericardial effusion at the start of the procedure. There is no change in effusion upon completion of the procedure. Echocardiogram Transesophageal    Result Date: 1/5/2021  Transthoracic Echocardiography Report (TTE)  Demographics   Patient Name       David Drake   Date of Study      01/04/2021       Gender               Female   Patient Number     [id number]       Date of Birth        1945   Visit Number       [id number]        Age                  70 year(s)   Accession Number   [id number]       Room Number          SHARONDA Leslieate ID       [id number]         Sonographer          GIGI Ramirez, RDCS, RVT   Ordering Physician Kylie Meléndez MD  Interpreting         [address] [place] [address].                                      Physician            Abdifatah Barron MD  Procedure Type of Study   TTE procedure:ECHOCARDIOGRAM LIMITED. Procedure Date Date: 01/04/2021 Start: 04:46 PM Study Location: OR Technical Quality: Adequate visualization Indications:Atrial fibrillation. BP: 157/68 mmHg  Conclusions   Summary  Limited study performed to assess for pericardial effusion. There is no evidence of a significant effusion. Signature   ------------------------------------------------------------------  Electronically signed by Abdifatah Barron MD (Interpreting  physician) on 01/05/2021 at 09:56 AM  ------------------------------------------------------------------   Findings   Pericardial Effusion  Limited study performed to assess for pericardial effusion. There is no evidence of a significant effusion.       Xr Chest Portable    Result Date: 1/5/2021  EXAMINATION: ONE XRAY VIEW OF THE CHEST 1/5/2021 5:08 am COMPARISON: 11/11/2020 HISTORY: ORDERING SYSTEM PROVIDED HISTORY: SOB TECHNOLOGIST PROVIDED HISTORY: Reason for exam:->SOB Reason for Exam: SOB Acuity: Acute Type of Exam: Initial FINDINGS: Relatively homogeneous diffuse airspace opacities are seen throughout both lungs. Lingular scarring is again noted. The heart size is at the upper limits of normal.  There is no large pleural effusion or definite evidence for pneumothorax. Pulmonary edema versus atypical pneumonia.        Last Labs on Discharge:     Recent Results (from the past 24 hour(s))   Basic Metabolic Panel w/ Reflex to MG    Collection Time: 01/06/21  4:53 AM   Result Value Ref Range    Sodium 139 136 - 145 mmol/L    Potassium reflex Magnesium 3.5 3.5 - 5.1 mmol/L    Chloride 93 (L) 99 - 110 mmol/L    CO2 38 (H) 21 - 32 mmol/L    Anion Gap 8 3 - 16    Glucose 114 (H) 70 - 99 mg/dL    BUN 25 (H) 7 - 20 mg/dL    CREATININE 1.1 0.6 - 1.2 mg/dL    GFR Non- 49 (A) >60    GFR  59 (A) >60    Calcium 9.2 8.3 - 10.6 mg/dL   CBC auto differential    Collection Time: 01/06/21  4:53 AM   Result Value Ref Range    WBC 9.8 4.0 - 11.0 K/uL    RBC 3.48 (L) 4.00 - 5.20 M/uL    Hemoglobin 8.4 (L) 12.0 - 16.0 g/dL    Hematocrit 27.3 (L) 36.0 - 48.0 %    MCV 78.6 (L) 80.0 - 100.0 fL    MCH 24.3 (L) 26.0 - 34.0 pg    MCHC 30.9 (L) 31.0 - 36.0 g/dL    RDW 18.1 (H) 12.4 - 15.4 %    Platelets 699 317 - 534 K/uL    MPV 8.8 5.0 - 10.5 fL    Neutrophils % 78.2 %    Lymphocytes % 15.0 %    Monocytes % 6.1 %    Eosinophils % 0.2 %    Basophils % 0.5 %    Neutrophils Absolute 7.7 1.7 - 7.7 K/uL    Lymphocytes Absolute 1.5 1.0 - 5.1 K/uL    Monocytes Absolute 0.6 0.0 - 1.3 K/uL    Eosinophils Absolute 0.0 0.0 - 0.6 K/uL    Basophils Absolute 0.0 0.0 - 0.2 K/uL   Magnesium    Collection Time: 01/06/21  4:53 AM   Result Value Ref Range    Magnesium 1.80 1.80 - 2.40 mg/dL   Procalcitonin    Collection Time: 01/06/21  4:53 AM   Result Value Ref Range    Procalcitonin 0.03 0.00 - 0.15 ng/mL         Follow up: with Angelia Webber MD    Note that more  than 30 minutes was spent in preparing discharge papers, discussing discharge with patient, medication review, etc.    Thank you Claudio Lainez MD for the opportunity to be involved in this patient's care. If you have any questions or concerns please feel free to contact me at [phone]. Electronically signed by Abena Hardy MD on 1/6/2021 at 11:23 AM    This note was transcribed using [id number] [employer]. Please disregard any translational errors.

## 2021-01-06 NOTE — PROGRESS NOTES
Physical Therapy  Facility/Department: [address] PROGRESSIVE CARE  Initial Assessment    NAME: Viktor Lentz  : 1945  MRN: [id number]    Date of Service: 2021  Discharge Recommendations:  Home with assist PRN, Home with Home health PT   PT Equipment Recommendations  Equipment Needed: Yes  Mobility Devices: Omelia Soda: Rollator (4 Wheeled)    Assessment   Body structures, Functions, Activity limitations: Decreased functional mobility   Assessment: Pt presents with mildly decreased functional mobility after admission for \"shortness of breath and respiratory distress\". Of note, patient had a \"watchman procedure done on 2021. Patient thinks she might have got IV fluids during the procedure and presents with dyspnea along with swelling of her hands and legs. In the ER she was in respiratory distress and hypoxic needing 3 L of oxygen and was placed on BiPAP. \"  This date, pt participated in therapy session that included bed mobility, transfers and ambulation in room distances x 30-40 ft without device, and Supervision. Pt reported lower endurance and that she cannot ambulate community distances without being short of breath. Feel pt could benefit from obtaining Rollator walker for these community distances. Anticipating d/c to Home with assist of family and recommended Home PT follow up. Will cont to follow until d/c from acute setting. Viktor Lentz scored a 22/24 on the AM-PAC short mobility form. Current research shows that an AM-PAC score of 18 or greater is typically associated with a discharge to the patient's home setting. Based on the patient's AM-PAC score and their current functional mobility deficits, it is recommended that the patient have 2-3 sessions per week of Physical Therapy at d/c to increase the patient's independence. At this time, this patient demonstrates the endurance and safety to discharge home with HOME PT and a follow up treatment frequency of 2-3x/wk.   Please see assessment section for further patient specific details. If patient discharges prior to next session this note will serve as a discharge summary. Please see below for the latest assessment towards goals. Prognosis: Good  Decision Making: Medium Complexity  History: as noted  Exam: as above  Clinical Presentation: evolving  PT Education: General Safety;Goals;PT Role;Functional Mobility Training;Equipment  Barriers to Learning: none  REQUIRES PT FOLLOW UP: Yes  Activity Tolerance  Activity Tolerance: Patient limited by endurance       Patient Diagnosis(es): The primary encounter diagnosis was Acute respiratory failure with hypoxia ([address] Diagnoses of Acute pulmonary edema ([address] Flash pulmonary edema ([address] Hypertensive urgency, and ANGIE treated with BiPAP were also pertinent to this visit. has a past medical history of Atrial fibrillation ([address] CAD (coronary artery disease), CHF (congestive heart failure) ([address] Colostomy care ([address] COPD (chronic obstructive pulmonary disease) ([facility] Hyperlipidemia, Hypertension, Kidney disease, Peripheral vascular disease ([address] Rectal fissure, Restless legs syndrome, Sleep apnea, and Unspecified sleep apnea. has a past surgical history that includes Cholecystectomy; hernia repair; Coronary artery bypass graft; Cardiac catheterization (09/13/2017); colostomy (03/09/2018); and Upper gastrointestinal endoscopy (N/A, 9/30/2019).     Restrictions  Position Activity Restriction  Other position/activity restrictions: 3L O2 via NC  Vision/Hearing  Vision: Within Functional Limits  Hearing: Within functional limits     Subjective  General  Chart Reviewed: Yes  Patient assessed for rehabilitation services?: Yes  Additional Pertinent Hx: Per H&P, \"The patient is a 70 y.o. female with a past medical history of coronary artery disease, diastolic heart failure, COPD, chronic respiratory failure, ANGIE on BiPAP, peripheral vascular disease, hypertension, hyperlipidemia and A. fib who presents to [facility] with worsening shortness of breath and respiratory distress. Patient had a watchman procedure done on 1/4/2021. Patient thinks she might have got IV fluids during the procedure and presents with dyspnea along with swelling of her hands and legs. In the ER she was in respiratory distress and hypoxic needing 3 L of oxygen and was placed on BiPAP. \"  Family / Caregiver Present: No  Referring Practitioner: Horacio  Subjective  Subjective: Pt in supine and agreed to therapy session. Pt in general reported feeling \"aj weak and off\". Pt reported getting up to/from [facility] ad eda without difficulty. Pt reported at baseline wearing 2-3L home O2 at rest--but that she turns up to 5 L O2 at times with activity.      Orientation  Orientation  Overall Orientation Status: Within Functional Limits  Social/Functional History  Social/Functional History  Lives With: Family(granddtr)  Type of Home: Apartment  Home Layout: One level  Home Access: Stairs to enter with rails  Entrance Stairs - Number of Steps: 5 + 4  Bathroom Shower/Tub: Tub/Shower unit  Bathroom Toilet: Standard  Home Equipment: Oxygen(2-3L at rest, 5L with activity; otherwise no prior DME)  ADL Assistance: Independent  Homemaking Assistance: Needs assistance(granddtr completes)  Ambulation Assistance: Independent  Transfer Assistance: Independent  Active : Yes(car is broken down, uses [facility] transport)  Additional Comments: denies falls     Objective  AROM RLE (degrees)  RLE AROM: WFL  AROM LLE (degrees)  LLE AROM : WFL  Strength RLE  Strength RLE: WFL  Strength LLE  Strength LLE: WFL     Bed mobility  Supine to Sit: Supervision(HOB elevated)  Scooting: Supervision  Transfers  Sit to Stand: Supervision  Stand to sit: Supervision  Ambulation  Ambulation?: Yes  Ambulation 1  Surface: level tile  Device: No Device  Assistance: Supervision  Quality of Gait: stady gait without LOB--however, pt with lower endurance, needing seated rests as needed. Pt able to accomodate shorter household distances at this time--however, longer community distances may be more difficult. Gait Deviations: None  Distance: x 20 ft to bathroom, x 30-40 ft in room out of bathroom up to recliner. Comments: Pt remained up/oob with LE elevation, call light and needs in reach. Discussed rec for Home PT at least initially at d/c--then possible transition to cardio-pulm rehab. Pt seemed in agreement with this POC as well. Pt also inquiring about walker with a seat/rollator for d/c.  PT agreed that pt could also benefit from this, as her endurance at present time does not allow community distances. SW contacted to attempt to obtain script for this for pt at d/c. Stairs/Curb  Stairs?: No     Balance  Sitting - Static: Good  Sitting - Dynamic: Good  Standing - Static: Good  Standing - Dynamic: Good;-  Comments: S/I sitting eob, Sup stance and amb without AD. Plan   Plan  Times per week: 3-5 x wk in acute setting  Current Treatment Recommendations: Functional Mobility Training  Safety Devices  Type of devices: Left in chair, Gait belt, Call light within reach, Nurse notified    AM-PAC Score  AM-PAC Inpatient Mobility Raw Score : 22 (01/06/21 1407)  AM-PAC Inpatient T-Scale Score : 53.28 (01/06/21 1407)  Mobility Inpatient CMS 0-100% Score: 20.91 (01/06/21 1407)  Mobility Inpatient CMS G-Code Modifier : Carolina Al (01/06/21 1407)     Goals  Short term goals  Time Frame for Short term goals: by acute d/c:  Short term goal 1: trasnfers Ind  Short term goal 2: amb > 50 ft with LRAD and S/Mod I , no lob. Short term goal 3: assess steps as needed. Patient Goals   Patient goals : pt's goal is return home.      Therapy Time   Individual Concurrent Group Co-treatment   Time In 1310         Time Out 1350         Minutes [facility] Electronically signed by Bart Dean PT on 1/6/2021 at 2:09 PM

## 2021-01-06 NOTE — CARE COORDINATION
DISCHARGE PLAN: Pt plans to return home upon d/c. Sister to transport her home. [facility] for DME and bipap.  ___________________________________    Preethi Connors w/pt to address barriers to dc. HOME: Pt reported that she resides in an apartment with her grand daughter. There are 6 SHARAD. Disease Specific: Pt reported thatmelissae was at [facility] for a Watchman procedure on 1/4 and returned with dyspnea. DME/O2: Pt reported [facility], that she recieves O2 through [facility]. .  Pt stated that she also has a bipap but recently left this bipap in [place] at a [facility] and it is in the process of being mailed back to her. No other DME noted. KELSY spoke with Herman De Souza from [employer] who stated that this agency does not provide the oxygen to this pt. Herman De Souza stated that if the orders for the bipap are received with the appropriate settings, a bi pap can be rented but the cost with supplies will likely be over $400.00 per month. SW spoke with pt who stated that she is sure her oxygen is through [employer]. Pt stated that she has a Oxygo for the ride home. KELSY asked pt about where she obtains her supplies. Pt stated that she is unsure but does have a number in her phone. KELSY called the number [phone] and was directed to [address]. [address] confirmed that they are the providers for the O2 but stated that the bi pap is over 11years old and they did not provide the original bi pap. [facility] stated that pt is eligible for a new bi pap and supplies with a new order. ACTIVE SERVICES: Pt reported that she was independent with all self care PTA. Pt denied the need for any assistance at home. Pt stated that she is worried about her breathing and would like a visiting RN at home. KELSY talked with Dr. Edilma Goldstein about this and Dr. Durel Current agreed to plan    TRANSPORTATION: Pt stated that she currently does not drive.   Pt stated that her sister transports her as needed and will transport her home from the hospital.    PHARMACY: denies difficulty obtaining/taking meds. PCP: Dr. Kiah Cristobal Day    DEMOGRAPHICS: verified address/phone number as correct    INSURANCE:  [employer]    HD/PD: No    THERAPY RECS    PHYSICAL THERAPY  \"Date of Service: 1/6/2021  Discharge Recommendations:  Home with assist PRN, Home with Home health PT   PT Equipment Recommendations  Equipment Needed: Yes  Mobility Devices: Marguarite Speed: Rollator (4 Wheeled)\"    OCCUPATIONAL THERAPY  \"Date of Service: 1/6/2021     Discharge Recommendations:  Home with Home health OT, S Level 1, Home with assist PRN  OT Equipment Recommendations  Equipment Needed: Yes  Mobility Devices: ADL Assistive Devices  ADL Assistive Devices: Shower Chair without back  Other: pt would benefit from shower chair in order to promote energy conservatio, safety, and reduce risk of falls during bathing d/t pt's decreased activity tolerance and generalized weakness  Madeline Hall scored a 21/24 on the AM-PAC ADL Inpatient form. Current research shows that an AM-PAC score of 18 or greater is typically associated with a discharge to the patient's home setting. Based on the patient's AM-PAC score, and their current ADL deficits, it is recommended that the patient have 2-3 sessions per week of Occupational Therapy at d/c to increase the patient's independence.  At this time, this patient demonstrates the endurance and safety to discharge home with home OT and a follow up treatment frequency of 2-3x/wk. Please see assessment section for further patient specific details. \"    The Plan for Transition of Care is related to the following treatment goals: \"Going home upon d/c. \"    The Patient was provided with a choice of provider and agrees   with the discharge plan. [x] Yes [] No    Freedom of choice list was provided with basic dialogue that supports the patient's individualized plan of care/goals, treatment preferences and shares the quality data associated with the providers.  [x] Yes [] No    Pt stated that she has no agency preference. Referral called to [facility] [phone]. KELSY spoke with Lian Dickerson at [facility]. Order and AVS faxed to [facility]. KELSY spoke with Elizabeth Lancaster at [facility] to inform her that this pt will need a rollator and a shower chair. Elizabeth Lancaster agreed to look into this. Discharge planning team will remain available for needs. Please consult for any specifics not addressed in this note.     Roc Hollis [place]  [phone]  Electronically signed by Thu Lovell on 1/6/2021 at 2:19 PM

## 2021-01-07 ENCOUNTER — TELEPHONE (OUTPATIENT)
Dept: FAMILY MEDICINE CLINIC | Age: 76
End: 2021-01-07

## 2021-01-07 ENCOUNTER — TELEPHONE (OUTPATIENT)
Dept: PULMONOLOGY | Age: 76
End: 2021-01-07

## 2021-01-07 NOTE — TELEPHONE ENCOUNTER
Luis Cardona called requesting an office visit stating patient has been on a BIPAP. Dr. Sherri Hsieh does not follow for ANGIE. I looked at the patient's chart and she last saw Dr. Josselyn Araujo in 2015. I advised Luis Cardona of this.

## 2021-01-07 NOTE — TELEPHONE ENCOUNTER
Calling because pt is wanting a new bipap machine but [facility] needs OV notes stating the pt has been using the BiPAP machine in the last  6 months   Please advise   Fax to [phone]

## 2021-01-07 NOTE — CARE COORDINATION
Recd a call from [facility] saying they need more into before able to provide a bi-pap. SW didn't have the info they were looking for and directed alli to Dr Candice Garrison, or primary MD or pt.     Electronically signed by [id number] CE Villegas on 1/7/2021 at 11:09 AM

## 2021-01-08 ENCOUNTER — TELEPHONE (OUTPATIENT)
Dept: FAMILY MEDICINE CLINIC | Age: 76
End: 2021-01-08

## 2021-01-08 NOTE — TELEPHONE ENCOUNTER
Erickson Patiño from [facility] called stating that she was supposed to start home care for this patient today, however the pt. Is refusing to start until next Tues. Because she has too many dr. Jaz Mendez. Erickson Patiño just wanted to let Dr. Diana Jesus know.     KITTY

## 2021-01-11 ENCOUNTER — TELEMEDICINE (OUTPATIENT)
Dept: FAMILY MEDICINE CLINIC | Age: 76
End: 2021-01-11
Payer: COMMERCIAL

## 2021-01-11 ENCOUNTER — TELEPHONE (OUTPATIENT)
Dept: FAMILY MEDICINE CLINIC | Age: 76
End: 2021-01-11

## 2021-01-11 DIAGNOSIS — E03.9 HYPOTHYROIDISM, UNSPECIFIED TYPE: ICD-10-CM

## 2021-01-11 DIAGNOSIS — M54.2 CHRONIC NECK PAIN: ICD-10-CM

## 2021-01-11 DIAGNOSIS — G47.9 SLEEP DISTURBANCE: ICD-10-CM

## 2021-01-11 DIAGNOSIS — G89.29 CHRONIC NECK PAIN: ICD-10-CM

## 2021-01-11 DIAGNOSIS — J43.2 CENTRILOBULAR EMPHYSEMA (HCC): ICD-10-CM

## 2021-01-11 DIAGNOSIS — I10 ESSENTIAL HYPERTENSION: ICD-10-CM

## 2021-01-11 DIAGNOSIS — K21.9 GASTROESOPHAGEAL REFLUX DISEASE, UNSPECIFIED WHETHER ESOPHAGITIS PRESENT: ICD-10-CM

## 2021-01-11 DIAGNOSIS — J96.11 CHRONIC RESPIRATORY FAILURE WITH HYPOXIA (HCC): Primary | ICD-10-CM

## 2021-01-11 PROCEDURE — G8399 PT W/DXA RESULTS DOCUMENT: HCPCS | Performed by: FAMILY MEDICINE

## 2021-01-11 PROCEDURE — 1111F DSCHRG MED/CURRENT MED MERGE: CPT | Performed by: FAMILY MEDICINE

## 2021-01-11 PROCEDURE — 3017F COLORECTAL CA SCREEN DOC REV: CPT | Performed by: FAMILY MEDICINE

## 2021-01-11 PROCEDURE — 1123F ACP DISCUSS/DSCN MKR DOCD: CPT | Performed by: FAMILY MEDICINE

## 2021-01-11 PROCEDURE — 99214 OFFICE O/P EST MOD 30 MIN: CPT | Performed by: FAMILY MEDICINE

## 2021-01-11 PROCEDURE — 1090F PRES/ABSN URINE INCON ASSESS: CPT | Performed by: FAMILY MEDICINE

## 2021-01-11 PROCEDURE — G8427 DOCREV CUR MEDS BY ELIG CLIN: HCPCS | Performed by: FAMILY MEDICINE

## 2021-01-11 PROCEDURE — 4040F PNEUMOC VAC/ADMIN/RCVD: CPT | Performed by: FAMILY MEDICINE

## 2021-01-11 RX ORDER — LIDOCAINE 50 MG/G
OINTMENT TOPICAL
Qty: 1 TUBE | Refills: 5 | Status: SHIPPED | OUTPATIENT
Start: 2021-01-11 | End: 2021-08-18 | Stop reason: SDUPTHER

## 2021-01-11 RX ORDER — HYDRALAZINE HYDROCHLORIDE 100 MG/1
TABLET, FILM COATED ORAL
Qty: 270 TABLET | Refills: 0 | Status: ON HOLD | OUTPATIENT
Start: 2021-01-11 | End: 2021-05-31 | Stop reason: SDUPTHER

## 2021-01-11 RX ORDER — CARVEDILOL 12.5 MG/1
12.5 TABLET ORAL 2 TIMES DAILY WITH MEALS
Qty: 180 TABLET | Refills: 0 | Status: SHIPPED | OUTPATIENT
Start: 2021-01-11 | End: 2021-02-24 | Stop reason: SDUPTHER

## 2021-01-11 RX ORDER — MONTELUKAST SODIUM 10 MG/1
10 TABLET ORAL DAILY
Qty: 90 TABLET | Refills: 0 | Status: SHIPPED | OUTPATIENT
Start: 2021-01-11 | End: 2021-06-30

## 2021-01-11 RX ORDER — LANSOPRAZOLE 15 MG/1
15 CAPSULE, DELAYED RELEASE ORAL DAILY
Qty: 90 CAPSULE | Refills: 0 | Status: SHIPPED | OUTPATIENT
Start: 2021-01-11 | End: 2021-12-27 | Stop reason: SDUPTHER

## 2021-01-11 RX ORDER — LEVOTHYROXINE SODIUM 0.05 MG/1
TABLET ORAL
Qty: 90 TABLET | Refills: 0 | Status: SHIPPED | OUTPATIENT
Start: 2021-01-11 | End: 2021-12-27 | Stop reason: SDUPTHER

## 2021-01-11 RX ORDER — ATORVASTATIN CALCIUM 40 MG/1
TABLET, FILM COATED ORAL
Qty: 90 TABLET | Refills: 1 | Status: SHIPPED | OUTPATIENT
Start: 2021-01-11 | End: 2021-10-08

## 2021-01-11 RX ORDER — CITALOPRAM 40 MG/1
TABLET ORAL
Qty: 90 TABLET | Refills: 0 | Status: SHIPPED | OUTPATIENT
Start: 2021-01-11 | End: 2021-05-28

## 2021-01-11 ASSESSMENT — PATIENT HEALTH QUESTIONNAIRE - PHQ9
2. FEELING DOWN, DEPRESSED OR HOPELESS: 0
SUM OF ALL RESPONSES TO PHQ QUESTIONS 1-9: 1
SUM OF ALL RESPONSES TO PHQ QUESTIONS 1-9: 0
SUM OF ALL RESPONSES TO PHQ QUESTIONS 1-9: 1
SUM OF ALL RESPONSES TO PHQ QUESTIONS 1-9: 0
SUM OF ALL RESPONSES TO PHQ QUESTIONS 1-9: 0
SUM OF ALL RESPONSES TO PHQ9 QUESTIONS 1 & 2: 0
1. LITTLE INTEREST OR PLEASURE IN DOING THINGS: 1

## 2021-01-11 NOTE — PROGRESS NOTES
1/11/2021    This is a 70 y.o. female   Chief Complaint   Patient presents with   [address] from [facility]     had the watchman     HPI     Recently discharged after being admitted for volume overload. Did well with diuresis. Taking lasix 80mg daily. Has a scale she has ordered to check daily weights that should be in soon. Reports significant improvement in her swelling. Needs refill of meds for chronic conditions.     Thyroid  On synthroid  Recent TSH 10/2020 normal    HTN  BP Readings from Last 3 Encounters:   01/06/21 (!) 117/44   01/04/21 (!) 150/53   01/04/21 (!) 182/96       Review of Systems     Past Medical History:   Diagnosis Date    Atrial fibrillation ([address] 1/4/2021    CAD (coronary artery disease)     Nonobstructive on cath in 9/2017    CHF (congestive heart failure) ([address]     Colostomy care ([address] 4/25/2018    Peristomal irritation and early leaking    COPD (chronic obstructive pulmonary disease) (HCC)     Hyperlipidemia     Hypertension     Kidney disease     Stage 3    Peripheral vascular disease ([address]     Rectal fissure 11/2014    surgery pending    Restless legs syndrome     Sleep apnea     O2 3 liters at hs    Unspecified sleep apnea        Past Surgical History:   Procedure Laterality Date    CARDIAC CATHETERIZATION  09/13/2017    Dr. Ernie Pham  03/09/2018    CORONARY ARTERY BYPASS GRAFT      Legs & Carotid    HERNIA REPAIR      UPPER GASTROINTESTINAL ENDOSCOPY N/A 9/30/2019    EGD DIAGNOSTIC ONLY performed by Iza John MD at [address] [place] [address]       Family History   Problem Relation Age of Onset    Heart Disease Mother     Heart Disease Father     Heart Disease Sister     Cancer Brother        Current Outpatient Medications   Medication Sig Dispense Refill    carvedilol (COREG) 12.5 MG tablet Take 1 tablet by mouth 2 times daily (with meals) 180 tablet 0    atorvastatin (LIPITOR) 40 MG tablet TAKE ONE TABLET BY MOUTH DAILY 90 tablet 1  montelukast (SINGULAIR) 10 MG tablet Take 1 tablet by mouth daily 90 tablet 0    lansoprazole (PREVACID) 15 MG delayed release capsule Take 1 capsule by mouth daily 90 capsule 0    citalopram (CELEXA) 40 MG tablet TAKE ONE TABLET BY MOUTH DAILY 90 tablet 0    levothyroxine (SYNTHROID) 50 MCG tablet TAKE ONE TABLET BY MOUTH DAILY 90 tablet 0    hydrALAZINE (APRESOLINE) 100 MG tablet TAKE ONE TABLET BY MOUTH THREE TIMES A  tablet 0    diclofenac sodium (VOLTAREN) 1 % GEL Apply pea sized amount topically daily as needed 100 g 5    lidocaine (XYLOCAINE) 5 % ointment Apply topically as needed. 1 Tube 5    apixaban (ELIQUIS) 5 MG TABS tablet Take 0.5 tablets by mouth 2 times daily 180 tablet 0    mineral oil-hydrophilic petrolatum (HYDROPHOR) ointment Apply topically as needed.  1 Tube 5    ipratropium-albuterol (DUONEB) 0.5-2.5 (3) MG/3ML SOLN nebulizer solution Inhale 3 mLs into the lungs every 4 hours as needed for Shortness of Breath 360 mL 5    fluticasone-vilanterol (BREO ELLIPTA) 100-25 MCG/INH AEPB inhaler Inhale 1 puff into the lungs daily 1 each 5    tiotropium (SPIRIVA RESPIMAT) 2.5 MCG/ACT AERS inhaler Inhale 2 puffs into the lungs daily 1 Inhaler 5    albuterol sulfate HFA (VENTOLIN HFA) 108 (90 Base) MCG/ACT inhaler Inhale 2 puffs into the lungs every 4 hours as needed for Wheezing or Shortness of Breath 1 Inhaler 5    traZODone (DESYREL) 50 MG tablet Take 1 tablet by mouth nightly as needed for Sleep 30 tablet 5    lidocaine (XYLOCAINE) 2 % jelly Apply small amount topically to affected area every 8 hours when necessary pain 1 Tube 11    furosemide (LASIX) 40 MG tablet Take 1 tablet by mouth 2 times daily 180 tablet 0    budesonide-formoterol (SYMBICORT) 160-4.5 MCG/ACT AERO Inhale 2 puffs into the lungs 2 times daily 1 Inhaler 3    cloNIDine (CATAPRES) 0.2 MG tablet Take 1 tablet by mouth 3 times daily 60 tablet 3    butalbital-acetaminophen-caffeine (FIORICET, ESGIC) -65 MG per tablet Take 1 tablet by mouth every 6 hours as needed for Headaches 16 tablet 0    aspirin 81 MG tablet Take 1 tablet by mouth daily 30 tablet 3     No current facility-administered medications for this visit. LMP 05/01/2006 (Approximate)     Physical Exam    Wt Readings from Last 3 Encounters:   01/06/21 169 lb 5 oz (76.8 kg)   12/07/20 172 lb (78 kg)   11/11/20 172 lb 9.6 oz (78.3 kg)       BP Readings from Last 3 Encounters:   01/06/21 (!) 117/44   01/04/21 (!) 150/53   01/04/21 (!) 182/96       Assessment/Plan:  1. Chronic respiratory failure with hypoxia (HCC)    2. Essential hypertension  - carvedilol (COREG) 12.5 MG tablet; Take 1 tablet by mouth 2 times daily (with meals)  Dispense: 180 tablet; Refill: 0  - hydrALAZINE (APRESOLINE) 100 MG tablet; TAKE ONE TABLET BY MOUTH THREE TIMES A DAY  Dispense: 270 tablet; Refill: 0  - BASIC METABOLIC PANEL; Future  - HEMOGLOBIN; Future    3. Centrilobular emphysema (HCC)  - montelukast (SINGULAIR) 10 MG tablet; Take 1 tablet by mouth daily  Dispense: 90 tablet; Refill: 0    4. Gastroesophageal reflux disease  - lansoprazole (PREVACID) 15 MG delayed release capsule; Take 1 capsule by mouth daily  Dispense: 90 capsule; Refill: 0  - HEMOGLOBIN; Future    5. Sleep disturbance    6. Hypothyroidism, unspecified type  - levothyroxine (SYNTHROID) 50 MCG tablet; TAKE ONE TABLET BY MOUTH DAILY  Dispense: 90 tablet; Refill: 0    7. Chronic neck pain  - XR CERVICAL SPINE (4-5 VIEWS); Future  - diclofenac sodium (VOLTAREN) 1 % GEL; Apply pea sized amount topically daily as needed  Dispense: 100 g; Refill: 5  - lidocaine (XYLOCAINE) 5 % ointment; Apply topically as needed. Dispense: 1 Tube; Refill: 5      Discussed monitoring daily weights. Check blood work next week to monitor BMP on lasix  Checking Hemoglobin also for hx of GI bleed, low hemoglobin, on Eliquis  Chronic meds sent in and refilled  Check XR for chronic neck pain now worsening. Topical tx.  Consider gabapentin but would have to be careful with potential for leg swelling    Lisseth Devine is a 70 y.o. female being evaluated by a Virtual Visit (video visit, attempted, converted to phone) encounter to address concerns as mentioned above. A caregiver was present when appropriate. Due to this being a TeleHealth encounter (During EPBTV-03 public health emergency), evaluation of the following organ systems was limited: Vitals/Constitutional/EENT/Resp/CV/GI//MS/Neuro/Skin/Heme-Lymph-Imm. Pursuant to the emergency declaration under the [address] [place] [address], [address] [place] [address] authority and the [employer] Act, this Virtual Visit was conducted with patient's (and/or legal guardian's) consent, to reduce the patient's risk of exposure to COVID-19 and provide necessary medical care. The patient (and/or legal guardian) has also been advised to contact this office for worsening conditions or problems, and seek emergency medical treatment and/or call 911 if deemed necessary. Patient identification was verified at the start of the visit: yes    Total time spent for this encounter: not billed on time  Services were provided through a video synchronous discussion virtually to substitute for in-person clinic visit. Patient and provider were located at their individual homes. --Mally Contreras MD on 1/11/2021 at 12:43 PM    An electronic signature was used to authenticate this note.

## 2021-01-11 NOTE — TELEPHONE ENCOUNTER
Pharmacy calling in regard to Voltaren gel needs to know quantity and directions for med and    Lidocaine ointment needs to know how much to dispense and directions for med   Please advise

## 2021-01-12 ENCOUNTER — TELEPHONE (OUTPATIENT)
Dept: FAMILY MEDICINE CLINIC | Age: 76
End: 2021-01-12

## 2021-01-14 ENCOUNTER — TELEPHONE (OUTPATIENT)
Dept: FAMILY MEDICINE CLINIC | Age: 76
End: 2021-01-14

## 2021-01-20 NOTE — PROGRESS NOTES
[facility] PULMONARY REHABILITATION ORDER  Medical Director:  Dr. Mary Guillen  (X)Phase II Pulmonary Rehabilitation [facility] Facility-based, supervised exercise with SpO2 / HR monitoring and Oxygen Titration (if necessary) each session, 2-3 times weekly, with individualized education sessions. Patient Name: Brooks Contreras  : 1945  Referring Physician: Dr. Cecilia Fischer  Date: 2021    Medically Necessary Pulmonary Rehab for:  Severe COPD (from my dictation or the PFT report), which is GOLD Stage 3      Physician Prescribed Exercise:  Length of program:  Up to 36 sessions, subject to insurance limitations. Program to include aerobic endurance conditioning, resistance training (RT), step training (ST), flexibility training, and education (all relevant topics including psychosocial assessment). FITT Principles + Progression for Exercise Prescription (also found on the ITP):     Frequency: 2-3x / wk for up to 36 sessions    Intensity:   Set from Initial 6MWT (Feet achieved converted to METs)   Type:          Aerobic and Strength (Treadmill, AD, NuStep, SciFit, UBE, RT, ST)   Time:        15-60 min. of Treatment; Aerobic, RT, ST, Rests and Education  Progression:  1-2 minute increase in Time, per Type, per session, 5-20% increase in Intensity per week if SpO2 >88 AND Areli RPE/RPD is <14      Note:  These are guidelines. The Pulmonary Rehab staff may adjust the treatment to suit the patient's individual needs, goals, oxygen saturation and functional level. Plan of Care:  Pulmonary Rehab aerobic endurance and strength training sessions for a total of 30-91 min/day, including Education time, 2-3 days/week with suggested supplemented matching minutes of walking at home on most days not participating in Pulmonary Rehab. Patient is willing to cooperate and participate in the plan of care.  Patient is physically able, motivated, and willing to participate in NV, and I expect this patient to improve in a reasonable and predictable time frame. Other Program Components:   (X)6 Minute Walk Test (6 MWT) at program initiation and discharge   (X)Evaluation for oxygen needs while exercising   (X)Titrate Oxygen to maintain >88 SpO2   (X)Stop Exercise or Apply Oxygen if patient desaturates <88%   (x)May continue in the Maintenance Program upon completion    Measurable Endurance Goal:    -Aerobic endurance goal to be measured in minutes. Start endurance training per patient tolerance at 1-15 minutes per exercise type, progressing to a total of 31-60 minutes using various modes of training (see Exercise Prescription on Individualized Treatment Plan 'ITP'). -Measurable Muscular Strength Goal: Starting at 1-3 lbs x 8 reps, progressing daily/weekly to 5-10 lbs x 15 reps    NOTE: Benson Garrido tobacco History:   Social History     Tobacco Use   Smoking Status Former Smoker    Packs/day: 3.00    Years: 30.00    Pack years: 90.00    Start date: 1963   [place] Quit date: 1992    Years since quittin.9   Smokeless Tobacco Never Used   Tobacco Comment    QUIT 21 YRS AGO     If patient is a current smoker, patient will participate in tobacco cessation program during Pulmonary Rehab.       Physician Signature/Date/Time:

## 2021-01-21 ENCOUNTER — APPOINTMENT (OUTPATIENT)
Dept: GENERAL RADIOLOGY | Age: 76
DRG: 133 | End: 2021-01-21
Payer: COMMERCIAL

## 2021-01-21 ENCOUNTER — HOSPITAL ENCOUNTER (INPATIENT)
Age: 76
LOS: 5 days | Discharge: HOME | DRG: 133 | End: 2021-01-26
Attending: STUDENT IN AN ORGANIZED HEALTH CARE EDUCATION/TRAINING PROGRAM | Admitting: FAMILY MEDICINE
Payer: COMMERCIAL

## 2021-01-21 ENCOUNTER — HOSPITAL ENCOUNTER (OUTPATIENT)
Dept: CARDIAC REHAB | Age: 76
Setting detail: THERAPIES SERIES
Discharge: HOME | End: 2021-01-21
Payer: COMMERCIAL

## 2021-01-21 DIAGNOSIS — J44.9 COPD, SEVERE (HCC): ICD-10-CM

## 2021-01-21 DIAGNOSIS — I50.9 ACUTE ON CHRONIC CONGESTIVE HEART FAILURE, UNSPECIFIED HEART FAILURE TYPE (HCC): Primary | ICD-10-CM

## 2021-01-21 DIAGNOSIS — I51.89 DIASTOLIC DYSFUNCTION: ICD-10-CM

## 2021-01-21 PROBLEM — J96.21 ACUTE ON CHRONIC RESPIRATORY FAILURE WITH HYPOXIA (HCC): Status: ACTIVE | Noted: 2021-01-21

## 2021-01-21 LAB
A/G RATIO: 1.3 (ref 1.1–2.2)
ALBUMIN SERPL-MCNC: 3.9 G/DL (ref 3.4–5)
ALP BLD-CCNC: 46 U/L (ref 40–129)
ALT SERPL-CCNC: 12 U/L (ref 10–40)
ANION GAP SERPL CALCULATED.3IONS-SCNC: 9 MMOL/L (ref 3–16)
AST SERPL-CCNC: 15 U/L (ref 15–37)
BASE EXCESS VENOUS: 12.8 MMOL/L
BASOPHILS ABSOLUTE: 0 K/UL (ref 0–0.2)
BASOPHILS RELATIVE PERCENT: 0.5 %
BILIRUB SERPL-MCNC: 0.3 MG/DL (ref 0–1)
BUN BLDV-MCNC: 21 MG/DL (ref 7–20)
CALCIUM SERPL-MCNC: 9.6 MG/DL (ref 8.3–10.6)
CARBOXYHEMOGLOBIN: 1.7 %
CHLORIDE BLD-SCNC: 94 MMOL/L (ref 99–110)
CO2: 36 MMOL/L (ref 21–32)
CREAT SERPL-MCNC: 1.1 MG/DL (ref 0.6–1.2)
EKG ATRIAL RATE: 61 BPM
EKG DIAGNOSIS: NORMAL
EKG P AXIS: -3 DEGREES
EKG P-R INTERVAL: 122 MS
EKG Q-T INTERVAL: 466 MS
EKG QRS DURATION: 80 MS
EKG QTC CALCULATION (BAZETT): 469 MS
EKG R AXIS: 44 DEGREES
EKG T AXIS: 115 DEGREES
EKG VENTRICULAR RATE: 61 BPM
EOSINOPHILS ABSOLUTE: 0.2 K/UL (ref 0–0.6)
EOSINOPHILS RELATIVE PERCENT: 2.1 %
GFR AFRICAN AMERICAN: 59
GFR NON-AFRICAN AMERICAN: 49
GLOBULIN: 3.1 G/DL
GLUCOSE BLD-MCNC: 145 MG/DL (ref 70–99)
HCO3 VENOUS: 40 MMOL/L (ref 23–29)
HCT VFR BLD CALC: 29 % (ref 36–48)
HEMOGLOBIN: 8.9 G/DL (ref 12–16)
IRON SATURATION: 17 % (ref 15–50)
IRON: 50 UG/DL (ref 37–145)
LYMPHOCYTES ABSOLUTE: 0.7 K/UL (ref 1–5.1)
LYMPHOCYTES RELATIVE PERCENT: 7.9 %
MCH RBC QN AUTO: 23.7 PG (ref 26–34)
MCHC RBC AUTO-ENTMCNC: 30.5 G/DL (ref 31–36)
MCV RBC AUTO: 77.8 FL (ref 80–100)
METHEMOGLOBIN VENOUS: 0.8 %
MONOCYTES ABSOLUTE: 0.3 K/UL (ref 0–1.3)
MONOCYTES RELATIVE PERCENT: 3.8 %
NEUTROPHILS ABSOLUTE: 7.1 K/UL (ref 1.7–7.7)
NEUTROPHILS RELATIVE PERCENT: 85.7 %
O2 CONTENT, VEN: 9 ML/DL
O2 SAT, VEN: 76 %
O2 THERAPY: ABNORMAL
PCO2, VEN: 69.3 MMHG (ref 40–50)
PDW BLD-RTO: 18.3 % (ref 12.4–15.4)
PH VENOUS: 7.37 (ref 7.35–7.45)
PLATELET # BLD: 258 K/UL (ref 135–450)
PMV BLD AUTO: 8.5 FL (ref 5–10.5)
PO2, VEN: 46 MMHG
POTASSIUM REFLEX MAGNESIUM: 4 MMOL/L (ref 3.5–5.1)
PRO-BNP: 3756 PG/ML (ref 0–124)
PROCALCITONIN: 0.05 NG/ML (ref 0–0.15)
RBC # BLD: 3.73 M/UL (ref 4–5.2)
SARS-COV-2, NAAT: NOT DETECTED
SODIUM BLD-SCNC: 139 MMOL/L (ref 136–145)
TCO2 CALC VENOUS: 42 MMOL/L
TOTAL IRON BINDING CAPACITY: 302 UG/DL (ref 260–445)
TOTAL PROTEIN: 7 G/DL (ref 6.4–8.2)
TROPONIN: <0.01 NG/ML
WBC # BLD: 8.2 K/UL (ref 4–11)

## 2021-01-21 PROCEDURE — 94640 AIRWAY INHALATION TREATMENT: CPT

## 2021-01-21 PROCEDURE — 6370000000 HC RX 637 (ALT 250 FOR IP): Performed by: FAMILY MEDICINE

## 2021-01-21 PROCEDURE — 82803 BLOOD GASES ANY COMBINATION: CPT

## 2021-01-21 PROCEDURE — 2700000000 HC OXYGEN THERAPY PER DAY

## 2021-01-21 PROCEDURE — 80053 COMPREHEN METABOLIC PANEL: CPT

## 2021-01-21 PROCEDURE — 83550 IRON BINDING TEST: CPT

## 2021-01-21 PROCEDURE — 94760 N-INVAS EAR/PLS OXIMETRY 1: CPT

## 2021-01-21 PROCEDURE — 93005 ELECTROCARDIOGRAM TRACING: CPT | Performed by: STUDENT IN AN ORGANIZED HEALTH CARE EDUCATION/TRAINING PROGRAM

## 2021-01-21 PROCEDURE — 99285 EMERGENCY DEPT VISIT HI MDM: CPT

## 2021-01-21 PROCEDURE — 96374 THER/PROPH/DIAG INJ IV PUSH: CPT

## 2021-01-21 PROCEDURE — U0002 COVID-19 LAB TEST NON-CDC: HCPCS

## 2021-01-21 PROCEDURE — 6360000002 HC RX W HCPCS: Performed by: FAMILY MEDICINE

## 2021-01-21 PROCEDURE — 84145 PROCALCITONIN (PCT): CPT

## 2021-01-21 PROCEDURE — 85025 COMPLETE CBC W/AUTO DIFF WBC: CPT

## 2021-01-21 PROCEDURE — 84484 ASSAY OF TROPONIN QUANT: CPT

## 2021-01-21 PROCEDURE — 71045 X-RAY EXAM CHEST 1 VIEW: CPT

## 2021-01-21 PROCEDURE — 2580000003 HC RX 258: Performed by: FAMILY MEDICINE

## 2021-01-21 PROCEDURE — 1200000000 HC SEMI PRIVATE

## 2021-01-21 PROCEDURE — 83540 ASSAY OF IRON: CPT

## 2021-01-21 PROCEDURE — 36415 COLL VENOUS BLD VENIPUNCTURE: CPT

## 2021-01-21 PROCEDURE — 6360000002 HC RX W HCPCS: Performed by: STUDENT IN AN ORGANIZED HEALTH CARE EDUCATION/TRAINING PROGRAM

## 2021-01-21 PROCEDURE — 83880 ASSAY OF NATRIURETIC PEPTIDE: CPT

## 2021-01-21 RX ORDER — FUROSEMIDE 10 MG/ML
60 INJECTION INTRAMUSCULAR; INTRAVENOUS ONCE
Status: COMPLETED | OUTPATIENT
Start: 2021-01-21 | End: 2021-01-21

## 2021-01-21 RX ORDER — SODIUM CHLORIDE 0.9 % (FLUSH) 0.9 %
10 SYRINGE (ML) INJECTION PRN
Status: DISCONTINUED | OUTPATIENT
Start: 2021-01-21 | End: 2021-01-26 | Stop reason: HOSPADM

## 2021-01-21 RX ORDER — BUTALBITAL, ACETAMINOPHEN AND CAFFEINE 50; 325; 40 MG/1; MG/1; MG/1
1 TABLET ORAL EVERY 6 HOURS PRN
Status: DISCONTINUED | OUTPATIENT
Start: 2021-01-21 | End: 2021-01-26 | Stop reason: HOSPADM

## 2021-01-21 RX ORDER — ALBUTEROL SULFATE 90 UG/1
2 AEROSOL, METERED RESPIRATORY (INHALATION) EVERY 4 HOURS PRN
Status: DISCONTINUED | OUTPATIENT
Start: 2021-01-21 | End: 2021-01-26 | Stop reason: HOSPADM

## 2021-01-21 RX ORDER — POLYETHYLENE GLYCOL 3350 17 G/17G
17 POWDER, FOR SOLUTION ORAL DAILY PRN
Status: DISCONTINUED | OUTPATIENT
Start: 2021-01-21 | End: 2021-01-26 | Stop reason: HOSPADM

## 2021-01-21 RX ORDER — LEVOTHYROXINE SODIUM 0.05 MG/1
50 TABLET ORAL DAILY
Status: DISCONTINUED | OUTPATIENT
Start: 2021-01-22 | End: 2021-01-26 | Stop reason: HOSPADM

## 2021-01-21 RX ORDER — FUROSEMIDE 10 MG/ML
40 INJECTION INTRAMUSCULAR; INTRAVENOUS 2 TIMES DAILY
Status: DISCONTINUED | OUTPATIENT
Start: 2021-01-21 | End: 2021-01-22

## 2021-01-21 RX ORDER — SODIUM CHLORIDE 0.9 % (FLUSH) 0.9 %
10 SYRINGE (ML) INJECTION EVERY 12 HOURS SCHEDULED
Status: DISCONTINUED | OUTPATIENT
Start: 2021-01-21 | End: 2021-01-26 | Stop reason: HOSPADM

## 2021-01-21 RX ORDER — PROMETHAZINE HYDROCHLORIDE 25 MG/1
12.5 TABLET ORAL EVERY 6 HOURS PRN
Status: DISCONTINUED | OUTPATIENT
Start: 2021-01-21 | End: 2021-01-26 | Stop reason: HOSPADM

## 2021-01-21 RX ORDER — ACETAMINOPHEN 325 MG/1
650 TABLET ORAL EVERY 6 HOURS PRN
Status: DISCONTINUED | OUTPATIENT
Start: 2021-01-21 | End: 2021-01-26 | Stop reason: HOSPADM

## 2021-01-21 RX ORDER — BUDESONIDE AND FORMOTEROL FUMARATE DIHYDRATE 80; 4.5 UG/1; UG/1
2 AEROSOL RESPIRATORY (INHALATION) 2 TIMES DAILY
Status: DISCONTINUED | OUTPATIENT
Start: 2021-01-21 | End: 2021-01-26 | Stop reason: HOSPADM

## 2021-01-21 RX ORDER — ACETAMINOPHEN 650 MG/1
650 SUPPOSITORY RECTAL EVERY 6 HOURS PRN
Status: DISCONTINUED | OUTPATIENT
Start: 2021-01-21 | End: 2021-01-26 | Stop reason: HOSPADM

## 2021-01-21 RX ORDER — ONDANSETRON 2 MG/ML
4 INJECTION INTRAMUSCULAR; INTRAVENOUS EVERY 6 HOURS PRN
Status: DISCONTINUED | OUTPATIENT
Start: 2021-01-21 | End: 2021-01-26 | Stop reason: HOSPADM

## 2021-01-21 RX ORDER — CARVEDILOL 12.5 MG/1
12.5 TABLET ORAL 2 TIMES DAILY WITH MEALS
Status: DISCONTINUED | OUTPATIENT
Start: 2021-01-21 | End: 2021-01-25

## 2021-01-21 RX ORDER — CITALOPRAM 20 MG/1
40 TABLET ORAL DAILY
Status: DISCONTINUED | OUTPATIENT
Start: 2021-01-22 | End: 2021-01-26 | Stop reason: HOSPADM

## 2021-01-21 RX ORDER — PANTOPRAZOLE SODIUM 40 MG/1
40 TABLET, DELAYED RELEASE ORAL
Status: DISCONTINUED | OUTPATIENT
Start: 2021-01-22 | End: 2021-01-26 | Stop reason: HOSPADM

## 2021-01-21 RX ORDER — HYDRALAZINE HYDROCHLORIDE 50 MG/1
100 TABLET, FILM COATED ORAL EVERY 8 HOURS SCHEDULED
Status: DISCONTINUED | OUTPATIENT
Start: 2021-01-21 | End: 2021-01-26 | Stop reason: HOSPADM

## 2021-01-21 RX ORDER — TRAZODONE HYDROCHLORIDE 50 MG/1
50 TABLET ORAL NIGHTLY PRN
Status: DISCONTINUED | OUTPATIENT
Start: 2021-01-21 | End: 2021-01-26 | Stop reason: HOSPADM

## 2021-01-21 RX ORDER — ATORVASTATIN CALCIUM 40 MG/1
40 TABLET, FILM COATED ORAL DAILY
Status: DISCONTINUED | OUTPATIENT
Start: 2021-01-22 | End: 2021-01-26 | Stop reason: HOSPADM

## 2021-01-21 RX ORDER — ASPIRIN 81 MG/1
81 TABLET, CHEWABLE ORAL DAILY
Status: DISCONTINUED | OUTPATIENT
Start: 2021-01-21 | End: 2021-01-26 | Stop reason: HOSPADM

## 2021-01-21 RX ORDER — MONTELUKAST SODIUM 10 MG/1
10 TABLET ORAL DAILY
Status: DISCONTINUED | OUTPATIENT
Start: 2021-01-22 | End: 2021-01-26 | Stop reason: HOSPADM

## 2021-01-21 RX ORDER — CLONIDINE HYDROCHLORIDE 0.1 MG/1
0.2 TABLET ORAL 3 TIMES DAILY
Status: DISCONTINUED | OUTPATIENT
Start: 2021-01-21 | End: 2021-01-26 | Stop reason: HOSPADM

## 2021-01-21 RX ADMIN — APIXABAN 2.5 MG: 2.5 TABLET, FILM COATED ORAL at 21:04

## 2021-01-21 RX ADMIN — HYDRALAZINE HYDROCHLORIDE 100 MG: 50 TABLET, FILM COATED ORAL at 21:04

## 2021-01-21 RX ADMIN — ASPIRIN 81 MG: 81 TABLET, CHEWABLE ORAL at 17:37

## 2021-01-21 RX ADMIN — CLONIDINE HYDROCHLORIDE 0.2 MG: 0.1 TABLET ORAL at 17:37

## 2021-01-21 RX ADMIN — CARVEDILOL 12.5 MG: 12.5 TABLET, FILM COATED ORAL at 17:37

## 2021-01-21 RX ADMIN — CLONIDINE HYDROCHLORIDE 0.2 MG: 0.1 TABLET ORAL at 21:04

## 2021-01-21 RX ADMIN — FUROSEMIDE 40 MG: 10 INJECTION, SOLUTION INTRAMUSCULAR; INTRAVENOUS at 17:37

## 2021-01-21 RX ADMIN — FUROSEMIDE 60 MG: 10 INJECTION, SOLUTION INTRAMUSCULAR; INTRAVENOUS at 14:06

## 2021-01-21 RX ADMIN — SODIUM CHLORIDE, PRESERVATIVE FREE 10 ML: 5 INJECTION INTRAVENOUS at 21:04

## 2021-01-21 RX ADMIN — BUDESONIDE AND FORMOTEROL FUMARATE DIHYDRATE 2 PUFF: 80; 4.5 AEROSOL RESPIRATORY (INHALATION) at 20:01

## 2021-01-21 ASSESSMENT — PAIN SCALES - GENERAL: PAINLEVEL_OUTOF10: 0

## 2021-01-21 NOTE — ED NOTES
Pt arrived to the ed via ems, pt c/oo sob denies pain wear o2 at home 3 liters per nasal cannula, pt is alert and orient afebrile      Marycarmen Carter RN  01/21/21 0195

## 2021-01-21 NOTE — ED NOTES
Report called to  Ela Navarro      RN   To Room   [id number]  Cardiac monitor on during transfer  Pt's pain level   denies  VSS, Afebrile   IV site is clean, dry and intact, Normal saline locked   Family updated on transfer            Marycarmen Carter RN  01/21/21 [id number]

## 2021-01-21 NOTE — ED PROVIDER NOTES
Primary Care Physician: Crystal Farrell MD   Attending Physician: German Murphy MD     History   Chief Complaint   Patient presents with    Shortness of Breath     Patient always on oxygen, states she has been SOB since a procedure x1 week ago. Patient not given any medication/treatmetns en route. HPI   Willow Shannon is a 70 y.o. female coronary artery disease, diastolic heart failure, COPD, chronic respiratory failure, ANGIE on BiPAP, peripheral vascular disease, hypertension, hyperlipidemia and A. fib who was recently admitted here and discharged home on 6 January after she presented with shortness of breath found to be in respiratory distress due to fluid overload. He was given diuretics, placed on BiPAP and underwent watchman TTE on 4 January by Dr. Mariia Riggs. She returns today accompanied by her granddaughter complaining of shortness of breath now with increased oxygen requirement. Denies fevers, chills, nausea, vomiting, but admits to some chest pain without leg swelling. She currently takes 80 mg of Lasix at home.     Past Medical History:   Diagnosis Date    Arthritis     Atrial fibrillation ([address] 1/4/2021    CAD (coronary artery disease)     Nonobstructive on cath in 9/2017    CHF (congestive heart failure) ([address]     Colostomy care ([address] 4/25/2018    Peristomal irritation and early leaking    COPD (chronic obstructive pulmonary disease) (HCC)     Hyperlipidemia     Hypertension     Kidney disease     Stage 3    Peripheral vascular disease ([address]     Rectal fissure 11/2014    surgery pending    Restless legs syndrome     Sleep apnea     O2 3 liters at hs    Thyroid disease     Unspecified sleep apnea         Past Surgical History:   Procedure Laterality Date    ABDOMEN SURGERY      CARDIAC CATHETERIZATION  09/13/2017    Dr. Ericka Queen  03/09/2018    CORONARY ARTERY BYPASS GRAFT      Legs & Carotid    FRACTURE SURGERY      HERNIA REPAIR      UPPER GASTROINTESTINAL ENDOSCOPY N/A 2019    EGD DIAGNOSTIC ONLY performed by Abbi Isaac MD at [facility] VASCULAR SURGERY          Family History   Problem Relation Age of Onset    Heart Disease Mother     Heart Disease Father     Heart Disease Sister     Cancer Brother         Social History     Socioeconomic History    Marital status:      Spouse name: Not on file    Number of children: 6    Years of education: 15    Highest education level: Not on file   Occupational History    Not on file   Social Needs    Financial resource strain: Not on file    Food insecurity     Worry: Not on file     Inability: Not on file   [employer] needs     Medical: Not on file     Non-medical: Not on file   Tobacco Use    Smoking status: Former Smoker     Packs/day: 3.00     Years: 30.00     Pack years: 90.00     Start date: 1963     Quit date: 1992     Years since quittin.9    Smokeless tobacco: Never Used    Tobacco comment: QUIT 20 YRS AGO   Substance and Sexual Activity    Alcohol use: No     Alcohol/week: 0.0 standard drinks    Drug use: No    Sexual activity: Not Currently   Lifestyle    Physical activity     Days per week: Not on file     Minutes per session: Not on file    Stress: Not on file   Relationships    Social connections     Talks on phone: Not on file     Gets together: Not on file     Attends Zoroastrian service: Not on file     Active member of club or organization: Not on file     Attends meetings of clubs or organizations: Not on file     Relationship status: Not on file    Intimate partner violence     Fear of current or ex partner: Not on file     Emotionally abused: Not on file     Physically abused: Not on file     Forced sexual activity: Not on file   Other Topics Concern    Not on file   Social History Narrative    Not on file        Review of Systems   10 total systems reviewed and found to be negative unless otherwise noted in HPI     Physical Exam   BP (!) 138/52   Pulse 64   Temp 98.2 °F (36.8 °C) (Oral)   Resp 14   Wt 169 lb 5 oz (76.8 kg)   LMP 05/01/2006 (Approximate)   SpO2 99%   BMI 33.07 kg/m²      CONSTITUTIONAL: ill appearing, in no acute distress   HEAD: atraumatic, normocephalic   EYES: PERRL, No injection, discharge or scleral icterus. ENT: Moist mucous membranes. NECK: Normal ROM, NO LAD   CARDIOVASCULAR: Regular rate and rhythm. No murmurs or gallop. PULMONARY/CHEST: In respiratory distress and hypoxic  ABDOMEN: Soft, Non-distended and non-tender, without guarding or rebound. SKIN: Acyanotic, warm, dry   MUSCULOSKELETAL: No swelling, tenderness or deformity   NEUROLOGICAL: Awake and oriented x 3. Pulses intact. Grossly nonfocal   Nursing note and vitals reviewed.      ED Course & Medical Decision Making   Medications   albuterol sulfate  (90 Base) MCG/ACT inhaler 2 puff (has no administration in time range)   apixaban (ELIQUIS) tablet 2.5 mg (2.5 mg Oral Given 1/23/21 0846)   aspirin chewable tablet 81 mg (81 mg Oral Given 1/23/21 0846)   atorvastatin (LIPITOR) tablet 40 mg (40 mg Oral Given 1/23/21 0846)   butalbital-acetaminophen-caffeine (FIORICET, ESGIC) per tablet 1 tablet (1 tablet Oral Given 1/23/21 0920)   carvedilol (COREG) tablet 12.5 mg (12.5 mg Oral Given 1/23/21 0845)   citalopram (CELEXA) tablet 40 mg (40 mg Oral Given 1/23/21 0846)   cloNIDine (CATAPRES) tablet 0.2 mg (0.2 mg Oral Given 1/23/21 1529)   hydrALAZINE (APRESOLINE) tablet 100 mg (100 mg Oral Given 1/23/21 1529)   pantoprazole (PROTONIX) tablet 40 mg (40 mg Oral Given 1/23/21 0160)   levothyroxine (SYNTHROID) tablet 50 mcg (50 mcg Oral Given 1/23/21 0638)   budesonide-formoterol (SYMBICORT) 80-4.5 MCG/ACT inhaler 2 puff (2 puffs Inhalation Given 1/23/21 0945)   montelukast (SINGULAIR) tablet 10 mg (10 mg Oral Given 1/23/21 2853)   tiotropium (SPIRIVA RESPIMAT) 2.5 MCG/ACT inhaler 2 puff (2 puffs Inhalation Given PEPTIDE - Abnormal; Notable for the following components:    Pro-BNP 3,756 (*)     All other components within normal limits    Narrative:     Performed at:  [facility]  [address] [employer]   Phone [phone]   BLOOD GAS, VENOUS - Abnormal; Notable for the following components:    pCO2, Ralph 69.3 (*)     HCO3, Venous 40 (*)     All other components within normal limits    Narrative:     Performed at:  [facility]  [address], [facility] [employer] [address]   Phone [phone]   BASIC METABOLIC PANEL - Abnormal; Notable for the following components:    Chloride 90 (*)     CO2 37 (*)     Glucose 111 (*)     BUN 28 (*)     CREATININE 1.4 (*)     GFR Non- 37 (*)     GFR  45 (*)     All other components within normal limits    Narrative:     Performed at:  [facility]  [address], [address] [employer] [address]   Phone [phone]   LIPID PANEL - Abnormal; Notable for the following components:    Triglycerides 153 (*)     LDL Calculated 102 (*)     All other components within normal limits    Narrative:     Performed at:  [facility]  [address], [address]   Phone [phone]   BASIC METABOLIC PANEL - Abnormal; Notable for the following components:    Chloride 93 (*)     CO2 37 (*)     Glucose 108 (*)     BUN 36 (*)     GFR Non- 44 (*)     GFR  53 (*)     All other components within normal limits    Narrative:     Performed at:  [facility]  [address], [employer]   Phone [phone]   TROPONIN    Narrative:     Performed at:  [facility]  [address], [employer]   Phone [phone]   PROCALCITONIN    Narrative:     Performed at:  [facility]  [address] [facility] [address]   Phone [phone]    Narrative:     Performed at:  [facility]  [address] [facility] [address]   Phone [phone]   IRON AND TIBC    Narrative:     Performed at:  [facility]  [address] [facility] [address]   Phone [phone]   MAGNESIUM    Narrative:     Performed at:  [facility]  [address] [facility] [address]   Phone [phone]   MAGNESIUM    Narrative:     Performed at:  [facility]  [address] [facility] [address]   Phone [phone]      XR CHEST PORTABLE   Final Result   Mild hazy bibasilar opacities as well as small pleural effusions, slightly   worse on the left. Echocardiogram Transesophageal    Result Date: 1/5/2021  Transesophageal Echocardiography Report (MIKO)  Demographics   Patient Name       Dashawn Luong   Date of Study      01/04/2021       Gender               Female   Patient Number     [id number]       Date of Birth        1945   Visit Number       [id number]        Age                  70 year(s)   Accession Number   [id number]       Room Number          [facility]   Corporate ID       [id number]         Sonographer          GIGI Alcantar, RDCS, RVT   Ordering Physician Maryanne Hernández MD  Interpreting         [address] [place] [address].                                      Physician            Johan Shi MD  Procedure Type of Study   MIKO procedure:ECHOCARDIOGRAM TRANSESOPHAGEAL. Procedure Date Date: 01/04/2021 Start: 02:37 PM Study Location: OR Technical Quality: Adequate visualization Indications:Atrial fibrillation.  Patient Status: Routine BP: 157/68 mmHg MIKO Performed By: the attending and the sonographer  Type of Anesthesia: General anesthesia   Conclusions Dae Raya MD  Procedure Type of Study   TTE procedure:ECHOCARDIOGRAM LIMITED. Procedure Date Date: 01/04/2021 Start: 04:46 PM Study Location: OR Technical Quality: Adequate visualization Indications:Atrial fibrillation. BP: 157/68 mmHg  Conclusions   Summary  Limited study performed to assess for pericardial effusion. There is no evidence of a significant effusion. Signature   ------------------------------------------------------------------  Electronically signed by Dae Raya MD (Interpreting  physician) on 01/05/2021 at 09:56 AM  ------------------------------------------------------------------   Findings   Pericardial Effusion  Limited study performed to assess for pericardial effusion. There is no evidence of a significant effusion. Xr Chest Portable    Result Date: 1/5/2021  EXAMINATION: ONE XRAY VIEW OF THE CHEST 1/5/2021 5:08 am COMPARISON: 11/11/2020 HISTORY: ORDERING SYSTEM PROVIDED HISTORY: SOB TECHNOLOGIST PROVIDED HISTORY: Reason for exam:->SOB Reason for Exam: SOB Acuity: Acute Type of Exam: Initial FINDINGS: Relatively homogeneous diffuse airspace opacities are seen throughout both lungs. Lingular scarring is again noted. The heart size is at the upper limits of normal.  There is no large pleural effusion or definite evidence for pneumothorax. Pulmonary edema versus atypical pneumonia. EKG INTERPRETATION:  EKG by my preliminary interpretation shows sinus rhythm with rate of 61, normal axis, normal intervals, with no ST changes indicative of ischemia at this time.     PROCEDURES:   Procedures    ASSESSMENT AND PLAN:  Sruthi Elise is a 70 y.o. female coronary artery disease, diastolic heart failure, COPD, chronic respiratory failure, ANGIE on BiPAP, peripheral vascular disease, hypertension, hyperlipidemia and A. fib who was recently admitted here and discharged home on 6 January after she presented with shortness of breath found to be in respiratory distress due to fluid overload. He was given diuretics, placed on BiPAP and underwent watchman TTE on 4 January by Dr. Eric Martinez. She returns today accompanied by her granddaughter complaining of shortness of breath now with increased oxygen requirement. Denies fevers, chills, nausea, vomiting, but admits to some chest pain without leg swelling. She currently takes 80 mg of Lasix at home. On exam patient appears to be in respiratory distress with increased oxygen requirement. She is currently on 5 L instead of 3 L which is her baseline. Labs obtained and showed no significant abnormalities apart from VBG with PCO2 of 69.3. Calcitonin and troponin are negative. X-ray shows some worsening opacities on the left. Because of increased oxygen requirement patient will need admission for further evaluation and treatment. This time with low or normal procalcitonin I do not think that she needs antibiotic treatment. This might be secondary to heart failure causing pulmonary edema less likely pneumonia. Patient given a dose of 80 mg of Lasix. Hospitalist consulted patient admitted to their service for further evaluation and treatment. ClINICAL IMPRESSION:  1. Acute on chronic congestive heart failure, unspecified heart failure type [facility])        DISPOSITION    Hospitalist admit   -Findings and recommendations explained to patient. She expressed understanding and agreed with the plan.   Chava Babin MD (electronically signed)  1/23/2021  _________________________________________________________________________________________  Amount and/or Complexity of Data Reviewed:  Clinical lab tests: ordered and reviewed   Tests in the radiology section of CPT®: ordered and reviewed   Tests in the medicine section of CPT®: ordered and reviewed   Decide to obtain previous medical records or to obtain history from someone other than the patient: no  Obtain history from someone other than the patient: no  Review and summarize past medical records:yes  I looked up the patient in our electronic medical record:yes  Discuss the patient with other providers:yes  Independent visualization of images, tracings, or specimens:yes  Risk of Complications, Morbidity, and/or Mortality:Moderate  Presenting problems: moderate Diagnostic procedures: moderate yes Management options: moderate     Critical Care Attestation   Total critical care time: 35 minutes minimum   Critical care time does not include separately billable procedures and treating other patients. Critical care was necessary to treat or prevent imminent or life-threatening deterioration of the following conditions: Heart failure with fluid overload with lasix. Patient provided with supplemental oxygen and treated urgently in the ED with antibiotics. Case discussed with consultants.       _________________________________________________________________________________________  This record is transcribed utilizing voice recognition technology. There are inherent limitations in this technology. In addition, there may be limitations in editing of this report. If there are any discrepancies, please contact me directly.         Chinmay Turner MD  01/23/21 2564

## 2021-01-21 NOTE — ED NOTES
Bed: A-17  Expected date: 1/21/21  Expected time: 11:43 AM  Means of arrival:   Comments:  71 y/o F OMAYRA Brand RN  01/21/21 1143

## 2021-01-21 NOTE — H&P
Hospital Medicine History & Physical      PCP: Kym Welch MD    Date of Admission: 1/21/2021    Date of Service: Pt seen/examined on 1/21/2021 and Admitted to Inpatient with expected LOS greater than two midnights due to medical therapy. Chief Complaint:  Dyspnea       History Of Present Illness:    Tim Griggs is 70 y.o. female w/ PMH CAD, dCHF, COPD, ANGIE on BiPAP, chronic respiratory failure on 3 L NC, PVD, HTN, HLD, A fib who presented to [facility] with worsening dyspnea since prior admission. Patient was admitted 1/5/21-1/6/21 for CHF exacerbation after receiving IVF during a watchman procedure on 1/4/2021. She was Diuresed and discharged. Patient states that since then she her breathing has not been great and has progressively worsened. States she now has SCOTT and at rest.  No leg swelling. No cough, no fevers, chills, myalgias. She was requiring 5 L of oxygen in the ED, she wears 3 L at home.     Past Medical History:          Diagnosis Date    Atrial fibrillation ([address] 1/4/2021    CAD (coronary artery disease)     Nonobstructive on cath in 9/2017    CHF (congestive heart failure) ([address]     Colostomy care ([address] 4/25/2018    Peristomal irritation and early leaking    COPD (chronic obstructive pulmonary disease) (HCC)     Hyperlipidemia     Hypertension     Kidney disease     Stage 3    Peripheral vascular disease ([address]     Rectal fissure 11/2014    surgery pending    Restless legs syndrome     Sleep apnea     O2 3 liters at hs    Unspecified sleep apnea        Past Surgical History:          Procedure Laterality Date    CARDIAC CATHETERIZATION  09/13/2017    Dr. Avtar Crane  03/09/2018    CORONARY ARTERY BYPASS GRAFT      Legs & Carotid    HERNIA REPAIR      UPPER GASTROINTESTINAL ENDOSCOPY N/A 9/30/2019    EGD DIAGNOSTIC ONLY performed by Lori Ahmadi MD at [address] [place] [address]       Medications Prior to Admission:      Prior to Admission medications    Medication Sig Start Date End Date Taking? Authorizing Provider   carvedilol (COREG) 12.5 MG tablet Take 1 tablet by mouth 2 times daily (with meals) 1/11/21  Yes Lesley Webber MD   atorvastatin (LIPITOR) 40 MG tablet TAKE ONE TABLET BY MOUTH DAILY 1/11/21  Yes Lesley Webber MD   montelukast (SINGULAIR) 10 MG tablet Take 1 tablet by mouth daily 1/11/21  Yes Lesley Webber MD   lansoprazole (PREVACID) 15 MG delayed release capsule Take 1 capsule by mouth daily 1/11/21  Yes Mel Carter MD   citalopram (CELEXA) 40 MG tablet TAKE ONE TABLET BY MOUTH DAILY 1/11/21  Yes Lesley Webber MD   levothyroxine (SYNTHROID) 50 MCG tablet TAKE ONE TABLET BY MOUTH DAILY 1/11/21  Yes Lesley Webber MD   hydrALAZINE (APRESOLINE) 100 MG tablet TAKE ONE TABLET BY MOUTH THREE TIMES A DAY 1/11/21  Yes Lesley Webber MD   lidocaine (XYLOCAINE) 5 % ointment Apply topically as needed. 1/11/21  Yes Lesley Webber MD   diclofenac sodium (VOLTAREN) 1 % GEL Apply 2 g topically 3 times daily as needed for Pain 1/11/21  Yes Lesley Webber MD   lidocaine (XYLOCAINE) 2 % jelly Apply pea-sized amount topically to affected area every 8 hours when necessary pain 1/11/21  Yes Mel Carter MD   apixaban (ELIQUIS) 5 MG TABS tablet Take 0.5 tablets by mouth 2 times daily 1/4/21  Yes Hardy Guillory MD   mineral oil-hydrophilic petrolatum (HYDROPHOR) ointment Apply topically as needed.  12/9/20  Yes Mel Carter MD   ipratropium-albuterol (DUONEB) 0.5-2.5 (3) MG/3ML SOLN nebulizer solution Inhale 3 mLs into the lungs every 4 hours as needed for Shortness of Breath 11/11/20  Yes Riya Patricio MD   fluticasone-vilanterol (BREO ELLIPTA) 100-25 MCG/INH AEPB inhaler Inhale 1 puff into the lungs daily 11/11/20  Yes Riya Patricio, MD   tiotropium (SPIRIVA RESPIMAT) 2.5 MCG/ACT AERS inhaler Inhale 2 puffs into the lungs daily 11/11/20  Yes Riya Prior, MD   albuterol sulfate HFA (VENTOLIN HFA) 108 (90 Base) MCG/ACT inhaler Inhale 2 puffs into the lungs every 4 hours as needed for Wheezing or Shortness of Breath 11/11/20  Yes Milo Duffy MD   traZODone (DESYREL) 50 MG tablet Take 1 tablet by mouth nightly as needed for Sleep 10/19/20  Yes Jovan Webber MD   furosemide (LASIX) 40 MG tablet Take 1 tablet by mouth 2 times daily 8/26/20  Yes Jovan Webber MD   budesonide-formoterol [facility]) 160-4.5 MCG/ACT AERO Inhale 2 puffs into the lungs 2 times daily 2/21/20  Yes Evelyn Hong, DO   cloNIDine (CATAPRES) 0.2 MG tablet Take 1 tablet by mouth 3 times daily 2/21/20  Yes Evelyn Hong, DO   butalbital-acetaminophen-caffeine (FIORICET, ESGIC) -40 MG per tablet Take 1 tablet by mouth every 6 hours as needed for Headaches 10/17/19  Yes HENRIQUE Delgadillo Foot, DO   aspirin 81 MG tablet Take 1 tablet by mouth daily 7/26/19  Yes Mandeep Sanchez MD       Allergies: Iv dye [iodides]    Social History:      TOBACCO:   reports that she quit smoking about 28 years ago. She started smoking about 58 years ago. She has a 90.00 pack-year smoking history. She has never used smokeless tobacco.  ETOH:   reports no history of alcohol use. E-Cigarettes/Vaping Use     Questions Responses    E-Cigarette/Vaping Use Never User    Start Date     Passive Exposure     Quit Date     Counseling Given     Comments             Family History:            Problem Relation Age of Onset    Heart Disease Mother     Heart Disease Father     Heart Disease Sister     Cancer Brother        REVIEW OF SYSTEMS:   Pertinent positives as noted in the HPI. All other systems reviewed and negative. PHYSICAL EXAM PERFORMED:    BP (!) 157/57   Pulse 57   Temp 98.9 °F (37.2 °C) (Oral)   Resp 18   Wt 170 lb 3.1 oz (77.2 kg)   LMP 05/01/2006 (Approximate)   SpO2 99%   BMI 33.24 kg/m²     General appearance:  No apparent distress, alert and cooperative. HEENT:  atraumatic without obvious deformity. Pupils equal, round, and reactive to light. Conjunctivae/corneas clear. Neck: Supple, with full range of motion.  + jugular venous distention. Trachea midline. Respiratory:  Normal respiratory effort. diminished  Cardiovascular:  Regular rate and rhythm, normal S1/S2, no murmurs  Abdomen: Soft, non-tender, non-distended with normal bowel sounds. Musculoskeletal:  No edema bilaterally. Full range of motion without deformity. Skin: Skin color, texture, turgor normal.  No rashes or lesions. Neurologic:  Neurovascularly intact, no focal deficits  Psychiatric:  Alert and oriented, normal insight  Capillary Refill: Brisk,< 3 seconds   Peripheral Pulses: +2 palpable, equal bilaterally       Labs:     Recent Labs     01/21/21  1221   WBC 8.2   HGB 8.9*   HCT 29.0*        Recent Labs     01/21/21  1221      K 4.0   CL 94*   CO2 36*   BUN 21*   CREATININE 1.1   CALCIUM 9.6     Recent Labs     01/21/21  1221   AST 15   ALT 12   BILITOT 0.3   ALKPHOS 46     No results for input(s): INR in the last 72 hours. Recent Labs     01/21/21  1221   TROPONINI <0.01       Urinalysis:      Lab Results   Component Value Date    NITRU Negative 01/04/2021    WBCUA 3 01/04/2021    BACTERIA RARE 12/07/2020    RBCUA 0-2 01/04/2021    BLOODU Negative 01/04/2021    SPECGRAV 1.020 01/04/2021    GLUCOSEU Negative 01/04/2021       Radiology:     EKG:  I have reviewed the EKG with the following interpretation: NSR    XR CHEST PORTABLE   Final Result   Mild hazy bibasilar opacities as well as small pleural effusions, slightly   worse on the left.              ASSESSMENT/ PLAN:  Acute on chronic respiratory failure with hypoxia  - on 5 L NC, wean as tolerated  - COIVD19 negative    Acute on chronic diastolic CHF  - elevated BNP, CXR bibasilar haziness  - IV lasix BID  - I/o    PAF  - s/p watchman 1/4  - c/w Eliquis    COPD w/ chronic respiratory failure  - c/w home inhalers    ANGIE on BIPAP    HTN  - c/w home meds    HLD  - c/w statin    Hypothyroidism  - c/w synthroid    Hx chronic anemia  - Hb stable    DVT Prophylaxis: Eliquis  Diet: DIET LOW SODIUM 2 GM;  Code Status: Prior      Dispo - admit to Alex Perry MD    Thank you Allen Mcgrath MD for the opportunity to be involved in this patient's care. If you have any questions or concerns please feel free to contact me.

## 2021-01-21 NOTE — ED NOTES
Pt resting in bed at this time. Call light remains in reach instructed pt how to use, and encouraged pt to call if needed assistance, no distress noted. RR even and unlabored, skin warm and dry. No needs at this time. Will continue to monitor closely.          Aubrie Wood RN  01/21/21 2066

## 2021-01-21 NOTE — PROGRESS NOTES
4 Eyes Skin Assessment     NAME:  Abena Woodson  YOB: 1945  MEDICAL RECORD NUMBER:  [id number]    The patient is being assess for  Admission    I agree that 2 RN's have performed a thorough Head to Toe Skin Assessment on the patient. ALL assessment sites listed below have been assessed. Areas assessed by both nurses:    Head, Face, Ears, Shoulders, Back, Chest, Arms, Elbows, Hands, Sacrum. Buttock, Coccyx, Ischium and Legs. Feet and Heels        Does the Patient have a Wound?  No noted wound(s)       Sander Prevention initiated:  Yes   Wound Care Orders initiated:  No    Pressure Injury (Stage 3,4, Unstageable, DTI, NWPT, and Complex wounds) if present place consult order under [de-identified] No    New and Established Ostomies if present place consult order under : Yes      Nurse 1 eSignature: Electronically signed by Thuan Cai RN on 1/21/21 at 5:14 PM EST    **SHARE this note so that the co-signing nurse is able to place an eSignature**    Nurse 2 eSignature: Electronically signed by Kitty Molina RN on 1/21/21 at 5:16 PM EST         \

## 2021-01-22 LAB
ANION GAP SERPL CALCULATED.3IONS-SCNC: 9 MMOL/L (ref 3–16)
BUN BLDV-MCNC: 28 MG/DL (ref 7–20)
CALCIUM SERPL-MCNC: 9.4 MG/DL (ref 8.3–10.6)
CHLORIDE BLD-SCNC: 90 MMOL/L (ref 99–110)
CHOLESTEROL, TOTAL: 177 MG/DL (ref 0–199)
CO2: 37 MMOL/L (ref 21–32)
CREAT SERPL-MCNC: 1.4 MG/DL (ref 0.6–1.2)
GFR AFRICAN AMERICAN: 45
GFR NON-AFRICAN AMERICAN: 37
GLUCOSE BLD-MCNC: 111 MG/DL (ref 70–99)
HDLC SERPL-MCNC: 44 MG/DL (ref 40–60)
LDL CHOLESTEROL CALCULATED: 102 MG/DL
MAGNESIUM: 2 MG/DL (ref 1.8–2.4)
POTASSIUM SERPL-SCNC: 3.9 MMOL/L (ref 3.5–5.1)
SODIUM BLD-SCNC: 136 MMOL/L (ref 136–145)
TRIGL SERPL-MCNC: 153 MG/DL (ref 0–150)
VLDLC SERPL CALC-MCNC: 31 MG/DL

## 2021-01-22 PROCEDURE — 2700000000 HC OXYGEN THERAPY PER DAY

## 2021-01-22 PROCEDURE — 94760 N-INVAS EAR/PLS OXIMETRY 1: CPT

## 2021-01-22 PROCEDURE — 36415 COLL VENOUS BLD VENIPUNCTURE: CPT

## 2021-01-22 PROCEDURE — 6370000000 HC RX 637 (ALT 250 FOR IP): Performed by: FAMILY MEDICINE

## 2021-01-22 PROCEDURE — 6360000002 HC RX W HCPCS: Performed by: FAMILY MEDICINE

## 2021-01-22 PROCEDURE — 94640 AIRWAY INHALATION TREATMENT: CPT

## 2021-01-22 PROCEDURE — 1200000000 HC SEMI PRIVATE

## 2021-01-22 PROCEDURE — 2580000003 HC RX 258: Performed by: FAMILY MEDICINE

## 2021-01-22 PROCEDURE — 80061 LIPID PANEL: CPT

## 2021-01-22 PROCEDURE — 94660 CPAP INITIATION&MGMT: CPT

## 2021-01-22 PROCEDURE — 83735 ASSAY OF MAGNESIUM: CPT

## 2021-01-22 PROCEDURE — 80048 BASIC METABOLIC PNL TOTAL CA: CPT

## 2021-01-22 RX ORDER — FUROSEMIDE 10 MG/ML
60 INJECTION INTRAMUSCULAR; INTRAVENOUS 2 TIMES DAILY
Status: DISCONTINUED | OUTPATIENT
Start: 2021-01-22 | End: 2021-01-24

## 2021-01-22 RX ORDER — LIDOCAINE HYDROCHLORIDE 20 MG/ML
JELLY TOPICAL PRN
Status: DISCONTINUED | OUTPATIENT
Start: 2021-01-22 | End: 2021-01-26 | Stop reason: HOSPADM

## 2021-01-22 RX ADMIN — CARVEDILOL 12.5 MG: 12.5 TABLET, FILM COATED ORAL at 08:01

## 2021-01-22 RX ADMIN — MONTELUKAST 10 MG: 10 TABLET, FILM COATED ORAL at 08:01

## 2021-01-22 RX ADMIN — CLONIDINE HYDROCHLORIDE 0.2 MG: 0.1 TABLET ORAL at 16:12

## 2021-01-22 RX ADMIN — CARVEDILOL 12.5 MG: 12.5 TABLET, FILM COATED ORAL at 18:09

## 2021-01-22 RX ADMIN — PANTOPRAZOLE SODIUM 40 MG: 40 TABLET, DELAYED RELEASE ORAL at 05:47

## 2021-01-22 RX ADMIN — ASPIRIN 81 MG: 81 TABLET, CHEWABLE ORAL at 08:01

## 2021-01-22 RX ADMIN — LIDOCAINE HYDROCHLORIDE: 20 JELLY TOPICAL at 18:12

## 2021-01-22 RX ADMIN — FUROSEMIDE 60 MG: 10 INJECTION, SOLUTION INTRAMUSCULAR; INTRAVENOUS at 18:09

## 2021-01-22 RX ADMIN — SODIUM CHLORIDE, PRESERVATIVE FREE 10 ML: 5 INJECTION INTRAVENOUS at 08:03

## 2021-01-22 RX ADMIN — CLONIDINE HYDROCHLORIDE 0.2 MG: 0.1 TABLET ORAL at 08:01

## 2021-01-22 RX ADMIN — HYDRALAZINE HYDROCHLORIDE 100 MG: 50 TABLET, FILM COATED ORAL at 21:23

## 2021-01-22 RX ADMIN — ATORVASTATIN CALCIUM 40 MG: 40 TABLET, FILM COATED ORAL at 08:01

## 2021-01-22 RX ADMIN — APIXABAN 2.5 MG: 2.5 TABLET, FILM COATED ORAL at 21:23

## 2021-01-22 RX ADMIN — LEVOTHYROXINE SODIUM 50 MCG: 0.05 TABLET ORAL at 05:47

## 2021-01-22 RX ADMIN — Medication 2 PUFF: at 08:12

## 2021-01-22 RX ADMIN — CLONIDINE HYDROCHLORIDE 0.2 MG: 0.1 TABLET ORAL at 21:23

## 2021-01-22 RX ADMIN — HYDRALAZINE HYDROCHLORIDE 100 MG: 50 TABLET, FILM COATED ORAL at 16:12

## 2021-01-22 RX ADMIN — APIXABAN 2.5 MG: 2.5 TABLET, FILM COATED ORAL at 08:01

## 2021-01-22 RX ADMIN — HYDRALAZINE HYDROCHLORIDE 100 MG: 50 TABLET, FILM COATED ORAL at 05:47

## 2021-01-22 RX ADMIN — FUROSEMIDE 40 MG: 10 INJECTION, SOLUTION INTRAMUSCULAR; INTRAVENOUS at 08:01

## 2021-01-22 RX ADMIN — CITALOPRAM HYDROBROMIDE 40 MG: 20 TABLET ORAL at 08:01

## 2021-01-22 RX ADMIN — BUDESONIDE AND FORMOTEROL FUMARATE DIHYDRATE 2 PUFF: 80; 4.5 AEROSOL RESPIRATORY (INHALATION) at 08:12

## 2021-01-22 RX ADMIN — BUDESONIDE AND FORMOTEROL FUMARATE DIHYDRATE 2 PUFF: 80; 4.5 AEROSOL RESPIRATORY (INHALATION) at 20:23

## 2021-01-22 ASSESSMENT — PAIN SCALES - GENERAL
PAINLEVEL_OUTOF10: 0
PAINLEVEL_OUTOF10: 0

## 2021-01-22 NOTE — CARE COORDINATION
Ostomy Referral Progress Note      NAME:  Lisette Kaplan RECORD NUMBER:  [id number]  AGE: 70 y.o. GENDER:  female  :  1945  TODAY'S DATE:  2021    Subjective:    Brooks Contreras is a 70 y.o. female referred by:   [] Physician  [x] Nursing  [] Other:     Received consult for ostomy, POA. Pt states she cares for stoma herself. She has issues with bleeding and pain around stoma at times. Objective    BP (!) 145/69   Pulse 69   Temp 98.3 °F (36.8 °C) (Oral)   Resp 20   Wt 170 lb 3.1 oz (77.2 kg)   LMP 2006 (Approximate)   SpO2 97%   BMI 33.24 kg/m²     Sander Risk Score Sander Scale Score: 20    Assessment       Colostomy LUQ Descending/sigmoid (Active)   Stomal Appliance 1 piece;Clean;Dry; Intact 21 0954   Stool Appearance Loose 21 0954   Stool Color Nimisha Hotter 21 0954   Number of days: 9426     Pt changed pouch yesterday. Pt uses Casero pc; currently pouch is clean, dry and intact. Pt detects odor; tail end of pouch cleaned with resolution of odor. Pt instructed on how to tx skin if it becomes red and sore with stoma powder and barrier wipes. She has those supplies at home. Pt does not have extra ostomy supplies here. States that Jacqueline worked for her last admit. Will get same supplies sent up that pt used -along with extra stoma powder.   Coloplast SenSura Gil flat 1 pc #[id number]  Brava protective Seal thin [id number] Barrier Strips #[id number]      Intake/Output Summary (Last 24 hours) at 2021 0955  Last data filed at 2021 5754  Gross per 24 hour   Intake 890 ml   Output 750 ml   Net 140 ml       Plan:   Plan for Ostomy Care:    Routine ostomy care -   Change pouch every 3 days & PRN  Pouch:Coloplast SenSura Gil flat 1 pc #[id number]  Brava protective Seal thin #[id number]  Brava Elastic Barrier Strips #[id number]  Measure stoma & cut wafer to fit snuggly around base of stoma  Remove pouch, cleanse skin with warm water, dry thoroughly  Apply barrier ring, then pouch.  Apply elastic barrier strips     Ostomy Plan of Care  [x] Supplies/Instructions left in room  [] Patient using home supplies  [x] Brand/supplies at bedside Coloplast from supply    Current Diet: DIET LOW SODIUM 2 GM; 1500 ml  Dietician consult:  No    Discharge Plan:  Placement for patient upon discharge: home with support    Outpatient visit plan No  Supplies given No   Samples requested No    Referrals:  []   [] [facility]  [] Supplies  [] Other      Patient/Caregiver Teaching:  Written Instructions given to patient/family  Teaching provided:  [] Reviewed GI and A&P        [] Supplies  [] Pouch emptying      [] Manipulate closure  [] Routine Care         [x] Comment - care for irritation  [] Pouch maintenance           Level of patient/caregiver understanding able to:  [] Indicates understanding       [] Needs reinforcement  [] Unsuccessful      [x] Verbal Understanding  [] Demonstrated understanding       [] No evidence of learning  [] Refused teaching         [] N/A    Electronically signed by MARLIN Gorman on 1/22/2021 at 9:55 AM

## 2021-01-22 NOTE — PROGRESS NOTES
Hospitalist Progress Note      PCP: Maya Solomon MD    Date of Admission: 1/21/2021      Hospital Course:   Loly Gallegosy is 70 y.o. female w/ PMH CAD, dCHF, COPD, ANGIE on BiPAP, chronic respiratory failure on 3 L NC, PVD, HTN, HLD, A fib who presented to [facility] with worsening dyspnea since prior admission. Patient was admitted 1/5/21-1/6/21 for CHF exacerbation after receiving IVF during a watchman procedure on 1/4/2021. She was Diuresed and discharged.     Patient states that since then she her breathing has not been great and has progressively worsened. States she now has SCOTT and at rest.  Admitted for IV lasix. Subjective:    Patient seen and examined. Says her breathing is improving but still with SCOTT.      Medications:  Reviewed    Infusion Medications   Scheduled Medications    furosemide  60 mg Intravenous BID    apixaban  2.5 mg Oral BID    aspirin  81 mg Oral Daily    atorvastatin  40 mg Oral Daily    carvedilol  12.5 mg Oral BID WC    citalopram  40 mg Oral Daily    cloNIDine  0.2 mg Oral TID    hydrALAZINE  100 mg Oral 3 times per day    pantoprazole  40 mg Oral QAM AC    levothyroxine  50 mcg Oral Daily    budesonide-formoterol  2 puff Inhalation BID    montelukast  10 mg Oral Daily    tiotropium  2 puff Inhalation Daily    sodium chloride flush  10 mL Intravenous 2 times per day     PRN Meds: lidocaine, albuterol sulfate HFA, butalbital-acetaminophen-caffeine, traZODone, sodium chloride flush, promethazine **OR** ondansetron, polyethylene glycol, acetaminophen **OR** acetaminophen      Intake/Output Summary (Last 24 hours) at 1/22/2021 1705  Last data filed at 1/22/2021 1616  Gross per 24 hour   Intake 890 ml   Output 1400 ml   Net -510 ml       Physical Exam Performed:    BP (!) 162/67   Pulse 65   Temp 97.8 °F (36.6 °C) (Oral)   Resp 16   Wt 170 lb 3.1 oz (77.2 kg)   LMP 05/01/2006 (Approximate)   SpO2 98%   BMI 33.24 kg/m²     General appearance: No apparent distress, alert and cooperative. HEENT: Conjunctivae/corneas clear, neck supple w/ full ROM  Respiratory:  Normal respiratory effort. Clear to auscultation, bilaterally without Rales/Wheezes/Rhonchi. Cardiovascular: Regular rate and rhythm, + JVD  Abdomen: Soft, non-tender, non-distended with normal bowel sounds. Musculoskeletal: No edema bilaterally  Neurologic:  No new focal deficits  Psychiatric: Alert and oriented, normal insight  Capillary Refill: Brisk,< 3 seconds   Peripheral Pulses: +2 palpable, equal bilaterally       Labs:   Recent Labs     01/21/21  1221   WBC 8.2   HGB 8.9*   HCT 29.0*        Recent Labs     01/21/21  1221 01/22/21  0735    136   K 4.0 3.9   CL 94* 90*   CO2 36* 37*   BUN 21* 28*   CREATININE 1.1 1.4*   CALCIUM 9.6 9.4     Recent Labs     01/21/21  1221   AST 15   ALT 12   BILITOT 0.3   ALKPHOS 46     No results for input(s): INR in the last 72 hours. Recent Labs     01/21/21  1221   TROPONINI <0.01       Urinalysis:      Lab Results   Component Value Date    NITRU Negative 01/04/2021    WBCUA 3 01/04/2021    BACTERIA RARE 12/07/2020    RBCUA 0-2 01/04/2021    BLOODU Negative 01/04/2021    SPECGRAV 1.020 01/04/2021    GLUCOSEU Negative 01/04/2021       Radiology:  XR CHEST PORTABLE   Final Result   Mild hazy bibasilar opacities as well as small pleural effusions, slightly   worse on the left.                  Assessment/Plan:    Active Hospital Problems    Diagnosis    Acute on chronic respiratory failure with hypoxia (HCC) [J96.21]     Acute on chronic respiratory failure with hypoxia  - on 5 L NC, wean as tolerated  - COIVD19 negative     Acute on chronic diastolic CHF  - elevated BNP, CXR bibasilar haziness on admission  - c/w IV lasix BID  - monitor I/o  - monitor creatinine  - cardiology c/s     PAF  - s/p watchman 1/4  - c/w Eliquis     COPD w/ chronic respiratory failure  - c/w home inhalers     ANGIE on BIPAP     HTN  - c/w home meds     HLD  - c/w statin     Hypothyroidism  - c/w synthroid     Hx chronic anemia  - Hb stable       DVT Prophylaxis: Eliquis  Diet: DIET LOW SODIUM 2 GM; 1500 ml  Code Status: Full Code      Dispo - continue care, home in 1-2 days    Donnie Parks MD

## 2021-01-22 NOTE — DISCHARGE INSTR - DIET
For nutrition questions after discharge please call the Registered Dietitian at [phone]. Heart Failure Nutrition Therapy  This diet will help you feel better and support your heart by reducing symptoms of fluid retention, shortness of breath and swelling. You should focus on:   Limiting sodium in your diet by reading labels and limiting foods high in sodium. o Limit your daily sodium intake to 2,000 mg per day.  o Select foods with 140 mg of sodium or less per serving. o Foods with more than 300 mg of sodium per serving may not fit into a reduced-sodium meal plan.  o Check serving sizes. If you eat more than 1 serving, you will get more sodium than the amount listed.  Limiting fluid in your diet.  o Limit fluid to no more than 64oz daily  o Remember foods that are liquid at room temperature such as popsicles, soup, ice cream and Jell-O are fluids.  Checking your weight to make sure you're not retaining too much fluid.  o Weigh yourself every morning. If you gain 3 or more pounds in one day or 5 pounds within 1 week, call your doctor. Foods to choose and avoid:   Avoid processed foods. Eat more fresh foods. o Fresh and frozen fruits and vegetables are good choices. o Choose fresh meats. Avoid processed meats such as gupta, sausage and hot dogs.  Do not add salt to your food while cooking or at the table. o Try dry or fresh herbs, pepper, lemon juice, or a sodium-free seasoning blend such as Mrs. Dash to add flavor to food. o Do not use a salt substitute.  Use caution at restaurants  o Restaurant foods are high in sodium. Ask for your food to be cooked without salt and request sauces and dressing to come on the side. 

## 2021-01-22 NOTE — PLAN OF CARE
Problem: Falls - Risk of:  Goal: Will remain free from falls  Description: Will remain free from falls  Outcome: Ongoing   Patient uses call light appropriately to express needs. Bed to lowest position with door open and call light in reach. All fall precautions implemented at this time. Siderails up x2. Non skid footwear in place. Patient has had no falls this shift. Will continue to monitor. Problem: OXYGENATION/RESPIRATORY FUNCTION  Goal: Patient will maintain patent airway  Outcome: Ongoing     Problem: OXYGENATION/RESPIRATORY FUNCTION  Goal: Patient will achieve/maintain normal respiratory rate/effort  Description: Respiratory rate and effort will be within normal limits for the patient  Outcome: Ongoing   Pt will maintain absence of respiratory complications. Oxygen saturations >90% at all times with supplemental O2 as needed. Will assess respiratory status every shift and PRN. Encourage to cough and deep breath. Problem: HEMODYNAMIC STATUS  Goal: Patient has stable vital signs and fluid balance  Outcome: Ongoing     Problem: FLUID AND ELECTROLYTE IMBALANCE  Goal: Fluid and electrolyte balance are achieved/maintained  Outcome: Ongoing  Daily weights. I/O. Fluid restriction. Medications as ordered.   Problem: ACTIVITY INTOLERANCE/IMPAIRED MOBILITY  Goal: Mobility/activity is maintained at optimum level for patient  Outcome: Ongoing     Problem: Discharge Planning:  Goal: Discharged to appropriate level of care  Description: Discharged to appropriate level of care  Outcome: Ongoing

## 2021-01-22 NOTE — DISCHARGE INSTR - COC
Continuity of Care Form    Patient Name: Stephen Whyte   :  1945  MRN:  [id number]    Admit date:  2021  Discharge date:  2021    Code Status Order: Full Code   Advance Directives:   [facility] Documentation       Date/Time Healthcare Directive Type of Healthcare Directive Copy in [address] Agent's Name Healthcare Agent's Phone Number    21 1129  No, patient does not have an advance directive for healthcare treatment -- -- -- -- --            Admitting Physician:  Maisha Barbosa MD  PCP: Nereyda Maguire MD    Discharging Nurse: [facility] [place] [facility] Unit/Room#: K7V-1550/8798-94  Discharging Unit Phone Number: [phone]    Emergency Contact:   Extended Emergency Contact Information  Primary Emergency Contact: Clark [address] Phone: [phone]  Mobile Phone: [phone]  Relation: Child  Secondary Emergency Contact: Coco Morton   [address] Phone: [phone]  Mobile Phone: [phone]  Relation: Brother/Sister    Past Surgical History:  Past Surgical History:   Procedure Laterality Date    ABDOMEN SURGERY      CARDIAC CATHETERIZATION  2017    Dr. Samia Espinoza  2018    CORONARY ARTERY BYPASS GRAFT      Legs & Carotid    FRACTURE SURGERY      HERNIA REPAIR      UPPER GASTROINTESTINAL ENDOSCOPY N/A 2019    EGD DIAGNOSTIC ONLY performed by Paz Cuba MD at [address] [place] [address]         Immunization History:   Immunization History   Administered Date(s) Administered    Influenza, High Dose (Fluzone 65 yrs and older) 2015, 2018    Influenza, Quadv, 6 mo and older, IM, PF (Flulaval, Fluarix) 2018    Influenza, Quadv, adjuvanted, 65 yrs +, IM, PF (Fluad) 2020    Influenza, Triv, inactivated, subunit, adjuvanted, IM (Fluad 65 yrs and older) 10/25/2019    Pneumococcal Conjugate 13-valent Ozzie Harrington) 01/17/2018    Pneumococcal Conjugate 7-valent (Alia Goldman) 12/30/2013       Active Problems:  Patient Active Problem List   Diagnosis Code    Fracture, metacarpal, neck S62.339A    PAD (peripheral artery disease) ([facility]) I73.9    CAD (coronary artery disease) I25.10    HTN (hypertension) Y35    Diastolic dysfunction T40.44    RLS (restless legs syndrome) G25.81    History of tobacco use Z87.891    Centrilobular emphysema (HCC) J43.2    Colovesical fistula N32.1    Diverticulitis large intestine w/o perforation or abscess w/bleeding K57.33    Large bowel obstruction ([facility] [place] [address] K56.609    ANGIE treated with BiPAP G47.33    CKD (chronic kidney disease), stage III - sees Dr. Gin Serna N18.30    COPD, severe ([address] J44.9    Colonic obstruction ([facility] [place] [address] K56.609    Carotid artery stenosis without cerebral infarction, bilateral I65.23    Colostomy care ([facility] [place] [address] Z43.3    Dyspnea and respiratory abnormalities R06.00, R06.89    Hyperlipidemia E78.5    Paroxysmal atrial fibrillation (HCC) I48.0    Hypothyroidism, unspecified E03.9    Chest pain R07.9    Symptomatic anemia D64.9    Anxiety F41.9    History of GI bleed Z87.19    Iron deficiency anemia due to chronic blood loss D50.0    Presence of Watchman left atrial appendage closure device Z95.818    Atrial fibrillation (HCC) I48.91    Pulmonary edema, acute (HCC) J81.0    Chronic respiratory failure with hypoxia (HCC) J96.11    Acute on chronic respiratory failure with hypoxia (HCC) J96.21       Isolation/Infection:   Isolation            No Isolation          Patient Infection Status       Infection Onset Added Last Indicated Last Indicated By Review Planned Expiration Resolved Resolved By    None active    Resolved    COVID-19 Rule Out 01/21/21 01/21/21 01/21/21 COVID-19 (Ordered)   01/21/21 Rule-Out Test Resulted    COVID-19 Rule Out 01/04/21 01/04/21 01/04/21 COVID-19 (Ordered)   01/04/21 Rule-Out Test Resulted            Nurse Assessment:  Last Vital Signs: BP (!) 145/69 Pulse 69   Temp 98.3 °F (36.8 °C) (Oral)   Resp 20   Wt 170 lb 3.1 oz (77.2 kg)   LMP 05/01/2006 (Approximate)   SpO2 97%   BMI 33.24 kg/m²     Last documented pain score (0-10 scale): Pain Level: 0  Last Weight:   Wt Readings from Last 1 Encounters:   01/22/21 170 lb 3.1 oz (77.2 kg)     Mental Status:  oriented and alert    IV Access:  - None    Nursing Mobility/ADLs:  Walking   Independent  Transfer  Independent  Bathing  Independent  Dressing  Independent  Toileting  Independent  Feeding  Independent  Med Admin  Assisted  Med Delivery   whole    Wound Care Documentation and Therapy:        Elimination:  Continence: Bowel: Yes  Bladder: Yes  Urinary Catheter: None   Colostomy/Ileostomy/Ileal Conduit: Yes  Colostomy LUQ Descending/sigmoid-Stomal Appliance: 1 piece, Clean, Dry, Intact  Colostomy LUQ Descending/sigmoid-Stoma  Assessment: Red, Moist  Colostomy LUQ Descending/sigmoid-Mucocutaneous Junction: Intact  Colostomy LUQ Descending/sigmoid-Peristomal Assessment: Clean, Intact  Colostomy LUQ Descending/sigmoid-Stool Appearance: Loose  Colostomy LUQ Descending/sigmoid-Stool Color: Brown  Colostomy LUQ Descending/sigmoid-Output (mL): 250 ml    Date of Last BM: 1/26/2021    Intake/Output Summary (Last 24 hours) at 1/22/2021 1221  Last data filed at 1/22/2021 3249  Gross per 24 hour   Intake 890 ml   Output 750 ml   Net 140 ml     I/O last 3 completed shifts: In: 0 [P.O.:650]  Out: 750 [Urine:300; Emesis/NG output:200; Stool:250]    Safety Concerns:     None    Impairments/Disabilities:      None    Nutrition Therapy:  Current Nutrition Therapy:   - Oral Diet:  Low Sodium (2gm)    Routes of Feeding: Oral  Liquids:  Thin Liquids  Daily Fluid Restriction: yes - amount 1500  Last Modified Barium Swallow with Video (Video Swallowing Test): not done    Treatments at the Time of Hospital Discharge:   Respiratory Treatments:   Oxygen Therapy:  Is on 5 L/min of oxygen   Ventilator:    - No ventilator support    Rehab Therapies: Physical Therapy and Occupational Therapy  Weight Bearing Status/Restrictions: No weight bearing restirctions  Other Medical Equipment (for information only, NOT a DME order): Other Treatments    Heart Failure Instructions for Daily Management  Patient was treated for acute on chronic diastolic heart failure. she  will require the following:    Please weigh daily on the same scale and approximately the same time of day. Report weight gain of 3 pounds/day or 5 pounds/week to : Beny Gutiérrez [phone] and Jaelyn 81 [phone]. Please use hospital discharge weight as baseline reference. Please monitor for signs and symptoms of and report to MD:  Worsening Heart Failure: sudden weight gain, shortness of breath, lower extremity or general edema/swelling, abdominal bloating/swelling, inability to lie flat, intolerance to usual activity, or cough (especially at night). Report these finding even if no increase in weight. Dehydration:  having difficulty or a decrease in urination, dizziness, worsening fatigue, or new onset/worsening of generalized weakness. Please continue a LOW SODIUM diet and LIMIT fluid intake to 48 - 64 ounces ( 1.5 - 2 liters) per day. Call Beny Gutiérrez [phone] and Jaelyn 81 [phone] and/or Hardeep Mai @ [phone] with any questions or concerns. Please continue heart failure education to patient and family/support system. See After Visit Summary for hospital follow up appointment details. Consider spiritual care referral for support and/or completion of advance directives [phone]. Consider: [facility] [place] [address] telehealth program if patient agreeable and able to participate, palliative care consult for ongoing goals of care, end of life, and/or chronic disease management discussions and referral to [facility] [phone] once SNF/HHC complete.       Patient's personal belongings (please select all that are sent with patient):  {CHP DME Belongings:[id number]}    RN SIGNATURE:  {Esignature:[id number]}    CASE MANAGEMENT/SOCIAL WORK SECTION    Inpatient Status Date: 1/21/21    Readmission Risk Assessment Score:  Readmission Risk              Risk of Unplanned Readmission:        21           Discharging to Facility/ Agency   Name: [facility] [address], [address] [facility] [address]    Dialysis Facility (if applicable)   Name:  Address:  Dialysis Schedule:  Phone:  Fax:    / signature: Electronically signed by Bobby Ramos RN on 1/26/21 at 10:14 AM EST    PHYSICIAN SECTION    Prognosis: Good    Condition at Discharge: Stable    Rehab Potential (if transferring to Rehab): Good    Recommended Labs or Other Treatments After Discharge:   Home Nursing:  Vital sign checks and CHF management. Check BMP on 1/28. [facility] or PCP to follow up on results    Physician Certification: I certify the above information and transfer of Stephen Whyte  is necessary for the continuing treatment of the diagnosis listed and that she requires Home Care for less 30 days.      Update Admission H&P: No change in H&P    PHYSICIAN SIGNATURE:  Electronically signed by Kumar Hanely MD on 1/26/21 at 10:11 AM EST

## 2021-01-22 NOTE — FLOWSHEET NOTE
Hat placed in toilet and instructed that strict I/O needed. Asked patient if she followed a fluid restriction at home, she stated \"yes, 64oz\". Instructed that she should follow that here also, not 5 minutes later patient asking for water pitcher to be filled. Again instructed fluid restriction and need for I/O.

## 2021-01-22 NOTE — PROGRESS NOTES
Pt is a 30 day HF readmit. She was here 1/5-1/6 with HF, dc'd home with [facility]. She had a VV with her PCP on 1/11 for follow up. She did have an appt in place for yesterday 1/21 with cardiology but pt was here in ER. (Of note this is actually pt's 3rd admission. She was here 1/4 to have Watchman placed and then came back 1/5 in HF. Pt is again back with HF. Appropriate HF orders are in place ie daily wts, low Na diet, 1.8 FR. HF instructions placed on AVS as well as on STEWART. I have [facility] attending requesting a cardiology consult due to readmits and she does follow with a Rodolfo Guillen, Dr Nila Pham. Aware of HF RN consult, will follow.

## 2021-01-22 NOTE — PLAN OF CARE
Problem: Falls - Risk of:  Goal: Will remain free from falls  Description: Will remain free from falls  1/22/2021 1130 by Jitendra Dc RN  Outcome: Ongoing  1/22/2021 0310 by Katlyn Roth RN  Outcome: Ongoing  Goal: Absence of physical injury  Description: Absence of physical injury  Outcome: Ongoing     Problem: OXYGENATION/RESPIRATORY FUNCTION  Goal: Patient will maintain patent airway  1/22/2021 1130 by Jitendra Dc RN  Outcome: Ongoing  1/22/2021 0310 by Katlyn Roth RN  Outcome: Ongoing  Goal: Patient will achieve/maintain normal respiratory rate/effort  Description: Respiratory rate and effort will be within normal limits for the patient  1/22/2021 1130 by Jitendra Dc RN  Outcome: Ongoing  1/22/2021 0310 by Katlyn Roth RN  Outcome: Ongoing     Problem: HEMODYNAMIC STATUS  Goal: Patient has stable vital signs and fluid balance  1/22/2021 1130 by Jitendra Dc RN  Outcome: Ongoing  1/22/2021 0310 by Katlyn Roth RN  Outcome: Ongoing     Problem: FLUID AND ELECTROLYTE IMBALANCE  Goal: Fluid and electrolyte balance are achieved/maintained  1/22/2021 1130 by Jitendra Dc RN  Outcome: Ongoing  1/22/2021 0310 by Katlyn Roth RN  Outcome: Ongoing     Problem: ACTIVITY INTOLERANCE/IMPAIRED MOBILITY  Goal: Mobility/activity is maintained at optimum level for patient  1/22/2021 1130 by Jitendra Dc RN  Outcome: Ongoing  1/22/2021 0310 by Katlyn Roth RN  Outcome: Ongoing     Problem: Discharge Planning:  Goal: Discharged to appropriate level of care  Description: Discharged to appropriate level of care  1/22/2021 1130 by Jitendra Dc RN  Outcome: Ongoing  1/22/2021 0310 by Katlyn Roth RN  Outcome: Ongoing

## 2021-01-23 LAB
ANION GAP SERPL CALCULATED.3IONS-SCNC: 10 MMOL/L (ref 3–16)
BUN BLDV-MCNC: 36 MG/DL (ref 7–20)
CALCIUM SERPL-MCNC: 9.2 MG/DL (ref 8.3–10.6)
CHLORIDE BLD-SCNC: 93 MMOL/L (ref 99–110)
CO2: 37 MMOL/L (ref 21–32)
CREAT SERPL-MCNC: 1.2 MG/DL (ref 0.6–1.2)
GFR AFRICAN AMERICAN: 53
GFR NON-AFRICAN AMERICAN: 44
GLUCOSE BLD-MCNC: 108 MG/DL (ref 70–99)
MAGNESIUM: 2.2 MG/DL (ref 1.8–2.4)
POTASSIUM SERPL-SCNC: 3.6 MMOL/L (ref 3.5–5.1)
SODIUM BLD-SCNC: 140 MMOL/L (ref 136–145)

## 2021-01-23 PROCEDURE — 80048 BASIC METABOLIC PNL TOTAL CA: CPT

## 2021-01-23 PROCEDURE — 36415 COLL VENOUS BLD VENIPUNCTURE: CPT

## 2021-01-23 PROCEDURE — 83735 ASSAY OF MAGNESIUM: CPT

## 2021-01-23 PROCEDURE — 2700000000 HC OXYGEN THERAPY PER DAY

## 2021-01-23 PROCEDURE — 94761 N-INVAS EAR/PLS OXIMETRY MLT: CPT

## 2021-01-23 PROCEDURE — 6360000002 HC RX W HCPCS: Performed by: FAMILY MEDICINE

## 2021-01-23 PROCEDURE — 6370000000 HC RX 637 (ALT 250 FOR IP): Performed by: HOSPITALIST

## 2021-01-23 PROCEDURE — 93005 ELECTROCARDIOGRAM TRACING: CPT | Performed by: FAMILY MEDICINE

## 2021-01-23 PROCEDURE — 94660 CPAP INITIATION&MGMT: CPT

## 2021-01-23 PROCEDURE — 99255 IP/OBS CONSLTJ NEW/EST HI 80: CPT | Performed by: INTERNAL MEDICINE

## 2021-01-23 PROCEDURE — 6370000000 HC RX 637 (ALT 250 FOR IP): Performed by: FAMILY MEDICINE

## 2021-01-23 PROCEDURE — 2580000003 HC RX 258: Performed by: FAMILY MEDICINE

## 2021-01-23 PROCEDURE — 94640 AIRWAY INHALATION TREATMENT: CPT

## 2021-01-23 PROCEDURE — 1200000000 HC SEMI PRIVATE

## 2021-01-23 RX ORDER — LORAZEPAM 1 MG/1
1 TABLET ORAL DAILY PRN
Status: DISCONTINUED | OUTPATIENT
Start: 2021-01-23 | End: 2021-01-26 | Stop reason: HOSPADM

## 2021-01-23 RX ORDER — METHYLPREDNISOLONE SODIUM SUCCINATE 40 MG/ML
40 INJECTION, POWDER, LYOPHILIZED, FOR SOLUTION INTRAMUSCULAR; INTRAVENOUS EVERY 8 HOURS
Status: DISCONTINUED | OUTPATIENT
Start: 2021-01-23 | End: 2021-01-24

## 2021-01-23 RX ORDER — AMLODIPINE BESYLATE 5 MG/1
5 TABLET ORAL DAILY
Status: DISCONTINUED | OUTPATIENT
Start: 2021-01-23 | End: 2021-01-24

## 2021-01-23 RX ADMIN — BUTALBITAL, ACETAMINOPHEN AND CAFFEINE 1 TABLET: 50; 325; 40 TABLET ORAL at 09:20

## 2021-01-23 RX ADMIN — ASPIRIN 81 MG: 81 TABLET, CHEWABLE ORAL at 08:46

## 2021-01-23 RX ADMIN — MONTELUKAST 10 MG: 10 TABLET, FILM COATED ORAL at 08:46

## 2021-01-23 RX ADMIN — LEVOTHYROXINE SODIUM 50 MCG: 0.05 TABLET ORAL at 06:38

## 2021-01-23 RX ADMIN — FUROSEMIDE 60 MG: 10 INJECTION, SOLUTION INTRAMUSCULAR; INTRAVENOUS at 17:54

## 2021-01-23 RX ADMIN — ATORVASTATIN CALCIUM 40 MG: 40 TABLET, FILM COATED ORAL at 08:46

## 2021-01-23 RX ADMIN — LORAZEPAM 1 MG: 1 TABLET ORAL at 15:29

## 2021-01-23 RX ADMIN — FUROSEMIDE 60 MG: 10 INJECTION, SOLUTION INTRAMUSCULAR; INTRAVENOUS at 08:47

## 2021-01-23 RX ADMIN — CLONIDINE HYDROCHLORIDE 0.2 MG: 0.1 TABLET ORAL at 15:29

## 2021-01-23 RX ADMIN — HYDRALAZINE HYDROCHLORIDE 100 MG: 50 TABLET, FILM COATED ORAL at 15:29

## 2021-01-23 RX ADMIN — SODIUM CHLORIDE, PRESERVATIVE FREE 10 ML: 5 INJECTION INTRAVENOUS at 08:47

## 2021-01-23 RX ADMIN — HYDRALAZINE HYDROCHLORIDE 100 MG: 50 TABLET, FILM COATED ORAL at 20:24

## 2021-01-23 RX ADMIN — CARVEDILOL 12.5 MG: 12.5 TABLET, FILM COATED ORAL at 17:49

## 2021-01-23 RX ADMIN — HYDRALAZINE HYDROCHLORIDE 100 MG: 50 TABLET, FILM COATED ORAL at 06:38

## 2021-01-23 RX ADMIN — CLONIDINE HYDROCHLORIDE 0.2 MG: 0.1 TABLET ORAL at 08:46

## 2021-01-23 RX ADMIN — APIXABAN 2.5 MG: 2.5 TABLET, FILM COATED ORAL at 08:46

## 2021-01-23 RX ADMIN — Medication 2 PUFF: at 09:10

## 2021-01-23 RX ADMIN — CLONIDINE HYDROCHLORIDE 0.2 MG: 0.1 TABLET ORAL at 20:25

## 2021-01-23 RX ADMIN — BUDESONIDE AND FORMOTEROL FUMARATE DIHYDRATE 2 PUFF: 80; 4.5 AEROSOL RESPIRATORY (INHALATION) at 20:00

## 2021-01-23 RX ADMIN — SODIUM CHLORIDE, PRESERVATIVE FREE 10 ML: 5 INJECTION INTRAVENOUS at 20:25

## 2021-01-23 RX ADMIN — PANTOPRAZOLE SODIUM 40 MG: 40 TABLET, DELAYED RELEASE ORAL at 06:38

## 2021-01-23 RX ADMIN — BUDESONIDE AND FORMOTEROL FUMARATE DIHYDRATE 2 PUFF: 80; 4.5 AEROSOL RESPIRATORY (INHALATION) at 09:10

## 2021-01-23 RX ADMIN — CARVEDILOL 12.5 MG: 12.5 TABLET, FILM COATED ORAL at 08:45

## 2021-01-23 RX ADMIN — CITALOPRAM HYDROBROMIDE 40 MG: 20 TABLET ORAL at 08:46

## 2021-01-23 RX ADMIN — APIXABAN 2.5 MG: 2.5 TABLET, FILM COATED ORAL at 20:25

## 2021-01-23 ASSESSMENT — PAIN SCALES - GENERAL
PAINLEVEL_OUTOF10: 0
PAINLEVEL_OUTOF10: 0
PAINLEVEL_OUTOF10: 6

## 2021-01-23 ASSESSMENT — PAIN DESCRIPTION - ONSET
ONSET: SUDDEN
ONSET: SUDDEN

## 2021-01-23 ASSESSMENT — PAIN DESCRIPTION - PROGRESSION
CLINICAL_PROGRESSION: GRADUALLY IMPROVING
CLINICAL_PROGRESSION: GRADUALLY IMPROVING

## 2021-01-23 ASSESSMENT — PAIN DESCRIPTION - PAIN TYPE: TYPE: ACUTE PAIN

## 2021-01-23 ASSESSMENT — PAIN DESCRIPTION - LOCATION: LOCATION: CHEST

## 2021-01-23 ASSESSMENT — PAIN DESCRIPTION - ORIENTATION: ORIENTATION: MID

## 2021-01-23 ASSESSMENT — PAIN - FUNCTIONAL ASSESSMENT: PAIN_FUNCTIONAL_ASSESSMENT: ACTIVITIES ARE NOT PREVENTED

## 2021-01-23 NOTE — FLOWSHEET NOTE
Pt ordered 60 mg lasix IV. After 40 mg of the lasix was pushed, pt c/o of sudden onset of chest pain and feeling warm. RN discontinued pushing last 20 mg of IV lasix. BP went from 144/61 pre-administration, to 188/48 after 40 mg was pushed. Stat EKG ordered. EKG showed NSR with Left ventricular hypertrophy with repolarization abnormality. Oxygen saturation remained in the upper 90's. Follow up BP was 199/36 via dynamap on left arm. Switched to right arm, manual BP still 180/32. Dr John Browne notified via face to face. Dr Suri Miller notified via secure message. Consult to pulmonologist ordered. No other orders added at this time. Will continue to monitor.

## 2021-01-23 NOTE — CONSULTS
[address]  Cardiology Consult    Pat aLnderos  1945 January 23, 2021          CC: Shortness of breath      Subjective:     History of Present Illness:    Pat Landeros is a 70 y.o. patient with a PMH significant for atrial fibrillation, COPD, congestive heart failure presented with complaints of increasing shortness of breath. Patient complains of shortness of breath at rest.  Symptoms include dry cough, dyspnea on exertion and dyspnea when laying down. Symptoms began 4 days ago, gradually worsening since that time. Patient denies sputum production and tightness in chest. Associated symptoms include dyspnea. Patient has not had recent travel. Weight has been stable. Appetite has been unaffected. Symptoms are exacerbated by any exercise. Symptoms are alleviated by nothing. Past Medical History:   has a past medical history of Arthritis, Atrial fibrillation ([facility] [address] CAD (coronary artery disease), CHF (congestive heart failure) ([facility] [address] Colostomy care ([facility] [address] COPD (chronic obstructive pulmonary disease) ([facility] [address] Hyperlipidemia, Hypertension, Kidney disease, Peripheral vascular disease ([facility] [address] Rectal fissure, Restless legs syndrome, Sleep apnea, Thyroid disease, and Unspecified sleep apnea. Surgical History:   has a past surgical history that includes Cholecystectomy; hernia repair; Coronary artery bypass graft; Cardiac catheterization (09/13/2017); colostomy (03/09/2018); Upper gastrointestinal endoscopy (N/A, 9/30/2019); Abdomen surgery; Colonoscopy; fracture surgery; and vascular surgery. Social History:   reports that she quit smoking about 28 years ago. She started smoking about 58 years ago. She has a 90.00 pack-year smoking history. She has never used smokeless tobacco. She reports that she does not drink alcohol or use drugs. Family History:  family history includes Cancer in her brother; Heart Disease in her father, mother, and sister.     Home Medications:  Were reviewed and are listed in nursing record and/or below  Prior to Admission medications    Medication Sig Start Date End Date Taking? Authorizing Provider   carvedilol (COREG) 12.5 MG tablet Take 1 tablet by mouth 2 times daily (with meals) 1/11/21  Yes Karri Webber MD   atorvastatin (LIPITOR) 40 MG tablet TAKE ONE TABLET BY MOUTH DAILY 1/11/21  Yes Karri Webber MD   montelukast (SINGULAIR) 10 MG tablet Take 1 tablet by mouth daily 1/11/21  Yes Karri Webber MD   lansoprazole (PREVACID) 15 MG delayed release capsule Take 1 capsule by mouth daily 1/11/21  Yes Maya Solomon MD   citalopram (CELEXA) 40 MG tablet TAKE ONE TABLET BY MOUTH DAILY 1/11/21  Yes Karri Webber MD   levothyroxine (SYNTHROID) 50 MCG tablet TAKE ONE TABLET BY MOUTH DAILY 1/11/21  Yes Karri Webber MD   hydrALAZINE (APRESOLINE) 100 MG tablet TAKE ONE TABLET BY MOUTH THREE TIMES A DAY 1/11/21  Yes Karri Webber MD   lidocaine (XYLOCAINE) 5 % ointment Apply topically as needed. 1/11/21  Yes Karri Webber MD   diclofenac sodium (VOLTAREN) 1 % GEL Apply 2 g topically 3 times daily as needed for Pain 1/11/21  Yes Karri Webber MD   lidocaine (XYLOCAINE) 2 % jelly Apply pea-sized amount topically to affected area every 8 hours when necessary pain 1/11/21  Yes Maya Solomon MD   apixaban (ELIQUIS) 5 MG TABS tablet Take 0.5 tablets by mouth 2 times daily 1/4/21  Yes Armando Rodgers MD   mineral oil-hydrophilic petrolatum (HYDROPHOR) ointment Apply topically as needed.  12/9/20  Yes Maya Solomon MD   fluticasone-vilanterol (BREO ELLIPTA) 100-25 MCG/INH AEPB inhaler Inhale 1 puff into the lungs daily 11/11/20  Yes Fuller Felty, MD   tiotropium (SPIRIVA RESPIMAT) 2.5 MCG/ACT AERS inhaler Inhale 2 puffs into the lungs daily 11/11/20  Yes Fuller Felty, MD   albuterol sulfate HFA (VENTOLIN HFA) 108 (90 Base) MCG/ACT inhaler Inhale 2 puffs into the lungs every 4 hours as needed for Wheezing or Shortness of Breath 11/11/20  Yes Fuller Felty, MD   furosemide (LASIX) 40 MG tablet Take 1 tablet by mouth 2 times daily 8/26/20  Yes Jovan Webber MD   budesonide-formoterol [facility]) 160-4.5 MCG/ACT AERO Inhale 2 puffs into the lungs 2 times daily 2/21/20  Yes Evelyn Hong, DO   cloNIDine (CATAPRES) 0.2 MG tablet Take 1 tablet by mouth 3 times daily 2/21/20  Yes Evelyn Hong, DO   aspirin 81 MG tablet Take 1 tablet by mouth daily 7/26/19  Yes Mandeep Sanchez MD   ipratropium-albuterol (DUONEB) 0.5-2.5 (3) MG/3ML SOLN nebulizer solution Inhale 3 mLs into the lungs every 4 hours as needed for Shortness of Breath 11/11/20   Milo Duffy MD   traZODone (DESYREL) 50 MG tablet Take 1 tablet by mouth nightly as needed for Sleep 10/19/20   Heath Saini MD   butalbital-acetaminophen-caffeine (FIORICET, ESGIC) -22 MG per tablet Take 1 tablet by mouth every 6 hours as needed for Headaches 10/17/19   J Leona Sam, DO        CURRENT Medications:      amLODIPine (NORVASC) tablet 5 mg, Daily      LORazepam (ATIVAN) tablet 1 mg, Daily PRN      furosemide (LASIX) injection 60 mg, BID      lidocaine (XYLOCAINE) 2 % jelly, PRN      albuterol sulfate  (90 Base) MCG/ACT inhaler 2 puff, Q4H PRN      apixaban (ELIQUIS) tablet 2.5 mg, BID      aspirin chewable tablet 81 mg, Daily      atorvastatin (LIPITOR) tablet 40 mg, Daily      butalbital-acetaminophen-caffeine (FIORICET, ESGIC) per tablet 1 tablet, Q6H PRN      carvedilol (COREG) tablet 12.5 mg, BID WC      citalopram (CELEXA) tablet 40 mg, Daily      cloNIDine (CATAPRES) tablet 0.2 mg, TID      hydrALAZINE (APRESOLINE) tablet 100 mg, 3 times per day      pantoprazole (PROTONIX) tablet 40 mg, QAM AC      levothyroxine (SYNTHROID) tablet 50 mcg, Daily      budesonide-formoterol (SYMBICORT) 80-4.5 MCG/ACT inhaler 2 puff, BID      montelukast (SINGULAIR) tablet 10 mg, Daily      tiotropium (SPIRIVA RESPIMAT) 2.5 MCG/ACT inhaler 2 puff, Daily      traZODone (DESYREL) tablet 50 mg, Nightly PRN      sodium chloride flush 0.9 % injection 10 distress, appears stated age. Head:  Normocephalic, without obvious abnormality, atraumatic. Eyes:  Pupils equal and round. No scleral icterus. Mouth: Moist mucosa, no pharyngeal erythema. Nose: Nares normal. No drainage or sinus tenderness. Neck: Supple, symmetrical, trachea midline. No adenopathy. No tenderness/mass/nodules. No carotid bruit or elevated JVD. Lungs:   Respiratory Effort: Normal   Auscultation: Clear to auscultation bilaterally, respirations unlabored. No wheeze, rales   Chest Wall:  No tenderness or deformity. Cardiovascular:    Pulses  Palpation: normal   Ascultation: Regular rate, S1/ S2 normal. No murmur, rub, or gallop. 2+ radial and pedal pulses, symmetric  Carotid  Femoral   Abdomen and Gastrointestinal:   Soft, non-tender, bowel sounds active. Liver and Spleen  Masses   Musculoskeletal: No muscle wasting  Back  Gait   Extremities: Extremities normal, atraumatic. No cyanosis or edema. No cyanosis clubbing       Skin: Inspection and palpation performed, no rashes or lesions. Pysch: Normal mood and affect.  Alert and oriented to time place person   Neurologic: Normal gross motor and sensory exam.       Labs     All labs have been reviewed    Lab Results   Component Value Date    WBC 8.2 01/21/2021    RBC 3.73 01/21/2021    HGB 8.9 01/21/2021    HCT 29.0 01/21/2021    MCV 77.8 01/21/2021    RDW 18.3 01/21/2021     01/21/2021     Lab Results   Component Value Date     01/23/2021    K 3.6 01/23/2021    K 4.0 01/21/2021    CL 93 01/23/2021    CO2 37 01/23/2021    BUN 36 01/23/2021    CREATININE 1.2 01/23/2021    GFRAA 53 01/23/2021    AGRATIO 1.3 01/21/2021    LABGLOM 44 01/23/2021    GLUCOSE 108 01/23/2021    PROT 7.0 01/21/2021    CALCIUM 9.2 01/23/2021    BILITOT 0.3 01/21/2021    ALKPHOS 46 01/21/2021    AST 15 01/21/2021    ALT 12 01/21/2021     No results found for: PTINR  Lab Results   Component Value Date    LABA1C 5.5 10/11/2019     Lab Results   Component Value Date    CKTOTAL 43 09/26/2019    TROPONINI <0.01 01/21/2021       Cardiac, Vascular and Imaging Data all Personally Reviewed in Detail by Myself      EKG: Normal sinus rhythmNonspecific T wave abnormalityAbnormal ECGConfirmed by NATA LEON     Echocardiogram:  Left ventricular cavity size is small.   There is mildly increased left ventricular wall thickness.   Overall left ventricular systolic function appears normal.   Ejection fraction is visually estimated to be 60-65%.   The left atrial size is normal.   Normal right ventricular size and function    X-ray  Mild hazy bibasilar opacities as well as small pleural effusions, slightly   worse on the left. Assessment and Plan     -Acute on chronic respiratory failure with hypoxemia. At present does not seem to be in acute diastolic heart failure she does have chronic diastolic heart failure. She also has pretty bad COPD and has seen pulmonary. Patient has severe lower airflow obstruction. She is currently on steroid inhalers and duo nebs. We recommend pulmonary consultation. Repeat chest x-ray and labs. Uncontrolled hypertension we will manage her blood pressure. Paroxysmal atrial fibrillation status post left atrial appendage closure with watchman. Thank you for allowing us to participate in the care of [facility]. Please do not hesitate to contact me if you have any questions.     Rozina Amaya MD, MPH    [facility], [facility] Natalee Srinath Woodall [facility]  Ph: [phone]  Fax: [phone]    1/23/2021 2:52 PM

## 2021-01-23 NOTE — CARE COORDINATION
INITIAL CASE MANAGEMENT ASSESSMENT    Reviewed chart, called patient to assess possible discharge needs. Explained Case Management role/services. Living Situation: Patient lives in an apartment with 6 [address] with her granddaughter    ADLs: IPTA     DME: Home oxygen provided through [employer]. Has rollator, shower chair, Bipap    PT/OT Recs: not ordered     Active Services: Active with [facility]. Will need to resume care at discharge     Transportation: Patient's sister provides transportation     Medications: No barriers. PCP: Vanda Webber MD      HD/PD: n/a    PLAN/COMMENTS: Follow for PT/OT eval and recs. [facility] prior to admission. SW/CM provided contact information for patient or family to call with any questions. SW/CM will follow and assist as needed.     Electronically signed by Toshia Schafer RN MSN on 1/23/2021 at 4:31 PM

## 2021-01-23 NOTE — PLAN OF CARE
Problem: Falls - Risk of:  Goal: Will remain free from falls  Description: Will remain free from falls  Outcome: Ongoing     Problem: Falls - Risk of:  Goal: Absence of physical injury  Description: Absence of physical injury  Outcome: Ongoing     Problem: OXYGENATION/RESPIRATORY FUNCTION  Goal: Patient will maintain patent airway  Outcome: Ongoing     Problem: OXYGENATION/RESPIRATORY FUNCTION  Goal: Patient will achieve/maintain normal respiratory rate/effort  Description: Respiratory rate and effort will be within normal limits for the patient  Outcome: Ongoing     Problem: HEMODYNAMIC STATUS  Goal: Patient has stable vital signs and fluid balance  Outcome: Ongoing     Problem: FLUID AND ELECTROLYTE IMBALANCE  Goal: Fluid and electrolyte balance are achieved/maintained  Outcome: Ongoing     Problem: ACTIVITY INTOLERANCE/IMPAIRED MOBILITY  Goal: Mobility/activity is maintained at optimum level for patient  Outcome: Ongoing     Problem: Discharge Planning:  Goal: Discharged to appropriate level of care  Description: Discharged to appropriate level of care  Outcome: Ongoing     Problem: Pain:  Goal: Pain level will decrease  Description: Pain level will decrease  Outcome: Ongoing     Problem: Pain:  Goal: Control of acute pain  Description: Control of acute pain  Outcome: Ongoing     Problem: Pain:  Goal: Control of chronic pain  Description: Control of chronic pain  Outcome: Ongoing

## 2021-01-23 NOTE — PROGRESS NOTES
Hospitalist Progress Note  1/23/2021 9:45 AM    PCP: Dewayne Alcaraz MD    [phone]     Date of Admission: 1/21/2021                                                                                                                     HOSPITAL COURSE    Patient demographics:  The patient  Ninoska Russell is a 70 y.o. female     Significant past medical history:   Patient Active Problem List   Diagnosis    Fracture, metacarpal, neck    PAD (peripheral artery disease) ([address]    CAD (coronary artery disease)    HTN (hypertension)    Diastolic dysfunction    RLS (restless legs syndrome)    History of tobacco use    Centrilobular emphysema ([address]    Colovesical fistula    Diverticulitis large intestine w/o perforation or abscess w/bleeding    Large bowel obstruction ([address]    ANGIE treated with BiPAP    CKD (chronic kidney disease), stage III - sees Dr. Lance Black    COPD, severe ([address]    Colonic obstruction ([address]    Carotid artery stenosis without cerebral infarction, bilateral    Colostomy care ([address]    Dyspnea and respiratory abnormalities    Hyperlipidemia    Paroxysmal atrial fibrillation (HCC)    Hypothyroidism, unspecified    Chest pain    Symptomatic anemia    Anxiety    History of GI bleed    Iron deficiency anemia due to chronic blood loss    Presence of Watchman left atrial appendage closure device    Atrial fibrillation (HCC)    Pulmonary edema, acute (HCC)    Chronic respiratory failure with hypoxia (HCC)    Acute on chronic respiratory failure with hypoxia (HCC)         Presenting symptoms:  Dyspnea     Diagnostic workup:      CONSULTS DURING ADMISSION :   IP CONSULT TO HOSPITALIST  IP CONSULT TO HEART FAILURE NURSE/COORDINATOR  IP CONSULT TO DIETITIAN  IP CONSULT TO SPIRITUAL SERVICES  IP CONSULT TO CARDIOLOGY      Patient was diagnosed with:  Acute on chronic respiratory failure with hypoxia  Acute on chronic diastolic CHF    Treatment while inpatient:  Pt presented to [facility] with worsening dyspnea since prior admission.                                                                                       ----------------------------------------------------------      SUBJECTIVE COMPLAINTS- follow up for Dyspnea     Diet: DIET LOW SODIUM 2 GM; 1500 ml      OBJECTIVE:   Patient Active Problem List   Diagnosis    Fracture, metacarpal, neck    PAD (peripheral artery disease) (HCC)    CAD (coronary artery disease)    HTN (hypertension)    Diastolic dysfunction    RLS (restless legs syndrome)    History of tobacco use    Centrilobular emphysema (HCC)    Colovesical fistula    Diverticulitis large intestine w/o perforation or abscess w/bleeding    Large bowel obstruction ([address]    ANGIE treated with BiPAP    CKD (chronic kidney disease), stage III - sees Dr. Lance Black    COPD, severe ([facility] [address]    Colonic obstruction ([facility] [address]    Carotid artery stenosis without cerebral infarction, bilateral    Colostomy care ([facility] [address]    Dyspnea and respiratory abnormalities    Hyperlipidemia    Paroxysmal atrial fibrillation (HCC)    Hypothyroidism, unspecified    Chest pain    Symptomatic anemia    Anxiety    History of GI bleed    Iron deficiency anemia due to chronic blood loss    Presence of Watchman left atrial appendage closure device    Atrial fibrillation (HCC)    Pulmonary edema, acute (HCC)    Chronic respiratory failure with hypoxia (HCC)    Acute on chronic respiratory failure with hypoxia (HCC)       Allergies  Iv dye [iodides]    Medications    Scheduled Meds:   furosemide  60 mg Intravenous BID    apixaban  2.5 mg Oral BID    aspirin  81 mg Oral Daily    atorvastatin  40 mg Oral Daily    carvedilol  12.5 mg Oral BID WC    citalopram  40 mg Oral Daily    cloNIDine  0.2 mg Oral TID    hydrALAZINE  100 mg Oral 3 times per day    pantoprazole  40 mg Oral QAM AC    levothyroxine  50 mcg Oral Daily    budesonide-formoterol  2 puff Inhalation BID    montelukast  10 mg Oral Daily  tiotropium  2 puff Inhalation Daily    sodium chloride flush  10 mL Intravenous 2 times per day     Continuous Infusions:  PRN Meds:  lidocaine, albuterol sulfate HFA, butalbital-acetaminophen-caffeine, traZODone, sodium chloride flush, promethazine **OR** ondansetron, polyethylene glycol, acetaminophen **OR** acetaminophen    Vitals   Vitals /wt   Patient Vitals for the past 8 hrs:   BP Temp Temp src Pulse Resp SpO2 Weight   01/23/21 0915 (!) 199/36 -- -- 79 16 100 % --   01/23/21 0910 -- -- -- -- -- 98 % --   01/23/21 0845 (!) 188/47 -- -- 82 19 98 % --   01/23/21 0830 (!) 144/61 98.1 °F (36.7 °C) Oral 76 17 99 % --   01/23/21 0517 -- -- -- -- -- -- 169 lb 5 oz (76.8 kg)   01/23/21 0424 (!) 138/50 97.6 °F (36.4 °C) -- 61 12 95 % --   01/23/21 0420 -- -- -- -- 17 95 % --   01/23/21 0415 -- -- -- -- 23 97 % --        72HR INTAKE/OUTPUT:      Intake/Output Summary (Last 24 hours) at 1/23/2021 0945  Last data filed at 1/23/2021 0325  Gross per 24 hour   Intake 480 ml   Output 650 ml   Net -170 ml       Exam:    Gen:   Alert and oriented ×3   Eyes: PERRL. No sclera icterus. No conjunctival injection. ENT: No discharge. Pharynx clear. External appearance of ears and nose normal.  Neck: Trachea midline. No obvious mass. Resp: Decreased breath sounds bilaterally  CV: Regular rate. Regular rhythm. No murmur or rub. No edema. GI: Non-tender. Non-distended. No hernia. Skin: Warm, dry, normal texture and turgor. Lymph: No cervical LAD. No supraclavicular LAD. M/S: / Ext. No cyanosis. No clubbing. No joint deformity. Neuro: CN 2-12 are intact,  no neurologic deficits noted. PT/INR: No results for input(s): PROTIME, INR in the last 72 hours. APTT: No results for input(s): APTT in the last 72 hours.     CBC:   Recent Labs     01/21/21  1221   WBC 8.2   HGB 8.9*   HCT 29.0*   MCV 77.8*          BMP:   Recent Labs     01/21/21  1221 01/22/21  0735 01/23/21  0742    136 140   K 4.0 3.9 3.6   CL 94* 90* 93*   CO2 36* 37* 37*   BUN 21* 28* 36*   CREATININE 1.1 1.4* 1.2       LIVER PROFILE:   Recent Labs     01/21/21  1221   ALKPHOS 46   AST 15   ALT 12   BILITOT 0.3     No results for input(s): AMYLASE in the last 72 hours. No results for input(s): LIPASE in the last 72 hours. UA:No results for input(s): NITRITE, LABCAST, WBCUA, RBCUA, MUCUS in the last 72 hours. TROPONIN:   Recent Labs     01/21/21  1221   TROPONINI <0.01       Lab Results   Component Value Date/Time    URRFLXCULT Yes 03/02/2018 02:08 PM       No results for input(s): TSHREFLEX in the last 72 hours. No components found for: [id number]  POC GLUCOSE:  No results for input(s): POCGLU in the last 72 hours. No results for input(s): LABA1C in the last 72 hours. Lab Results   Component Value Date    LABA1C 5.5 10/11/2019         ASSESSMENT AND PLAN  Acute on chronic respiratory failure with hypoxia  - on 5 L NC, wean as tolerated  - COIVD19 negative     Acute on chronic diastolic CHF-ruled out  Cardiology does not believe patient is in acute CHF  - elevated BNP, CXR bibasilar haziness  - IV lasix BID  Pulmonary consult     PAF  - s/p watchman 1/4/2021  - c/w Eliquis     COPD w/ chronic respiratory failure  - c/w home inhalers    HTN  Continue patient on home medication  Add amlodipine     HLD  - c/w statin     Hypothyroidism  - c/w synthroid     Hx chronic anemia  - Hb stable     ANGIE on BIPAP      Code Status: Full Code        Dispo -cc        The patient and / or the family were informed of the results of any tests, a time was given to answer questions, a plan was proposed and they agreed with plan. Marlene Perez MD    This note was transcribed using [address]. Please disregard any translational errors.

## 2021-01-23 NOTE — PROGRESS NOTES
Pt awake and alert. No c/o pain. O2 on at 5l,breath sounds diminished. No cough at present. O2 canula changed,keeps coming disconnected from wall. Pt had cannula wrapped around leg. Call light in reach,hs snack given to pt.

## 2021-01-24 LAB
ANION GAP SERPL CALCULATED.3IONS-SCNC: 14 MMOL/L (ref 3–16)
BUN BLDV-MCNC: 41 MG/DL (ref 7–20)
CALCIUM SERPL-MCNC: 9.6 MG/DL (ref 8.3–10.6)
CHLORIDE BLD-SCNC: 91 MMOL/L (ref 99–110)
CO2: 32 MMOL/L (ref 21–32)
CREAT SERPL-MCNC: 1.3 MG/DL (ref 0.6–1.2)
EKG ATRIAL RATE: 79 BPM
EKG DIAGNOSIS: NORMAL
EKG P AXIS: 63 DEGREES
EKG P-R INTERVAL: 138 MS
EKG Q-T INTERVAL: 420 MS
EKG QRS DURATION: 94 MS
EKG QTC CALCULATION (BAZETT): 481 MS
EKG R AXIS: 57 DEGREES
EKG T AXIS: 184 DEGREES
EKG VENTRICULAR RATE: 79 BPM
GFR AFRICAN AMERICAN: 49
GFR NON-AFRICAN AMERICAN: 40
GLUCOSE BLD-MCNC: 233 MG/DL (ref 70–99)
HCT VFR BLD CALC: 28.1 % (ref 36–48)
HEMOGLOBIN: 8.6 G/DL (ref 12–16)
MAGNESIUM: 2.2 MG/DL (ref 1.8–2.4)
MCH RBC QN AUTO: 24.2 PG (ref 26–34)
MCHC RBC AUTO-ENTMCNC: 30.5 G/DL (ref 31–36)
MCV RBC AUTO: 79.4 FL (ref 80–100)
PDW BLD-RTO: 18.6 % (ref 12.4–15.4)
PLATELET # BLD: 287 K/UL (ref 135–450)
PMV BLD AUTO: 8.8 FL (ref 5–10.5)
POTASSIUM SERPL-SCNC: 3.8 MMOL/L (ref 3.5–5.1)
PRO-BNP: 1698 PG/ML (ref 0–124)
RBC # BLD: 3.55 M/UL (ref 4–5.2)
SODIUM BLD-SCNC: 137 MMOL/L (ref 136–145)
WBC # BLD: 10.4 K/UL (ref 4–11)

## 2021-01-24 PROCEDURE — 94761 N-INVAS EAR/PLS OXIMETRY MLT: CPT

## 2021-01-24 PROCEDURE — 99232 SBSQ HOSP IP/OBS MODERATE 35: CPT | Performed by: INTERNAL MEDICINE

## 2021-01-24 PROCEDURE — 2580000003 HC RX 258: Performed by: FAMILY MEDICINE

## 2021-01-24 PROCEDURE — 83735 ASSAY OF MAGNESIUM: CPT

## 2021-01-24 PROCEDURE — 83880 ASSAY OF NATRIURETIC PEPTIDE: CPT

## 2021-01-24 PROCEDURE — 6370000000 HC RX 637 (ALT 250 FOR IP): Performed by: HOSPITALIST

## 2021-01-24 PROCEDURE — 6360000002 HC RX W HCPCS: Performed by: HOSPITALIST

## 2021-01-24 PROCEDURE — 6370000000 HC RX 637 (ALT 250 FOR IP): Performed by: INTERNAL MEDICINE

## 2021-01-24 PROCEDURE — 99223 1ST HOSP IP/OBS HIGH 75: CPT | Performed by: INTERNAL MEDICINE

## 2021-01-24 PROCEDURE — 2700000000 HC OXYGEN THERAPY PER DAY

## 2021-01-24 PROCEDURE — 6370000000 HC RX 637 (ALT 250 FOR IP): Performed by: NURSE PRACTITIONER

## 2021-01-24 PROCEDURE — 6370000000 HC RX 637 (ALT 250 FOR IP): Performed by: FAMILY MEDICINE

## 2021-01-24 PROCEDURE — 1200000000 HC SEMI PRIVATE

## 2021-01-24 PROCEDURE — 80048 BASIC METABOLIC PNL TOTAL CA: CPT

## 2021-01-24 PROCEDURE — 94640 AIRWAY INHALATION TREATMENT: CPT

## 2021-01-24 PROCEDURE — 36415 COLL VENOUS BLD VENIPUNCTURE: CPT

## 2021-01-24 PROCEDURE — 94660 CPAP INITIATION&MGMT: CPT

## 2021-01-24 PROCEDURE — 85027 COMPLETE CBC AUTOMATED: CPT

## 2021-01-24 PROCEDURE — 93010 ELECTROCARDIOGRAM REPORT: CPT | Performed by: INTERNAL MEDICINE

## 2021-01-24 RX ORDER — METHYLPREDNISOLONE SODIUM SUCCINATE 125 MG/2ML
60 INJECTION, POWDER, LYOPHILIZED, FOR SOLUTION INTRAMUSCULAR; INTRAVENOUS EVERY 8 HOURS
Status: DISCONTINUED | OUTPATIENT
Start: 2021-01-24 | End: 2021-01-24

## 2021-01-24 RX ORDER — FUROSEMIDE 40 MG/1
40 TABLET ORAL 2 TIMES DAILY
Status: DISCONTINUED | OUTPATIENT
Start: 2021-01-24 | End: 2021-01-26 | Stop reason: HOSPADM

## 2021-01-24 RX ORDER — PREDNISONE 20 MG/1
40 TABLET ORAL DAILY
Status: DISCONTINUED | OUTPATIENT
Start: 2021-01-24 | End: 2021-01-26 | Stop reason: HOSPADM

## 2021-01-24 RX ORDER — AMLODIPINE BESYLATE 10 MG/1
10 TABLET ORAL DAILY
Status: DISCONTINUED | OUTPATIENT
Start: 2021-01-24 | End: 2021-01-26 | Stop reason: HOSPADM

## 2021-01-24 RX ORDER — NITROGLYCERIN 0.4 MG/1
0.4 TABLET SUBLINGUAL EVERY 5 MIN PRN
Status: DISCONTINUED | OUTPATIENT
Start: 2021-01-24 | End: 2021-01-26 | Stop reason: HOSPADM

## 2021-01-24 RX ADMIN — METHYLPREDNISOLONE SODIUM SUCCINATE 40 MG: 40 INJECTION, POWDER, FOR SOLUTION INTRAMUSCULAR; INTRAVENOUS at 06:42

## 2021-01-24 RX ADMIN — HYDRALAZINE HYDROCHLORIDE 100 MG: 50 TABLET, FILM COATED ORAL at 06:42

## 2021-01-24 RX ADMIN — PREDNISONE 40 MG: 20 TABLET ORAL at 13:49

## 2021-01-24 RX ADMIN — LEVOTHYROXINE SODIUM 50 MCG: 0.05 TABLET ORAL at 06:42

## 2021-01-24 RX ADMIN — APIXABAN 2.5 MG: 2.5 TABLET, FILM COATED ORAL at 20:52

## 2021-01-24 RX ADMIN — NITROGLYCERIN 0.4 MG: 0.4 TABLET, ORALLY DISINTEGRATING SUBLINGUAL at 22:17

## 2021-01-24 RX ADMIN — SODIUM CHLORIDE, PRESERVATIVE FREE 10 ML: 5 INJECTION INTRAVENOUS at 08:30

## 2021-01-24 RX ADMIN — CARVEDILOL 12.5 MG: 12.5 TABLET, FILM COATED ORAL at 17:03

## 2021-01-24 RX ADMIN — AMLODIPINE BESYLATE 10 MG: 10 TABLET ORAL at 08:29

## 2021-01-24 RX ADMIN — LORAZEPAM 1 MG: 1 TABLET ORAL at 14:44

## 2021-01-24 RX ADMIN — CLONIDINE HYDROCHLORIDE 0.2 MG: 0.1 TABLET ORAL at 08:28

## 2021-01-24 RX ADMIN — PANTOPRAZOLE SODIUM 40 MG: 40 TABLET, DELAYED RELEASE ORAL at 06:42

## 2021-01-24 RX ADMIN — NITROGLYCERIN 0.4 MG: 0.4 TABLET, ORALLY DISINTEGRATING SUBLINGUAL at 21:38

## 2021-01-24 RX ADMIN — NITROGLYCERIN 0.4 MG: 0.4 TABLET, ORALLY DISINTEGRATING SUBLINGUAL at 21:45

## 2021-01-24 RX ADMIN — CLONIDINE HYDROCHLORIDE 0.2 MG: 0.1 TABLET ORAL at 13:49

## 2021-01-24 RX ADMIN — CITALOPRAM HYDROBROMIDE 40 MG: 20 TABLET ORAL at 08:29

## 2021-01-24 RX ADMIN — APIXABAN 2.5 MG: 2.5 TABLET, FILM COATED ORAL at 08:29

## 2021-01-24 RX ADMIN — SODIUM CHLORIDE, PRESERVATIVE FREE 10 ML: 5 INJECTION INTRAVENOUS at 20:52

## 2021-01-24 RX ADMIN — CARVEDILOL 12.5 MG: 12.5 TABLET, FILM COATED ORAL at 08:29

## 2021-01-24 RX ADMIN — Medication 2 PUFF: at 08:19

## 2021-01-24 RX ADMIN — CLONIDINE HYDROCHLORIDE 0.2 MG: 0.1 TABLET ORAL at 20:51

## 2021-01-24 RX ADMIN — BUDESONIDE AND FORMOTEROL FUMARATE DIHYDRATE 2 PUFF: 80; 4.5 AEROSOL RESPIRATORY (INHALATION) at 08:21

## 2021-01-24 RX ADMIN — ATORVASTATIN CALCIUM 40 MG: 40 TABLET, FILM COATED ORAL at 08:29

## 2021-01-24 RX ADMIN — BUDESONIDE AND FORMOTEROL FUMARATE DIHYDRATE 2 PUFF: 80; 4.5 AEROSOL RESPIRATORY (INHALATION) at 19:46

## 2021-01-24 RX ADMIN — HYDRALAZINE HYDROCHLORIDE 100 MG: 50 TABLET, FILM COATED ORAL at 20:52

## 2021-01-24 RX ADMIN — FUROSEMIDE 40 MG: 40 TABLET ORAL at 17:03

## 2021-01-24 RX ADMIN — MONTELUKAST 10 MG: 10 TABLET, FILM COATED ORAL at 08:28

## 2021-01-24 RX ADMIN — HYDRALAZINE HYDROCHLORIDE 100 MG: 50 TABLET, FILM COATED ORAL at 13:49

## 2021-01-24 RX ADMIN — METHYLPREDNISOLONE SODIUM SUCCINATE 40 MG: 40 INJECTION, POWDER, FOR SOLUTION INTRAMUSCULAR; INTRAVENOUS at 00:01

## 2021-01-24 RX ADMIN — ASPIRIN 81 MG: 81 TABLET, CHEWABLE ORAL at 08:29

## 2021-01-24 ASSESSMENT — PAIN DESCRIPTION - FREQUENCY: FREQUENCY: CONTINUOUS

## 2021-01-24 ASSESSMENT — PAIN SCALES - GENERAL
PAINLEVEL_OUTOF10: 7
PAINLEVEL_OUTOF10: 0
PAINLEVEL_OUTOF10: 0

## 2021-01-24 ASSESSMENT — PAIN DESCRIPTION - PROGRESSION
CLINICAL_PROGRESSION: NOT CHANGED
CLINICAL_PROGRESSION: GRADUALLY IMPROVING

## 2021-01-24 ASSESSMENT — PAIN DESCRIPTION - PAIN TYPE: TYPE: ACUTE PAIN

## 2021-01-24 ASSESSMENT — PAIN DESCRIPTION - DESCRIPTORS: DESCRIPTORS: CONSTANT;DISCOMFORT

## 2021-01-24 NOTE — ACP (ADVANCE CARE PLANNING)
ADVANCED CARE PLANNING    Name:Alisha Pizano       :  1945              MRN:  [id number]      Purpose of Encounter: Advanced care planning in light of copd  Parties in attendance: :Lisseth Devine, Bertin Mcgraw MD  Decisional Capacity:--YES     Subjective/Patient Story:  Patient  wishes To BE resuscitated in case of cardiac arrest or respiratory arrest.    Plan: Will notify Mally Contreras MD of change in care plan. Will look at further interventions as needed. Code Status: At this time patient wishes to be full code    Time Spent on Advanced Planning Documents: >15 minutes    Advanced Care Planning Documents: Completed advances directives, advanced directives in chart-----NO        Electronically signed by Bertin Mcgraw MD on 2021 at 12:27 PM  Thank you Mally Contreras MD for the opportunity to be involved in this patient's care. If you have any questions or concerns please feel free to contact me at [phone].

## 2021-01-24 NOTE — CONSULTS
PATIENT IS SEEN AT THE REQUEST OF DR. Nemesio Rojas for acute resp failure    CONSULTING PHYSICIAN: Keya     HISTORY OF PRESENT ILLNESS:  This is a 70 y.o. female who presented to the ED on 1/21 with a CC of SOB. She has had SOB with increasing oxygen requirements. She was admitted for CHF  Said she has been SOB for some time. Trying to get down to baseline of 3 liters of oxygen but has had to use more. She admits to being couch bound for the past 9 months. She has been more SOB alone, without any other symptoms. She gets weak and has pain when she ambulates. She is feeling better today. Established Pulmonologist:  Edu    PAST MEDICAL HISTORY:  Past Medical History:   Diagnosis Date    Arthritis     Atrial fibrillation ([address] 1/4/2021    CAD (coronary artery disease)     Nonobstructive on cath in 9/2017    CHF (congestive heart failure) ([address]     Colostomy care ([address] 4/25/2018    Peristomal irritation and early leaking    COPD (chronic obstructive pulmonary disease) (HCC)     Hyperlipidemia     Hypertension     Kidney disease     Stage 3    Peripheral vascular disease ([address]     Rectal fissure 11/2014    surgery pending    Restless legs syndrome     Sleep apnea     O2 3 liters at hs    Thyroid disease     Unspecified sleep apnea        PAST SURGICAL HISTORY:  Past Surgical History:   Procedure Laterality Date    ABDOMEN SURGERY      CARDIAC CATHETERIZATION  09/13/2017    Dr. Alivia Wood  03/09/2018    CORONARY ARTERY BYPASS GRAFT      Legs & Carotid    FRACTURE SURGERY      HERNIA REPAIR      UPPER GASTROINTESTINAL ENDOSCOPY N/A 9/30/2019    EGD DIAGNOSTIC ONLY performed by Earl Bañuelos MD at [address] [place] [address]:  family history includes Cancer in her brother; Heart Disease in her father, mother, and sister. SOCIAL HISTORY:   reports that she quit smoking about 28 years ago.  She started smoking about 58 years ago. She has a 90.00 pack-year smoking history. She has never used smokeless tobacco.    Scheduled Meds:   amLODIPine  10 mg Oral Daily    methylPREDNISolone  60 mg Intravenous Q8H    furosemide  40 mg Oral BID    apixaban  2.5 mg Oral BID    aspirin  81 mg Oral Daily    atorvastatin  40 mg Oral Daily    carvedilol  12.5 mg Oral BID WC    citalopram  40 mg Oral Daily    cloNIDine  0.2 mg Oral TID    hydrALAZINE  100 mg Oral 3 times per day    pantoprazole  40 mg Oral QAM AC    levothyroxine  50 mcg Oral Daily    budesonide-formoterol  2 puff Inhalation BID    montelukast  10 mg Oral Daily    tiotropium  2 puff Inhalation Daily    sodium chloride flush  10 mL Intravenous 2 times per day       Continuous Infusions:      PRN Meds:  LORazepam, lidocaine, albuterol sulfate HFA, butalbital-acetaminophen-caffeine, traZODone, sodium chloride flush, promethazine **OR** ondansetron, polyethylene glycol, acetaminophen **OR** acetaminophen    ALLERGIES:  Patient is allergic to iv dye [iodides]. REVIEW OF SYSTEMS:  Constitutional: been on couch for the past 9 months   HENT: Negative for sore throat  Eyes: Negative for redness   Respiratory: SOB with exertion   Cardiovascular: Chest wall pains  Gastrointestinal: Negative for vomiting, diarrhea   Genitourinary: Negative for hematuria, negative for dysuria  Musculoskeletal: Negative for arthralgias   Skin: Negative for rash  Neurological: Negative for syncope. Weakness   Hematological: Negative for adenopathy  Extremities:  Negative for swelling    PHYSICAL EXAM:  Blood pressure (!) 176/56, pulse 69, temperature 97.9 °F (36.6 °C), temperature source Oral, resp. rate 18, weight 166 lb 7.2 oz (75.5 kg), last menstrual period 05/01/2006, SpO2 98 %, not currently breastfeeding.'  Gen: No distress. Pale  Eyes: PERRL. No sclera icterus. No conjunctival injection. ENT: No discharge. Pharynx clear. Neck: Trachea midline. No obvious mass.     Resp: Isolated crackles in the LLL, clear otherwise   CV: Regular rate. Regular rhythm. No murmur or rub. GI: Non-tender. Non-distended. No hernia. BS present. Skin: Pale skin   Lymph: No cervical LAD. No supraclavicular LAD. M/S: No cyanosis. No joint deformity. Neuro: Awake. Alert. Moves all four extremities. EXT:   trace edema, no clubbing    LABS:  CBC:   Recent Labs     21  1221 21  0719   WBC 8.2 10.4   HGB 8.9* 8.6*   HCT 29.0* 28.1*   MCV 77.8* 79.4*    287     BMP:   Recent Labs     21  0735 21  0742 21  0719    140 137   K 3.9 3.6 3.8   CL 90* 93* 91*   CO2 37* 37* 32   BUN 28* 36* 41*   CREATININE 1.4* 1.2 1.3*     LIVER PROFILE:   Recent Labs     21  1221   AST 15   ALT 12   BILITOT 0.3   ALKPHOS 46     PT/INR: No results for input(s): PROTIME, INR in the last 72 hours. APTT: No results for input(s): APTT in the last 72 hours. UA:No results for input(s): NITRITE, COLORU, PHUR, LABCAST, WBCUA, RBCUA, MUCUS, TRICHOMONAS, YEAST, BACTERIA, CLARITYU, SPECGRAV, LEUKOCYTESUR, UROBILINOGEN, BILIRUBINUR, BLOODU, GLUCOSEU, AMORPHOUS in the last 72 hours. Invalid input(s): KETONESU  No results for input(s): PHART, [id number], PO2ART in the last 72 hours. Cultures:  None    PFTs:  Severe obstruction      ECHO: 2020  Left ventricular cavity size is normal.   There is mild concentric left ventricular hypertrophy. Ejection fraction is visually estimated to be 55-60%. Grade I diastolic   dysfunction   Mild to moderate mitral regurgitation. Mild to moderate tricuspid regurgitation with RVSP of 51 mmHg. VB.37    Chest X-ray:  Chest imaging was reviewed by me and showed mild effusion with vascular congestion     I reviewed all the above labs and studies pertaining to this visit.       ASSESSMENT:  · Chronic Hypoxic Respiratory Failure, near her baseline of 2-5 liters  · Severe COPD, does not appear to be exacerbated   · Abnormal Radiograph suggest pulmonary edema and effusions  · Severe Deconditioning   · Chronic Anemia   · ANGIE    PLAN:  Titrate oxygen for saturations greater than or equal to 90%  · DC IV solumedrol  · Prednisone 40 mg for 5 days. I don't appreciate wheezing on exam  · Bronchodilators  · Diuresis as you are doing  · DVT prophylaxis with Eliquis     She says she has not left the couch in the past 9 months. She has outpatient referral to pulmonary rehab and needs to do this. Not sure anything is exacerbated at this time but has COPD, Diastolic CHF, CKD, anemia and deconditioning.   Needs optimization of them all       Maris Berrios DO  [facility]

## 2021-01-24 NOTE — PROGRESS NOTES
[facility]  Cardiology Consult    Vikash Short  1945 January 24, 2021          CC: Shortness of breath      Subjective:     History of Present Illness:    Vikash Short is a 70 y.o. patient with a PMH significant for atrial fibrillation, COPD, congestive heart failure presented with complaints of increasing shortness of breath. Patient complains of shortness of breath at rest.  Symptoms include dry cough, dyspnea on exertion and dyspnea when laying down. Symptoms began 4 days ago, gradually worsening since that time. Patient denies sputum production and tightness in chest. Associated symptoms include dyspnea. Patient has not had recent travel. Weight has been stable. Appetite has been unaffected. Symptoms are exacerbated by any exercise. Symptoms are alleviated by nothing. Past Medical History:   has a past medical history of Arthritis, Atrial fibrillation ([facility] CAD (coronary artery disease), CHF (congestive heart failure) ([facility] Colostomy care ([facility] COPD (chronic obstructive pulmonary disease) ([facility] Hyperlipidemia, Hypertension, Kidney disease, Peripheral vascular disease ([facility] Rectal fissure, Restless legs syndrome, Sleep apnea, Thyroid disease, and Unspecified sleep apnea. Surgical History:   has a past surgical history that includes Cholecystectomy; hernia repair; Coronary artery bypass graft; Cardiac catheterization (09/13/2017); colostomy (03/09/2018); Upper gastrointestinal endoscopy (N/A, 9/30/2019); Abdomen surgery; Colonoscopy; fracture surgery; and vascular surgery. Social History:   reports that she quit smoking about 28 years ago. She started smoking about 58 years ago. She has a 90.00 pack-year smoking history. She has never used smokeless tobacco. She reports that she does not drink alcohol or use drugs. Family History:  family history includes Cancer in her brother; Heart Disease in her father, mother, and sister.     Home Medications:  Were reviewed and are listed in nursing record and/or below  Prior to Admission medications    Medication Sig Start Date End Date Taking? Authorizing Provider   carvedilol (COREG) 12.5 MG tablet Take 1 tablet by mouth 2 times daily (with meals) 1/11/21  Yes Levon Webber MD   atorvastatin (LIPITOR) 40 MG tablet TAKE ONE TABLET BY MOUTH DAILY 1/11/21  Yes Levon Webber MD   montelukast (SINGULAIR) 10 MG tablet Take 1 tablet by mouth daily 1/11/21  Yes Levon Webber MD   lansoprazole (PREVACID) 15 MG delayed release capsule Take 1 capsule by mouth daily 1/11/21  Yes Dewayne Alcaraz MD   citalopram (CELEXA) 40 MG tablet TAKE ONE TABLET BY MOUTH DAILY 1/11/21  Yes Levon Webber MD   levothyroxine (SYNTHROID) 50 MCG tablet TAKE ONE TABLET BY MOUTH DAILY 1/11/21  Yes Levon Webber MD   hydrALAZINE (APRESOLINE) 100 MG tablet TAKE ONE TABLET BY MOUTH THREE TIMES A DAY 1/11/21  Yes Levon Webber MD   lidocaine (XYLOCAINE) 5 % ointment Apply topically as needed. 1/11/21  Yes Levon Webber MD   diclofenac sodium (VOLTAREN) 1 % GEL Apply 2 g topically 3 times daily as needed for Pain 1/11/21  Yes Levon Webber MD   lidocaine (XYLOCAINE) 2 % jelly Apply pea-sized amount topically to affected area every 8 hours when necessary pain 1/11/21  Yes Dewayne Alcaraz MD   apixaban (ELIQUIS) 5 MG TABS tablet Take 0.5 tablets by mouth 2 times daily 1/4/21  Yes Fabián Griffin MD   mineral oil-hydrophilic petrolatum (HYDROPHOR) ointment Apply topically as needed.  12/9/20  Yes Dewayne Alcaraz MD   fluticasone-vilanterol (BREO ELLIPTA) 100-25 MCG/INH AEPB inhaler Inhale 1 puff into the lungs daily 11/11/20  Yes Emily Martinez MD   tiotropium (SPIRIVA RESPIMAT) 2.5 MCG/ACT AERS inhaler Inhale 2 puffs into the lungs daily 11/11/20  Yes Emily Martinez MD   albuterol sulfate HFA (VENTOLIN HFA) 108 (90 Base) MCG/ACT inhaler Inhale 2 puffs into the lungs every 4 hours as needed for Wheezing or Shortness of Breath 11/11/20  Yes Emily Martinez MD   furosemide (LASIX) 40 MG tablet Take 1 tablet mg, Nightly PRN      sodium chloride flush 0.9 % injection 10 mL, 2 times per day      sodium chloride flush 0.9 % injection 10 mL, PRN      promethazine (PHENERGAN) tablet 12.5 mg, Q6H PRN    Or      ondansetron (ZOFRAN) injection 4 mg, Q6H PRN      polyethylene glycol (GLYCOLAX) packet 17 g, Daily PRN      acetaminophen (TYLENOL) tablet 650 mg, Q6H PRN    Or      acetaminophen (TYLENOL) suppository 650 mg, Q6H PRN        Allergies: Iv dye [iodides]           Review of Systems: SEE HPI   · Constitutional: no unanticipated weight loss. There's been no change in energy level, sleep pattern, or activity level. No fevers, chills. · Eyes: No visual changes or diplopia. No scleral icterus. · ENT: No Headaches, hearing loss or vertigo. No mouth sores or sore throat. · Cardiovascular: No Chest pain, tightness or discomfort.   Shortness of breath. Dyspnea on exertion, Orthopnea, Paroxysmal nocturnal dyspnea or breathlessness at rest.   No Palpitations.  No Syncope ('blackouts', 'faints', 'collapse') or dizziness. · Respiratory: No cough or wheezing, no sputum production. No hematemesis. · Gastrointestinal: No abdominal pain, appetite loss, blood in stools. No change in bowel or bladder habits. · Genitourinary: No dysuria, trouble voiding, or hematuria. · Musculoskeletal:  No gait disturbance, no joint complaints. · Integumentary: No rash or pruritis. · Neurological: No headache, diplopia, change in muscle strength, numbness or tingling. · Psychiatric: No anxiety or depression. · Endocrine: No temperature intolerance. No excessive thirst, fluid intake, or urination. No tremor. · Hematologic/Lymphatic: No abnormal bruising or bleeding, blood clots or swollen lymph nodes. · Allergic/Immunologic: No nasal congestion or hives.       Objective:     PHYSICAL EXAM:      Vitals:    01/24/21 0828   BP: (!) 176/56   Pulse: 69   Resp:    Temp:    SpO2:     Weight: 166 lb 7.2 oz (75.5 kg)       General Appearance:  Alert, cooperative, no distress, appears stated age. Head:  Normocephalic, without obvious abnormality, atraumatic. Eyes:  Pupils equal and round. No scleral icterus. Mouth: Moist mucosa, no pharyngeal erythema. Nose: Nares normal. No drainage or sinus tenderness. Neck: Supple, symmetrical, trachea midline. No adenopathy. No tenderness/mass/nodules. No carotid bruit or elevated JVD. Lungs:   Respiratory Effort: Normal   Auscultation: Clear to auscultation bilaterally, respirations unlabored. No wheeze, rales   Chest Wall:  No tenderness or deformity. Cardiovascular:    Pulses  Palpation: normal   Ascultation: Regular rate, S1/ S2 normal. No murmur, rub, or gallop. 2+ radial and pedal pulses, symmetric  Carotid  Femoral   Abdomen and Gastrointestinal:   Soft, non-tender, bowel sounds active. Liver and Spleen  Masses   Musculoskeletal: No muscle wasting  Back  Gait   Extremities: Extremities normal, atraumatic. No cyanosis or edema. No cyanosis clubbing       Skin: Inspection and palpation performed, no rashes or lesions. Pysch: Normal mood and affect.  Alert and oriented to time place person   Neurologic: Normal gross motor and sensory exam.       Labs     All labs have been reviewed    Lab Results   Component Value Date    WBC 10.4 01/24/2021    RBC 3.55 01/24/2021    HGB 8.6 01/24/2021    HCT 28.1 01/24/2021    MCV 79.4 01/24/2021    RDW 18.6 01/24/2021     01/24/2021     Lab Results   Component Value Date     01/24/2021    K 3.8 01/24/2021    K 4.0 01/21/2021    CL 91 01/24/2021    CO2 32 01/24/2021    BUN 41 01/24/2021    CREATININE 1.3 01/24/2021    GFRAA 49 01/24/2021    AGRATIO 1.3 01/21/2021    LABGLOM 40 01/24/2021    GLUCOSE 233 01/24/2021    PROT 7.0 01/21/2021    CALCIUM 9.6 01/24/2021    BILITOT 0.3 01/21/2021    ALKPHOS 46 01/21/2021    AST 15 01/21/2021    ALT 12 01/21/2021     No results found for: PTINR  Lab Results   Component Value Date    LABA1C 5.5 10/11/2019     Lab Results   Component Value Date    CKTOTAL 43 09/26/2019    TROPONINI <0.01 01/21/2021       Cardiac, Vascular and Imaging Data all Personally Reviewed in Detail by Myself      EKG: Normal sinus rhythmNonspecific T wave abnormalityAbnormal ECGConfirmed by NATA LEON     Echocardiogram:  Left ventricular cavity size is small.   There is mildly increased left ventricular wall thickness.   Overall left ventricular systolic function appears normal.   Ejection fraction is visually estimated to be 60-65%.   The left atrial size is normal.   Normal right ventricular size and function    X-ray  Mild hazy bibasilar opacities as well as small pleural effusions, slightly   worse on the left. Assessment and Plan     -Acute on chronic respiratory failure with hypoxemia. At present does not seem to be in acute diastolic heart failure she does have chronic diastolic heart failure. She also has pretty bad COPD and has seen pulmonary. Patient has severe lower airflow obstruction. She is currently on steroid inhalers and duo nebs. We recommend pulmonary consultation. Repeat chest x-ray and labs. We will start IV Solu-Medrol. Uncontrolled hypertension we will manage her blood pressure. Paroxysmal atrial fibrillation status post left atrial appendage closure with watchman. We can switch her to oral diuretic therapy. Follow laboratory values. Thank you for allowing us to participate in the care of [facility]. Please do not hesitate to contact me if you have any questions.     Howard Lindsey MD, MPH    [facility], [address]  Ph: [phone]  Fax: [phone]    1/24/2021 10:22 AM

## 2021-01-24 NOTE — PROGRESS NOTES
Hospitalist Progress Note  2021 9:35 AM    PCP: Marylu Gregg MD    [id number]     Date of Admission: 2021                                                                                                                     HOSPITAL COURSE    Patient demographics:  The patient  Lonnie Solorio is a 70 y.o. female     Significant past medical history:   Patient Active Problem List   Diagnosis    Fracture, metacarpal, neck    PAD (peripheral artery disease) ([address]    CAD (coronary artery disease)    HTN (hypertension)    Diastolic dysfunction    RLS (restless legs syndrome)    History of tobacco use    Centrilobular emphysema ([address]    Colovesical fistula    Diverticulitis large intestine w/o perforation or abscess w/bleeding    Large bowel obstruction ([address]    ANGIE treated with BiPAP    CKD (chronic kidney disease), stage III - sees Dr. Crystal Brower    COPD, severe ([address]    Colonic obstruction ([address]    Carotid artery stenosis without cerebral infarction, bilateral    Colostomy care ([address]    Dyspnea and respiratory abnormalities    Hyperlipidemia    Paroxysmal atrial fibrillation (HCC)    Hypothyroidism, unspecified    Chest pain    Symptomatic anemia    Anxiety    History of GI bleed    Iron deficiency anemia due to chronic blood loss    Presence of Watchman left atrial appendage closure device    Atrial fibrillation (HCC)    Pulmonary edema, acute (HCC)    Chronic respiratory failure with hypoxia (HCC)    Acute on chronic respiratory failure with hypoxia (HCC)         Presenting symptoms:  Dyspnea     Diagnostic workup:      CONSULTS DURING ADMISSION :   IP CONSULT TO HOSPITALIST  IP CONSULT TO HEART FAILURE NURSE/COORDINATOR  IP CONSULT TO DIETITIAN  IP CONSULT TO SPIRITUAL SERVICES  IP CONSULT TO CARDIOLOGY  IP CONSULT TO PULMONOLOGY      Patient was diagnosed with:  Acute on chronic respiratory failure with hypoxia  Acute on chronic diastolic CHF    Treatment while inpatient:  Pt presented to [facility] with worsening dyspnea since prior admission.                                                                                       ----------------------------------------------------------      SUBJECTIVE COMPLAINTS- follow up for Dyspnea     Diet: DIET LOW SODIUM 2 GM; 1500 ml      OBJECTIVE:   Patient Active Problem List   Diagnosis    Fracture, metacarpal, neck    PAD (peripheral artery disease) [facility]    CAD (coronary artery disease)    HTN (hypertension)    Diastolic dysfunction    RLS (restless legs syndrome)    History of tobacco use    Centrilobular emphysema (HCC)    Colovesical fistula    Diverticulitis large intestine w/o perforation or abscess w/bleeding    Large bowel obstruction ([facility]    ANGIE treated with BiPAP    CKD (chronic kidney disease), stage III - sees Dr. Elicia Rosales    COPD, severe ([facility]    Colonic obstruction ([facility]    Carotid artery stenosis without cerebral infarction, bilateral    Colostomy care ([facility]    Dyspnea and respiratory abnormalities    Hyperlipidemia    Paroxysmal atrial fibrillation (HCC)    Hypothyroidism, unspecified    Chest pain    Symptomatic anemia    Anxiety    History of GI bleed    Iron deficiency anemia due to chronic blood loss    Presence of Watchman left atrial appendage closure device    Atrial fibrillation (HCC)    Pulmonary edema, acute (HCC)    Chronic respiratory failure with hypoxia (HCC)    Acute on chronic respiratory failure with hypoxia (HCC)       Allergies  Iv dye [iodides]    Medications    Scheduled Meds:   amLODIPine  10 mg Oral Daily    methylPREDNISolone  60 mg Intravenous Q8H    furosemide  40 mg Oral BID    apixaban  2.5 mg Oral BID    aspirin  81 mg Oral Daily    atorvastatin  40 mg Oral Daily    carvedilol  12.5 mg Oral BID WC    citalopram  40 mg Oral Daily    cloNIDine  0.2 mg Oral TID    hydrALAZINE  100 mg Oral 3 times per day    pantoprazole  40 mg Oral QAM AC    levothyroxine 50 mcg Oral Daily    budesonide-formoterol  2 puff Inhalation BID    montelukast  10 mg Oral Daily    tiotropium  2 puff Inhalation Daily    sodium chloride flush  10 mL Intravenous 2 times per day     Continuous Infusions:  PRN Meds:  LORazepam, lidocaine, albuterol sulfate HFA, butalbital-acetaminophen-caffeine, traZODone, sodium chloride flush, promethazine **OR** ondansetron, polyethylene glycol, acetaminophen **OR** acetaminophen    Vitals   Vitals /wt   Patient Vitals for the past 8 hrs:   BP Temp Temp src Pulse Resp SpO2 Weight   01/24/21 0828 (!) 176/56 -- -- 69 -- -- --   01/24/21 0819 -- -- -- -- -- 98 % --   01/24/21 0508 -- -- -- -- -- -- 166 lb 7.2 oz (75.5 kg)   01/24/21 0348 (!) 164/70 97.9 °F (36.6 °C) Oral 65 18 95 % --   01/24/21 0319 -- -- -- -- 20 94 % --        72HR INTAKE/OUTPUT:      Intake/Output Summary (Last 24 hours) at 1/24/2021 0935  Last data filed at 1/24/2021 7547  Gross per 24 hour   Intake 960 ml   Output 2200 ml   Net -1240 ml       Exam:    Gen:   Alert and oriented ×3   Eyes: PERRL. No sclera icterus. No conjunctival injection. ENT: No discharge. Pharynx clear. External appearance of ears and nose normal.  Neck: Trachea midline. No obvious mass. Resp: Decreased breath sounds bilaterally  CV: Regular rate. Regular rhythm. No murmur or rub. No edema. GI: Non-tender. Non-distended. No hernia. Skin: Warm, dry, normal texture and turgor. Lymph: No cervical LAD. No supraclavicular LAD. M/S: / Ext. No cyanosis. No clubbing. No joint deformity. Neuro: CN 2-12 are intact,  no neurologic deficits noted. PT/INR: No results for input(s): PROTIME, INR in the last 72 hours. APTT: No results for input(s): APTT in the last 72 hours.     CBC:   Recent Labs     01/21/21  1221 01/24/21  0719   WBC 8.2 10.4   HGB 8.9* 8.6*   HCT 29.0* 28.1*   MCV 77.8* 79.4*    287       BMP:   Recent Labs     01/21/21  1221 01/22/21  0735 01/23/21  0742 01/24/21  0719    136 140 137   K 4.0 3.9 3.6 3.8   CL 94* 90* 93* 91*   CO2 36* 37* 37* 32   BUN 21* 28* 36* 41*   CREATININE 1.1 1.4* 1.2 1.3*       LIVER PROFILE:   Recent Labs     01/21/21  1221   ALKPHOS 46   AST 15   ALT 12   BILITOT 0.3     No results for input(s): AMYLASE in the last 72 hours. No results for input(s): LIPASE in the last 72 hours. UA:No results for input(s): NITRITE, LABCAST, WBCUA, RBCUA, MUCUS in the last 72 hours. TROPONIN:   Recent Labs     01/21/21  1221   TROPONINI <0.01       Lab Results   Component Value Date/Time    URRFLXCULT Yes 03/02/2018 02:08 PM       No results for input(s): TSHREFLEX in the last 72 hours. No components found for: [id number]  POC GLUCOSE:  No results for input(s): POCGLU in the last 72 hours. No results for input(s): LABA1C in the last 72 hours. Lab Results   Component Value Date    LABA1C 5.5 10/11/2019         ASSESSMENT AND PLAN  Acute on chronic respiratory failure with hypoxia  Multifactorial including CHF COPD CKD and anemia  Severe COPD-Solu-Medrol to prednisone and bronchodilators  - on 4 L NC, wean as tolerated  Patient also has obstructive sleep apnea  - COIVD19 negative     Acute on chronic diastolic CHF-ruled out  Cardiology does not believe patient is in acute CHF  - elevated BNP, CXR bibasilar haziness  Continue patient on oral Lasix  Pulmonary consult is appreciated     PAF  - s/p watchman 1/4/2021  - c/w Eliquis     COPD w/ chronic respiratory failure  - c/w home inhalers    HTN  Continue patient on home medication  Add amlodipine     HLD  - c/w statin     Hypothyroidism  - c/w synthroid     Hx chronic anemia  - Hb stable     ANGIE on BIPAP      Code Status: Full Code        Dispo -cc        The patient and / or the family were informed of the results of any tests, a time was given to answer questions, a plan was proposed and they agreed with plan. Cam Schwab, MD    This note was transcribed using [id number] [facility].  Please disregard any translational errors.

## 2021-01-24 NOTE — PLAN OF CARE
Problem: Falls - Risk of:  Goal: Will remain free from falls  Description: Will remain free from falls  1/23/2021 2326 by Mark Campoverde RN  Outcome: Ongoing  1/23/2021 1257 by Jr Dallas RN  Outcome: Ongoing  Goal: Absence of physical injury  Description: Absence of physical injury  1/23/2021 2326 by Mark Campoverde RN  Outcome: Ongoing  1/23/2021 1257 by Jr Dallas RN  Outcome: Ongoing     Problem: OXYGENATION/RESPIRATORY FUNCTION  Goal: Patient will maintain patent airway  1/23/2021 2326 by Mark Campoverde RN  Outcome: Ongoing  1/23/2021 1257 by Jr Dallas RN  Outcome: Ongoing  Goal: Patient will achieve/maintain normal respiratory rate/effort  Description: Respiratory rate and effort will be within normal limits for the patient  1/23/2021 2326 by Mark Campoverde RN  Outcome: Ongoing  1/23/2021 1257 by Jr Dallas RN  Outcome: Ongoing     Problem: HEMODYNAMIC STATUS  Goal: Patient has stable vital signs and fluid balance  1/23/2021 2326 by Mark Campoverde RN  Outcome: Ongoing  1/23/2021 1257 by Jr Dallas RN  Outcome: Ongoing     Problem: ACTIVITY INTOLERANCE/IMPAIRED MOBILITY  Goal: Mobility/activity is maintained at optimum level for patient  1/23/2021 2326 by Mark Campoverde RN  Outcome: Ongoing  1/23/2021 1257 by Jr Dallas RN  Outcome: Ongoing     Problem: Discharge Planning:  Goal: Discharged to appropriate level of care  Description: Discharged to appropriate level of care  1/23/2021 2326 by Mark Campoverde RN  Outcome: Ongoing  1/23/2021 1257 by Jr Dallas RN  Outcome: Ongoing     Problem: Pain:  Goal: Pain level will decrease  Description: Pain level will decrease  1/23/2021 2326 by Mark Campoverde RN  Outcome: Ongoing  1/23/2021 1257 by Jr Dallas RN  Outcome: Ongoing  Goal: Control of acute pain  Description: Control of acute pain  1/23/2021 2326 by Mark Campoverde RN  Outcome: Ongoing  1/23/2021 1257 by Jr Dallas RN  Outcome: Ongoing  Goal: Control of chronic pain  Description: Control of chronic pain  1/23/2021 2326 by Jag Patricio RN  Outcome: Ongoing  1/23/2021 1257 by Nav Sprague RN  Outcome: Ongoing

## 2021-01-24 NOTE — FLOWSHEET NOTE
Colostomy bag changed. Stoma red and moist. Peristomal skin cleaned & powder applied. Pt tolerated well.  Electronically signed by Christine Donohue RN on 1/23/2021 at 7:29 PM

## 2021-01-24 NOTE — PLAN OF CARE
Problem: Falls - Risk of:  Goal: Will remain free from falls  Description: Will remain free from falls  Outcome: Ongoing  Goal: Absence of physical injury  Description: Absence of physical injury  Outcome: Ongoing     Problem: OXYGENATION/RESPIRATORY FUNCTION  Goal: Patient will maintain patent airway  Outcome: Ongoing  Goal: Patient will achieve/maintain normal respiratory rate/effort  Description: Respiratory rate and effort will be within normal limits for the patient  Outcome: Ongoing     Problem: HEMODYNAMIC STATUS  Goal: Patient has stable vital signs and fluid balance  Outcome: Ongoing     Problem: FLUID AND ELECTROLYTE IMBALANCE  Goal: Fluid and electrolyte balance are achieved/maintained  Outcome: Ongoing     Problem: ACTIVITY INTOLERANCE/IMPAIRED MOBILITY  Goal: Mobility/activity is maintained at optimum level for patient  Outcome: Ongoing     Problem: Discharge Planning:  Goal: Discharged to appropriate level of care  Description: Discharged to appropriate level of care  Outcome: Ongoing     Problem: Pain:  Goal: Pain level will decrease  Description: Pain level will decrease  Outcome: Ongoing  Goal: Control of acute pain  Description: Control of acute pain  Outcome: Ongoing  Goal: Control of chronic pain  Description: Control of chronic pain  Outcome: Ongoing

## 2021-01-25 LAB
ANION GAP SERPL CALCULATED.3IONS-SCNC: 9 MMOL/L (ref 3–16)
BUN BLDV-MCNC: 44 MG/DL (ref 7–20)
CALCIUM SERPL-MCNC: 9.8 MG/DL (ref 8.3–10.6)
CHLORIDE BLD-SCNC: 96 MMOL/L (ref 99–110)
CO2: 36 MMOL/L (ref 21–32)
CREAT SERPL-MCNC: 1.1 MG/DL (ref 0.6–1.2)
GFR AFRICAN AMERICAN: 59
GFR NON-AFRICAN AMERICAN: 49
GLUCOSE BLD-MCNC: 141 MG/DL (ref 70–99)
MAGNESIUM: 2.6 MG/DL (ref 1.8–2.4)
POTASSIUM SERPL-SCNC: 3.9 MMOL/L (ref 3.5–5.1)
SODIUM BLD-SCNC: 141 MMOL/L (ref 136–145)

## 2021-01-25 PROCEDURE — 80048 BASIC METABOLIC PNL TOTAL CA: CPT

## 2021-01-25 PROCEDURE — 2700000000 HC OXYGEN THERAPY PER DAY

## 2021-01-25 PROCEDURE — 36415 COLL VENOUS BLD VENIPUNCTURE: CPT

## 2021-01-25 PROCEDURE — 99232 SBSQ HOSP IP/OBS MODERATE 35: CPT | Performed by: NURSE PRACTITIONER

## 2021-01-25 PROCEDURE — 2580000003 HC RX 258: Performed by: FAMILY MEDICINE

## 2021-01-25 PROCEDURE — 6370000000 HC RX 637 (ALT 250 FOR IP): Performed by: INTERNAL MEDICINE

## 2021-01-25 PROCEDURE — 94761 N-INVAS EAR/PLS OXIMETRY MLT: CPT

## 2021-01-25 PROCEDURE — 1200000000 HC SEMI PRIVATE

## 2021-01-25 PROCEDURE — 97165 OT EVAL LOW COMPLEX 30 MIN: CPT

## 2021-01-25 PROCEDURE — 97162 PT EVAL MOD COMPLEX 30 MIN: CPT

## 2021-01-25 PROCEDURE — 6370000000 HC RX 637 (ALT 250 FOR IP): Performed by: HOSPITALIST

## 2021-01-25 PROCEDURE — 99232 SBSQ HOSP IP/OBS MODERATE 35: CPT | Performed by: INTERNAL MEDICINE

## 2021-01-25 PROCEDURE — 94640 AIRWAY INHALATION TREATMENT: CPT

## 2021-01-25 PROCEDURE — 83735 ASSAY OF MAGNESIUM: CPT

## 2021-01-25 PROCEDURE — 94660 CPAP INITIATION&MGMT: CPT

## 2021-01-25 PROCEDURE — 6370000000 HC RX 637 (ALT 250 FOR IP): Performed by: NURSE PRACTITIONER

## 2021-01-25 PROCEDURE — 6370000000 HC RX 637 (ALT 250 FOR IP): Performed by: FAMILY MEDICINE

## 2021-01-25 RX ORDER — LISINOPRIL 5 MG/1
5 TABLET ORAL DAILY
Status: DISCONTINUED | OUTPATIENT
Start: 2021-01-25 | End: 2021-01-26 | Stop reason: HOSPADM

## 2021-01-25 RX ADMIN — CLONIDINE HYDROCHLORIDE 0.2 MG: 0.1 TABLET ORAL at 09:47

## 2021-01-25 RX ADMIN — MONTELUKAST 10 MG: 10 TABLET, FILM COATED ORAL at 09:47

## 2021-01-25 RX ADMIN — CARVEDILOL 18.75 MG: 12.5 TABLET, FILM COATED ORAL at 18:59

## 2021-01-25 RX ADMIN — SODIUM CHLORIDE, PRESERVATIVE FREE 10 ML: 5 INJECTION INTRAVENOUS at 09:50

## 2021-01-25 RX ADMIN — AMLODIPINE BESYLATE 10 MG: 10 TABLET ORAL at 09:48

## 2021-01-25 RX ADMIN — HYDRALAZINE HYDROCHLORIDE 100 MG: 50 TABLET, FILM COATED ORAL at 21:58

## 2021-01-25 RX ADMIN — APIXABAN 2.5 MG: 2.5 TABLET, FILM COATED ORAL at 21:58

## 2021-01-25 RX ADMIN — CITALOPRAM HYDROBROMIDE 40 MG: 20 TABLET ORAL at 09:48

## 2021-01-25 RX ADMIN — BUDESONIDE AND FORMOTEROL FUMARATE DIHYDRATE 2 PUFF: 80; 4.5 AEROSOL RESPIRATORY (INHALATION) at 20:18

## 2021-01-25 RX ADMIN — PANTOPRAZOLE SODIUM 40 MG: 40 TABLET, DELAYED RELEASE ORAL at 06:18

## 2021-01-25 RX ADMIN — PREDNISONE 40 MG: 20 TABLET ORAL at 09:48

## 2021-01-25 RX ADMIN — Medication 2 PUFF: at 08:23

## 2021-01-25 RX ADMIN — HYDRALAZINE HYDROCHLORIDE 100 MG: 50 TABLET, FILM COATED ORAL at 14:16

## 2021-01-25 RX ADMIN — LORAZEPAM 1 MG: 1 TABLET ORAL at 19:02

## 2021-01-25 RX ADMIN — HYDRALAZINE HYDROCHLORIDE 100 MG: 50 TABLET, FILM COATED ORAL at 06:18

## 2021-01-25 RX ADMIN — ASPIRIN 81 MG: 81 TABLET, CHEWABLE ORAL at 09:48

## 2021-01-25 RX ADMIN — CLONIDINE HYDROCHLORIDE 0.2 MG: 0.1 TABLET ORAL at 14:16

## 2021-01-25 RX ADMIN — ATORVASTATIN CALCIUM 40 MG: 40 TABLET, FILM COATED ORAL at 09:48

## 2021-01-25 RX ADMIN — BUDESONIDE AND FORMOTEROL FUMARATE DIHYDRATE 2 PUFF: 80; 4.5 AEROSOL RESPIRATORY (INHALATION) at 08:23

## 2021-01-25 RX ADMIN — FUROSEMIDE 40 MG: 40 TABLET ORAL at 18:59

## 2021-01-25 RX ADMIN — LISINOPRIL 5 MG: 5 TABLET ORAL at 14:16

## 2021-01-25 RX ADMIN — CLONIDINE HYDROCHLORIDE 0.2 MG: 0.1 TABLET ORAL at 21:58

## 2021-01-25 RX ADMIN — FUROSEMIDE 40 MG: 40 TABLET ORAL at 09:47

## 2021-01-25 RX ADMIN — LEVOTHYROXINE SODIUM 50 MCG: 0.05 TABLET ORAL at 06:18

## 2021-01-25 RX ADMIN — APIXABAN 2.5 MG: 2.5 TABLET, FILM COATED ORAL at 09:48

## 2021-01-25 ASSESSMENT — PAIN SCALES - GENERAL
PAINLEVEL_OUTOF10: 0
PAINLEVEL_OUTOF10: 0

## 2021-01-25 NOTE — CONSULTS
HF RN consult noted. Chart reviewed. 30 day readmit. Pt came in with SOB, SCOTT, and cough. Pt with PMH of afib, COPD, and DHF. Cardiology was consulted who feels at present pt does not seem to be in an acute exacerbation and recommended a Pulm consult. Pt is being treated for COPD and acute on chronic resp failure. Pt follows with Dr Leora Griffith for Card and Dr Dorcas Meehan for [address]. I did call into room to check in with Ms Angelita Libman to see how she is doing in regards to her HF management at home. I reviewed teachings on HF, HF Zones, S&S and importance of daily wts. Pt admits to not weighing daily. \"I actually just bought a scale 3 days before I came in here. I'll start using it. \" Teaching done on the '3/5 rule', the proper way to weigh herself, and who and when to call. She states she follows a Low Na diet and takes her meds accordingly. During her recent admission 1/5-1/6, it was noted in SW note that pt left her bipap in [place] and  was working with pt on getting donations to rent one but pt was told thru insurance she could get a new one. Pt states she was told to call \"a Dr Stefanie Lau in [place]" to set up sleep medicine appt \"but I called and it was a dentist so are you able to help me find the correct number. \" Pt also states she uses [employer] for transportation but has limited number of usage \"and with all my appts I go over it\" and needs help finding other transportation services. I consulted SW to assist with transportation. I have found the correct Dr Stefanie Lau in [facility] for sleep medicine and have pt this number on her AVS in the f/u section. I also called her back to inform her of this to which she stated \"I'm glad we talked as I needed all this information. \" She will need reiteration. HF instructions placed on AVS as well as on [facility] as is active with [facility]. Pt has an appt scheduled for Friday 1/29 with PCP for hospital f/u. Card appt for 2/4. Pulm appt for 2/17.  I placed a Palliative Care consult (per readmit protocol). I will place a [address] consult to help reiterate the education for both HF & COPD and to assist in preventing another readmission.

## 2021-01-25 NOTE — PROGRESS NOTES
[address] 83   Daily Progress Note      Admit Date:  1/21/2021    Reason for follow up visit: Shortness of breath    CC: \"I still get SOB when I get up  To the bathroom. \"    71 y/o female with PMH significant for atrial fib/S/P Watchman LAAC device, COPD, diastolic HF and CAD who was admitted with worsening SOB. She has been diuresed and started on steroids for her COPD. Seen by Dr. Melissa Mtz and changed to po steroids and bronchodilators. Interval History:  Pt. seen and examined; records reviewed  BP noted; SBP remains elevated  O2 @ 5L   Net diuresis -1.8 L (Wts inaccurate)  Hgb 8.6 on 1/24/2021  Remains SOB with exertion    Subjective:  Pt with no acute overnight cardiac events. Denies chest pain, productive cough, palpitations or dizziness    Review of Systems:   · Constitutional: no unanticipated weight loss. No fevers, chills. + sedentary (\"sit on couch all day\"). · Eyes: No visual changes or diplopia. No scleral icterus. · ENT: No Headaches, hearing loss or vertigo. No mouth sores or sore throat. · Cardiovascular: as reviewed in HPI  · Respiratory: + occasional wheezing. No hematemesis. · Gastrointestinal: No abdominal pain, appetite loss, blood in stools. No change in bowel or bladder habits. · Genitourinary: No dysuria, trouble voiding, or hematuria. · Musculoskeletal:  No gait disturbance, no joint complaints. · Integumentary: No rash or pruritis. · Neurological: No headache, diplopia, change in muscle strength, numbness or tingling. · Psychiatric: No anxiety or depression. · Endocrine: No temperature intolerance. No excessive thirst, fluid intake, or urination. No tremor. · Hematologic/Lymphatic: No abnormal bruising or bleeding, blood clots or swollen lymph nodes. · Allergic/Immunologic: No nasal congestion or hives.     Objective:   BP (!) 179/64   Pulse 73   Temp 97.4 °F (36.3 °C) (Oral)   Resp 19   Wt 173 lb 8 oz (78.7 kg)   LMP 05/01/2006 (Approximate)   SpO2 of 51 mmHg    Assessment/Plan:    1. Acute on chronic respiratory failure with hypoxemia  -multifactorial and d/t COPD and DHF and deconditioning  -currently not in an acute exacerbation of CHF  -pulmonary consult appreciated  -continue diuretic and carvedilol    2. Uncontrolled HTN  -on multiple antihypertensives  -increase carvedilol and monitor HR    3. PAF  -S/P LAAC device (Watchman) on 1/4/2021  -continue Eliquis and ASA    4. Severe COPD  -pulmonary following  Pulmonary rehab    5. Chronic anemia  -Hgb stable    6.  ANGIE with use of CPAP at night    Increase activity/PT and OT evaluation  Encouraged she follow thru with pulmonary rehab as outpt    Electronically signed by GIULIANO Moe CNP on 1/25/2021 at 9:08 AM

## 2021-01-25 NOTE — PROGRESS NOTES
Colonoscopy; fracture surgery; and vascular surgery. Restrictions  Position Activity Restriction  Other position/activity restrictions: 4L of O2     Vision/Hearing  Vision: Within Functional Limits  Hearing: Within functional limits       Subjective  General  Chart Reviewed: Yes  Patient assessed for rehabilitation services?: Yes  Additional Pertinent Hx: Abena Woodson is 70 y.o. female w/ PMH CAD, dCHF, COPD, ANGIE on BiPAP, chronic respiratory failure on 3 L NC, PVD, HTN, HLD, A fib who presented to [facility] on 1/21/21 with worsening dyspnea since prior admission. Patient was admitted 1/5/21-1/6/21 for CHF exacerbation after receiving IVF during a watchman procedure on 1/4/2021. She was Diuresed and discharged. Pt reporting progressive SOB.   Response To Previous Treatment: Not applicable  Family / Caregiver Present: No  Referring Practitioner: GIULIANO Fritz CNP  Referral Date : 01/25/21  Diagnosis: Acute on chronic respiratory failure with hypoxia  Follows Commands: Within Functional Limits  Subjective  Subjective: Pt is agreeable to PT  Pain Screening  Patient Currently in Pain: (No complaints of pain at this time)          Orientation  Orientation  Overall Orientation Status: Within Functional Limits     Social/Functional History  Social/Functional History  Lives With: (granddtr)  Type of Home: Apartment  Home Access: Stairs to enter with rails  Entrance Stairs - Number of Steps: 5+4  Bathroom Shower/Tub: Tub/Shower unit  Bathroom Toilet: Standard  Home Equipment: Oxygen(3L)  ADL Assistance: Independent  Homemaking Assistance: Needs assistance(granddtr completes)  Ambulation Assistance: Independent  Transfer Assistance: Independent  Active : Yes(car is broken down, uses [facility] transport)  Additional Comments: Plan is to start Pulmonary Rehab     Cognition   Cognition  Overall Cognitive Status: WFL    Objective  Strength RLE  Strength RLE: WFL  Strength LLE  Strength LLE: [place]  [facility] Control  Gross Motor?: WNL     Bed mobility  Supine to Sit: Modified independent  Sit to Supine: Unable to assess(in recliner at end of session)  Transfers  Sit to Stand: Independent  Stand to sit: Independent  Ambulation  Ambulation?: Yes  More Ambulation?: No  Ambulation 1  Surface: level tile  Device: No Device  Other Apparatus: O2(4L)  Assistance: Independent  Gait Deviations: None  Distance: 5' + 50'  Comments: SpO2 stays in low 90's on 4L O2.   Stairs/Curb  Stairs?: No     Balance  Posture: Good  Sitting - Static: Good  Sitting - Dynamic: Good  Standing - Static: Good  Standing - Dynamic: Good        Plan   Safety Devices  Type of devices: Call light within reach, Left in chair      AM-PAC Score  AM-PAC Inpatient Mobility Raw Score : 23 (01/25/21 1138)  AM-PAC Inpatient T-Scale Score : 56.93 (01/25/21 1138)  Mobility Inpatient CMS 0-100% Score: 11.2 (01/25/21 1138)  Mobility Inpatient CMS G-Code Modifier : CI (01/25/21 1138)            Therapy Time   Individual Concurrent Group Co-treatment   Time In 1125         Time Out 1135         Minutes 10         Timed Code Treatment Minutes: 0 Minutes       Yolsi Cardoso, PT

## 2021-01-25 NOTE — PROGRESS NOTES
Occupational Therapy   Occupational Therapy Initial Assessment and Discharge Summary    Date: 2021   Patient Name: Denise Galvin  MRN: [id number]     : 1945    Date of Service: 2021    Assessment: Pt is 70 y.o. F who presents with worsening SOB, recently hospitalized from CHF. PTA pt lives with granddaughter in apt with 5+5 SHARAD. Pt reports independence in self-care tasks, granddaughter completes homemaking responsibilities, and completes functional mobility with no device. Currently, pt presents with ROM/strength in [facility] for self-care and transfers. Pt completed bed mobility, transfers, and ambulation with no device independently. Pt completed toileting independently - anticipate functioning at baseline. Anticipate pt safe to d/c home with prior level of assistance from family. Discharge Recommendations:  Home with assist PRN       Assessment   Performance deficits / Impairments: Decreased endurance;Decreased strength  Assessment: Pt is 70 y.o. F who presents with worsening SOB, recently hospitalized from CHF. PTA pt lives with granddaughter in apt with 5+5 SHARAD. Pt reports independence in self-care tasks, granddaughter completes homemaking responsibilities, and completes functional mobility with no device. Currently, pt presents with ROM/strength in [facility] for self-care and transfers. Pt completed bed mobility, transfers, and ambulation with no device independently. Pt completed toileting independently - anticipate functioning at baseline. Anticipate pt safe to d/c home with prior level of assistance from family. Prognosis: Fair  Decision Making: Low Complexity  History: PMH: COPD, CHF, CAD, Colostomy  Exam: ADLs, transfers, func mob, bed mob  Assistance / Modification: Indep for mobility and self-care tasks  OT Education: OT Role;Transfer Training;Plan of Care;Precautions; ADL Adaptive Strategies  REQUIRES OT FOLLOW UP: No  Activity Tolerance  Activity Tolerance: Patient Tolerated treatment well  Activity Tolerance: on 4L of O2, O2 sats above 90% throughout session with mobility and toileting  Safety Devices  Safety Devices in place: Yes(ISABELL Montano) aware)  Type of devices: Nurse notified;Call light within reach; Left in chair           Patient Diagnosis(es): The encounter diagnosis was Acute on chronic congestive heart failure, unspecified heart failure type ([address] has a past medical history of Arthritis, Atrial fibrillation ([address] CAD (coronary artery disease), CHF (congestive heart failure) ([address] Colostomy care ([address] COPD (chronic obstructive pulmonary disease) ([address] Hyperlipidemia, Hypertension, Kidney disease, Peripheral vascular disease ([address] Rectal fissure, Restless legs syndrome, Sleep apnea, Thyroid disease, and Unspecified sleep apnea. has a past surgical history that includes Cholecystectomy; hernia repair; Coronary artery bypass graft; Cardiac catheterization (09/13/2017); colostomy (03/09/2018); Upper gastrointestinal endoscopy (N/A, 9/30/2019); Abdomen surgery; Colonoscopy; fracture surgery; and vascular surgery. Restrictions  Position Activity Restriction  Other position/activity restrictions: 4L of O2    Subjective   General  Chart Reviewed: Yes  Patient assessed for rehabilitation services?: Yes  Additional Pertinent Hx: Per Barry Fox MD Pt is \"71 y.o. female w/ PMH CAD, dCHF, COPD, ANGIE on BiPAP, chronic respiratory failure on 3 L NC, PVD, HTN, HLD, A fib who presented to [facility] with worsening dyspnea since prior admission. Patient was admitted 1/5/21-1/6/21 for CHF exacerbation after receiving IVF during a watchman procedure on 1/4/2021. She was Diuresed and discharged. Patient states that since then she her breathing has not been great and has progressively worsened. States she now has SCOTT and at rest.  No leg swelling. No cough, no fevers, chills, myalgias. She was requiring 5 L of oxygen in the ED, she wears 3 L at home. \"  Family / Caregiver Present: Yes(Granddaughter)  Referring Practitioner: Nav Crane MD  Diagnosis: Acute on chronic respiratory failure with hypoxia  Subjective  Subjective: Pt met bedside, agreeable for therapy evaluation and OOB activity. Patient Currently in Pain: (No complaints of pain at this time)  Vital Signs  Patient Currently in Pain: (No complaints of pain at this time)     Social/Functional History  Social/Functional History  Lives With: (granddtr)  Type of Home: Apartment  Home Access: Stairs to enter with rails  Entrance Stairs - Number of Steps: 5+4  Bathroom Shower/Tub: Tub/Shower unit  Bathroom Toilet: Standard  Home Equipment: Oxygen(3L)  ADL Assistance: Independent  Homemaking Assistance: Needs assistance(granddtr completes)  Ambulation Assistance: Independent  Transfer Assistance: Independent  Active : Yes(car is broken down, uses [facility] transport)  Additional Comments: Plan is to start Pulmonary Rehab       Objective   Vision: Within Functional Limits  Hearing: Within functional limits      Orientation  Overall Orientation Status: Within Functional Limits     Balance  Sitting Balance: Independent  Standing Balance: Independent  Standing Balance  Time: ~2 minutes  Activity: Func mob, transfers, and ADL tasks    Functional Mobility  Functional - Mobility Device: No device  Activity: To/from bathroom  Assist Level: Independent  Functional Mobility Comments: Pt completed functional mobility around room with no device, steady with no overt LOB. Min cues for managing O2 line. Toilet Transfers  Toilet - Technique: Ambulating  Equipment Used: Grab bars(Comfort height commode)  Toilet Transfer: Independent    ADL  Toileting: Independent(Managed clothing and pericare without assist)  Additional Comments: PTA pt reports independence in self-care tasks. Anticipate pt to be at baseline - will benefit from shower chair for safety and energy conservation.      Tone RUE  RUE Tone: Normotonic  Tone LUE  LUE Tone: Normotonic  Coordination  Movements Are Fluid And Coordinated: Yes     Bed mobility  Supine to Sit: Modified independent  Sit to Supine: Unable to assess(in recliner at end of session)     Transfers  Sit to stand: Independent  Stand to sit: Independent  Transfer Comments: Indep for sit <> stand from EOB and sit to recliner chair     Cognition  Overall Cognitive Status: WFL        Sensation  Overall Sensation Status: (Appears WFL for light touch)        LUE AROM (degrees)  LUE AROM : WFL  Left Hand AROM (degrees)  Left Hand AROM: WFL  RUE AROM (degrees)  RUE AROM : WFL  Right Hand AROM (degrees)  Right Hand AROM: WFL     LUE Strength  Gross LUE Strength: WFL  RUE Strength  Gross RUE Strength: WFL          Plan   Plan  Plan Comment: Discharged from OT services. AM-PAC Score   No further OT needs. Pt discussed she was supposed to start pulmonary rehab recently but had to cancel. AM-[facility] Inpatient Daily Activity Raw Score: 24 (01/25/21 1138)  AM-PAC Inpatient ADL T-Scale Score : 57.54 (01/25/21 1138)  ADL Inpatient CMS 0-100% Score: 0 (01/25/21 1138)  ADL Inpatient CMS G-Code Modifier : CH (01/25/21 1138)    Goals  Short term goals  Time Frame for Short term goals: Pt discharged from OT services. Therapy Time   Individual Concurrent Group Co-treatment   Time In       1125   Time Out       1135   Minutes       10   Timed Code Treatment Minutes: (10 min eval)     If pt is discharged prior to next OT session, this note will serve as the discharge summary.     Rimma Solares, CASHJ/J#[id number]

## 2021-01-25 NOTE — PLAN OF CARE
Problem: Falls - Risk of:  Goal: Will remain free from falls  Description: Will remain free from falls  1/24/2021 2337 by Tania Whitten RN  Outcome: Ongoing  1/24/2021 1429 by Harjeet Harris  Outcome: Ongoing  Goal: Absence of physical injury  Description: Absence of physical injury  1/24/2021 2337 by Tania Whitten RN  Outcome: Ongoing  1/24/2021 1429 by Harjeet Harris  Outcome: Ongoing     Problem: OXYGENATION/RESPIRATORY FUNCTION  Goal: Patient will maintain patent airway  1/24/2021 2337 by Tania Whitten RN  Outcome: Ongoing  1/24/2021 1429 by Harjeet Harris  Outcome: Ongoing  Goal: Patient will achieve/maintain normal respiratory rate/effort  Description: Respiratory rate and effort will be within normal limits for the patient  1/24/2021 2337 by Tania Whitten RN  Outcome: Ongoing  1/24/2021 1429 by Harjeet Harris  Outcome: Ongoing     Problem: HEMODYNAMIC STATUS  Goal: Patient has stable vital signs and fluid balance  1/24/2021 2337 by Tania Whitten RN  Outcome: Ongoing  1/24/2021 1429 by Harjeet Harris  Outcome: Ongoing     Problem: FLUID AND ELECTROLYTE IMBALANCE  Goal: Fluid and electrolyte balance are achieved/maintained  1/24/2021 2337 by Tania Whitten RN  Outcome: Ongoing  1/24/2021 1429 by Harjeet Harris  Outcome: Ongoing     Problem: ACTIVITY INTOLERANCE/IMPAIRED MOBILITY  Goal: Mobility/activity is maintained at optimum level for patient  1/24/2021 2337 by Tania Whitten RN  Outcome: Ongoing  1/24/2021 1429 by Harjeet Harris  Outcome: Ongoing     Problem: Discharge Planning:  Goal: Discharged to appropriate level of care  Description: Discharged to appropriate level of care  1/24/2021 2337 by Tania Whitten RN  Outcome: Ongoing  1/24/2021 1429 by Harjeet Harris  Outcome: Ongoing     Problem: Pain:  Goal: Pain level will decrease  Description: Pain level will decrease  1/24/2021 2337 by Tania Whitten RN  Outcome: Ongoing  1/24/2021 1429 by Harjeet Harris  Outcome: Ongoing  Goal: Control of acute pain  Description: Control of acute pain  1/24/2021 2337 by Bety Alvarado RN  Outcome: Ongoing  1/24/2021 1429 by Rosalba Frias  Outcome: Ongoing  Goal: Control of chronic pain  Description: Control of chronic pain  1/24/2021 2337 by Bety Alvarado RN  Outcome: Ongoing  1/24/2021 1429 by Rosalba Frias  Outcome: Ongoing

## 2021-01-25 NOTE — PROGRESS NOTES
Hospitalist Progress Note  1/25/2021 9:32 AM    PCP: Tabby Lilly MD    [phone]     Date of Admission: 1/21/2021                                                                                                                     HOSPITAL COURSE    Patient demographics:  The patient  Jazmine Zuniga is a 70 y.o. female     Significant past medical history:   Patient Active Problem List   Diagnosis    Fracture, metacarpal, neck    PAD (peripheral artery disease) ([address]    CAD (coronary artery disease)    HTN (hypertension)    Diastolic dysfunction    RLS (restless legs syndrome)    History of tobacco use    Centrilobular emphysema ([address]    Colovesical fistula    Diverticulitis large intestine w/o perforation or abscess w/bleeding    Large bowel obstruction ([address]    ANGIE treated with BiPAP    CKD (chronic kidney disease), stage III - sees Dr. Ninoska Lentz    Abnormal radiograph    COPD, severe ([address]    Colonic obstruction ([address]    Carotid artery stenosis without cerebral infarction, bilateral    Colostomy care ([address]    Dyspnea and respiratory abnormalities    Hyperlipidemia    Paroxysmal atrial fibrillation (HCC)    Hypothyroidism, unspecified    Chest pain    Chronic anemia    Anxiety    History of GI bleed    Iron deficiency anemia due to chronic blood loss    Presence of Watchman left atrial appendage closure device    Atrial fibrillation (HCC)    Pulmonary edema, acute (HCC)    Chronic respiratory failure with hypoxia (HCC)    Acute on chronic respiratory failure with hypoxia (HCC)    Severe muscle deconditioning         Presenting symptoms:  Dyspnea     Diagnostic workup:      CONSULTS DURING ADMISSION :   IP CONSULT TO HOSPITALIST  IP CONSULT TO HEART FAILURE NURSE/COORDINATOR  IP CONSULT TO DIETITIAN  IP CONSULT TO SPIRITUAL SERVICES  IP CONSULT TO CARDIOLOGY  IP CONSULT TO PULMONOLOGY      Patient was diagnosed with:  Acute on chronic respiratory failure with hypoxia  Acute on chronic diastolic CHF    Treatment while inpatient:  Pt presented to [facility] with worsening dyspnea since prior admission.                                                                                       ----------------------------------------------------------      SUBJECTIVE COMPLAINTS- follow up for Dyspnea     Diet: DIET LOW SODIUM 2 GM; 1500 ml      OBJECTIVE:   Patient Active Problem List   Diagnosis    Fracture, metacarpal, neck    [facility]per[facility]) [facility]    CAD (coronary artery disease)    HTN (hypertension)    Diastolic dysfunction    RLS (restless legs syndrome)    History of tobacco use    Centrilobular emphysema (HCC)    Colovesical fistula    Diverticulitis large intestine w/o perforation or abscess w/bleeding    Large bowel obstruction ([address]    ANGIE treated with BiPAP    CKD (chronic kidney disease), stage III - sees Dr. Ace Kaur    Abnormal radiograph    COPD, severe ([address]    Colonic obstruction ([address]    Carotid artery stenosis without cerebral infarction, bilateral    Colostomy care ([address]    Dyspnea and respiratory abnormalities    Hyperlipidemia    Paroxysmal atrial fibrillation (HCC)    Hypothyroidism, unspecified    Chest pain    Chronic anemia    Anxiety    History of GI bleed    Iron deficiency anemia due to chronic blood loss    Presence of Watchman left atrial appendage closure device    Atrial fibrillation (HCC)    Pulmonary edema, acute (HCC)    Chronic respiratory failure with hypoxia (HCC)    Acute on chronic respiratory failure with hypoxia (HCC)    Severe muscle deconditioning       Allergies  Iv dye [iodides]    Medications    Scheduled Meds:   carvedilol  18.75 mg Oral BID     amLODIPine  10 mg Oral Daily    furosemide  40 mg Oral BID    predniSONE  40 mg Oral Daily    apixaban  2.5 mg Oral BID    aspirin  81 mg Oral Daily    atorvastatin  40 mg Oral Daily    citalopram  40 mg Oral Daily    cloNIDine  0.2 mg Oral TID    137 141   K 3.6 3.8 3.9   CL 93* 91* 96*   CO2 37* 32 36*   BUN 36* 41* 44*   CREATININE 1.2 1.3* 1.1       LIVER PROFILE:   No results for input(s): ALKPHOS, AST, ALT, ALB, BILIDIR, BILITOT, ALKPHOS in the last 72 hours. No results for input(s): AMYLASE in the last 72 hours. No results for input(s): LIPASE in the last 72 hours. UA:No results for input(s): NITRITE, LABCAST, WBCUA, RBCUA, MUCUS in the last 72 hours. TROPONIN:   No results for input(s): Rubi Gaster in the last 72 hours. Lab Results   Component Value Date/Time    URRFLXCULT Yes 03/02/2018 02:08 PM       No results for input(s): TSHREFLEX in the last 72 hours. No components found for: [id number]  POC GLUCOSE:  No results for input(s): POCGLU in the last 72 hours. No results for input(s): LABA1C in the last 72 hours. Lab Results   Component Value Date    LABA1C 5.5 10/11/2019         ASSESSMENT AND PLAN  Acute on chronic respiratory failure with hypoxia  Multifactorial including CHF COPD CKD and anemia  Severe COPD-continue home 40 mg of prednisone-complete 5 days  Bronchodilators  - on 4 L NC, wean as tolerated  Patient also has obstructive sleep apnea  - COIVD19 negative     Acute on chronic diastolic CHF-ruled out  Cardiology does not believe patient is in acute CHF  - elevated BNP, CXR bibasilar haziness  Continue patient on oral Lasix  Pulmonary consult is appreciated     PAF  - s/p watchman 1/4/2021  - c/w Eliquis     COPD w/ chronic respiratory failure  - c/w home inhalers    HTN  Continue patient on home medication  Add amlodipine     HLD  - c/w statin     Hypothyroidism  - c/w synthroid     Hx chronic anemia  - Hb stable     ANGIE on BIPAP      Code Status: Full Code        Dispo -cc        The patient and / or the family were informed of the results of any tests, a time was given to answer questions, a plan was proposed and they agreed with plan.     Edgar Crouch MD    This note was transcribed using Dragon Dictation

## 2021-01-25 NOTE — PROGRESS NOTES
Pulmonary Progress Note    CC:  Follow up COPD, hypoxia    Subjective: On 4 liters of oxygen   Says she is still dropping her oxygen with exertion. Said she dropped to 91%. Chest discomfort with exertion as well       Intake/Output Summary (Last 24 hours) at 1/25/2021 0799  Last data filed at 1/25/2021 0634  Gross per 24 hour   Intake 1440 ml   Output 1700 ml   Net -260 ml         PHYSICAL EXAM:  Blood pressure (!) 179/64, pulse 73, temperature 97.4 °F (36.3 °C), temperature source Oral, resp. rate 19, weight 173 lb 8 oz (78.7 kg), last menstrual period 05/01/2006, SpO2 97 %, not currently breastfeeding.'  Gen: Pale, chronically ill   Eyes: PERRL. No sclera icterus. No conjunctival injection. ENT: No discharge. Pharynx clear. External appearance of ears and nose normal.  Neck: Trachea midline. No obvious mass. Resp: Faint crackles   CV: Regular rate. Regular rhythm. No murmur or rub. GI: Non-tender. Non-distended. No hernia. Skin: Warm, dry, normal texture and turgor. No nodule on exposed extremities. Lymph: No cervical LAD. No supraclavicular LAD. M/S: No cyanosis. No clubbing. No joint deformity. Neuro: Moves all four extremities. CN 2-12 tested, no defect noted.   Ext:   no edema    Medications:    Scheduled Meds:   amLODIPine  10 mg Oral Daily    furosemide  40 mg Oral BID    predniSONE  40 mg Oral Daily    apixaban  2.5 mg Oral BID    aspirin  81 mg Oral Daily    atorvastatin  40 mg Oral Daily    carvedilol  12.5 mg Oral BID WC    citalopram  40 mg Oral Daily    cloNIDine  0.2 mg Oral TID    hydrALAZINE  100 mg Oral 3 times per day    pantoprazole  40 mg Oral QAM AC    levothyroxine  50 mcg Oral Daily    budesonide-formoterol  2 puff Inhalation BID    montelukast  10 mg Oral Daily    tiotropium  2 puff Inhalation Daily    sodium chloride flush  10 mL Intravenous 2 times per day       Continuous Infusions:      PRN Meds:  nitroGLYCERIN, LORazepam, lidocaine, albuterol sulfate HFA, butalbital-acetaminophen-caffeine, traZODone, sodium chloride flush, promethazine **OR** ondansetron, polyethylene glycol, acetaminophen **OR** acetaminophen    Labs:  CBC:   Recent Labs     01/24/21  0719   WBC 10.4   HGB 8.6*   HCT 28.1*   MCV 79.4*        BMP:   Recent Labs     01/23/21  0742 01/24/21  0719 01/25/21  0620    137 141   K 3.6 3.8 3.9   CL 93* 91* 96*   CO2 37* 32 36*   BUN 36* 41* 44*   CREATININE 1.2 1.3* 1.1     LIVER PROFILE: No results for input(s): AST, ALT, LIPASE, BILIDIR, BILITOT, ALKPHOS in the last 72 hours. Invalid input(s): AMYLASE,  ALB  PT/INR: No results for input(s): PROTIME, INR in the last 72 hours. APTT: No results for input(s): APTT in the last 72 hours. UA:No results for input(s): NITRITE, COLORU, PHUR, LABCAST, WBCUA, RBCUA, MUCUS, TRICHOMONAS, YEAST, BACTERIA, CLARITYU, SPECGRAV, LEUKOCYTESUR, UROBILINOGEN, BILIRUBINUR, BLOODU, GLUCOSEU, AMORPHOUS in the last 72 hours. Invalid input(s): Alfreida Billie  No results for input(s): PH, PCO2, PO2 in the last 72 hours. Films:  Chest imaging reports were reviewed and imaging was reviewed by me and showed no new films     ABG:  No new draws    Cultures:  None    I reviewed the labs and images listed above    ASSESSMENT:  · Chronic Hypoxic Respiratory Failure, near her baseline of 2-5 liters  · Severe COPD, does not appear to be exacerbated   · Abnormal Radiograph suggest pulmonary edema and effusions  · Severe Deconditioning   · Chronic Anemia   · ANGIE     PLAN:  · Titrate oxygen for saturations greater than or equal to 90%  · Prednisone 40 mg for 5 days. · Bronchodilators  · Diuresis as you are doing  · DVT prophylaxis with Eliquis     Needs to enroll in pulmonary rehab. I have no further recs from lung standpoint. She will be SOB with exertion until her conditioning improves.          DO Timo Evans [facility]

## 2021-01-26 ENCOUNTER — TELEPHONE (OUTPATIENT)
Dept: FAMILY MEDICINE CLINIC | Age: 76
End: 2021-01-26

## 2021-01-26 VITALS
HEART RATE: 75 BPM | TEMPERATURE: 97.5 F | WEIGHT: 173.5 LBS | SYSTOLIC BLOOD PRESSURE: 148 MMHG | RESPIRATION RATE: 18 BRPM | OXYGEN SATURATION: 97 % | BODY MASS INDEX: 33.88 KG/M2 | DIASTOLIC BLOOD PRESSURE: 65 MMHG

## 2021-01-26 LAB
ANION GAP SERPL CALCULATED.3IONS-SCNC: 8 MMOL/L (ref 3–16)
BUN BLDV-MCNC: 49 MG/DL (ref 7–20)
CALCIUM SERPL-MCNC: 9.3 MG/DL (ref 8.3–10.6)
CHLORIDE BLD-SCNC: 96 MMOL/L (ref 99–110)
CO2: 38 MMOL/L (ref 21–32)
CREAT SERPL-MCNC: 1.2 MG/DL (ref 0.6–1.2)
GFR AFRICAN AMERICAN: 53
GFR NON-AFRICAN AMERICAN: 44
GLUCOSE BLD-MCNC: 109 MG/DL (ref 70–99)
MAGNESIUM: 2.4 MG/DL (ref 1.8–2.4)
POTASSIUM SERPL-SCNC: 3.9 MMOL/L (ref 3.5–5.1)
SODIUM BLD-SCNC: 142 MMOL/L (ref 136–145)

## 2021-01-26 PROCEDURE — 99232 SBSQ HOSP IP/OBS MODERATE 35: CPT | Performed by: INTERNAL MEDICINE

## 2021-01-26 PROCEDURE — 2700000000 HC OXYGEN THERAPY PER DAY

## 2021-01-26 PROCEDURE — 6370000000 HC RX 637 (ALT 250 FOR IP): Performed by: INTERNAL MEDICINE

## 2021-01-26 PROCEDURE — 36415 COLL VENOUS BLD VENIPUNCTURE: CPT

## 2021-01-26 PROCEDURE — 6370000000 HC RX 637 (ALT 250 FOR IP): Performed by: FAMILY MEDICINE

## 2021-01-26 PROCEDURE — 94640 AIRWAY INHALATION TREATMENT: CPT

## 2021-01-26 PROCEDURE — 94660 CPAP INITIATION&MGMT: CPT

## 2021-01-26 PROCEDURE — 99232 SBSQ HOSP IP/OBS MODERATE 35: CPT | Performed by: NURSE PRACTITIONER

## 2021-01-26 PROCEDURE — 80048 BASIC METABOLIC PNL TOTAL CA: CPT

## 2021-01-26 PROCEDURE — 2580000003 HC RX 258: Performed by: FAMILY MEDICINE

## 2021-01-26 PROCEDURE — 6370000000 HC RX 637 (ALT 250 FOR IP): Performed by: NURSE PRACTITIONER

## 2021-01-26 PROCEDURE — 6370000000 HC RX 637 (ALT 250 FOR IP): Performed by: HOSPITALIST

## 2021-01-26 PROCEDURE — 83735 ASSAY OF MAGNESIUM: CPT

## 2021-01-26 PROCEDURE — 94761 N-INVAS EAR/PLS OXIMETRY MLT: CPT

## 2021-01-26 RX ORDER — PREDNISONE 20 MG/1
40 TABLET ORAL DAILY
Qty: 6 TABLET | Refills: 0 | Status: SHIPPED | OUTPATIENT
Start: 2021-01-27 | End: 2021-01-30

## 2021-01-26 RX ORDER — LISINOPRIL 5 MG/1
2.5 TABLET ORAL DAILY
Qty: 30 TABLET | Refills: 3 | Status: ON HOLD
Start: 2021-01-27 | End: 2021-05-31 | Stop reason: HOSPADM

## 2021-01-26 RX ORDER — AMLODIPINE BESYLATE 10 MG/1
10 TABLET ORAL DAILY
Qty: 30 TABLET | Refills: 3 | Status: ON HOLD | OUTPATIENT
Start: 2021-01-27 | End: 2021-05-31 | Stop reason: SDUPTHER

## 2021-01-26 RX ADMIN — HYDRALAZINE HYDROCHLORIDE 100 MG: 50 TABLET, FILM COATED ORAL at 06:45

## 2021-01-26 RX ADMIN — CLONIDINE HYDROCHLORIDE 0.2 MG: 0.1 TABLET ORAL at 09:17

## 2021-01-26 RX ADMIN — APIXABAN 2.5 MG: 2.5 TABLET, FILM COATED ORAL at 09:17

## 2021-01-26 RX ADMIN — LEVOTHYROXINE SODIUM 50 MCG: 0.05 TABLET ORAL at 06:45

## 2021-01-26 RX ADMIN — ASPIRIN 81 MG: 81 TABLET, CHEWABLE ORAL at 09:18

## 2021-01-26 RX ADMIN — MONTELUKAST 10 MG: 10 TABLET, FILM COATED ORAL at 09:18

## 2021-01-26 RX ADMIN — AMLODIPINE BESYLATE 10 MG: 10 TABLET ORAL at 09:18

## 2021-01-26 RX ADMIN — SODIUM CHLORIDE, PRESERVATIVE FREE 10 ML: 5 INJECTION INTRAVENOUS at 10:46

## 2021-01-26 RX ADMIN — LISINOPRIL 5 MG: 5 TABLET ORAL at 09:17

## 2021-01-26 RX ADMIN — CITALOPRAM HYDROBROMIDE 40 MG: 20 TABLET ORAL at 09:18

## 2021-01-26 RX ADMIN — ALBUTEROL SULFATE 2 PUFF: 90 AEROSOL, METERED RESPIRATORY (INHALATION) at 09:22

## 2021-01-26 RX ADMIN — ATORVASTATIN CALCIUM 40 MG: 40 TABLET, FILM COATED ORAL at 09:18

## 2021-01-26 RX ADMIN — BUDESONIDE AND FORMOTEROL FUMARATE DIHYDRATE 2 PUFF: 80; 4.5 AEROSOL RESPIRATORY (INHALATION) at 09:22

## 2021-01-26 RX ADMIN — CARVEDILOL 18.75 MG: 12.5 TABLET, FILM COATED ORAL at 09:39

## 2021-01-26 RX ADMIN — PANTOPRAZOLE SODIUM 40 MG: 40 TABLET, DELAYED RELEASE ORAL at 06:45

## 2021-01-26 RX ADMIN — FUROSEMIDE 40 MG: 40 TABLET ORAL at 09:17

## 2021-01-26 RX ADMIN — Medication 2 PUFF: at 09:22

## 2021-01-26 RX ADMIN — PREDNISONE 40 MG: 20 TABLET ORAL at 09:17

## 2021-01-26 RX ADMIN — LORAZEPAM 1 MG: 1 TABLET ORAL at 09:22

## 2021-01-26 NOTE — CARE COORDINATION
1/26 Call received from RN. The patient is going to require assistance with transportation to home. [employer] arranged.   Reg   Full name   Jay Jay Todd   Mobile phone number   [phone] type   [employer] (4 seats)   Route   Pickup   [address] [place] [address], [place] [address]   Drop[facility]off   [facility]   Request details   Agent name   Sal Holt   Date and time   1/26/21, 01:55 PM EST

## 2021-01-26 NOTE — PROGRESS NOTES
Pt awake and alert. Refuses bed alarm, call light in reach. Ostomy intact with little stool. O2 on at [address]. Cough is congested. Remains on a fluid restriction.

## 2021-01-26 NOTE — CONSULTS
[facility]  Palliative Care   Consult Note    NAME:  [address] [place] [address] RECORD NUMBER:  [id number]  AGE: 70 y.o.    GENDER: female  : 1945  TODAY'S DATE:  2021    Subjective     Reason for Consult:  goals of care  Visit Type: Initial Consult      Brooks Contreras is a 70 y.o. female referred by:   [x] Physician    PAST MEDICAL HISTORY      Diagnosis Date    Arthritis     Atrial fibrillation ([facility] [address] 2021    CAD (coronary artery disease)     Nonobstructive on cath in 2017    CHF (congestive heart failure) ([facility] [address] [facility]     Colostomy care ([facility] [address] 2018    Peristomal irritation and early leaking    COPD (chronic obstructive pulmonary disease) ([facility] [address] [facility]     Hyperlipidemia     Hypertension     Kidney disease     Stage 3    Peripheral vascular disease ([facility] [address] [facility]     Rectal fissure 2014    surgery pending    Restless legs syndrome     Sleep apnea     O2 3 liters at hs    Thyroid disease     Unspecified sleep apnea        PAST SURGICAL HISTORY  Past Surgical History:   Procedure Laterality Date    ABDOMEN SURGERY      CARDIAC CATHETERIZATION  2017    Dr. Keren Reddy  2018    CORONARY ARTERY BYPASS GRAFT      Legs & Carotid    FRACTURE SURGERY      HERNIA REPAIR      UPPER GASTROINTESTINAL ENDOSCOPY N/A 2019    EGD DIAGNOSTIC ONLY performed by Ramon Hare MD at [address] [place] [address] HISTORY  Family History   Problem Relation Age of Onset    Heart Disease Mother     Heart Disease Father     Heart Disease Sister     Cancer Brother        SOCIAL HISTORY  Social History     Tobacco Use    Smoking status: Former Smoker     Packs/day: 3.00     Years: 30.00     Pack years: 90.00     Start date: 1963     Quit date: 1992     Years since quittin.0    Smokeless tobacco: Never Used    Tobacco comment: QUIT 20 YRS AGO   Substance Use Topics    Alcohol use: No     Alcohol/week: 0.0 standard drinks    Drug use: No       ALLERGIES  Allergies   Allergen Reactions    Iv Dye [Iodides]      Flushed, broke out and difficulty breathing       MEDICATIONS  No current facility-administered medications on file prior to encounter. Current Outpatient Medications on File Prior to Encounter   Medication Sig Dispense Refill    carvedilol (COREG) 12.5 MG tablet Take 1 tablet by mouth 2 times daily (with meals) 180 tablet 0    atorvastatin (LIPITOR) 40 MG tablet TAKE ONE TABLET BY MOUTH DAILY 90 tablet 1    montelukast (SINGULAIR) 10 MG tablet Take 1 tablet by mouth daily 90 tablet 0    lansoprazole (PREVACID) 15 MG delayed release capsule Take 1 capsule by mouth daily 90 capsule 0    citalopram (CELEXA) 40 MG tablet TAKE ONE TABLET BY MOUTH DAILY 90 tablet 0    levothyroxine (SYNTHROID) 50 MCG tablet TAKE ONE TABLET BY MOUTH DAILY 90 tablet 0    hydrALAZINE (APRESOLINE) 100 MG tablet TAKE ONE TABLET BY MOUTH THREE TIMES A  tablet 0    lidocaine (XYLOCAINE) 5 % ointment Apply topically as needed. 1 Tube 5    diclofenac sodium (VOLTAREN) 1 % GEL Apply 2 g topically 3 times daily as needed for Pain 100 g 5    lidocaine (XYLOCAINE) 2 % jelly Apply pea-sized amount topically to affected area every 8 hours when necessary pain 1 Tube 11    apixaban (ELIQUIS) 5 MG TABS tablet Take 0.5 tablets by mouth 2 times daily 180 tablet 0    mineral oil-hydrophilic petrolatum (HYDROPHOR) ointment Apply topically as needed.  1 Tube 5    fluticasone-vilanterol (BREO ELLIPTA) 100-25 MCG/INH AEPB inhaler Inhale 1 puff into the lungs daily 1 each 5    tiotropium (SPIRIVA RESPIMAT) 2.5 MCG/ACT AERS inhaler Inhale 2 puffs into the lungs daily 1 Inhaler 5    albuterol sulfate HFA (VENTOLIN HFA) 108 (90 Base) MCG/ACT inhaler Inhale 2 puffs into the lungs every 4 hours as needed for Wheezing or Shortness of Breath 1 Inhaler 5    furosemide (LASIX) 40 MG tablet Take 1 tablet by mouth 2 times daily 180 tablet 0    budesonide-formoterol (SYMBICORT) 160-4.5 MCG/ACT AERO Inhale 2 puffs into the lungs 2 times daily 1 Inhaler 3    cloNIDine (CATAPRES) 0.2 MG tablet Take 1 tablet by mouth 3 times daily 60 tablet 3    aspirin 81 MG tablet Take 1 tablet by mouth daily 30 tablet 3    ipratropium-albuterol (DUONEB) 0.5-2.5 (3) MG/3ML SOLN nebulizer solution Inhale 3 mLs into the lungs every 4 hours as needed for Shortness of Breath 360 mL 5    traZODone (DESYREL) 50 MG tablet Take 1 tablet by mouth nightly as needed for Sleep 30 tablet 5    butalbital-acetaminophen-caffeine (FIORICET, ESGIC) -40 MG per tablet Take 1 tablet by mouth every 6 hours as needed for Headaches 16 tablet 0       Objective         BP (!) 148/65   Pulse 75   Temp 97.5 °F (36.4 °C) (Oral)   Resp 18   Wt 173 lb 8 oz (78.7 kg)   LMP 05/01/2006 (Approximate)   SpO2 97%   BMI 33.88 kg/m²     Code Status: Full Code    Advanced Directives: not completed has paperwork not sure who to name, she has 11 children. Assessment        Management and Education    Persons available for education:       [x] Self     [] Caregiver       [] Spouse       [] Other Family Member   []  Other    Spiritual History:  notified: Yes,     Does the patient have a Primary Care Physician? Yes    Level of patient/caregiver understanding able to:        [x] Verbalize Understanding   [] Demonstrate Understanding       [] Teach Back       [] Needs Reinforcement     []  Other:      Teaching Time:  0hours  30 minutes     Plan        Social Service Consult Made:  Yes  Assistance filling out Living Will/HPOA:  No      Discharge Plan:  Education/support to patient  Code status clarified: Full Code  Palliative care orders introduced    Discharge Environment:      [x] Home with home care    Discussion: Patient admitted for increased shortness of breath.  I met with patient she gives a history of living with her granddaughter who does all household duties she is independent in ADL's. We did discuss reason for readmission and she felt it is because she has misplaced her bipap. She stated when she had episodes at home she would bipap and it would help. [place] has moved from [place] to [place] and misplaced her bipap, but plans on making an appt with sleep clinic at [place] to obtain new bipap. She is going [facility] and did not want any other services, I offered a palliative care nurse to come and see if they could help her she refused. We discussed advanced care planning and she stated she has papers at home but has not completed, she has 11 children \"all over\". I encouraged her to complete and discuss her wishes with person she chooses. I will continue to follow Ms. Morrow's care as needed. Thank you for allowing me to participate in the care of Ms. Angelita Libman .      Electronically signed by Timothy Elizabeth RN, [address] on 1/26/2021 at 2:02 PM  [address] [place] [address]  Office: [phone]

## 2021-01-26 NOTE — PROGRESS NOTES
DC order noted. Pharm notified to do dc med rec. Pt has received > 60 mins of HF education and this HF RN completed HF RN consult on 1/25, see note. [address] referral was placed for on-going eduction on HF and COPD. Pt was a 30 day readmit. HF instructions placed on AVS as well as on [facility] as going home with [facility]. Follow up appts in place with PCP on 1/29 at 2:00 with Dr Webber and 2/4 at 1:00 with Sam Farooq NP. Dc wt 173 lbs. (close to dc wt from 1/6 which was 169 lbs).

## 2021-01-26 NOTE — PROGRESS NOTES
Pharmacy Heart Failure Medication Reconciliation Note    Pt discharged from [facility] today after admission for acute on chronic CHF. Kiel Farias has a diagnosis of diastolic heart failure (last EF = 55-60% on 2/20). Pertinent Labs:  BMP:   Lab Results   Component Value Date     01/26/2021    K 3.9 01/26/2021    K 4.0 01/21/2021    CL 96 01/26/2021    CO2 38 01/26/2021    BUN 49 01/26/2021    CREATININE 1.2 01/26/2021     BNP:   Lab Results   Component Value Date    PROBNP 1,698 01/24/2021    PROBNP 3,756 01/21/2021    PROBNP 3,006 01/05/2021       Patient taking an ACEI / ARB / Entresto:  Yes     Patient taking a BETA BLOCKER:  Yes  Patient taking a LOOP DIURETIC: Yes  Patient taking a ALDOSTERONE RECEPTOR ANTAGONIST: No: d/t increased Scr. EF>40%    Patient has a diagnosis of Atrial Fibrillation: Yes. If yes, patient is on appropriate anticoagulation: yes    Patient has a diagnosis of type 2 diabetes:  No: .  If yes, patient prescribed the following anti-hyperglycemic medication at discharge:       Corrections to discharge medications include:      Discharge Medications:         Medication List      START taking these medications    amLODIPine 10 MG tablet  Commonly known as: NORVASC  Take 1 tablet by mouth daily  Start taking on: January 27, 2021     lisinopril 5 MG tablet  Commonly known as: PRINIVIL;ZESTRIL  Take 0.5 tablets by mouth daily  Start taking on: January 27, 2021     predniSONE 20 MG tablet  Commonly known as: DELTASONE  Take 2 tablets by mouth daily for 3 days  Start taking on: January 27, 2021        Isiah Vicente taking these medications    albuterol sulfate  (90 Base) MCG/ACT inhaler  Commonly known as: Ventolin HFA  Inhale 2 puffs into the lungs every 4 hours as needed for Wheezing or Shortness of Breath     apixaban 5 MG Tabs tablet  Commonly known as: Eliquis  Take 0.5 tablets by mouth 2 times daily     aspirin 81 MG tablet  Take 1 tablet by mouth daily     atorvastatin 40 MG tablet  Commonly known as: LIPITOR  TAKE ONE TABLET BY MOUTH DAILY     Breo Ellipta 100-25 MCG/INH Aepb inhaler  Generic drug: fluticasone-vilanterol  Inhale 1 puff into the lungs daily     budesonide-formoterol 160-4.5 MCG/ACT Aero  Commonly known as: SYMBICORT  Inhale 2 puffs into the lungs 2 times daily     butalbital-acetaminophen-caffeine -40 MG per tablet  Commonly known as: FIORICET, ESGIC  Take 1 tablet by mouth every 6 hours as needed for Headaches     carvedilol 12.5 MG tablet  Commonly known as: COREG  Take 1 tablet by mouth 2 times daily (with meals)     citalopram 40 MG tablet  Commonly known as: CELEXA  TAKE ONE TABLET BY MOUTH DAILY     cloNIDine 0.2 MG tablet  Commonly known as: CATAPRES  Take 1 tablet by mouth 3 times daily     diclofenac sodium 1 % Gel  Commonly known as: Voltaren  Apply 2 g topically 3 times daily as needed for Pain     furosemide 40 MG tablet  Commonly known as: LASIX  Take 1 tablet by mouth 2 times daily     hydrALAZINE 100 MG tablet  Commonly known as: APRESOLINE  TAKE ONE TABLET BY MOUTH THREE TIMES A DAY     ipratropium-albuterol 0.5-2.5 (3) MG/3ML Soln nebulizer solution  Commonly known as: DUONEB  Inhale 3 mLs into the lungs every 4 hours as needed for Shortness of Breath     lansoprazole 15 MG delayed release capsule  Commonly known as: PREVACID  Take 1 capsule by mouth daily     levothyroxine 50 MCG tablet  Commonly known as: SYNTHROID  TAKE ONE TABLET BY MOUTH DAILY     lidocaine 2 % jelly  Commonly known as: XYLOCAINE  Apply pea-sized amount topically to affected area every 8 hours when necessary pain     lidocaine 5 % ointment  Commonly known as: XYLOCAINE  Apply topically as needed. mineral oil-hydrophilic petrolatum ointment  Apply topically as needed.      montelukast 10 MG tablet  Commonly known as: Singulair  Take 1 tablet by mouth daily     tiotropium 2.5 MCG/ACT Aers inhaler  Commonly known as: Spiriva Respimat  Inhale 2 puffs into the lungs daily

## 2021-01-26 NOTE — DISCHARGE SUMMARY
Hospital Medicine Discharge Summary    Patient ID: Vikash Short      Patient's PCP: Margarito Zeng MD    Admit Date: 1/21/2021     Discharge Date: 1/26/2021      Admitting Physician: Fredi Goode MD     Discharge Physician: Zonia Stone MD     Discharge Diagnoses: Active Hospital Problems    Diagnosis Date Noted    Severe muscle deconditioning [R29.898]     Acute on chronic respiratory failure with hypoxia (HCC) [J96.21] 01/21/2021    Chronic anemia [D64.9] 02/15/2020    Abnormal radiograph [R93.89]        The patient was seen and examined on day of discharge and this discharge summary is in conjunction with any daily progress note from day of discharge. Hospital Course:     Acute on chronic respiratory failure with hypoxia  Multifactorial including CHF COPD CKD and anemia  Severe COPD-continue home 40 mg of prednisone-complete 5 days  Bronchodilators  - on 4 L NC, wean as tolerated  Patient also has obstructive sleep apnea  - COIVD19 negative     Acute on chronic diastolic CHF-ruled out  Cardiology does not believe patient is in acute CHF  - elevated BNP, CXR bibasilar haziness  Continue patient on oral Lasix  Pulmonary consult is appreciated     PAF  - s/p watchman 1/4/2021  - c/w Eliquis     COPD w/ chronic respiratory failure  - c/w home inhalers     HTN  Continue patient on home medication  Add amlodipine     HLD  - c/w statin     Hypothyroidism  - c/w synthroid     Hx chronic anemia  - Hb stable     ANGIE on BIPAP      Exam:     BP (!) 148/65   Pulse 75   Temp 97.5 °F (36.4 °C) (Oral)   Resp 18   Wt 173 lb 8 oz (78.7 kg)   LMP 05/01/2006 (Approximate)   SpO2 97%   BMI 33.88 kg/m²     General appearance: No apparent distress, appears stated age and cooperative. HEENT: Pupils equal, round, and reactive to light. Conjunctivae/corneas clear. Neck: Supple, with full range of motion. No jugular venous distention. Trachea midline. Respiratory:  Normal respiratory effort.  Clear to auscultation, bilaterally without Rales/Wheezes/Rhonchi. Cardiovascular: Regular rate and rhythm with normal S1/S2 without murmurs, rubs or gallops. Abdomen: Soft, non-tender, non-distended with normal bowel sounds. Musculoskelatal: No clubbing, cyanosis or edema bilaterally. Full range of motion without deformity. Skin: Skin color, texture, turgor normal.  No rashes or lesions. Neurologic:  Neurovascularly intact without any focal sensory/motor deficits. Cranial nerves: II-XII intact, grossly non-focal.  Psychiatric: Alert and oriented, thought content appropriate, normal insight      Consults:     IP CONSULT TO HOSPITALIST  IP CONSULT TO HEART FAILURE NURSE/COORDINATOR  IP CONSULT TO DIETITIAN  IP CONSULT TO SPIRITUAL SERVICES  IP CONSULT TO CARDIOLOGY  IP CONSULT TO PULMONOLOGY  IP CONSULT TO SOCIAL WORK  IP CONSULT TO HOME CARE NEEDS  IP CONSULT TO PALLIATIVE CARE    Significant Diagnostic Studies:       XR CHEST PORTABLE [[id number]] Collected: 01/21/21 1211      Order Status: Completed Updated: 01/21/21 1214     Narrative:       EXAMINATION:   ONE XRAY VIEW OF THE CHEST     1/21/2021 12:07 pm     COMPARISON:   January 5, 2021     HISTORY:   ORDERING SYSTEM PROVIDED HISTORY: sob   TECHNOLOGIST PROVIDED HISTORY:   Reason for exam:->sob   Reason for Exam: sob, increase of o2   Acuity: Acute   Type of Exam: Initial     FINDINGS:   Cardiac silhouette is enlarged.  No pneumothorax.  Small bilateral pleural   effusions, slightly greater on the left.  Mild hazy bibasilar opacities.  No   acute bony abnormality.      Impression:       Mild hazy bibasilar opacities as well as small pleural effusions, slightly   worse on the left.              PCP/SNF to follow up: Pulmonary Rehab    Disposition:  Home    Condition on D/C:  Stable    Discharge Instructions/Follow-up:  Ensure Bipap/CPAP use outpatient    Code Status:  Prior     Activity: activity as tolerated    Diet: cardiac diet    Labs:  For convenience and continuity at follow-up the following most recent labs are provided:      CBC:    Lab Results   Component Value Date    WBC 10.4 01/24/2021    HGB 8.6 01/24/2021    HCT 28.1 01/24/2021     01/24/2021       Renal:    Lab Results   Component Value Date     01/26/2021    K 3.9 01/26/2021    K 4.0 01/21/2021    CL 96 01/26/2021    CO2 38 01/26/2021    BUN 49 01/26/2021    CREATININE 1.2 01/26/2021    CALCIUM 9.3 01/26/2021    PHOS 2.3 02/21/2020       Discharge Medications:     Discharge Medication List as of 1/26/2021  1:16 PM           Details   amLODIPine (NORVASC) 10 MG tablet Take 1 tablet by mouth daily, Disp-30 tablet, R-3Normal      predniSONE (DELTASONE) 20 MG tablet Take 2 tablets by mouth daily for 3 days, Disp-6 tablet, R-0Normal      lisinopril (PRINIVIL;ZESTRIL) 5 MG tablet Take 0.5 tablets by mouth daily, Disp-30 tablet, R-3Normal              Details   carvedilol (COREG) 12.5 MG tablet Take 1 tablet by mouth 2 times daily (with meals), Disp-180 tablet, R-0Normal      atorvastatin (LIPITOR) 40 MG tablet TAKE ONE TABLET BY MOUTH DAILY, Disp-90 tablet, R-1Normal      montelukast (SINGULAIR) 10 MG tablet Take 1 tablet by mouth daily, Disp-90 tablet, R-0Normal      lansoprazole (PREVACID) 15 MG delayed release capsule Take 1 capsule by mouth daily, Disp-90 capsule, R-0Normal      citalopram (CELEXA) 40 MG tablet TAKE ONE TABLET BY MOUTH DAILY, Disp-90 tablet, R-0Normal      levothyroxine (SYNTHROID) 50 MCG tablet TAKE ONE TABLET BY MOUTH DAILY, Disp-90 tablet, R-0Normal      hydrALAZINE (APRESOLINE) 100 MG tablet TAKE ONE TABLET BY MOUTH THREE TIMES A DAY, Disp-270 tablet, R-0Normal      lidocaine (XYLOCAINE) 5 % ointment Apply topically as needed. , Disp-1 Tube, R-5, Normal      diclofenac sodium (VOLTAREN) 1 % GEL Apply 2 g topically 3 times daily as needed for Pain, Topical, 3 TIMES DAILY PRN Starting Mon 1/11/2021, Disp-100 g, R-5, Normal      lidocaine (XYLOCAINE) 2 % jelly Apply pea-sized amount topically to affected area every 8 hours when necessary pain, Disp-1 Tube, R-11, Normal      apixaban (ELIQUIS) 5 MG TABS tablet Take 0.5 tablets by mouth 2 times daily, Disp-180 tablet, R-0Normal      mineral oil-hydrophilic petrolatum (HYDROPHOR) ointment Apply topically as needed. , Disp-1 Tube, R-5, Normal      ipratropium-albuterol (DUONEB) 0.5-2.5 (3) MG/3ML SOLN nebulizer solution Inhale 3 mLs into the lungs every 4 hours as needed for Shortness of Breath, Disp-360 mL,R-5Normal      fluticasone-vilanterol (BREO ELLIPTA) 100-25 MCG/INH AEPB inhaler Inhale 1 puff into the lungs daily, Disp-1 each,R-5Normal      tiotropium (SPIRIVA RESPIMAT) 2.5 MCG/ACT AERS inhaler Inhale 2 puffs into the lungs daily, Disp-1 Inhaler,R-5Normal      albuterol sulfate HFA (VENTOLIN HFA) 108 (90 Base) MCG/ACT inhaler Inhale 2 puffs into the lungs every 4 hours as needed for Wheezing or Shortness of Breath, Disp-1 Inhaler,R-5Normal      traZODone (DESYREL) 50 MG tablet Take 1 tablet by mouth nightly as needed for Sleep, Disp-30 tablet,R-5Normal      furosemide (LASIX) 40 MG tablet Take 1 tablet by mouth 2 times daily, Disp-180 tablet,R-0Normal      budesonide-formoterol (SYMBICORT) 160-4.5 MCG/ACT AERO Inhale 2 puffs into the lungs 2 times daily, Disp-1 Inhaler, R-3Normal      cloNIDine (CATAPRES) 0.2 MG tablet Take 1 tablet by mouth 3 times daily, Disp-60 tablet, R-3Normal      butalbital-acetaminophen-caffeine (FIORICET, ESGIC) -40 MG per tablet Take 1 tablet by mouth every 6 hours as needed for Headaches, Disp-16 tablet, R-0Print      aspirin 81 MG tablet Take 1 tablet by mouth daily, Disp-30 tablet, R-3Normal             Time Spent on discharge is more than 45 minutes in the examination, evaluation, counseling and review of medications and discharge plan. Signed: Leonard Lau MD   1/26/2021      Thank you Lance Freeman MD for the opportunity to be involved in this patient's care.  If you have any questions or concerns please feel free to contact me at [phone].

## 2021-01-26 NOTE — TELEPHONE ENCOUNTER
Yes, happy to. Please ensure we've had an encounter in the past 2 months - if not may need video visit for documentation. Thanks.

## 2021-01-26 NOTE — CARE COORDINATION
1/26 SW consult noted. Patient given COA information for transportation assistaance. Pt. Will return to home with [facility].  Electronically signed by Peg Tucker RN on 1/26/2021 at 10:11 AM

## 2021-01-26 NOTE — PROGRESS NOTES
Discharge instructions reviewed with patient. Discussed follow up appointments per CHF nurse. Denies questions or concerns. IV removed. Patient requested transportation. Call to social work. Lyft ordered for patient. Informed patient about retail pharmacy and new medications. Patient to lobby awaiting lyft to home.    Electronically signed by Sonya Wilson RN on 1/26/2021 at 2:28 PM

## 2021-01-26 NOTE — PROGRESS NOTES
[facility]   Daily Progress Note      Admit Date:  1/21/2021    Reason for follow up visit: SOB    CC: \"I feel better, just tired. \"    69 y/o female with PMH significant for atrial fib/S/P Watchman LAAC device, COPD, diastolic HF and CAD who was admitted with worsening SOB. She has been diuresed and started on steroids for her COPD. Seen by Dr. Theo Tilley and changed to po steroids and bronchodilators. Interval History:  Pt. seen and examined; records reviewed  BP notes. Denies chest pain  SOB improving but continues with exertion  O2@ 4L  (Home 3-5L)  Net diuresis -2L    Subjective:  Pt with no acute overnight cardiac events. Denies chest pain, productive cough, palpitations or dizziness  SOBOE stable    Review of Systems:   · Constitutional: no unanticipated weight loss. No fevers, chills. + sedentary  · Eyes: No visual changes or diplopia. No scleral icterus. · ENT: No Headaches, hearing loss or vertigo. No mouth sores or sore throat. · Cardiovascular: as reviewed in HPI  · Respiratory: No cough or wheezing, no sputum production. No hematemesis. · Gastrointestinal: No abdominal pain, appetite loss, blood in stools. No change in bowel or bladder habits. · Genitourinary: No dysuria, trouble voiding, or hematuria. · Musculoskeletal:  No gait disturbance, no joint complaints. · Integumentary: No rash or pruritis. · Neurological: No headache, diplopia, change in muscle strength, numbness or tingling. · Psychiatric: No anxiety or depression. · Endocrine: No temperature intolerance. No excessive thirst, fluid intake, or urination. No tremor. · Hematologic/Lymphatic: No abnormal bruising or bleeding, blood clots or swollen lymph nodes. · Allergic/Immunologic: No nasal congestion or hives.     Objective:   BP (!) 156/70   Pulse 74   Temp 97.3 °F (36.3 °C) (Axillary)   Resp 18   Wt 173 lb 8 oz (78.7 kg)   LMP 05/01/2006 (Approximate)   SpO2 96%   BMI 33.88 kg/m²       Intake/Output Summary (Last 24 hours) at 1/26/2021 0901  Last data filed at 1/26/2021 0835  Gross per 24 hour   Intake 1320 ml   Output 1700 ml   Net -380 ml     Wt Readings from Last 3 Encounters:   01/26/21 173 lb 8 oz (78.7 kg)   01/06/21 169 lb 5 oz (76.8 kg)   12/07/20 172 lb (78 kg)       Physical Exam:  General: In no acute distress. Chronically ill in appearance. Awake, alert, and oriented X4. Skin:  Warm and dry. Neck:  Supple. No JVD appreciated. Chest: Lungs with decreased breath sounds. No wheezes/rhonchi/rales  Cardiovascular:  RRR. Normal S1 and S2. Soft systolic murmur  Abdomen:  soft, nontender, nondistended, +bowel sounds. Extremities:  No LE edema. No cyanosis. 2+ bilateral radial/DP/PT pulses. Cap refill brisk    Medications:    carvedilol  18.75 mg Oral BID WC    lisinopril  5 mg Oral Daily    amLODIPine  10 mg Oral Daily    furosemide  40 mg Oral BID    predniSONE  40 mg Oral Daily    apixaban  2.5 mg Oral BID    aspirin  81 mg Oral Daily    atorvastatin  40 mg Oral Daily    citalopram  40 mg Oral Daily    cloNIDine  0.2 mg Oral TID    hydrALAZINE  100 mg Oral 3 times per day    pantoprazole  40 mg Oral QAM AC    levothyroxine  50 mcg Oral Daily    budesonide-formoterol  2 puff Inhalation BID    montelukast  10 mg Oral Daily    tiotropium  2 puff Inhalation Daily    sodium chloride flush  10 mL Intravenous 2 times per day       nitroGLYCERIN, LORazepam, lidocaine, albuterol sulfate HFA, butalbital-acetaminophen-caffeine, traZODone, sodium chloride flush, promethazine **OR** ondansetron, polyethylene glycol, acetaminophen **OR** acetaminophen    Lab Data:  CBC:   Recent Labs     01/24/21  0719   WBC 10.4   HGB 8.6*        BMP:    Recent Labs     01/24/21  0719 01/25/21  0620 01/26/21  0653    141 142   K 3.8 3.9 3.9   CO2 32 36* 38*   BUN 41* 44* 49*   CREATININE 1.3* 1.1 1.2     Results for Wood Coley (MRN [id number]) as of 1/26/2021 09:02   Ref.  Range 1/21/2021 12:21 1/22/2021 07:35 1/24/2021 07:19   Pro-BNP Latest Ref Range: 0 - 124 pg/mL 3,756 (H)  1,698 (H)     ECG 1/23/2021:  Possible Left atrial enlargement  Left ventricular hypertrophy with repolarization abnormality  Abnormal ECG  When compared with ECG of 21-JAN-2021 12:12,  ST now depressed in Lateral leads  Inverted T waves have replaced nonspecific T wave abnormality in Inferior leads  Inverted T waves have replaced nonspecific T wave abnormality in Lateral leads  consider lateral ischemia  Confirmed by [facility] Noé BARONE MD ([id number]) on 1/24/2021 4:43:46 PM     MIKO 1/4/2021 :  Braydon Ru is no significant pericardial effusion at the start of the procedure.   The left atrium and appendage appear free of thrombus.   Appendage ostial measurements   43 degrees 1.77cm   92 degrees 1.77cm   97 degrees 1.82cm   154 degrees 1.88cm   175 degrees 1.94cm   A 27mm device is deployed into the left atrial appendage. The device appears   adequately compressed. There is no evidence of leak by color flow.   There is no change in effusion upon completion of the procedure.     1/5/2021 Limited echo:   Limited study performed to assess for pericardial effusion.  Braydon Vences is no evidence of a significant effusion.     Echo 2/14/2020:   Left ventricular cavity size is normal.   There is mild concentric left ventricular hypertrophy.   Ejection fraction is visually estimated to be 55-60%. Grade I diastolic   dysfunction   Mild to moderate mitral regurgitation.   Mild to moderate tricuspid regurgitation with RVSP of 51 mmHg      Assessment/Plan:    1. Acute on chronic respiratory failure with hypoxemia  -multifactorial and d/t COPD and DHF and deconditioning  -currently not in an acute exacerbation of her CHF  -pulmonary consult appreciated    2.  Acute on chronic diastolic HF  -proBNP is lowest it has been since 2018; she is not currently in an acute exacerbation of CHF  -continue diuretic  -continue carvedilol, ACE  -not on aldactone d/t elevated Cr     3. Uncontrolled HTN  -improving and on multiple antihypertensives  -continue medical management     4. PAF  -S/P LAAC device (Watchman) on 1/4/2021  -continue with ASA and Eliquis     5. Severe COPD  -on home O2  -appears to be at baseline or close to baseline O2 demand  -Rx per pulmonary  -agree with pulmonary rehab as outpt     6. Chronic anemia  -Hgb stable     7. ANGIE with use of BiPap at night  -uses nightly at home    She is stable from a cardiac standpoint and ok to discharge when okay with others. Follow up with cardiology: scheduled with D. Enzweiler, CNP on 2/4/2020 @ 1PM    Emphasized and discussed low-fat/low sodium diet, monitoring of daily weights, fluid restriction, worsening signs and symptoms of heart failure and when to call, and the importance of regular exercise and activity.     Check BMP and BNP in 7 days    Discharge Cardiac meds:  Amlodipine 10 mg daily  Eliquis 2.5 mg BID  ASA 81 mg daily  Atorvastatin 40 mg daily  Carvedilol 18.75 mg BID  Clonidine 0.2 mg TID  Lasix 40 mg daily  Hydralazine 100 mg every 8 hours  Lisinopril 5 mg daily           Electronically signed by GIULIANO Spencer CNP on 1/26/2021 at 9:01 AM

## 2021-01-27 ENCOUNTER — TELEPHONE (OUTPATIENT)
Dept: PHARMACY | Age: 76
End: 2021-01-27

## 2021-01-27 ENCOUNTER — TELEPHONE (OUTPATIENT)
Dept: FAMILY MEDICINE CLINIC | Age: 76
End: 2021-01-27

## 2021-01-27 NOTE — TELEPHONE ENCOUNTER
Outbound call from outpatient wellness center. Appointment scheduled for new heart failure virtual visit on January 28 at 12:00 noon.

## 2021-01-27 NOTE — TELEPHONE ENCOUNTER
[employer] from [facility]  Referral for home health from the hospital   Wants to know if dr. Kya Mccarthy will follow her orders  Pt has a hospital fu this Friday     Please advise

## 2021-02-01 ENCOUNTER — PATIENT MESSAGE (OUTPATIENT)
Dept: FAMILY MEDICINE CLINIC | Age: 76
End: 2021-02-01

## 2021-02-01 ENCOUNTER — TELEPHONE (OUTPATIENT)
Dept: PULMONOLOGY | Age: 76
End: 2021-02-01

## 2021-02-01 DIAGNOSIS — F41.9 ANXIETY: ICD-10-CM

## 2021-02-01 RX ORDER — LORAZEPAM 1 MG/1
TABLET ORAL
Qty: 30 TABLET | Refills: 0 | Status: SHIPPED | OUTPATIENT
Start: 2021-02-01 | End: 2021-05-11 | Stop reason: SDUPTHER

## 2021-02-01 NOTE — TELEPHONE ENCOUNTER
Spoke to Haja marques/ [facility] 12/1 they had delivered E- tank that last for 4 hours . Haja said that she had just gotten off the phone with the patient and have her set up for another E-tank delivery today as the patient is persistent that she doesn't have one.

## 2021-02-01 NOTE — TELEPHONE ENCOUNTER
From: Viktor Lentz  To: Vinnie Agustin MD  Sent: 2/1/2021 12:08 PM EST  Subject: Non-Urgent Medical Question    Could i please get a refill on ativan n lidacaine. I think lidacane needs pre authorization.  Thank you

## 2021-02-01 NOTE — TELEPHONE ENCOUNTER
Patient calling because she needs an order sent to [address] [place] [address] for a large O2 tank. She would like to have it as a back up in case of emergency. Please fax over an order.

## 2021-02-02 ENCOUNTER — TELEPHONE (OUTPATIENT)
Dept: FAMILY MEDICINE CLINIC | Age: 76
End: 2021-02-02

## 2021-02-02 NOTE — TELEPHONE ENCOUNTER
[facility] is trying to reach pt for home care orders they wanted us to know that she is non complaint FYI

## 2021-02-04 ENCOUNTER — NURSE TRIAGE (OUTPATIENT)
Dept: OTHER | Facility: CLINIC | Age: 76
End: 2021-02-04

## 2021-02-04 NOTE — TELEPHONE ENCOUNTER
[facility]  with red flag complaint. Brief description of triage: Patient calling with concerns for shortness of breath. Triage indicates for patient to go to ED now. Care advice provided, patient verbalizes understanding; denies any other questions or concerns; instructed to call back for any new or worsening symptoms. Attention Provider: Thank you for allowing me to participate in the care of your patient. The patient was connected to triage in response to information provided to the ECC. Please do not respond through this encounter as the response is not directed to a shared pool. Reason for Disposition   [1] MODERATE difficulty breathing (e.g., speaks in phrases, SOB even at rest, pulse 100-120) AND [2] NEW-onset or WORSE than normal    Answer Assessment - Initial Assessment Questions  1. RESPIRATORY STATUS: \"Describe your breathing? \" (e.g., wheezing, shortness of breath, unable to speak, severe coughing)       Shortness of breath which has worsened    2. ONSET: \"When did this breathing problem begin? \"       Started today    3. PATTERN \"Does the difficult breathing come and go, or has it been constant since it started? \"       Constant    4. SEVERITY: \"How bad is your breathing? \" (e.g., mild, moderate, severe)     - MILD: No SOB at rest, mild SOB with walking, speaks normally in sentences, can lay down, no retractions, pulse < 100.     - MODERATE: SOB at rest, SOB with minimal exertion and prefers to sit, cannot lie down flat, speaks in phrases, mild retractions, audible wheezing, pulse 100-120.     - SEVERE: Very SOB at rest, speaks in single words, struggling to breathe, sitting hunched forward, retractions, pulse > 120       Moderate    5. RECURRENT SYMPTOM: \"Have you had difficulty breathing before? \" If so, ask: \"When was the last time? \" and \"What happened that time? \"       Has COPD and SOB at baseline but has worsened today    6. CARDIAC HISTORY: \"Do you have any history of heart disease? \" (e.g., heart attack, angina, bypass surgery, angioplasty)       A-fib had recent watchman procedure, CHF    7. LUNG HISTORY: \"Do you have any history of lung disease? \"  (e.g., pulmonary embolus, asthma, emphysema)      COPD    8. CAUSE: \"What do you think is causing the breathing problem? \"       Unsure    9. OTHER SYMPTOMS: \"Do you have any other symptoms? (e.g., dizziness, runny nose, cough, chest pain, fever)      Chest pain left/middle     10. PREGNANCY: \"Is there any chance you are pregnant? \" \"When was your last menstrual period? \"        N/a    11. TRAVEL: \"Have you traveled out of the country in the last month? \" (e.g., travel history, exposures)        no    Protocols used: BREATHING DIFFICULTY-ADULT-AH

## 2021-02-09 ENCOUNTER — TELEPHONE (OUTPATIENT)
Dept: FAMILY MEDICINE CLINIC | Age: 76
End: 2021-02-09

## 2021-02-12 ENCOUNTER — APPOINTMENT (OUTPATIENT)
Dept: CT IMAGING | Age: 76
End: 2021-02-12
Payer: COMMERCIAL

## 2021-02-12 ENCOUNTER — HOSPITAL ENCOUNTER (EMERGENCY)
Age: 76
Discharge: HOME | End: 2021-02-12
Attending: STUDENT IN AN ORGANIZED HEALTH CARE EDUCATION/TRAINING PROGRAM
Payer: COMMERCIAL

## 2021-02-12 VITALS
DIASTOLIC BLOOD PRESSURE: 58 MMHG | HEART RATE: 69 BPM | RESPIRATION RATE: 19 BRPM | OXYGEN SATURATION: 100 % | TEMPERATURE: 98.1 F | SYSTOLIC BLOOD PRESSURE: 128 MMHG

## 2021-02-12 DIAGNOSIS — R10.84 GENERALIZED ABDOMINAL PAIN: Primary | ICD-10-CM

## 2021-02-12 DIAGNOSIS — R19.7 DIARRHEA, UNSPECIFIED TYPE: ICD-10-CM

## 2021-02-12 LAB
A/G RATIO: 1.2 (ref 1.1–2.2)
ALBUMIN SERPL-MCNC: 3.8 G/DL (ref 3.4–5)
ALP BLD-CCNC: 50 U/L (ref 40–129)
ALT SERPL-CCNC: 30 U/L (ref 10–40)
ANION GAP SERPL CALCULATED.3IONS-SCNC: 9 MMOL/L (ref 3–16)
AST SERPL-CCNC: 24 U/L (ref 15–37)
BASOPHILS ABSOLUTE: 0.1 K/UL (ref 0–0.2)
BASOPHILS RELATIVE PERCENT: 0.6 %
BILIRUB SERPL-MCNC: <0.2 MG/DL (ref 0–1)
BILIRUBIN URINE: NEGATIVE
BLOOD, URINE: NEGATIVE
BUN BLDV-MCNC: 34 MG/DL (ref 7–20)
CALCIUM SERPL-MCNC: 9.7 MG/DL (ref 8.3–10.6)
CHLORIDE BLD-SCNC: 98 MMOL/L (ref 99–110)
CLARITY: ABNORMAL
CO2: 28 MMOL/L (ref 21–32)
COLOR: YELLOW
COMMENT UA: ABNORMAL
CREAT SERPL-MCNC: 1.5 MG/DL (ref 0.6–1.2)
EOSINOPHILS ABSOLUTE: 0.2 K/UL (ref 0–0.6)
EOSINOPHILS RELATIVE PERCENT: 1.7 %
EPITHELIAL CELLS, UA: 1 /HPF (ref 0–5)
GFR AFRICAN AMERICAN: 41
GFR NON-AFRICAN AMERICAN: 34
GLOBULIN: 3.3 G/DL
GLUCOSE BLD-MCNC: 109 MG/DL (ref 70–99)
GLUCOSE URINE: NEGATIVE MG/DL
HCT VFR BLD CALC: 28.2 % (ref 36–48)
HEMOGLOBIN: 9 G/DL (ref 12–16)
HYALINE CASTS: 1 /LPF (ref 0–8)
KETONES, URINE: NEGATIVE MG/DL
LEUKOCYTE ESTERASE, URINE: ABNORMAL
LIPASE: 55 U/L (ref 13–60)
LYMPHOCYTES ABSOLUTE: 0.9 K/UL (ref 1–5.1)
LYMPHOCYTES RELATIVE PERCENT: 8.8 %
MAGNESIUM: 2.1 MG/DL (ref 1.8–2.4)
MCH RBC QN AUTO: 24.2 PG (ref 26–34)
MCHC RBC AUTO-ENTMCNC: 32 G/DL (ref 31–36)
MCV RBC AUTO: 75.8 FL (ref 80–100)
MICROSCOPIC EXAMINATION: YES
MONOCYTES ABSOLUTE: 0.8 K/UL (ref 0–1.3)
MONOCYTES RELATIVE PERCENT: 7 %
NEUTROPHILS ABSOLUTE: 8.7 K/UL (ref 1.7–7.7)
NEUTROPHILS RELATIVE PERCENT: 81.9 %
NITRITE, URINE: NEGATIVE
PDW BLD-RTO: 18.4 % (ref 12.4–15.4)
PH UA: 5.5 (ref 5–8)
PHOSPHORUS: 3.9 MG/DL (ref 2.5–4.9)
PLATELET # BLD: 285 K/UL (ref 135–450)
PMV BLD AUTO: 8.3 FL (ref 5–10.5)
POTASSIUM REFLEX MAGNESIUM: 4.5 MMOL/L (ref 3.5–5.1)
PROTEIN UA: ABNORMAL MG/DL
RBC # BLD: 3.72 M/UL (ref 4–5.2)
RBC UA: ABNORMAL /HPF (ref 0–4)
SODIUM BLD-SCNC: 135 MMOL/L (ref 136–145)
SPECIFIC GRAVITY UA: 1.02 (ref 1–1.03)
TOTAL PROTEIN: 7.1 G/DL (ref 6.4–8.2)
URINE REFLEX TO CULTURE: YES
URINE TYPE: ABNORMAL
UROBILINOGEN, URINE: 0.2 E.U./DL
WBC # BLD: 10.7 K/UL (ref 4–11)
WBC UA: 423 /HPF (ref 0–5)

## 2021-02-12 PROCEDURE — 99283 EMERGENCY DEPT VISIT LOW MDM: CPT

## 2021-02-12 PROCEDURE — 85025 COMPLETE CBC W/AUTO DIFF WBC: CPT

## 2021-02-12 PROCEDURE — 70490 CT SOFT TISSUE NECK W/O DYE: CPT

## 2021-02-12 PROCEDURE — 74176 CT ABD & PELVIS W/O CONTRAST: CPT

## 2021-02-12 PROCEDURE — 2580000003 HC RX 258: Performed by: STUDENT IN AN ORGANIZED HEALTH CARE EDUCATION/TRAINING PROGRAM

## 2021-02-12 PROCEDURE — 87086 URINE CULTURE/COLONY COUNT: CPT

## 2021-02-12 PROCEDURE — 81001 URINALYSIS AUTO W/SCOPE: CPT

## 2021-02-12 PROCEDURE — 84100 ASSAY OF PHOSPHORUS: CPT

## 2021-02-12 PROCEDURE — 83690 ASSAY OF LIPASE: CPT

## 2021-02-12 PROCEDURE — 80053 COMPREHEN METABOLIC PANEL: CPT

## 2021-02-12 PROCEDURE — 83735 ASSAY OF MAGNESIUM: CPT

## 2021-02-12 RX ORDER — CEFUROXIME AXETIL 500 MG/1
500 TABLET ORAL 2 TIMES DAILY
Qty: 14 TABLET | Refills: 0 | Status: SHIPPED | OUTPATIENT
Start: 2021-02-12 | End: 2021-02-19

## 2021-02-12 RX ORDER — LOPERAMIDE HYDROCHLORIDE 2 MG/1
2 CAPSULE ORAL 4 TIMES DAILY PRN
Qty: 30 CAPSULE | Refills: 0 | Status: SHIPPED | OUTPATIENT
Start: 2021-02-12 | End: 2021-02-22

## 2021-02-12 RX ORDER — ONDANSETRON 4 MG/1
4 TABLET, FILM COATED ORAL EVERY 8 HOURS PRN
Qty: 30 TABLET | Refills: 0 | Status: SHIPPED | OUTPATIENT
Start: 2021-02-12 | End: 2021-06-22

## 2021-02-12 RX ORDER — 0.9 % SODIUM CHLORIDE 0.9 %
1000 INTRAVENOUS SOLUTION INTRAVENOUS ONCE
Status: COMPLETED | OUTPATIENT
Start: 2021-02-12 | End: 2021-02-12

## 2021-02-12 RX ADMIN — SODIUM CHLORIDE 1000 ML: 9 INJECTION, SOLUTION INTRAVENOUS at 19:20

## 2021-02-12 ASSESSMENT — PAIN SCALES - GENERAL
PAINLEVEL_OUTOF10: 4
PAINLEVEL_OUTOF10: 6

## 2021-02-12 ASSESSMENT — PAIN DESCRIPTION - LOCATION: LOCATION: ABDOMEN

## 2021-02-12 ASSESSMENT — PAIN DESCRIPTION - ONSET: ONSET: GRADUAL

## 2021-02-12 ASSESSMENT — PAIN DESCRIPTION - DESCRIPTORS: DESCRIPTORS: BURNING;CRAMPING

## 2021-02-12 NOTE — ED PROVIDER NOTES
Primary Care Physician: Heath Saini MD   Attending Physician: No att. providers found     History   Chief Complaint   Patient presents with    Abdominal Pain     abd pain x 1 week,     Diarrhea     diarrhea  has a colostomy         HPI   Ana Richards is a 70 y.o. female history of congestive heart failure, coronary artery disease, COPD, hypertension, hyperlipidemia, chronic kidney disease, peripheral artery disease, diverticulitis, status post ostomy presenting this evening accompanied granddaughter complaining of abdominal pain which is associated with persistent diarrhea for the past 2 weeks. Now feels very weak and having significant diarrhea. She states her diarrhea is watery. She denies fevers, chills, nausea, vomiting, chest pain or shortness of breath. No URI symptoms and no concerns for COVID-19 infection.     Past Medical History:   Diagnosis Date    Arthritis     Atrial fibrillation ([address] 1/4/2021    CAD (coronary artery disease)     Nonobstructive on cath in 9/2017    CHF (congestive heart failure) ([address]     Colostomy care ([facility] 4/25/2018    Peristomal irritation and early leaking    COPD (chronic obstructive pulmonary disease) (HCC)     Hyperlipidemia     Hypertension     Kidney disease     Stage 3    Peripheral vascular disease ([address]     Rectal fissure 11/2014    surgery pending    Restless legs syndrome     Sleep apnea     O2 3 liters at hs    Thyroid disease     Unspecified sleep apnea         Past Surgical History:   Procedure Laterality Date    ABDOMEN SURGERY      CARDIAC CATHETERIZATION  09/13/2017    Dr. Vinod Leo  03/09/2018    CORONARY ARTERY BYPASS GRAFT      Legs & Carotid    FRACTURE SURGERY      HERNIA REPAIR      UPPER GASTROINTESTINAL ENDOSCOPY N/A 9/30/2019    EGD DIAGNOSTIC ONLY performed by Shae Keith MD at [address] [place] [address]          Family History   Problem Relation Age of Onset    Heart Disease Mother     Heart Disease Father     Heart Disease Sister     Cancer Brother         Social History     Socioeconomic History    Marital status:      Spouse name: None    Number of children: 6    Years of education: 15    Highest education level: None   Occupational History    None   Social Needs    Financial resource strain: None    Food insecurity     Worry: None     Inability: None    Transportation needs     Medical: None     Non-medical: None   Tobacco Use    Smoking status: Former Smoker     Packs/day: 3.00     Years: 30.00     Pack years: 90.00     Start date: 1963     Quit date: 1992     Years since quittin.0    Smokeless tobacco: Never Used    Tobacco comment: QUIT 21 YRS AGO   Substance and Sexual Activity    Alcohol use: No     Alcohol/week: 0.0 standard drinks    Drug use: No    Sexual activity: Not Currently   Lifestyle    Physical activity     Days per week: None     Minutes per session: None    Stress: None   Relationships    Social connections     Talks on phone: None     Gets together: None     Attends [employer] service: None     Active member of club or organization: None     Attends meetings of clubs or organizations: None     Relationship status: None    Intimate partner violence     Fear of current or ex partner: None     Emotionally abused: None     Physically abused: None     Forced sexual activity: None   Other Topics Concern    None   Social History Narrative    None        Review of Systems   10 total systems reviewed and found to be negative unless otherwise noted in HPI     Physical Exam   BP (!) 128/58   Pulse 69   Temp 98.1 °F (36.7 °C) (Oral)   Resp 19   LMP 2006 (Approximate)   SpO2 100%      CONSTITUTIONAL: Well appearing, in no acute distress   HEAD: atraumatic, normocephalic   EYES: PERRL, No injection, discharge or scleral icterus. ENT: Moist mucous membranes.     NECK: Normal ROM, NO LAD   CARDIOVASCULAR: Regular rate and rhythm. No murmurs or gallop. PULMONARY/CHEST: Airway patent. No retractions. Breath sounds clear with good air entry bilaterally. ABDOMEN: Soft, Non-distended mild abdominal tenderness around the ostomy but ostomy viable and functional.   SKIN: Acyanotic, warm, dry   MUSCULOSKELETAL: No swelling, tenderness or deformity   NEUROLOGICAL: Awake and oriented x 3. Pulses intact. Grossly nonfocal   Nursing note and vitals reviewed.      ED Course & Medical Decision Making   Medications   0.9 % sodium chloride bolus (0 mLs Intravenous Stopped 2/12/21 2106)      Labs Reviewed   CBC WITH AUTO DIFFERENTIAL - Abnormal; Notable for the following components:       Result Value    RBC 3.72 (*)     Hemoglobin 9.0 (*)     Hematocrit 28.2 (*)     MCV 75.8 (*)     MCH 24.2 (*)     RDW 18.4 (*)     Neutrophils Absolute 8.7 (*)     Lymphocytes Absolute 0.9 (*)     All other components within normal limits    Narrative:     Performed at:  [address] [facility] [place] [address]   Phone [phone]   COMPREHENSIVE METABOLIC PANEL W/ REFLEX TO MG FOR LOW K - Abnormal; Notable for the following components:    Sodium 135 (*)     Chloride 98 (*)     Glucose 109 (*)     BUN 34 (*)     CREATININE 1.5 (*)     GFR Non- 34 (*)     GFR  41 (*)     All other components within normal limits    Narrative:     Performed at:  [address] [facility] [place] [address]   Phone [phone]   URINE RT REFLEX TO CULTURE - Abnormal; Notable for the following components:    Clarity, UA CLOUDY (*)     Protein, UA TRACE (*)     Leukocyte Esterase, Urine LARGE (*)     All other components within normal limits    Narrative:     Performed at:  [address] [facility] [address]   Phone [phone]   MICROSCOPIC URINALYSIS - Abnormal; Notable for the following components:    WBC,  (*)     All other components within normal limits    Narrative:     Performed at:  [facility]  [address] [address]   Phone [phone]   CULTURE, URINE    Narrative:     ORDER#: [id number]                          ORDERED BY: Kofi Juarez  SOURCE: Urine Clean Catch                  COLLECTED:  02/12/21 20:21  ANTIBIOTICS AT MARQUIS.:                      RECEIVED :  02/12/21 20:51  Performed at:  [facility]  [address], [facility] [address]   Phone [phone]   LIPASE    Narrative:     Performed at:  [facility]  [address] [address]   Phone [phone]   MAGNESIUM    Narrative:     Performed at:  [facility]  [address], [facility] [address]   Phone [phone]   PHOSPHORUS    Narrative:     Performed at:  [facility]  [address] [facility] [place] [address]   Phone [phone]      CT ABDOMEN PELVIS WO CONTRAST Additional Contrast? None   Final Result   1. No acute findings within the abdomen or pelvis. Colonic diverticulosis   with no acute features. Stable loop colostomy in the left mid abdomen. 2. Otherwise stable CT of the abdomen and pelvis. Stable asymmetric right   renal atrophy. 3. Status post cholecystectomy. Aortoiliac graft with moderate diffuse   atherosclerotic plaque. CT SOFT TISSUE NECK WO CONTRAST   Final Result   No acute abnormality of the soft tissue structures of the neck.             Echo Limited    Result Date: 2/12/2021  Transthoracic Echocardiography Report (TTE)  Demographics   Patient Name       David Drake   Date of Study      02/09/2021       Gender               Female   Patient Number     [id number]       Date of Birth        1945   Visit Number       [id number]        Age                  70 year(s)   Accession Number   [id number]       Room Number          [facility]   Corporate ID       [id number]         Sonographer          GIGI Masters, RDCS, RVT   Ordering Physician Juliocesar Multani MD  Interpreting         Physician DEZ Condon  Procedure Type of Study   TTE procedure:ECHOCARDIOGRAM LIMITED. Procedure Date Date: 02/09/2021 Start: 03:55 PM Study Location: [facility] Indications:Pericardial effusion. Additional Indications:S/P Watchman. Patient Status: Routine Rhythm: Atrial fibrillation  Conclusions   Summary  Limited study to assess for pericardial effusion s/p watchman. No evidence of pericardial effusion seen   Signature   ------------------------------------------------------------------  Electronically signed by Beni Farias MD (Interpreting  physician) on 02/12/2021 at 04:04 PM  ------------------------------------------------------------------   Findings   Pericardial Effusion  No pericardial effusion noted. Xr Chest Portable    Result Date: 1/21/2021  EXAMINATION: ONE XRAY VIEW OF THE CHEST 1/21/2021 12:07 pm COMPARISON: January 5, 2021 HISTORY: ORDERING SYSTEM PROVIDED HISTORY: sob TECHNOLOGIST PROVIDED HISTORY: Reason for exam:->sob Reason for Exam: sob, increase of o2 Acuity: Acute Type of Exam: Initial FINDINGS: Cardiac silhouette is enlarged. No pneumothorax. Small bilateral pleural effusions, slightly greater on the left. Mild hazy bibasilar opacities. No acute bony abnormality. Mild hazy bibasilar opacities as well as small pleural effusions, slightly worse on the left. PROCEDURES:   Procedures    ASSESSMENT AND PLAN:  Jazmine Zuniga is a 70 y.o. female presenting with persistent watery diarrhea for 2 weeks as well as some abdominal pain. Exam nontoxic in no acute distress but tender to palpation around the ostomy.   Labs obtained and showed no significant abnormalities in electrolytes and no leukocytosis however there was greater than 400 WBCs. Given the tenderness to palpation around the ostomy I was concerned for stoma hernia and I did obtain imaging studies which were all unremarkable. At this point I believe that the cause of her pain is secondary to persistent diarrhea of unknown cause but less likely infectious. Patient given symptom management. She remained stable in the emergency room was discharged home with Imodium, Zofran as well as Ceftin for UTI. Recommended to follow-up with PCP. ClINICAL IMPRESSION:  1. Generalized abdominal pain    2. Diarrhea, unspecified type          PATIENT REFERRED TO:  MD Anup Palacios [facility] [address] . [address] [place] [address]  [phone]    Schedule an appointment as soon as possible for a visit in 2 days        DISCHARGE MEDICATIONS:  Discharge Medication List as of 2/12/2021  9:18 PM      START taking these medications    Details   ondansetron (ZOFRAN) 4 MG tablet Take 1 tablet by mouth every 8 hours as needed for Nausea, Disp-30 tablet, R-0Print      cefUROXime (CEFTIN) 500 MG tablet Take 1 tablet by mouth 2 times daily for 7 days, Disp-14 tablet, R-0Print      loperamide (RA ANTI-DIARRHEAL) 2 MG capsule Take 1 capsule by mouth 4 times daily as needed for Diarrhea, Disp-30 capsule, R-0Print           DISCONTINUED MEDICATIONS:  Discharge Medication List as of 2/12/2021  9:18 PM        DISPOSITION Decision To Discharge 02/12/2021 09:12:14 PM  -We have instructed the patient, Angelica Baker) to return to the ED or call her PCP if her pain/symptoms worsen. -Findings and recommendations explained to patient, and granddaughter. They expressed understanding and agreed with the plan.   Chinmay Turner MD (electronically

## 2021-02-14 LAB — URINE CULTURE, ROUTINE: NORMAL

## 2021-02-15 ENCOUNTER — TELEPHONE (OUTPATIENT)
Dept: OTHER | Facility: CLINIC | Age: 76
End: 2021-02-15

## 2021-02-15 NOTE — TELEPHONE ENCOUNTER
[facility] contacted Dr. Otis Farias to schedule ED f/u appt. Spoke with Jaimie Chowdhury, she scheduled ass for Monday 2-22 @12:00pm with Dr. Sylvia Payne as a VV.

## 2021-02-15 NOTE — TELEPHONE ENCOUNTER
[facility] contacted pt to notify her of ED f/u appt. Pt was unable to hear this writer. Attempted to call back but pt did not answer telephone and this writer was unable to leave message.

## 2021-02-16 ENCOUNTER — TELEPHONE (OUTPATIENT)
Dept: CARDIOLOGY CLINIC | Age: 76
End: 2021-02-16

## 2021-02-16 NOTE — TELEPHONE ENCOUNTER
Called and spoke to patient to make aware of MIKO. MIKO is scheduled for 2/23/2021 at 10:30 with and arrival time of 9:00. Went over pre-procedure instructions. She verbalized understanding.

## 2021-02-22 NOTE — PROGRESS NOTES
syncope or TIA-like symptoms. Psychiatric: Denies anxiety or depression.  + insomnia     Past Medical History:   Diagnosis Date    Arthritis     Atrial fibrillation ([facility] 2021    CAD (coronary artery disease)     Nonobstructive on cath in 2017    CHF (congestive heart failure) ([facility]     [facility] 2018    Peristomal irritation and early leaking    COPD (chronic obstructive pulmonary disease) (HCC)     Hyperlipidemia     Hypertension     Kidney disease     Stage 3    Peripheral vascular disease ([facility]     Rectal fissure 2014    surgery pending    Restless legs syndrome     Sleep apnea     O2 3 liters at     Thyroid disease     Unspecified sleep apnea      Past Surgical History:   Procedure Laterality Date    ABDOMEN SURGERY      CARDIAC CATHETERIZATION  2017    Dr. Zac Stevens  2018    CORONARY ARTERY BYPASS GRAFT      Legs & Carotid    FRACTURE SURGERY      HERNIA REPAIR      UPPER GASTROINTESTINAL ENDOSCOPY N/A 2019    EGD DIAGNOSTIC ONLY performed by Marisa Meléndez MD at [address] [place] [address]       Family History   Problem Relation Age of Onset    Heart Disease Mother     Heart Disease Father     Heart Disease Sister     Cancer Brother      Social History     Tobacco Use    Smoking status: Former Smoker     Packs/day: 3.00     Years: 30.00     Pack years: 90.00     Start date: 1963     Quit date: 1992     Years since quittin.0    Smokeless tobacco: Never Used    Tobacco comment: QUIT 20 YRS AGO   Substance Use Topics    Alcohol use: No     Alcohol/week: 0.0 standard drinks    Drug use: No       Allergies   Allergen Reactions    Iv Dye [Iodides]      Flushed, broke out and difficulty breathing     Current Outpatient Medications   Medication Sig Dispense Refill    carvedilol (COREG) 12.5 MG tablet Take 1 tablet by mouth 2 times daily (with meals) 180 tablet 2    Tube 5    ipratropium-albuterol (DUONEB) 0.5-2.5 (3) MG/3ML SOLN nebulizer solution Inhale 3 mLs into the lungs every 4 hours as needed for Shortness of Breath 360 mL 5    fluticasone-vilanterol (BREO ELLIPTA) 100-25 MCG/INH AEPB inhaler Inhale 1 puff into the lungs daily 1 each 5    butalbital-acetaminophen-caffeine (FIORICET, ESGIC) -40 MG per tablet Take 1 tablet by mouth every 6 hours as needed for Headaches 16 tablet 0    aspirin 81 MG tablet Take 1 tablet by mouth daily 30 tablet 3     No current facility-administered medications for this visit. Physical Exam:   /70   Pulse 85   Temp 97.8 °F (36.6 °C)   Ht 5' (1.524 m)   Wt 162 lb (73.5 kg)   LMP 05/01/2006 (Approximate)   SpO2 98%   BMI 31.64 kg/m²   Wt Readings from Last 2 Encounters:   02/24/21 162 lb (73.5 kg)   01/26/21 173 lb 8 oz (78.7 kg)     Constitutional: She is oriented to person, place, and time. She appears chronically ill and frail. In no acute distress. HEENT: Normocephalic and atraumatic. Sclerae anicteric. No xanthelasmas. EOM's intact. Neck: Supple. No JVD present. Carotids with R bruit No thyromegaly present. No lymphadenopathy present. Cardiovascular: RRR, normal S1 and A3;Q/CP systolic murmur  Pulmonary/Chest: Effort normal.  Lungs clear to auscultation. Chest wall nontender  Abdominal: soft, nontender, nondistended. + bowel sounds; no hepatomegaly   Extremities: No edema, cyanosis, or clubbing. Pulses are 2+ radial and 1+ bilateral DP/PT pulses bilaterally. Cap refill brisk. Neurological: No focal deficit. Skin: Skin is warm and dry. Psychiatric: She has a normal mood and affect.  Her speech is normal and behavior is normal.     Lab Review:   Lab Results   Component Value Date    TRIG 153 01/22/2021    HDL 44 01/22/2021    LDLCALC 102 01/22/2021    LDLDIRECT 120 01/09/2018    LABVLDL 31 01/22/2021     Lab Results   Component Value Date     02/12/2021    K 4.5 02/12/2021    CL 98 02/12/2021 flow.  2.  The left anterior descending artery comes from the left main  coronary artery. It has ELIER 3 flow. The midportion has 50%  stenosis. 3.  The left circumflex comes from the left main. It is nondominant. No significant stenosis was seen. 4.  The right coronary artery comes from the right coronary cusp. It  is dominant giving rise to the right posterior descending artery and  posterolateral branch and has no significant stenosis. 5.  LV ejection fraction is 60%.     SUMMARY:  Nonobstructive coronary artery disease with normal LV  ejection fraction. LVEDP was 20 mmHg. 8/29/2017 Carotid US:   The left and right internal carotid artery appears to have a 50-79%    8/2017 Lexiscan-Myoview:   Abnormal study. There is a moderate sized, moderate intensity, reversible    defect of the mid to apical inferior wall which is consistent with ischemia.    Normal LV size and systolic function.    Overall findings represent an intermediate risk study. Assessment:    1. Right carotid bruit  -will follow up carotid US  -prior carotid US showed 50-79% cameron ICA stenosis (2017)  -continue ASA and statin    2. Stenosis of bilateral carotid artery  -bilateral ICA stenosis noted on carotid US previously    3. Chronic diastolic heart failure (HCC)  -currently compensated  -continue carvedilol, ACE-I  -not on aldactone d/t elevated Cr    4. Essential hypertension  -better controlled on multiple antihypertensives  -goal BP < 130/80  -continue medical management    5. Paroxysmal A-fib (HCC)  -S/P LAAC device ([facility]) on 1/4/2021  -S/P MIKO on 2/23/2021: Eliquis dc'd   -continue ASA and Plavix    6. Chronic obstructive pulmonary disease, unspecified COPD type ([facility]  -severe and follows with pulmonary  -chronic respiratory failure and on O2  -continue inhalers     7. ANGIE treated with BiPAP  -uses BiPap with O2 @ night    8.  Chronic kidney disease  -last Cr 1.5       Plan:  Check carotid US  Continue amlodipine, ASA, statin, carvedilol, clonidine, plavix, lasix, hydralazine, lisinopril  Emphasized and discussed low-fat/low sodium diet, monitoring of daily weights, fluid restriction, worsening signs and symptoms of heart failure and when to call, and the importance of regular exercise and activity. Recent labs reviewed  Follow up in 3-4 months with D. Enzweiler, CNP or sooner if needed    Return in about 3 months (around 5/24/2021) for 3-4 months with D. Enzweiler, CNP. Thanks for allowing me to participate in the care of this patient.       SHAZIA Del Castillo  [facility], [address] [place] [address] [address] [place] [address], [address] [place] [address]  Office: [phone]  Fax: [phone]      Electronically signed by GIULIANO Serra CNP on 2/24/2021 at 1:20 PM

## 2021-02-23 ENCOUNTER — HOSPITAL ENCOUNTER (OUTPATIENT)
Dept: CARDIAC CATH/INVASIVE PROCEDURES | Age: 76
Discharge: HOME | End: 2021-02-23
Payer: MEDICARE

## 2021-02-23 LAB — SARS-COV-2, NAAT: NOT DETECTED

## 2021-02-23 PROCEDURE — 93312 ECHO TRANSESOPHAGEAL: CPT

## 2021-02-23 PROCEDURE — 7100000011 HC PHASE II RECOVERY - ADDTL 15 MIN

## 2021-02-23 PROCEDURE — 93325 DOPPLER ECHO COLOR FLOW MAPG: CPT

## 2021-02-23 PROCEDURE — 6360000002 HC RX W HCPCS

## 2021-02-23 PROCEDURE — 7100000010 HC PHASE II RECOVERY - FIRST 15 MIN

## 2021-02-23 PROCEDURE — 99152 MOD SED SAME PHYS/QHP 5/>YRS: CPT

## 2021-02-23 PROCEDURE — 87635 SARS-COV-2 COVID-19 AMP PRB: CPT

## 2021-02-23 PROCEDURE — 2580000003 HC RX 258

## 2021-02-23 PROCEDURE — 6370000000 HC RX 637 (ALT 250 FOR IP)

## 2021-02-23 RX ORDER — SODIUM CHLORIDE 0.9 % (FLUSH) 0.9 %
10 SYRINGE (ML) INJECTION PRN
Status: DISCONTINUED | OUTPATIENT
Start: 2021-02-23 | End: 2021-02-24 | Stop reason: HOSPADM

## 2021-02-23 RX ORDER — CLOPIDOGREL BISULFATE 75 MG/1
75 TABLET ORAL DAILY
Qty: 90 TABLET | Refills: 1 | Status: SHIPPED | OUTPATIENT
Start: 2021-02-23 | End: 2021-12-27 | Stop reason: SDUPTHER

## 2021-02-23 RX ORDER — BUTALBITAL, ACETAMINOPHEN AND CAFFEINE 50; 325; 40 MG/1; MG/1; MG/1
1 TABLET ORAL ONCE
Status: DISCONTINUED | OUTPATIENT
Start: 2021-02-23 | End: 2021-02-24 | Stop reason: HOSPADM

## 2021-02-23 RX ORDER — SODIUM CHLORIDE 0.9 % (FLUSH) 0.9 %
10 SYRINGE (ML) INJECTION EVERY 12 HOURS SCHEDULED
Status: DISCONTINUED | OUTPATIENT
Start: 2021-02-23 | End: 2021-02-24 | Stop reason: HOSPADM

## 2021-02-23 RX ORDER — SODIUM CHLORIDE 9 MG/ML
INJECTION, SOLUTION INTRAVENOUS CONTINUOUS
Status: DISCONTINUED | OUTPATIENT
Start: 2021-02-23 | End: 2021-02-24 | Stop reason: HOSPADM

## 2021-02-24 ENCOUNTER — OFFICE VISIT (OUTPATIENT)
Dept: CARDIOLOGY CLINIC | Age: 76
End: 2021-02-24
Payer: COMMERCIAL

## 2021-02-24 VITALS
HEIGHT: 60 IN | SYSTOLIC BLOOD PRESSURE: 138 MMHG | OXYGEN SATURATION: 98 % | WEIGHT: 162 LBS | DIASTOLIC BLOOD PRESSURE: 70 MMHG | HEART RATE: 85 BPM | BODY MASS INDEX: 31.8 KG/M2 | TEMPERATURE: 97.8 F

## 2021-02-24 DIAGNOSIS — R09.89 RIGHT CAROTID BRUIT: Primary | ICD-10-CM

## 2021-02-24 DIAGNOSIS — J44.9 CHRONIC OBSTRUCTIVE PULMONARY DISEASE, UNSPECIFIED COPD TYPE (HCC): ICD-10-CM

## 2021-02-24 DIAGNOSIS — I65.21 STENOSIS OF RIGHT CAROTID ARTERY: ICD-10-CM

## 2021-02-24 DIAGNOSIS — G47.33 OSA TREATED WITH BIPAP: ICD-10-CM

## 2021-02-24 DIAGNOSIS — I10 ESSENTIAL HYPERTENSION: ICD-10-CM

## 2021-02-24 DIAGNOSIS — I48.0 PAROXYSMAL A-FIB (HCC): ICD-10-CM

## 2021-02-24 DIAGNOSIS — I50.32 CHRONIC DIASTOLIC HEART FAILURE (HCC): ICD-10-CM

## 2021-02-24 PROCEDURE — 1111F DSCHRG MED/CURRENT MED MERGE: CPT | Performed by: NURSE PRACTITIONER

## 2021-02-24 PROCEDURE — 1090F PRES/ABSN URINE INCON ASSESS: CPT | Performed by: NURSE PRACTITIONER

## 2021-02-24 PROCEDURE — G8926 SPIRO NO PERF OR DOC: HCPCS | Performed by: NURSE PRACTITIONER

## 2021-02-24 PROCEDURE — 99214 OFFICE O/P EST MOD 30 MIN: CPT | Performed by: NURSE PRACTITIONER

## 2021-02-24 PROCEDURE — G8427 DOCREV CUR MEDS BY ELIG CLIN: HCPCS | Performed by: NURSE PRACTITIONER

## 2021-02-24 PROCEDURE — G8399 PT W/DXA RESULTS DOCUMENT: HCPCS | Performed by: NURSE PRACTITIONER

## 2021-02-24 PROCEDURE — 3017F COLORECTAL CA SCREEN DOC REV: CPT | Performed by: NURSE PRACTITIONER

## 2021-02-24 PROCEDURE — 1123F ACP DISCUSS/DSCN MKR DOCD: CPT | Performed by: NURSE PRACTITIONER

## 2021-02-24 PROCEDURE — 1036F TOBACCO NON-USER: CPT | Performed by: NURSE PRACTITIONER

## 2021-02-24 PROCEDURE — 4040F PNEUMOC VAC/ADMIN/RCVD: CPT | Performed by: NURSE PRACTITIONER

## 2021-02-24 PROCEDURE — G8417 CALC BMI ABV UP PARAM F/U: HCPCS | Performed by: NURSE PRACTITIONER

## 2021-02-24 PROCEDURE — G8484 FLU IMMUNIZE NO ADMIN: HCPCS | Performed by: NURSE PRACTITIONER

## 2021-02-24 PROCEDURE — 3023F SPIROM DOC REV: CPT | Performed by: NURSE PRACTITIONER

## 2021-02-24 RX ORDER — CLONIDINE HYDROCHLORIDE 0.2 MG/1
0.2 TABLET ORAL 3 TIMES DAILY
Qty: 270 TABLET | Refills: 1 | Status: ON HOLD | OUTPATIENT
Start: 2021-02-24 | End: 2021-05-31 | Stop reason: SDUPTHER

## 2021-02-24 RX ORDER — CARVEDILOL 12.5 MG/1
12.5 TABLET ORAL 2 TIMES DAILY WITH MEALS
Qty: 180 TABLET | Refills: 2 | Status: ON HOLD | OUTPATIENT
Start: 2021-02-24 | End: 2021-05-31 | Stop reason: SDUPTHER

## 2021-02-25 NOTE — H&P
[facility]  Cardiology Consult    Lennie Murillo  1945 February 25, 2021      CC: Follow transesophageal echocardiogram      Subjective:     History of Present Illness:    Lennie Murillo is a 70 y.o. patient with a PMH significant for atrial fibrillation here for transesophageal echocardiogram.    Past Medical History:   has a past medical history of Arthritis, Atrial fibrillation ([address] CAD (coronary artery disease), CHF (congestive heart failure) ([address] Colostomy care ([address] COPD (chronic obstructive pulmonary disease) ([facility] Hyperlipidemia, Hypertension, Kidney disease, Peripheral vascular disease ([address] Rectal fissure, Restless legs syndrome, Sleep apnea, Thyroid disease, and Unspecified sleep apnea. Surgical History:   has a past surgical history that includes Cholecystectomy; hernia repair; Coronary artery bypass graft; Cardiac catheterization (09/13/2017); colostomy (03/09/2018); Upper gastrointestinal endoscopy (N/A, 9/30/2019); Abdomen surgery; Colonoscopy; fracture surgery; and vascular surgery. Social History:   reports that she quit smoking about 29 years ago. She started smoking about 58 years ago. She has a 90.00 pack-year smoking history. She has never used smokeless tobacco. She reports that she does not drink alcohol or use drugs. Family History:  family history includes Cancer in her brother; Heart Disease in her father, mother, and sister. Home Medications:  Were reviewed and are listed in nursing record and/or below  Prior to Admission medications    Medication Sig Start Date End Date Taking?  Authorizing Provider   clopidogrel (PLAVIX) 75 MG tablet Take 1 tablet by mouth daily 2/23/21  Yes Neema Torrez MD   carvedilol (COREG) 12.5 MG tablet Take 1 tablet by mouth 2 times daily (with meals) 2/24/21   GIULIANO Burgos - CNP   cloNIDine (CATAPRES) 0.2 MG tablet Take 1 tablet by mouth 3 times daily 2/24/21   GIULIANO Burgos - CNP   furosemide (LASIX) 40 MG Apply topically as needed. 12/9/20   Bryson Webber MD   ipratropium-albuterol (DUONEB) 0.5-2.5 (3) MG/3ML SOLN nebulizer solution Inhale 3 mLs into the lungs every 4 hours as needed for Shortness of Breath 11/11/20   Mandi Ogden MD   fluticasone-vilanterol (BREO ELLIPTA) 100-25 MCG/INH AEPB inhaler Inhale 1 puff into the lungs daily 11/11/20   Mandi Ogden MD   butalbital-acetaminophen-caffeine (FIORICET, ESGIC) -66 MG per tablet Take 1 tablet by mouth every 6 hours as needed for Headaches 10/17/19   HENRIQUE Hagen DO   aspirin 81 MG tablet Take 1 tablet by mouth daily 7/26/19   Suresh Tate MD        CURRENT Medications:  No current facility-administered medications for this encounter. Allergies: Iv dye [iodides]           Review of Systems: SEE HPI   · Constitutional: no unanticipated weight loss. There's been no change in energy level, sleep pattern, or activity level. No fevers, chills. · Eyes: No visual changes or diplopia. No scleral icterus. · ENT: No Headaches, hearing loss or vertigo. No mouth sores or sore throat. · Cardiovascular: No Chest pain, tightness or discomfort.  No Shortness of breath. No Dyspnea on exertion, Orthopnea, Paroxysmal nocturnal dyspnea or breathlessness at rest.   No Palpitations.  No Syncope ('blackouts', 'faints', 'collapse') or dizziness. · Respiratory: No cough or wheezing, no sputum production. No hematemesis. · Gastrointestinal: No abdominal pain, appetite loss, blood in stools. No change in bowel or bladder habits. · Genitourinary: No dysuria, trouble voiding, or hematuria. · Musculoskeletal:  No gait disturbance, no joint complaints. · Integumentary: No rash or pruritis. · Neurological: No headache, diplopia, change in muscle strength, numbness or tingling. · Psychiatric: No anxiety or depression. · Endocrine: No temperature intolerance. No excessive thirst, fluid intake, or urination.  No 02/12/2021    PROT 7.1 02/12/2021    CALCIUM 9.7 02/12/2021    BILITOT <0.2 02/12/2021    ALKPHOS 50 02/12/2021    AST 24 02/12/2021    ALT 30 02/12/2021     No results found for: Satmetrix  Lab Results   Component Value Date    LABA1C 5.5 10/11/2019     Lab Results   Component Value Date    CKTOTAL 43 09/26/2019    TROPONINI <0.01 01/21/2021       Cardiac, Vascular and Imaging Data all Personally Reviewed in Detail by Myself      All imaging was reviewed    Assessment and Plan     -Atrial fibrillation status post left atrial appendage closure. Here for transesophageal echocardiogram.    ASA 2 Mallampati score 2. Thank you for allowing us to participate in the care of [place] [address]. Please do not hesitate to contact me if you have any questions.     Gardenia Castellanos MD, MPH    [address]   [address] [place] [address]  Ph: [phone]  Fax: [phone]    2/25/2021 4:56 PM

## 2021-03-16 ENCOUNTER — TELEPHONE (OUTPATIENT)
Dept: FAMILY MEDICINE CLINIC | Age: 76
End: 2021-03-16

## 2021-05-11 DIAGNOSIS — F41.9 ANXIETY: ICD-10-CM

## 2021-05-11 RX ORDER — LORAZEPAM 1 MG/1
TABLET ORAL
Qty: 30 TABLET | Refills: 0 | Status: CANCELLED | OUTPATIENT
Start: 2021-05-11 | End: 2021-06-10

## 2021-05-12 ENCOUNTER — OFFICE VISIT (OUTPATIENT)
Dept: PULMONOLOGY | Age: 76
End: 2021-05-12
Payer: MEDICARE

## 2021-05-12 VITALS
TEMPERATURE: 95.9 F | BODY MASS INDEX: 31.61 KG/M2 | DIASTOLIC BLOOD PRESSURE: 64 MMHG | HEIGHT: 60 IN | WEIGHT: 161 LBS | RESPIRATION RATE: 16 BRPM | OXYGEN SATURATION: 98 % | HEART RATE: 63 BPM | SYSTOLIC BLOOD PRESSURE: 138 MMHG

## 2021-05-12 DIAGNOSIS — J96.11 CHRONIC RESPIRATORY FAILURE WITH HYPOXIA (HCC): ICD-10-CM

## 2021-05-12 DIAGNOSIS — G47.33 OSA (OBSTRUCTIVE SLEEP APNEA): ICD-10-CM

## 2021-05-12 DIAGNOSIS — J43.2 CENTRILOBULAR EMPHYSEMA (HCC): Primary | ICD-10-CM

## 2021-05-12 PROCEDURE — 99214 OFFICE O/P EST MOD 30 MIN: CPT | Performed by: INTERNAL MEDICINE

## 2021-05-12 PROCEDURE — 1036F TOBACCO NON-USER: CPT | Performed by: INTERNAL MEDICINE

## 2021-05-12 PROCEDURE — G8399 PT W/DXA RESULTS DOCUMENT: HCPCS | Performed by: INTERNAL MEDICINE

## 2021-05-12 PROCEDURE — 1123F ACP DISCUSS/DSCN MKR DOCD: CPT | Performed by: INTERNAL MEDICINE

## 2021-05-12 PROCEDURE — 4040F PNEUMOC VAC/ADMIN/RCVD: CPT | Performed by: INTERNAL MEDICINE

## 2021-05-12 PROCEDURE — 1090F PRES/ABSN URINE INCON ASSESS: CPT | Performed by: INTERNAL MEDICINE

## 2021-05-12 PROCEDURE — G8417 CALC BMI ABV UP PARAM F/U: HCPCS | Performed by: INTERNAL MEDICINE

## 2021-05-12 PROCEDURE — 3017F COLORECTAL CA SCREEN DOC REV: CPT | Performed by: INTERNAL MEDICINE

## 2021-05-12 PROCEDURE — G8427 DOCREV CUR MEDS BY ELIG CLIN: HCPCS | Performed by: INTERNAL MEDICINE

## 2021-05-12 PROCEDURE — 3023F SPIROM DOC REV: CPT | Performed by: INTERNAL MEDICINE

## 2021-05-12 RX ORDER — BUDESONIDE AND FORMOTEROL FUMARATE DIHYDRATE 160; 4.5 UG/1; UG/1
2 AEROSOL RESPIRATORY (INHALATION) 2 TIMES DAILY
Qty: 1 INHALER | Refills: 5 | Status: SHIPPED | OUTPATIENT
Start: 2021-05-12

## 2021-05-12 RX ORDER — IPRATROPIUM BROMIDE AND ALBUTEROL SULFATE 2.5; .5 MG/3ML; MG/3ML
1 SOLUTION RESPIRATORY (INHALATION) EVERY 4 HOURS PRN
Qty: 360 ML | Refills: 5 | Status: SHIPPED | OUTPATIENT
Start: 2021-05-12

## 2021-05-12 RX ORDER — ALBUTEROL SULFATE 90 UG/1
2 AEROSOL, METERED RESPIRATORY (INHALATION) EVERY 4 HOURS PRN
Qty: 1 INHALER | Refills: 5 | Status: SHIPPED | OUTPATIENT
Start: 2021-05-12

## 2021-05-12 NOTE — PROGRESS NOTES
Dispense Refill    LORazepam (ATIVAN) 1 MG tablet TAKE ONE TABLET BY MOUTH DAILY AS NEEDED FOR ANXIETY 30 tablet 0    carvedilol (COREG) 12.5 MG tablet Take 1 tablet by mouth 2 times daily (with meals) 180 tablet 2    cloNIDine (CATAPRES) 0.2 MG tablet Take 1 tablet by mouth 3 times daily 270 tablet 1    clopidogrel (PLAVIX) 75 MG tablet Take 1 tablet by mouth daily 90 tablet 1    furosemide (LASIX) 40 MG tablet TAKE ONE TABLET BY MOUTH TWICE A  tablet 0    albuterol sulfate HFA (VENTOLIN HFA) 108 (90 Base) MCG/ACT inhaler Inhale 2 puffs into the lungs every 4 hours as needed for Wheezing or Shortness of Breath 1 Inhaler 5    budesonide-formoterol (SYMBICORT) 160-4.5 MCG/ACT AERO Inhale 2 puffs into the lungs 2 times daily 1 Inhaler 5    tiotropium (SPIRIVA RESPIMAT) 2.5 MCG/ACT AERS inhaler Inhale 2 puffs into the lungs daily 1 Inhaler 5    ondansetron (ZOFRAN) 4 MG tablet Take 1 tablet by mouth every 8 hours as needed for Nausea 30 tablet 0    amLODIPine (NORVASC) 10 MG tablet Take 1 tablet by mouth daily 30 tablet 3    lisinopril (PRINIVIL;ZESTRIL) 5 MG tablet Take 0.5 tablets by mouth daily 30 tablet 3    atorvastatin (LIPITOR) 40 MG tablet TAKE ONE TABLET BY MOUTH DAILY 90 tablet 1    montelukast (SINGULAIR) 10 MG tablet Take 1 tablet by mouth daily 90 tablet 0    lansoprazole (PREVACID) 15 MG delayed release capsule Take 1 capsule by mouth daily 90 capsule 0    citalopram (CELEXA) 40 MG tablet TAKE ONE TABLET BY MOUTH DAILY 90 tablet 0    levothyroxine (SYNTHROID) 50 MCG tablet TAKE ONE TABLET BY MOUTH DAILY 90 tablet 0    hydrALAZINE (APRESOLINE) 100 MG tablet TAKE ONE TABLET BY MOUTH THREE TIMES A  tablet 0    lidocaine (XYLOCAINE) 5 % ointment Apply topically as needed.  1 Tube 5    diclofenac sodium (VOLTAREN) 1 % GEL Apply 2 g topically 3 times daily as needed for Pain 100 g 5    lidocaine (XYLOCAINE) 2 % jelly Apply pea-sized amount topically to affected area every 8 hours when necessary pain 1 Tube 11    mineral oil-hydrophilic petrolatum (HYDROPHOR) ointment Apply topically as needed. 1 Tube 5    ipratropium-albuterol (DUONEB) 0.5-2.5 (3) MG/3ML SOLN nebulizer solution Inhale 3 mLs into the lungs every 4 hours as needed for Shortness of Breath 360 mL 5    fluticasone-vilanterol (BREO ELLIPTA) 100-25 MCG/INH AEPB inhaler Inhale 1 puff into the lungs daily 1 each 5    butalbital-acetaminophen-caffeine (FIORICET, ESGIC) -40 MG per tablet Take 1 tablet by mouth every 6 hours as needed for Headaches 16 tablet 0    aspirin 81 MG tablet Take 1 tablet by mouth daily 30 tablet 3     No current facility-administered medications for this visit.         Allergies   Allergen Reactions    Iv Dye [Iodides]      Flushed, broke out and difficulty breathing       Family History   Problem Relation Age of Onset    Heart Disease Mother     Heart Disease Father     Heart Disease Sister     Cancer Brother        Social History     Socioeconomic History    Marital status:      Spouse name: Not on file    Number of children: 6    Years of education: 15    Highest education level: Not on file   Occupational History    Not on file   Social Needs    Financial resource strain: Not on file    Food insecurity     Worry: Not on file     Inability: Not on file   [employer] needs     Medical: Not on file     Non-medical: Not on file   Tobacco Use    Smoking status: Former Smoker     Packs/day: 3.00     Years: 30.00     Pack years: 90.00     Start date: 1963     Quit date: 1992     Years since quittin.2    Smokeless tobacco: Never Used    Tobacco comment: QUIT 20 YRS AGO   Substance and Sexual Activity    Alcohol use: No     Alcohol/week: 0.0 standard drinks    Drug use: No    Sexual activity: Not Currently   Lifestyle    Physical activity     Days per week: Not on file     Minutes per session: Not on file    Stress: Not on file   Relationships    Social connections     Talks on phone: Not on file     Gets together: Not on file     Attends Mu-ism service: Not on file     Active member of club or organization: Not on file     Attends meetings of clubs or organizations: Not on file     Relationship status: Not on file    Intimate partner violence     Fear of current or ex partner: Not on file     Emotionally abused: Not on file     Physically abused: Not on file     Forced sexual activity: Not on file   Other Topics Concern    Not on file   Social History Narrative    Not on file       Immunization History   Administered Date(s) Administered    Influenza, High Dose (Fluzone 65 yrs and older) 12/16/2015, 01/17/2018    Influenza, Aldean Cheese, 6 mo and older, IM, PF (Flulaval, Fluarix) 09/24/2018    Influenza, Quadv, adjuvanted, 65 yrs +, IM, PF (Fluad) 11/11/2020    Influenza, Triv, inactivated, subunit, adjuvanted, IM (Fluad 65 yrs and older) 10/25/2019    Pneumococcal Conjugate 13-valent ([id number]) 01/17/2018    Pneumococcal Conjugate 7-valent (Delle Gaw) 12/30/2013       ROS:  GENERAL:  No fevers, no chills, +11lb weight loss in 6 months   RESPIRATORY:  + exertional shortness of breath, +intermittent dry cough      PHYSICAL EXAM:  Vitals:    05/12/21 1329   BP: 138/64   Site: Right Upper Arm   Position: Sitting   Cuff Size: Medium Adult   Pulse: 63   Resp: 16   Temp: 95.9 °F (35.5 °C)   TempSrc: Temporal   SpO2: 98%   Weight: 161 lb (73 kg)   Height: 5' (1.524 m)   on 2L NC    Gen: Well developed; well nourished  Eyes: No scleral icterus. No conjunctival injection. ENT:  Oropharynx clear. External appearance of ears and nose normal.  Neck: Trachea midline. No obvious mass. No visible thyroid enlargement    Respiratory: Clear to auscultation bilaterally, no accessory muscle use  Cardiovascular: Regular rate and rhythm, murmur at the right upper heart border. No edema. Gastrointestinal: Soft, non-tender. No hernia  Skin: Warm and dry.  No rashes or results found for: BNP    Lab Results   Component Value Date    CREATININE 1.5 (H) 02/12/2021    BUN 34 (H) 02/12/2021     (L) 02/12/2021    K 4.5 02/12/2021    CL 98 (L) 02/12/2021    CO2 28 02/12/2021         Assessment/Plan:70y.o. year old female presents for follow up. COPD- Stable. I have asked her to use Symbicort twice daily and continue Spiriva daily. Continue albuterol inhaler and DuoNebs as needed. She has been referred to pulmonary rehabilitation and plans to attend. Chronic hypoxic respiratory failure- Stable. She will continue 2 to 5 L of oxygen to maintain an oxygen saturation greater than 90%. ANGIE- Will refer to Dr. Deana Cruz. She is to return for follow-up in 4 months.        Mohsen Barlow MD  [facility] Pulmonology, Critical Care and Sleep

## 2021-05-12 NOTE — PATIENT INSTRUCTIONS
Please attend pulmonary rehab  Please see the doctor for your BiPAP    Please come for a follow up in 4 months

## 2021-05-14 ENCOUNTER — HOSPITAL ENCOUNTER (OUTPATIENT)
Dept: VASCULAR LAB | Age: 76
Discharge: HOME | End: 2021-05-14
Payer: MEDICARE

## 2021-05-14 DIAGNOSIS — R09.89 RIGHT CAROTID BRUIT: ICD-10-CM

## 2021-05-14 PROCEDURE — 93880 EXTRACRANIAL BILAT STUDY: CPT

## 2021-05-19 ENCOUNTER — HOSPITAL ENCOUNTER (OUTPATIENT)
Dept: GENERAL RADIOLOGY | Age: 76
Discharge: HOME | End: 2021-05-19
Payer: MEDICARE

## 2021-05-19 ENCOUNTER — OFFICE VISIT (OUTPATIENT)
Dept: FAMILY MEDICINE CLINIC | Age: 76
End: 2021-05-19
Payer: MEDICARE

## 2021-05-19 ENCOUNTER — HOSPITAL ENCOUNTER (OUTPATIENT)
Age: 76
Discharge: HOME | End: 2021-05-19
Payer: MEDICARE

## 2021-05-19 VITALS
RESPIRATION RATE: 16 BRPM | SYSTOLIC BLOOD PRESSURE: 152 MMHG | BODY MASS INDEX: 32.2 KG/M2 | TEMPERATURE: 97.9 F | OXYGEN SATURATION: 95 % | HEIGHT: 60 IN | WEIGHT: 164 LBS | DIASTOLIC BLOOD PRESSURE: 76 MMHG | HEART RATE: 78 BPM

## 2021-05-19 DIAGNOSIS — R53.83 FATIGUE, UNSPECIFIED TYPE: ICD-10-CM

## 2021-05-19 DIAGNOSIS — R30.0 DYSURIA: ICD-10-CM

## 2021-05-19 DIAGNOSIS — N30.01 ACUTE CYSTITIS WITH HEMATURIA: Primary | ICD-10-CM

## 2021-05-19 DIAGNOSIS — R06.02 SHORTNESS OF BREATH: ICD-10-CM

## 2021-05-19 LAB
A/G RATIO: 1.3 (ref 1.1–2.2)
ALBUMIN SERPL-MCNC: 3.9 G/DL (ref 3.4–5)
ALP BLD-CCNC: 49 U/L (ref 40–129)
ALT SERPL-CCNC: 6 U/L (ref 10–40)
ANION GAP SERPL CALCULATED.3IONS-SCNC: 14 MMOL/L (ref 3–16)
AST SERPL-CCNC: 15 U/L (ref 15–37)
BASOPHILS ABSOLUTE: 0.1 K/UL (ref 0–0.2)
BASOPHILS RELATIVE PERCENT: 0.4 %
BILIRUB SERPL-MCNC: 0.3 MG/DL (ref 0–1)
BILIRUBIN, POC: ABNORMAL
BLOOD URINE, POC: ABNORMAL
BUN BLDV-MCNC: 18 MG/DL (ref 7–20)
CALCIUM SERPL-MCNC: 9.7 MG/DL (ref 8.3–10.6)
CHLORIDE BLD-SCNC: 93 MMOL/L (ref 99–110)
CLARITY, POC: ABNORMAL
CO2: 35 MMOL/L (ref 21–32)
COLOR, POC: ABNORMAL
CREAT SERPL-MCNC: 1 MG/DL (ref 0.6–1.2)
EOSINOPHILS ABSOLUTE: 0.4 K/UL (ref 0–0.6)
EOSINOPHILS RELATIVE PERCENT: 3 %
GFR AFRICAN AMERICAN: >60
GFR NON-AFRICAN AMERICAN: 54
GLOBULIN: 2.9 G/DL
GLUCOSE BLD-MCNC: 52 MG/DL (ref 70–99)
GLUCOSE URINE, POC: ABNORMAL
HCT VFR BLD CALC: 28.7 % (ref 36–48)
HEMOGLOBIN: 8.6 G/DL (ref 12–16)
HYPOCHROMIA: ABNORMAL
KETONES, POC: ABNORMAL
LEUKOCYTE EST, POC: ABNORMAL
LYMPHOCYTES ABSOLUTE: 1.7 K/UL (ref 1–5.1)
LYMPHOCYTES RELATIVE PERCENT: 13.5 %
MCH RBC QN AUTO: 22.1 PG (ref 26–34)
MCHC RBC AUTO-ENTMCNC: 30.1 G/DL (ref 31–36)
MCV RBC AUTO: 73.5 FL (ref 80–100)
MONOCYTES ABSOLUTE: 1.2 K/UL (ref 0–1.3)
MONOCYTES RELATIVE PERCENT: 9.3 %
NEUTROPHILS ABSOLUTE: 9.3 K/UL (ref 1.7–7.7)
NEUTROPHILS RELATIVE PERCENT: 73.8 %
NITRITE, POC: ABNORMAL
OVALOCYTES: ABNORMAL
PDW BLD-RTO: 18.7 % (ref 12.4–15.4)
PH, POC: 5.5
PLATELET # BLD: 353 K/UL (ref 135–450)
PLATELET SLIDE REVIEW: ADEQUATE
PMV BLD AUTO: 8.8 FL (ref 5–10.5)
POIKILOCYTES: ABNORMAL
POTASSIUM SERPL-SCNC: 4.5 MMOL/L (ref 3.5–5.1)
PRO-BNP: 2257 PG/ML (ref 0–124)
PROTEIN, POC: ABNORMAL
RBC # BLD: 3.9 M/UL (ref 4–5.2)
SLIDE REVIEW: ABNORMAL
SODIUM BLD-SCNC: 142 MMOL/L (ref 136–145)
SPECIFIC GRAVITY, POC: 1.02
TOTAL PROTEIN: 6.8 G/DL (ref 6.4–8.2)
UROBILINOGEN, POC: 0.2
WBC # BLD: 12.7 K/UL (ref 4–11)

## 2021-05-19 PROCEDURE — G8427 DOCREV CUR MEDS BY ELIG CLIN: HCPCS | Performed by: FAMILY MEDICINE

## 2021-05-19 PROCEDURE — 3017F COLORECTAL CA SCREEN DOC REV: CPT | Performed by: FAMILY MEDICINE

## 2021-05-19 PROCEDURE — 99214 OFFICE O/P EST MOD 30 MIN: CPT | Performed by: FAMILY MEDICINE

## 2021-05-19 PROCEDURE — G8417 CALC BMI ABV UP PARAM F/U: HCPCS | Performed by: FAMILY MEDICINE

## 2021-05-19 PROCEDURE — 4040F PNEUMOC VAC/ADMIN/RCVD: CPT | Performed by: FAMILY MEDICINE

## 2021-05-19 PROCEDURE — G8399 PT W/DXA RESULTS DOCUMENT: HCPCS | Performed by: FAMILY MEDICINE

## 2021-05-19 PROCEDURE — 1036F TOBACCO NON-USER: CPT | Performed by: FAMILY MEDICINE

## 2021-05-19 PROCEDURE — 1090F PRES/ABSN URINE INCON ASSESS: CPT | Performed by: FAMILY MEDICINE

## 2021-05-19 PROCEDURE — 81002 URINALYSIS NONAUTO W/O SCOPE: CPT | Performed by: FAMILY MEDICINE

## 2021-05-19 PROCEDURE — 1123F ACP DISCUSS/DSCN MKR DOCD: CPT | Performed by: FAMILY MEDICINE

## 2021-05-19 PROCEDURE — 71046 X-RAY EXAM CHEST 2 VIEWS: CPT

## 2021-05-19 RX ORDER — CIPROFLOXACIN 500 MG/1
500 TABLET, FILM COATED ORAL DAILY
Qty: 7 TABLET | Refills: 0 | Status: SHIPPED | OUTPATIENT
Start: 2021-05-19 | End: 2021-05-26

## 2021-05-19 NOTE — PROGRESS NOTES
5/19/2021    This is a 70 y.o. female   Chief Complaint   Patient presents with    Dysuria     pain in back and stomach     HPI    Here for not feeling well  - she was recently in [place] visiting family and got back about 2 weeks ago. About a month ago, she [place] and was dx with a UTI and given antibiotics. Reports her symptoms have not improved  - still having suprapubic discomfort. No frequency. Minimal dysuria. She had blood in her urine up to 2 days ago    Shortness of breath  - chronic but worse than usual over the past 4 weeks or so. Walking to the bathroom and back at home will make her SOB. Hasn't noticed any leg swelling or fluid build up. Pillow orthopnea is chronic for years and now new.  Not coughing more than usual.     Review of Systems     Past Medical History:   Diagnosis Date    Arthritis     Atrial fibrillation ([address] 1/4/2021    CAD (coronary artery disease)     Nonobstructive on cath in 9/2017    CHF (congestive heart failure) ([address]     Colostomy care ([address] 4/25/2018    Peristomal irritation and early leaking    COPD (chronic obstructive pulmonary disease) (HCC)     Hyperlipidemia     Hypertension     Kidney disease     Stage 3    Peripheral vascular disease ([address]     Rectal fissure 11/2014    surgery pending    Restless legs syndrome     Sleep apnea     O2 3 liters at hs    Thyroid disease     Unspecified sleep apnea        Past Surgical History:   Procedure Laterality Date    ABDOMEN SURGERY      CARDIAC CATHETERIZATION  09/13/2017    Dr. Godwin Holly  03/09/2018    CORONARY ARTERY BYPASS GRAFT      Legs & Carotid    FRACTURE SURGERY      HERNIA REPAIR      UPPER GASTROINTESTINAL ENDOSCOPY N/A 9/30/2019    EGD DIAGNOSTIC ONLY performed by Cesar Fernandez MD at [address] [place] [address]         Family History   Problem Relation Age of Onset    Heart Disease Mother     Heart hydrALAZINE (APRESOLINE) 100 MG tablet TAKE ONE TABLET BY MOUTH THREE TIMES A  tablet 0    lidocaine (XYLOCAINE) 5 % ointment Apply topically as needed. 1 Tube 5    diclofenac sodium (VOLTAREN) 1 % GEL Apply 2 g topically 3 times daily as needed for Pain 100 g 5    lidocaine (XYLOCAINE) 2 % jelly Apply pea-sized amount topically to affected area every 8 hours when necessary pain 1 Tube 11    mineral oil-hydrophilic petrolatum (HYDROPHOR) ointment Apply topically as needed. 1 Tube 5    butalbital-acetaminophen-caffeine (FIORICET, ESGIC) -40 MG per tablet Take 1 tablet by mouth every 6 hours as needed for Headaches 16 tablet 0    aspirin 81 MG tablet Take 1 tablet by mouth daily 30 tablet 3     No current facility-administered medications for this visit. BP (!) 152/76   Pulse 78   Temp 97.9 °F (36.6 °C) (Oral)   Resp 16   Ht 5' (1.524 m)   Wt 164 lb (74.4 kg)   LMP 05/01/2006 (Approximate)   SpO2 95%   BMI 32.03 kg/m²     Physical Exam  HENT:      Nose: Nose normal.   Cardiovascular:      Rate and Rhythm: Normal rate and regular rhythm. Pulmonary:      Comments: On 3 L of supplemental oxygen  Moderate diffuse wheezing  No focal rales  Musculoskeletal:      Cervical back: Normal range of motion. Neurological:      Mental Status: She is alert. Wt Readings from Last 3 Encounters:   05/19/21 164 lb (74.4 kg)   05/12/21 161 lb (73 kg)   02/24/21 162 lb (73.5 kg)       BP Readings from Last 3 Encounters:   05/19/21 (!) 152/76   05/12/21 138/64   02/24/21 138/70         Assessment/Plan:  1. Acute cystitis with hematuria  - ciprofloxacin (CIPRO) 500 MG tablet; Take 1 tablet by mouth daily for 7 days  Dispense: 7 tablet; Refill: 0  - Culture, Urine    2. Dysuria  - POCT Urinalysis no Micro    3. Shortness of breath  - CBC Auto Differential; Future  - Comprehensive Metabolic Panel; Future  - BRAIN NATRIURETIC PEPTIDE (BNP); Future  - XR CHEST (2 VW); Future    4.  Fatigue, unspecified type  - CBC Auto Differential; Future  - Comprehensive Metabolic Panel; Future    Alisha [place], her symptoms seem to be returning. Since we do not have records we will treat with more broad-spectrum antibiotic that will also cover potential for lung infection as well. On results, her blood work is notable for a significantly elevated BNP. She will increase Lasix dose by 50% for 5 days along with potassium and notify us of any changes in her symptoms. Her hemoglobin remains significantly low. She is on aspirin and Plavix and has a history of GI bleeds in the past.  However, it is not a significant decrease from prior.   She would benefit from iron transfusions and referral was placed to hematology      Follow-up closely in 2 to 3 weeks, sooner for worsening symptoms

## 2021-05-20 ENCOUNTER — TELEPHONE (OUTPATIENT)
Dept: FAMILY MEDICINE CLINIC | Age: 76
End: 2021-05-20

## 2021-05-20 DIAGNOSIS — D50.9 IRON DEFICIENCY ANEMIA, UNSPECIFIED IRON DEFICIENCY ANEMIA TYPE: Primary | ICD-10-CM

## 2021-05-20 LAB — URINE CULTURE, ROUTINE: NORMAL

## 2021-05-20 NOTE — TELEPHONE ENCOUNTER
[facility]  They would order iron infusion for her and help manage it   Thanks.

## 2021-05-20 NOTE — TELEPHONE ENCOUNTER
Called pt  She agrees to double Lasix and Potassium  Does not see hematologist. Will need referral placed  Would you like to proceed w ordering Iron Infusion?   I can call infusion center to see how to initiate this  Please advise

## 2021-05-20 NOTE — TELEPHONE ENCOUNTER
Please let Maribel Richie know that her blood work showed signs of extra fluid buildup. I would like for her to take 1 extra Lasix pill with 1 extra potassium pill for the next 5 days. She remains very anemic and I want to ensure she has follow-up with her hematologist as she will likely need an iron infusion.     I would also like for her to follow-up in 2 weeks in the office so we can ensure she is improving, her breathing is better, and can recheck blood work
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
## 2021-05-28 RX ORDER — CITALOPRAM 40 MG/1
TABLET ORAL
Qty: 90 TABLET | Refills: 0 | Status: SHIPPED | OUTPATIENT
Start: 2021-05-28 | End: 2021-09-08

## 2021-05-29 ENCOUNTER — HOSPITAL ENCOUNTER (INPATIENT)
Age: 76
LOS: 1 days | Discharge: HOME | DRG: 305 | End: 2021-05-31
Attending: EMERGENCY MEDICINE | Admitting: INTERNAL MEDICINE
Payer: MEDICARE

## 2021-05-29 ENCOUNTER — APPOINTMENT (OUTPATIENT)
Dept: GENERAL RADIOLOGY | Age: 76
DRG: 305 | End: 2021-05-29
Payer: MEDICARE

## 2021-05-29 ENCOUNTER — APPOINTMENT (OUTPATIENT)
Dept: CT IMAGING | Age: 76
DRG: 305 | End: 2021-05-29
Payer: MEDICARE

## 2021-05-29 DIAGNOSIS — R79.89 ELEVATED BRAIN NATRIURETIC PEPTIDE (BNP) LEVEL: ICD-10-CM

## 2021-05-29 DIAGNOSIS — I10 ESSENTIAL HYPERTENSION: ICD-10-CM

## 2021-05-29 DIAGNOSIS — J81.0 ACUTE PULMONARY EDEMA (HCC): ICD-10-CM

## 2021-05-29 DIAGNOSIS — I16.0 HYPERTENSIVE URGENCY: Primary | ICD-10-CM

## 2021-05-29 DIAGNOSIS — R77.8 ELEVATED TROPONIN: ICD-10-CM

## 2021-05-29 DIAGNOSIS — H11.31 SUBCONJUNCTIVAL HEMORRHAGE OF RIGHT EYE: ICD-10-CM

## 2021-05-29 DIAGNOSIS — N17.9 AKI (ACUTE KIDNEY INJURY) (HCC): ICD-10-CM

## 2021-05-29 LAB
BASOPHILS ABSOLUTE: 0.1 K/UL (ref 0–0.2)
BASOPHILS RELATIVE PERCENT: 0.5 %
EOSINOPHILS ABSOLUTE: 0 K/UL (ref 0–0.6)
EOSINOPHILS RELATIVE PERCENT: 0.2 %
HCT VFR BLD CALC: 28.9 % (ref 36–48)
HEMOGLOBIN: 9.1 G/DL (ref 12–16)
LYMPHOCYTES ABSOLUTE: 1.5 K/UL (ref 1–5.1)
LYMPHOCYTES RELATIVE PERCENT: 10.2 %
MCH RBC QN AUTO: 22.6 PG (ref 26–34)
MCHC RBC AUTO-ENTMCNC: 31.4 G/DL (ref 31–36)
MCV RBC AUTO: 72 FL (ref 80–100)
MONOCYTES ABSOLUTE: 0.9 K/UL (ref 0–1.3)
MONOCYTES RELATIVE PERCENT: 6.3 %
NEUTROPHILS ABSOLUTE: 12.1 K/UL (ref 1.7–7.7)
NEUTROPHILS RELATIVE PERCENT: 82.8 %
PDW BLD-RTO: 19.3 % (ref 12.4–15.4)
PLATELET # BLD: 435 K/UL (ref 135–450)
PMV BLD AUTO: 8.1 FL (ref 5–10.5)
RBC # BLD: 4.01 M/UL (ref 4–5.2)
WBC # BLD: 14.6 K/UL (ref 4–11)

## 2021-05-29 PROCEDURE — 36415 COLL VENOUS BLD VENIPUNCTURE: CPT

## 2021-05-29 PROCEDURE — 85025 COMPLETE CBC W/AUTO DIFF WBC: CPT

## 2021-05-29 PROCEDURE — 70450 CT HEAD/BRAIN W/O DYE: CPT

## 2021-05-29 PROCEDURE — 80053 COMPREHEN METABOLIC PANEL: CPT

## 2021-05-29 PROCEDURE — 71045 X-RAY EXAM CHEST 1 VIEW: CPT

## 2021-05-29 PROCEDURE — 83880 ASSAY OF NATRIURETIC PEPTIDE: CPT

## 2021-05-29 PROCEDURE — 84484 ASSAY OF TROPONIN QUANT: CPT

## 2021-05-29 PROCEDURE — 83735 ASSAY OF MAGNESIUM: CPT

## 2021-05-29 PROCEDURE — 93005 ELECTROCARDIOGRAM TRACING: CPT | Performed by: NURSE PRACTITIONER

## 2021-05-29 ASSESSMENT — PAIN DESCRIPTION - PAIN TYPE: TYPE: ACUTE PAIN

## 2021-05-29 ASSESSMENT — PAIN - FUNCTIONAL ASSESSMENT: PAIN_FUNCTIONAL_ASSESSMENT: ACTIVITIES ARE NOT PREVENTED

## 2021-05-29 ASSESSMENT — PAIN DESCRIPTION - DESCRIPTORS: DESCRIPTORS: SHARP

## 2021-05-29 ASSESSMENT — PAIN DESCRIPTION - LOCATION: LOCATION: HEAD

## 2021-05-29 ASSESSMENT — PAIN SCALES - GENERAL: PAINLEVEL_OUTOF10: 7

## 2021-05-30 ENCOUNTER — APPOINTMENT (OUTPATIENT)
Dept: CT IMAGING | Age: 76
DRG: 305 | End: 2021-05-30
Payer: MEDICARE

## 2021-05-30 PROBLEM — I16.0 HYPERTENSIVE URGENCY: Status: ACTIVE | Noted: 2021-05-30

## 2021-05-30 PROBLEM — R51.9 HEADACHE: Status: ACTIVE | Noted: 2021-05-30

## 2021-05-30 PROBLEM — N30.00 ACUTE CYSTITIS WITHOUT HEMATURIA: Status: ACTIVE | Noted: 2021-05-30

## 2021-05-30 PROBLEM — I10 ESSENTIAL HYPERTENSION: Status: ACTIVE | Noted: 2021-05-30

## 2021-05-30 LAB
A/G RATIO: 1.1 (ref 1.1–2.2)
ALBUMIN SERPL-MCNC: 3.9 G/DL (ref 3.4–5)
ALP BLD-CCNC: 45 U/L (ref 40–129)
ALT SERPL-CCNC: 8 U/L (ref 10–40)
ANION GAP SERPL CALCULATED.3IONS-SCNC: 10 MMOL/L (ref 3–16)
ANION GAP SERPL CALCULATED.3IONS-SCNC: 13 MMOL/L (ref 3–16)
AST SERPL-CCNC: 19 U/L (ref 15–37)
BASOPHILS ABSOLUTE: 0.1 K/UL (ref 0–0.2)
BASOPHILS RELATIVE PERCENT: 0.7 %
BILIRUB SERPL-MCNC: <0.2 MG/DL (ref 0–1)
BILIRUBIN URINE: NEGATIVE
BLOOD, URINE: NEGATIVE
BUN BLDV-MCNC: 24 MG/DL (ref 7–20)
BUN BLDV-MCNC: 25 MG/DL (ref 7–20)
CALCIUM SERPL-MCNC: 9.2 MG/DL (ref 8.3–10.6)
CALCIUM SERPL-MCNC: 9.7 MG/DL (ref 8.3–10.6)
CHLORIDE BLD-SCNC: 97 MMOL/L (ref 99–110)
CHLORIDE BLD-SCNC: 97 MMOL/L (ref 99–110)
CLARITY: ABNORMAL
CO2: 33 MMOL/L (ref 21–32)
CO2: 33 MMOL/L (ref 21–32)
COLOR: YELLOW
CREAT SERPL-MCNC: 1.3 MG/DL (ref 0.6–1.2)
CREAT SERPL-MCNC: 1.6 MG/DL (ref 0.6–1.2)
EKG ATRIAL RATE: 69 BPM
EKG DIAGNOSIS: NORMAL
EKG P AXIS: 37 DEGREES
EKG P-R INTERVAL: 126 MS
EKG Q-T INTERVAL: 458 MS
EKG QRS DURATION: 84 MS
EKG QTC CALCULATION (BAZETT): 490 MS
EKG R AXIS: 38 DEGREES
EKG T AXIS: 150 DEGREES
EKG VENTRICULAR RATE: 69 BPM
EOSINOPHILS ABSOLUTE: 0.1 K/UL (ref 0–0.6)
EOSINOPHILS RELATIVE PERCENT: 0.6 %
EPITHELIAL CELLS, UA: 0 /HPF (ref 0–5)
GFR AFRICAN AMERICAN: 38
GFR AFRICAN AMERICAN: 48
GFR NON-AFRICAN AMERICAN: 32
GFR NON-AFRICAN AMERICAN: 40
GLOBULIN: 3.4 G/DL
GLUCOSE BLD-MCNC: 138 MG/DL (ref 70–99)
GLUCOSE BLD-MCNC: 95 MG/DL (ref 70–99)
GLUCOSE URINE: NEGATIVE MG/DL
HCT VFR BLD CALC: 26.7 % (ref 36–48)
HEMOGLOBIN: 8.3 G/DL (ref 12–16)
HYALINE CASTS: 3 /LPF (ref 0–8)
KETONES, URINE: NEGATIVE MG/DL
LEUKOCYTE ESTERASE, URINE: ABNORMAL
LYMPHOCYTES ABSOLUTE: 2 K/UL (ref 1–5.1)
LYMPHOCYTES RELATIVE PERCENT: 15 %
MAGNESIUM: 2.1 MG/DL (ref 1.8–2.4)
MAGNESIUM: 2.1 MG/DL (ref 1.8–2.4)
MCH RBC QN AUTO: 22.7 PG (ref 26–34)
MCHC RBC AUTO-ENTMCNC: 31.2 G/DL (ref 31–36)
MCV RBC AUTO: 72.7 FL (ref 80–100)
MICROSCOPIC EXAMINATION: YES
MONOCYTES ABSOLUTE: 1.2 K/UL (ref 0–1.3)
MONOCYTES RELATIVE PERCENT: 8.8 %
NEUTROPHILS ABSOLUTE: 10.1 K/UL (ref 1.7–7.7)
NEUTROPHILS RELATIVE PERCENT: 74.9 %
NITRITE, URINE: NEGATIVE
PDW BLD-RTO: 18.9 % (ref 12.4–15.4)
PH UA: 5 (ref 5–8)
PLATELET # BLD: 390 K/UL (ref 135–450)
PMV BLD AUTO: 7.8 FL (ref 5–10.5)
POTASSIUM REFLEX MAGNESIUM: 3.2 MMOL/L (ref 3.5–5.1)
POTASSIUM REFLEX MAGNESIUM: 3.4 MMOL/L (ref 3.5–5.1)
PRO-BNP: 3420 PG/ML (ref 0–124)
PROTEIN UA: NEGATIVE MG/DL
RBC # BLD: 3.67 M/UL (ref 4–5.2)
RBC UA: 10 /HPF (ref 0–4)
SODIUM BLD-SCNC: 140 MMOL/L (ref 136–145)
SODIUM BLD-SCNC: 143 MMOL/L (ref 136–145)
SPECIFIC GRAVITY UA: 1.02 (ref 1–1.03)
T4 FREE: 1.2 NG/DL (ref 0.9–1.8)
TOTAL PROTEIN: 7.3 G/DL (ref 6.4–8.2)
TROPONIN: 0.03 NG/ML
TROPONIN: 0.04 NG/ML
TROPONIN: 0.06 NG/ML
TROPONIN: 0.06 NG/ML
TSH SERPL DL<=0.05 MIU/L-ACNC: 2.59 UIU/ML (ref 0.27–4.2)
URINE REFLEX TO CULTURE: YES
URINE TYPE: ABNORMAL
UROBILINOGEN, URINE: 0.2 E.U./DL
WBC # BLD: 13.5 K/UL (ref 4–11)
WBC UA: 118 /HPF (ref 0–5)

## 2021-05-30 PROCEDURE — 93010 ELECTROCARDIOGRAM REPORT: CPT | Performed by: INTERNAL MEDICINE

## 2021-05-30 PROCEDURE — 2580000003 HC RX 258: Performed by: INTERNAL MEDICINE

## 2021-05-30 PROCEDURE — 99285 EMERGENCY DEPT VISIT HI MDM: CPT

## 2021-05-30 PROCEDURE — 81001 URINALYSIS AUTO W/SCOPE: CPT

## 2021-05-30 PROCEDURE — 6370000000 HC RX 637 (ALT 250 FOR IP): Performed by: INTERNAL MEDICINE

## 2021-05-30 PROCEDURE — 6360000002 HC RX W HCPCS: Performed by: EMERGENCY MEDICINE

## 2021-05-30 PROCEDURE — 94640 AIRWAY INHALATION TREATMENT: CPT

## 2021-05-30 PROCEDURE — 94760 N-INVAS EAR/PLS OXIMETRY 1: CPT

## 2021-05-30 PROCEDURE — 6360000004 HC RX CONTRAST MEDICATION: Performed by: INTERNAL MEDICINE

## 2021-05-30 PROCEDURE — 1200000000 HC SEMI PRIVATE

## 2021-05-30 PROCEDURE — 83735 ASSAY OF MAGNESIUM: CPT

## 2021-05-30 PROCEDURE — 2500000003 HC RX 250 WO HCPCS: Performed by: INTERNAL MEDICINE

## 2021-05-30 PROCEDURE — 85025 COMPLETE CBC W/AUTO DIFF WBC: CPT

## 2021-05-30 PROCEDURE — 87086 URINE CULTURE/COLONY COUNT: CPT

## 2021-05-30 PROCEDURE — 2580000003 HC RX 258: Performed by: EMERGENCY MEDICINE

## 2021-05-30 PROCEDURE — 84439 ASSAY OF FREE THYROXINE: CPT

## 2021-05-30 PROCEDURE — 80048 BASIC METABOLIC PNL TOTAL CA: CPT

## 2021-05-30 PROCEDURE — 6360000002 HC RX W HCPCS: Performed by: INTERNAL MEDICINE

## 2021-05-30 PROCEDURE — 2700000000 HC OXYGEN THERAPY PER DAY

## 2021-05-30 PROCEDURE — 74176 CT ABD & PELVIS W/O CONTRAST: CPT

## 2021-05-30 PROCEDURE — 84484 ASSAY OF TROPONIN QUANT: CPT

## 2021-05-30 PROCEDURE — 36415 COLL VENOUS BLD VENIPUNCTURE: CPT

## 2021-05-30 PROCEDURE — 84443 ASSAY THYROID STIM HORMONE: CPT

## 2021-05-30 PROCEDURE — 6370000000 HC RX 637 (ALT 250 FOR IP): Performed by: NURSE PRACTITIONER

## 2021-05-30 RX ORDER — ATORVASTATIN CALCIUM 40 MG/1
40 TABLET, FILM COATED ORAL DAILY
Status: DISCONTINUED | OUTPATIENT
Start: 2021-05-30 | End: 2021-05-31 | Stop reason: HOSPADM

## 2021-05-30 RX ORDER — POLYETHYLENE GLYCOL 3350 17 G/17G
17 POWDER, FOR SOLUTION ORAL DAILY PRN
Status: DISCONTINUED | OUTPATIENT
Start: 2021-05-30 | End: 2021-05-31 | Stop reason: HOSPADM

## 2021-05-30 RX ORDER — ACETAMINOPHEN 325 MG/1
650 TABLET ORAL EVERY 4 HOURS PRN
Status: DISCONTINUED | OUTPATIENT
Start: 2021-05-30 | End: 2021-05-31 | Stop reason: HOSPADM

## 2021-05-30 RX ORDER — BUDESONIDE AND FORMOTEROL FUMARATE DIHYDRATE 160; 4.5 UG/1; UG/1
2 AEROSOL RESPIRATORY (INHALATION) 2 TIMES DAILY
Status: DISCONTINUED | OUTPATIENT
Start: 2021-05-30 | End: 2021-05-31 | Stop reason: HOSPADM

## 2021-05-30 RX ORDER — IPRATROPIUM BROMIDE AND ALBUTEROL SULFATE 2.5; .5 MG/3ML; MG/3ML
1 SOLUTION RESPIRATORY (INHALATION) EVERY 4 HOURS PRN
Status: DISCONTINUED | OUTPATIENT
Start: 2021-05-30 | End: 2021-05-31 | Stop reason: HOSPADM

## 2021-05-30 RX ORDER — POTASSIUM CHLORIDE 20 MEQ/1
40 TABLET, EXTENDED RELEASE ORAL ONCE
Status: COMPLETED | OUTPATIENT
Start: 2021-05-30 | End: 2021-05-30

## 2021-05-30 RX ORDER — FUROSEMIDE 40 MG/1
40 TABLET ORAL 2 TIMES DAILY
Status: DISCONTINUED | OUTPATIENT
Start: 2021-05-30 | End: 2021-05-30

## 2021-05-30 RX ORDER — CARVEDILOL 6.25 MG/1
12.5 TABLET ORAL ONCE
Status: COMPLETED | OUTPATIENT
Start: 2021-05-30 | End: 2021-05-30

## 2021-05-30 RX ORDER — ACETAMINOPHEN 500 MG
1000 TABLET ORAL ONCE
Status: COMPLETED | OUTPATIENT
Start: 2021-05-30 | End: 2021-05-30

## 2021-05-30 RX ORDER — ACETAMINOPHEN, ASPIRIN AND CAFFEINE 250; 250; 65 MG/1; MG/1; MG/1
2 TABLET, FILM COATED ORAL EVERY 6 HOURS PRN
Status: DISCONTINUED | OUTPATIENT
Start: 2021-05-30 | End: 2021-05-31 | Stop reason: HOSPADM

## 2021-05-30 RX ORDER — CLOPIDOGREL BISULFATE 75 MG/1
75 TABLET ORAL DAILY
Status: DISCONTINUED | OUTPATIENT
Start: 2021-05-30 | End: 2021-05-31 | Stop reason: HOSPADM

## 2021-05-30 RX ORDER — LORAZEPAM 1 MG/1
1 TABLET ORAL DAILY PRN
Status: DISCONTINUED | OUTPATIENT
Start: 2021-05-30 | End: 2021-05-31 | Stop reason: HOSPADM

## 2021-05-30 RX ORDER — CLONIDINE HYDROCHLORIDE 0.1 MG/1
0.2 TABLET ORAL ONCE
Status: COMPLETED | OUTPATIENT
Start: 2021-05-30 | End: 2021-05-30

## 2021-05-30 RX ORDER — HYDRALAZINE HYDROCHLORIDE 25 MG/1
100 TABLET, FILM COATED ORAL ONCE
Status: COMPLETED | OUTPATIENT
Start: 2021-05-30 | End: 2021-05-30

## 2021-05-30 RX ORDER — ALBUTEROL SULFATE 2.5 MG/3ML
2.5 SOLUTION RESPIRATORY (INHALATION) EVERY 6 HOURS PRN
Status: DISCONTINUED | OUTPATIENT
Start: 2021-05-30 | End: 2021-05-31 | Stop reason: HOSPADM

## 2021-05-30 RX ORDER — AMLODIPINE BESYLATE 10 MG/1
10 TABLET ORAL DAILY
Status: DISCONTINUED | OUTPATIENT
Start: 2021-05-30 | End: 2021-05-31 | Stop reason: HOSPADM

## 2021-05-30 RX ORDER — HYDRALAZINE HYDROCHLORIDE 20 MG/ML
5 INJECTION INTRAMUSCULAR; INTRAVENOUS EVERY 4 HOURS PRN
Status: DISCONTINUED | OUTPATIENT
Start: 2021-05-30 | End: 2021-05-31 | Stop reason: HOSPADM

## 2021-05-30 RX ORDER — CITALOPRAM 20 MG/1
40 TABLET ORAL DAILY
Status: DISCONTINUED | OUTPATIENT
Start: 2021-05-30 | End: 2021-05-31 | Stop reason: HOSPADM

## 2021-05-30 RX ORDER — BUTALBITAL, ACETAMINOPHEN AND CAFFEINE 50; 325; 40 MG/1; MG/1; MG/1
1 TABLET ORAL EVERY 6 HOURS PRN
Status: DISCONTINUED | OUTPATIENT
Start: 2021-05-30 | End: 2021-05-31 | Stop reason: HOSPADM

## 2021-05-30 RX ORDER — ACETAMINOPHEN 650 MG/1
650 SUPPOSITORY RECTAL EVERY 4 HOURS PRN
Status: DISCONTINUED | OUTPATIENT
Start: 2021-05-30 | End: 2021-05-31 | Stop reason: HOSPADM

## 2021-05-30 RX ORDER — PANTOPRAZOLE SODIUM 40 MG/1
40 TABLET, DELAYED RELEASE ORAL
Status: DISCONTINUED | OUTPATIENT
Start: 2021-05-30 | End: 2021-05-31 | Stop reason: HOSPADM

## 2021-05-30 RX ORDER — SODIUM CHLORIDE 9 MG/ML
INJECTION, SOLUTION INTRAVENOUS CONTINUOUS
Status: DISCONTINUED | OUTPATIENT
Start: 2021-05-30 | End: 2021-05-31 | Stop reason: HOSPADM

## 2021-05-30 RX ORDER — CARVEDILOL 12.5 MG/1
12.5 TABLET ORAL 2 TIMES DAILY WITH MEALS
Status: DISCONTINUED | OUTPATIENT
Start: 2021-05-30 | End: 2021-05-31 | Stop reason: HOSPADM

## 2021-05-30 RX ORDER — LEVOTHYROXINE SODIUM 0.05 MG/1
50 TABLET ORAL DAILY
Status: DISCONTINUED | OUTPATIENT
Start: 2021-05-30 | End: 2021-05-31 | Stop reason: HOSPADM

## 2021-05-30 RX ORDER — ONDANSETRON 2 MG/ML
4 INJECTION INTRAMUSCULAR; INTRAVENOUS EVERY 4 HOURS PRN
Status: DISCONTINUED | OUTPATIENT
Start: 2021-05-30 | End: 2021-05-31 | Stop reason: HOSPADM

## 2021-05-30 RX ORDER — SODIUM CHLORIDE 9 MG/ML
25 INJECTION, SOLUTION INTRAVENOUS PRN
Status: DISCONTINUED | OUTPATIENT
Start: 2021-05-30 | End: 2021-05-31 | Stop reason: HOSPADM

## 2021-05-30 RX ORDER — POTASSIUM CHLORIDE 20 MEQ/1
20 TABLET, EXTENDED RELEASE ORAL ONCE
Status: COMPLETED | OUTPATIENT
Start: 2021-05-30 | End: 2021-05-30

## 2021-05-30 RX ORDER — ASPIRIN 81 MG/1
81 TABLET, CHEWABLE ORAL DAILY
Status: DISCONTINUED | OUTPATIENT
Start: 2021-05-30 | End: 2021-05-31 | Stop reason: HOSPADM

## 2021-05-30 RX ORDER — LISINOPRIL 5 MG/1
2.5 TABLET ORAL DAILY
Status: DISCONTINUED | OUTPATIENT
Start: 2021-05-30 | End: 2021-05-30

## 2021-05-30 RX ORDER — HYDRALAZINE HYDROCHLORIDE 50 MG/1
100 TABLET, FILM COATED ORAL
Status: DISCONTINUED | OUTPATIENT
Start: 2021-05-30 | End: 2021-05-31 | Stop reason: HOSPADM

## 2021-05-30 RX ORDER — SODIUM CHLORIDE 0.9 % (FLUSH) 0.9 %
10 SYRINGE (ML) INJECTION PRN
Status: DISCONTINUED | OUTPATIENT
Start: 2021-05-30 | End: 2021-05-31 | Stop reason: HOSPADM

## 2021-05-30 RX ORDER — MONTELUKAST SODIUM 10 MG/1
10 TABLET ORAL DAILY
Status: DISCONTINUED | OUTPATIENT
Start: 2021-05-30 | End: 2021-05-31 | Stop reason: HOSPADM

## 2021-05-30 RX ORDER — SODIUM CHLORIDE 0.9 % (FLUSH) 0.9 %
10 SYRINGE (ML) INJECTION EVERY 12 HOURS SCHEDULED
Status: DISCONTINUED | OUTPATIENT
Start: 2021-05-30 | End: 2021-05-31 | Stop reason: HOSPADM

## 2021-05-30 RX ORDER — CLONIDINE HYDROCHLORIDE 0.1 MG/1
0.2 TABLET ORAL 3 TIMES DAILY
Status: DISCONTINUED | OUTPATIENT
Start: 2021-05-30 | End: 2021-05-31 | Stop reason: HOSPADM

## 2021-05-30 RX ORDER — ENALAPRILAT 2.5 MG/2ML
1.25 INJECTION INTRAVENOUS EVERY 6 HOURS PRN
Status: DISCONTINUED | OUTPATIENT
Start: 2021-05-30 | End: 2021-05-30

## 2021-05-30 RX ADMIN — CEFTRIAXONE 1000 MG: 1 INJECTION, POWDER, FOR SOLUTION INTRAMUSCULAR; INTRAVENOUS at 04:03

## 2021-05-30 RX ADMIN — SODIUM CHLORIDE: 9 INJECTION, SOLUTION INTRAVENOUS at 14:59

## 2021-05-30 RX ADMIN — BUDESONIDE AND FORMOTEROL FUMARATE DIHYDRATE 2 PUFF: 160; 4.5 AEROSOL RESPIRATORY (INHALATION) at 09:00

## 2021-05-30 RX ADMIN — PANTOPRAZOLE SODIUM 40 MG: 40 TABLET, DELAYED RELEASE ORAL at 08:48

## 2021-05-30 RX ADMIN — CLONIDINE HYDROCHLORIDE 0.2 MG: 0.1 TABLET ORAL at 14:59

## 2021-05-30 RX ADMIN — Medication 10 ML: at 08:51

## 2021-05-30 RX ADMIN — AMLODIPINE BESYLATE 10 MG: 10 TABLET ORAL at 08:48

## 2021-05-30 RX ADMIN — ENOXAPARIN SODIUM 40 MG: 40 INJECTION SUBCUTANEOUS at 08:51

## 2021-05-30 RX ADMIN — CLONIDINE HYDROCHLORIDE 0.2 MG: 0.1 TABLET ORAL at 08:48

## 2021-05-30 RX ADMIN — CLONIDINE HYDROCHLORIDE 0.2 MG: 0.1 TABLET ORAL at 01:17

## 2021-05-30 RX ADMIN — CLONIDINE HYDROCHLORIDE 0.2 MG: 0.1 TABLET ORAL at 20:54

## 2021-05-30 RX ADMIN — ASPIRIN 81 MG: 81 TABLET, CHEWABLE ORAL at 08:48

## 2021-05-30 RX ADMIN — CITALOPRAM HYDROBROMIDE 40 MG: 20 TABLET ORAL at 08:49

## 2021-05-30 RX ADMIN — HYDRALAZINE HYDROCHLORIDE 100 MG: 50 TABLET, FILM COATED ORAL at 17:21

## 2021-05-30 RX ADMIN — CLOPIDOGREL BISULFATE 75 MG: 75 TABLET ORAL at 08:48

## 2021-05-30 RX ADMIN — LISINOPRIL 2.5 MG: 5 TABLET ORAL at 08:49

## 2021-05-30 RX ADMIN — BUDESONIDE AND FORMOTEROL FUMARATE DIHYDRATE 2 PUFF: 160; 4.5 AEROSOL RESPIRATORY (INHALATION) at 20:32

## 2021-05-30 RX ADMIN — MONTELUKAST 10 MG: 10 TABLET, FILM COATED ORAL at 08:48

## 2021-05-30 RX ADMIN — LEVOTHYROXINE SODIUM 50 MCG: 0.05 TABLET ORAL at 06:06

## 2021-05-30 RX ADMIN — POTASSIUM CHLORIDE 40 MEQ: 1500 TABLET, EXTENDED RELEASE ORAL at 12:00

## 2021-05-30 RX ADMIN — IOHEXOL 50 ML: 240 INJECTION, SOLUTION INTRATHECAL; INTRAVASCULAR; INTRAVENOUS; ORAL at 13:29

## 2021-05-30 RX ADMIN — ENALAPRILAT 1.25 MG: 1.25 INJECTION INTRAVENOUS at 04:43

## 2021-05-30 RX ADMIN — CARVEDILOL 12.5 MG: 12.5 TABLET, FILM COATED ORAL at 08:48

## 2021-05-30 RX ADMIN — FUROSEMIDE 40 MG: 40 TABLET ORAL at 08:48

## 2021-05-30 RX ADMIN — CARVEDILOL 12.5 MG: 12.5 TABLET, FILM COATED ORAL at 17:21

## 2021-05-30 RX ADMIN — CARVEDILOL 12.5 MG: 6.25 TABLET, FILM COATED ORAL at 01:17

## 2021-05-30 RX ADMIN — HYDRALAZINE HYDROCHLORIDE 100 MG: 50 TABLET, FILM COATED ORAL at 08:49

## 2021-05-30 RX ADMIN — HYDRALAZINE HYDROCHLORIDE 100 MG: 50 TABLET, FILM COATED ORAL at 12:00

## 2021-05-30 RX ADMIN — ATORVASTATIN CALCIUM 40 MG: 40 TABLET, FILM COATED ORAL at 08:48

## 2021-05-30 RX ADMIN — HYDRALAZINE HYDROCHLORIDE 100 MG: 25 TABLET, FILM COATED ORAL at 01:17

## 2021-05-30 RX ADMIN — TIOTROPIUM BROMIDE INHALATION SPRAY 2 PUFF: 3.12 SPRAY, METERED RESPIRATORY (INHALATION) at 09:00

## 2021-05-30 RX ADMIN — ACETAMINOPHEN 1000 MG: 500 TABLET ORAL at 02:07

## 2021-05-30 RX ADMIN — POTASSIUM CHLORIDE 20 MEQ: 1500 TABLET, EXTENDED RELEASE ORAL at 14:59

## 2021-05-30 ASSESSMENT — ENCOUNTER SYMPTOMS
VOMITING: 0
ABDOMINAL PAIN: 0
CONSTIPATION: 0
NAUSEA: 0
BLOOD IN STOOL: 0
ABDOMINAL DISTENTION: 0
SHORTNESS OF BREATH: 1
COUGH: 0
DIARRHEA: 0
COLOR CHANGE: 0
EYE REDNESS: 1

## 2021-05-30 ASSESSMENT — PAIN SCALES - GENERAL
PAINLEVEL_OUTOF10: 7
PAINLEVEL_OUTOF10: 0
PAINLEVEL_OUTOF10: 0

## 2021-05-30 NOTE — PROGRESS NOTES
Department of Internal Medicine  Nephrology Progress Note    SUBJECTIVE:  We are following this patient for RAMÍREZ/HTN  . Patient progress reviewed. The patient feels better     REVIEW OF SYSTEMS:  No CP/SOB/SCOTT or headache   Family present   Physical Exam:    VITALS:  BP (!) 151/54   Pulse 54   Temp 97.6 °F (36.4 °C) (Oral)   Resp 17   Ht 5' (1.524 m)   Wt 160 lb 11.5 oz (72.9 kg)   LMP 05/01/2006 (Approximate)   SpO2 99%   BMI 31.39 kg/m²   24HR INTAKE/OUTPUT:      Intake/Output Summary (Last 24 hours) at 5/30/2021 1222  Last data filed at 5/30/2021 1038  Gross per 24 hour   Intake 240 ml   Output 600 ml   Net -360 ml       Constitutional:  Doing well  Respiratory:  CTA  Gastrointestinal:  No tenderness. Normal Bowel Sounds.   Colostomy in place   Cardiovascular:  S1, S2 RRR   Edema:  Lower Extremity has no edema    DATA:    CBC:  Lab Results   Component Value Date    WBC 13.5 05/30/2021    RBC 3.67 05/30/2021    HGB 8.3 05/30/2021    HCT 26.7 05/30/2021    MCV 72.7 05/30/2021    MCH 22.7 05/30/2021    MCHC 31.2 05/30/2021    RDW 18.9 05/30/2021     05/30/2021    MPV 7.8 05/30/2021     CMP:  Lab Results   Component Value Date     05/30/2021    K 3.2 05/30/2021    CL 97 05/30/2021    CO2 33 05/30/2021    BUN 25 05/30/2021    CREATININE 1.6 05/30/2021    GFRAA 38 05/30/2021    AGRATIO 1.1 05/29/2021    LABGLOM 32 05/30/2021    GLUCOSE 95 05/30/2021    PROT 7.3 05/29/2021    CALCIUM 9.2 05/30/2021    BILITOT <0.2 05/29/2021    ALKPHOS 45 05/29/2021    AST 19 05/29/2021    ALT 8 05/29/2021      Hepatic Function Panel:   Lab Results   Component Value Date    ALKPHOS 45 05/29/2021    ALT 8 05/29/2021    AST 19 05/29/2021    PROT 7.3 05/29/2021    BILITOT <0.2 05/29/2021    BILIDIR <0.2 09/26/2019    IBILI see below 09/26/2019      Phosphorus:   Lab Results   Component Value Date    PHOS 3.9 02/12/2021       ASSESSMENT:  Principal Problem:    Hypertensive urgency  Active Problems:    CAD (coronary artery disease)    CKD (chronic kidney disease), stage III - sees Dr. Janusz Hauser    COPD, severe ([facility]    Hyperlipidemia    Acquired hypothyroidism    Atrial fibrillation ([facility]    [facility]    Essential hypertension    Acute cystitis without hematuria    Headache  Resolved Problems:    * No resolved hospital problems. *      PLAN:  1. RAMÍREZ meds vs HTN urgency . Hold lasix /ACE-I. Add IVF,Will do w/u .   2. HTN urgency , meds adjusted   3. Anemia check iron studies . 4.  CKD3 Out pt f/u advised     Tj Latif MD, [address]

## 2021-05-30 NOTE — PROGRESS NOTES
4 Eyes Skin Assessment     NAME:  Raymond Calvillo  YOB: 1945  MEDICAL RECORD NUMBER:  [id number]    The patient is being assess for  Admission    I agree that 2 RN's have performed a thorough Head to Toe Skin Assessment on the patient. ALL assessment sites listed below have been assessed. Areas assessed by both nurses:    Head, Face, Ears, Shoulders, Back, Chest, Arms, Elbows, Hands, Sacrum. Buttock, Coccyx, Ischium and Legs. Feet and Heels        Does the Patient have a Wound?  No noted wound(s)       Sander Prevention initiated:  Yes   Wound Care Orders initiated:  Yes    Pressure Injury (Stage 3,4, Unstageable, DTI, NWPT, and Complex wounds) if present place consult order under [de-identified] No    New and Established Ostomies if present place consult order under : Yes      Nurse 1 eSignature: Electronically signed by Elpidio Cotton RN on 5/30/21 at 5:04 AM EDT    **SHARE this note so that the co-signing nurse is able to place an eSignature**    Nurse 2 eSignature: Electronically signed by Bree Alcaraz RN on 5/30/21 at 7:39 AM EDT

## 2021-05-30 NOTE — PROGRESS NOTES
Pharmacy Medication Reconciliation Note     List of medications patient is currently taking is complete. Source of information:   1. [employer]  2. Patient interview  3. Rx dispense history    Notes regarding home medications:   1. Rx for Amlodipine & Lisinopril started on discharge Jan 2021 were never refilled by patient    2. Vipin also states no recent refill of Hydralazine Rx   3.  Discussed with Dr. Mina Mendez    Denies taking any other OTC or herbal medications    Malou Garcia PharmD, BCPS  5/30/2021  11:54 AM

## 2021-05-30 NOTE — ED PROVIDER NOTES
[facility]        Pt Name: Anabell Mcgrath  MRN: [id number]  [place] 1945  Date of evaluation: 5/29/2021  Provider: GIULIANO Villafuerte - CNP  PCP: Otoniel Dixon MD  Note Started: 1:47 AM EDT        I have seen and evaluated this patient with my supervising physician Danita Shannon MD.    [address] [place] [address]       Chief Complaint   Patient presents with    Headache       HISTORY OF PRESENT ILLNESS   (Location, Timing/Onset, Context/Setting, Quality, Duration, Modifying Factors, Severity, Associated Signs and Symptoms)  Note limiting factors. Anabell Mcgrath is a 70 y.o. female with PMH significant for HTN, HLD, PVD, oxygen dependent COPD, ANGIE, CHF, CAD on Plavix, PAF, CKD, and PAD who presents to the emergency department today complaining of high blood pressure associated with headache, chest tightness, shortness of breath, and a blown blood vessel in her right eye. Onset was around 1700 hrs. this evening. She advised she has not taken her nightly blood pressure medications. On my exam she advised that she no longer had chest pain, but was still having a headache. It is not the worst headache of her life. Nursing Notes were all reviewed and agreed with or any disagreements were addressed in the HPI. REVIEW OF SYSTEMS    (2-9 systems for level 4, 10 or more for level 5)     Review of Systems   Constitutional: Negative for chills, diaphoresis, fatigue and fever. HENT: Negative. Eyes: Positive for redness. Negative for visual disturbance. Respiratory: Positive for shortness of breath. Negative for cough. Cardiovascular: Positive for chest pain. Gastrointestinal: Negative for abdominal distention, abdominal pain, blood in stool, constipation, diarrhea, nausea and vomiting. Genitourinary: Negative for dysuria and hematuria. Skin: Negative for color change, pallor, rash and wound.    Allergic/Immunologic: Negative for immunocompromised state. Neurological: Positive for headaches. Negative for dizziness, syncope, weakness, light-headedness and numbness. Psychiatric/Behavioral: Negative for confusion. All other systems reviewed and are negative. Positives and Pertinent negatives as per HPI. Except as noted above in the ROS, all other systems were reviewed and negative.        PAST MEDICAL HISTORY     Past Medical History:   Diagnosis Date    Arthritis     Atrial fibrillation ([facility] 1/4/2021    CAD (coronary artery disease)     Nonobstructive on cath in 9/2017    CHF (congestive heart failure) ([facility] [facility]     Colostomy care ([facility] [address] 4/25/2018    Peristomal irritation and early leaking    COPD (chronic obstructive pulmonary disease) (HCC)     Hyperlipidemia     Hypertension     Kidney disease     Stage 3    Peripheral vascular disease ([facility] [address]     Rectal fissure 11/2014    surgery pending    Restless legs syndrome     Sleep apnea     O2 3 liters at hs    Thyroid disease     Unspecified sleep apnea          SURGICAL HISTORY     Past Surgical History:   Procedure Laterality Date    ABDOMEN SURGERY      CARDIAC CATHETERIZATION  09/13/2017    Dr. Andree Denney  03/09/2018    CORONARY ARTERY BYPASS GRAFT      Legs & Carotid    FRACTURE SURGERY      HERNIA REPAIR      UPPER GASTROINTESTINAL ENDOSCOPY N/A 9/30/2019    EGD DIAGNOSTIC ONLY performed by Cheryl Powell MD at [address]       Previous Medications    ALBUTEROL SULFATE HFA (VENTOLIN HFA) 108 (90 BASE) MCG/ACT INHALER    Inhale 2 puffs into the lungs every 4 hours as needed for Wheezing or Shortness of Breath    AMLODIPINE (NORVASC) 10 MG TABLET    Take 1 tablet by mouth daily    ASPIRIN 81 MG TABLET    Take 1 tablet by mouth daily    ATORVASTATIN (LIPITOR) 40 MG TABLET    TAKE ONE TABLET BY MOUTH DAILY    BUDESONIDE-FORMOTEROL (SYMBICORT) 160-4.5 MCG/ACT AERO    Inhale 2 puffs into the lungs 2 times daily    BUTALBITAL-ACETAMINOPHEN-CAFFEINE (FIORICET, ESGIC) -40 MG PER TABLET    Take 1 tablet by mouth every 6 hours as needed for Headaches    CARVEDILOL (COREG) 12.5 MG TABLET    Take 1 tablet by mouth 2 times daily (with meals)    CITALOPRAM (CELEXA) 40 MG TABLET    TAKE ONE TABLET BY MOUTH DAILY    CLONIDINE (CATAPRES) 0.2 MG TABLET    Take 1 tablet by mouth 3 times daily    CLOPIDOGREL (PLAVIX) 75 MG TABLET    Take 1 tablet by mouth daily    DICLOFENAC SODIUM (VOLTAREN) 1 % GEL    Apply 2 g topically 3 times daily as needed for Pain    FUROSEMIDE (LASIX) 40 MG TABLET    TAKE ONE TABLET BY MOUTH TWICE A DAY    HYDRALAZINE (APRESOLINE) 100 MG TABLET    TAKE ONE TABLET BY MOUTH THREE TIMES A DAY    IPRATROPIUM-ALBUTEROL (DUONEB) 0.5-2.5 (3) MG/3ML SOLN NEBULIZER SOLUTION    Inhale 3 mLs into the lungs every 4 hours as needed for Shortness of Breath    LANSOPRAZOLE (PREVACID) 15 MG DELAYED RELEASE CAPSULE    Take 1 capsule by mouth daily    LEVOTHYROXINE (SYNTHROID) 50 MCG TABLET    TAKE ONE TABLET BY MOUTH DAILY    LIDOCAINE (XYLOCAINE) 2 % JELLY    Apply pea-sized amount topically to affected area every 8 hours when necessary pain    LIDOCAINE (XYLOCAINE) 5 % OINTMENT    Apply topically as needed. LISINOPRIL (PRINIVIL;ZESTRIL) 5 MG TABLET    Take 0.5 tablets by mouth daily    LORAZEPAM (ATIVAN) 1 MG TABLET    TAKE ONE TABLET BY MOUTH DAILY AS NEEDED FOR ANXIETY    MINERAL OIL-HYDROPHILIC PETROLATUM (HYDROPHOR) OINTMENT    Apply topically as needed.     MONTELUKAST (SINGULAIR) 10 MG TABLET    Take 1 tablet by mouth daily    ONDANSETRON (ZOFRAN) 4 MG TABLET    Take 1 tablet by mouth every 8 hours as needed for Nausea    TIOTROPIUM (SPIRIVA RESPIMAT) 2.5 MCG/ACT AERS INHALER    Inhale 2 puffs into the lungs daily         ALLERGIES     Iv dye [iodides]    FAMILYHISTORY       Family History   Problem Relation Age of Onset    Heart Disease Mother     Heart Disease Father     Heart Disease Sister     Cancer Brother           SOCIAL HISTORY       Social History     Tobacco Use    Smoking status: Former Smoker     Packs/day: 3.00     Years: 30.00     Pack years: 90.00     Start date: 1963     Quit date: 1992     Years since quittin.3    Smokeless tobacco: Never Used    Tobacco comment: QUIT 21 YRS AGO   Vaping Use    Vaping Use: Never used   Substance Use Topics    Alcohol use: No     Alcohol/week: 0.0 standard drinks    Drug use: No       SCREENINGS             PHYSICAL EXAM    (up to 7 for level 4, 8 or more for level 5)     ED Triage Vitals [21 2312]   BP Temp Temp Source Pulse Resp SpO2 Height Weight   (!) 197/112 98.1 °F (36.7 °C) Oral 69 16 100 % -- 168 lb 14 oz (76.6 kg)       Physical Exam  Vitals and nursing note reviewed. Constitutional:       General: She is not in acute distress. Appearance: Normal appearance. She is well-developed. She is not toxic-appearing. HENT:      Head: Normocephalic and atraumatic. Eyes:      General: No scleral icterus. Conjunctiva/sclera:      Right eye: Hemorrhage present. Neck:      Vascular: No JVD. Cardiovascular:      Rate and Rhythm: Normal rate and regular rhythm. Heart sounds: Normal heart sounds. Pulmonary:      Effort: Pulmonary effort is normal. No respiratory distress. Breath sounds: Decreased breath sounds present. Abdominal:      General: There is no distension. Palpations: Abdomen is soft. Abdomen is not rigid. Tenderness: There is no abdominal tenderness. Musculoskeletal:         General: Normal range of motion. Cervical back: Normal range of motion and neck supple. Skin:     General: Skin is warm and dry. Capillary Refill: Capillary refill takes less than 2 seconds. Findings: No rash. Neurological:      General: No focal deficit present. Mental Status: She is alert and oriented to person, place, and time.       Cranial Nerves: Cranial nerves are intact. Sensory: Sensation is intact. Motor: Motor function is intact.    Psychiatric:         Mood and Affect: Mood normal.             DIAGNOSTIC RESULTS   LABS:    Labs Reviewed   CBC WITH AUTO DIFFERENTIAL - Abnormal; Notable for the following components:       Result Value    WBC 14.6 (*)     Hemoglobin 9.1 (*)     Hematocrit 28.9 (*)     MCV 72.0 (*)     MCH 22.6 (*)     RDW 19.3 (*)     Neutrophils Absolute 12.1 (*)     All other components within normal limits    Narrative:     Performed at:  [address] [address]   Phone [phone]   COMPREHENSIVE METABOLIC PANEL W/ REFLEX TO MG FOR LOW K - Abnormal; Notable for the following components:    Potassium reflex Magnesium 3.4 (*)     Chloride 97 (*)     CO2 33 (*)     Glucose 138 (*)     BUN 24 (*)     CREATININE 1.3 (*)     GFR Non- 40 (*)     GFR  48 (*)     ALT 8 (*)     All other components within normal limits    Narrative:     Performed at:  [address] [address]   Phone [phone]   TROPONIN - Abnormal; Notable for the following components:    Troponin 0.03 (*)     All other components within normal limits    Narrative:     Performed at:  [address] [facility] [address] [address]   Phone [phone]   BRAIN NATRIURETIC PEPTIDE - Abnormal; Notable for the following components:    Pro-BNP 3,420 (*)     All other components within normal limits    Narrative:     Performed at:  [address] [address]   Phone [phone]   MAGNESIUM    Narrative:     Performed at:  [address] [address]   Phone [phone]   URINE RT REFLEX TO CULTURE   TROPONIN       All other labs were within normal range or not returned as of this dictation. EKG: All EKG's are interpreted by the Emergency Department Physician in the absence of a cardiologist.  Please see their note for interpretation of EKG. RADIOLOGY:   Non-plain film images such as CT, Ultrasound and MRI are read by the radiologist. Plain radiographic images are visualized and preliminarily interpreted by the ED Provider with the below findings:        Interpretation per the Radiologist below, if available at the time of this note:    XR CHEST PORTABLE   Final Result   No acute process. CT HEAD WO CONTRAST   Final Result   No acute intracranial abnormality. No acute territorial infarction,   intracranial hemorrhage or mass lesion. Mild chronic microangiopathic ischemic disease. CT HEAD WO CONTRAST    Result Date: 5/29/2021  EXAMINATION: CT OF THE HEAD WITHOUT CONTRAST  5/29/2021 11:19 pm TECHNIQUE: CT of the head was performed without the administration of intravenous contrast. Dose modulation, iterative reconstruction, and/or weight based adjustment of the mA/kV was utilized to reduce the radiation dose to as low as reasonably achievable. COMPARISON: 10/27/2018 HISTORY: ORDERING SYSTEM PROVIDED HISTORY: HTN TECHNOLOGIST PROVIDED HISTORY: Reason for exam:->HTN Has a \"code stroke\" or \"stroke alert\" been called? ->No Decision Support Exception - unselect if not a suspected or confirmed emergency medical condition->Emergency Medical Condition (MA) Reason for Exam: Headache FINDINGS: BRAIN/VENTRICLES: There is no acute intracranial hemorrhage, mass effect or midline shift. No abnormal extra-axial fluid collection. The gray-white differentiation is maintained without evidence of an acute infarct. There is no evidence of hydrocephalus.   There are nonspecific hypoattenuating foci in the subcortical and periventricular white matter that most likely represent chronic microangiopathic ischemic changes in a patient of this age. Yuli Dally: The visualized portion of the orbits demonstrate no acute abnormality. SINUSES: The visualized paranasal sinuses and mastoid air cells demonstrate no acute abnormality. SOFT TISSUES/SKULL:  No acute abnormality of the visualized skull or soft tissues. No acute intracranial abnormality. No acute territorial infarction, intracranial hemorrhage or mass lesion. Mild chronic microangiopathic ischemic disease. XR CHEST PORTABLE    Result Date: 5/29/2021  EXAMINATION: ONE XRAY VIEW OF THE CHEST 5/29/2021 11:17 pm COMPARISON: 05/19/2021 HISTORY: ORDERING SYSTEM PROVIDED HISTORY: SOB TECHNOLOGIST PROVIDED HISTORY: Reason for exam:->SOB Reason for Exam: sob FINDINGS: The lungs are without acute focal process. Chronic scarring is noted within the lingula. There is no effusion or pneumothorax. The cardiomediastinal silhouette is stable. The osseous structures are stable. No acute process.            PROCEDURES   Unless otherwise noted below, none     Procedures    CRITICAL CARE TIME   N/A    CONSULTS:  IP CONSULT TO HOSPITALIST  IP CONSULT TO NEPHROLOGY      EMERGENCY DEPARTMENT COURSE and DIFFERENTIAL DIAGNOSIS/MDM:   Vitals:    Vitals:    05/29/21 2312 05/30/21 0015 05/30/21 0102   BP: (!) 197/112 (!) 148/71 (!) 213/63   Pulse: 69 67 73   Resp: 16 17 22   Temp: 98.1 °F (36.7 °C)     TempSrc: Oral     SpO2: 100%  100%   Weight: 168 lb 14 oz (76.6 kg)         Patient was given the following medications:  Medications   acetaminophen (TYLENOL) tablet 1,000 mg (has no administration in time range)   carvedilol (COREG) tablet 12.5 mg (12.5 mg Oral Given 5/30/21 0117)   hydrALAZINE (APRESOLINE) tablet 100 mg (100 mg Oral Given 5/30/21 0117)   cloNIDine (CATAPRES) tablet 0.2 mg (0.2 mg Oral Given 5/30/21 0117)           Differential diagnosis: Asymptomatic hypertension, hypertensive urgency, hypertensive emergency, hypertension encephalopathy, acute coronary syndrome, acute renal failure, Thrombotic Stroke, Embolic Stroke, Hemorrhagic Stroke, pain or vertigo or other medical problems elevating the blood pressure secondarily which is a normal physiologic response, other    Patient presents with headache, chest pain, and blown blood vessel in right eye. See HPI for full presentation. Physical exam as above. 70year-old lying in bed in no acute distress. She is awake alert and oriented with no neurovascular deficits. No swelling in extremities. Abdomen soft and nontender. Lungs decreased but CTA bilaterally. Cardiac exam normal.  Neck supple. CBC shows leukocytosis with a white count of 14 the left shift of 12 with no bandemia. Hemoglobin 9.1. Chemistry shows slightly worsening kidney function with a creatinine of 1.3 and GFR 40. Potassium 3.4. LFTs normal.  Troponin 0.03. BNP 3400. CXR normal.  CT head shows no acute intracranial abnormality. Emergency department course included home blood pressure medications. She was given Tylenol for headache. She will be admitted for further work-up and monitoring. She was given all test results and given an opportunity to ask and have any questions answered. She was agreeable to admission. The hospitalist, Dr. Dwaine Chen, was consulted and agreed to meet patient and write orders. The patient tolerated their visit well. They were seen and evaluated by the attending physician, Bijan Gutierrez MD who agreed with the assessment and plan. The patient and / or the family were informed of the results of any tests, a time was given to answer questions, a plan was proposed and they agreed with plan. FINAL IMPRESSION      1. Hypertensive urgency    2. RAMÍREZ (acute kidney injury) ([facility] [address]    3. Elevated troponin    4. Elevated brain natriuretic peptide (BNP) level    5. Subconjunctival hemorrhage of right eye          DISPOSITION/PLAN   DISPOSITION Admitted 05/30/2021 01:49:16 AM      PATIENT REFERRED TO:  No follow-up provider specified.     DISCHARGE MEDICATIONS:  New

## 2021-05-30 NOTE — ED PROVIDER NOTES
Attending Supervising Physicians Attestation Statement  I was present with the Mid Level Provider during the history and exam. I discussed the findings and plans with the Mid Level Provider and agree as documented in his note     81-year-old female with multiple complaints found to have hypertensive urgency with elevated creatinine as well as elevated troponin. Antihypertensives initiated. Antibiotics also initiated for UTI. Patient be admitted for further inpatient evaluation. Vitals:    05/30/21 0210 05/30/21 0211 05/30/21 0227 05/30/21 0230   BP: (!) 213/61 (!) 213/61 (!) 191/69    Pulse: 75  74    Resp: 19 20 18    Temp:   98.1 °F (36.7 °C)    TempSrc:   Oral    SpO2: 100% 100% 98%    Weight:       Height:    5' (1.524 m)     FINAL IMPRESSION       1. Hypertensive urgency    2. RAMÍREZ (acute kidney injury) ([facility] [address]    3. Elevated troponin    4. Elevated brain natriuretic peptide (BNP) level    5.  Subconjunctival hemorrhage of right eye          Electronically signed by Olga Rodrigez MD on 5/30/21 at 3:12 AM EDT           Olga Rodrigez MD  05/30/21 0621

## 2021-05-30 NOTE — PROGRESS NOTES
Pt admitted to room 4273 from ED. Pt alert and oriented x 4. BP elevated. Fall risks screening completed. Orientation to room performed and instructions were provided for call light system. Call light and bedside table within pt reach. Will continue to monitor.

## 2021-05-30 NOTE — H&P
Hospital Medicine  History and Physical    PCP: Harleen Herrera MD  Patient Name: Roni Median    Date of Service: Pt seen/examined on 5/30/21 and admitted to Inpatient with expected LOS greater than two midnights due to medical therapy    CHIEF COMPLAINT:  Pt c/o high BP, headache, chest tightness, shortness of breath and a \"blown blood vessel\" in her right eye. HISTORY OF PRESENT ILLNESS: Pt is an 70y.o. year-old female with a history of hypertension, hyperlipidemia, hypothyroidism, CKD3, coronary artery disease, A fib COPD with chronic hypoxic respiratory failure. He presents to the emergency room for evaluation of the above issues (high BP, headache, chest tightness, shortness of breath and a \"blown blood vessel\" in her right eye). She states that her symptoms began at approximately 5:00 last evening. He reports that her headache was severe, throbbing and involved her entire head. In the emergency room she was found to have a hypertensive urgency and an elevated troponin level. She denied current or recent hernan chest pain and the mild tightness that she had in her chest earlier resolved. She does report that she is currently under treatment for a urinary tract infection and that is symptomatic with some low back pain. She states that this is her second round of treatment and she has finished day 5 of the current antibiotic regimen. She denies dysuria. She is being admitted for further evaluation and treatment. Associated signs and symptoms do not include fever or chills, nausea, vomiting, abdominal pain, hemoptysis, hematochezia, diarrhea, constipation, typical chest pain,  diaphoresis, edema, orthopnea, paroxysmal nocturnal dyspnea.         Past Medical History:        Diagnosis Date    Arthritis     Atrial fibrillation ([address] 1/4/2021    CAD (coronary artery disease)     Nonobstructive on cath in 9/2017    CHF (congestive heart failure) ([facility]     Colostomy care ([address] 4/25/2018    Peristomal irritation and early leaking    COPD (chronic obstructive pulmonary disease) (HCC)     Hyperlipidemia     Hypertension     Kidney disease     Stage 3    Peripheral vascular disease ([facility]     Rectal fissure 11/2014    surgery pending    Restless legs syndrome     Sleep apnea     O2 3 liters at hs    Thyroid disease     Unspecified sleep apnea        Past Surgical History:        Procedure Laterality Date    ABDOMEN SURGERY      CARDIAC CATHETERIZATION  09/13/2017    Dr. Sherwin Mercado  03/09/2018    CORONARY ARTERY BYPASS GRAFT      Legs & Carotid    FRACTURE SURGERY      HERNIA REPAIR      UPPER GASTROINTESTINAL ENDOSCOPY N/A 9/30/2019    EGD DIAGNOSTIC ONLY performed by Inder Rene MD at [address] [place] [address]         Allergies: Iv dye [iodides]    Medications Prior to Admission:    Prior to Admission medications    Medication Sig Start Date End Date Taking?  Authorizing Provider   citalopram (CELEXA) 40 MG tablet TAKE ONE TABLET BY MOUTH DAILY 5/28/21   Ludy Webber MD   albuterol sulfate HFA (VENTOLIN HFA) 108 (90 Base) MCG/ACT inhaler Inhale 2 puffs into the lungs every 4 hours as needed for Wheezing or Shortness of Breath 5/12/21   Jef Smith MD   budesonide-formoterol [facility]) 160-4.5 MCG/ACT AERO Inhale 2 puffs into the lungs 2 times daily 5/12/21   Jef Smith MD   ipratropium-albuterol (DUONEB) 0.5-2.5 (3) MG/3ML SOLN nebulizer solution Inhale 3 mLs into the lungs every 4 hours as needed for Shortness of Breath 5/12/21   Jef Smith MD   tiotropium (SPIRIVA RESPIMAT) 2.5 MCG/ACT AERS inhaler Inhale 2 puffs into the lungs daily 5/12/21   Jef Smith MD   LORazepam (ATIVAN) 1 MG tablet TAKE ONE TABLET BY MOUTH DAILY AS NEEDED FOR ANXIETY 5/11/21 6/10/21  Ludy Webber MD   carvedilol (COREG) 12.5 MG tablet Take 1 tablet by mouth 2 times daily (with meals) 2/24/21   GIULIANO Mclaughlin - CNP   cloNIDine (CATAPRES) 0.2 MG tablet Take 1 tablet by mouth 3 times daily 2/24/21   GIULIANO Mclaughlin - CNP   clopidogrel (PLAVIX) 75 MG tablet Take 1 tablet by mouth daily 2/23/21   Justa Denney MD   furosemide (LASIX) 40 MG tablet TAKE ONE TABLET BY MOUTH TWICE A DAY 2/22/21   Mernauetta Seat Day, MD   ondansetron (ZOFRAN) 4 MG tablet Take 1 tablet by mouth every 8 hours as needed for Nausea 2/12/21   Valerie Collado MD   amLODIPine (NORVASC) 10 MG tablet Take 1 tablet by mouth daily 1/27/21   Michael Oneal MD   lisinopril (PRINIVIL;ZESTRIL) 5 MG tablet Take 0.5 tablets by mouth daily 1/27/21   Michael Oneal MD   atorvastatin (LIPITOR) 40 MG tablet TAKE ONE TABLET BY MOUTH DAILY 1/11/21   Louetta Seat Day, MD   montelukast (SINGULAIR) 10 MG tablet Take 1 tablet by mouth daily 1/11/21   Louetta Seat Day, MD   lansoprazole (PREVACID) 15 MG delayed release capsule Take 1 capsule by mouth daily 1/11/21   Louetta Seat Day, MD   levothyroxine (SYNTHROID) 50 MCG tablet TAKE ONE TABLET BY MOUTH DAILY 1/11/21   Louetta Seat Day, MD   hydrALAZINE (APRESOLINE) 100 MG tablet TAKE ONE TABLET BY MOUTH THREE TIMES A DAY 1/11/21   Louetta Seat Day, MD   lidocaine (XYLOCAINE) 5 % ointment Apply topically as needed. 1/11/21   Louetta Seat Day, MD   diclofenac sodium (VOLTAREN) 1 % GEL Apply 2 g topically 3 times daily as needed for Pain 1/11/21   Louetta Seat Day, MD   lidocaine (XYLOCAINE) 2 % jelly Apply pea-sized amount topically to affected area every 8 hours when necessary pain 1/11/21   Louetta Seat Day, MD   mineral oil-hydrophilic petrolatum (HYDROPHOR) ointment Apply topically as needed.  12/9/20   Socorro Eckert MD   butalbital-acetaminophen-caffeine (FIORICET, ESGIC) -90 MG per tablet Take 1 tablet by mouth every 6 hours as needed for Headaches 10/17/19   HENRIQUE Cooper DO   aspirin 81 MG tablet Take 1 tablet by mouth daily 7/26/19   Manuel Schafer MD       Family History:       Problem Relation Age of Onset    Heart Disease Mother     Heart Disease Father     Heart and oriented, thought content appropriate, normal insight    CBC:   Recent Labs     05/29/21 2330   WBC 14.6*   HGB 9.1*        BMP:    Recent Labs     05/29/21 2330      K 3.4*   CL 97*   CO2 33*   BUN 24*   CREATININE 1.3*   GLUCOSE 138*     Troponin:   Recent Labs     05/29/21  2330 05/30/21  0257   TROPONINI 0.03* 0.04*     PT/INR:  No results found for: PTINR  U/A:    Lab Results   Component Value Date    LEUKOCYTESUR MODERATE 05/30/2021    NITRITE neg 05/19/2021    RBCUA 10 05/30/2021    SPECGRAV 1.022 05/30/2021    UROBILINOGEN 0.2 05/30/2021    BILIRUBINUR Negative 05/30/2021    BILIRUBINUR neg 05/19/2021    BLOODU Negative 05/30/2021    GLUCOSEU Negative 05/30/2021    PROTEINU Negative 05/30/2021         RAD:   I have independently reviewed and interpreted the imaging studies below and based my recommendations to the patient on those findings. XR CHEST (2 VW)    Result Date: 5/19/2021  EXAMINATION: TWO XRAY VIEWS OF THE CHEST 5/19/2021 3:51 pm COMPARISON: 01/21/2021 HISTORY: ORDERING SYSTEM PROVIDED HISTORY: Shortness of breath TECHNOLOGIST PROVIDED HISTORY: Reason for Exam: sob, hx copd Acuity: Acute Type of Exam: Initial FINDINGS: Cardiomegaly with anti thrombotic device in the left atrial appendage region. Pulmonary vasculature within normal limits. Scarring in the inferior left upper lobe. Lungs otherwise clear. Costophrenic angles sharp     1. Cardiomegaly with no evidence of edema 2. No acute cardiopulmonary process demonstrated     CT HEAD WO CONTRAST    Result Date: 5/29/2021  EXAMINATION: CT OF THE HEAD WITHOUT CONTRAST  5/29/2021 11:19 pm TECHNIQUE: CT of the head was performed without the administration of intravenous contrast. Dose modulation, iterative reconstruction, and/or weight based adjustment of the mA/kV was utilized to reduce the radiation dose to as low as reasonably achievable.  COMPARISON: 10/27/2018 HISTORY: ORDERING SYSTEM PROVIDED HISTORY: HTN TECHNOLOGIST PROVIDED HISTORY: Reason for exam:->HTN Has a \"code stroke\" or \"stroke alert\" been called? ->No Decision Support Exception - unselect if not a suspected or confirmed emergency medical condition->Emergency Medical Condition (MA) Reason for Exam: Headache FINDINGS: BRAIN/VENTRICLES: There is no acute intracranial hemorrhage, mass effect or midline shift. No abnormal extra-axial fluid collection. The gray-white differentiation is maintained without evidence of an acute infarct. There is no evidence of hydrocephalus. There are nonspecific hypoattenuating foci in the subcortical and periventricular white matter that most likely represent chronic microangiopathic ischemic changes in a patient of this age. ORBITS: The visualized portion of the orbits demonstrate no acute abnormality. SINUSES: The visualized paranasal sinuses and mastoid air cells demonstrate no acute abnormality. SOFT TISSUES/SKULL:  No acute abnormality of the visualized skull or soft tissues. No acute intracranial abnormality. No acute territorial infarction, intracranial hemorrhage or mass lesion. Mild chronic microangiopathic ischemic disease. XR CHEST PORTABLE    Result Date: 5/29/2021  EXAMINATION: ONE XRAY VIEW OF THE CHEST 5/29/2021 11:17 pm COMPARISON: 05/19/2021 HISTORY: ORDERING SYSTEM PROVIDED HISTORY: SOB TECHNOLOGIST PROVIDED HISTORY: Reason for exam:->SOB Reason for Exam: sob FINDINGS: The lungs are without acute focal process. Chronic scarring is noted within the lingula. There is no effusion or pneumothorax. The cardiomediastinal silhouette is stable. The osseous structures are stable. No acute process.      VL DUP CAROTID BILATERAL    Result Date: 5/14/2021  Carotid Duplex Study  Demographics   Patient Name       Arash Solorzano   Date of Study      05/14/2021         Gender              Female   Patient Number     [id number]         Date of Birth       1945   Visit Number       [id number]          Age 70 year(s)   Accession Number   [id number]         Room Number   Corporate ID       [id number]           Sonographer         Genie Rich T   Ordering Physician Morelia Baxter   Interpreting        [facility] Stefanie Shaver., CNP            Physician           Surg                                                            Miguel Bradley DO  Procedure Type of Study:   Cerebral:Carotid, VL CAROTID DUPLEX BILATERAL. Indications for Study:Carotid bruit. Impressions Right Impression Limited visualization due to shadowing and patient movement. The right internal carotid artery appears to have a 50-79% diameter reducing stenosis based on velocity criteria. The right vertebral artery was not visualized. Severe shadowing plaque formation demonstrated in the bulb and Internal Carotid Artery. Left Impression Limited visualization due to shadowing and patient movement. The left internal carotid artery appears to have a 50-79% diameter reducing stenosis based on velocity criteria. The left vertebral artery demonstrates high velocity antegrade flow. Mild plaque formation demonstrated in the bulb. Internal Carotid Artery and External Carotid Artery was not well visualized due to shadowing. Conclusions   Summary   Limited study due to patient movement. Moderate internal carotid artery  stenosis bilaterally. Right ratio is 2.75 and left ratio is 1.46. Plaque  appreciate in both carotid bulbs. Signature   ------------------------------------------------------------------  Electronically signed by Miguel Bradley DO (Interpreting  physician) on 05/14/2021 at 11:22 PM  ------------------------------------------------------------------  Blood Pressure:Right arm 180/ mmHg. Left arm 162/ mmHg. Patient Status:Routine. [facility] [address] [facility]. Technical Quality:Limited visualization due to calcific shadowing.  Risk Factors History +------------------+----+--------------------------------------+ ! 127 !21. 6!60   !0.83! +---------+----+----+-----+----+ ! Mid ICA  !105 !20. 4!60   !0.81! +---------+----+----+-----+----+ ! Dist ICA !137 ! 25. 5!60   !0.81! +---------+----+----+-----+----+ ! Prox ECA !160 !17  !60   !0.89! +---------+----+----+-----+----+ ! Vertebral!123 !20. 4!60   !0.83! +---------+----+----+-----+----+   - There is antegrade vertebral flow noted on the left side. - Additional Measurements:ICAPSV/CCAPSV 1.46. ICAEDV/CCAEDV 1.32. EKG:   Read by ER in the absence of a Cardiologist shows      Assessment:   Principal Problem:    Hypertensive urgency  Active Problems:    CAD (coronary artery disease)    CKD (chronic kidney disease), stage III - sees Dr. Toy Boas    COPD, severe ([facility]    Hyperlipidemia    Acquired hypothyroidism    Atrial fibrillation ([facility]    [facility]    Essential hypertension    Acute cystitis without hematuria    Headache  Resolved Problems:    * No resolved hospital problems. *      Plan:         Hypertensive urgency - Patient has not taken her antihypertensive medications today. They were given in the ER and her BP had little improvement. Will given Vasotec x 1 dose (avoid further doses due to chronic renal failure) and consult her Nephrologist to help with blood pressure control. Headache - currently mild. Will treat symptomatically with Tylenol or Excedrin    Elevated Troponin - patient had reported having some mild chest tightness earlier. She denies any hernan chest pain or other anginal symptoms. Will monitor on telemetry and follow serial cardiac enzymes. CAD (coronary artery disease) - As above. Continue Beta Blocker, Statin and Aspirin. Monitor on Telemetry. Atrial fibrillation ([address] - currently rate controlled. Continue Coreg. Not on a home anticoagulant    Acute cystitis without significant hematuria - Pt reports that she is on day 5 of her second round of antibiotics (she believes that it's Augmentin).   Her urine still shows sign of infection. She will be started on Rocephin empirically. Urine culture and sensitivities have been ordered, and antibiotic therapy will be adjusted as necessary based upon those results. Chronic Renal Failure stage III - Renal function is at/near baseline and is stable; Monitor renal function and avoid Nephrotoxic agents as able     COPD (chronic obstructive pulmonary disease) - without acute exacerbation. Will provide Nebulizer treatments as needed and continue home medications. Patient will be monitored closely, and deep breathing and coughing will be encouraged while awake. Chronic respiratory failure with hypoxia (HCC) - due to COPD; provide supplemental oxygen as necessary to keep SaO2 92% or greater. Acquired hypothyroidism - stable; continue Levothyroxine    Hyperlipidemia - No current evidence of Rhabdomyolysis or other adverse effects. Continue statin therapy while in the hospital          DVT Prophylaxis: Lovenox  Diet: DIET GENERAL;  Code Status: Full Code  (Advanced care planning has been discussed with patient and/or responsible family member and is reflected in the code status.  Further orders associated with this have been entered if appropriate)    Disposition: Anticipate that patient will remain in the hospital for 2 to 3 days depending on further evaluation and clinical course  Disposition: Anticipate that patient will remain in the hospital for 1 to 2 days depending on further evaluation and clinical course    Please note that over 50 minutes was spent in evaluating the patient, review of records and results, discussion with staff/family, etc.    Gerson Baxter MD, MD

## 2021-05-30 NOTE — PROGRESS NOTES
needed and continue home medications. Patient will be monitored closely, and deep breathing and coughing will be encouraged while awake.      Chronic respiratory failure with hypoxia (HCC) - due to COPD; provide supplemental oxygen as necessary to keep SaO2 92% or greater.     Acquired hypothyroidism - stable; continue Levothyroxine     Hyperlipidemia - No current evidence of Rhabdomyolysis or other adverse effects.  Continue statin therapy while in the hospital    Vangie Holcomb MD, MPH  Hospitalist  Pager: [phone]

## 2021-05-30 NOTE — PLAN OF CARE
Problem: Pain:  Goal: Pain level will decrease  Description: Pain level will decrease  5/30/2021 1016 by Star Cantu RN  Outcome: Ongoing  5/30/2021 0643 by Bridger Yanes RN  Outcome: Ongoing  Goal: Control of acute pain  Description: Control of acute pain  5/30/2021 1016 by Star Cantu RN  Outcome: Ongoing  5/30/2021 0643 by Bridger Yanes RN  Outcome: Ongoing  Goal: Control of chronic pain  Description: Control of chronic pain  5/30/2021 1016 by Star Cantu RN  Outcome: Ongoing  5/30/2021 0643 by Bridger Yanes RN  Outcome: Ongoing

## 2021-05-31 VITALS
HEIGHT: 60 IN | RESPIRATION RATE: 16 BRPM | TEMPERATURE: 97.6 F | BODY MASS INDEX: 31.55 KG/M2 | DIASTOLIC BLOOD PRESSURE: 57 MMHG | OXYGEN SATURATION: 99 % | HEART RATE: 58 BPM | SYSTOLIC BLOOD PRESSURE: 114 MMHG | WEIGHT: 160.72 LBS

## 2021-05-31 LAB
ALBUMIN SERPL-MCNC: 3.2 G/DL (ref 3.4–5)
ANION GAP SERPL CALCULATED.3IONS-SCNC: 10 MMOL/L (ref 3–16)
BASOPHILS ABSOLUTE: 0.1 K/UL (ref 0–0.2)
BASOPHILS RELATIVE PERCENT: 0.5 %
BUN BLDV-MCNC: 25 MG/DL (ref 7–20)
CALCIUM SERPL-MCNC: 8.9 MG/DL (ref 8.3–10.6)
CHLORIDE BLD-SCNC: 101 MMOL/L (ref 99–110)
CO2: 27 MMOL/L (ref 21–32)
CREAT SERPL-MCNC: 1.4 MG/DL (ref 0.6–1.2)
EOSINOPHILS ABSOLUTE: 0.3 K/UL (ref 0–0.6)
EOSINOPHILS RELATIVE PERCENT: 2.9 %
GFR AFRICAN AMERICAN: 45
GFR NON-AFRICAN AMERICAN: 37
GLUCOSE BLD-MCNC: 96 MG/DL (ref 70–99)
HCT VFR BLD CALC: 25.9 % (ref 36–48)
HEMOGLOBIN: 7.9 G/DL (ref 12–16)
LYMPHOCYTES ABSOLUTE: 1.5 K/UL (ref 1–5.1)
LYMPHOCYTES RELATIVE PERCENT: 13.6 %
MAGNESIUM: 2 MG/DL (ref 1.8–2.4)
MCH RBC QN AUTO: 22.4 PG (ref 26–34)
MCHC RBC AUTO-ENTMCNC: 30.3 G/DL (ref 31–36)
MCV RBC AUTO: 74 FL (ref 80–100)
MONOCYTES ABSOLUTE: 1 K/UL (ref 0–1.3)
MONOCYTES RELATIVE PERCENT: 8.6 %
NEUTROPHILS ABSOLUTE: 8.4 K/UL (ref 1.7–7.7)
NEUTROPHILS RELATIVE PERCENT: 74.4 %
PDW BLD-RTO: 19.9 % (ref 12.4–15.4)
PHOSPHORUS: 4.5 MG/DL (ref 2.5–4.9)
PLATELET # BLD: 329 K/UL (ref 135–450)
PMV BLD AUTO: 8.2 FL (ref 5–10.5)
POTASSIUM REFLEX MAGNESIUM: 4.2 MMOL/L (ref 3.5–5.1)
POTASSIUM SERPL-SCNC: 4.2 MMOL/L (ref 3.5–5.1)
PRO-BNP: 1663 PG/ML (ref 0–124)
RBC # BLD: 3.51 M/UL (ref 4–5.2)
SODIUM BLD-SCNC: 138 MMOL/L (ref 136–145)
URINE CULTURE, ROUTINE: NORMAL
WBC # BLD: 11.3 K/UL (ref 4–11)

## 2021-05-31 PROCEDURE — 6370000000 HC RX 637 (ALT 250 FOR IP): Performed by: NURSE PRACTITIONER

## 2021-05-31 PROCEDURE — 85025 COMPLETE CBC W/AUTO DIFF WBC: CPT

## 2021-05-31 PROCEDURE — 6370000000 HC RX 637 (ALT 250 FOR IP): Performed by: INTERNAL MEDICINE

## 2021-05-31 PROCEDURE — 2700000000 HC OXYGEN THERAPY PER DAY

## 2021-05-31 PROCEDURE — 94761 N-INVAS EAR/PLS OXIMETRY MLT: CPT

## 2021-05-31 PROCEDURE — 6360000002 HC RX W HCPCS: Performed by: INTERNAL MEDICINE

## 2021-05-31 PROCEDURE — 80069 RENAL FUNCTION PANEL: CPT

## 2021-05-31 PROCEDURE — 36415 COLL VENOUS BLD VENIPUNCTURE: CPT

## 2021-05-31 PROCEDURE — 2580000003 HC RX 258: Performed by: INTERNAL MEDICINE

## 2021-05-31 PROCEDURE — 83880 ASSAY OF NATRIURETIC PEPTIDE: CPT

## 2021-05-31 PROCEDURE — 83735 ASSAY OF MAGNESIUM: CPT

## 2021-05-31 PROCEDURE — 94640 AIRWAY INHALATION TREATMENT: CPT

## 2021-05-31 RX ORDER — LANOLIN ALCOHOL/MO/W.PET/CERES
3 CREAM (GRAM) TOPICAL NIGHTLY PRN
Qty: 30 TABLET | Refills: 1 | Status: SHIPPED | OUTPATIENT
Start: 2021-05-31 | End: 2021-08-25 | Stop reason: SDUPTHER

## 2021-05-31 RX ORDER — HYDRALAZINE HYDROCHLORIDE 100 MG/1
TABLET, FILM COATED ORAL
Qty: 270 TABLET | Refills: 0 | Status: ON HOLD | OUTPATIENT
Start: 2021-05-31 | End: 2021-06-08 | Stop reason: HOSPADM

## 2021-05-31 RX ORDER — LANOLIN ALCOHOL/MO/W.PET/CERES
3 CREAM (GRAM) TOPICAL NIGHTLY PRN
Status: DISCONTINUED | OUTPATIENT
Start: 2021-05-31 | End: 2021-05-31 | Stop reason: HOSPADM

## 2021-05-31 RX ORDER — CARVEDILOL 12.5 MG/1
12.5 TABLET ORAL 2 TIMES DAILY WITH MEALS
Qty: 180 TABLET | Refills: 2 | Status: ON HOLD | OUTPATIENT
Start: 2021-05-31 | End: 2021-06-08 | Stop reason: HOSPADM

## 2021-05-31 RX ORDER — CLONIDINE HYDROCHLORIDE 0.2 MG/1
0.2 TABLET ORAL 3 TIMES DAILY
Qty: 270 TABLET | Refills: 1 | Status: SHIPPED | OUTPATIENT
Start: 2021-05-31

## 2021-05-31 RX ORDER — AMLODIPINE BESYLATE 10 MG/1
10 TABLET ORAL DAILY
Qty: 30 TABLET | Refills: 3 | Status: SHIPPED | OUTPATIENT
Start: 2021-05-31

## 2021-05-31 RX ADMIN — BUDESONIDE AND FORMOTEROL FUMARATE DIHYDRATE 2 PUFF: 160; 4.5 AEROSOL RESPIRATORY (INHALATION) at 08:34

## 2021-05-31 RX ADMIN — CARVEDILOL 12.5 MG: 12.5 TABLET, FILM COATED ORAL at 09:20

## 2021-05-31 RX ADMIN — ATORVASTATIN CALCIUM 40 MG: 40 TABLET, FILM COATED ORAL at 09:19

## 2021-05-31 RX ADMIN — HYDRALAZINE HYDROCHLORIDE 100 MG: 50 TABLET, FILM COATED ORAL at 12:37

## 2021-05-31 RX ADMIN — HYDRALAZINE HYDROCHLORIDE 100 MG: 50 TABLET, FILM COATED ORAL at 09:18

## 2021-05-31 RX ADMIN — CITALOPRAM HYDROBROMIDE 40 MG: 20 TABLET ORAL at 09:19

## 2021-05-31 RX ADMIN — CLONIDINE HYDROCHLORIDE 0.2 MG: 0.1 TABLET ORAL at 14:56

## 2021-05-31 RX ADMIN — ENOXAPARIN SODIUM 40 MG: 40 INJECTION SUBCUTANEOUS at 09:18

## 2021-05-31 RX ADMIN — AMLODIPINE BESYLATE 10 MG: 10 TABLET ORAL at 09:19

## 2021-05-31 RX ADMIN — ASPIRIN 81 MG: 81 TABLET, CHEWABLE ORAL at 09:20

## 2021-05-31 RX ADMIN — CEFTRIAXONE 1000 MG: 1 INJECTION, POWDER, FOR SOLUTION INTRAMUSCULAR; INTRAVENOUS at 04:19

## 2021-05-31 RX ADMIN — LEVOTHYROXINE SODIUM 50 MCG: 0.05 TABLET ORAL at 05:48

## 2021-05-31 RX ADMIN — SODIUM CHLORIDE: 9 INJECTION, SOLUTION INTRAVENOUS at 12:37

## 2021-05-31 RX ADMIN — Medication 3 MG: at 00:48

## 2021-05-31 RX ADMIN — CLONIDINE HYDROCHLORIDE 0.2 MG: 0.1 TABLET ORAL at 09:20

## 2021-05-31 RX ADMIN — TIOTROPIUM BROMIDE INHALATION SPRAY 2 PUFF: 3.12 SPRAY, METERED RESPIRATORY (INHALATION) at 08:34

## 2021-05-31 RX ADMIN — MONTELUKAST 10 MG: 10 TABLET, FILM COATED ORAL at 09:19

## 2021-05-31 RX ADMIN — CLOPIDOGREL BISULFATE 75 MG: 75 TABLET ORAL at 09:19

## 2021-05-31 RX ADMIN — PANTOPRAZOLE SODIUM 40 MG: 40 TABLET, DELAYED RELEASE ORAL at 09:19

## 2021-05-31 ASSESSMENT — PAIN SCALES - GENERAL: PAINLEVEL_OUTOF10: 0

## 2021-05-31 NOTE — DISCHARGE SUMMARY
Hospital Medicine Discharge Summary    Patient ID: Murray Clark      Patient's PCP: Meggan Rivas MD    Admit Date: 5/29/2021     Discharge Date:   5/31/2021    Admitting Physician: Rober Wang MD     Discharge Physician: Fritz Murguia MD     Discharge Diagnoses: Active Hospital Problems    Diagnosis Date Noted    Hypertensive urgency [I16.0] 05/30/2021    Essential hypertension [I10] 05/30/2021    Acute cystitis without hematuria [N30.00] 05/30/2021    Headache [R51.9] 05/30/2021    Chronic respiratory failure with hypoxia (HCC) [J96.11] 01/11/2021    Atrial fibrillation ([address] [I48.91] 01/04/2021    Acquired hypothyroidism [E03.9] 09/19/2019    Hyperlipidemia [E78.5] 09/23/2018    COPD, severe ([address] [J44.9]     CKD (chronic kidney disease), stage III - sees Dr. Litzy Chambers [N18.30] 03/08/2018    CAD (coronary artery disease) [I25.10] 12/30/2014       The patient was seen and examined on day of discharge and this discharge summary is in conjunction with any daily progress note from day of discharge. Hospital Course: Pt is an 70y.o. year-old female with a history of hypertension, hyperlipidemia, hypothyroidism, CKD3, coronary artery disease, A fib COPD with chronic hypoxic respiratory failure. He presents to the emergency room for evaluation of the above issues (high BP, headache, chest tightness, shortness of breath and a \"blown blood vessel\" in her right eye). She states that her symptoms began at approximately 5:00 last evening. He reports that her headache was severe, throbbing and involved her entire head. In the emergency room she was found to have a hypertensive urgency and an elevated troponin level. She denied current or recent hernan chest pain and the mild tightness that she had in her chest earlier resolved. She does report that she is currently under treatment for a urinary tract infection and that is symptomatic with some low back pain.   She states that this is her treatments as needed and continue home medications. Patient will be monitored closely, and deep breathing and coughing will be encouraged while awake.      Chronic respiratory failure with hypoxia (HCC) - due to COPD; provide supplemental oxygen as necessary to keep SaO2 92% or greater.     Acquired hypothyroidism - stable; continue Levothyroxine     Hyperlipidemia - No current evidence of Rhabdomyolysis or other adverse effects. Continue statin therapy while in the hospital      Exam:     BP (!) 122/53   Pulse 57   Temp 97.7 °F (36.5 °C) (Oral)   Resp 16   Ht 5' (1.524 m)   Wt 160 lb 11.5 oz (72.9 kg)   LMP 05/01/2006 (Approximate)   SpO2 100%   BMI 31.39 kg/m²       General appearance:  No apparent distress, appears stated age and cooperative. HEENT:  Normal cephalic, atraumatic without obvious deformity. Pupils equal, round, and reactive to light. Extra ocular muscles intact. Conjunctivae/corneas clear. Neck: Supple, with full range of motion. No jugular venous distention. Trachea midline. Respiratory:  Normal respiratory effort. Clear to auscultation, bilaterally without Rales/Wheezes/Rhonchi. Cardiovascular:  Regular rate and rhythm with normal S1/S2 without murmurs, rubs or gallops. Abdomen: Soft, non-tender, non-distended with normal bowel sounds. Musculoskeletal:  No clubbing, cyanosis or edema bilaterally. Full range of motion without deformity. Skin: Skin color, texture, turgor normal.  No rashes or lesions. Neurologic:  Neurovascularly intact without any focal sensory/motor deficits. Cranial nerves: II-XII intact, grossly non-focal.  Psychiatric:  Alert and oriented, thought content appropriate, normal insight  Capillary Refill: Brisk,< 3 seconds   Peripheral Pulses: +2 palpable, equal bilaterally       Labs:  For convenience and continuity at follow-up the following most recent labs are provided:      CBC:    Lab Results   Component Value Date    WBC 11.3 05/31/2021    HGB 7.9 05/31/2021 HCT 25.9 05/31/2021     05/31/2021       Renal:    Lab Results   Component Value Date     05/31/2021    K 4.2 05/31/2021    K 4.2 05/31/2021     05/31/2021    CO2 27 05/31/2021    BUN 25 05/31/2021    CREATININE 1.4 05/31/2021    CALCIUM 8.9 05/31/2021    PHOS 4.5 05/31/2021         Significant Diagnostic Studies    Radiology:   CT ABDOMEN PELVIS WO CONTRAST Additional Contrast? Radiologist Recommendation   Final Result   Stable abdominal CT. No acute intra-abdominal abnormality         XR CHEST PORTABLE   Final Result   No acute process. CT HEAD WO CONTRAST   Final Result   No acute intracranial abnormality. No acute territorial infarction,   intracranial hemorrhage or mass lesion. Mild chronic microangiopathic ischemic disease.                 Consults:     IP CONSULT TO HOSPITALIST  IP CONSULT TO NEPHROLOGY    Disposition:  home     Condition at Discharge: Stable    Discharge Instructions/Follow-up:  meds as prescribed, follow-up with PCP and nephrology at discharge    Code Status:  Full Code     Activity: activity as tolerated    Diet: renal diet      Discharge Medications:     Current Discharge Medication List           Details   melatonin 3 MG TABS tablet Take 1 tablet by mouth nightly as needed (sleep)  Qty: 30 tablet, Refills: 1              Details   amLODIPine (NORVASC) 10 MG tablet Take 1 tablet by mouth daily  Qty: 30 tablet, Refills: 3      cloNIDine (CATAPRES) 0.2 MG tablet Take 1 tablet by mouth 3 times daily  Qty: 270 tablet, Refills: 1    Associated Diagnoses: Essential hypertension      hydrALAZINE (APRESOLINE) 100 MG tablet TAKE ONE TABLET BY MOUTH THREE TIMES A DAY  Qty: 270 tablet, Refills: 0    Associated Diagnoses: Essential hypertension      carvedilol (COREG) 12.5 MG tablet Take 1 tablet by mouth 2 times daily (with meals)  Qty: 180 tablet, Refills: 2    Associated Diagnoses: Essential hypertension              Details   citalopram (CELEXA) 40 MG tablet Diagnoses: Chronic neck pain      diclofenac sodium (VOLTAREN) 1 % GEL Apply 2 g topically 3 times daily as needed for Pain  Qty: 100 g, Refills: 5    Associated Diagnoses: Chronic neck pain      lidocaine (XYLOCAINE) 2 % jelly Apply pea-sized amount topically to affected area every 8 hours when necessary pain  Qty: 1 Tube, Refills: 11      mineral oil-hydrophilic petrolatum (HYDROPHOR) ointment Apply topically as needed. Qty: 1 Tube, Refills: 5      aspirin 81 MG tablet Take 1 tablet by mouth daily  Qty: 30 tablet, Refills: 3             Time Spent on discharge is more than 30 minutes in the examination, evaluation, counseling and review of medications and discharge plan. Signed:    Herminia Galarza MD   5/31/2021      Thank you Francisca Hernandez MD for the opportunity to be involved in this patient's care. If you have any questions or concerns please feel free to contact me at [phone].

## 2021-05-31 NOTE — PLAN OF CARE
Problem: Pain:  Goal: Pain level will decrease  Description: Pain level will decrease  5/31/2021 0010 by Teressa Singer RN  Outcome: Ongoing  5/30/2021 1016 by Naldo España RN  Outcome: Ongoing  Goal: Control of acute pain  Description: Control of acute pain  5/31/2021 0010 by Teressa Singer RN  Outcome: Ongoing  5/30/2021 1016 by Naldo España RN  Outcome: Ongoing  Goal: Control of chronic pain  Description: Control of chronic pain  5/31/2021 0010 by Teressa Singer RN  Outcome: Ongoing  5/30/2021 1016 by Naldo España RN  Outcome: Ongoing     Problem: Falls - Risk of:  Goal: Will remain free from falls  Description: Will remain free from falls  Outcome: Ongoing  Goal: Absence of physical injury  Description: Absence of physical injury  Outcome: Ongoing

## 2021-05-31 NOTE — PLAN OF CARE
Problem: Pain:  Goal: Pain level will decrease  Description: Pain level will decrease  5/31/2021 1022 by Erasmo Xiao RN  Outcome: Ongoing  5/31/2021 0010 by Jae Douglas RN  Outcome: Ongoing  Goal: Control of acute pain  Description: Control of acute pain  5/31/2021 1022 by Erasmo Xiao RN  Outcome: Ongoing  5/31/2021 0010 by Jae Douglas RN  Outcome: Ongoing  Goal: Control of chronic pain  Description: Control of chronic pain  5/31/2021 1022 by Erasmo Xiao RN  Outcome: Ongoing  5/31/2021 0010 by Jae Douglas RN  Outcome: Ongoing     Problem: Falls - Risk of:  Goal: Will remain free from falls  Description: Will remain free from falls  5/31/2021 1022 by Erasmo Xiao RN  Outcome: Ongoing  5/31/2021 0010 by Jae Douglas RN  Outcome: Ongoing  Goal: Absence of physical injury  Description: Absence of physical injury  5/31/2021 1022 by Erasmo Xiao RN  Outcome: Ongoing  5/31/2021 0010 by Jae Douglas RN  Outcome: Ongoing

## 2021-05-31 NOTE — PROGRESS NOTES
Pt alert and oriented x4. VSS, pt denies pain , nausea or vomiting. 140ml watery yellow output form colostomy bag. Peristomal skin intact, stoma site pink and moist. Shift assessment complete,  PM medication given, IVF infusing. Pt denies any further needs at this time. Call light and bedside table within pt reach. Will continue to monitor.

## 2021-05-31 NOTE — CARE COORDINATION
Case Management:  Discharge order noted, discussed with patient who tells cm she has no ride home today. Patient tells cm her sister does not get off work till after dark and she does not drive in the dark, patient is requesting Lyft ride home. Patient also does not have a portable oxygen to get home with, supplied her with a [employer] portable tank from storage closet. Notified [facility] so they can replace the storage room. Patient denied other needs and tells cm she is able to manage the oxygen herself.   Electronically signed by Robert Rios on 5/31/2021 at 3:47 PM

## 2021-05-31 NOTE — PROGRESS NOTES
Edema:  Lower Extremity has no edema    DATA:    CBC:  Lab Results   Component Value Date    WBC 11.3 05/31/2021    RBC 3.51 05/31/2021    HGB 7.9 05/31/2021    HCT 25.9 05/31/2021    MCV 74.0 05/31/2021    MCH 22.4 05/31/2021    MCHC 30.3 05/31/2021    RDW 19.9 05/31/2021     05/31/2021    MPV 8.2 05/31/2021     CMP:  Lab Results   Component Value Date     05/31/2021    K 4.2 05/31/2021    K 4.2 05/31/2021     05/31/2021    CO2 27 05/31/2021    BUN 25 05/31/2021    CREATININE 1.4 05/31/2021    GFRAA 45 05/31/2021    AGRATIO 1.1 05/29/2021    LABGLOM 37 05/31/2021    GLUCOSE 96 05/31/2021    PROT 7.3 05/29/2021    CALCIUM 8.9 05/31/2021    BILITOT <0.2 05/29/2021    ALKPHOS 45 05/29/2021    AST 19 05/29/2021    ALT 8 05/29/2021      Hepatic Function Panel:   Lab Results   Component Value Date    ALKPHOS 45 05/29/2021    ALT 8 05/29/2021    AST 19 05/29/2021    PROT 7.3 05/29/2021    BILITOT <0.2 05/29/2021    BILIDIR <0.2 09/26/2019    IBILI see below 09/26/2019      Phosphorus:   Lab Results   Component Value Date    PHOS 4.5 05/31/2021       ASSESSMENT:  Principal Problem:    Hypertensive urgency  Active Problems:    CAD (coronary artery disease)    CKD (chronic kidney disease), stage III - sees Dr. Lupis Hernandez    COPD, severe ([facility] [address]    Hyperlipidemia    Acquired hypothyroidism    Atrial fibrillation ([facility] [address]    Chronic respiratory failure with hypoxia ([facility] [address]    Essential hypertension    Acute cystitis without hematuria    Headache  Resolved Problems:    * No resolved hospital problems. *      PLAN:  1. RAMÍREZ meds vs HTN urgency . Hold lasix /ACE-I.   2. Renal function better , on IVF. W/u in progress. 3. HTN better controlled  , meds adjusted   4. Anemia add oral iron . 5.  CKD3 Out pt f/u advised     Kimberly Castillo MD, Elizabeth Guerrero

## 2021-05-31 NOTE — CARE COORDINATION
[employer] transportation arranged for 5:00PM (Scheduled to arrive between 5 and 5:15PM, RN notified).    Provided RN's telephone for  upon arrival.   ISIDORO Moreno, ORVILLEW, Social Work/Case Management   [phone]  Electronically signed by ISIDORO Moreno, CE on 5/31/2021 at 4:26 PM

## 2021-06-01 RX ORDER — FUROSEMIDE 40 MG/1
TABLET ORAL
Qty: 180 TABLET | Refills: 0 | Status: ON HOLD
Start: 2021-06-01 | End: 2021-06-08 | Stop reason: HOSPADM

## 2021-06-05 ENCOUNTER — APPOINTMENT (OUTPATIENT)
Dept: GENERAL RADIOLOGY | Age: 76
DRG: 291 | End: 2021-06-05
Payer: MEDICARE

## 2021-06-05 ENCOUNTER — HOSPITAL ENCOUNTER (INPATIENT)
Age: 76
LOS: 4 days | Discharge: HOME | DRG: 291 | End: 2021-06-09
Attending: EMERGENCY MEDICINE | Admitting: STUDENT IN AN ORGANIZED HEALTH CARE EDUCATION/TRAINING PROGRAM
Payer: MEDICARE

## 2021-06-05 DIAGNOSIS — I50.31 ACUTE DIASTOLIC HEART FAILURE (HCC): ICD-10-CM

## 2021-06-05 DIAGNOSIS — R06.03 ACUTE RESPIRATORY DISTRESS: Primary | ICD-10-CM

## 2021-06-05 PROBLEM — J96.01 ACUTE RESPIRATORY FAILURE WITH HYPOXIA (HCC): Status: ACTIVE | Noted: 2021-06-05

## 2021-06-05 LAB
ANION GAP SERPL CALCULATED.3IONS-SCNC: 18 MMOL/L (ref 3–16)
BASE EXCESS VENOUS: 8.5 MMOL/L
BASOPHILS ABSOLUTE: 0 K/UL (ref 0–0.2)
BASOPHILS RELATIVE PERCENT: 0.1 %
BUN BLDV-MCNC: 27 MG/DL (ref 7–20)
CALCIUM SERPL-MCNC: 9.4 MG/DL (ref 8.3–10.6)
CARBOXYHEMOGLOBIN: 1.7 %
CHLORIDE BLD-SCNC: 97 MMOL/L (ref 99–110)
CO2: 26 MMOL/L (ref 21–32)
CREAT SERPL-MCNC: 1.2 MG/DL (ref 0.6–1.2)
EKG ATRIAL RATE: 83 BPM
EKG DIAGNOSIS: NORMAL
EKG P AXIS: 64 DEGREES
EKG P-R INTERVAL: 126 MS
EKG Q-T INTERVAL: 440 MS
EKG QRS DURATION: 88 MS
EKG QTC CALCULATION (BAZETT): 517 MS
EKG R AXIS: 46 DEGREES
EKG T AXIS: 19 DEGREES
EKG VENTRICULAR RATE: 83 BPM
EOSINOPHILS ABSOLUTE: 0 K/UL (ref 0–0.6)
EOSINOPHILS RELATIVE PERCENT: 0 %
GFR AFRICAN AMERICAN: 53
GFR NON-AFRICAN AMERICAN: 44
GLUCOSE BLD-MCNC: 138 MG/DL (ref 70–99)
GLUCOSE BLD-MCNC: 185 MG/DL (ref 70–99)
GLUCOSE BLD-MCNC: 212 MG/DL (ref 70–99)
GLUCOSE BLD-MCNC: 276 MG/DL (ref 70–99)
HCO3 VENOUS: 35 MMOL/L (ref 23–29)
HCT VFR BLD CALC: 28.7 % (ref 36–48)
HEMOGLOBIN: 9 G/DL (ref 12–16)
IRON SATURATION: 65 % (ref 15–50)
IRON: 364 UG/DL (ref 37–145)
LYMPHOCYTES ABSOLUTE: 0.5 K/UL (ref 1–5.1)
LYMPHOCYTES RELATIVE PERCENT: 5.2 %
MCH RBC QN AUTO: 23.9 PG (ref 26–34)
MCHC RBC AUTO-ENTMCNC: 31.5 G/DL (ref 31–36)
MCV RBC AUTO: 75.8 FL (ref 80–100)
METHEMOGLOBIN VENOUS: 1.3 %
MONOCYTES ABSOLUTE: 0.1 K/UL (ref 0–1.3)
MONOCYTES RELATIVE PERCENT: 1.3 %
NEUTROPHILS ABSOLUTE: 9 K/UL (ref 1.7–7.7)
NEUTROPHILS RELATIVE PERCENT: 93.4 %
O2 CONTENT, VEN: 12 ML/DL
O2 SAT, VEN: 92 %
O2 THERAPY: ABNORMAL
PCO2, VEN: 55.5 MMHG (ref 40–50)
PDW BLD-RTO: 20.8 % (ref 12.4–15.4)
PERFORMED ON: ABNORMAL
PH VENOUS: 7.4 (ref 7.35–7.45)
PLATELET # BLD: 312 K/UL (ref 135–450)
PMV BLD AUTO: 8.5 FL (ref 5–10.5)
PO2, VEN: 66 MMHG
POTASSIUM REFLEX MAGNESIUM: 4 MMOL/L (ref 3.5–5.1)
PRO-BNP: 2997 PG/ML (ref 0–124)
RBC # BLD: 3.78 M/UL (ref 4–5.2)
SODIUM BLD-SCNC: 141 MMOL/L (ref 136–145)
TCO2 CALC VENOUS: 36 MMOL/L
TOTAL IRON BINDING CAPACITY: 558 UG/DL (ref 260–445)
TROPONIN: <0.01 NG/ML
WBC # BLD: 9.6 K/UL (ref 4–11)

## 2021-06-05 PROCEDURE — 71045 X-RAY EXAM CHEST 1 VIEW: CPT

## 2021-06-05 PROCEDURE — 1200000000 HC SEMI PRIVATE

## 2021-06-05 PROCEDURE — 6370000000 HC RX 637 (ALT 250 FOR IP)

## 2021-06-05 PROCEDURE — 80048 BASIC METABOLIC PNL TOTAL CA: CPT

## 2021-06-05 PROCEDURE — 84484 ASSAY OF TROPONIN QUANT: CPT

## 2021-06-05 PROCEDURE — 93005 ELECTROCARDIOGRAM TRACING: CPT | Performed by: EMERGENCY MEDICINE

## 2021-06-05 PROCEDURE — 83540 ASSAY OF IRON: CPT

## 2021-06-05 PROCEDURE — 94761 N-INVAS EAR/PLS OXIMETRY MLT: CPT

## 2021-06-05 PROCEDURE — 6370000000 HC RX 637 (ALT 250 FOR IP): Performed by: INTERNAL MEDICINE

## 2021-06-05 PROCEDURE — 6360000002 HC RX W HCPCS: Performed by: EMERGENCY MEDICINE

## 2021-06-05 PROCEDURE — 96375 TX/PRO/DX INJ NEW DRUG ADDON: CPT

## 2021-06-05 PROCEDURE — 83880 ASSAY OF NATRIURETIC PEPTIDE: CPT

## 2021-06-05 PROCEDURE — 97116 GAIT TRAINING THERAPY: CPT

## 2021-06-05 PROCEDURE — 83550 IRON BINDING TEST: CPT

## 2021-06-05 PROCEDURE — 94640 AIRWAY INHALATION TREATMENT: CPT

## 2021-06-05 PROCEDURE — 82803 BLOOD GASES ANY COMBINATION: CPT

## 2021-06-05 PROCEDURE — 6360000002 HC RX W HCPCS: Performed by: STUDENT IN AN ORGANIZED HEALTH CARE EDUCATION/TRAINING PROGRAM

## 2021-06-05 PROCEDURE — 2580000003 HC RX 258: Performed by: STUDENT IN AN ORGANIZED HEALTH CARE EDUCATION/TRAINING PROGRAM

## 2021-06-05 PROCEDURE — 2700000000 HC OXYGEN THERAPY PER DAY

## 2021-06-05 PROCEDURE — 6360000002 HC RX W HCPCS: Performed by: INTERNAL MEDICINE

## 2021-06-05 PROCEDURE — 85025 COMPLETE CBC W/AUTO DIFF WBC: CPT

## 2021-06-05 PROCEDURE — 96374 THER/PROPH/DIAG INJ IV PUSH: CPT

## 2021-06-05 PROCEDURE — 97165 OT EVAL LOW COMPLEX 30 MIN: CPT

## 2021-06-05 PROCEDURE — 99223 1ST HOSP IP/OBS HIGH 75: CPT | Performed by: INTERNAL MEDICINE

## 2021-06-05 PROCEDURE — 6370000000 HC RX 637 (ALT 250 FOR IP): Performed by: STUDENT IN AN ORGANIZED HEALTH CARE EDUCATION/TRAINING PROGRAM

## 2021-06-05 PROCEDURE — 97161 PT EVAL LOW COMPLEX 20 MIN: CPT

## 2021-06-05 PROCEDURE — 93010 ELECTROCARDIOGRAM REPORT: CPT | Performed by: INTERNAL MEDICINE

## 2021-06-05 PROCEDURE — 97530 THERAPEUTIC ACTIVITIES: CPT

## 2021-06-05 PROCEDURE — 2060000000 HC ICU INTERMEDIATE R&B

## 2021-06-05 PROCEDURE — 99283 EMERGENCY DEPT VISIT LOW MDM: CPT

## 2021-06-05 PROCEDURE — 36415 COLL VENOUS BLD VENIPUNCTURE: CPT

## 2021-06-05 RX ORDER — ACETAMINOPHEN 325 MG/1
650 TABLET ORAL EVERY 6 HOURS PRN
Status: DISCONTINUED | OUTPATIENT
Start: 2021-06-05 | End: 2021-06-09 | Stop reason: HOSPADM

## 2021-06-05 RX ORDER — NICOTINE POLACRILEX 4 MG
15 LOZENGE BUCCAL PRN
Status: DISCONTINUED | OUTPATIENT
Start: 2021-06-05 | End: 2021-06-09 | Stop reason: HOSPADM

## 2021-06-05 RX ORDER — MONTELUKAST SODIUM 10 MG/1
10 TABLET ORAL DAILY
Status: DISCONTINUED | OUTPATIENT
Start: 2021-06-05 | End: 2021-06-09 | Stop reason: HOSPADM

## 2021-06-05 RX ORDER — IPRATROPIUM BROMIDE AND ALBUTEROL SULFATE 2.5; .5 MG/3ML; MG/3ML
SOLUTION RESPIRATORY (INHALATION)
Status: COMPLETED
Start: 2021-06-05 | End: 2021-06-05

## 2021-06-05 RX ORDER — AZITHROMYCIN 500 MG/1
500 TABLET, FILM COATED ORAL DAILY
Status: DISCONTINUED | OUTPATIENT
Start: 2021-06-05 | End: 2021-06-05

## 2021-06-05 RX ORDER — CLOPIDOGREL BISULFATE 75 MG/1
75 TABLET ORAL DAILY
Status: DISCONTINUED | OUTPATIENT
Start: 2021-06-05 | End: 2021-06-09 | Stop reason: HOSPADM

## 2021-06-05 RX ORDER — CARVEDILOL 12.5 MG/1
12.5 TABLET ORAL 2 TIMES DAILY WITH MEALS
Status: DISCONTINUED | OUTPATIENT
Start: 2021-06-05 | End: 2021-06-06

## 2021-06-05 RX ORDER — ACETAMINOPHEN 650 MG/1
650 SUPPOSITORY RECTAL EVERY 6 HOURS PRN
Status: DISCONTINUED | OUTPATIENT
Start: 2021-06-05 | End: 2021-06-09 | Stop reason: HOSPADM

## 2021-06-05 RX ORDER — IPRATROPIUM BROMIDE AND ALBUTEROL SULFATE 2.5; .5 MG/3ML; MG/3ML
1 SOLUTION RESPIRATORY (INHALATION)
Status: DISCONTINUED | OUTPATIENT
Start: 2021-06-05 | End: 2021-06-07

## 2021-06-05 RX ORDER — SODIUM CHLORIDE 0.9 % (FLUSH) 0.9 %
5-40 SYRINGE (ML) INJECTION EVERY 12 HOURS SCHEDULED
Status: DISCONTINUED | OUTPATIENT
Start: 2021-06-05 | End: 2021-06-09 | Stop reason: HOSPADM

## 2021-06-05 RX ORDER — PREDNISONE 20 MG/1
40 TABLET ORAL DAILY
Status: DISCONTINUED | OUTPATIENT
Start: 2021-06-07 | End: 2021-06-09 | Stop reason: HOSPADM

## 2021-06-05 RX ORDER — DEXTROSE MONOHYDRATE 25 G/50ML
12.5 INJECTION, SOLUTION INTRAVENOUS PRN
Status: DISCONTINUED | OUTPATIENT
Start: 2021-06-05 | End: 2021-06-09 | Stop reason: HOSPADM

## 2021-06-05 RX ORDER — LORAZEPAM 0.5 MG/1
0.5 TABLET ORAL DAILY PRN
Status: DISCONTINUED | OUTPATIENT
Start: 2021-06-05 | End: 2021-06-09 | Stop reason: HOSPADM

## 2021-06-05 RX ORDER — LEVOTHYROXINE SODIUM 0.05 MG/1
50 TABLET ORAL
Status: DISCONTINUED | OUTPATIENT
Start: 2021-06-06 | End: 2021-06-09 | Stop reason: HOSPADM

## 2021-06-05 RX ORDER — ALBUTEROL SULFATE 90 UG/1
2 AEROSOL, METERED RESPIRATORY (INHALATION) EVERY 4 HOURS PRN
Status: DISCONTINUED | OUTPATIENT
Start: 2021-06-05 | End: 2021-06-09 | Stop reason: HOSPADM

## 2021-06-05 RX ORDER — METHYLPREDNISOLONE SODIUM SUCCINATE 40 MG/ML
40 INJECTION, POWDER, LYOPHILIZED, FOR SOLUTION INTRAMUSCULAR; INTRAVENOUS EVERY 6 HOURS
Status: COMPLETED | OUTPATIENT
Start: 2021-06-05 | End: 2021-06-07

## 2021-06-05 RX ORDER — LANOLIN ALCOHOL/MO/W.PET/CERES
3 CREAM (GRAM) TOPICAL NIGHTLY PRN
Status: DISCONTINUED | OUTPATIENT
Start: 2021-06-05 | End: 2021-06-09 | Stop reason: HOSPADM

## 2021-06-05 RX ORDER — DOXYCYCLINE HYCLATE 100 MG
100 TABLET ORAL EVERY 12 HOURS SCHEDULED
Status: DISCONTINUED | OUTPATIENT
Start: 2021-06-06 | End: 2021-06-09 | Stop reason: HOSPADM

## 2021-06-05 RX ORDER — FUROSEMIDE 10 MG/ML
60 INJECTION INTRAMUSCULAR; INTRAVENOUS ONCE
Status: COMPLETED | OUTPATIENT
Start: 2021-06-05 | End: 2021-06-05

## 2021-06-05 RX ORDER — ONDANSETRON 2 MG/ML
4 INJECTION INTRAMUSCULAR; INTRAVENOUS EVERY 6 HOURS PRN
Status: DISCONTINUED | OUTPATIENT
Start: 2021-06-05 | End: 2021-06-09 | Stop reason: HOSPADM

## 2021-06-05 RX ORDER — MORPHINE SULFATE 2 MG/ML
2 INJECTION, SOLUTION INTRAMUSCULAR; INTRAVENOUS ONCE
Status: COMPLETED | OUTPATIENT
Start: 2021-06-05 | End: 2021-06-05

## 2021-06-05 RX ORDER — FUROSEMIDE 10 MG/ML
40 INJECTION INTRAMUSCULAR; INTRAVENOUS DAILY
Status: DISCONTINUED | OUTPATIENT
Start: 2021-06-05 | End: 2021-06-06

## 2021-06-05 RX ORDER — SODIUM CHLORIDE 9 MG/ML
25 INJECTION, SOLUTION INTRAVENOUS PRN
Status: DISCONTINUED | OUTPATIENT
Start: 2021-06-05 | End: 2021-06-09 | Stop reason: HOSPADM

## 2021-06-05 RX ORDER — LABETALOL HYDROCHLORIDE 5 MG/ML
10 INJECTION, SOLUTION INTRAVENOUS EVERY 4 HOURS PRN
Status: DISCONTINUED | OUTPATIENT
Start: 2021-06-05 | End: 2021-06-09 | Stop reason: HOSPADM

## 2021-06-05 RX ORDER — CITALOPRAM 20 MG/1
40 TABLET ORAL DAILY
Status: DISCONTINUED | OUTPATIENT
Start: 2021-06-05 | End: 2021-06-09 | Stop reason: HOSPADM

## 2021-06-05 RX ORDER — ASPIRIN 81 MG/1
81 TABLET, CHEWABLE ORAL DAILY
Status: DISCONTINUED | OUTPATIENT
Start: 2021-06-05 | End: 2021-06-09 | Stop reason: HOSPADM

## 2021-06-05 RX ORDER — SODIUM CHLORIDE 0.9 % (FLUSH) 0.9 %
5-40 SYRINGE (ML) INJECTION PRN
Status: DISCONTINUED | OUTPATIENT
Start: 2021-06-05 | End: 2021-06-09 | Stop reason: HOSPADM

## 2021-06-05 RX ORDER — ATORVASTATIN CALCIUM 40 MG/1
40 TABLET, FILM COATED ORAL DAILY
Status: DISCONTINUED | OUTPATIENT
Start: 2021-06-05 | End: 2021-06-09 | Stop reason: HOSPADM

## 2021-06-05 RX ORDER — METHYLPREDNISOLONE SODIUM SUCCINATE 125 MG/2ML
60 INJECTION, POWDER, LYOPHILIZED, FOR SOLUTION INTRAMUSCULAR; INTRAVENOUS ONCE
Status: COMPLETED | OUTPATIENT
Start: 2021-06-05 | End: 2021-06-05

## 2021-06-05 RX ORDER — IPRATROPIUM BROMIDE AND ALBUTEROL SULFATE 2.5; .5 MG/3ML; MG/3ML
1 SOLUTION RESPIRATORY (INHALATION) ONCE
Status: COMPLETED | OUTPATIENT
Start: 2021-06-05 | End: 2021-06-05

## 2021-06-05 RX ORDER — PANTOPRAZOLE SODIUM 40 MG/1
40 TABLET, DELAYED RELEASE ORAL
Refills: 0 | Status: DISCONTINUED | OUTPATIENT
Start: 2021-06-06 | End: 2021-06-09 | Stop reason: HOSPADM

## 2021-06-05 RX ORDER — AMLODIPINE BESYLATE 10 MG/1
10 TABLET ORAL DAILY
Status: DISCONTINUED | OUTPATIENT
Start: 2021-06-05 | End: 2021-06-09 | Stop reason: HOSPADM

## 2021-06-05 RX ORDER — DEXTROSE MONOHYDRATE 50 MG/ML
100 INJECTION, SOLUTION INTRAVENOUS PRN
Status: DISCONTINUED | OUTPATIENT
Start: 2021-06-05 | End: 2021-06-09 | Stop reason: HOSPADM

## 2021-06-05 RX ORDER — FUROSEMIDE 10 MG/ML
40 INJECTION INTRAMUSCULAR; INTRAVENOUS ONCE
Status: COMPLETED | OUTPATIENT
Start: 2021-06-05 | End: 2021-06-05

## 2021-06-05 RX ORDER — ONDANSETRON 4 MG/1
4 TABLET, ORALLY DISINTEGRATING ORAL EVERY 8 HOURS PRN
Status: DISCONTINUED | OUTPATIENT
Start: 2021-06-05 | End: 2021-06-09 | Stop reason: HOSPADM

## 2021-06-05 RX ORDER — BUDESONIDE AND FORMOTEROL FUMARATE DIHYDRATE 160; 4.5 UG/1; UG/1
2 AEROSOL RESPIRATORY (INHALATION) 2 TIMES DAILY
Status: DISCONTINUED | OUTPATIENT
Start: 2021-06-05 | End: 2021-06-09 | Stop reason: HOSPADM

## 2021-06-05 RX ORDER — CLONIDINE HYDROCHLORIDE 0.1 MG/1
0.2 TABLET ORAL 3 TIMES DAILY
Status: DISCONTINUED | OUTPATIENT
Start: 2021-06-05 | End: 2021-06-09 | Stop reason: HOSPADM

## 2021-06-05 RX ADMIN — MONTELUKAST 10 MG: 10 TABLET, FILM COATED ORAL at 11:46

## 2021-06-05 RX ADMIN — ASPIRIN 81 MG: 81 TABLET, CHEWABLE ORAL at 11:46

## 2021-06-05 RX ADMIN — CLOPIDOGREL BISULFATE 75 MG: 75 TABLET ORAL at 11:46

## 2021-06-05 RX ADMIN — CITALOPRAM HYDROBROMIDE 40 MG: 20 TABLET ORAL at 11:46

## 2021-06-05 RX ADMIN — FUROSEMIDE 40 MG: 10 INJECTION, SOLUTION INTRAMUSCULAR; INTRAVENOUS at 10:25

## 2021-06-05 RX ADMIN — IPRATROPIUM BROMIDE AND ALBUTEROL SULFATE 1 AMPULE: 2.5; .5 SOLUTION RESPIRATORY (INHALATION) at 01:09

## 2021-06-05 RX ADMIN — Medication 10 ML: at 08:53

## 2021-06-05 RX ADMIN — METHYLPREDNISOLONE SODIUM SUCCINATE 40 MG: 40 INJECTION, POWDER, FOR SOLUTION INTRAMUSCULAR; INTRAVENOUS at 23:23

## 2021-06-05 RX ADMIN — METHYLPREDNISOLONE SODIUM SUCCINATE 40 MG: 40 INJECTION, POWDER, FOR SOLUTION INTRAMUSCULAR; INTRAVENOUS at 17:38

## 2021-06-05 RX ADMIN — FUROSEMIDE 40 MG: 10 INJECTION, SOLUTION INTRAMUSCULAR; INTRAVENOUS at 04:25

## 2021-06-05 RX ADMIN — BUDESONIDE AND FORMOTEROL FUMARATE DIHYDRATE 2 PUFF: 160; 4.5 AEROSOL RESPIRATORY (INHALATION) at 20:30

## 2021-06-05 RX ADMIN — Medication 3 MG: at 21:31

## 2021-06-05 RX ADMIN — CLONIDINE HYDROCHLORIDE 0.2 MG: 0.1 TABLET ORAL at 21:33

## 2021-06-05 RX ADMIN — METHYLPREDNISOLONE SODIUM SUCCINATE 40 MG: 40 INJECTION, POWDER, FOR SOLUTION INTRAMUSCULAR; INTRAVENOUS at 10:25

## 2021-06-05 RX ADMIN — Medication 10 ML: at 21:33

## 2021-06-05 RX ADMIN — AMLODIPINE BESYLATE 10 MG: 10 TABLET ORAL at 11:46

## 2021-06-05 RX ADMIN — IPRATROPIUM BROMIDE AND ALBUTEROL SULFATE 1 AMPULE: .5; 3 SOLUTION RESPIRATORY (INHALATION) at 01:09

## 2021-06-05 RX ADMIN — FUROSEMIDE 60 MG: 10 INJECTION, SOLUTION INTRAVENOUS at 17:37

## 2021-06-05 RX ADMIN — MORPHINE SULFATE 2 MG: 2 INJECTION, SOLUTION INTRAMUSCULAR; INTRAVENOUS at 04:25

## 2021-06-05 RX ADMIN — IPRATROPIUM BROMIDE AND ALBUTEROL SULFATE 1 AMPULE: .5; 3 SOLUTION RESPIRATORY (INHALATION) at 15:37

## 2021-06-05 RX ADMIN — IPRATROPIUM BROMIDE AND ALBUTEROL SULFATE 1 AMPULE: .5; 3 SOLUTION RESPIRATORY (INHALATION) at 20:30

## 2021-06-05 RX ADMIN — CARVEDILOL 12.5 MG: 12.5 TABLET, FILM COATED ORAL at 11:46

## 2021-06-05 RX ADMIN — IPRATROPIUM BROMIDE AND ALBUTEROL SULFATE 1 AMPULE: .5; 3 SOLUTION RESPIRATORY (INHALATION) at 12:17

## 2021-06-05 RX ADMIN — METHYLPREDNISOLONE SODIUM SUCCINATE 60 MG: 125 INJECTION, POWDER, FOR SOLUTION INTRAMUSCULAR; INTRAVENOUS at 04:25

## 2021-06-05 RX ADMIN — INSULIN LISPRO 1 UNITS: 100 INJECTION, SOLUTION INTRAVENOUS; SUBCUTANEOUS at 12:50

## 2021-06-05 RX ADMIN — AZITHROMYCIN MONOHYDRATE 500 MG: 500 TABLET ORAL at 08:52

## 2021-06-05 RX ADMIN — CLONIDINE HYDROCHLORIDE 0.2 MG: 0.1 TABLET ORAL at 12:49

## 2021-06-05 RX ADMIN — INSULIN LISPRO 1 UNITS: 100 INJECTION, SOLUTION INTRAVENOUS; SUBCUTANEOUS at 21:31

## 2021-06-05 RX ADMIN — CARVEDILOL 12.5 MG: 12.5 TABLET, FILM COATED ORAL at 17:37

## 2021-06-05 RX ADMIN — ATORVASTATIN CALCIUM 40 MG: 40 TABLET, FILM COATED ORAL at 11:46

## 2021-06-05 RX ADMIN — ENOXAPARIN SODIUM 40 MG: 40 INJECTION SUBCUTANEOUS at 08:52

## 2021-06-05 ASSESSMENT — PAIN SCALES - GENERAL
PAINLEVEL_OUTOF10: 0
PAINLEVEL_OUTOF10: 6
PAINLEVEL_OUTOF10: 0
PAINLEVEL_OUTOF10: 6
PAINLEVEL_OUTOF10: 0
PAINLEVEL_OUTOF10: 0

## 2021-06-05 ASSESSMENT — ENCOUNTER SYMPTOMS
EYE ITCHING: 0
EYE DISCHARGE: 0
COLOR CHANGE: 0
CONSTIPATION: 0
VOMITING: 0
SHORTNESS OF BREATH: 1
COUGH: 1
WHEEZING: 1
ABDOMINAL PAIN: 0

## 2021-06-05 NOTE — PROGRESS NOTES
4 Eyes Skin Assessment     NAME:  Jenifer Gavin  YOB: 1945  MEDICAL RECORD NUMBER:  [id number]    The patient is being assess for  Admission    I agree that 2 RN's have performed a thorough Head to Toe Skin Assessment on the patient. ALL assessment sites listed below have been assessed. Areas assessed by both nurses:    Head, Face, Ears, Shoulders, Back, Chest, Arms, Elbows, Hands, Sacrum. Buttock, Coccyx, Ischium and Legs. Feet and Heels        Does the Patient have a Wound?  No noted wound(s)       Sander Prevention initiated:  NA   Wound Care Orders initiated:  NA    Pressure Injury (Stage 3,4, Unstageable, DTI, NWPT, and Complex wounds) if present place consult order under [de-identified] NA    New and Established Ostomies if present place consult order under : Yes      Nurse 1 eSignature: Electronically signed by Adonis Sanders RN on 6/5/21 at 6:15 AM EDT    **SHARE this note so that the co-signing nurse is able to place an eSignature**    Nurse 2 eSignature: Electronically signed by Sulema Paz RN on 6/5/21 at 6:20 AM EDT

## 2021-06-05 NOTE — RT PROTOCOL NOTE
RT Inhaler-Nebulizer Bronchodilator Protocol Note    There is a bronchodilator order in the chart from a provider indicating to follow the RT Bronchodilator Protocol and there is an Initiate RT Bronchodilator Protocol order as well (see protocol at bottom of note). The findings from the last RT Protocol Assessment were as follows:  Smoking: <15 Pack years  Surgical Status: No surgery  Xray: One lobe infiltrates/atelectasis/pleural effusion/edema  Respiratory Pattern: RR <12 or 21-30  Mental Status: Alert and Oriented  Breath Sounds: Diminished and/or crackles  Cough: Strong, productive  Activity Level: Walking with assistance  Oxygen Requirement: 29% or 3LNC - 5LNC/40%  Indication for Bronchodilator Therapy: Wheezing associated with pulm disorder  Bronchodilator Assessment Score: 6    Aerosolized bronchodilator medication orders have been revised according to the RT Bronchodilator Protocol. RT Inhaler-Nebulizer Bronchodilator Protocol:    Respiratory Therapist to perform RT Therapy Protocol Assessment then follow the protocol. No Indications - adjust the frequency to every 6 hours PRN wheezing or bronchospasm, if no treatments needed after 48 hours then discontinue using Per Protocol order mode. If indication present, adjust the RT bronchodilator orders based on the Bronchodilator Assessment Score as follows:    0-6 - enter or revise RT bronchodilator order to Albuterol Inhaler order with frequency of every 2 hours PRN for wheezing or increased work of breathing using Per Protocol order mode. If Albuterol Inhaler not tolerated or not effective, then discontinue the Albuterol Inhaler order and enter Albuterol Nebulizer order with same frequency and PRN reasons. Repeat RT Therapy Protocol Assessment as needed.     7-10 - discontinue any other Inpatient aerosolized bronchodilator medication orders and enter or revise two Albuterol Inhaler orders, one with BID frequency and one with frequency of every 2 hours PRN wheezing or increased work of breathing using Per Protocol order mode. Repeat RT Therapy Protocol Assessment with second treatment then BID and as needed. If Albuterol Inhaler not tolerated or not effective, then discontinue the Albuterol Inhaler orders and enter two Albuterol Nebulizer orders with same frequencies and PRN reasons. 11-13 - discontinue any other Inpatient aerosolized bronchodilator medication orders and enter DuoNeb Nebulizer orders QID frequency and an Albuterol Nebulizer order every 2 hours PRN wheezing or increased work of breathing using Per Protocol order mode. Repeat RT Therapy Protocol Assessment with second treatment then QID and as needed. Greater than 13 - discontinue any other Inpatient bronchodilator aerosolized medication orders and enter DuoNeb Nebulizer order every 4 hours frequency and Albuterol Nebulizer every 2 hours PRN wheezing or increased work of breathing using Per Protocol order mode. Repeat RT Therapy Protocol Assessment with second treatment then every 4 hours and as needed. RT to enter RT Home Evaluation for COPD & MDI Assessment order using Per Protocol order mode.     Electronically signed by Shaista Kemp RCP on 6/5/2021 at 3:41 PM

## 2021-06-05 NOTE — PROGRESS NOTES
Physical Therapy    Facility/Department: [address] PROGRESSIVE CARE  Initial Assessment    NAME: Cherie Faustin  : 1945  MRN: [id number]    Date of Service: 2021    Discharge Recommendations:  Home with assist PRN     Cherie Faustin scored a 23/24 on the AM-PAC short mobility form. At this time, no further PT is recommended upon discharge due to pt is at baseline level of function and is safe to go home with family support. Recommend patient returns to prior setting with prior services. Assessment   Body structures, Functions, Activity limitations: Decreased functional mobility   Assessment: Pt presents today on 5L O2 as at home, mildly SOB, reports chest feels\"tight\" wiht breathing but O2 sat 93 to 95%. All transfers and amb 100' I, no LOB, mils SOB but O2 sats good. No further PT needed, safe to d/c home when medically ready. Pt agrees. Treatment Diagnosis: decreased functional mobility following SOB  Prognosis: Good  Decision Making: Low Complexity  History: see above  Exam: see above  Clinical Presentation: resolving  REQUIRES PT FOLLOW UP: No  Activity Tolerance  Activity Tolerance: Patient Tolerated treatment well;Patient limited by endurance       Patient Diagnosis(es): The encounter diagnosis was Acute respiratory distress. has a past medical history of Arthritis, Atrial fibrillation ([address] CAD (coronary artery disease), CHF (congestive heart failure) ([address] Colostomy care ([address] COPD (chronic obstructive pulmonary disease) ([address] Hyperlipidemia, Hypertension, Kidney disease, Peripheral vascular disease ([address] Rectal fissure, Restless legs syndrome, Sleep apnea, Thyroid disease, and Unspecified sleep apnea. has a past surgical history that includes Cholecystectomy; hernia repair; Coronary artery bypass graft; Cardiac catheterization (2017); colostomy (2018); Upper gastrointestinal endoscopy (N/A, 2019); Abdomen surgery;  Colonoscopy; fracture surgery; and vascular surgery. Restrictions  Restrictions/Precautions  Restrictions/Precautions: Up Ad Marie  Position Activity Restriction  Other position/activity restrictions: O2 5L  Vision/Hearing  Vision: Impaired  Vision Exceptions: Wears glasses for reading  Hearing: Within functional limits     Subjective  Pain Screening  Patient Currently in Pain: Denies          Orientation  Orientation  Overall Orientation Status: Within Normal Limits  Social/Functional History  Social/Functional History  Lives With:  (granddaughter)  Type of Home: Apartment  Home Layout: One level  Home Access: Stairs to enter with rails  Entrance Stairs - Number of Steps: 5+4  Entrance Stairs - Rails: Right  Bathroom Shower/Tub: Tub/Shower unit  Bathroom Toilet: Standard  Home Equipment: Oxygen (5L)  ADL Assistance: Independent  Homemaking Assistance: Needs assistance (granddaughter performs)  Homemaking Responsibilities: Yes (pt helps with cooking and cleaning, pt folds laundry)  Ambulation Assistance: Independent  Transfer Assistance: Independent  Active : Yes  Leisure & Hobbies: scrapbooking, wants to volunteer when she can walk more  Cognition   Cognition  Overall Cognitive Status: WFL    Objective          AROM RLE (degrees)  RLE AROM: WFL  AROM LLE (degrees)  LLE AROM : WFL  Strength RLE  Strength RLE: WFL  Strength LLE  Strength LLE: WFL  Tone RLE  RLE Tone: Normotonic  Tone LLE  LLE Tone: Normotonic  Motor Control  Gross Motor?: WNL  Coordination  Rapid Alternating Movements: Normal     Bed mobility  Rolling to Left: Independent  Rolling to Right: Independent  Supine to Sit: Independent  Sit to Supine: Independent  Scooting: Independent  Transfers  Sit to Stand: Independent  Stand to sit:  Independent  Ambulation  Ambulation?: Yes  More Ambulation?: No  Ambulation 1  Surface: level tile  Device: No Device  Assistance: Supervision  Quality of Gait: good pace and pattern, mild SOB but O2 sat = 94 to 95%, HE in 80's  Gait Deviations: None  Distance: 100' x 2     Balance  Posture: Good  Sitting - Static: Good  Sitting - Dynamic: Good  Standing - Static: Good  Standing - Dynamic: Good        Plan   Plan  Times per week: no further PT needed  Safety Devices  Type of devices: All fall risk precautions in place, Call light within reach, Gait belt, Left in chair, Nurse notified  AM-PAC Score  AM-PAC Inpatient Mobility Raw Score : 23 (06/05/21 1310)  AM-PAC Inpatient T-Scale Score : 56.93 (06/05/21 1310)  Mobility Inpatient CMS 0-100% Score: 11.2 (06/05/21 1310)  Mobility Inpatient CMS G-Code Modifier : CI (06/05/21 1310)   Goals  Patient Goals   Patient goals : none set: no further PT needed, up ad eda in room, RN noified   Therapy Time   Individual Concurrent Group Co-treatment   Time In 1235         Time Out 1310         Minutes 35         Timed Code Treatment Minutes: 35 Minutes   This eval will serve as the d/c summary.   Rachael Wilson, [address]

## 2021-06-05 NOTE — RT PROTOCOL NOTE
RT Inhaler-Nebulizer Bronchodilator Protocol Note    There is a bronchodilator order in the chart from a provider indicating to follow the RT Bronchodilator Protocol and there is an Initiate RT Bronchodilator Protocol order as well (see protocol at bottom of note). The findings from the last RT Protocol Assessment were as follows:  Smoking: <15 Pack years  Surgical Status: No surgery  Xray: One lobe infiltrates/atelectasis/pleural effusion/edema  Respiratory Pattern: RR 12-20  Mental Status: Alert and Oriented  Breath Sounds: Wheezing and/or rhonchi  Cough: Strong, spontaneous, non-productive  Activity Level: Walking unassisted  Oxygen Requirement: 29% or 3LNC - 5LNC/40%  Indication for Bronchodilator Therapy: Wheezing associated with pulm disorder  Bronchodilator Assessment Score: 6    Aerosolized bronchodilator medication orders have been revised according to the RT Bronchodilator Protocol. RT Inhaler-Nebulizer Bronchodilator Protocol:    Respiratory Therapist to perform RT Therapy Protocol Assessment then follow the protocol. No Indications - adjust the frequency to every 6 hours PRN wheezing or bronchospasm, if no treatments needed after 48 hours then discontinue using Per Protocol order mode. If indication present, adjust the RT bronchodilator orders based on the Bronchodilator Assessment Score as follows:    0-6 - enter or revise RT bronchodilator order to Albuterol Inhaler order with frequency of every 2 hours PRN for wheezing or increased work of breathing using Per Protocol order mode. If Albuterol Inhaler not tolerated or not effective, then discontinue the Albuterol Inhaler order and enter Albuterol Nebulizer order with same frequency and PRN reasons. Repeat RT Therapy Protocol Assessment as needed.     7-10 - discontinue any other Inpatient aerosolized bronchodilator medication orders and enter or revise two Albuterol Inhaler orders, one with BID frequency and one with frequency of every 2 hours PRN wheezing or increased work of breathing using Per Protocol order mode. Repeat RT Therapy Protocol Assessment with second treatment then BID and as needed. If Albuterol Inhaler not tolerated or not effective, then discontinue the Albuterol Inhaler orders and enter two Albuterol Nebulizer orders with same frequencies and PRN reasons. 11-13 - discontinue any other Inpatient aerosolized bronchodilator medication orders and enter DuoNeb Nebulizer orders QID frequency and an Albuterol Nebulizer order every 2 hours PRN wheezing or increased work of breathing using Per Protocol order mode. Repeat RT Therapy Protocol Assessment with second treatment then QID and as needed. Greater than 13 - discontinue any other Inpatient bronchodilator aerosolized medication orders and enter DuoNeb Nebulizer order every 4 hours frequency and Albuterol Nebulizer every 2 hours PRN wheezing or increased work of breathing using Per Protocol order mode. Repeat RT Therapy Protocol Assessment with second treatment then every 4 hours and as needed. RT to enter RT Home Evaluation for COPD & MDI Assessment order using Per Protocol order mode.     Electronically signed by Nilda Mcgrath RCP on 6/5/2021 at 9:15 AM

## 2021-06-05 NOTE — PLAN OF CARE
Problem: Pain:  Goal: Pain level will decrease  Description: Pain level will decrease  Outcome: Ongoing  Goal: Control of acute pain  Description: Control of acute pain  Outcome: Ongoing  Goal: Control of chronic pain  Description: Control of chronic pain  Outcome: Ongoing  Goal: Patient's pain/discomfort is manageable  Description: Patient's pain/discomfort is manageable  Outcome: Ongoing   Alert and oriented x4 Dyspnic with exertion Sats. WNL on 5L O2 per n/c Lungs diminished.  Cough and deep breathing exercises encouraged No c/o pain Up to bathroom independantly Free from fall/injury

## 2021-06-05 NOTE — CONSULTS
[address] [facility]  1945 June 5, 2021    Reason for Consult: Shortness of breath     CC: Shortness of breath    HPI:  The patient is 70 y.o. female with a past medical history significant for PAD, COPD with chronic respiratory failure with hypoxia (5 liters chronically), chronic diastolic CHF, atrial fibrillation s/p Watchman Device 03/10/21 by Dr. Deirdre Otero who presented to [facility] ED with shortness of breath. I was asked to see her for this. Erik Arechiga states that she was in the hospital a week ago. She relates that when she gets her iron infusions on Fridays as an outpatient she is given a lot of fluid with it. After these infusions, she gets short of breath. Her blood pressure will elevate to >[id number] systolic. She received her infusion yesterday and became short of breath . She was still a little dyspneic from her last admission a week ago. Today, she states her breathing is \"good\" after receiving IV lasix. She is still having some difficulty breathing though. Review of Systems:  Constitutional: No fatigue, weakness, night sweats or fever. HEENT: No new vision difficulties or ringing in the ears. Respiratory: No new SOB, PND, orthopnea or cough. Cardiovascular: See HPI   GI: No n/v, diarrhea, constipation, abdominal pain or changes in bowel habits. No melena, no hematochezia  : No urinary frequency, urgency, incontinence, hematuria or dysuria. Skin: No cyanosis or skin lesions. Musculoskeletal: No new muscle or joint pain. Neurological: No syncope or TIA-like symptoms.   Psychiatric: No anxiety, insomnia or depression     Past Medical History:   Diagnosis Date    Arthritis     Atrial fibrillation ([address] 1/4/2021    CAD (coronary artery disease)     Nonobstructive on cath in 9/2017    CHF (congestive heart failure) ([address]     Colostomy care ([address] 4/25/2018    Peristomal irritation and early leaking    [facility]     Hyperlipidemia     Hypertension     Kidney disease     Stage 3    Peripheral vascular disease ([facility]     Rectal fissure 2014    surgery pending    Restless legs syndrome     Sleep apnea     O2 3 liters at hs    Thyroid disease     Unspecified sleep apnea      Past Surgical History:   Procedure Laterality Date    ABDOMEN SURGERY      CARDIAC CATHETERIZATION  2017    Dr. Desmond Whyte  2018    CORONARY ARTERY BYPASS GRAFT      Legs & Carotid    FRACTURE SURGERY      HERNIA REPAIR      UPPER GASTROINTESTINAL ENDOSCOPY N/A 2019    EGD DIAGNOSTIC ONLY performed by Russell Hollingsworth MD at [address] VASCULAR SURGERY       Family History   Problem Relation Age of Onset    Heart Disease Mother     Heart Disease Father     Heart Disease Sister     Cancer Brother      Social History     Tobacco Use    Smoking status: Former Smoker     Packs/day: 3.00     Years: 30.00     Pack years: 90.00     Start date: 1963     Quit date: 1992     Years since quittin.3    Smokeless tobacco: Never Used    Tobacco comment: QUIT 20 YRS AGO   Vaping Use    Vaping Use: Never used   Substance Use Topics    Alcohol use: No     Alcohol/week: 0.0 standard drinks    Drug use: No       Allergies   Allergen Reactions    Iv Dye [Iodides]      Flushed, broke out and difficulty breathing     Current Facility-Administered Medications   Medication Dose Route Frequency Provider Last Rate Last Admin    sodium chloride flush 0.9 % injection 5-40 mL  5-40 mL Intravenous 2 times per day Cuco JENNIFER Munizni, DO   10 mL at 21 0853    sodium chloride flush 0.9 % injection 5-40 mL  5-40 mL Intravenous PRN Cuco JENNIFER Munizni, DO        0.9 % sodium chloride infusion  25 mL Intravenous PRN Cuco JENNIFER Munizni, DO        ondansetron (ZOFRAN-ODT) disintegrating tablet 4 mg  4 mg Oral Q8H PRN Cuco JENNIFER Turner, DO        Or    ondansetron (ZOFRAN) injection 4 mg  4 mg Intravenous Q6H PRN [facility],         enoxaparin (LOVENOX) injection 40 mg  40 mg Subcutaneous Daily [facility], DO   40 mg at 06/05/21 0852    acetaminophen (TYLENOL) tablet 650 mg  650 mg Oral Q6H PRN [facility],         Or    acetaminophen (TYLENOL) suppository 650 mg  650 mg Rectal Q6H PRN [facility], DO        methylPREDNISolone sodium (SOLU-MEDROL) injection 40 mg  40 mg Intravenous Q6H [facility], DO   40 mg at 06/05/21 1025    Followed by   John Hardy ON 6/7/2021] predniSONE (DELTASONE) tablet 40 mg  40 mg Oral Daily [facility],         azithromycin (ZITHROMAX) tablet 500 mg  500 mg Oral Daily [facility], DO   500 mg at 06/05/21 0852    ipratropium-albuterol (DUONEB) nebulizer solution 1 ampule  1 ampule Inhalation Q4H [facility],         furosemide (LASIX) injection 40 mg  40 mg Intravenous Daily Ernst Freeman MD   40 mg at 06/05/21 1025    labetalol (NORMODYNE;TRANDATE) injection 10 mg  10 mg Intravenous Q4H PRN Ernst Freeman MD        amLODIPine (NORVASC) tablet 10 mg  10 mg Oral Daily Ernst Freeman MD        albuterol sulfate  (90 Base) MCG/ACT inhaler 2 puff  2 puff Inhalation Q4H PRN Ernst Freeman MD        aspirin chewable tablet 81 mg  81 mg Oral Daily Ernst Freeman MD        atorvastatin (LIPITOR) tablet 40 mg  40 mg Oral Daily Ernst Freeman MD        budesonide-formoterol (SYMBICORT) 160-4.5 MCG/ACT inhaler 2 puff  2 puff Inhalation BID Ernst Freeman MD        carvedilol (COREG) tablet 12.5 mg  12.5 mg Oral BID WC Ernst Freeman MD        citalopram (CELEXA) tablet 40 mg  40 mg Oral Daily Ernst Freeman MD        cloNIDine (CATAPRES) tablet 0.2 mg  0.2 mg Oral TID Ernst Freeman MD        clopidogrel (PLAVIX) tablet 75 mg  75 mg Oral Daily Ernst Freeman MD        [START ON 6/6/2021] levothyroxine (SYNTHROID) tablet 50 mcg  50 mcg Oral QAM AC Mandy Camarillo MD       Jailene Peterson ON 6/6/2021] pantoprazole (PROTONIX) tablet 40 mg  40 mg Oral QAM AC Ernst Freeman MD        LORazepam (ATIVAN) tablet 0.5 mg  0.5 mg Oral Daily PRN Ernst Freeman MD        melatonin tablet 3 mg  3 mg Oral Nightly PRN Ernst Freeman MD        montelukast (SINGULAIR) tablet 10 mg  10 mg Oral Daily Ernst Freeman MD        tiotropium (SPIRIVA RESPIMAT) 2.5 MCG/ACT inhaler 2 puff  2 puff Inhalation Daily Ernst Freeman MD           Physical Exam:   BP (!) 185/67   Pulse 67   Temp 97.9 °F (36.6 °C) (Oral)   Resp 18   Ht 5' (1.524 m)   Wt 162 lb 0.6 oz (73.5 kg)   LMP 05/01/2006 (Approximate)   SpO2 98%   BMI 31.65 kg/m²     Intake/Output Summary (Last 24 hours) at 6/5/2021 1144  Last data filed at 6/5/2021 1028  Gross per 24 hour   Intake 180 ml   Output --   Net 180 ml     Wt Readings from Last 2 Encounters:   06/05/21 162 lb 0.6 oz (73.5 kg)   05/31/21 160 lb 11.5 oz (72.9 kg)     Constitutional: She is oriented to person, place, and time. She appears well-developed and well-nourished. In no acute distress. Head: Normocephalic and atraumatic. Neck: Neck supple. No JVD present. Carotid bruit is not present. No mass and no thyromegaly present. No lymphadenopathy present. Cardiovascular: Normal rate, regular rhythm, normal heart sounds and intact distal pulses. Exam reveals no gallop and no friction rub. No murmur heard. Pulmonary/Chest: Effort normal and breath sounds diminished bilaterally. No respiratory distress. She has no wheezes, rhonchi or rales. Abdominal: Soft, non-tender. Bowel sounds and aorta are normal. She exhibits no organomegaly, mass or bruit. Extremities: No edema, cyanosis, or clubbing. Pulses are 2+ radial/carotid/dorsalis pedis and posterior tibial bilaterally. Neurological: She is alert and oriented to person, place, and time. She has normal reflexes. No cranial nerve deficit.  Coordination normal.   Skin: Skin is warm and dry. There is no rash or diaphoresis. Psychiatric: She has a normal mood and affect. Her speech is normal and behavior is normal.     EKG Interpretation: Normal sinus rhythm with LVH    Lab Review:   Lab Results   Component Value Date    TRIG 153 01/22/2021    HDL 44 01/22/2021    LDLCALC 102 01/22/2021    LDLDIRECT 120 01/09/2018    LABVLDL 31 01/22/2021     Lab Results   Component Value Date     06/05/2021    K 4.0 06/05/2021    BUN 27 06/05/2021    CREATININE 1.2 06/05/2021     Recent Labs     06/05/21  0113   WBC 9.6   HGB 9.0*   HCT 28.7*        Echo Limited 2/12/21:  Limited study to assess for pericardial effusion s/p watchman. No evidence of pericardial effusion seen      MIKO 03/01/21:  A 27mm Watchman device appears well seated in the left atrial appendage,   with no evidence of leaks by color flow Doppler. No pericardial effusion noted. TTE 2/14/20:  Left ventricular cavity size is normal.   There is mild concentric left ventricular hypertrophy. Ejection fraction is visually estimated to be 55-60%. Grade I diastolic   dysfunction   Mild to moderate mitral regurgitation. Mild to moderate tricuspid regurgitation with RVSP of 51 mmHg. Assessment:  2. Acute on Chronic Diastolic CHF  2. Acute on chronic Respiratory Failure with hypoxa  3. Atrial Fibrillation, paroxysmal  4. Essential Hypertension      Plan:  Nita Arriola has a very tenuous respiratory status at baseline being on 5 L of oxygen chronically. It appears as if she is back to her baseline level of oxygenation at 5 L. I would actually go ahead and give her an additional 60 mg of IV Lasix now. She continues to improve I think she can be discharged from the hospital on observation status. I would hold off on giving her any further iron transfusions. She is also quite hypertensive. I would start her on carvedilol 6.25 mg twice daily. I do not think this will affect her respiratory status adversely.   We can continue to follow along with her but if she is doing better later on today she can be discharged to home. It is certainly possible that this is just a COPD exacerbation for her.

## 2021-06-05 NOTE — ED NOTES
Pt requesting to use bathroom. Provided with bedside commode.  Pt able to use commode and return to bed without issue     Jennifer Marx RN  06/05/21 9354

## 2021-06-05 NOTE — ED PROVIDER NOTES
[address] [facility]      Pt Name: Julius Kulkarni  MRN: [id number]  [place] 1945  Date of evaluation: 6/5/2021  Provider: Ellouise Riedel, MD    CHIEF COMPLAINT       Chief Complaint   Patient presents with    Respiratory Distress       HISTORY OF PRESENT ILLNESS    Julius Kulkarni is a 70 y.o. female who presents to the emergency department with respiratory distress. Patient presents via EMS in acute hypoxic respiratory distress. She arrived on CPAP. Patient initially placed on BiPAP and then weaned to nasal cannula. She endorses acute onset of shortness of breath starting earlier today progressively worsening. She is chronically on 5 L nasal cannula. Endorses dry cough. No fevers or chills. Intermittent chest pain. No other associated symptoms. History was limited secondary to patient's degree of respiratory distress. Nursing Notes were reviewed. Including nursing noted for FM, Surgical History, Past Medical History, Social History, vitals, and allergies; agree with all. REVIEW OF SYSTEMS       Review of Systems   Constitutional: Negative for diaphoresis and unexpected weight change. HENT: Negative for congestion and dental problem. Eyes: Negative for discharge and itching. Respiratory: Positive for cough, shortness of breath and wheezing. Cardiovascular: Positive for chest pain. Negative for leg swelling. Gastrointestinal: Negative for abdominal pain, constipation and vomiting. Endocrine: Negative for cold intolerance and heat intolerance. Genitourinary: Negative for vaginal bleeding, vaginal discharge and vaginal pain. Musculoskeletal: Negative for neck pain and neck stiffness. Skin: Negative for color change and pallor. Neurological: Negative for tremors and weakness. Psychiatric/Behavioral: Negative for agitation and behavioral problems.        Except as noted above the remainder of the review of systems was reviewed and negative.      PAST MEDICAL HISTORY     Past Medical History:   Diagnosis Date    Arthritis     Atrial fibrillation ([facility] 1/4/2021    CAD (coronary artery disease)     Nonobstructive on cath in 9/2017    CHF (congestive heart failure) ([facility]     Colostomy care ([facility] 4/25/2018    Peristomal irritation and early leaking    COPD (chronic obstructive pulmonary disease) (HCC)     Hyperlipidemia     Hypertension     Kidney disease     Stage 3    Peripheral vascular disease ([facility]     Rectal fissure 11/2014    surgery pending    Restless legs syndrome     Sleep apnea     O2 3 liters at hs    Thyroid disease     Unspecified sleep apnea        SURGICAL HISTORY       Past Surgical History:   Procedure Laterality Date    ABDOMEN SURGERY      CARDIAC CATHETERIZATION  09/13/2017    Dr. Chelly Jaimes  03/09/2018    CORONARY ARTERY BYPASS GRAFT      Legs & Carotid    FRACTURE SURGERY      HERNIA REPAIR      UPPER GASTROINTESTINAL ENDOSCOPY N/A 9/30/2019    EGD DIAGNOSTIC ONLY performed by Mercedes Devine MD at [facility]       Previous Medications    ALBUTEROL SULFATE HFA (VENTOLIN HFA) 108 (90 BASE) MCG/ACT INHALER    Inhale 2 puffs into the lungs every 4 hours as needed for Wheezing or Shortness of Breath    AMLODIPINE (NORVASC) 10 MG TABLET    Take 1 tablet by mouth daily    ASPIRIN 81 MG TABLET    Take 1 tablet by mouth daily    ATORVASTATIN (LIPITOR) 40 MG TABLET    TAKE ONE TABLET BY MOUTH DAILY    BUDESONIDE-FORMOTEROL (SYMBICORT) 160-4.5 MCG/ACT AERO    Inhale 2 puffs into the lungs 2 times daily    CARVEDILOL (COREG) 12.5 MG TABLET    Take 1 tablet by mouth 2 times daily (with meals)    CITALOPRAM (CELEXA) 40 MG TABLET    TAKE ONE TABLET BY MOUTH DAILY    CLONIDINE (CATAPRES) 0.2 MG TABLET    Take 1 tablet by mouth 3 times daily    CLOPIDOGREL (PLAVIX) 75 MG TABLET    Take 1 tablet by mouth daily    DICLOFENAC SODIUM (VOLTAREN) 1 % GEL    Apply 2 g topically 3 times daily as needed for Pain    FUROSEMIDE (LASIX) 40 MG TABLET    TAKE ONE TABLET BY MOUTH TWICE A DAY    HYDRALAZINE (APRESOLINE) 100 MG TABLET    TAKE ONE TABLET BY MOUTH THREE TIMES A DAY    IPRATROPIUM-ALBUTEROL (DUONEB) 0.5-2.5 (3) MG/3ML SOLN NEBULIZER SOLUTION    Inhale 3 mLs into the lungs every 4 hours as needed for Shortness of Breath    LANSOPRAZOLE (PREVACID) 15 MG DELAYED RELEASE CAPSULE    Take 1 capsule by mouth daily    LEVOTHYROXINE (SYNTHROID) 50 MCG TABLET    TAKE ONE TABLET BY MOUTH DAILY    LIDOCAINE (XYLOCAINE) 2 % JELLY    Apply pea-sized amount topically to affected area every 8 hours when necessary pain    LIDOCAINE (XYLOCAINE) 5 % OINTMENT    Apply topically as needed. LORAZEPAM (ATIVAN) 1 MG TABLET    TAKE ONE TABLET BY MOUTH DAILY AS NEEDED FOR ANXIETY    MELATONIN 3 MG TABS TABLET    Take 1 tablet by mouth nightly as needed (sleep)    MINERAL OIL-HYDROPHILIC PETROLATUM (HYDROPHOR) OINTMENT    Apply topically as needed.     MONTELUKAST (SINGULAIR) 10 MG TABLET    Take 1 tablet by mouth daily    ONDANSETRON (ZOFRAN) 4 MG TABLET    Take 1 tablet by mouth every 8 hours as needed for Nausea    TIOTROPIUM (SPIRIVA RESPIMAT) 2.5 MCG/ACT AERS INHALER    Inhale 2 puffs into the lungs daily       ALLERGIES     Iv dye [iodides]    FAMILY HISTORY        Family History   Problem Relation Age of Onset    Heart Disease Mother     Heart Disease Father     Heart Disease Sister     Cancer Brother        SOCIAL HISTORY       Social History     Socioeconomic History    Marital status:      Spouse name: None    Number of children: 6    Years of education: 15    Highest education level: None   Occupational History    None   Tobacco Use    Smoking status: Former Smoker     Packs/day: 3.00     Years: 30.00     Pack years: 90.00     Start date: 1963     Quit date: 1992     Years since quittin.3    Smokeless tobacco: Never Used    Tobacco comment: QUIT 21 YRS AGO   Vaping Use    Vaping Use: Never used   Substance and Sexual Activity    Alcohol use: No     Alcohol/week: 0.0 standard drinks    Drug use: No    Sexual activity: Not Currently   Other Topics Concern    None   Social History Narrative    None     Social Determinants of Health     Financial Resource Strain:     Difficulty of Paying Living Expenses:    Food Insecurity:     Worried About Running Out of Food in the Last Year:     Ran Out of Food in the Last Year:    Transportation Needs:     Lack of Transportation (Medical):  Lack of Transportation (Non-Medical):    Physical Activity:     Days of Exercise per Week:     Minutes of Exercise per Session:    Stress:     Feeling of Stress :    Social Connections:     Frequency of Communication with Friends and Family:     Frequency of Social Gatherings with Friends and Family:     Attends Ca[employer] Services:     Active Member of Clubs or Organizations:     Attends Club or Organization Meetings:     Marital Status:    Intimate Partner Violence:     Fear of Current or Ex-Partner:     Emotionally Abused:     Physically Abused:     Sexually Abused:        PHYSICAL EXAM       ED Triage Vitals [06/05/21 0047]   BP Temp Temp Source Pulse Resp SpO2 Height Weight   118/81 98.1 °F (36.7 °C) Oral 83 26 99 % -- 168 lb 6.9 oz (76.4 kg)       Physical Exam  Vitals and nursing note reviewed. Constitutional:       General: She is not in acute distress. Appearance: She is well-developed. She is ill-appearing. She is not toxic-appearing or diaphoretic. HENT:      Head: Normocephalic and atraumatic. Right Ear: External ear normal.      Left Ear: External ear normal.   Eyes:      General:         Right eye: No discharge. Left eye: No discharge. Conjunctiva/sclera: Conjunctivae normal.      Pupils: Pupils are equal, round, and reactive to light.    Cardiovascular:      Rate and (*)     Glucose 276 (*)     BUN 27 (*)     GFR Non- 44 (*)     GFR  53 (*)     All other components within normal limits    Narrative:     Performed at:  [facility]  [address], [place] [address]   Phone [phone]   BRAIN NATRIURETIC PEPTIDE - Abnormal; Notable for the following components:    Pro-BNP 2,997 (*)     All other components within normal limits    Narrative:     Performed at:  [facility]  [address] [facility] [place] [address]   Phone [phone]   BLOOD GAS, VENOUS - Abnormal; Notable for the following components:    pCO2, Ralph 55.5 (*)     HCO3, Venous 35 (*)     All other components within normal limits    Narrative:     Performed at:  [facility]  [address], [place] [address]   Phone [phone]   TROPONIN    Narrative:     Performed at:  [place] [address], [place] [address]   Phone [phone]       All other labs were withinnormal range or not returned as of this dictation. EMERGENCY DEPARTMENT COURSE and DIFFERENTIAL DIAGNOSIS/MDM:     PMH, Surgical Hx, FH, Social Hx reviewed by myself (ETOH usage, Tobacco usage, Drug usage reviewed by myself, no pertinent Hx)- No Pertinent Hx     Old records were reviewed by me    72-year-old female with acute hypoxic respiratory distress. She was weaned to 5 L nasal cannula. She was given steroids and nebs. Feels significantly better. Will admit for further inpatient evaluation. CRITICAL CARE TIME   Total Critical Caretime was 39 minutes, excluding separately reportable procedures. There was a high probability of clinically significant/life threatening deterioration in the patient's condition which required my urgent intervention. PROCEDURES:  Unlessotherwise noted below, none    FINAL IMPRESSION      1.  Acute respiratory

## 2021-06-05 NOTE — PROGRESS NOTES
Medication Reconciliation    List of medications patient is currently taking is complete. Source of information: 1. Discharge med list from 05/31/21                                      2. EPIC records      Allergies  Iv dye [iodides]     Notes regarding home medications:   1. Med rec completed by pharmacist upon last admission. Just discharge 5 days ago. Med list accurate.

## 2021-06-05 NOTE — PROGRESS NOTES
Occupational Therapy   Occupational Therapy Initial Assessment and Discharge    Date: 2021   Patient Name: Nidhi Garnica  MRN: [id number]     : 1945    Date of Service: 2021    Discharge Recommendations:  Home with assist PRN  OT Equipment Recommendations  Equipment Needed: No    Nidhi Garnica scored a 24/24 on the AM-PAC ADL Inpatient form. At this time, no further OT is recommended upon discharge due to pt at baseline level of independence. Recommend patient returns to prior setting with prior services. Assessment   Assessment: Pt is a 70 y.o. female admitted to hospital with worsening SOB. Pt being treated for COPD exacerbation. PTA, pt lives with her granddaughter. Pt was independent with ADLs, granddaughter did most IADLs. Pt was independent with fxl transfer/smobility and managing her O2. Currently, pt is functioning at her baseline level of occupational performance. She is independent for fxl transfers, mobility without device, and anticipate also independent for ADLs. Her O2 sats on 5L O2 per NC remained stable and WFL thorughout (92-94%) with activity. At this time, plan to d/c pt from acute OT services and she is in agreement with this plan. Once medically stable, anticipate pt will be safe to d/c home with assist prn. Should the need for additional OT services arise, please re-consult. Treatment Diagnosis: pt is at baseline level of occupational performance  Prognosis: Good  Decision Making: Low Complexity  History: Pt is a 70 y.o. female admitted to hospital with worsening SOB. Pt being treated for COPD exacerbation. PTA, pt lives with her granddaughter. Pt was independent with ADLs, granddaughter did most IADLs. Pt was independent with fxl transfer/smobility and managing her O2. Exam: at baseline level of occupational performance  Assistance / Modification: Currently, pt is functioning at her baseline level of occupational performance.  She is independent for fxl transfers, mobility without device, and anticipate also independent for ADLs. Her O2 sats on 5L O2 per NC remained stable and WFL thorughout (92-94%) with activity. OT Education: OT Role;Plan of Care;Transfer Training  Patient Education: discharge recommendation- pt in agreement with plan  Barriers to Learning: none  No Skilled OT: Independent with functional mobility; Independent with ADL's;At baseline function; Safe to return home; No OT goals identified  REQUIRES OT FOLLOW UP: No  Activity Tolerance  Activity Tolerance: Patient Tolerated treatment well  Activity Tolerance: O2 sats on 5 L O2 per NC WFL (92-94% with activity). Safety Devices  Safety Devices in place: Yes  Type of devices: Call light within reach;Gait belt;Left in chair;Nurse notified (PCA in room & aware pt safe for up ad marie. RN Calin Sarah) also notified and in agreement for pt to be up at marie.)           Patient Diagnosis(es): The encounter diagnosis was Acute respiratory distress. has a past medical history of Arthritis, Atrial fibrillation ([address] CAD (coronary artery disease), CHF (congestive heart failure) ([address] Colostomy care ([address] COPD (chronic obstructive pulmonary disease) ([address] Hyperlipidemia, Hypertension, Kidney disease, Peripheral vascular disease ([address] Rectal fissure, Restless legs syndrome, Sleep apnea, Thyroid disease, and Unspecified sleep apnea. has a past surgical history that includes Cholecystectomy; hernia repair; Coronary artery bypass graft; Cardiac catheterization (09/13/2017); colostomy (03/09/2018); Upper gastrointestinal endoscopy (N/A, 9/30/2019); Abdomen surgery; Colonoscopy; fracture surgery; and vascular surgery.     Treatment Diagnosis: pt is at baseline level of occupational performance      Restrictions  Restrictions/Precautions  Restrictions/Precautions: Up Ad Marie  Position Activity Restriction  Other position/activity restrictions: O2 5L    Subjective   General  Chart Reviewed: Yes, Progress Notes, History and Physical, Orders, Imaging, Labs  Additional Pertinent Hx: Per Dr. Nilam Jenkins H&P 6/5/21: \"71 y.o. female who presented to [facility] with worsening shortness of breath, to note that patient recently discharged from the hospital, her shortness of breath is getting worse for a day or so, no obvious alleviating or aggravating factors in the context of her COPD elevated blood pressure and potential heart failure, still having some cough, denies fever chills nausea vomiting, feels better after being on BiPAP and CPAP, currently on nasal cannula, denies blurry or double vision at this time. \" 1. Acute on chronic respiratory failure with hypoxia, initially was on CPAP and BiPAP currently back to nasal cannula on 5 L she is normally on 4 L at home, etiology likely multifactorial due to potential COPD and am suspecting heart failure due to elevated blood pressure. Telemetry monitoring, oxygen and treatment for COPD and heart failure2. Possible COPD exacerbation, on steroids, continue azithromycin. DuoNeb. 3. Acute on chronic congestive heart failure suspecting diastolic, likely due to uncontrolled hypertension, will consult cardiology for recommendations, starting on Lasix daily. 4. A. fib status post watchman procedure5. Uncontrolled hypertension, resume p.o. medications for now monitor closely, adding as needed. PMH includes: Arthritis, CAD, CHF, COPD, HLD, HTN, thyroid disease, afib, colostomy, PVD, rectal fissure, restless leg syndrome, sleep apnea, abdomen surgery, cardiac cath, CABG, hernia repair, upper GI endoscopy, vascular surgery. Family / Caregiver Present: No  Referring Practitioner: Dr. Lorna Gonzalez  Diagnosis: COPD exac  Subjective  Subjective: RN okay for pt to be see for OT and PT co-eval. Pt in bed finishing lunch. Pt pleasant and agreeable to therapy.     Social/Functional History  Social/Functional History  Lives With:  (granddaughter)  Type of Home: Apartment  Home Layout: One level  Home Access: Stairs to enter with rails  Entrance Stairs - Number of Steps: 5+4  Entrance Stairs - Rails: Right  Bathroom Shower/Tub: Tub/Shower unit  Bathroom Toilet: Standard  Home Equipment: Oxygen (5L)  ADL Assistance: Independent  Homemaking Assistance: Needs assistance (granddaughter performs)  Homemaking Responsibilities: Yes (pt helps with cooking and cleaning, pt folds laundry)  Ambulation Assistance: Independent  Transfer Assistance: Independent  Active : Yes  Leisure & Hobbies: scrapbooking, wants to volunteer when she can walk more       Objective   Vision: Impaired  Vision Exceptions: Wears glasses for reading  Hearing: Within functional limits    Orientation  Overall Orientation Status: Within Functional Limits     Balance  Sitting Balance: Independent  Standing Balance: Independent  Functional Mobility  Functional - Mobility Device: No device  Activity: Other; To/from bathroom  Assist Level: Modified independent   Functional Mobility Comments: assist to manage O2 tank. Pt completes fxl mobility without device around room, and in hallway to/from solarium- on tile and carpet areas. Pt steady, no LOB. Requests seated rest break 1x as needed. O2 sats with activity 92-94% on 5L O2 per NC  Toilet Transfers  Toilet - Technique: Ambulating  Equipment Used: Standard toilet  Toilet Transfer: Independent  Toilet Transfers Comments: comfort height commode; did not use grab bars  Wheelchair Bed Transfers  Wheelchair/Bed - Technique: Ambulating  Equipment Used: Bed;Other (recliner, couch)  Level of Asssistance: Independent  ADL  Feeding: Independent (eating lunch upon arrival)  Grooming:  (declines)  LE Dressing: Independent (seated on toilet, bends forward to adjust non-skid socks)  Toileting:  (declines)  Additional Comments: Anticipate pt is independent for all ADLs as demo'd by her balance, endurance, strength, and PLOF.   Tone RUE  RUE Tone: Normotonic  Tone LUE  LUE Tone: Normotonic  Coordination  Movements Are Fluid And Coordinated: Yes     Bed mobility  Supine to Sit: Independent  Sit to Supine: Unable to assess (OOB to recliner end of session, but anticipate independent)     Vision - Basic Assessment  Prior Vision: Wears glasses only for reading  Cognition  Overall Cognitive Status: WFL  Perception  Overall Perceptual Status: WFL     Sensation  Overall Sensation Status: WFL (reports arms \"get hot\" when O2 levels get low, otherwise WFL)        LUE AROM (degrees)  LUE AROM : WFL  Left Hand AROM (degrees)  Left Hand AROM: WFL  RUE AROM (degrees)  RUE AROM : WFL  Right Hand AROM (degrees)  Right Hand AROM: WFL  LUE Strength  Gross LUE Strength: WFL  L Hand General: 5/5  LUE Strength Comment: 5/5 throughout  RUE Strength  Gross RUE Strength: WFL  R Hand General: 5/5  RUE Strength Comment: 5/5 throughout                   Plan   Plan  Plan Comment: Pt is at baseline level of independence and will be d/c'd from acute OT services. Should the need for further OT services arise, please re-consult. OutComes Score    How much help for putting on and taking off regular lower body clothing?: None  How much help for Bathing?: None  How much help for Toileting?: None  How much help for putting on and taking off regular upper body clothing?: None  How much help for taking care of personal grooming?: None  How much help for eating meals?: None  AM-PAC Inpatient Daily Activity Raw Score: 24  AM-PAC Inpatient ADL T-Scale Score : 57.54  ADL Inpatient CMS 0-100% Score: 0  ADL Inpatient CMS G-Code Modifier : [address] [place] [id number]                                               AM-PAC Score        AM-PAC Inpatient Daily Activity Raw Score: 24 (06/05/21 1311)  AM-PAC Inpatient ADL T-Scale Score : 57.54 (06/05/21 1311)  ADL Inpatient CMS 0-100% Score: 0 (06/05/21 1311)  ADL Inpatient CMS G-Code Modifier : [address] [place] [id number] (06/05/21 1311)    Goals  Short term goals  Time Frame for Short term goals: No acute OT goals identified.  Pt is at baseline level of independence and will be d/c'd from acute OT services.        Therapy Time   Individual Concurrent Group Co-treatment   Time In       1215   Time Out       1310   Minutes       35   Timed Code Treatment Minutes: 20 Minutes        Let this serve as discharge note if discharged prior to next OT session      Rex Melendez OTR/ALMA #[id number]

## 2021-06-05 NOTE — PROGRESS NOTES
Brought to room from [address] [place] [address] Rd in bed Heart monitor applied Oriented to room and call light

## 2021-06-05 NOTE — H&P
Hospital Medicine History & Physical      PCP: Alan Romero MD    Date of Admission: 6/5/2021    Date of Service: Pt seen/examined on 06/04/2021 and Admitted to Inpatient with expected LOS greater than two midnights due to medical therapy. Chief Complaint:  SOB      History Of Present Illness:      70 y.o. female who presented to [facility] with worsening shortness of breath, to note that patient recently discharged from the hospital, her shortness of breath is getting worse for a day or so, no obvious alleviating or aggravating factors in the context of her COPD elevated blood pressure and potential heart failure, still having some cough, denies fever chills nausea vomiting, feels better after being on BiPAP and CPAP, currently on nasal cannula, denies blurry or double vision at this time.     Past Medical History:          Diagnosis Date    Arthritis     Atrial fibrillation ([address] 1/4/2021    CAD (coronary artery disease)     Nonobstructive on cath in 9/2017    CHF (congestive heart failure) ([address]     Colostomy care ([address] 4/25/2018    Peristomal irritation and early leaking    COPD (chronic obstructive pulmonary disease) (HCC)     Hyperlipidemia     Hypertension     Kidney disease     Stage 3    Peripheral vascular disease ([address]     Rectal fissure 11/2014    surgery pending    Restless legs syndrome     Sleep apnea     O2 3 liters at hs    Thyroid disease     Unspecified sleep apnea        Past Surgical History:          Procedure Laterality Date    ABDOMEN SURGERY      CARDIAC CATHETERIZATION  09/13/2017    Dr. Dannie Hernandez  03/09/2018    CORONARY ARTERY BYPASS GRAFT      Legs & Carotid    FRACTURE SURGERY      HERNIA REPAIR      UPPER GASTROINTESTINAL ENDOSCOPY N/A 9/30/2019    EGD DIAGNOSTIC ONLY performed by Annie Rogers MD at [address] [place] [address]         Medications Prior to Admission:      Prior to Admission medications    Medication Sig Start Date End Date Taking?  Authorizing Provider   furosemide (LASIX) 40 MG tablet TAKE ONE TABLET BY MOUTH TWICE A DAY 6/1/21  Yes Marlene Webber MD   amLODIPine (NORVASC) 10 MG tablet Take 1 tablet by mouth daily 5/31/21  Yes Sandor Stein MD   cloNIDine (CATAPRES) 0.2 MG tablet Take 1 tablet by mouth 3 times daily 5/31/21  Yes Sandor Stein MD   hydrALAZINE (APRESOLINE) 100 MG tablet TAKE ONE TABLET BY MOUTH THREE TIMES A DAY 5/31/21  Yes Sandor Stein MD   carvedilol (COREG) 12.5 MG tablet Take 1 tablet by mouth 2 times daily (with meals) 5/31/21  Yes Sandor Stein MD   melatonin 3 MG TABS tablet Take 1 tablet by mouth nightly as needed (sleep) 5/31/21  Yes Sandor Stein MD   citalopram (CELEXA) 40 MG tablet TAKE ONE TABLET BY MOUTH DAILY 5/28/21  Yes Marlene Webber MD   albuterol sulfate HFA (VENTOLIN HFA) 108 (90 Base) MCG/ACT inhaler Inhale 2 puffs into the lungs every 4 hours as needed for Wheezing or Shortness of Breath 5/12/21  Yes Patel Verdugo MD   budesonide-formoterol [facility]) 160-4.5 MCG/ACT AERO Inhale 2 puffs into the lungs 2 times daily 5/12/21  Yes Patel Verdugo MD   ipratropium-albuterol (DUONEB) 0.5-2.5 (3) MG/3ML SOLN nebulizer solution Inhale 3 mLs into the lungs every 4 hours as needed for Shortness of Breath 5/12/21  Yes Patel Verdugo MD   tiotropium (SPIRIVA RESPIMAT) 2.5 MCG/ACT AERS inhaler Inhale 2 puffs into the lungs daily 5/12/21  Yes Patel Verdugo MD   LORazepam (ATIVAN) 1 MG tablet TAKE ONE TABLET BY MOUTH DAILY AS NEEDED FOR ANXIETY 5/11/21 6/10/21 Yes Marlene Webber MD   clopidogrel (PLAVIX) 75 MG tablet Take 1 tablet by mouth daily 2/23/21  Yes Espinoza Nielson MD   ondansetron (ZOFRAN) 4 MG tablet Take 1 tablet by mouth every 8 hours as needed for Nausea 2/12/21  Yes Valerie Collado MD   atorvastatin (LIPITOR) 40 MG tablet TAKE ONE TABLET BY MOUTH DAILY 1/11/21  Yes Marlene Webber MD   montelukast (SINGULAIR) 10 MG tablet Take 1 tablet by mouth daily 1/11/21  Yes Eric Webber MD   lansoprazole (PREVACID) 15 MG delayed release capsule Take 1 capsule by mouth daily 1/11/21  Yes Eric Webber MD   levothyroxine (SYNTHROID) 50 MCG tablet TAKE ONE TABLET BY MOUTH DAILY 1/11/21  Yes Eric Webber MD   lidocaine (XYLOCAINE) 5 % ointment Apply topically as needed. 1/11/21  Yes Eric Webber MD   diclofenac sodium (VOLTAREN) 1 % GEL Apply 2 g topically 3 times daily as needed for Pain 1/11/21  Yes Eric Webber MD   lidocaine (XYLOCAINE) 2 % jelly Apply pea-sized amount topically to affected area every 8 hours when necessary pain 1/11/21  Yes Miladis Lock MD   mineral oil-hydrophilic petrolatum (HYDROPHOR) ointment Apply topically as needed. 12/9/20  Yes Miladis Lock MD   aspirin 81 MG tablet Take 1 tablet by mouth daily 7/26/19  Yes Nakia Pritchard MD       Allergies: Iv dye [iodides]    Social History:      The patient currently lives at home    TOBACCO:   reports that she quit smoking about 29 years ago. She started smoking about 58 years ago. She has a 90.00 pack-year smoking history. She has never used smokeless tobacco.  ETOH:   reports no history of alcohol use. E-Cigarettes/Vaping Use     Questions Responses    E-Cigarette/Vaping Use Never User    Start Date     Passive Exposure No    Quit Date     Counseling Given Yes    Comments             Family History:      Reviewed in detail and positive as follows:        Problem Relation Age of Onset    Heart Disease Mother     Heart Disease Father     Heart Disease Sister     Cancer Brother        REVIEW OF SYSTEMS COMPLETED:   Pertinent positives as noted in the HPI. All other systems reviewed and negative.     PHYSICAL EXAM PERFORMED:    BP (!) 178/60   Pulse 69   Temp 97.6 °F (36.4 °C) (Oral)   Resp 20   Ht 5' (1.524 m)   Wt 162 lb 0.6 oz (73.5 kg)   LMP 05/01/2006 (Approximate)   SpO2 95%   BMI 31.65 kg/m²     General appearance:  No apparent distress  HEENT:  Normal cephalic  Neck: Supple  Respiratory: Scattered rhonchi and wheezes  Cardiovascular:  Regular rate and rhythm with normal S1/S2 without murmurs, rubs or gallops. Abdomen: Soft, non-tender, non-distended with normal bowel sounds. Musculoskeletal:  No clubbing, cyanosis   Skin: Skin color, texture, turgor normal.  No rashes or lesions. Neurologic: No focal weakness  Psychiatric:  Alert and oriented  Capillary Refill: Brisk,3 seconds, normal  Peripheral Pulses: +2 palpable, equal bilaterally       Labs:     Recent Labs     06/05/21 0113   WBC 9.6   HGB 9.0*   HCT 28.7*        Recent Labs     06/05/21 0113      K 4.0   CL 97*   CO2 26   BUN 27*   CREATININE 1.2   CALCIUM 9.4     No results for input(s): AST, ALT, BILIDIR, BILITOT, ALKPHOS in the last 72 hours. No results for input(s): INR in the last 72 hours. Recent Labs     06/05/21 0113   TROPONINI <0.01       Urinalysis:      Lab Results   Component Value Date    NITRU Negative 05/30/2021    WBCUA 118 05/30/2021    BACTERIA RARE 12/07/2020    RBCUA 10 05/30/2021    BLOODU Negative 05/30/2021    SPECGRAV 1.022 05/30/2021    GLUCOSEU Negative 05/30/2021       Radiology:     CXR: I have reviewed the CXR with the following interpretation: No infiltrate  EKG:  I have reviewed the EKG with the following interpretation: No ST elevation    XR CHEST PORTABLE   Final Result   Minimal basilar atelectasis with chronic scarring noted in the left mid lower   lung field. ASSESSMENT:    Active Hospital Problems    Diagnosis Date Noted    Acute respiratory failure with hypoxia ([address] [J96.01] 06/05/2021    Acute on chronic respiratory failure with hypoxia ([address] [J96.21] 01/21/2021         PLAN:    1.   Acute on chronic respiratory failure with hypoxia, initially was on CPAP and BiPAP currently back to nasal cannula on 5 L she is normally on 4 L at home, etiology likely multifactorial due to potential COPD and am suspecting heart failure due to elevated blood pressure. Telemetry monitoring, oxygen and treatment for COPD and heart failure  2. Possible COPD exacerbation, on steroids, continue azithromycin. DuoNeb. 3.  Acute on chronic congestive heart failure suspecting diastolic, likely due to uncontrolled hypertension, will consult cardiology for recommendations, starting on Lasix daily. 4.  A. fib status post watchman procedure  5. Uncontrolled hypertension, resume p.o. medications for now monitor closely, adding as needed      DVT Prophylaxis: Lovenox  Diet: ADULT DIET; Regular; 4 carb choices (60 gm/meal); Low Sodium (2 gm)  Code Status: Full Code    PT/OT Eval Status: Ordered    Dispo -inpatient 2 to 3 days       Ernst Mariee MD    Thank you Subha Gregg MD for the opportunity to be involved in this patient's care. If you have any questions or concerns please feel free to contact me at [phone].

## 2021-06-05 NOTE — ED NOTES
ED SBAR report provider to [facility], [facility]. Patient to be transported to Room 5261 via stretcher by transport tech. Patient transported with bedside cardiac monitor and with IV medications infusing. IV site clean, dry, and intact. MEWS score and pain assessed as 6/10 and documented. Patient's score on the EDWARDS Fall scale reviewed with receiving RN. Updated patient and family on plan of care.        Mickey Diaz RN  06/05/21 2597

## 2021-06-05 NOTE — ED TRIAGE NOTES
Pt arrived to ED via EMS for a complaint of shortness of breath. Pt states that she received an iron infusion today and afterwards had some difficulty breathing. Pt states that this happened last week as well after her iron infusion. Pt has a history of COPD and CHF and wears oxygen at home. Pt denies chest pain, but states that she feels tightness in the middle of her chest. Pt arrived on CPAP and EMS states that her saturation was 97%. Pt assessed by DR Mel Mcghee and states that her breathing is better and is currently on NC 5lpm.  VS noted and stable. Patient A&Ox4. . Skin warm and dry and appropriate for ethnicity. No acute distress noted at this time. Patient placed on continuous telemetry and pulse ox upon arrival to room.

## 2021-06-05 NOTE — PLAN OF CARE
Problem: Daily Care:  Goal: Daily care needs are met  Description: Daily care needs are met  6/5/2021 1236 by Karo Lemon RN  Outcome: Ongoing  Note: Patient's ability assessed to perform self care and independent activity encouraged according to that ability. Assisted with ADL's as needed. Risk for skin breakdown assessed. Potential discharge needs assessed. Patient and family included in daily care decisions.       Problem: OXYGENATION/RESPIRATORY FUNCTION  Goal: Patient will maintain patent airway  Outcome: Ongoing  Note:       Problem: FLUID AND ELECTROLYTE IMBALANCE  Goal: Fluid and electrolyte balance are achieved/maintained  Outcome: Ongoing  Note:       Problem: ACTIVITY INTOLERANCE/IMPAIRED MOBILITY  Goal: Mobility/activity is maintained at optimum level for patient  Outcome: Ongoing  Note:

## 2021-06-06 LAB
ANION GAP SERPL CALCULATED.3IONS-SCNC: 11 MMOL/L (ref 3–16)
BUN BLDV-MCNC: 37 MG/DL (ref 7–20)
CALCIUM SERPL-MCNC: 9.8 MG/DL (ref 8.3–10.6)
CHLORIDE BLD-SCNC: 96 MMOL/L (ref 99–110)
CO2: 37 MMOL/L (ref 21–32)
CREAT SERPL-MCNC: 1.2 MG/DL (ref 0.6–1.2)
GFR AFRICAN AMERICAN: 53
GFR NON-AFRICAN AMERICAN: 44
GLUCOSE BLD-MCNC: 129 MG/DL (ref 70–99)
GLUCOSE BLD-MCNC: 149 MG/DL (ref 70–99)
GLUCOSE BLD-MCNC: 159 MG/DL (ref 70–99)
GLUCOSE BLD-MCNC: 216 MG/DL (ref 70–99)
GLUCOSE BLD-MCNC: 224 MG/DL (ref 70–99)
MAGNESIUM: 2 MG/DL (ref 1.8–2.4)
PERFORMED ON: ABNORMAL
POTASSIUM SERPL-SCNC: 3.5 MMOL/L (ref 3.5–5.1)
SODIUM BLD-SCNC: 144 MMOL/L (ref 136–145)

## 2021-06-06 PROCEDURE — 2700000000 HC OXYGEN THERAPY PER DAY

## 2021-06-06 PROCEDURE — 1200000000 HC SEMI PRIVATE

## 2021-06-06 PROCEDURE — 6370000000 HC RX 637 (ALT 250 FOR IP): Performed by: INTERNAL MEDICINE

## 2021-06-06 PROCEDURE — 94760 N-INVAS EAR/PLS OXIMETRY 1: CPT

## 2021-06-06 PROCEDURE — 2060000000 HC ICU INTERMEDIATE R&B

## 2021-06-06 PROCEDURE — 83735 ASSAY OF MAGNESIUM: CPT

## 2021-06-06 PROCEDURE — 94640 AIRWAY INHALATION TREATMENT: CPT

## 2021-06-06 PROCEDURE — 6360000002 HC RX W HCPCS: Performed by: STUDENT IN AN ORGANIZED HEALTH CARE EDUCATION/TRAINING PROGRAM

## 2021-06-06 PROCEDURE — 99232 SBSQ HOSP IP/OBS MODERATE 35: CPT | Performed by: INTERNAL MEDICINE

## 2021-06-06 PROCEDURE — 36415 COLL VENOUS BLD VENIPUNCTURE: CPT

## 2021-06-06 PROCEDURE — 2580000003 HC RX 258: Performed by: STUDENT IN AN ORGANIZED HEALTH CARE EDUCATION/TRAINING PROGRAM

## 2021-06-06 PROCEDURE — 6360000002 HC RX W HCPCS: Performed by: INTERNAL MEDICINE

## 2021-06-06 PROCEDURE — 80048 BASIC METABOLIC PNL TOTAL CA: CPT

## 2021-06-06 PROCEDURE — 6370000000 HC RX 637 (ALT 250 FOR IP): Performed by: STUDENT IN AN ORGANIZED HEALTH CARE EDUCATION/TRAINING PROGRAM

## 2021-06-06 RX ORDER — TORSEMIDE 20 MG/1
40 TABLET ORAL DAILY
Status: DISCONTINUED | OUTPATIENT
Start: 2021-06-06 | End: 2021-06-09 | Stop reason: HOSPADM

## 2021-06-06 RX ORDER — CARVEDILOL 25 MG/1
25 TABLET ORAL 2 TIMES DAILY WITH MEALS
Status: DISCONTINUED | OUTPATIENT
Start: 2021-06-06 | End: 2021-06-09 | Stop reason: HOSPADM

## 2021-06-06 RX ADMIN — IPRATROPIUM BROMIDE AND ALBUTEROL SULFATE 1 AMPULE: .5; 3 SOLUTION RESPIRATORY (INHALATION) at 19:47

## 2021-06-06 RX ADMIN — IPRATROPIUM BROMIDE AND ALBUTEROL SULFATE 1 AMPULE: .5; 3 SOLUTION RESPIRATORY (INHALATION) at 15:52

## 2021-06-06 RX ADMIN — AMLODIPINE BESYLATE 10 MG: 10 TABLET ORAL at 08:42

## 2021-06-06 RX ADMIN — LEVOTHYROXINE SODIUM 50 MCG: 0.05 TABLET ORAL at 05:22

## 2021-06-06 RX ADMIN — MONTELUKAST 10 MG: 10 TABLET, FILM COATED ORAL at 08:43

## 2021-06-06 RX ADMIN — DOXYCYCLINE HYCLATE 100 MG: 100 TABLET, FILM COATED ORAL at 22:07

## 2021-06-06 RX ADMIN — CARVEDILOL 12.5 MG: 12.5 TABLET, FILM COATED ORAL at 08:42

## 2021-06-06 RX ADMIN — ATORVASTATIN CALCIUM 40 MG: 40 TABLET, FILM COATED ORAL at 08:42

## 2021-06-06 RX ADMIN — INSULIN LISPRO 1 UNITS: 100 INJECTION, SOLUTION INTRAVENOUS; SUBCUTANEOUS at 22:07

## 2021-06-06 RX ADMIN — TIOTROPIUM BROMIDE INHALATION SPRAY 2 PUFF: 3.12 SPRAY, METERED RESPIRATORY (INHALATION) at 08:10

## 2021-06-06 RX ADMIN — Medication 3 MG: at 22:07

## 2021-06-06 RX ADMIN — Medication 10 ML: at 22:09

## 2021-06-06 RX ADMIN — ASPIRIN 81 MG: 81 TABLET, CHEWABLE ORAL at 08:43

## 2021-06-06 RX ADMIN — METHYLPREDNISOLONE SODIUM SUCCINATE 40 MG: 40 INJECTION, POWDER, FOR SOLUTION INTRAMUSCULAR; INTRAVENOUS at 05:22

## 2021-06-06 RX ADMIN — BUDESONIDE AND FORMOTEROL FUMARATE DIHYDRATE 2 PUFF: 160; 4.5 AEROSOL RESPIRATORY (INHALATION) at 08:10

## 2021-06-06 RX ADMIN — CLOPIDOGREL BISULFATE 75 MG: 75 TABLET ORAL at 08:42

## 2021-06-06 RX ADMIN — CLONIDINE HYDROCHLORIDE 0.2 MG: 0.1 TABLET ORAL at 22:06

## 2021-06-06 RX ADMIN — TORSEMIDE 40 MG: 20 TABLET ORAL at 17:51

## 2021-06-06 RX ADMIN — DOXYCYCLINE HYCLATE 100 MG: 100 TABLET, FILM COATED ORAL at 08:42

## 2021-06-06 RX ADMIN — INSULIN LISPRO 2 UNITS: 100 INJECTION, SOLUTION INTRAVENOUS; SUBCUTANEOUS at 08:44

## 2021-06-06 RX ADMIN — ENOXAPARIN SODIUM 40 MG: 40 INJECTION SUBCUTANEOUS at 08:43

## 2021-06-06 RX ADMIN — CARVEDILOL 25 MG: 25 TABLET, FILM COATED ORAL at 17:51

## 2021-06-06 RX ADMIN — CLONIDINE HYDROCHLORIDE 0.2 MG: 0.1 TABLET ORAL at 08:42

## 2021-06-06 RX ADMIN — CLONIDINE HYDROCHLORIDE 0.2 MG: 0.1 TABLET ORAL at 14:24

## 2021-06-06 RX ADMIN — CITALOPRAM HYDROBROMIDE 40 MG: 20 TABLET ORAL at 08:42

## 2021-06-06 RX ADMIN — BUDESONIDE AND FORMOTEROL FUMARATE DIHYDRATE 2 PUFF: 160; 4.5 AEROSOL RESPIRATORY (INHALATION) at 19:47

## 2021-06-06 RX ADMIN — FUROSEMIDE 40 MG: 10 INJECTION, SOLUTION INTRAMUSCULAR; INTRAVENOUS at 08:43

## 2021-06-06 RX ADMIN — METHYLPREDNISOLONE SODIUM SUCCINATE 40 MG: 40 INJECTION, POWDER, FOR SOLUTION INTRAMUSCULAR; INTRAVENOUS at 12:24

## 2021-06-06 RX ADMIN — PANTOPRAZOLE SODIUM 40 MG: 40 TABLET, DELAYED RELEASE ORAL at 05:22

## 2021-06-06 RX ADMIN — LORAZEPAM 0.5 MG: 0.5 TABLET ORAL at 12:35

## 2021-06-06 RX ADMIN — IPRATROPIUM BROMIDE AND ALBUTEROL SULFATE 1 AMPULE: .5; 3 SOLUTION RESPIRATORY (INHALATION) at 08:10

## 2021-06-06 RX ADMIN — IPRATROPIUM BROMIDE AND ALBUTEROL SULFATE 1 AMPULE: .5; 3 SOLUTION RESPIRATORY (INHALATION) at 12:35

## 2021-06-06 RX ADMIN — METHYLPREDNISOLONE SODIUM SUCCINATE 40 MG: 40 INJECTION, POWDER, FOR SOLUTION INTRAMUSCULAR; INTRAVENOUS at 17:51

## 2021-06-06 RX ADMIN — INSULIN LISPRO 2 UNITS: 100 INJECTION, SOLUTION INTRAVENOUS; SUBCUTANEOUS at 17:51

## 2021-06-06 ASSESSMENT — PAIN SCALES - GENERAL
PAINLEVEL_OUTOF10: 0

## 2021-06-06 NOTE — PLAN OF CARE
Problem: Pain:  Goal: Pain level will decrease  Description: Pain level will decrease  Outcome: Ongoing  Goal: Control of acute pain  Description: Control of acute pain  Outcome: Ongoing  Goal: Control of chronic pain  Description: Control of chronic pain  Outcome: Ongoing  Goal: Patient's pain/discomfort is manageable  Description: Patient's pain/discomfort is manageable  Outcome: Ongoing     Problem: Falls - Risk of:  Goal: Will remain free from falls  Description: Will remain free from falls  Outcome: Ongoing  Goal: Absence of physical injury  Description: Absence of physical injury  Outcome: Ongoing     Problem: Infection:  Goal: Will remain free from infection  Description: Will remain free from infection  Outcome: Ongoing     Problem: Safety:  Goal: Free from accidental physical injury  Description: Free from accidental physical injury  Outcome: Ongoing  Goal: Free from intentional harm  Description: Free from intentional harm  Outcome: Ongoing     Problem: Daily Care:  Goal: Daily care needs are met  Description: Daily care needs are met  Outcome: Ongoing     Problem: Skin Integrity:  Goal: Skin integrity will stabilize  Description: Skin integrity will stabilize  Outcome: Ongoing     Problem: Discharge Planning:  Goal: Patients continuum of care needs are met  Description: Patients continuum of care needs are met  Outcome: Ongoing     Problem: OXYGENATION/RESPIRATORY FUNCTION  Goal: Patient will maintain patent airway  Outcome: Ongoing  Goal: Patient will achieve/maintain normal respiratory rate/effort  Description: Respiratory rate and effort will be within normal limits for the patient  Outcome: Ongoing     Problem: HEMODYNAMIC STATUS  Goal: Patient has stable vital signs and fluid balance  Outcome: Ongoing     Problem: FLUID AND ELECTROLYTE IMBALANCE  Goal: Fluid and electrolyte balance are achieved/maintained  Outcome: Ongoing     Problem: ACTIVITY INTOLERANCE/IMPAIRED MOBILITY  Goal: Mobility/activity is maintained at optimum level for patient  Outcome: Ongoing     Problem: Discharge Planning:  Goal: Discharged to appropriate level of care  Description: Discharged to appropriate level of care  Outcome: Ongoing     Problem:  Activity Intolerance:  Goal: Ability to tolerate increased activity will improve  Description: Ability to tolerate increased activity will improve  Outcome: Ongoing     Problem: Airway Clearance - Ineffective:  Goal: Ability to maintain a clear airway will improve  Description: Ability to maintain a clear airway will improve  Outcome: Ongoing     Problem: Breathing Pattern - Ineffective:  Goal: Ability to achieve and maintain a regular respiratory rate will improve  Description: Ability to achieve and maintain a regular respiratory rate will improve  Outcome: Ongoing     Problem: Gas Exchange - Impaired:  Goal: Levels of oxygenation will improve  Description: Levels of oxygenation will improve  Outcome: Ongoing

## 2021-06-06 NOTE — RT PROTOCOL NOTE
RT Inhaler-Nebulizer Bronchodilator Protocol Note    There is a bronchodilator order in the chart from a provider indicating to follow the RT Bronchodilator Protocol and there is an Initiate RT Bronchodilator Protocol order as well (see protocol at bottom of note). The findings from the last RT Protocol Assessment were as follows:  Smoking: <15 Pack years  Surgical Status: No surgery  Xray: One lobe infiltrates/atelectasis/pleural effusion/edema  Respiratory Pattern: RR <12 or 21-30  Mental Status: Alert and Oriented  Breath Sounds: Diminished and/or crackles  Cough: Strong, productive  Activity Level: Walking with assistance  Oxygen Requirement: 29% or 3LNC - 5LNC/40%  Indication for Bronchodilator Therapy: Wheezing associated with pulm disorder  Bronchodilator Assessment Score: 6    Aerosolized bronchodilator medication orders have been revised according to the RT Bronchodilator Protocol. RT Inhaler-Nebulizer Bronchodilator Protocol:    Respiratory Therapist to perform RT Therapy Protocol Assessment then follow the protocol. No Indications - adjust the frequency to every 6 hours PRN wheezing or bronchospasm, if no treatments needed after 48 hours then discontinue using Per Protocol order mode. If indication present, adjust the RT bronchodilator orders based on the Bronchodilator Assessment Score as follows:    0-6 - enter or revise RT bronchodilator order to Albuterol Inhaler order with frequency of every 2 hours PRN for wheezing or increased work of breathing using Per Protocol order mode. If Albuterol Inhaler not tolerated or not effective, then discontinue the Albuterol Inhaler order and enter Albuterol Nebulizer order with same frequency and PRN reasons. Repeat RT Therapy Protocol Assessment as needed.     7-10 - discontinue any other Inpatient aerosolized bronchodilator medication orders and enter or revise two Albuterol Inhaler orders, one with BID frequency and one with frequency of every 2 hours PRN wheezing or increased work of breathing using Per Protocol order mode. Repeat RT Therapy Protocol Assessment with second treatment then BID and as needed. If Albuterol Inhaler not tolerated or not effective, then discontinue the Albuterol Inhaler orders and enter two Albuterol Nebulizer orders with same frequencies and PRN reasons. 11-13 - discontinue any other Inpatient aerosolized bronchodilator medication orders and enter DuoNeb Nebulizer orders QID frequency and an Albuterol Nebulizer order every 2 hours PRN wheezing or increased work of breathing using Per Protocol order mode. Repeat RT Therapy Protocol Assessment with second treatment then QID and as needed. Greater than 13 - discontinue any other Inpatient bronchodilator aerosolized medication orders and enter DuoNeb Nebulizer order every 4 hours frequency and Albuterol Nebulizer every 2 hours PRN wheezing or increased work of breathing using Per Protocol order mode. Repeat RT Therapy Protocol Assessment with second treatment then every 4 hours and as needed.         Electronically signed by Viviane Goldman RCP on 6/6/2021 at 8:16 AM

## 2021-06-06 NOTE — PROGRESS NOTES
[facility]  Cardiology Consult    Leeanna Miranda  1945 June 6, 2021      Reason for Referral: Heart failure    CC: Shortness of breath      Subjective:     History of Present Illness:    Leeanna Miranda is a 70 y.o. patient with a PMH significant for congestive heart failure, paroxysmal atrial fibrillation, COPD hyperlipidemia hypertension presented with complaints of increasing shortness of breath. Patient status post watchman left atrial appendage closure. She is starting to get more more short of breath lately. She is on chronic oxygen therapy requiring more oxygen. Past Medical History:   has a past medical history of Arthritis, Atrial fibrillation ([facility] [address] CAD (coronary artery disease), CHF (congestive heart failure) ([facility] [address] Colostomy care ([facility] [address] COPD (chronic obstructive pulmonary disease) ([facility] [address] Hyperlipidemia, Hypertension, Kidney disease, Peripheral vascular disease ([facility] [address] Rectal fissure, Restless legs syndrome, Sleep apnea, Thyroid disease, and Unspecified sleep apnea. Surgical History:   has a past surgical history that includes Cholecystectomy; hernia repair; Coronary artery bypass graft; Cardiac catheterization (09/13/2017); colostomy (03/09/2018); Upper gastrointestinal endoscopy (N/A, 9/30/2019); Abdomen surgery; Colonoscopy; fracture surgery; and vascular surgery. Social History:   reports that she quit smoking about 29 years ago. She started smoking about 58 years ago. She has a 90.00 pack-year smoking history. She has never used smokeless tobacco. She reports that she does not drink alcohol and does not use drugs. Family History:  family history includes Cancer in her brother; Heart Disease in her father, mother, and sister. Home Medications:  Were reviewed and are listed in nursing record and/or below  Prior to Admission medications    Medication Sig Start Date End Date Taking?  Authorizing Provider   furosemide (LASIX) 40 MG tablet TAKE ONE TABLET BY MOUTH TWICE A DAY 6/1/21  Yes Manny Webber MD   amLODIPine (NORVASC) 10 MG tablet Take 1 tablet by mouth daily 5/31/21  Yes Rekha Nava MD   cloNIDine (CATAPRES) 0.2 MG tablet Take 1 tablet by mouth 3 times daily 5/31/21  Yes Rekha Nava MD   hydrALAZINE (APRESOLINE) 100 MG tablet TAKE ONE TABLET BY MOUTH THREE TIMES A DAY 5/31/21  Yes Rekha Nava MD   carvedilol (COREG) 12.5 MG tablet Take 1 tablet by mouth 2 times daily (with meals) 5/31/21  Yes Rekha Nava MD   melatonin 3 MG TABS tablet Take 1 tablet by mouth nightly as needed (sleep) 5/31/21  Yes Rekha Nava MD   citalopram (CELEXA) 40 MG tablet TAKE ONE TABLET BY MOUTH DAILY 5/28/21  Yes Manny Webber MD   albuterol sulfate HFA (VENTOLIN HFA) 108 (90 Base) MCG/ACT inhaler Inhale 2 puffs into the lungs every 4 hours as needed for Wheezing or Shortness of Breath 5/12/21  Yes Cristobal Olson MD   budesonide-formoterol [facility]) 160-4.5 MCG/ACT AERO Inhale 2 puffs into the lungs 2 times daily 5/12/21  Yes Cristobal Olson MD   ipratropium-albuterol (DUONEB) 0.5-2.5 (3) MG/3ML SOLN nebulizer solution Inhale 3 mLs into the lungs every 4 hours as needed for Shortness of Breath 5/12/21  Yes Cristobal Olson MD   tiotropium (SPIRIVA RESPIMAT) 2.5 MCG/ACT AERS inhaler Inhale 2 puffs into the lungs daily 5/12/21  Yes Cristobal Olson MD   LORazepam (ATIVAN) 1 MG tablet TAKE ONE TABLET BY MOUTH DAILY AS NEEDED FOR ANXIETY 5/11/21 6/10/21 Yes Manny Webber MD   clopidogrel (PLAVIX) 75 MG tablet Take 1 tablet by mouth daily 2/23/21  Yes Eugene Samaniego MD   ondansetron (ZOFRAN) 4 MG tablet Take 1 tablet by mouth every 8 hours as needed for Nausea 2/12/21  Yes Valerie Collado MD   atorvastatin (LIPITOR) 40 MG tablet TAKE ONE TABLET BY MOUTH DAILY 1/11/21  Yes Manny Webber MD   montelukast (SINGULAIR) 10 MG tablet Take 1 tablet by mouth daily 1/11/21  Yes Manny Webber MD   lansoprazole (PREVACID) 15 MG delayed release capsule Take 1 capsule by mouth daily 1/11/21  Yes Rosalia Rizvi MD   levothyroxine (SYNTHROID) 50 MCG tablet TAKE ONE TABLET BY MOUTH DAILY 1/11/21  Yes Edwin Webber MD   lidocaine (XYLOCAINE) 5 % ointment Apply topically as needed. 1/11/21  Yes Edwin Webber MD   diclofenac sodium (VOLTAREN) 1 % GEL Apply 2 g topically 3 times daily as needed for Pain 1/11/21  Yes Edwin Webber MD   lidocaine (XYLOCAINE) 2 % jelly Apply pea-sized amount topically to affected area every 8 hours when necessary pain 1/11/21  Yes Rosalia Rizvi MD   mineral oil-hydrophilic petrolatum (HYDROPHOR) ointment Apply topically as needed.  12/9/20  Yes Rosalia Rizvi MD   aspirin 81 MG tablet Take 1 tablet by mouth daily 7/26/19  Yes Vania Ralph MD        CURRENT Medications:  sodium chloride flush 0.9 % injection 5-40 mL, 2 times per day  sodium chloride flush 0.9 % injection 5-40 mL, PRN  0.9 % sodium chloride infusion, PRN  ondansetron (ZOFRAN-ODT) disintegrating tablet 4 mg, Q8H PRN   Or  ondansetron (ZOFRAN) injection 4 mg, Q6H PRN  enoxaparin (LOVENOX) injection 40 mg, Daily  acetaminophen (TYLENOL) tablet 650 mg, Q6H PRN   Or  acetaminophen (TYLENOL) suppository 650 mg, Q6H PRN  methylPREDNISolone sodium (SOLU-MEDROL) injection 40 mg, Q6H   Followed by  Vaibhav Spear ON 6/7/2021] predniSONE (DELTASONE) tablet 40 mg, Daily  ipratropium-albuterol (DUONEB) nebulizer solution 1 ampule, Q4H WA  furosemide (LASIX) injection 40 mg, Daily  labetalol (NORMODYNE;TRANDATE) injection 10 mg, Q4H PRN  amLODIPine (NORVASC) tablet 10 mg, Daily  albuterol sulfate  (90 Base) MCG/ACT inhaler 2 puff, Q4H PRN  aspirin chewable tablet 81 mg, Daily  atorvastatin (LIPITOR) tablet 40 mg, Daily  budesonide-formoterol (SYMBICORT) 160-4.5 MCG/ACT inhaler 2 puff, BID  carvedilol (COREG) tablet 12.5 mg, BID WC  citalopram (CELEXA) tablet 40 mg, Daily  cloNIDine (CATAPRES) tablet 0.2 mg, TID  clopidogrel (PLAVIX) tablet 75 mg, Daily  levothyroxine (SYNTHROID) tablet 50 mcg, QAM AC  pantoprazole (PROTONIX) tablet 40 mg, QAM AC  LORazepam (ATIVAN) tablet 0.5 mg, Daily PRN  melatonin tablet 3 mg, Nightly PRN  montelukast (SINGULAIR) tablet 10 mg, Daily  tiotropium (SPIRIVA RESPIMAT) 2.5 MCG/ACT inhaler 2 puff, Daily  doxycycline hyclate (VIBRA-TABS) tablet 100 mg, 2 times per day  insulin lispro (HUMALOG) injection vial 0-6 Units, TID WC  insulin lispro (HUMALOG) injection vial 0-3 Units, Nightly  glucose (GLUTOSE) 40 % oral gel 15 g, PRN  dextrose 50 % IV solution, PRN  glucagon (rDNA) injection 1 mg, PRN  dextrose 5 % solution, PRN        Allergies: Iv dye [iodides]           Review of Systems: SEE HPI   · Constitutional: no unanticipated weight loss. There's been no change in energy level, sleep pattern, or activity level. No fevers, chills. · Eyes: No visual changes or diplopia. No scleral icterus. · ENT: No Headaches, hearing loss or vertigo. No mouth sores or sore throat. · Cardiovascular: No Chest pain, tightness or discomfort.  She has shortness of breath. No Dyspnea on exertion, Orthopnea, Paroxysmal nocturnal dyspnea or breathlessness at rest.   No Palpitations.  No Syncope ('blackouts', 'faints', 'collapse') or dizziness. · Respiratory: No cough or wheezing, no sputum production. No hematemesis. · Gastrointestinal: No abdominal pain, appetite loss, blood in stools. No change in bowel or bladder habits. · Genitourinary: No dysuria, trouble voiding, or hematuria. · Musculoskeletal:  No gait disturbance, no joint complaints. · Integumentary: No rash or pruritis. · Neurological: No headache, diplopia, change in muscle strength, numbness or tingling. · Psychiatric: No anxiety or depression. · Endocrine: No temperature intolerance. No excessive thirst, fluid intake, or urination. No tremor. · Hematologic/Lymphatic: No abnormal bruising or bleeding, blood clots or swollen lymph nodes. · Allergic/Immunologic: No nasal congestion or hives.       Objective:     PHYSICAL EXAM:      Vitals: 06/06/21 0813   BP:    Pulse:    Resp:    Temp:    SpO2: 100%    Weight: 161 lb 2.5 oz (73.1 kg)       General Appearance:  Alert, cooperative, no distress, appears stated age. Head:  Normocephalic, without obvious abnormality, atraumatic. Eyes:  Pupils equal and round. No scleral icterus. Mouth: Moist mucosa, no pharyngeal erythema. Nose: Nares normal. No drainage or sinus tenderness. Neck: Supple, symmetrical, trachea midline. No adenopathy. No tenderness/mass/nodules. No carotid bruit or elevated JVD. Lungs:   Respiratory Effort: Normal   Auscultation: Clear to auscultation bilaterally, respirations unlabored. Basilar rales   Chest Wall:  No tenderness or deformity. Cardiovascular:    Pulses  Palpation: normal   Ascultation: Regular rate, S1/ S2 normal. No murmur, rub, or gallop. 2+ radial and pedal pulses, symmetric  Carotid  Femoral   Abdomen and Gastrointestinal:   Soft, non-tender, bowel sounds active. Liver and Spleen  Masses   Musculoskeletal: No muscle wasting  Back  Gait   Extremities: Extremities normal, atraumatic. No cyanosis or edema. No cyanosis clubbing       Skin: Inspection and palpation performed, no rashes or lesions. Pysch: Normal mood and affect.  Alert and oriented to time place person   Neurologic: Normal gross motor and sensory exam.       Labs     All labs have been reviewed    Lab Results   Component Value Date    WBC 9.6 06/05/2021    RBC 3.78 06/05/2021    HGB 9.0 06/05/2021    HCT 28.7 06/05/2021    MCV 75.8 06/05/2021    RDW 20.8 06/05/2021     06/05/2021     Lab Results   Component Value Date     06/06/2021    K 3.5 06/06/2021    K 4.0 06/05/2021    CL 96 06/06/2021    CO2 37 06/06/2021    BUN 37 06/06/2021    CREATININE 1.2 06/06/2021    GFRAA 53 06/06/2021    AGRATIO 1.1 05/29/2021    LABGLOM 44 06/06/2021    GLUCOSE 149 06/06/2021    PROT 7.3 05/29/2021    CALCIUM 9.8 06/06/2021    BILITOT <0.2 05/29/2021    ALKPHOS 45 05/29/2021    AST 19 05/29/2021 ALT 8 05/29/2021     No results found for: [employer]  Lab Results   Component Value Date    LABA1C 5.5 10/11/2019     Lab Results   Component Value Date    CKTOTAL 43 09/26/2019    TROPONINI <0.01 06/05/2021       Cardiac, Vascular and Imaging Data all Personally Reviewed in Detail by Myself      EKG: Normal sinus rhythm with LVH and repolarization abnormality    Echocardiogram: Summary   Left ventricular cavity size is normal.   There is mild concentric left ventricular hypertrophy. Ejection fraction is visually estimated to be 55-60%. Grade I diastolic   dysfunction   Mild to moderate mitral regurgitation. Mild to moderate tricuspid regurgitation with RVSP of 51 mmHg. Other imaging:   Chest x-ray  Shows minimal basilar atelectasis with chronic scarring noted. Assessment and Plan     -Acute on chronic diastolic heart failure  Diuretic therapy has been started will continue. Watch ins and outs  Follow laboratory values including BNP  Blood pressure control. She is on carvedilol now. Acute on chronic respiratory failure with hypoxemia  Continue oxygen support    Paroxysmal atrial fibrillation  Off antithrombotic therapy. Rate control. Essential hypertension  Blood pressure control carvedilol has been initiated. Thank you for allowing us to participate in the care of [place] [id number]. Please do not hesitate to contact me if you have any questions.     Espinoza Nielson MD, MPH    [facility], [facility]ais [facility]  Ph: [phone]  Fax: [phone]    6/6/2021 9:44 AM

## 2021-06-06 NOTE — PROGRESS NOTES
Hospitalist Progress Note      PCP: Marlo Dobbs MD    Date of Admission: 6/5/2021        Subjective: feels better, less SOB, no palpitation, chest pain or headaches. Medications:  Reviewed    Infusion Medications    sodium chloride      dextrose       Scheduled Medications    sodium chloride flush  5-40 mL Intravenous 2 times per day    enoxaparin  40 mg Subcutaneous Daily    methylPREDNISolone  40 mg Intravenous Q6H    Followed by   Cullen Cruz ON 6/7/2021] predniSONE  40 mg Oral Daily    ipratropium-albuterol  1 ampule Inhalation Q4H WA    furosemide  40 mg Intravenous Daily    amLODIPine  10 mg Oral Daily    aspirin  81 mg Oral Daily    atorvastatin  40 mg Oral Daily    budesonide-formoterol  2 puff Inhalation BID    carvedilol  12.5 mg Oral BID WC    citalopram  40 mg Oral Daily    cloNIDine  0.2 mg Oral TID    clopidogrel  75 mg Oral Daily    levothyroxine  50 mcg Oral QAM AC    pantoprazole  40 mg Oral QAM AC    montelukast  10 mg Oral Daily    tiotropium  2 puff Inhalation Daily    doxycycline hyclate  100 mg Oral 2 times per day    insulin lispro  0-6 Units Subcutaneous TID WC    insulin lispro  0-3 Units Subcutaneous Nightly     PRN Meds: sodium chloride flush, sodium chloride, ondansetron **OR** ondansetron, acetaminophen **OR** acetaminophen, labetalol, albuterol sulfate HFA, LORazepam, melatonin, glucose, dextrose, glucagon (rDNA), dextrose      Intake/Output Summary (Last 24 hours) at 6/6/2021 0942  Last data filed at 6/6/2021 0641  Gross per 24 hour   Intake 1980 ml   Output 2400 ml   Net -420 ml       Physical Exam Performed:    BP (!) 160/59   Pulse 70   Temp 97.8 °F (36.6 °C) (Oral)   Resp 18   Ht 5' (1.524 m)   Wt 161 lb 2.5 oz (73.1 kg)   LMP 05/01/2006 (Approximate)   SpO2 100%   BMI 31.47 kg/m²     General appearance: No apparent distress  Neck: Supple  Respiratory:  Normal respiratory effort.  Clear to auscultation, bilaterally without Rales/Wheezes/Rhonchi. Cardiovascular: Regular rate and rhythm with normal S1/S2 without murmurs, rubs or gallops. Abdomen: Soft, non-tender, non-distended with normal bowel sounds. Musculoskeletal: No clubbing, cyanosis  Skin: Skin color, texture, turgor normal.  No rashes or lesions. Neurologic:  No focal weakness   Psychiatric: Alert and oriented  Capillary Refill: Brisk,3 seconds, normal   Peripheral Pulses: +2 palpable, equal bilaterally       Labs:   Recent Labs     06/05/21  0113   WBC 9.6   HGB 9.0*   HCT 28.7*        Recent Labs     06/05/21  0113 06/06/21  0537    144   K 4.0 3.5   CL 97* 96*   CO2 26 37*   BUN 27* 37*   CREATININE 1.2 1.2   CALCIUM 9.4 9.8     No results for input(s): AST, ALT, BILIDIR, BILITOT, ALKPHOS in the last 72 hours. No results for input(s): INR in the last 72 hours. Recent Labs     06/05/21 0113   TROPONINI <0.01       Urinalysis:      Lab Results   Component Value Date    NITRU Negative 05/30/2021    WBCUA 118 05/30/2021    BACTERIA RARE 12/07/2020    RBCUA 10 05/30/2021    BLOODU Negative 05/30/2021    SPECGRAV 1.022 05/30/2021    GLUCOSEU Negative 05/30/2021       Radiology:  XR CHEST PORTABLE   Final Result   Minimal basilar atelectasis with chronic scarring noted in the left mid lower   lung field. Assessment/Plan:    Active Hospital Problems    Diagnosis     Acute respiratory failure with hypoxia ([address] [J96.01]     Acute on chronic respiratory failure with hypoxia ([facility]) [J96.21]      1. Acute on chronic respiratory failure with hypoxia, initially was on CPAP and BiPAP currently back to nasal cannula on 5 L she is normally on 4 L at home, etiology likely multifactorial due to potential COPD and am suspecting heart failure due to elevated blood pressure. Telemetry monitoring, oxygen and treatment for COPD and heart failure, discussed with cardiology today, hopefully home tomorrow.    2.  Possible COPD exacerbation, on steroids, continue azithromycin. DuoNeb. 3.  Acute on chronic congestive heart failure suspecting diastolic, likely due to uncontrolled hypertension, lasix for now. 4.  A. fib status post watchman procedure  5. Uncontrolled hypertension, resume p.o. medications for now monitor closely, adding as needed    Diet: ADULT DIET; Regular; 4 carb choices (60 gm/meal);  Low Sodium (2 gm)  Code Status: Full Code      Bridget Acosta MD

## 2021-06-06 NOTE — RT PROTOCOL NOTE
RT Inhaler-Nebulizer Bronchodilator Protocol Note    There is a bronchodilator order in the chart from a provider indicating to follow the RT Bronchodilator Protocol and there is an Initiate RT Bronchodilator Protocol order as well (see protocol at bottom of note). The findings from the last RT Protocol Assessment were as follows:  Smoking: <15 Pack years  Surgical Status: No surgery  Xray: One lobe infiltrates/atelectasis/pleural effusion/edema  Respiratory Pattern: RR 12-20  Mental Status: Alert and Oriented  Breath Sounds: Clear  Cough: Strong, spontaneous, non-productive  Activity Level: Walking with assistance  Oxygen Requirement: 29% or 3LNC - 5LNC/40%  Indication for Bronchodilator Therapy: On home bronchodilators  Bronchodilator Assessment Score: 3    Aerosolized bronchodilator medication orders have been revised according to the RT Bronchodilator Protocol. RT Inhaler-Nebulizer Bronchodilator Protocol:    Respiratory Therapist to perform RT Therapy Protocol Assessment then follow the protocol. No Indications - adjust the frequency to every 6 hours PRN wheezing or bronchospasm, if no treatments needed after 48 hours then discontinue using Per Protocol order mode. If indication present, adjust the RT bronchodilator orders based on the Bronchodilator Assessment Score as follows:    0-6 - enter or revise RT bronchodilator order to Albuterol Inhaler order with frequency of every 2 hours PRN for wheezing or increased work of breathing using Per Protocol order mode. If Albuterol Inhaler not tolerated or not effective, then discontinue the Albuterol Inhaler order and enter Albuterol Nebulizer order with same frequency and PRN reasons. Repeat RT Therapy Protocol Assessment as needed.     7-10 - discontinue any other Inpatient aerosolized bronchodilator medication orders and enter or revise two Albuterol Inhaler orders, one with BID frequency and one with frequency of every 2 hours PRN wheezing or increased work of breathing using Per Protocol order mode. Repeat RT Therapy Protocol Assessment with second treatment then BID and as needed. If Albuterol Inhaler not tolerated or not effective, then discontinue the Albuterol Inhaler orders and enter two Albuterol Nebulizer orders with same frequencies and PRN reasons. 11-13 - discontinue any other Inpatient aerosolized bronchodilator medication orders and enter DuoNeb Nebulizer orders QID frequency and an Albuterol Nebulizer order every 2 hours PRN wheezing or increased work of breathing using Per Protocol order mode. Repeat RT Therapy Protocol Assessment with second treatment then QID and as needed. Greater than 13 - discontinue any other Inpatient bronchodilator aerosolized medication orders and enter DuoNeb Nebulizer order every 4 hours frequency and Albuterol Nebulizer every 2 hours PRN wheezing or increased work of breathing using Per Protocol order mode. Repeat RT Therapy Protocol Assessment with second treatment then every 4 hours and as needed. RT to enter RT Home Evaluation for COPD & MDI Assessment order using Per Protocol order mode.     Electronically signed by Nixon Ayala RCP on 6/6/2021 at 3:55 PM

## 2021-06-06 NOTE — RT PROTOCOL NOTE
RT Inhaler-Nebulizer Bronchodilator Protocol Note    There is a bronchodilator order in the chart from a provider indicating to follow the RT Bronchodilator Protocol and there is an Initiate RT Bronchodilator Protocol order as well (see protocol at bottom of note). The findings from the last RT Protocol Assessment were as follows:  Smoking: <15 Pack years  Surgical Status: No surgery  Xray: One lobe infiltrates/atelectasis/pleural effusion/edema  Respiratory Pattern: RR 12-20  Mental Status: Alert and Oriented  Breath Sounds: Clear  Cough: Strong, spontaneous, non-productive  Activity Level: Walking with assistance  Oxygen Requirement: 41% or 6LNC - 60%  Indication for Bronchodilator Therapy: On home bronchodilators  Bronchodilator Assessment Score: 3    Aerosolized bronchodilator medication orders have been revised according to the RT Bronchodilator Protocol. RT Inhaler-Nebulizer Bronchodilator Protocol:    Respiratory Therapist to perform RT Therapy Protocol Assessment then follow the protocol. No Indications - adjust the frequency to every 6 hours PRN wheezing or bronchospasm, if no treatments needed after 48 hours then discontinue using Per Protocol order mode. If indication present, adjust the RT bronchodilator orders based on the Bronchodilator Assessment Score as follows:    0-6 - enter or revise RT bronchodilator order to Albuterol Inhaler order with frequency of every 2 hours PRN for wheezing or increased work of breathing using Per Protocol order mode. If Albuterol Inhaler not tolerated or not effective, then discontinue the Albuterol Inhaler order and enter Albuterol Nebulizer order with same frequency and PRN reasons. Repeat RT Therapy Protocol Assessment as needed.     7-10 - discontinue any other Inpatient aerosolized bronchodilator medication orders and enter or revise two Albuterol Inhaler orders, one with BID frequency and one with frequency of every 2 hours PRN wheezing or increased work of breathing using Per Protocol order mode. Repeat RT Therapy Protocol Assessment with second treatment then BID and as needed. If Albuterol Inhaler not tolerated or not effective, then discontinue the Albuterol Inhaler orders and enter two Albuterol Nebulizer orders with same frequencies and PRN reasons. 11-13 - discontinue any other Inpatient aerosolized bronchodilator medication orders and enter DuoNeb Nebulizer orders QID frequency and an Albuterol Nebulizer order every 2 hours PRN wheezing or increased work of breathing using Per Protocol order mode. Repeat RT Therapy Protocol Assessment with second treatment then QID and as needed. Greater than 13 - discontinue any other Inpatient bronchodilator aerosolized medication orders and enter DuoNeb Nebulizer order every 4 hours frequency and Albuterol Nebulizer every 2 hours PRN wheezing or increased work of breathing using Per Protocol order mode. Repeat RT Therapy Protocol Assessment with second treatment then every 4 hours and as needed. RT to enter RT Home Evaluation for COPD & MDI Assessment order using Per Protocol order mode.     Electronically signed by Jez Mixon RCP on 6/6/2021 at 12:40 PM

## 2021-06-07 LAB
ANION GAP SERPL CALCULATED.3IONS-SCNC: 13 MMOL/L (ref 3–16)
BUN BLDV-MCNC: 38 MG/DL (ref 7–20)
CALCIUM SERPL-MCNC: 9.6 MG/DL (ref 8.3–10.6)
CHLORIDE BLD-SCNC: 92 MMOL/L (ref 99–110)
CO2: 36 MMOL/L (ref 21–32)
CREAT SERPL-MCNC: 1.3 MG/DL (ref 0.6–1.2)
GFR AFRICAN AMERICAN: 48
GFR NON-AFRICAN AMERICAN: 40
GLUCOSE BLD-MCNC: 122 MG/DL (ref 70–99)
GLUCOSE BLD-MCNC: 143 MG/DL (ref 70–99)
GLUCOSE BLD-MCNC: 146 MG/DL (ref 70–99)
GLUCOSE BLD-MCNC: 168 MG/DL (ref 70–99)
GLUCOSE BLD-MCNC: 232 MG/DL (ref 70–99)
MAGNESIUM: 1.8 MG/DL (ref 1.8–2.4)
PERFORMED ON: ABNORMAL
POTASSIUM SERPL-SCNC: 3.3 MMOL/L (ref 3.5–5.1)
PRO-BNP: 5096 PG/ML (ref 0–124)
SODIUM BLD-SCNC: 141 MMOL/L (ref 136–145)

## 2021-06-07 PROCEDURE — 6370000000 HC RX 637 (ALT 250 FOR IP): Performed by: NURSE PRACTITIONER

## 2021-06-07 PROCEDURE — 94761 N-INVAS EAR/PLS OXIMETRY MLT: CPT

## 2021-06-07 PROCEDURE — 36415 COLL VENOUS BLD VENIPUNCTURE: CPT

## 2021-06-07 PROCEDURE — 6370000000 HC RX 637 (ALT 250 FOR IP): Performed by: INTERNAL MEDICINE

## 2021-06-07 PROCEDURE — 2700000000 HC OXYGEN THERAPY PER DAY

## 2021-06-07 PROCEDURE — 6360000002 HC RX W HCPCS: Performed by: NURSE PRACTITIONER

## 2021-06-07 PROCEDURE — 94640 AIRWAY INHALATION TREATMENT: CPT

## 2021-06-07 PROCEDURE — 2060000000 HC ICU INTERMEDIATE R&B

## 2021-06-07 PROCEDURE — 80048 BASIC METABOLIC PNL TOTAL CA: CPT

## 2021-06-07 PROCEDURE — 83735 ASSAY OF MAGNESIUM: CPT

## 2021-06-07 PROCEDURE — 83880 ASSAY OF NATRIURETIC PEPTIDE: CPT

## 2021-06-07 PROCEDURE — 99233 SBSQ HOSP IP/OBS HIGH 50: CPT | Performed by: NURSE PRACTITIONER

## 2021-06-07 PROCEDURE — 6370000000 HC RX 637 (ALT 250 FOR IP): Performed by: STUDENT IN AN ORGANIZED HEALTH CARE EDUCATION/TRAINING PROGRAM

## 2021-06-07 PROCEDURE — 6360000002 HC RX W HCPCS: Performed by: STUDENT IN AN ORGANIZED HEALTH CARE EDUCATION/TRAINING PROGRAM

## 2021-06-07 PROCEDURE — 2580000003 HC RX 258: Performed by: STUDENT IN AN ORGANIZED HEALTH CARE EDUCATION/TRAINING PROGRAM

## 2021-06-07 PROCEDURE — 2500000003 HC RX 250 WO HCPCS: Performed by: INTERNAL MEDICINE

## 2021-06-07 RX ORDER — FUROSEMIDE 10 MG/ML
40 INJECTION INTRAMUSCULAR; INTRAVENOUS ONCE
Status: COMPLETED | OUTPATIENT
Start: 2021-06-07 | End: 2021-06-07

## 2021-06-07 RX ORDER — IPRATROPIUM BROMIDE AND ALBUTEROL SULFATE 2.5; .5 MG/3ML; MG/3ML
1 SOLUTION RESPIRATORY (INHALATION) EVERY 4 HOURS PRN
Status: DISCONTINUED | OUTPATIENT
Start: 2021-06-07 | End: 2021-06-09 | Stop reason: HOSPADM

## 2021-06-07 RX ORDER — POTASSIUM CHLORIDE 20 MEQ/1
20 TABLET, EXTENDED RELEASE ORAL
Status: DISCONTINUED | OUTPATIENT
Start: 2021-06-08 | End: 2021-06-09 | Stop reason: HOSPADM

## 2021-06-07 RX ORDER — POTASSIUM CHLORIDE 20 MEQ/1
40 TABLET, EXTENDED RELEASE ORAL ONCE
Status: COMPLETED | OUTPATIENT
Start: 2021-06-07 | End: 2021-06-07

## 2021-06-07 RX ADMIN — TORSEMIDE 40 MG: 20 TABLET ORAL at 08:28

## 2021-06-07 RX ADMIN — MONTELUKAST 10 MG: 10 TABLET, FILM COATED ORAL at 08:27

## 2021-06-07 RX ADMIN — BUDESONIDE AND FORMOTEROL FUMARATE DIHYDRATE 2 PUFF: 160; 4.5 AEROSOL RESPIRATORY (INHALATION) at 20:01

## 2021-06-07 RX ADMIN — PREDNISONE 40 MG: 20 TABLET ORAL at 11:54

## 2021-06-07 RX ADMIN — POTASSIUM CHLORIDE 40 MEQ: 1500 TABLET, EXTENDED RELEASE ORAL at 08:30

## 2021-06-07 RX ADMIN — INSULIN LISPRO 2 UNITS: 100 INJECTION, SOLUTION INTRAVENOUS; SUBCUTANEOUS at 08:30

## 2021-06-07 RX ADMIN — CITALOPRAM HYDROBROMIDE 40 MG: 20 TABLET ORAL at 08:27

## 2021-06-07 RX ADMIN — LEVOTHYROXINE SODIUM 50 MCG: 0.05 TABLET ORAL at 05:54

## 2021-06-07 RX ADMIN — DOXYCYCLINE HYCLATE 100 MG: 100 TABLET, FILM COATED ORAL at 08:27

## 2021-06-07 RX ADMIN — BUDESONIDE AND FORMOTEROL FUMARATE DIHYDRATE 2 PUFF: 160; 4.5 AEROSOL RESPIRATORY (INHALATION) at 08:07

## 2021-06-07 RX ADMIN — INSULIN LISPRO 1 UNITS: 100 INJECTION, SOLUTION INTRAVENOUS; SUBCUTANEOUS at 17:48

## 2021-06-07 RX ADMIN — INSULIN LISPRO 1 UNITS: 100 INJECTION, SOLUTION INTRAVENOUS; SUBCUTANEOUS at 11:54

## 2021-06-07 RX ADMIN — PANTOPRAZOLE SODIUM 40 MG: 40 TABLET, DELAYED RELEASE ORAL at 05:54

## 2021-06-07 RX ADMIN — CLONIDINE HYDROCHLORIDE 0.2 MG: 0.1 TABLET ORAL at 22:25

## 2021-06-07 RX ADMIN — CLONIDINE HYDROCHLORIDE 0.2 MG: 0.1 TABLET ORAL at 08:28

## 2021-06-07 RX ADMIN — CARVEDILOL 25 MG: 25 TABLET, FILM COATED ORAL at 17:47

## 2021-06-07 RX ADMIN — Medication 3 MG: at 22:25

## 2021-06-07 RX ADMIN — CLOPIDOGREL BISULFATE 75 MG: 75 TABLET ORAL at 08:28

## 2021-06-07 RX ADMIN — CLONIDINE HYDROCHLORIDE 0.2 MG: 0.1 TABLET ORAL at 14:22

## 2021-06-07 RX ADMIN — Medication 10 ML: at 08:28

## 2021-06-07 RX ADMIN — METHYLPREDNISOLONE SODIUM SUCCINATE 40 MG: 40 INJECTION, POWDER, FOR SOLUTION INTRAMUSCULAR; INTRAVENOUS at 00:31

## 2021-06-07 RX ADMIN — ATORVASTATIN CALCIUM 40 MG: 40 TABLET, FILM COATED ORAL at 08:28

## 2021-06-07 RX ADMIN — CARVEDILOL 25 MG: 25 TABLET, FILM COATED ORAL at 08:28

## 2021-06-07 RX ADMIN — ASPIRIN 81 MG: 81 TABLET, CHEWABLE ORAL at 08:27

## 2021-06-07 RX ADMIN — AMLODIPINE BESYLATE 10 MG: 10 TABLET ORAL at 08:29

## 2021-06-07 RX ADMIN — IPRATROPIUM BROMIDE AND ALBUTEROL SULFATE 1 AMPULE: .5; 3 SOLUTION RESPIRATORY (INHALATION) at 08:07

## 2021-06-07 RX ADMIN — FUROSEMIDE 40 MG: 10 INJECTION, SOLUTION INTRAMUSCULAR; INTRAVENOUS at 14:22

## 2021-06-07 RX ADMIN — TIOTROPIUM BROMIDE INHALATION SPRAY 2 PUFF: 3.12 SPRAY, METERED RESPIRATORY (INHALATION) at 08:07

## 2021-06-07 RX ADMIN — METHYLPREDNISOLONE SODIUM SUCCINATE 40 MG: 40 INJECTION, POWDER, FOR SOLUTION INTRAMUSCULAR; INTRAVENOUS at 05:54

## 2021-06-07 RX ADMIN — Medication 10 ML: at 22:26

## 2021-06-07 RX ADMIN — DOXYCYCLINE HYCLATE 100 MG: 100 TABLET, FILM COATED ORAL at 22:25

## 2021-06-07 RX ADMIN — ENOXAPARIN SODIUM 40 MG: 40 INJECTION SUBCUTANEOUS at 08:28

## 2021-06-07 RX ADMIN — LABETALOL HYDROCHLORIDE 10 MG: 5 INJECTION INTRAVENOUS at 11:54

## 2021-06-07 ASSESSMENT — PAIN SCALES - GENERAL
PAINLEVEL_OUTOF10: 0

## 2021-06-07 NOTE — PROGRESS NOTES
[facility], [place]   Daily Progress Note      Admit Date:  6/5/2021    Reason for follow up visit: SOB    CC: \"I feel like I am back to my baseline. \"    71 y/o female with PMH significant for chronic respiratory failure d/t COPD and on chronic O2, CHF, PAF/S/P Watchman LAAC device, HTN, HLP and iron deficiency anemia (receiving IV venofer) who was admitted with increasing SOB. She starting to get more SOB recently and was requiring more O2. She was diuresed and sxs have improved. Back to her baseline O2 requirement. Interval History:  Pt. seen and examined; records reviewed  BP reviewed. Remains on baseline O2 @ 5L  Net diuresis -2.6L since admit; -2.3 L last 24 hours  SOB much improved    Subjective:  Pt with no acute overnight cardiac events. + chronic SOB  Denies chest pain, cough, palpitations or dizziness    Review of Systems:   · Constitutional: no unanticipated weight loss. There's been no change in energy level, sleep pattern, or activity level. No fevers, chills. · Eyes: No visual changes or diplopia. No scleral icterus. · ENT: No Headaches, hearing loss or vertigo. No mouth sores or sore throat. · Cardiovascular: as reviewed in HPI  · Respiratory: + chronic SOB on O2    · Gastrointestinal: No abdominal pain, appetite loss, blood in stools. No change in bowel or bladder habits. · Genitourinary: No dysuria, trouble voiding, or hematuria. · Musculoskeletal:  No gait disturbance, no joint complaints. · Integumentary: No rash or pruritis. · Neurological: No headache, diplopia, change in muscle strength, numbness or tingling. · Psychiatric: No anxiety or depression. · Endocrine: No temperature intolerance. No excessive thirst, fluid intake, or urination. No tremor. · Hematologic/Lymphatic: No abnormal bruising or bleeding, blood clots or swollen lymph nodes. · Allergic/Immunologic: No nasal congestion or hives.     Objective:   BP (!) 168/68   Pulse 60   Temp 97.5 °F (36.4 °C) (Oral) Resp 18   Ht 5' (1.524 m)   Wt 162 lb 7.7 oz (73.7 kg)   LMP 05/01/2006 (Approximate)   SpO2 99%   BMI 31.73 kg/m²       Intake/Output Summary (Last 24 hours) at 6/7/2021 0942  Last data filed at 6/7/2021 0430  Gross per 24 hour   Intake 720 ml   Output 2950 ml   Net -2230 ml     Wt Readings from Last 3 Encounters:   06/07/21 162 lb 7.7 oz (73.7 kg)   05/31/21 160 lb 11.5 oz (72.9 kg)   05/19/21 164 lb (74.4 kg)       Physical Exam:  General: In no acute distress. Awake, alert, and oriented X4. Resting in bed  Skin:  Warm and dry. No new appearing rashes or lesions. Neck:  Supple. No JVD appreciated. + R carotid bruit  Chest:Lungs with diminished breath sounds. No wheezes/rhonchi/rales  Cardiovascular:  RRR. Normal S1 and S2. Soft systolic murmur. Abdomen:  soft, nontender, nondistended, +bowel sounds. No hepatomegaly  Extremities:  No LE edema. 2+ bilateral radial and DP/PT pulses.  Cap refill brisk     Medications:    [START ON 6/8/2021] potassium chloride  20 mEq Oral Daily with breakfast    carvedilol  25 mg Oral BID WC    torsemide  40 mg Oral Daily    sodium chloride flush  5-40 mL Intravenous 2 times per day    enoxaparin  40 mg Subcutaneous Daily    predniSONE  40 mg Oral Daily    ipratropium-albuterol  1 ampule Inhalation Q4H WA    amLODIPine  10 mg Oral Daily    aspirin  81 mg Oral Daily    atorvastatin  40 mg Oral Daily    budesonide-formoterol  2 puff Inhalation BID    citalopram  40 mg Oral Daily    cloNIDine  0.2 mg Oral TID    clopidogrel  75 mg Oral Daily    levothyroxine  50 mcg Oral QAM AC    pantoprazole  40 mg Oral QAM AC    montelukast  10 mg Oral Daily    tiotropium  2 puff Inhalation Daily    doxycycline hyclate  100 mg Oral 2 times per day    insulin lispro  0-6 Units Subcutaneous TID WC    insulin lispro  0-3 Units Subcutaneous Nightly      sodium chloride      dextrose       sodium chloride flush, sodium chloride, ondansetron **OR** ondansetron, acetaminophen start of the procedure.   The left atrium and appendage appear free of thrombus.   Appendage ostial measurements   43 degrees 1.77cm   92 degrees 1.77cm   97 degrees 1.82cm   154 degrees 1.88cm   175 degrees 1.94cm   A 27mm device is deployed into the left atrial appendage. The device appears   adequately compressed. There is no evidence of leak by color flow.   There is no change in effusion upon completion of the procedure.     1/5/2021 Limited echo:   Limited study performed to assess for pericardial effusion.  Francisco Ruiz is no evidence of a significant effusion.       8/24/2018 R heart cath:  1.  Right atrial pressure is 10 mmHg. 2.  RV pressure is at 39/-6. 3.  Right ventricular pressure is 32/-1. 4.  Pulmonary capillary wedge pressure of 7 mmHg. 5.  Pulmonary artery pressure 26/1. 6.  Cardiac output is 3.46 liters per minute. 7.  Right atrial pressure is 63%. 8.  Aortic saturation 96%. 9.  Pulmonary artery saturation 64%.     SUMMARY:  Normal left and right heart cardiac pressure.     9/13/2017 Cardiac cath:  1.  The left main comes from the left coronary cusp.  There is normal  ELIER 3 flow. 2.  The left anterior descending artery comes from the left main  coronary artery.  It has ELIER 3 flow.  The midportion has 50%  stenosis. 3.  The left circumflex comes from the left main.  It is nondominant. No significant stenosis was seen. 4.  The right coronary artery comes from the right coronary cusp.  It  is dominant giving rise to the right posterior descending artery and  posterolateral branch and has no significant stenosis. 5.  LV ejection fraction is 60%.     SUMMARY:  Nonobstructive coronary artery disease with normal LV  ejection fraction.  LVEDP was 20 mmHg.     8/29/2017 Carotid US:   The left and right internal carotid artery appears to have a 50-79%     8/2017 Lexiscan-Myoview:   Abnormal study.  There is a moderate sized, moderate intensity, reversible    defect of the mid to apical inferior wall which is consistent with ischemia.    Normal LV size and systolic function.    Overall findings represent an intermediate risk study. Telemetry: Sinus rhythm with PCA's/PVC's    Assessment/Plan:    1. Acute on chronic diastolic HF  -NYHA class III; LVEF 55-60%  -currently appears to be baseline  -continue carvedilol, torsemide  -low sodium diet and fluid restriction  -BP control    2. Paroxysmal atrial fibrillation  -off antithrombotic therapy  -S/P Watchman LAAC device placement in March 2021  -continue plavix for now  -continue BB    3. Essential HTN  -labile  -continue medical management  -goal BP < 140/80    4. Acute on chronic respiratory failure with hypoxia  -on baseline O2 @ 5L  -underlying COPD  -continue antibiotic, nebulizer and steroids per Primary team    5. Iron deficiency anemia  -has been receiving venofer as outpt  -if continued infusions, recommend IV lasix with infusions    She appears to be improved and at baseline O2 requirements. We can arrange for follow up within 1 week after follow up. Discussed and reviewed low-fat/low sodium diet, monitoring of daily weights, fluid restriction, worsening signs and symptoms of heart failure and when to call, and the importance of regular exercise and activity.     Continue amlodipine, ASA, statin, carvedilol, clonidine, plavix, Klor Con and torsemide    Electronically signed by Oneda Mcburney, APRN - CNP on 6/7/2021 at 9:42 AM

## 2021-06-07 NOTE — PLAN OF CARE
Problem: Pain:  Goal: Pain level will decrease  Description: Pain level will decrease  6/7/2021 1603 by Franko Kramer RN  Outcome: Ongoing     Problem: Pain:  Goal: Control of acute pain  Description: Control of acute pain  6/7/2021 1603 by Franko Kramer RN  Outcome: Ongoing

## 2021-06-07 NOTE — CONSULTS
Nutrition Education    Consult \"Heart failure diet guidelines\". Pt reports she tries to follow Low Na diet at home. Reports that she uses Mrs. Dash and pepper for seasonings and tries to cook from scratch. Pt reports she does occasionally use some prepackaged foods. Encouraged pt to read the nutrition labels and look for lowest Na foods and watch portion sizes of prepackaged foods. She does state she tries to limit her fluids to <2L a day but some days are hard because she is anemic and likes to eat ice and will over-do it. Encouraged to try sugar free hard candies. All questions answered and provided handouts. Provided pt with HF handouts:  · \"Heart Failure Nutrition Therapy\"  · \"Sodium content of food\"  · \"Lower sodium fast food menu items\"    Time spent: 10 min    · Verbally reviewed information with Patient  · Educated on Low Na diet  · Written educational materials provided. · Contact name and number provided. · Refer to Patient Education activity for more details.     Electronically signed by Jacy Joya RD, LD on 6/7/21 at 1:19 PM EDT    Contact: [phone]

## 2021-06-07 NOTE — PROGRESS NOTES
Hospital Medicine Progress Note      Admit Date: 6/5/2021       CC: F/U for worsening SOB    HPI: 70 y.o. female who presented to [facility] with worsening shortness of breath, to note that patient recently discharged from the hospital, her shortness of breath is getting worse for a day or so, no obvious alleviating or aggravating factors in the context of her COPD elevated blood pressure and potential heart failure, still having some cough, denies fever chills nausea vomiting, feels better after being on BiPAP and CPAP, currently on nasal cannula, denies blurry or double vision at this time    Interval History/Subjective: on torsemide now. Takes lasix at home bid. Cardiology following. On 5L (baseline 4L-5L) denies cough now, but had a little before. Chest tight but no \"pain. \"     BNP elevated >5,000  Spoke to Kathie Soares NP, who will give IV lasix today. She would likely benefit from IV lasix with her iron infusions going forward to avoid fluid overload with them. Recheck BNP tomorrow. Review of Systems:       The patient denied headaches, visual changes, LOC, SOB, CP, ABD pain, N/V/D, skin changes, new or worsening weakness or neuromuscular deficits. Comprehensive ROS negative except as mentioned above.     Past Medical History:        Diagnosis Date    Arthritis     Atrial fibrillation ([address] 1/4/2021    CAD (coronary artery disease)     Nonobstructive on cath in 9/2017    CHF (congestive heart failure) ([address]     Colostomy care ([address] 4/25/2018    Peristomal irritation and early leaking    COPD (chronic obstructive pulmonary disease) (HCC)     Hyperlipidemia     Hypertension     Kidney disease     Stage 3    Peripheral vascular disease ([address]     Rectal fissure 11/2014    surgery pending    Restless legs syndrome     Sleep apnea     O2 3 liters at hs    Thyroid disease     Unspecified sleep apnea        Past Surgical History:        Procedure Laterality Date    ABDOMEN Results   Component Value Date    WBC 9.6 06/05/2021    HGB 9.0 (L) 06/05/2021    HCT 28.7 (L) 06/05/2021    MCV 75.8 (L) 06/05/2021     06/05/2021    LYMPHOPCT 5.2 06/05/2021    RBC 3.78 (L) 06/05/2021    MCH 23.9 (L) 06/05/2021    MCHC 31.5 06/05/2021    RDW 20.8 (H) 06/05/2021       Lab Results   Component Value Date    CREATININE 1.3 (H) 06/07/2021    BUN 38 (H) 06/07/2021     06/07/2021    K 3.3 (L) 06/07/2021    CL 92 (L) 06/07/2021    CO2 36 (H) 06/07/2021       Lab Results   Component Value Date    MG 1.80 06/07/2021       Lab Results   Component Value Date    ALT 8 (L) 05/29/2021    AST 19 05/29/2021    ALKPHOS 45 05/29/2021    BILITOT <0.2 05/29/2021        No flowsheet data found. Lab Results   Component Value Date    LABA1C 5.5 10/11/2019       Imaging:  XR CHEST PORTABLE   Final Result   Minimal basilar atelectasis with chronic scarring noted in the left mid lower   lung field.              Scheduled and prn Medications:    Scheduled Meds:   [START ON 6/8/2021] potassium chloride  20 mEq Oral Daily with breakfast    carvedilol  25 mg Oral BID WC    torsemide  40 mg Oral Daily    sodium chloride flush  5-40 mL Intravenous 2 times per day    enoxaparin  40 mg Subcutaneous Daily    predniSONE  40 mg Oral Daily    ipratropium-albuterol  1 ampule Inhalation Q4H WA    amLODIPine  10 mg Oral Daily    aspirin  81 mg Oral Daily    atorvastatin  40 mg Oral Daily    budesonide-formoterol  2 puff Inhalation BID    citalopram  40 mg Oral Daily    cloNIDine  0.2 mg Oral TID    clopidogrel  75 mg Oral Daily    levothyroxine  50 mcg Oral QAM AC    pantoprazole  40 mg Oral QAM AC    montelukast  10 mg Oral Daily    tiotropium  2 puff Inhalation Daily    doxycycline hyclate  100 mg Oral 2 times per day    insulin lispro  0-6 Units Subcutaneous TID     insulin lispro  0-3 Units Subcutaneous Nightly     Continuous Infusions:   sodium chloride      dextrose       PRN Meds:.sodium chloride flush, sodium chloride, ondansetron **OR** ondansetron, acetaminophen **OR** acetaminophen, labetalol, albuterol sulfate HFA, LORazepam, melatonin, glucose, dextrose, glucagon (rDNA), dextrose    Assessment & Plan:         Acute on chronic respiratory failure with hypoxia, initially was on CPAP and BiPAP currently back to nasal cannula on 5 L she is normally on 4 L at home, etiology likely multifactorial due to potential COPD and am suspecting heart failure due to elevated blood pressure. Telemetry monitoring, oxygen and treatment for COPD and heart failure  - on 5L now  - cont steroids, continue azithromycin. DuoNeb. Acute on chronic diastolic HF  -NYHA class III; LVEF 55-60%  -currently appears to be baseline  -continue carvedilol, torsemide  -low sodium diet and fluid restriction  -BP control  - torsemide 40mg daily now. - was on lasix 40mg bid at home  - one time IV lasix 40mg today   - f/u 1 wk on discharge with cardiology - they will set up  - would likely benefit from IV lasix with iron infusions going forward. Paroxysmal atrial fibrillation  -off antithrombotic therapy  -S/P Watchman LAAC device placement in March 2021  -continue plavix for now  -continue BB    Uncontrolled hypertension, resume p.o. medications for now monitor closely, adding as needed  - goal < 140/80       Continue current regimen/therapies. Monitor. Adjust medical regimen as appropriate. Body mass index is 31.73 kg/m². The patient and / or the family were informed of the results of any tests, a time was given to answer questions, a plan was proposed and they agreed with plan. DVT ppx: lovenox      Diet: ADULT DIET; Regular; 4 carb choices (60 gm/meal);  Low Sodium (2 gm)    Consults:  IP CONSULT TO RESPIRATORY CARE  IP CONSULT TO SOCIAL WORK  IP CONSULT TO HEART FAILURE NURSE/COORDINATOR  IP CONSULT TO DIETITIAN  IP CONSULT TO CARDIOLOGY    DISPO/placement plan: pending    Code Status: Full Code      Dustin Ayala Amarjit Celis, GIULIANO - CNP  06/07/21

## 2021-06-07 NOTE — CARE COORDINATION
Ostomy Referral Progress Note      NAME:  Vani Wu RECORD NUMBER:  [id number]  AGE: 70 y.o. GENDER:  female  :  1945  TODAY'S DATE:  2021    Subjective   Wound care familiar with patient. Pt seen today. Wears dorita 1-piece pouch at home. Currently in 1-piece coloplast pouch. Pt provides own ostomy care. Pt has hx of bleeding from stoma due to granulomas. Stoma is pink, large, and protruding. Gas and stool present in pouch. Had recommended pt follow at the outpatient clinic but has not been. No ostomy needs at this time. Established    PAST MEDICAL HISTORY:        Diagnosis Date    Arthritis     Atrial fibrillation ([facility] 2021    CAD (coronary artery disease)     Nonobstructive on cath in 2017    CHF (congestive heart failure) ([facility]     Colostomy care ([facility] 2018    Peristomal irritation and early leaking    COPD (chronic obstructive pulmonary disease) (HCC)     Hyperlipidemia     Hypertension     Kidney disease     Stage 3    Peripheral vascular disease ([facility]     Rectal fissure 2014    surgery pending    Restless legs syndrome     Sleep apnea     O2 3 liters at hs    Thyroid disease     Unspecified sleep apnea        MEDICATIONS:    No current facility-administered medications on file prior to encounter.      Current Outpatient Medications on File Prior to Encounter   Medication Sig Dispense Refill    furosemide (LASIX) 40 MG tablet TAKE ONE TABLET BY MOUTH TWICE A  tablet 0    amLODIPine (NORVASC) 10 MG tablet Take 1 tablet by mouth daily 30 tablet 3    cloNIDine (CATAPRES) 0.2 MG tablet Take 1 tablet by mouth 3 times daily 270 tablet 1    hydrALAZINE (APRESOLINE) 100 MG tablet TAKE ONE TABLET BY MOUTH THREE TIMES A  tablet 0    carvedilol (COREG) 12.5 MG tablet Take 1 tablet by mouth 2 times daily (with meals) 180 tablet 2    melatonin 3 MG TABS tablet Take 1 tablet by mouth nightly as needed (sleep) 30 tablet 1    citalopram (CELEXA) 40 MG tablet TAKE ONE TABLET BY MOUTH DAILY 90 tablet 0    albuterol sulfate HFA (VENTOLIN HFA) 108 (90 Base) MCG/ACT inhaler Inhale 2 puffs into the lungs every 4 hours as needed for Wheezing or Shortness of Breath 1 Inhaler 5    budesonide-formoterol (SYMBICORT) 160-4.5 MCG/ACT AERO Inhale 2 puffs into the lungs 2 times daily 1 Inhaler 5    ipratropium-albuterol (DUONEB) 0.5-2.5 (3) MG/3ML SOLN nebulizer solution Inhale 3 mLs into the lungs every 4 hours as needed for Shortness of Breath 360 mL 5    tiotropium (SPIRIVA RESPIMAT) 2.5 MCG/ACT AERS inhaler Inhale 2 puffs into the lungs daily 1 Inhaler 5    LORazepam (ATIVAN) 1 MG tablet TAKE ONE TABLET BY MOUTH DAILY AS NEEDED FOR ANXIETY 30 tablet 0    clopidogrel (PLAVIX) 75 MG tablet Take 1 tablet by mouth daily 90 tablet 1    ondansetron (ZOFRAN) 4 MG tablet Take 1 tablet by mouth every 8 hours as needed for Nausea 30 tablet 0    atorvastatin (LIPITOR) 40 MG tablet TAKE ONE TABLET BY MOUTH DAILY 90 tablet 1    montelukast (SINGULAIR) 10 MG tablet Take 1 tablet by mouth daily 90 tablet 0    lansoprazole (PREVACID) 15 MG delayed release capsule Take 1 capsule by mouth daily 90 capsule 0    levothyroxine (SYNTHROID) 50 MCG tablet TAKE ONE TABLET BY MOUTH DAILY 90 tablet 0    lidocaine (XYLOCAINE) 5 % ointment Apply topically as needed. 1 Tube 5    diclofenac sodium (VOLTAREN) 1 % GEL Apply 2 g topically 3 times daily as needed for Pain 100 g 5    lidocaine (XYLOCAINE) 2 % jelly Apply pea-sized amount topically to affected area every 8 hours when necessary pain 1 Tube 11    mineral oil-hydrophilic petrolatum (HYDROPHOR) ointment Apply topically as needed.  1 Tube 5    aspirin 81 MG tablet Take 1 tablet by mouth daily 30 tablet 3       ALLERGIES:    Allergies   Allergen Reactions    Iv Dye [Iodides]      Flushed, broke out and difficulty breathing       PAST SURGICAL HISTORY:    Past Surgical History: Procedure Laterality Date    ABDOMEN SURGERY      CARDIAC CATHETERIZATION  2017    Dr. Flora Knight  2018    CORONARY ARTERY BYPASS GRAFT      Legs & Carotid    FRACTURE SURGERY      HERNIA REPAIR      UPPER GASTROINTESTINAL ENDOSCOPY N/A 2019    EGD DIAGNOSTIC ONLY performed by Kalpana Gamble MD at [address] [place] [address]:    family history includes Cancer in her brother; Heart Disease in her father, mother, and sister.     SOCIAL HISTORY:    Social History     Tobacco Use    Smoking status: Former Smoker     Packs/day: 3.00     Years: 30.00     Pack years: 90.00     Start date: 1963     Quit date: 1992     Years since quittin.3    Smokeless tobacco: Never Used    Tobacco comment: QUIT 21 YRS AGO   Vaping Use    Vaping Use: Never used   Substance Use Topics    Alcohol use: No     Alcohol/week: 0.0 standard drinks    Drug use: No       LABS:  WBC:    Lab Results   Component Value Date    WBC 9.6 2021     H/H:    Lab Results   Component Value Date    HGB 9.0 2021    HCT 28.7 2021     BMP:    Lab Results   Component Value Date     2021    K 3.3 2021    K 4.0 2021    CL 92 2021    CO2 36 2021    BUN 38 2021    LABALBU 3.2 2021    CREATININE 1.3 2021    CALCIUM 9.6 2021    GFRAA 48 2021    LABGLOM 40 2021    GLUCOSE 146 2021     PTT:    Lab Results   Component Value Date    APTT 29.2 2018   [APTT}  PT/INR:    Lab Results   Component Value Date    PROTIME 15.6 2019    INR 1.37 2019       Objective    BP (!) 176/61   Pulse 57   Temp 97.8 °F (36.6 °C) (Oral)   Resp 16   Ht 5' (1.524 m)   Wt 162 lb 7.7 oz (73.7 kg)   LMP 2006 (Approximate)   SpO2 99%   BMI 31.73 kg/m²     Sander Risk Score Sander Scale Score: 21    Patient Active Problem List   Diagnosis Code    Fracture, metacarpal, neck S62.339A    PAD (peripheral artery disease) (HCC) I73.9    CAD (coronary artery disease) I25.10    HTN (hypertension) U94    Diastolic dysfunction A98.65    RLS (restless legs syndrome) G25.81    History of tobacco use Z87.891    Centrilobular emphysema (HCC) J43.2    Colovesical fistula N32.1    Diverticulitis large intestine w/o perforation or abscess w/bleeding K57.33    Large bowel obstruction ([address] K56.609    ANGIE treated with BiPAP G47.33    CKD (chronic kidney disease), stage III - sees Dr. Eric John N18.30    Abnormal radiograph R93.89    COPD, severe ([address] J44.9    Colonic obstruction ([address] K56.609    Carotid artery stenosis without cerebral infarction, bilateral I65.23    Colostomy care ([facility] Z43.3    Dyspnea and respiratory abnormalities R06.00, R06.89    Hyperlipidemia E78.5    Paroxysmal atrial fibrillation ([facility]) I48.0    Acquired hypothyroidism E03.9    Chest pain R07.9    Chronic anemia D64.9    Anxiety F41.9    History of GI bleed Z87.19    Iron deficiency anemia due to chronic blood loss D50.0    Presence of Watchman left atrial appendage closure device Z95.818    Atrial fibrillation ([facility]) I48.91    Pulmonary edema, acute ([facility]) J81.0    Chronic respiratory failure with hypoxia ([facility]) J96.11    Acute on chronic respiratory failure with hypoxia ([facility]) J96.21    Severe muscle deconditioning R29.898    Hypertensive urgency I16.0    Essential hypertension I10    Acute cystitis without hematuria N30.00    Headache R51.9    Acute respiratory failure with hypoxia ([facility]) J96.01    Acute diastolic heart failure ([facility]) I50.31    Acute respiratory distress R06.03       Assessment     Colostomy LUQ Descending/sigmoid (Active)   Stomal Appliance 1 piece 06/06/21 2207   Flange Size (inches) 2.5 Inches 05/31/21 1410   Stoma  Assessment Protrudes; Moist;Pink 06/06/21 2207   Mucocutaneous Junction Intact 06/06/21 2207   Peristomal Assessment Red; Other (Comment) 06/06/21 2207   Treatment Bag change;Site care 06/06/21 2207   Stool Appearance Soft 06/06/21 2207   Stool Color Brown 06/06/21 2207   Stool Amount Medium 06/06/21 2207   Output (mL) 300 ml 05/31/21 1410   Number of days: 1185       Intake/Output Summary (Last 24 hours) at 6/7/2021 1152  Last data filed at 6/7/2021 0430  Gross per 24 hour   Intake 720 ml   Output 2950 ml   Net -2230 ml         Plan   Plan for Ostomy Care:  Supplies can be obtained in clean hold:  -Coloplast [employer] [place] flat 1 pc #[id number]  -Brava protective Seal thin #[id number]  -[employer]rav[employer] Elastic Barrier Strips #[id number]  Pt can provide own ostomy care. Will not continue to follow. Please re-consult if needed. Current Diet: ADULT DIET; Regular; 4 carb choices (60 gm/meal);  Low Sodium (2 gm)  Dietician consult:  No    Electronically signed by Alejandro Davis RN, on 6/7/2021 at 11:52 AM

## 2021-06-07 NOTE — ACP (ADVANCE CARE PLANNING)
Advance Care Planning     Advance Care Planning Activator (Inpatient)  Conversation Note      Date of ACP Conversation: 6/7/2021     Conversation Conducted with: Patient with Decision Making Capacity    ACP Activator: Rock Freed RN      Health Care Decision Maker:     Current Designated Health Care Decision Maker:   [place], son - Primary Decision Maker      Today we documented Decision Maker(s) consistent with Legal Next of Kin hierarchy. Care Preferences    Ventilation: \"If you were in your present state of health and suddenly became very ill and were unable to breathe on your own, what would your preference be about the use of a ventilator (breathing machine) if it were available to you? \"      Would the patient desire the use of ventilator (breathing machine)?: yes    \"If your health worsens and it becomes clear that your chance of recovery is unlikely, what would your preference be about the use of a ventilator (breathing machine) if it were available to you? \"     Would the patient desire the use of ventilator (breathing machine)?: Unsure      Resuscitation  \"CPR works best to restart the heart when there is a sudden event, like a heart attack, in someone who is otherwise healthy. Unfortunately, CPR does not typically restart the heart for people who have serious health conditions or who are very sick. \"    \"In the event your heart stopped as a result of an underlying serious health condition, would you want attempts to be made to restart your heart (answer \"yes\" for attempt to resuscitate) or would you prefer a natural death (answer \"no\" for do not attempt to resuscitate)? \" yes       [] Yes   [x] No   Educated Patient / Donnamae Aristeo regarding differences between Advance Directives and portable DNR orders.     Length of ACP Conversation in minutes:  5 minutes    Conversation Outcomes:  [x] ACP discussion completed  [] Existing advance directive reviewed with patient; no changes to patient's previously recorded wishes  [] New Advance Directive completed  [] Portable Do Not Rescitate prepared for Provider review and signature  [] POLST/POST/MOLST/MOST prepared for Provider review and signature      Follow-up plan:    [] Schedule follow-up conversation to continue planning  [] Referred individual to Provider for additional questions/concerns   [] Advised patient/agent/surrogate to review completed ACP document and update if needed with changes in condition, patient preferences or care setting    [x] This note routed to one or more involved healthcare providers  Electronically signed by Speedy Oconnor RN Case Management on 6/7/2021 at 3:01 PM

## 2021-06-07 NOTE — CARE COORDINATION
06/07/21 1458   Readmission Assessment   Number of Days since last admission? 1-7 days   Previous Disposition Home with Family   Who is being Interviewed Patient   What was the patient's/caregiver's perception as to why they think they needed to return back to the hospital? Other (Comment)  (after iron infusion became short of breath)   Did you visit your Primary Care Physician after you left the hospital, before you returned this time? No   Why weren't you able to visit your PCP? Other (Comment)  (returned to hospital before appt)   Did you see a specialist, such as Cardiac, Pulmonary, Orthopedic Physician, etc. after you left the hospital? No   Who advised the patient to return to the hospital? Self-referral   Does the patient report anything that got in the way of taking their medications? No   In our efforts to provide the best possible care to you and others like you, can you think of anything that we could have done to help you after you left the hospital the first time, so that you might not have needed to return so soon?  Other (Comment)  (pt declines, but maybe give IV lasix with outpatient infusion)

## 2021-06-08 LAB
ANION GAP SERPL CALCULATED.3IONS-SCNC: 14 MMOL/L (ref 3–16)
BUN BLDV-MCNC: 55 MG/DL (ref 7–20)
CALCIUM SERPL-MCNC: 9.3 MG/DL (ref 8.3–10.6)
CHLORIDE BLD-SCNC: 91 MMOL/L (ref 99–110)
CO2: 36 MMOL/L (ref 21–32)
CREAT SERPL-MCNC: 1.3 MG/DL (ref 0.6–1.2)
GFR AFRICAN AMERICAN: 48
GFR NON-AFRICAN AMERICAN: 40
GLUCOSE BLD-MCNC: 121 MG/DL (ref 70–99)
GLUCOSE BLD-MCNC: 135 MG/DL (ref 70–99)
GLUCOSE BLD-MCNC: 140 MG/DL (ref 70–99)
GLUCOSE BLD-MCNC: 146 MG/DL (ref 70–99)
GLUCOSE BLD-MCNC: 182 MG/DL (ref 70–99)
MAGNESIUM: 1.6 MG/DL (ref 1.8–2.4)
PERFORMED ON: ABNORMAL
POTASSIUM SERPL-SCNC: 3.1 MMOL/L (ref 3.5–5.1)
PRO-BNP: 5449 PG/ML (ref 0–124)
SODIUM BLD-SCNC: 141 MMOL/L (ref 136–145)

## 2021-06-08 PROCEDURE — 94640 AIRWAY INHALATION TREATMENT: CPT

## 2021-06-08 PROCEDURE — 80048 BASIC METABOLIC PNL TOTAL CA: CPT

## 2021-06-08 PROCEDURE — 2580000003 HC RX 258: Performed by: STUDENT IN AN ORGANIZED HEALTH CARE EDUCATION/TRAINING PROGRAM

## 2021-06-08 PROCEDURE — 2500000003 HC RX 250 WO HCPCS: Performed by: INTERNAL MEDICINE

## 2021-06-08 PROCEDURE — 2700000000 HC OXYGEN THERAPY PER DAY

## 2021-06-08 PROCEDURE — 83880 ASSAY OF NATRIURETIC PEPTIDE: CPT

## 2021-06-08 PROCEDURE — 99233 SBSQ HOSP IP/OBS HIGH 50: CPT | Performed by: NURSE PRACTITIONER

## 2021-06-08 PROCEDURE — 6370000000 HC RX 637 (ALT 250 FOR IP): Performed by: INTERNAL MEDICINE

## 2021-06-08 PROCEDURE — 6360000002 HC RX W HCPCS: Performed by: NURSE PRACTITIONER

## 2021-06-08 PROCEDURE — 6370000000 HC RX 637 (ALT 250 FOR IP): Performed by: NURSE PRACTITIONER

## 2021-06-08 PROCEDURE — 6370000000 HC RX 637 (ALT 250 FOR IP): Performed by: STUDENT IN AN ORGANIZED HEALTH CARE EDUCATION/TRAINING PROGRAM

## 2021-06-08 PROCEDURE — 83735 ASSAY OF MAGNESIUM: CPT

## 2021-06-08 PROCEDURE — 36415 COLL VENOUS BLD VENIPUNCTURE: CPT

## 2021-06-08 PROCEDURE — 2060000000 HC ICU INTERMEDIATE R&B

## 2021-06-08 PROCEDURE — 99223 1ST HOSP IP/OBS HIGH 75: CPT | Performed by: INTERNAL MEDICINE

## 2021-06-08 PROCEDURE — 94760 N-INVAS EAR/PLS OXIMETRY 1: CPT

## 2021-06-08 RX ORDER — POTASSIUM CHLORIDE 20 MEQ/1
20 TABLET, EXTENDED RELEASE ORAL
Qty: 30 TABLET | Refills: 3 | Status: SHIPPED | OUTPATIENT
Start: 2021-06-09

## 2021-06-08 RX ORDER — CARVEDILOL 25 MG/1
25 TABLET ORAL 2 TIMES DAILY WITH MEALS
Qty: 60 TABLET | Refills: 3 | Status: SHIPPED
Start: 2021-06-08 | End: 2021-06-24 | Stop reason: DRUGHIGH

## 2021-06-08 RX ORDER — POTASSIUM CHLORIDE 20 MEQ/1
40 TABLET, EXTENDED RELEASE ORAL ONCE
Status: DISCONTINUED | OUTPATIENT
Start: 2021-06-08 | End: 2021-06-08

## 2021-06-08 RX ORDER — TORSEMIDE 20 MG/1
40 TABLET ORAL DAILY
Qty: 60 TABLET | Refills: 3 | Status: SHIPPED | OUTPATIENT
Start: 2021-06-09 | End: 2021-08-05

## 2021-06-08 RX ORDER — PREDNISONE 10 MG/1
TABLET ORAL
Qty: 40 TABLET | Refills: 0 | Status: SHIPPED | OUTPATIENT
Start: 2021-06-08 | End: 2021-08-05

## 2021-06-08 RX ORDER — HYDRALAZINE HYDROCHLORIDE 25 MG/1
25 TABLET, FILM COATED ORAL EVERY 8 HOURS SCHEDULED
Status: DISCONTINUED | OUTPATIENT
Start: 2021-06-08 | End: 2021-06-09 | Stop reason: HOSPADM

## 2021-06-08 RX ORDER — HYDRALAZINE HYDROCHLORIDE 25 MG/1
25 TABLET, FILM COATED ORAL EVERY 8 HOURS SCHEDULED
Qty: 90 TABLET | Refills: 3 | Status: SHIPPED | OUTPATIENT
Start: 2021-06-08

## 2021-06-08 RX ORDER — MAGNESIUM SULFATE IN WATER 40 MG/ML
2000 INJECTION, SOLUTION INTRAVENOUS ONCE
Status: COMPLETED | OUTPATIENT
Start: 2021-06-08 | End: 2021-06-08

## 2021-06-08 RX ORDER — DOXYCYCLINE HYCLATE 100 MG
100 TABLET ORAL EVERY 12 HOURS SCHEDULED
Qty: 10 TABLET | Refills: 0 | Status: SHIPPED | OUTPATIENT
Start: 2021-06-08 | End: 2021-06-13

## 2021-06-08 RX ADMIN — LABETALOL HYDROCHLORIDE 10 MG: 5 INJECTION INTRAVENOUS at 05:21

## 2021-06-08 RX ADMIN — Medication 10 ML: at 21:04

## 2021-06-08 RX ADMIN — ASPIRIN 81 MG: 81 TABLET, CHEWABLE ORAL at 08:30

## 2021-06-08 RX ADMIN — Medication 3 MG: at 21:02

## 2021-06-08 RX ADMIN — AMLODIPINE BESYLATE 10 MG: 10 TABLET ORAL at 08:30

## 2021-06-08 RX ADMIN — INSULIN LISPRO 1 UNITS: 100 INJECTION, SOLUTION INTRAVENOUS; SUBCUTANEOUS at 17:25

## 2021-06-08 RX ADMIN — HYDRALAZINE HYDROCHLORIDE 25 MG: 25 TABLET, FILM COATED ORAL at 14:09

## 2021-06-08 RX ADMIN — CARVEDILOL 25 MG: 25 TABLET, FILM COATED ORAL at 08:30

## 2021-06-08 RX ADMIN — CLOPIDOGREL BISULFATE 75 MG: 75 TABLET ORAL at 08:31

## 2021-06-08 RX ADMIN — ATORVASTATIN CALCIUM 40 MG: 40 TABLET, FILM COATED ORAL at 08:31

## 2021-06-08 RX ADMIN — CLONIDINE HYDROCHLORIDE 0.2 MG: 0.1 TABLET ORAL at 14:09

## 2021-06-08 RX ADMIN — HYDRALAZINE HYDROCHLORIDE 25 MG: 25 TABLET, FILM COATED ORAL at 21:02

## 2021-06-08 RX ADMIN — PANTOPRAZOLE SODIUM 40 MG: 40 TABLET, DELAYED RELEASE ORAL at 05:20

## 2021-06-08 RX ADMIN — MAGNESIUM SULFATE HEPTAHYDRATE 2000 MG: 40 INJECTION, SOLUTION INTRAVENOUS at 11:50

## 2021-06-08 RX ADMIN — BUDESONIDE AND FORMOTEROL FUMARATE DIHYDRATE 2 PUFF: 160; 4.5 AEROSOL RESPIRATORY (INHALATION) at 07:53

## 2021-06-08 RX ADMIN — CLONIDINE HYDROCHLORIDE 0.2 MG: 0.1 TABLET ORAL at 21:02

## 2021-06-08 RX ADMIN — MONTELUKAST 10 MG: 10 TABLET, FILM COATED ORAL at 08:30

## 2021-06-08 RX ADMIN — CLONIDINE HYDROCHLORIDE 0.2 MG: 0.1 TABLET ORAL at 08:30

## 2021-06-08 RX ADMIN — BUDESONIDE AND FORMOTEROL FUMARATE DIHYDRATE 2 PUFF: 160; 4.5 AEROSOL RESPIRATORY (INHALATION) at 19:50

## 2021-06-08 RX ADMIN — POTASSIUM CHLORIDE 20 MEQ: 1500 TABLET, EXTENDED RELEASE ORAL at 11:50

## 2021-06-08 RX ADMIN — TIOTROPIUM BROMIDE INHALATION SPRAY 2 PUFF: 3.12 SPRAY, METERED RESPIRATORY (INHALATION) at 07:53

## 2021-06-08 RX ADMIN — CITALOPRAM HYDROBROMIDE 40 MG: 20 TABLET ORAL at 08:30

## 2021-06-08 RX ADMIN — DOXYCYCLINE HYCLATE 100 MG: 100 TABLET, FILM COATED ORAL at 21:02

## 2021-06-08 RX ADMIN — LORAZEPAM 0.5 MG: 0.5 TABLET ORAL at 21:04

## 2021-06-08 RX ADMIN — TORSEMIDE 40 MG: 20 TABLET ORAL at 08:29

## 2021-06-08 RX ADMIN — POTASSIUM CHLORIDE 20 MEQ: 1500 TABLET, EXTENDED RELEASE ORAL at 08:31

## 2021-06-08 RX ADMIN — PREDNISONE 40 MG: 20 TABLET ORAL at 08:30

## 2021-06-08 RX ADMIN — CARVEDILOL 25 MG: 25 TABLET, FILM COATED ORAL at 17:21

## 2021-06-08 RX ADMIN — DOXYCYCLINE HYCLATE 100 MG: 100 TABLET, FILM COATED ORAL at 08:31

## 2021-06-08 RX ADMIN — LEVOTHYROXINE SODIUM 50 MCG: 0.05 TABLET ORAL at 05:20

## 2021-06-08 ASSESSMENT — PAIN SCALES - GENERAL
PAINLEVEL_OUTOF10: 0
PAINLEVEL_OUTOF10: 0

## 2021-06-08 NOTE — CONSULTS
[facility]  HEART FAILURE PROGRAM      NAME:  [address] [place] [address] RECORD NUMBER:  [id number]  AGE: 70 y.o.    GENDER: female  : 1945  TODAY'S DATE:  2021    Subjective:     VISIT TYPE: evaluation     ADMITTING PHYSICIAN:  Umberto Hurley DO    PAST MEDICAL HISTORY        Diagnosis Date    Arthritis     Atrial fibrillation ([facility] 2021    CAD (coronary artery disease)     Nonobstructive on cath in 2017    CHF (congestive heart failure) ([address]     Colostomy care ([facility] 2018    Peristomal irritation and early leaking    COPD (chronic obstructive pulmonary disease) ([facility]     Hyperlipidemia     Hypertension     Kidney disease     Stage 3    Peripheral vascular disease ([facility]     Rectal fissure 2014    surgery pending    Restless legs syndrome     Sleep apnea     O2 3 liters at hs    Thyroid disease     Unspecified sleep apnea        SOCIAL HISTORY    Social History     Tobacco Use    Smoking status: Former Smoker     Packs/day: 3.00     Years: 30.00     Pack years: 90.00     Start date: 1963     Quit date: 1992     Years since quittin.3    Smokeless tobacco: Never Used    Tobacco comment: QUIT 21 YRS AGO   Vaping Use    Vaping Use: Never used   Substance Use Topics    Alcohol use: No     Alcohol/week: 0.0 standard drinks    Drug use: No       ALLERGIES    Allergies   Allergen Reactions    Iv Dye [Iodides]      Flushed, broke out and difficulty breathing       MEDICATIONS  Scheduled Meds:   hydrALAZINE  25 mg Oral 3 times per day    magnesium sulfate  2,000 mg Intravenous Once    potassium chloride  20 mEq Oral Daily with breakfast    carvedilol  25 mg Oral BID WC    torsemide  40 mg Oral Daily    sodium chloride flush  5-40 mL Intravenous 2 times per day    predniSONE  40 mg Oral Daily    amLODIPine  10 mg Oral Daily    aspirin  81 mg Oral Daily    atorvastatin  40 mg Oral Daily    budesonide-formoterol  2 puff Inhalation BID    citalopram  40 mg Oral Daily    cloNIDine  0.2 mg Oral TID    clopidogrel  75 mg Oral Daily    levothyroxine  50 mcg Oral QAM AC    pantoprazole  40 mg Oral QAM AC    montelukast  10 mg Oral Daily    tiotropium  2 puff Inhalation Daily    doxycycline hyclate  100 mg Oral 2 times per day    insulin lispro  0-6 Units Subcutaneous TID WC    insulin lispro  0-3 Units Subcutaneous Nightly       ADMIT DATE: 6/5/2021      Objective:     ADMISSION DIAGNOSIS:   Acute respiratory failure with hypoxia (HCC) [J96.01]     BP (!) 123/41   Pulse 57   Temp 98.2 °F (36.8 °C) (Oral)   Resp 18   Ht 5' (1.524 m)   Wt 163 lb 2.3 oz (74 kg)   LMP 05/01/2006 (Approximate)   SpO2 100%   BMI 31.86 kg/m²     ADMIT:  Weight: 168 lb 6.9 oz (76.4 kg)    TODAY: Weight: 163 lb 2.3 oz (74 kg)    Wt Readings from Last 25 Encounters:   06/08/21 163 lb 2.3 oz (74 kg)   05/31/21 160 lb 11.5 oz (72.9 kg)   05/19/21 164 lb (74.4 kg)   05/12/21 161 lb (73 kg)   02/24/21 162 lb (73.5 kg)   01/26/21 173 lb 8 oz (78.7 kg)   01/06/21 169 lb 5 oz (76.8 kg)   12/07/20 172 lb (78 kg)   11/11/20 172 lb 9.6 oz (78.3 kg)   11/02/20 173 lb (78.5 kg)   03/04/20 175 lb (79.4 kg)   02/25/20 175 lb (79.4 kg)   02/25/20 176 lb (79.8 kg)   02/21/20 182 lb 5.1 oz (82.7 kg)   02/13/20 172 lb (78 kg)   02/11/20 175 lb (79.4 kg)   02/10/20 175 lb (79.4 kg)   01/28/20 172 lb (78 kg)   11/27/19 178 lb (80.7 kg)   11/19/19 173 lb (78.5 kg)   11/14/19 176 lb 12.8 oz (80.2 kg)   11/07/19 178 lb 12.7 oz (81.1 kg)   10/30/19 174 lb (78.9 kg)   10/25/19 177 lb (80.3 kg)   10/17/19 170 lb (77.1 kg)          Intake/Output Summary (Last 24 hours) at 6/8/2021 1128  Last data filed at 6/8/2021 1037  Gross per 24 hour   Intake 1820 ml   Output 3200 ml   Net -1380 ml       LABS  BMP:   Lab Results   Component Value Date     06/08/2021    K 3.1 06/08/2021    K 4.0 06/05/2021    CL 91 06/08/2021    CO2 36 06/08/2021    BUN 55 06/08/2021    LABALBU 3.2 05/31/2021 CREATININE 1.3 06/08/2021    CALCIUM 9.3 06/08/2021    GFRAA 48 06/08/2021    LABGLOM 40 06/08/2021    GLUCOSE 135 06/08/2021     CBC: No results for input(s): WBC, HGB, HCT, MCV, PLT in the last 72 hours. BNP: No results found for: BNP    ECHOCARDIOGRAM: 02/14/2020  Summary   Left ventricular cavity size is normal.   There is mild concentric left ventricular hypertrophy. Ejection fraction is visually estimated to be 55-60%. Grade I diastolic   dysfunction   Mild to moderate mitral regurgitation. Mild to moderate tricuspid regurgitation with RVSP of 51 mmHg. There have been limited echoes in interim associated with Watchman Jan 2021. Assessment:     CONSULTS:   IP CONSULT TO SOCIAL WORK  IP CONSULT TO HEART FAILURE NURSE/COORDINATOR  IP CONSULT TO DIETITIAN  IP CONSULT TO CARDIOLOGY  IP CONSULT TO HEART FAILURE NURSE/COORDINATOR  IP CONSULT TO HOME CARE NEEDS    Patient has a CARDIOLOGY CONSULT: Yes      Patient taking an ACEI/ARB:  No: preserved EF       Patient taking a BETA BLOCKER:  Yes    SCALE AVAILABLE:  Yes     EDUCATION STATUS: Patient   [x]  Provided both written and verbal education on Heart Failure signs/symptoms. [x]  Provided instructions on daily medications. [x]  Provided instructions to monitor and record weight daily. [x]  Provided instructions to call if weight increases 3 lbs in one day or 5 lbs in one week. [x]  Received verbal acknowledgment/understanding of Heart Failure related causes. [x]  Provided instructions on how to maintain a low sodium diet. []  Provided recommendations for smoking cessation programs  [x]  Provided recommendations on activity and exercise    [x]  Other:HF RN consult received from Dr Estella Bowman as part of order set and Andres Grider NP. Chart reviewed. Pt presented to ED via EMS with c/o respiratory distress. She was placed on CPAP enroute and transitioned to BiPAP upon ED arrival.  She endorsed dry cough.   She was treated with steroids / nebs and so.  I provided weight log and corresponding AHA HF zones sheet. We discussed the 3/5 rule and s/sx of worsening HF found on yellow zone. She nods as we review the yellow zone items and states \"I had all those when I came in\". She states she has a pulse oximeter at home and noted her O2 had been staying in the upper 80's for a couple days. I urged her to reach out when she has flare concerns. We discussed the importance of EARLY report of concerns to most easily address issues. She is agreeable to do so with the hope of getting \"treated\" over the phone vs having to find transport. We then reviewed the rationale of sodium and fluid restriction and the recommendations of each. Pt admits to \"eating ice all day, every day\". She reports \"going through a 10# bag 1-2 times a week\". She claims she only drinks water otherwise. She has no estimate of daily intake. She feels her sodium intake is not excessive but also admits to [facility] not paying that much attention\". We reviewed how to read a food label. We discusses tips on how to offset thirst.  We discussed how to track ice intake. She states \"I would be fine if all I could have is ice\". She claims to purchase some styrofoam cups so she can track how many cups she drinks. We also discussed she could premeasure her daily amount into a container and spend from it as an alternative. She is not diabetic so we discussed snacking on fruits to offset fluid intake. She does have CKD and was surprised to learn a renal diet is also low sodium. I provided handouts for renal diets / recipes, etc. She was very appreciative. We discussed importance of follow up visit. She does not have transportation but is already aware of appt for next week and claims she will make arrangements with her insurance company later today. We discussed support programs available.   She is interested in attending [facility] and was very happy to learn about virtual visit option. We also discussed LYFT availability for urgent type needs for both [facility] and [address]. Overall, pt was very engaged and repeatedly voiced her appreciation of all information / help. I provided [facility] and [facility] HF NIKE. She shares her granddaughter is very involved with her care and will review all material as well. I encouraged her to plan [facility] virtual visits when gdtr present to allow her opportunity to hear info first hand and ask any questions she might have. Pt agrees to do so. HF RNs will continue to follow and assist with transition of care at discharge. Pt is hopeful to be discharged tomorrow. CURRENT DIET: ADULT DIET; Regular; 4 carb choices (60 gm/meal); Low Sodium (2 gm); 2000 ml    EDUCATIONAL PACKETS PROVIDED- PRINTED FROM [employer]. Titles and material given:  Yes   [x]  What is Heart Failure? [x]  Heart Failure: Warning Signs of a Flare-Up  [x]  Heart Failure: Making Changes to Your Diet  [x]  Heart Failure: Medications to Help Your Heart   [x]  Other: weight log, AHA HF zones sheet, [employer], Sodium Content in foods, Fast food Nutrition guide, [facility] print outs of Renal diet and Low Sodium Diet     Pt informed of free carly for smart phone \"WOW ME 2000\" and [facility]. org website for free renal recipes. (Pt states she also has class 3 CKD and was searching for recipes for both HF and CKD). PATIENT/CAREGIVER TEACHING:    Level of patient/caregiver understanding able to:   [x] Verbalize understanding   [] Demonstrate understanding       [x] Teach back        [x] Needs reinforcement     []  Other:      TEACHING TIME:  60 minutes       Plan:       DISCHARGE PLAN:  Placement for patient upon discharge: home with support   Hospice Care:  no  Code Status: Full Code  Discharge appointment scheduled: Yes     RECOMMENDATIONS:   [x]  Encourage to call Heart Failure Resource Line with any questions or concerns. [x]  Educate further MsChristiano Morrow's on fluid restriction 48 oz- 64 oz during inpatient stay so she can understand how to measure intake at home. [x]  Continue to educate on S/S of Heart Failure.   [x]  Emphasize daily weights, diet, and if changes, to call Heart Failure Resource Line  [x]  Other:  Cardiac Rehab referral: pt declines as not covered by insurance in setting of preserved EF    [address] referral : pt accepts / electronic order placed / brochure provided             Electronically signed by Melanie Herrera RN, BSN [facility]  on 6/8/2021 at 11:28 AM

## 2021-06-08 NOTE — CONSULTS
REASON FOR CONSULTATION/CC: copd       Consult at request of Jonathan Contreras MD for     PCP: Isi Gay MD  Established Pulmonologist: Dr. Mk Nicholas, transitioning to Dr. Hernandez Bly: Nidhi Garnica is a 70y.o. year old female with a history of copd / mayank  who presents with     History  Patient was recently discharged May 31st after being admitted for headache, hypertensive urgency and urinary tract infection. Current history  Patient arrived to the emergency room 3 days ago via EMS secondary to shortness of breath on CPAP/BiPAP and weaned to nasal cannula. Assessment:     COPD, FEV1 49%  Chronic hypoxemic respiratory failure, 2 L nasal cannula and 5 L with exertion  Sleep apnea-BiPAP broken  Status post watchman history of A. fib  Diastolic heart failure  CAD  Right carotid bruit with stenosis of bilateral carotid arteries  Hypertension  CKD    Plan:      Hospital Day 3     COPD  -Home medication Symbicort and Spiriva with albuterol and duo nebs as needed  - prednisone 40 mg daily     Sleep apnea  - She states her bipap machine is broke and in storage. This broke several months ago. She is not clear why but will not turn on  - she was asked to bring in bipap to assess at visit. Future Appointments   Date Time Provider Cleo Peralta   6/15/2021  2:40 PM GIULIANO Shannon - CNP [facility]   7/6/2021  1:00 PM GIULIANO Srinivasan - CNP [facility]   7/21/2021  9:40 AM Scott Montoya  [facility]             Chronic hypoxemia  - 80% on room air (o2 was disconnected from wall)   - placed on 3L with return to 93%. - back to baseline. This note was transcribed using [id number] [employer] Please disregard any translational errors.     Thank you for the consult    [facility]  [phone]             Data:     PAST MEDICAL HISTORY:  Past Medical History:   Diagnosis Date    Arthritis     mg Oral Daily    tiotropium  2 puff Inhalation Daily    doxycycline hyclate  100 mg Oral 2 times per day    insulin lispro  0-6 Units Subcutaneous TID WC    insulin lispro  0-3 Units Subcutaneous Nightly       Continuous Infusions:   sodium chloride      dextrose         PRN Meds:  ipratropium-albuterol, sodium chloride flush, sodium chloride, ondansetron **OR** ondansetron, acetaminophen **OR** acetaminophen, labetalol, albuterol sulfate HFA, LORazepam, melatonin, glucose, dextrose, glucagon (rDNA), dextrose    ALLERGIES:  Patient is allergic to iv dye [iodides]. REVIEW OF SYSTEMS:  Constitutional: Negative for fever    HENT: Negative for sore throat  Eyes: Negative for redness   Respiratory: Negative for dyspnea, cough (close to baseline)   Cardiovascular: Negative for chest pain  Gastrointestinal: Negative for vomiting, diarrhea    Genitourinary: Negative for hematuria   Musculoskeletal: Negative for arthralgias   Skin: Negative for rash  Neurological: Negative for syncope  Hematological: Negative for adenopathy  Psychiatric/Behavorial: Negative for anxiety    Objective:   PHYSICAL EXAM:  Blood pressure (!) 165/68, pulse 54, temperature 97.5 °F (36.4 °C), temperature source Axillary, resp. rate 15, height 5' (1.524 m), weight 163 lb 2.3 oz (74 kg), last menstrual period 05/01/2006, SpO2 100 %, not currently breastfeeding.'  Gen: No distress. Eyes: PERRL. No sclera icterus. No conjunctival injection. ENT: No discharge. Pharynx clear. External appearance of ears and nose normal.  Neck: Trachea midline. No obvious mass. Resp: No accessory muscle use. No crackles. No wheezes. No rhonchi. CV: Regular rate. Regular rhythm. No murmur or rub. No edema. GI: Non-tender. Non-distended. No hernia. Skin: Warm, dry, normal texture and turgor. No nodule on exposed extremities. Lymph: No cervical LAD. No supraclavicular LAD. M/S: No cyanosis. No clubbing. No joint deformity. Neuro:  Moves all four extremities. Psych: Oriented x 3. No anxiety. Awake. Alert. Intact judgement and insight. Data Reviewed:   LABS:  CBC: No results for input(s): WBC, HGB, HCT, MCV, PLT in the last 72 hours. BMP:   Recent Labs     06/06/21  0537 06/07/21  0542 06/08/21  0532    141 141   K 3.5 3.3* 3.1*   CL 96* 92* 91*   CO2 37* 36* 36*   BUN 37* 38* 55*   CREATININE 1.2 1.3* 1.3*     LIVER PROFILE: No results for input(s): AST, ALT, LIPASE, BILIDIR, BILITOT, ALKPHOS in the last 72 hours. Invalid input(s): AMYLASE,  ALB  PT/INR: No results for input(s): PROTIME, INR in the last 72 hours. APTT: No results for input(s): APTT in the last 72 hours. UA:No results for input(s): NITRITE, COLORU, PHUR, LABCAST, WBCUA, RBCUA, MUCUS, TRICHOMONAS, YEAST, BACTERIA, CLARITYU, SPECGRAV, LEUKOCYTESUR, UROBILINOGEN, BILIRUBINUR, BLOODU, GLUCOSEU, AMORPHOUS in the last 72 hours. Invalid input(s): KETONESU  No results for input(s): PHART, DDJ3SHS, PO2ART in the last 72 hours. Vent Information  Skin Assessment: Clean, dry, & intact  SpO2: 100 %    Radiology Review:  Pertinent images / reports were reviewed as a part of this visit. CT Chest w/ contrast: No results found for this or any previous visit. CT Chest w/o contrast: Results for orders placed during the hospital encounter of 11/05/19    CT CHEST WO CONTRAST    Narrative  EXAMINATION:  CT OF THE CHEST WITHOUT CONTRAST 11/5/2019 7:24 pm    TECHNIQUE:  CT of the chest was performed without the administration of intravenous  contrast. Multiplanar reformatted images are provided for review. Dose  modulation, iterative reconstruction, and/or weight based adjustment of the  mA/kV was utilized to reduce the radiation dose to as low as reasonably  achievable.     COMPARISON:  08/24/2018    HISTORY:  ORDERING SYSTEM PROVIDED HISTORY: hypoxia  TECHNOLOGIST PROVIDED HISTORY:  Reason for exam:->hypoxia  Reason for Exam: hypoxia    FINDINGS:  Mediastinum: Calcification of the thoracic aorta. Coronary artery  calcification. Mediastinal lymph nodes are predominantly subcentimeter in  short axis. No axillary adenopathy. Lungs/pleura: Small bilateral pleural effusions, right greater than left. Septal thickening is demonstrated bilaterally. Patchy ground-glass opacity  is seen within all lobes. No pneumothorax. Upper Abdomen: Status post cholecystectomy. Calcified granulomas within the  spleen. Right renal atrophy. Calcification of the aorta. Partial  visualization of stoma within the left hemiabdomen. Soft Tissues/Bones: Degenerative change of the spine. Impression  Small bilateral pleural effusions. Patchy ground-glass opacity with septal  thickening, likely reflecting pulmonary edema. Atypical pneumonia is an  additional though less favored consideration. Atherosclerosis, including coronary artery calcification. Right renal atrophy. CTPA: No results found for this or any previous visit. CXR PA/LAT: Results for orders placed during the hospital encounter of 05/19/21    XR CHEST (2 VW)    Narrative  EXAMINATION:  TWO XRAY VIEWS OF THE CHEST    5/19/2021 3:51 pm    COMPARISON:  01/21/2021    HISTORY:  ORDERING SYSTEM PROVIDED HISTORY: Shortness of breath  TECHNOLOGIST PROVIDED HISTORY:  Reason for Exam: sob, hx copd  Acuity: Acute  Type of Exam: Initial    FINDINGS:  Cardiomegaly with anti thrombotic device in the left atrial appendage region. Pulmonary vasculature within normal limits. Scarring in the inferior left  upper lobe. Lungs otherwise clear. Costophrenic angles sharp    Impression  1. Cardiomegaly with no evidence of edema  2.  No acute cardiopulmonary process demonstrated      CXR portable: Results for orders placed during the hospital encounter of 06/05/21    XR CHEST PORTABLE    Narrative  EXAMINATION:  ONE XRAY VIEW OF THE CHEST    6/5/2021 1:19 am    COMPARISON:  05/29/2021, 01/28/2020    HISTORY:  ORDERING SYSTEM PROVIDED HISTORY: SOB  TECHNOLOGIST PROVIDED HISTORY:  Reason for exam:->SOB  Reason for Exam: sob    FINDINGS:  Cardiac size and mediastinal structures appear stable. Atherosclerotic  calcification is identified. Chronic scarring is again identified in the  region of the left mid lung, as noted on previous examinations. Minimal  bibasilar atelectasis. No pneumothorax is identified. Osteopenia. Degenerative changes in the shoulders and spine. Impression  Minimal basilar atelectasis with chronic scarring noted in the left mid lower  lung field.

## 2021-06-08 NOTE — PLAN OF CARE
Discussed with patient home care and daily weights, patient indicated she didn't do a weight daily. Reviewed ability and explained importance of daily weight, following a low salt diet and monitoring amount of fluid intake. CHF RN consulted. Reviewed the HF paperwork.

## 2021-06-08 NOTE — PROGRESS NOTES
Jaelyn Kim   Daily Progress Note      Admit Date:  6/5/2021    Reason for follow up visit: SOB    CC: \"I feel fine this morning. \"    71 y/o female with PMH significant for chronic respiratory failure d/t COPD and on chronic O2, CHF, PAF/S/P Watchman LAAC device, HTN, HLP and iron deficiency anemia (receiving IV venofer) who was admitted with increasing SOB. She starting to get more SOB recently and was requiring more O2. She was diuresed and sxs have improved. Back to her baseline O2 requirement. Interval History:  Pt. seen and examined; records reviewed  BNP elevated yesterday and IV lasix given  SOB much improved this AM with activity. BP elevated this AM (had been on hydralazine at home)  Net diuresis -3.9L since admit; -1.3 L last 24 hours  O2 @ 5L  Denies chest pain    Subjective:  Pt with no acute overnight cardiac events. Denies chest pain, SOB, cough, palpitations or dizziness    Review of Systems:   · Constitutional: no unanticipated weight loss. There's been no change in energy level, sleep pattern, or activity level. No fevers, chills. · Eyes: No visual changes or diplopia. No scleral icterus. · ENT: No Headaches or vertigo. No mouth sores or sore throat. Cardiovascular: as reviewed in HPI  · Respiratory: No cough or wheezing, no sputum production. No hematemesis. + chronic SOB on O2  · Gastrointestinal: No abdominal pain, appetite loss, blood in stools. No change in bowel or bladder habits. · Genitourinary: No dysuria, trouble voiding, or hematuria. · Musculoskeletal:  No gait disturbance, no joint complaints. · Integumentary: No rash or pruritis. · Neurological: No headache, diplopia, change in muscle strength, numbness or tingling. · Psychiatric: No anxiety or depression. · Endocrine: No temperature intolerance. No excessive thirst, fluid intake, or urination. No tremor.   · Hematologic/Lymphatic: No abnormal bruising or bleeding, blood clots or swollen lymph nodes.  · Allergic/Immunologic: No nasal congestion or hives. Objective:   BP (!) 204/65   Pulse 54   Temp 97.5 °F (36.4 °C) (Oral)   Resp 18   Ht 5' (1.524 m)   Wt 163 lb 2.3 oz (74 kg)   LMP 05/01/2006 (Approximate)   SpO2 100%   BMI 31.86 kg/m²       Intake/Output Summary (Last 24 hours) at 6/8/2021 0914  Last data filed at 6/8/2021 0557  Gross per 24 hour   Intake 1460 ml   Output 2800 ml   Net -1340 ml     Wt Readings from Last 3 Encounters:   06/08/21 163 lb 2.3 oz (74 kg)   05/31/21 160 lb 11.5 oz (72.9 kg)   05/19/21 164 lb (74.4 kg)       Physical Exam:  General: In no acute distress. Awake, alert, and oriented X4. Resting in bed  Skin:  Warm and dry. No new appearing rashes or lesions. Neck:  Supple. No JVD appreciated. + R carotid bruit  Chest: Lungs clear with diminished breath sounds bilaterally. No wheezes/rhonchi/rales  Cardiovascular:  RRR. Normal S1 and S2. Soft systolic murmur   Abdomen:  soft, nontender, nondistended, +bowel sounds. Extremities:  No LE edema. 2+ bilateral radial and DP pulses. CAp refill brisk.     Medications:    potassium chloride  20 mEq Oral Daily with breakfast    carvedilol  25 mg Oral BID WC    torsemide  40 mg Oral Daily    sodium chloride flush  5-40 mL Intravenous 2 times per day    enoxaparin  40 mg Subcutaneous Daily    predniSONE  40 mg Oral Daily    amLODIPine  10 mg Oral Daily    aspirin  81 mg Oral Daily    atorvastatin  40 mg Oral Daily    budesonide-formoterol  2 puff Inhalation BID    citalopram  40 mg Oral Daily    cloNIDine  0.2 mg Oral TID    clopidogrel  75 mg Oral Daily    levothyroxine  50 mcg Oral QAM AC    pantoprazole  40 mg Oral QAM AC    montelukast  10 mg Oral Daily    tiotropium  2 puff Inhalation Daily    doxycycline hyclate  100 mg Oral 2 times per day    insulin lispro  0-6 Units Subcutaneous TID WC    insulin lispro  0-3 Units Subcutaneous Nightly      sodium chloride      dextrose       ipratropium-albuterol, sodium chloride flush, sodium chloride, ondansetron **OR** ondansetron, acetaminophen **OR** acetaminophen, labetalol, albuterol sulfate HFA, LORazepam, melatonin, glucose, dextrose, glucagon (rDNA), dextrose    Lab Data:  CBC: No results for input(s): WBC, HGB, PLT in the last 72 hours. BMP:    Recent Labs     06/06/21  0537 06/07/21  0542    141   K 3.5 3.3*   CO2 37* 36*   BUN 37* 38*   CREATININE 1.2 1.3*     Results for Mauricio Kohler (MRN [id number]) as of 6/8/2021 09:33   Ref. Range 5/31/2021 07:25 6/5/2021 01:13 6/7/2021 10:06   Pro-BNP Latest Ref Range: 0 - 124 pg/mL 1,663 (H) 2,997 (H) 5,096 (H)   Troponin Latest Ref Range: <0.01 ng/mL  <0.01      5/14/2021 Carotid US:  Impressions   Right Impression   Limited visualization due to shadowing and patient movement. The right internal carotid artery appears to have a 50-79% diameter reducing   stenosis based on velocity criteria. The right vertebral artery was not visualized. Severe shadowing plaque formation demonstrated in the bulb and Internal   Carotid Artery. Left Impression   Limited visualization due to shadowing and patient movement. The left internal carotid artery appears to have a 50-79% diameter reducing   stenosis based on velocity criteria. The left vertebral artery demonstrates high velocity antegrade flow. Mild plaque formation demonstrated in the bulb. Internal Carotid Artery and   External Carotid Artery was not well visualized due to shadowing.       Conclusions        Summary        Limited study due to patient movement. Moderate internal carotid artery    stenosis bilaterally. Right ratio is 2.75 and left ratio is 1.46. Plaque    appreciate in both carotid bulbs.      Echo 2/14/2020:   Left ventricular cavity size is normal.   There is mild concentric left ventricular hypertrophy.   Ejection fraction is visually estimated to be 55-60%.  Grade I diastolic   dysfunction   Mild to moderate mitral regurgitation.   Mild to moderate tricuspid regurgitation with RVSP of 51 mmHg     1/4/2021 Watchman implant:  · Percutaneous transcatheter closure of the left atrial appendage with endocardial implant   · Transeptal Puncture  · MIKO  · 27 mm Watchman     MIKO 1/4/2021 :  Francisco Ruiz is no significant pericardial effusion at the start of the procedure.   The left atrium and appendage appear free of thrombus.   Appendage ostial measurements   43 degrees 1.77cm   92 degrees 1.77cm   97 degrees 1.82cm   154 degrees 1.88cm   175 degrees 1.94cm   A 27mm device is deployed into the left atrial appendage. The device appears   adequately compressed. There is no evidence of leak by color flow.   There is no change in effusion upon completion of the procedure.     1/5/2021 Limited echo:   Limited study performed to assess for pericardial effusion.  Francisco Ruiz is no evidence of a significant effusion.        8/24/2018 R heart cath:  1.  Right atrial pressure is 10 mmHg. 2.  RV pressure is at 39/-6. 3.  Right ventricular pressure is 32/-1. 4.  Pulmonary capillary wedge pressure of 7 mmHg. 5.  Pulmonary artery pressure 26/1. 6.  Cardiac output is 3.46 liters per minute. 7.  Right atrial pressure is 63%. 8.  Aortic saturation 96%. 9.  Pulmonary artery saturation 64%.     SUMMARY:  Normal left and right heart cardiac pressure.     9/13/2017 Cardiac cath:  1.  The left main comes from the left coronary cusp.  There is normal ELIER 3 flow. 2.  The left anterior descending artery comes from the left main coronary artery.  It has ELIER 3 flow.  The midportion has 50% stenosis. 3.  The left circumflex comes from the left main.  It is nondominant. No significant stenosis was seen. 4.  The right coronary artery comes from the right coronary cusp.  It is dominant giving rise to the right posterior descending artery and posterolateral branch and has no significant stenosis.   5.  LV ejection fraction is 60%.     SUMMARY:  Nonobstructive coronary artery disease with normal LV ejection fraction.  LVEDP was 20 mmHg.     8/29/2017 Carotid US:   The left and right internal carotid artery appears to have a 50-79%     8/2017 Lexiscan-Myoview:   Abnormal study. There is a moderate sized, moderate intensity, reversible    defect of the mid to apical inferior wall which is consistent with ischemia.    Normal LV size and systolic function.    Overall findings represent an intermediate risk study. Telemetry: Sinus rhythm-sinus bradycardia with PAC's' rare PVC    Assessment/Plan:    1. Acute on chronic diastolic HF  -NYHA class III; LVEF 55-60%  -symptomatically improved after IV diuretic yesterday  -continue carvedilol, torsemide  -low sodium diet and fluid restriction  -needs tighter BP control (will adjust meds)     2. Paroxysmal atrial fibrillation  -off antithrombotic therapy and S/P Watchman LAAC device placement in March 2021  -continue plavix for now  -poor long term anticoagulation candidate d/t recurrent anemia requiring transfusion; GIB  -continue BB     3. Essential HTN  -not well controlled  -goal BP < 140/80  -continue medical management and resume hydralazine     4. Acute on chronic respiratory failure with hypoxia  -@ baseline O2 requirement  -underlying COPD  -Rx per Primary team    5. Iron deficiency anemia  -had been receiving venofer as outpt  -if continued infusions, recommend IV lasix with infusions      OK to discharge later today if BP has improved     Follow up with cardiology on  6/15/2021 @ 2:40 PM (D. Enzweiler, CNP)    Reinforced and discussed low-fat/low sodium diet, monitoring of daily weights, fluid restriction, worsening signs and symptoms of heart failure and when to call, and the importance of regular exercise and activity. Prior to admit she states she was eating 10-15 pounds of ice weekly in addition to her fluids and was not following fluid restriction closely. Pt educated again on fluid restriction.     Discharge Cardiac Meds:  Amlodipine 10 mg daily  ASA 81 mg daily  Atorvastatin 40 mg daily  Carvedilol 25 mg BID (NEW DOSE)  Clonidine 0.2 mg 3x daily  Plavix 75 mg daily  Hydralazine 25 mg TID (NEW DOSE)  Klor Con 20 mEq daily  Torsemide 40 mg daily    Med Rec for cardiac medications completed    Electronically signed by GIULIANO Winters CNP on 6/8/2021 at 9:27 AM

## 2021-06-08 NOTE — DISCHARGE INSTR - COC
Continuity of Care Form    Patient Name: Azam Delgado   :  1945  MRN:  [id number]    Admit date:  2021  Discharge date:  2021    Code Status Order: Full Code   Advance Directives:   [address] [facility] Documentation       Date/Time Healthcare Directive Type of Healthcare Directive Copy in [address] Agent's Name Healthcare Agent's Phone Number    [phone]  No, patient does not have an advance directive for healthcare treatment -- -- -- -- --            Admitting Physician:  Girish Ennis DO  PCP: Sudheer Turner MD    Discharging Nurse:  [address] [place] [address] Unit/Room#: T6S-9262/8889-37  Discharging Unit Phone Number:     Emergency Contact:   Extended Emergency Contact Information  Primary Emergency Contact: Drew Watters [address] Phone: [phone]  Mobile Phone: [phone]  Relation: Child  Secondary Emergency Contact: Coco Morton   [address] Phone: [phone]  Mobile Phone: [phone]  Relation: Brother/Sister    Past Surgical History:  Past Surgical History:   Procedure Laterality Date    ABDOMEN SURGERY      CARDIAC CATHETERIZATION  2017    Dr. Henrry Stapleton  2018    CORONARY ARTERY BYPASS GRAFT      Legs & Carotid    FRACTURE SURGERY      HERNIA REPAIR      UPPER GASTROINTESTINAL ENDOSCOPY N/A 2019    EGD DIAGNOSTIC ONLY performed by Jamaal Shearer MD at [address] [place] [address]         Immunization History:   Immunization History   Administered Date(s) Administered    COVID-19, Moderna, PF, 100mcg/0.5mL 2021    Influenza, High Dose (Fluzone 65 yrs and older) 2015, 2018    Influenza, Ozzie Peace, 6 mo and older, IM, PF (Flulaval, Fluarix) 2018    Influenza, Quadv, adjuvanted, 65 yrs +, IM, PF (Fluad) 2020    Influenza, Triv, inactivated, subunit, adjuvanted, IM (Fluad 72 yrs and older) 10/25/2019    Pneumococcal Conjugate 13-valent (Antdbum19) 01/17/2018    Pneumococcal Conjugate 7-valent (Prevnar7) 12/30/2013    Pneumococcal Polysaccharide (Cxhwwaeys78) 03/30/2008, 09/27/2012       Active Problems:  Patient Active Problem List   Diagnosis Code    Fracture, metacarpal, neck S62.339A    PAD (peripheral artery disease) ([facility]) I73.9    CAD (coronary artery disease) I25.10    HTN (hypertension) R65    Diastolic dysfunction K46.42    RLS (restless legs syndrome) G25.81    History of tobacco use Z87.891    Centrilobular emphysema ([facility]) J43.2    Colovesical fistula N32.1    Diverticulitis large intestine w/o perforation or abscess w/bleeding K57.33    Large bowel obstruction ([address] K56.609    ANGIE treated with BiPAP G47.33    CKD (chronic kidney disease), stage III - sees Dr. Hayley Fernando N18.30    Abnormal radiograph R93.89    COPD, severe ([address] J44.9    Colonic obstruction ([address] K56.609    Carotid artery stenosis without cerebral infarction, bilateral I65.23    Colostomy care ([facility]) Z43.3    Dyspnea and respiratory abnormalities R06.00, R06.89    Hyperlipidemia E78.5    Paroxysmal atrial fibrillation (HCC) I48.0    Acquired hypothyroidism E03.9    Chest pain R07.9    Chronic anemia D64.9    Anxiety F41.9    History of GI bleed Z87.19    Iron deficiency anemia due to chronic blood loss D50.0    Presence of Watchman left atrial appendage closure device Z95.818    Atrial fibrillation (HCC) I48.91    Pulmonary edema, acute ([facility]) J81.0    Chronic respiratory failure with hypoxia (HCC) J96.11    Acute on chronic respiratory failure with hypoxia ([facility]) J96.21    Severe muscle deconditioning R29.898    Hypertensive urgency I16.0    Essential hypertension I10    Acute cystitis without hematuria N30.00    Headache R51.9    Acute respiratory failure with hypoxia ([facility]) J96.01    Acute diastolic heart failure ([facility]) I50.31    Acute respiratory distress R06.03 Isolation/Infection:   Isolation            No Isolation          Patient Infection Status       Infection Onset Added Last Indicated Last Indicated By Review Planned Expiration Resolved Resolved By    None active    Resolved    COVID-19 Rule Out 01/21/21 01/21/21 01/21/21 COVID-19 (Ordered)   01/21/21 Rule-Out Test Resulted    COVID-19 Rule Out 01/04/21 01/04/21 01/04/21 COVID-19 (Ordered)   01/04/21 Rule-Out Test Resulted            Nurse Assessment:  Last Vital Signs: BP (!) 154/61   Pulse 53   Temp 97.5 °F (36.4 °C) (Oral)   Resp 18   Ht 5' (1.524 m)   Wt 163 lb 2.3 oz (74 kg)   LMP 05/01/2006 (Approximate)   SpO2 100%   BMI 31.86 kg/m²     Last documented pain score (0-10 scale): Pain Level: 0  Last Weight:   Wt Readings from Last 1 Encounters:   06/08/21 163 lb 2.3 oz (74 kg)     Mental Status:  oriented, alert and coherent    IV Access:  - None    Nursing Mobility/ADLs:  Walking   Independent  Transfer  Independent  Bathing  Independent  Dressing  Independent  Toileting  Assisted  Feeding  [address]  Independent  Med Delivery   whole    Wound Care Documentation and Therapy:        Elimination:  Continence:   · Bowel:  Yes  · Bladder: Yes  Urinary Catheter: None   Colostomy/Ileostomy/Ileal Conduit: Yes  Colostomy LUQ Descending/sigmoid-Stomal Appliance: 1 piece  Colostomy LUQ Descending/sigmoid-Stoma  Assessment: Protrudes, Moist, Pink  Colostomy LUQ Descending/sigmoid-Mucocutaneous Junction: Intact  Colostomy LUQ Descending/sigmoid-Peristomal Assessment: Red, Other (Comment) (bleeding)  Colostomy LUQ Descending/sigmoid-Treatment: Bag change, Site care  Colostomy LUQ Descending/sigmoid-Stool Appearance: Soft  Colostomy LUQ Descending/sigmoid-Stool Color: Brown  Colostomy LUQ Descending/sigmoid-Stool Amount: Large    Date of Last BM: 6/9/2021    Intake/Output Summary (Last 24 hours) at 6/8/2021 0973  Last data filed at 6/8/2021 0557  Gross per 24 hour   Intake 1460 ml   Output 2800 ml Net -1340 ml     I/O last 3 completed shifts: In: 9431 [P.O.:1460]  Out: 2800 [Urine:2800]    Safety Concerns:     None    Impairments/Disabilities:      None    Nutrition Therapy:  Current Nutrition Therapy:   - Oral Diet:  Low Sodium (2gm)    Routes of Feeding: Oral  Liquids: No Restrictions  Daily Fluid Restriction: yes - amount 2000  Last Modified Barium Swallow with Video (Video Swallowing Test): not done    Treatments at the Time of Hospital Discharge:   Respiratory Treatments: HHN  Oxygen Therapy:  is on oxygen at 4 L/min per nasal cannula. Ventilator:    - No ventilator support    Rehab Therapies:   Weight Bearing Status/Restrictions: No weight bearing restirctions  Other Medical Equipment (for information only, NOT a DME order): Other Treatments:     Patient's personal belongings (please select all that are sent with patient):  Denilson WHYTE SIGNATURE:  Electronically signed by Wale Patel RN on 6/9/21 at 11:30 AM EDT    CASE MANAGEMENT/SOCIAL WORK SECTION    Inpatient Status Date: ***    Readmission Risk Assessment Score:  Readmission Risk              Risk of Unplanned Readmission:  33           Discharging to Facility/ Agency   · Name:   · Address:  · Phone:  · Fax:    Dialysis Facility (if applicable)   · Name:  · Address:  · Dialysis Schedule:  · Phone:  · Fax:    / signature: {Esignature:[id number]:::0}    PHYSICIAN SECTION    Prognosis: Fair    Condition at Discharge: Stable    Rehab Potential (if transferring to Rehab): Fair    Recommended Labs or Other Treatments After Discharge: RN/PT/OT/O2 needs    Physician Certification: I certify the above information and transfer of Cristina Mccarty  is necessary for the continuing treatment of the diagnosis listed and that she requires Home Care for less 30 days.      Update Admission H&P: No change in H&P    PHYSICIAN SIGNATURE:  Electronically signed by GIULIANO Graves CNP on 6/8/21 at 9:51 AM EDT/ Dr. Hannah Singletary

## 2021-06-08 NOTE — PLAN OF CARE
Problem: Pain:  Goal: Pain level will decrease  Description: Pain level will decrease  6/8/2021 0503 by Brock Kelly RN  Outcome: Ongoing     Problem: Pain:  Goal: Control of acute pain  Description: Control of acute pain  6/8/2021 0503 by Brock Kelly RN  Outcome: Ongoing     Problem: Pain:  Goal: Control of chronic pain  Description: Control of chronic pain  6/8/2021 0503 by Brock Kelly RN  Outcome: Ongoing     Problem: Pain:  Goal: Patient's pain/discomfort is manageable  Description: Patient's pain/discomfort is manageable  6/8/2021 0503 by Brock Kelly RN  Outcome: Ongoing     Problem: Falls - Risk of:  Goal: Will remain free from falls  Description: Will remain free from falls  6/8/2021 0503 by Brock Kelly RN  Outcome: Ongoing     Problem: Falls - Risk of:  Goal: Absence of physical injury  Description: Absence of physical injury  6/8/2021 0503 by Brock Kelly RN  Outcome: Ongoing     Problem: Infection:  Goal: Will remain free from infection  Description: Will remain free from infection  6/8/2021 0503 by Brock Kelly RN  Outcome: Ongoing     Problem: Safety:  Goal: Free from accidental physical injury  Description: Free from accidental physical injury  6/8/2021 0503 by Brock Kelly RN  Outcome: Ongoing     Problem: Safety:  Goal: Free from intentional harm  Description: Free from intentional harm  6/8/2021 0503 by Brock Kelly RN  Outcome: Ongoing     Problem: Daily Care:  Goal: Daily care needs are met  Description: Daily care needs are met  6/8/2021 0503 by Brock Kelly RN  Outcome: Ongoing     Problem: Skin Integrity:  Goal: Skin integrity will stabilize  Description: Skin integrity will stabilize  6/8/2021 0503 by Brock Kelly RN  Outcome: Ongoing     Problem: Discharge Planning:  Goal: Patients continuum of care needs are met  Description: Patients continuum of care needs are met  6/8/2021 0503 by Brock Kelly RN  Outcome: Ongoing     Problem: OXYGENATION/RESPIRATORY FUNCTION  Goal: Patient will maintain patent airway  6/8/2021 0503 by Boaz Liz RN  Outcome: Ongoing     Problem: OXYGENATION/RESPIRATORY FUNCTION  Goal: Patient will achieve/maintain normal respiratory rate/effort  Description: Respiratory rate and effort will be within normal limits for the patient  6/8/2021 0503 by Baoz Liz RN  Outcome: Ongoing     Problem: HEMODYNAMIC STATUS  Goal: Patient has stable vital signs and fluid balance  6/8/2021 0503 by Boaz Liz RN  Outcome: Ongoing     Problem: FLUID AND ELECTROLYTE IMBALANCE  Goal: Fluid and electrolyte balance are achieved/maintained  6/8/2021 0503 by Boaz Liz RN  Outcome: Ongoing     Problem: ACTIVITY INTOLERANCE/IMPAIRED MOBILITY  Goal: Mobility/activity is maintained at optimum level for patient  6/8/2021 0503 by Boaz Liz RN  Outcome: Ongoing     Problem: Discharge Planning:  Goal: Discharged to appropriate level of care  Description: Discharged to appropriate level of care  6/8/2021 0503 by Boaz Liz RN  Outcome: Ongoing     Problem:  Activity Intolerance:  Goal: Ability to tolerate increased activity will improve  Description: Ability to tolerate increased activity will improve  6/8/2021 0503 by Boaz Liz RN  Outcome: Ongoing     Problem: Airway Clearance - Ineffective:  Goal: Ability to maintain a clear airway will improve  Description: Ability to maintain a clear airway will improve  6/8/2021 0503 by Boaz Liz RN  Outcome: Ongoing     Problem: Breathing Pattern - Ineffective:  Goal: Ability to achieve and maintain a regular respiratory rate will improve  Description: Ability to achieve and maintain a regular respiratory rate will improve  6/8/2021 0503 by Boaz Liz RN  Outcome: Ongoing     Problem: Gas Exchange - Impaired:  Goal: Levels of oxygenation will improve  Description: Levels of oxygenation will improve  6/8/2021 0503 by Boaz Liz RN  Outcome: Ongoing

## 2021-06-08 NOTE — PROGRESS NOTES
Hospital Medicine Progress Note      Admit Date: 6/5/2021       CC: F/U for worsening SOB    HPI: 70 y. o. female who presented to [facility] with worsening shortness of breath, to note that patient recently discharged from the hospital, her shortness of breath is getting worse for a day or so, no obvious alleviating or aggravating factors in the context of her COPD elevated blood pressure and potential heart failure, still having some cough, denies fever chills nausea vomiting, feels better after being on BiPAP and CPAP, currently on nasal cannula, denies blurry or double vision at this time     6/7: on torsemide now. Takes lasix at home bid.      Cardiology following. On 5L (baseline 4L-5L) denies cough now, but had a little before. Chest tight but no \"pain. \"      BNP elevated >5,000  Spoke to Council Cowden, NP, who will give IV lasix today.      She would likely benefit from IV lasix with her iron infusions going forward to avoid fluid overload with them.     Recheck BNP tomorrow. Seems to be more of a CHF picture than COPD. Interval History/Subjective: diuresed with IV lasix. Repeat labs BNP 5,449. At baseline O2. Cont with home COPD meds as prior to admission and f/u with Dr. Lucila Trejo (pulmonology) Sept, 2021 from her last visit. Will likely benefit from IV lasix with Fe infusions going forward. Cardiology adjusting BP meds. Potassium replaced with scheduled bid dose. BNP up higher today than yesterday. Not ready for d/c yet. May need more diuresing. Steroid taper on d/c. Cont doxycycline x5 more days  -- discussed with Council Cowden, and not as concerned with elevated BNP as indicator of CHF. Patient thinks breathing is doing a little better now , but can consult Pulmonology for while she is here since I would like to see her labs one more time tomorrow. She states she has been taking her spiriva and symbicort twice daily.      Review of Systems:     The patient denied headaches, visual changes, LOC, SOB, CP, ABD pain, N/V/D, skin changes, new or worsening weakness or neuromuscular deficits. Comprehensive ROS negative except as mentioned above. Past Medical History:        Diagnosis Date    Arthritis     Atrial fibrillation ([facility] 1/4/2021    CAD (coronary artery disease)     Nonobstructive on cath in 9/2017    CHF (congestive heart failure) ([facility]     Colostomy care ([facility] 4/25/2018    Peristomal irritation and early leaking    COPD (chronic obstructive pulmonary disease) (HCC)     Hyperlipidemia     Hypertension     Kidney disease     Stage 3    Peripheral vascular disease ([facility]     Rectal fissure 11/2014    surgery pending    Restless legs syndrome     Sleep apnea     O2 3 liters at     Thyroid disease     Unspecified sleep apnea        Past Surgical History:        Procedure Laterality Date    ABDOMEN SURGERY      CARDIAC CATHETERIZATION  09/13/2017    Dr. Root Slice  03/09/2018    CORONARY ARTERY BYPASS GRAFT      Legs & Carotid    FRACTURE SURGERY      HERNIA REPAIR      UPPER GASTROINTESTINAL ENDOSCOPY N/A 9/30/2019    EGD DIAGNOSTIC ONLY performed by Claudia Swanson MD at [address] [place] [address]         Allergies: Iv dye [iodides]    Past medical and surgical history reviewed. Any changes have been noted. PHYSICAL EXAM:  BP (!) 154/61   Pulse 53   Temp 97.5 °F (36.4 °C) (Oral)   Resp 18   Ht 5' (1.524 m)   Wt 163 lb 2.3 oz (74 kg)   LMP 05/01/2006 (Approximate)   SpO2 100%   BMI 31.86 kg/m²       Intake/Output Summary (Last 24 hours) at 6/8/2021 0942  Last data filed at 6/8/2021 0557  Gross per 24 hour   Intake 1460 ml   Output 2800 ml   Net -1340 ml      General appearance:   No apparent distress, appears stated age. Cooperative. HEENT:  Normocephalic, atraumatic. PERRLA. EOMi. Conjunctivae/corneas clear, no icterus, non-injected. Neck: Supple, with full range of motion.  No jugular venous distention. Trachea midline. Respiratory:  a little diminished bilaterally, but no obvious wheezing, rales or rhonchi. Normal respiratory effort. Clear to auscultation  Cardiovascular:  Regular rate and rhythm without murmurs, rubs or gallops. Abdomen: ostomy functioning with brown stool, Soft, non-tender, non-distended, without rebound or guarding. Normal bowel sounds. Musculoskeletal:  No clubbing, cyanosis or edema bilaterally. Full range of motion without deformity. Skin: Skin color, texture, turgor normal.  No rashes or lesions. Neurologic:  Neurovascularly intact without any focal sensory/motor deficits. Cranial nerves: II-XII intact, grossly intact. No facial asymmetry, tongue midline. Psychiatric:  Alert and oriented, thought content appropriate  Capillary Refill: Brisk,< 3 seconds   Peripheral Pulses: +2 palpable, equal bilaterally        LABS:    Lab Results   Component Value Date    WBC 9.6 06/05/2021    HGB 9.0 (L) 06/05/2021    HCT 28.7 (L) 06/05/2021    MCV 75.8 (L) 06/05/2021     06/05/2021    LYMPHOPCT 5.2 06/05/2021    RBC 3.78 (L) 06/05/2021    MCH 23.9 (L) 06/05/2021    MCHC 31.5 06/05/2021    RDW 20.8 (H) 06/05/2021       Lab Results   Component Value Date    CREATININE 1.3 (H) 06/07/2021    BUN 38 (H) 06/07/2021     06/07/2021    K 3.3 (L) 06/07/2021    CL 92 (L) 06/07/2021    CO2 36 (H) 06/07/2021       Lab Results   Component Value Date    MG 1.80 06/07/2021       Lab Results   Component Value Date    ALT 8 (L) 05/29/2021    AST 19 05/29/2021    ALKPHOS 45 05/29/2021    BILITOT <0.2 05/29/2021        No flowsheet data found. Lab Results   Component Value Date    LABA1C 5.5 10/11/2019       Imaging:  XR CHEST PORTABLE   Final Result   Minimal basilar atelectasis with chronic scarring noted in the left mid lower   lung field.              Scheduled and prn Medications:    Scheduled Meds:   hydrALAZINE  25 mg Oral 3 times per day    potassium chloride  20 mEq Oral Daily with breakfast    carvedilol  25 mg Oral BID WC    torsemide  40 mg Oral Daily    sodium chloride flush  5-40 mL Intravenous 2 times per day    predniSONE  40 mg Oral Daily    amLODIPine  10 mg Oral Daily    aspirin  81 mg Oral Daily    atorvastatin  40 mg Oral Daily    budesonide-formoterol  2 puff Inhalation BID    citalopram  40 mg Oral Daily    cloNIDine  0.2 mg Oral TID    clopidogrel  75 mg Oral Daily    levothyroxine  50 mcg Oral QAM AC    pantoprazole  40 mg Oral QAM AC    montelukast  10 mg Oral Daily    tiotropium  2 puff Inhalation Daily    doxycycline hyclate  100 mg Oral 2 times per day    insulin lispro  0-6 Units Subcutaneous TID WC    insulin lispro  0-3 Units Subcutaneous Nightly     Continuous Infusions:   sodium chloride      dextrose       PRN Meds:.ipratropium-albuterol, sodium chloride flush, sodium chloride, ondansetron **OR** ondansetron, acetaminophen **OR** acetaminophen, labetalol, albuterol sulfate HFA, LORazepam, melatonin, glucose, dextrose, glucagon (rDNA), dextrose    Assessment & Plan:         Acute on chronic respiratory failure with hypoxia, initially was on CPAP and BiPAP currently back to nasal cannula on 5 L she is normally on 4 L at home, etiology likely multifactorial due to potential COPD and am suspecting heart failure due to elevated blood pressure.  Telemetry monitoring, oxygen and treatment for COPD and heart failure  - on 5L now- baseline  - home meds: spiriva, symbicort and singulair, nebs (cont)  - cont steroids, continue azithromycin.  DuoNeb.     Acute on chronic diastolic HF  -NYHA class III; LVEF 55-60%  -currently appears to be baseline  -continue carvedilol, torsemide  -low sodium diet and fluid restriction  -BP control  - torsemide 40mg daily now.   - was on lasix 40mg bid at home  - one time IV lasix 40mg 6/7  - f/u 1 wk on discharge with cardiology - they will set up  - would likely benefit from IV lasix with iron infusions going forward.      Paroxysmal atrial fibrillation  -off antithrombotic therapy  -S/P Watchman LAAC device placement in March 2021  -continue plavix for now  -continue BB     Uncontrolled hypertension, resume p.o. medications for now monitor closely, adding as needed  - goal < 140/80    Continue current regimen/therapies. Monitor. Adjust medical regimen as appropriate. Body mass index is 31.86 kg/m². The patient and / or the family were informed of the results of any tests, a time was given to answer questions, a plan was proposed and they agreed with plan. DVT ppx: lovenox      Diet: ADULT DIET; Regular; 4 carb choices (60 gm/meal);  Low Sodium (2 gm); 2000 ml    Consults:  IP CONSULT TO SOCIAL WORK  IP CONSULT TO HEART FAILURE NURSE/COORDINATOR  IP CONSULT TO DIETITIAN  IP CONSULT TO CARDIOLOGY  IP CONSULT TO HEART FAILURE NURSE/COORDINATOR    DISPO/placement plan: pending    Code Status: Full Code      GIULIANO Arellano - MILDRED  06/08/21

## 2021-06-09 VITALS
BODY MASS INDEX: 31.77 KG/M2 | WEIGHT: 161.82 LBS | HEART RATE: 54 BPM | SYSTOLIC BLOOD PRESSURE: 165 MMHG | RESPIRATION RATE: 13 BRPM | OXYGEN SATURATION: 100 % | HEIGHT: 60 IN | DIASTOLIC BLOOD PRESSURE: 54 MMHG | TEMPERATURE: 98.5 F

## 2021-06-09 LAB
ANION GAP SERPL CALCULATED.3IONS-SCNC: 14 MMOL/L (ref 3–16)
BUN BLDV-MCNC: 58 MG/DL (ref 7–20)
CALCIUM SERPL-MCNC: 9.4 MG/DL (ref 8.3–10.6)
CHLORIDE BLD-SCNC: 93 MMOL/L (ref 99–110)
CO2: 36 MMOL/L (ref 21–32)
CREAT SERPL-MCNC: 1.1 MG/DL (ref 0.6–1.2)
GFR AFRICAN AMERICAN: 59
GFR NON-AFRICAN AMERICAN: 49
GLUCOSE BLD-MCNC: 89 MG/DL (ref 70–99)
GLUCOSE BLD-MCNC: 95 MG/DL (ref 70–99)
MAGNESIUM: 2 MG/DL (ref 1.8–2.4)
PERFORMED ON: NORMAL
POTASSIUM SERPL-SCNC: 3.5 MMOL/L (ref 3.5–5.1)
SODIUM BLD-SCNC: 143 MMOL/L (ref 136–145)

## 2021-06-09 PROCEDURE — 36415 COLL VENOUS BLD VENIPUNCTURE: CPT

## 2021-06-09 PROCEDURE — 6370000000 HC RX 637 (ALT 250 FOR IP): Performed by: STUDENT IN AN ORGANIZED HEALTH CARE EDUCATION/TRAINING PROGRAM

## 2021-06-09 PROCEDURE — 6370000000 HC RX 637 (ALT 250 FOR IP): Performed by: INTERNAL MEDICINE

## 2021-06-09 PROCEDURE — 2580000003 HC RX 258: Performed by: STUDENT IN AN ORGANIZED HEALTH CARE EDUCATION/TRAINING PROGRAM

## 2021-06-09 PROCEDURE — 6370000000 HC RX 637 (ALT 250 FOR IP): Performed by: NURSE PRACTITIONER

## 2021-06-09 PROCEDURE — 2700000000 HC OXYGEN THERAPY PER DAY

## 2021-06-09 PROCEDURE — 99232 SBSQ HOSP IP/OBS MODERATE 35: CPT | Performed by: INTERNAL MEDICINE

## 2021-06-09 PROCEDURE — 94640 AIRWAY INHALATION TREATMENT: CPT

## 2021-06-09 PROCEDURE — 94761 N-INVAS EAR/PLS OXIMETRY MLT: CPT

## 2021-06-09 PROCEDURE — 80048 BASIC METABOLIC PNL TOTAL CA: CPT

## 2021-06-09 PROCEDURE — 83735 ASSAY OF MAGNESIUM: CPT

## 2021-06-09 RX ADMIN — TIOTROPIUM BROMIDE INHALATION SPRAY 2 PUFF: 3.12 SPRAY, METERED RESPIRATORY (INHALATION) at 08:30

## 2021-06-09 RX ADMIN — POTASSIUM CHLORIDE 20 MEQ: 1500 TABLET, EXTENDED RELEASE ORAL at 08:09

## 2021-06-09 RX ADMIN — ASPIRIN 81 MG: 81 TABLET, CHEWABLE ORAL at 08:10

## 2021-06-09 RX ADMIN — Medication 10 ML: at 08:20

## 2021-06-09 RX ADMIN — AMLODIPINE BESYLATE 10 MG: 10 TABLET ORAL at 08:10

## 2021-06-09 RX ADMIN — LEVOTHYROXINE SODIUM 50 MCG: 0.05 TABLET ORAL at 06:01

## 2021-06-09 RX ADMIN — MONTELUKAST 10 MG: 10 TABLET, FILM COATED ORAL at 08:10

## 2021-06-09 RX ADMIN — ATORVASTATIN CALCIUM 40 MG: 40 TABLET, FILM COATED ORAL at 08:10

## 2021-06-09 RX ADMIN — CITALOPRAM HYDROBROMIDE 40 MG: 20 TABLET ORAL at 08:09

## 2021-06-09 RX ADMIN — PANTOPRAZOLE SODIUM 40 MG: 40 TABLET, DELAYED RELEASE ORAL at 06:01

## 2021-06-09 RX ADMIN — HYDRALAZINE HYDROCHLORIDE 25 MG: 25 TABLET, FILM COATED ORAL at 06:01

## 2021-06-09 RX ADMIN — CARVEDILOL 25 MG: 25 TABLET, FILM COATED ORAL at 08:10

## 2021-06-09 RX ADMIN — DOXYCYCLINE HYCLATE 100 MG: 100 TABLET, FILM COATED ORAL at 08:10

## 2021-06-09 RX ADMIN — PREDNISONE 40 MG: 20 TABLET ORAL at 08:09

## 2021-06-09 RX ADMIN — CLOPIDOGREL BISULFATE 75 MG: 75 TABLET ORAL at 08:10

## 2021-06-09 RX ADMIN — CLONIDINE HYDROCHLORIDE 0.2 MG: 0.1 TABLET ORAL at 08:10

## 2021-06-09 RX ADMIN — TORSEMIDE 40 MG: 20 TABLET ORAL at 08:09

## 2021-06-09 RX ADMIN — BUDESONIDE AND FORMOTEROL FUMARATE DIHYDRATE 2 PUFF: 160; 4.5 AEROSOL RESPIRATORY (INHALATION) at 08:30

## 2021-06-09 ASSESSMENT — PAIN SCALES - GENERAL: PAINLEVEL_OUTOF10: 0

## 2021-06-09 NOTE — PLAN OF CARE
Written discharge instructions including diet, activity, weight monitoring, what to do if symptoms worsen and the importance of follow up care and need to call his/her physician for  an appointment were reviewed with the patient who verbalized understanding. Patient is on home oxygen and will be transported via Lyft with tank provided by [facility]. She has all CHF materials and has been educated throughout admission. These written instructions were given to the patient to take home and a copy was made for the medical record.  Electronically signed by Lilia Robb RN on 6/9/2021 at 11:32 AM

## 2021-06-09 NOTE — PROGRESS NOTES
Pharmacy Heart Failure Medication Reconciliation Note    Pt discharged from [facility] today after admission for resp fx/HF. Miguel Membreno has a diagnosis of diastolic heart failure (last EF = 55-60% on 3/4/20). Pertinent Labs:  BMP:   Lab Results   Component Value Date     06/09/2021    K 3.5 06/09/2021    K 4.0 06/05/2021    CL 93 06/09/2021    CO2 36 06/09/2021    BUN 58 06/09/2021    CREATININE 1.1 06/09/2021     BNP:   Lab Results   Component Value Date    PROBNP 5,449 06/08/2021    PROBNP 5,096 06/07/2021    PROBNP 2,997 06/05/2021       Patient taking an ACEI / ARB / Entresto:  No: EF>40%     Patient taking a BETA BLOCKER:  Yes  Patient taking a LOOP DIURETIC: Yes  Patient taking a ALDOSTERONE RECEPTOR ANTAGONIST: No: EF>40    Patient has a diagnosis of Atrial Fibrillation: Yes. If yes, patient is on appropriate anticoagulation: No: GIB    Patient has a diagnosis of type 2 diabetes:  No: .  If yes, patient prescribed the following anti-hyperglycemic medication at discharge:       Corrections to discharge medications include:  none    Discharge Medications:         Medication List      START taking these medications    doxycycline hyclate 100 MG tablet  Commonly known as: VIBRA-TABS  Take 1 tablet by mouth every 12 hours for 5 days     potassium chloride 20 MEQ extended release tablet  Commonly known as: KLOR-CON M  Take 1 tablet by mouth daily (with breakfast)     predniSONE 10 MG tablet  Commonly known as: DELTASONE  Take 40mg daily x3 days, 30mg daily x3 days, 20mg daily x3 days and 10mg daily x3 days     torsemide 20 MG tablet  Commonly known as: DEMADEX  Take 2 tablets by mouth daily        CHANGE how you take these medications    carvedilol 25 MG tablet  Commonly known as: COREG  Take 1 tablet by mouth 2 times daily (with meals)  What changed:   · medication strength  · how much to take     hydrALAZINE 25 MG tablet  Commonly known as: APRESOLINE  Take 1 tablet by mouth every 8 hours  What changed: · medication strength  · how much to take  · how to take this  · when to take this  · additional instructions        CONTINUE taking these medications    albuterol sulfate  (90 Base) MCG/ACT inhaler  Commonly known as: Ventolin HFA  Inhale 2 puffs into the lungs every 4 hours as needed for Wheezing or Shortness of Breath     amLODIPine 10 MG tablet  Commonly known as: NORVASC  Take 1 tablet by mouth daily     aspirin 81 MG tablet  Take 1 tablet by mouth daily     atorvastatin 40 MG tablet  Commonly known as: LIPITOR  TAKE ONE TABLET BY MOUTH DAILY     budesonide-formoterol 160-4.5 MCG/ACT Aero  Commonly known as: SYMBICORT  Inhale 2 puffs into the lungs 2 times daily     citalopram 40 MG tablet  Commonly known as: CELEXA  TAKE ONE TABLET BY MOUTH DAILY     cloNIDine 0.2 MG tablet  Commonly known as: CATAPRES  Take 1 tablet by mouth 3 times daily     clopidogrel 75 MG tablet  Commonly known as: PLAVIX  Take 1 tablet by mouth daily     diclofenac sodium 1 % Gel  Commonly known as: Voltaren  Apply 2 g topically 3 times daily as needed for Pain     ipratropium-albuterol 0.5-2.5 (3) MG/3ML Soln nebulizer solution  Commonly known as: DUONEB  Inhale 3 mLs into the lungs every 4 hours as needed for Shortness of Breath     lansoprazole 15 MG delayed release capsule  Commonly known as: PREVACID  Take 1 capsule by mouth daily     levothyroxine 50 MCG tablet  Commonly known as: SYNTHROID  TAKE ONE TABLET BY MOUTH DAILY     lidocaine 2 % jelly  Commonly known as: XYLOCAINE  Apply pea-sized amount topically to affected area every 8 hours when necessary pain     lidocaine 5 % ointment  Commonly known as: XYLOCAINE  Apply topically as needed. LORazepam 1 MG tablet  Commonly known as: ATIVAN  TAKE ONE TABLET BY MOUTH DAILY AS NEEDED FOR ANXIETY     melatonin 3 MG Tabs tablet  Take 1 tablet by mouth nightly as needed (sleep)     mineral oil-hydrophilic petrolatum ointment  Apply topically as needed.      montelukast 10 MG tablet  Commonly known as: Singulair  Take 1 tablet by mouth daily     ondansetron 4 MG tablet  Commonly known as: Zofran  Take 1 tablet by mouth every 8 hours as needed for Nausea     tiotropium 2.5 MCG/ACT Aers inhaler  Commonly known as: Spiriva Respimat  Inhale 2 puffs into the lungs daily        STOP taking these medications    furosemide 40 MG tablet  Commonly known as: LASIX           Where to Get Your Medications      These medications were sent to [facility] [address] [facility], [address] [place] [address] [phone] Dickson Muñoz [phone] [address] [facility] [address]    Phone: [phone]   · carvedilol 25 MG tablet  · doxycycline hyclate 100 MG tablet  · hydrALAZINE 25 MG tablet  · potassium chloride 20 MEQ extended release tablet  · predniSONE 10 MG tablet  · torsemide 20 MG tablet         DANIEL Roman [facility]  Heart Failure Discharge Medication Reconciliation Program  [phone]

## 2021-06-09 NOTE — CARE COORDINATION
DISCHARGE SUMMARY     DATE OF DISCHARGE: 6/9/2021    DISCHARGE DESTINATION: Home independently    TRANSPORTATION: Lyft for 12:10pm    COMMENTS: Met with patient. O2 tank delivered to patient, provided by Mazin Torres. Patient denies needing additional assistance in home.   Electronically signed by Barrington Pedraza RN Case Management [phone] on 6/9/2021 at 11:56 AM

## 2021-06-09 NOTE — PROGRESS NOTES
Physician Progress Note      Kashif Lynch  CSN #:                  [id number]  :                       1945  ADMIT DATE:       2021 12:46 AM  DISCH DATE:  RESPONDING  PROVIDER #:        Aida Harrison MD        QUERY TEXT:    Stage of Chronic Kidney Disease: Please provide further specificity, if known. Clinical indicators include: ckd, bun, creatinine  Options provided:  -- Chronic kidney disease stage 1  -- Chronic kidney disease stage 2  -- Chronic kidney disease stage 3  -- Chronic kidney disease stage 3a  -- Chronic kidney disease stage 3b  -- Chronic kidney disease stage 4  -- Chronic kidney disease stage 5  -- Chronic kidney disease stage 5, requiring dialysis  -- End stage renal disease  -- Other - I will add my own diagnosis  -- Disagree - Not applicable / Not valid  -- Disagree - Clinically Unable to determine / Unknown        PROVIDER RESPONSE TEXT:    The patient has chronic kidney disease stage 1.       Electronically signed by:  Aida Harrison MD 2021 12:03 PM

## 2021-06-09 NOTE — PLAN OF CARE
Problem: Pain:  Goal: Pain level will decrease  Description: Pain level will decrease  Outcome: Ongoing     Problem: Falls - Risk of:  Goal: Will remain free from falls  Description: Will remain free from falls  Outcome: Ongoing     Problem: Infection:  Goal: Will remain free from infection  Description: Will remain free from infection  Outcome: Ongoing     Problem: Skin Integrity:  Goal: Skin integrity will stabilize  Description: Skin integrity will stabilize  Outcome: Ongoing

## 2021-06-09 NOTE — PROGRESS NOTES
Pulmonary Progress Note    Date of Admission: 6/5/2021   LOS: 4 days       CC:  copd    Subjective:  Feeling better. Would like to be discharged. ROS:   No nausea  No Vomiting  No chest pain      Assessment:     COPD, FEV1 49%  Chronic hypoxemic respiratory failure, 2 L nasal cannula and 5 L with exertion  Sleep apnea-BiPAP broken  Status post watchman history of A. fib  Diastolic heart failure  CAD  Right carotid bruit with stenosis of bilateral carotid arteries  Hypertension  CKD    Plan:        Hospital Day: 4     COPD  -Home medication Symbicort and Spiriva with albuterol and duo nebs as needed  - prednisone 40 mg daily    -     Sleep apnea  - This will be addressed on follow-up. She will bring her machine to the appointment. Chronic hypoxemia    - back to baseline. Cleared to discharge from a pulmonary standpoint. Data:        PHYSICAL EXAM:   Blood pressure (!) 165/54, pulse 54, temperature 98.5 °F (36.9 °C), temperature source Oral, resp. rate 13, height 5' (1.524 m), weight 161 lb 13.1 oz (73.4 kg), last menstrual period 05/01/2006, SpO2 100 %, not currently breastfeeding.'  Body mass index is 31.6 kg/m². Gen: No distress. ENT:   Resp: No accessory muscle use. No crackles. No wheezes. No rhonchi. CV: Regular rate. Regular rhythm. No murmur or rub. No edema. Skin: Warm, dry, normal texture and turgor. No nodule on exposed extremities. M/S: No cyanosis. No clubbing. No joint deformity. Psych: Oriented x 3. No anxiety. Awake. Alert. Intact judgement and insight. Good Mood / Affect.   Memory appears in tact       Medications:    Scheduled Meds:   hydrALAZINE  25 mg Oral 3 times per day    potassium chloride  20 mEq Oral Daily with breakfast    carvedilol  25 mg Oral BID WC    torsemide  40 mg Oral Daily    sodium chloride flush  5-40 mL Intravenous 2 times per day    predniSONE  40 mg Oral Daily    amLODIPine  10 mg Oral Daily    aspirin  81 mg Oral Daily    atorvastatin  40 mg Oral Daily    budesonide-formoterol  2 puff Inhalation BID    citalopram  40 mg Oral Daily    cloNIDine  0.2 mg Oral TID    clopidogrel  75 mg Oral Daily    levothyroxine  50 mcg Oral QAM AC    pantoprazole  40 mg Oral QAM AC    montelukast  10 mg Oral Daily    tiotropium  2 puff Inhalation Daily    doxycycline hyclate  100 mg Oral 2 times per day    insulin lispro  0-6 Units Subcutaneous TID WC    insulin lispro  0-3 Units Subcutaneous Nightly       Continuous Infusions:   sodium chloride      dextrose         PRN Meds:  ipratropium-albuterol, sodium chloride flush, sodium chloride, ondansetron **OR** ondansetron, acetaminophen **OR** acetaminophen, labetalol, albuterol sulfate HFA, LORazepam, melatonin, glucose, dextrose, glucagon (rDNA), dextrose    Labs reviewed:  CBC: No results for input(s): WBC, HGB, HCT, MCV, PLT in the last 72 hours. BMP:   Recent Labs     06/07/21  0542 06/08/21  0532 06/09/21  0554    141 143   K 3.3* 3.1* 3.5   CL 92* 91* 93*   CO2 36* 36* 36*   BUN 38* 55* 58*   CREATININE 1.3* 1.3* 1.1     LIVER PROFILE: No results for input(s): AST, ALT, LIPASE, BILIDIR, BILITOT, ALKPHOS in the last 72 hours. Invalid input(s): AMYLASE,  ALB  PT/INR: No results for input(s): PROTIME, INR in the last 72 hours. APTT: No results for input(s): APTT in the last 72 hours. UA:No results for input(s): NITRITE, COLORU, PHUR, LABCAST, WBCUA, RBCUA, MUCUS, TRICHOMONAS, YEAST, BACTERIA, CLARITYU, SPECGRAV, LEUKOCYTESUR, UROBILINOGEN, BILIRUBINUR, BLOODU, GLUCOSEU, AMORPHOUS in the last 72 hours. Invalid input(s): Dory Allis  No results for input(s): PH, PCO2, PO2 in the last 72 hours. Cx:      Films: This note was transcribed using [address] [facility]. Please disregard any translational errors.       [phone] stated

## 2021-06-10 ENCOUNTER — CARE COORDINATION (OUTPATIENT)
Dept: CASE MANAGEMENT | Age: 76
End: 2021-06-10

## 2021-06-10 NOTE — CARE COORDINATION
[facility] Transitions Initial Follow Up Call    Call within 2 business days of discharge: Yes    Patient: Murray Clark Patient : 1945   MRN: [id number]  Reason for Admission: acute resp distress  Discharge Date: 21 RARS: Readmission Risk Score: 31      Last Discharge [address] [address]       Complaint Diagnosis Description Type Department Provider    21 Respiratory Distress Acute respiratory distress . .. ED to Hosp-Admission (Discharged) (ADMITTED) BHANU Oliver MD; [facility] O[facility]io Leg. .. Attempted to reach patient via phone for initial post hospital transition call. VM left  with my contact information requesting a return call.         Non-face-to-face services provided:  Obtained and reviewed discharge summary and/or continuity of care documents    Care Transitions 24 Hour Call    Care Transitions Interventions         Follow Up  Future Appointments   Date Time Provider Cleo Peralta   6/15/2021  2:40 PM GIULIANO Rolle CNP [facility]   2021  1:00 PM GIULIANO Rolle CNP [facility]   2021  9:40 AM Tom Gaona MD [facility] PUL[facility] ANDREA Headley RN

## 2021-06-11 ENCOUNTER — CARE COORDINATION (OUTPATIENT)
Dept: CASE MANAGEMENT | Age: 76
End: 2021-06-11

## 2021-06-11 DIAGNOSIS — I73.9 PAD (PERIPHERAL ARTERY DISEASE) (HCC): Primary | ICD-10-CM

## 2021-06-11 NOTE — CARE COORDINATION
[facility] Transitions Initial Follow Up Call    Call within 2 business days of discharge: Yes    Patient: Jenifer Gavin Patient : 1945   MRN: [id number]  Reason for Admission: acute resp distress  Discharge Date: 21 RARS: Readmission Risk Score: 31      Last Discharge [facility]       Complaint Diagnosis Description Type Department Provider    21 Respiratory Distress Acute respiratory distress . .. ED to Hosp-Admission (Discharged) (ADMITTED) BHANU 5N Briana Barton MD; [facility]. .. Transitions of Care Initial Call      Challenges to be reviewed by the provider   Additional needs identified to be addressed with provider: No  none             Method of communication with provider : none     Was this a readmission? Yes  Patient stated reason for admission: fluid overload from iron infusion, SOB  Patients top risk factors for readmission: medical condition-HF    Care Transition Nurse (CTN) contacted the patient by telephone to perform post hospital discharge assessment. Verified name and  with patient as identifiers. Provided introduction to self, and explanation of the CTN role. CTN reviewed discharge instructions, medical action plan and red flags with patient who verbalized understanding. Patient given an opportunity to ask questions and does not have any further questions or concerns at this time. Were discharge instructions available to patient? Yes. Reviewed appropriate site of care based on symptoms and resources available to patient including: PCP. The patient agrees to contact the PCP office for questions related to their healthcare. Medication reconciliation was performed with patient, who verbalizes understanding of administration of home medications. Advised obtaining a 90-day supply of all daily and as-needed medications. Writer spoke with patient, Shelia Faith.   She stated she was pretty good and her breathing was better, she stated has oxygen on at 4L and oxygen level 96%. She stated has no edema and is following low sodium diet and fluid restrictions. She stated has lost weight current weight 157#. CTN provided contact information. Plan for follow up call in 10-15 days based on severity of symptoms and risk factors.         Non-face-to-face services provided:  Obtained and reviewed discharge summary and/or continuity of care documents  Assessment and support for treatment adherence and medication management-[id number] completed    Care Transitions 24 Hour Call    Do you have any ongoing symptoms?: No  Do you have a copy of your discharge instructions?: Yes  Do you have all of your prescriptions and are they filled?: Yes  Have you scheduled your follow up appointment?: Yes  How are you going to get to your appointment?: Other  Were you discharged with any Home Care or Post Acute Services: No  Care Transitions Interventions         Follow Up  Future Appointments   Date Time Provider Cleo Peralta   6/15/2021  2:40 PM GIULIANO Bhatt CNP [facility]   7/6/2021  1:00 PM GIULIANO Bhatt CNP [facility]   7/21/2021  9:40 AM [facility]       Alcides Owens RN

## 2021-06-14 ENCOUNTER — TELEPHONE (OUTPATIENT)
Dept: FAMILY MEDICINE CLINIC | Age: 76
End: 2021-06-14

## 2021-06-14 ENCOUNTER — TELEPHONE (OUTPATIENT)
Dept: PHARMACY | Age: 76
End: 2021-06-14

## 2021-06-14 NOTE — TELEPHONE ENCOUNTER
Outbound call from the outpatient wellness center. Attempting to schedule patient for new heart failure/COPD visit. Left message for return call.

## 2021-06-15 ENCOUNTER — TELEPHONE (OUTPATIENT)
Dept: PHARMACY | Age: 76
End: 2021-06-15

## 2021-06-15 NOTE — TELEPHONE ENCOUNTER
Outbound call from the outpatient wellness center. Attempting to schedule patient for new heart failure visit. She is requesting a call back tomorrow afternoon due to scheduling conflicts. We are attempting to schedule her for this Thursday or Friday and she prefers a virtual visit over in person.

## 2021-06-17 ENCOUNTER — PATIENT MESSAGE (OUTPATIENT)
Dept: FAMILY MEDICINE CLINIC | Age: 76
End: 2021-06-17

## 2021-06-17 ENCOUNTER — TELEPHONE (OUTPATIENT)
Dept: PHARMACY | Age: 76
End: 2021-06-17

## 2021-06-17 DIAGNOSIS — F41.9 ANXIETY: ICD-10-CM

## 2021-06-17 RX ORDER — LORAZEPAM 1 MG/1
TABLET ORAL
Qty: 30 TABLET | Refills: 0 | Status: CANCELLED | OUTPATIENT
Start: 2021-06-17 | End: 2021-07-17

## 2021-06-17 RX ORDER — LORAZEPAM 1 MG/1
TABLET ORAL
Qty: 30 TABLET | Refills: 0 | Status: SHIPPED | OUTPATIENT
Start: 2021-06-17 | End: 2021-08-18 | Stop reason: SDUPTHER

## 2021-06-17 NOTE — TELEPHONE ENCOUNTER
Left message for return call to outpatient wellness center. Attempting to schedule patient for new heart failure/COPD visit.

## 2021-06-18 ENCOUNTER — TELEPHONE (OUTPATIENT)
Dept: PHARMACY | Age: 76
End: 2021-06-18

## 2021-06-21 NOTE — PROGRESS NOTES
Jaelyn 83  Office Visit    [id number]  1945    CC:   Chief Complaint   Patient presents with    Follow-up     sob on exertion and dizziness     HPI:  The patient is 70 y.o. female with a past medical history significant for atrial fib and S/P Watchman LAAC device, COPD, diastolic HF, carotid stenosis, HTN, HLP, ANGIE on Bipap, CKD and CAD who is here for hospital follow up. She underwent placement of Watchman LAAC device on 2021 . She presented later in day after discharge with c/o SOB and was hospitalized with acute on chronic respiratory failure and hypoxia. She was hospitalized at [facility] from 2021-2021 with worsening SOB. She was diuresed and started on steroids for her COPD. Seen by Dr. Marcelina Cornelius as an inpatient and placed on bronchodilators and po steroids prior to discharge. She was hospitalized at [facility] from 2021-2021 with SOB, chronic respiratory failure and CHF. She was diuresed and sxs improved. Wt 163# at discharge and net diuresis -3.9L. Here for follow up. Overall feels like she is at her baseline. Wears O2 @ 3L . Follows with pulmonary. Last 2-3 days starting to feel tired again and having episodes of transient dizziness when she stands. Felt better after iron infusions in the hospital (received 2 days). Now follows with hematology for her anemia. SOB with exertion at her baseline. Home weight rangin-160# on her home scale. Denies chest pain/discomfort, cough, palpitations, syncope, edema , weight change or claudication. Denies orthopnea/PND. No significant pain in her thighs when she walks. Monitoring sodium and fluid intake. Review of Systems:  Constitutional: Denies night sweats or fever. + chronic fatigue and weakness  HEENT: Denies new visual changes, ringing in ears, nosebleeds,nasal congestion  Respiratory: Denies new or change in SOB, PND, orthopnea or cough.    Cardiovascular: see HPI  GI: Denies N/V, diarrhea, constipation, abdominal pain, change in bowel habits, melena or hematochezia  + colostomy  : Denies urinary frequency, urgency, incontinence, hematuria or dysuria. Skin: Denies rash, hives, or cyanosis  Musculoskeletal: + chronic back and leg pain  Neurological: Denies syncope or TIA-like symptoms. Psychiatric: Denies anxiety or depression   + insomnia (chronic)     Past Medical History:   Diagnosis Date    Arthritis     Atrial fibrillation ([facility] 2021    CAD (coronary artery disease)     Nonobstructive on cath in 2017    CHF (congestive heart failure) ([facility]     Colostomy care ([facility] 2018    Peristomal irritation and early leaking    COPD (chronic obstructive pulmonary disease) (HCC)     Hyperlipidemia     Hypertension     Kidney disease     Stage 3    Peripheral vascular disease ([facility]     Rectal fissure 2014    surgery pending    Restless legs syndrome     Sleep apnea     O2 3 liters at hs    Thyroid disease     Unspecified sleep apnea      Past Surgical History:   Procedure Laterality Date    ABDOMEN SURGERY      CARDIAC CATHETERIZATION  2017    Dr. Bertrand Lawler  2018    CORONARY ARTERY BYPASS GRAFT      Legs & Carotid    FRACTURE SURGERY      HERNIA REPAIR      UPPER GASTROINTESTINAL ENDOSCOPY N/A 2019    EGD DIAGNOSTIC ONLY performed by Pepe Lo MD at [address] [place] [address]       Family History   Problem Relation Age of Onset    Heart Disease Mother     Heart Disease Father     Heart Disease Sister     Cancer Brother      Social History     Tobacco Use    Smoking status: Former Smoker     Packs/day: 3.00     Years: 30.00     Pack years: 90.00     Start date: 1963     Quit date: 1992     Years since quittin.4    Smokeless tobacco: Never Used    Tobacco comment: QUIT 21 YRS AGO   Vaping Use    Vaping Use: Never used   Substance Use Topics    Alcohol use:  No Take 1 tablet by mouth daily 90 tablet 0    lansoprazole (PREVACID) 15 MG delayed release capsule Take 1 capsule by mouth daily 90 capsule 0    levothyroxine (SYNTHROID) 50 MCG tablet TAKE ONE TABLET BY MOUTH DAILY 90 tablet 0    lidocaine (XYLOCAINE) 5 % ointment Apply topically as needed. 1 Tube 5    diclofenac sodium (VOLTAREN) 1 % GEL Apply 2 g topically 3 times daily as needed for Pain 100 g 5    lidocaine (XYLOCAINE) 2 % jelly Apply pea-sized amount topically to affected area every 8 hours when necessary pain 1 Tube 11    mineral oil-hydrophilic petrolatum (HYDROPHOR) ointment Apply topically as needed. 1 Tube 5    aspirin 81 MG tablet Take 1 tablet by mouth daily 30 tablet 3     No current facility-administered medications for this visit. Physical Exam:   BP (!) 142/62   Pulse 65   Ht 5' (1.524 m)   Wt 165 lb (74.8 kg)   LMP 05/01/2006 (Approximate)   SpO2 99%   BMI 32.22 kg/m²   Wt Readings from Last 2 Encounters:   06/24/21 165 lb (74.8 kg)   06/09/21 161 lb 13.1 oz (73.4 kg)     Constitutional: She is oriented to person, place, and time. She appears frail and chronically ill in appearance. Color pale. In no acute distress. HEENT: Normocephalic and atraumatic. Sclerae anicteric. No xanthelasmas. EOM's intact. Neck: Supple. No JVD present. Carotids without bruits. No  thyromegaly present. No lymphadenopathy present. Cardiovascular: RRR, normal S1 and S2; I/VI systolic murmur  Pulmonary/Chest: Effort normal.  Lungs with minimally diminished breath sounds in posterior bases. Chest wall nontender  Abdominal: soft, nontender, nondistended. + bowel sounds; no hepatomegaly Extremities: No edema, cyanosis, or clubbing. Pulses are 2+ radial and DP/PT pulses. Cap refill brisk. Neurological: No focal deficit. Skin: Skin is warm and dry. Psychiatric: She has a normal mood and affect.  Her speech is normal and behavior is normal.     Lab Review:   Lab Results   Component Value Date    TRIG 153 01/22/2021    HDL 44 01/22/2021    LDLCALC 102 01/22/2021    LDLDIRECT 120 01/09/2018    LABVLDL 31 01/22/2021     Lab Results   Component Value Date     06/09/2021    K 3.5 06/09/2021    K 4.0 06/05/2021    CL 93 06/09/2021    CO2 36 06/09/2021    BUN 58 06/09/2021    CREATININE 1.1 06/09/2021    GLUCOSE 89 06/09/2021    CALCIUM 9.4 06/09/2021      Lab Results   Component Value Date    WBC 9.6 06/05/2021    HGB 9.0 (L) 06/05/2021    HCT 28.7 (L) 06/05/2021    MCV 75.8 (L) 06/05/2021     06/05/2021 5/14/2021 Carotid US:  Right Impression   Limited visualization due to shadowing and patient movement. The right internal carotid artery appears to have a 50-79% diameter reducing   stenosis based on velocity criteria. The right vertebral artery was not visualized. Severe shadowing plaque formation demonstrated in the bulb and Internal   Carotid Artery. Left Impression   Limited visualization due to shadowing and patient movement. The left internal carotid artery appears to have a 50-79% diameter reducing   stenosis based on velocity criteria. The left vertebral artery demonstrates high velocity antegrade flow. Mild plaque formation demonstrated in the bulb. Internal Carotid Artery and   External Carotid Artery was not well visualized due to shadowing.       Conclusions        Summary        Limited study due to patient movement. Moderate internal carotid artery    stenosis bilaterally. Right ratio is 2.75 and left ratio is 1.46. Plaque    appreciate in both carotid bulbs.          1/4/2021 Watchman implant:  · Percutaneous transcatheter closure of the left atrial appendage with endocardial implant   · Transeptal Puncture  · MIKO  · 27 mm Watchman    MIKO 1/4/2021 :  Demetrio Hyde is no significant pericardial effusion at the start of the procedure.   The left atrium and appendage appear free of thrombus.   Appendage ostial measurements   43 degrees 1.77cm   92 degrees 1.77cm   97 degrees 1.82cm   154 degrees 1.88cm   175 degrees 1.94cm   A 27mm device is deployed into the left atrial appendage. The device appears   adequately compressed. There is no evidence of leak by color flow.   There is no change in effusion upon completion of the procedure.     1/5/2021 Limited echo:   Limited study performed to assess for pericardial effusion.  Keren Dach is no evidence of a significant effusion.     Echo 2/14/2020:   Left ventricular cavity size is normal.   There is mild concentric left ventricular hypertrophy.   Ejection fraction is visually estimated to be 55-60%. Grade I diastolic   dysfunction   Mild to moderate mitral regurgitation.   Mild to moderate tricuspid regurgitation with RVSP of 51 mmHg    8/24/2018 R heart cath:  1. Right atrial pressure is 10 mmHg. 2.  RV pressure is at 39/-6. 3.  Right ventricular pressure is 32/-1. 4.  Pulmonary capillary wedge pressure of 7 mmHg. 5.  Pulmonary artery pressure 26/1.  6.  Cardiac output is 3.46 liters per minute. 7.  Right atrial pressure is 63%. 8.  Aortic saturation 96%. 9.  Pulmonary artery saturation 64%.     SUMMARY:  Normal left and right heart cardiac pressure. 9/13/2017 Cardiac cath:  1. The left main comes from the left coronary cusp. There is normal  ELIER 3 flow. 2.  The left anterior descending artery comes from the left main  coronary artery. It has ELIER 3 flow. The midportion has 50%  stenosis. 3.  The left circumflex comes from the left main. It is nondominant. No significant stenosis was seen. 4.  The right coronary artery comes from the right coronary cusp. It  is dominant giving rise to the right posterior descending artery and  posterolateral branch and has no significant stenosis. 5.  LV ejection fraction is 60%.     SUMMARY:  Nonobstructive coronary artery disease with normal LV  ejection fraction. LVEDP was 20 mmHg.     8/29/2017 Carotid US:   The left and right internal carotid artery appears to have a 50-79%    8/2017 (around 8/5/2021) for 6-8 weeks with D. Enzweiler, CNP. Thanks for allowing me to participate in the care of this patient.       SHAZIA Aragon  [facility], [address] [place] [address] [address], [address] [place] [address]  Office: [phone]  Fax: [phone]      Electronically signed by GIULIANO Ennis CNP on 6/24/2021 at 1:36 PM

## 2021-06-22 ENCOUNTER — VIRTUAL VISIT (OUTPATIENT)
Dept: FAMILY MEDICINE CLINIC | Age: 76
End: 2021-06-22
Payer: COMMERCIAL

## 2021-06-22 DIAGNOSIS — Z87.19 HISTORY OF GI BLEED: ICD-10-CM

## 2021-06-22 DIAGNOSIS — I50.32 CHRONIC HEART FAILURE WITH PRESERVED EJECTION FRACTION (HCC): Primary | ICD-10-CM

## 2021-06-22 DIAGNOSIS — J44.9 COPD, SEVERE (HCC): ICD-10-CM

## 2021-06-22 DIAGNOSIS — I10 ESSENTIAL HYPERTENSION: ICD-10-CM

## 2021-06-22 PROBLEM — N30.00 ACUTE CYSTITIS WITHOUT HEMATURIA: Status: RESOLVED | Noted: 2021-05-30 | Resolved: 2021-06-22

## 2021-06-22 PROBLEM — J96.01 ACUTE RESPIRATORY FAILURE WITH HYPOXIA (HCC): Status: RESOLVED | Noted: 2021-06-05 | Resolved: 2021-06-22

## 2021-06-22 PROBLEM — R07.9 CHEST PAIN: Status: RESOLVED | Noted: 2020-02-13 | Resolved: 2021-06-22

## 2021-06-22 PROBLEM — J96.21 ACUTE ON CHRONIC RESPIRATORY FAILURE WITH HYPOXIA (HCC): Status: RESOLVED | Noted: 2021-01-21 | Resolved: 2021-06-22

## 2021-06-22 PROCEDURE — 3023F SPIROM DOC REV: CPT | Performed by: FAMILY MEDICINE

## 2021-06-22 PROCEDURE — G8427 DOCREV CUR MEDS BY ELIG CLIN: HCPCS | Performed by: FAMILY MEDICINE

## 2021-06-22 PROCEDURE — 3017F COLORECTAL CA SCREEN DOC REV: CPT | Performed by: FAMILY MEDICINE

## 2021-06-22 PROCEDURE — G8926 SPIRO NO PERF OR DOC: HCPCS | Performed by: FAMILY MEDICINE

## 2021-06-22 PROCEDURE — 99214 OFFICE O/P EST MOD 30 MIN: CPT | Performed by: FAMILY MEDICINE

## 2021-06-22 PROCEDURE — 1090F PRES/ABSN URINE INCON ASSESS: CPT | Performed by: FAMILY MEDICINE

## 2021-06-22 PROCEDURE — G8399 PT W/DXA RESULTS DOCUMENT: HCPCS | Performed by: FAMILY MEDICINE

## 2021-06-22 PROCEDURE — 1123F ACP DISCUSS/DSCN MKR DOCD: CPT | Performed by: FAMILY MEDICINE

## 2021-06-22 PROCEDURE — G8417 CALC BMI ABV UP PARAM F/U: HCPCS | Performed by: FAMILY MEDICINE

## 2021-06-22 PROCEDURE — 4040F PNEUMOC VAC/ADMIN/RCVD: CPT | Performed by: FAMILY MEDICINE

## 2021-06-22 PROCEDURE — 1111F DSCHRG MED/CURRENT MED MERGE: CPT | Performed by: FAMILY MEDICINE

## 2021-06-22 PROCEDURE — 1036F TOBACCO NON-USER: CPT | Performed by: FAMILY MEDICINE

## 2021-06-22 NOTE — PROGRESS NOTES
6/22/2021    This is a 70 y.o. female   Chief Complaint   Patient presents with    Follow-Up from [facility] 6/4-6/9 for COPD, heart failure     HPI    Virtual visit today    Recently admitted to the hospital for COPD exacerbation and acute on chronic HFpEF  - she responded well to diuresis while inpatient  - her BP was elevated and some of her BP meds were adjusted  - she notes feeling dizzy and light-headed off and on throughout the day. BPs still run pretty high in the morning and evening. She received an IV infusion of iron. She reports improvement in some of her fatigue.      She reports monitoring her daily weights, they have been stable between 157-160  - on torsemide 40mg daily and has potassium with this    Review of Systems     Current Outpatient Medications   Medication Sig Dispense Refill    LORazepam (ATIVAN) 1 MG tablet TAKE ONE TABLET BY MOUTH DAILY AS NEEDED FOR ANXIETY 30 tablet 0    hydrALAZINE (APRESOLINE) 25 MG tablet Take 1 tablet by mouth every 8 hours 90 tablet 3    carvedilol (COREG) 25 MG tablet Take 1 tablet by mouth 2 times daily (with meals) (Patient taking differently: Take 12.5 mg by mouth 2 times daily (with meals) ) 60 tablet 3    torsemide (DEMADEX) 20 MG tablet Take 2 tablets by mouth daily 60 tablet 3    potassium chloride (KLOR-CON M) 20 MEQ extended release tablet Take 1 tablet by mouth daily (with breakfast) 30 tablet 3    predniSONE (DELTASONE) 10 MG tablet Take 40mg daily x3 days, 30mg daily x3 days, 20mg daily x3 days and 10mg daily x3 days 40 tablet 0    amLODIPine (NORVASC) 10 MG tablet Take 1 tablet by mouth daily 30 tablet 3    cloNIDine (CATAPRES) 0.2 MG tablet Take 1 tablet by mouth 3 times daily 270 tablet 1    melatonin 3 MG TABS tablet Take 1 tablet by mouth nightly as needed (sleep) 30 tablet 1    citalopram (CELEXA) 40 MG tablet TAKE ONE TABLET BY MOUTH DAILY 90 tablet 0    albuterol sulfate HFA (VENTOLIN HFA) 108 (90 Base) MCG/ACT inhaler Inhale 2 puffs into the lungs every 4 hours as needed for Wheezing or Shortness of Breath 1 Inhaler 5    budesonide-formoterol (SYMBICORT) 160-4.5 MCG/ACT AERO Inhale 2 puffs into the lungs 2 times daily 1 Inhaler 5    ipratropium-albuterol (DUONEB) 0.5-2.5 (3) MG/3ML SOLN nebulizer solution Inhale 3 mLs into the lungs every 4 hours as needed for Shortness of Breath 360 mL 5    tiotropium (SPIRIVA RESPIMAT) 2.5 MCG/ACT AERS inhaler Inhale 2 puffs into the lungs daily 1 Inhaler 5    clopidogrel (PLAVIX) 75 MG tablet Take 1 tablet by mouth daily 90 tablet 1    atorvastatin (LIPITOR) 40 MG tablet TAKE ONE TABLET BY MOUTH DAILY 90 tablet 1    montelukast (SINGULAIR) 10 MG tablet Take 1 tablet by mouth daily 90 tablet 0    lansoprazole (PREVACID) 15 MG delayed release capsule Take 1 capsule by mouth daily 90 capsule 0    levothyroxine (SYNTHROID) 50 MCG tablet TAKE ONE TABLET BY MOUTH DAILY 90 tablet 0    diclofenac sodium (VOLTAREN) 1 % GEL Apply 2 g topically 3 times daily as needed for Pain 100 g 5    mineral oil-hydrophilic petrolatum (HYDROPHOR) ointment Apply topically as needed. 1 Tube 5    aspirin 81 MG tablet Take 1 tablet by mouth daily 30 tablet 3    lidocaine (XYLOCAINE) 5 % ointment Apply topically as needed. (Patient not taking: Reported on 6/22/2021) 1 Tube 5    lidocaine (XYLOCAINE) 2 % jelly Apply pea-sized amount topically to affected area every 8 hours when necessary pain (Patient not taking: Reported on 6/22/2021) 1 Tube 11     No current facility-administered medications for this visit. LMP 05/01/2006 (Approximate)     Physical Exam    Wt Readings from Last 3 Encounters:   06/09/21 161 lb 13.1 oz (73.4 kg)   05/31/21 160 lb 11.5 oz (72.9 kg)   05/19/21 164 lb (74.4 kg)       BP Readings from Last 3 Encounters:   06/09/21 (!) 165/54   05/31/21 (!) 114/57   05/19/21 (!) 152/76         Assessment/Plan:  1. Chronic heart failure with preserved ejection fraction ([address]    2. COPD, severe ([facility]    3. Essential hypertension    4. History of GI bleed    She is monitoring her daily weights and taking her medications as prescribed. She reports her breathing is getting closer back to her baseline. We reviewed her medication regimen. We had a hernan discussion regarding her long-term health and ensuring she has a healthcare medical decision-maker identified. On subsequent visits we will continue to address this based on her recurrent hospitalizations and high risk for mortality with her comorbidities.     Repeat labs ordered to check CBC and BMP within the next 2 weeks

## 2021-06-24 ENCOUNTER — OFFICE VISIT (OUTPATIENT)
Dept: CARDIOLOGY CLINIC | Age: 76
End: 2021-06-24
Payer: MEDICARE

## 2021-06-24 VITALS
HEIGHT: 60 IN | SYSTOLIC BLOOD PRESSURE: 142 MMHG | WEIGHT: 165 LBS | BODY MASS INDEX: 32.39 KG/M2 | OXYGEN SATURATION: 99 % | HEART RATE: 65 BPM | DIASTOLIC BLOOD PRESSURE: 62 MMHG

## 2021-06-24 DIAGNOSIS — I48.0 PAROXYSMAL A-FIB (HCC): ICD-10-CM

## 2021-06-24 DIAGNOSIS — I65.23 BILATERAL CAROTID ARTERY STENOSIS: ICD-10-CM

## 2021-06-24 DIAGNOSIS — I10 ESSENTIAL HYPERTENSION: ICD-10-CM

## 2021-06-24 DIAGNOSIS — J96.11 CHRONIC RESPIRATORY FAILURE WITH HYPOXIA (HCC): ICD-10-CM

## 2021-06-24 DIAGNOSIS — D64.9 CHRONIC ANEMIA: ICD-10-CM

## 2021-06-24 DIAGNOSIS — G47.33 OSA TREATED WITH BIPAP: ICD-10-CM

## 2021-06-24 DIAGNOSIS — I50.32 CHRONIC DIASTOLIC HEART FAILURE (HCC): Primary | ICD-10-CM

## 2021-06-24 PROCEDURE — 1111F DSCHRG MED/CURRENT MED MERGE: CPT | Performed by: NURSE PRACTITIONER

## 2021-06-24 PROCEDURE — G8427 DOCREV CUR MEDS BY ELIG CLIN: HCPCS | Performed by: NURSE PRACTITIONER

## 2021-06-24 PROCEDURE — 4040F PNEUMOC VAC/ADMIN/RCVD: CPT | Performed by: NURSE PRACTITIONER

## 2021-06-24 PROCEDURE — 99214 OFFICE O/P EST MOD 30 MIN: CPT | Performed by: NURSE PRACTITIONER

## 2021-06-24 PROCEDURE — G8417 CALC BMI ABV UP PARAM F/U: HCPCS | Performed by: NURSE PRACTITIONER

## 2021-06-24 PROCEDURE — 1123F ACP DISCUSS/DSCN MKR DOCD: CPT | Performed by: NURSE PRACTITIONER

## 2021-06-24 PROCEDURE — 1036F TOBACCO NON-USER: CPT | Performed by: NURSE PRACTITIONER

## 2021-06-24 PROCEDURE — 3017F COLORECTAL CA SCREEN DOC REV: CPT | Performed by: NURSE PRACTITIONER

## 2021-06-24 PROCEDURE — G8399 PT W/DXA RESULTS DOCUMENT: HCPCS | Performed by: NURSE PRACTITIONER

## 2021-06-24 PROCEDURE — 1090F PRES/ABSN URINE INCON ASSESS: CPT | Performed by: NURSE PRACTITIONER

## 2021-06-24 RX ORDER — CARVEDILOL 12.5 MG/1
12.5 TABLET ORAL 2 TIMES DAILY WITH MEALS
COMMUNITY
End: 2022-01-20 | Stop reason: SDUPTHER

## 2021-06-24 NOTE — PATIENT INSTRUCTIONS
Increase Torsemide to 3 tablets a day with an additional potassium table x 3 days and then decrease back to 40 mg torsemide daily and one potassium tablet daily    Continue other medications    Lab work next week: BMP and CBC

## 2021-07-08 ENCOUNTER — CARE COORDINATION (OUTPATIENT)
Dept: CASE MANAGEMENT | Age: 76
End: 2021-07-08

## 2021-07-08 ENCOUNTER — NURSE TRIAGE (OUTPATIENT)
Dept: OTHER | Facility: CLINIC | Age: 76
End: 2021-07-08

## 2021-07-08 ENCOUNTER — TELEPHONE (OUTPATIENT)
Dept: FAMILY MEDICINE CLINIC | Age: 76
End: 2021-07-08

## 2021-07-08 NOTE — TELEPHONE ENCOUNTER
Called pt  She had already taken another water pill  Is now urinating better  Appt scheduled tmrw w Dr Chris Vargas

## 2021-07-08 NOTE — CARE COORDINATION
[facility] Transitions Follow Up Call    2021    Patient: Jose R Pittman  Patient : 1945   MRN: <[id number]>  Reason for Admission: acute resp distress   Discharge Date: 21 RARS: Readmission Risk Score: 32    Spoke with: Jose R Pittman who reports that she not doing to good today. Patient reports chest tightness, increased sob, and she feels like she is holding fluid. Patient did weigh this morning wt 159 which is no weight gain. Patient denies any edema, s/v, fatigue, dizziness, fever, chills, lightheadedness, cough, or abd distention or abd pain. Patient reports that she notified her PCP and is waiting for a call back for about an hour. Writer did a 3 way call to Dr Madelyn Wright office to report patients symptoms. Patient had hung up phone so the Triage nurse will give patient a call back to assess. 4:19 pm  Attempted to call patient back to follow up and no answer. Care Transitions Subsequent and Final Call    Subsequent and Final Calls  Care Transitions Interventions  Other Interventions:            Follow Up  Future Appointments   Date Time Provider Cleo Peralta   2021  9:40 AM Gumaro Crocker MD [facility] PUL ANDREA   2021  3:40 PM GIULIANO Stauffer - CNP [facility]       Elizabeth Pressley LPN

## 2021-07-08 NOTE — TELEPHONE ENCOUNTER
Sudha Garcesb reached nurse access, triage started when she mentioned that she had already spoken to office about her SOB and not being able to urinate more than a few drops as of this afternoon    No significant weight change, She was 160# and fluctuates between 159# and 160#    She took an extra Lasix around 1500 today, for a total of 3 pills for the day.     Informed of Dr Ivet Trevizo recommendation, transferred to [address] [place] [address] at the [facility] to set up appointment for tomorrow

## 2021-07-08 NOTE — TELEPHONE ENCOUNTER
Yes I would have Alisha take another water pill today. Please ask if her weight has changed significantly over the past 2 days (build up of water weight)  High risk with admissions recently, should see us or Cardiology tomorrow.

## 2021-07-08 NOTE — TELEPHONE ENCOUNTER
Pt called in and is having trouble urinating. Pt is feeling SOB. She is afraid that she is filling up with fluid. She has taken her water pills for today. She wants to know if she should take another water pill?     Please Advise

## 2021-07-09 ENCOUNTER — CARE COORDINATION (OUTPATIENT)
Dept: CASE MANAGEMENT | Age: 76
End: 2021-07-09

## 2021-07-09 NOTE — TELEPHONE ENCOUNTER
Patient cancelled appt for today with Dr. Mason Kearns no transportation [facility] would not give her a ride. Patient stating has not gained any weight, feeling better today. No sob   Patient is able to urinate. Does patient need to follow up with Dr. Mason Kearns next week to discuss this issue or only schedule appt if this happens again?    Please advise

## 2021-07-09 NOTE — TELEPHONE ENCOUNTER
She should follow up with us or cardiology is she is swelling. If she is having difficulty urinating it could be UTI  If she has any other sx such as painful urination, urinary frequency, foul smelling urine she should see us soon.

## 2021-07-09 NOTE — CARE COORDINATION
[facility] Transitions Follow Up Call    2021    Patient: Atul Alcala  Patient : 1945   MRN: [id number]  Reason for Admission: acute resp distress, HF  Discharge Date: 21 RARS: Readmission Risk Score: 31       Care Transitions Follow Up Call    Needs to be reviewed by the provider   Additional needs identified to be addressed with provider: No  none             Method of communication with provider : none      Care Transition Nurse (CTN) contacted the patient by telephone to follow up after admission on 2021. Verified name and  with patient as identifiers   The patient agrees to contact the PCP office for questions related to their healthcare. Patients top risk factors for readmission: medical condition-HF  Interventions to address risk factors: Obtained and reviewed discharge summary and/or continuity of care documents     Writer spoke with patient, Patsy Vaughn. She stated she was urinating a little and is a little better than yesterday. She stated her weight is stable at 159#. She stated has no sign of a fever, urine is a little darker than normal and has a little odor. She stated MD's office is to call back to schedule an appt for next week. She stated her transportation [employer] needs 2 days notice. Patient aware she can call MD  there is always someone on call. Patient agreeable to f/u call. Non-[facility] follow up appointment(s):     CTN provided contact information for future needs. Plan for follow-up call in 3-5 days based on severity of symptoms and risk factors. Plan for next call: symptom management-urination          Care Transitions Subsequent and Final Call    Subsequent and Final Calls  Care Transitions Interventions  Other Interventions:            Follow Up  Future Appointments   Date Time Provider Cleo Peralta   2021  9:40 AM Dave Mina MD [facility]   2021  3:40 PM [address], APRN - CNP [facility] ANDREA Huggins RN

## 2021-07-14 ENCOUNTER — CARE COORDINATION (OUTPATIENT)
Dept: CASE MANAGEMENT | Age: 76
End: 2021-07-14

## 2021-07-14 NOTE — CARE COORDINATION
9:40 AM Mellissa [facility]alvo [facility]   8/5/2021  3:40 PM GIULIANO Miranda - CNP [facility]       Anisha Palomares RN

## 2021-07-29 ENCOUNTER — TELEPHONE (OUTPATIENT)
Dept: CARDIOLOGY CLINIC | Age: 76
End: 2021-07-29

## 2021-08-03 NOTE — PROGRESS NOTES
[facility]  Office Visit    [id number]  1945 August 5, 2021    CC:   Chief Complaint   Patient presents with    Follow-up     edema on both legs      HPI:  The patient is 70 y.o. female with a past medical history significant for atrial fib and S/P Watchman LAAC device, COPD, diastolic HF, carotid stenosis, HTN, HLP, ANGIE on Bipap, CKD and CAD who is here for follow up. She underwent placement of Watchman LAAC device on 1/4/2021 . She presented later in day after discharge with c/o SOB and was hospitalized with acute on chronic respiratory failure and hypoxia. She was hospitalized at [facility] from 1/21/2021-1/26/2021 with worsening SOB. She was diuresed and started on steroids for her COPD. Seen by Dr. Duke Garza as an inpatient and placed on bronchodilators and po steroids prior to discharge. She was hospitalized at [facility] from 6/5/2021-6/8/2021 with SOB, chronic respiratory failure and CHF. She was diuresed and sxs improved. Wt 163# at discharge and net diuresis -3.9L. She was seen in follow up on 6/24/2021 and her torsemide was increased for a few days. She called the office on 7/29/2021 with an 8# weight gain and increasing edema. Her diuretic was increased again at that time and she was advised to continue higher dose with follow up BMP. Overall not feeling well. Has noted increased edema in her lower legs/ankles. Since last week her weight started at 168# and now down to 165#. She continues to have some edema in her ankles and feet. Worse at night and improves with elevating her legs or when she gets up in the morning. Has chronic SOB and worse with high humidity and feeling of being \"full. \"  Denies chest pain/discomfort,  orthopnea/PND, cough, palpitations, dizziness, syncope. Has chronic leg pain. Rides stationary bike 15-30 minutes daily depending on how she feels. Monitoring sodium and fluid intake.  Felt best when she was receiving iron infusion at direction of  Corie Curling. O2 sat running 96-99%. Her BiPap is currently not functioning. Review of Systems:  Constitutional: Denies night sweats or fever. + chronic fatigue and weakness  HEENT: Denies new visual changes, ringing in ears, nosebleeds,nasal congestion  Respiratory: Denies new or change in SOB, PND, orthopnea or cough. Cardiovascular: see HPI  GI: Denies N/V, diarrhea, constipation, abdominal pain, change in bowel habits, melena or hematochezia  : Denies urinary frequency, urgency, incontinence, hematuria or dysuria. Skin: Denies rash, hives, or cyanosis  Musculoskeletal: + chronic back and leg pain  Neurological: Denies syncope or TIA-like symptoms.   Psychiatric: Denies anxiety, insomnia or depression     Past Medical History:   Diagnosis Date    Arthritis     Atrial fibrillation ([address] 1/4/2021    CAD (coronary artery disease)     Nonobstructive on cath in 9/2017    CHF (congestive heart failure) ([address]     [facility] care ([facility] 4/25/2018    Peristomal irritation and early leaking    COPD (chronic obstructive pulmonary disease) (HCC)     Hyperlipidemia     Hypertension     Kidney disease     Stage 3    Peripheral vascular disease ([address]     Rectal fissure 11/2014    surgery pending    Restless legs syndrome     Sleep apnea     O2 3 liters at hs    Thyroid disease     Unspecified sleep apnea      Past Surgical History:   Procedure Laterality Date    ABDOMEN SURGERY      CARDIAC CATHETERIZATION  09/13/2017    Dr. Magallon Blade  03/09/2018    CORONARY ARTERY BYPASS GRAFT      Legs & Carotid    FRACTURE SURGERY      HERNIA REPAIR      UPPER GASTROINTESTINAL ENDOSCOPY N/A 9/30/2019    EGD DIAGNOSTIC ONLY performed by Clara Guerra MD at [address] [place] [address]       Family History   Problem Relation Age of Onset    Heart Disease Mother     Heart Disease Father     Heart Disease Sister     Cancer Brother      Social History Tobacco Use    Smoking status: Former Smoker     Packs/day: 3.00     Years: 30.00     Pack years: 90.00     Start date: 1963     Quit date: 1992     Years since quittin.5    Smokeless tobacco: Never Used    Tobacco comment: QUIT 21 YRS AGO   Vaping Use    Vaping Use: Never used   Substance Use Topics    Alcohol use: No     Alcohol/week: 0.0 standard drinks    Drug use: No       Allergies   Allergen Reactions    Iv Dye [Iodides]      Flushed, broke out and difficulty breathing     Current Outpatient Medications   Medication Sig Dispense Refill    montelukast (SINGULAIR) 10 MG tablet TAKE ONE TABLET BY MOUTH DAILY 90 tablet 0    carvedilol (COREG) 12.5 MG tablet Take 12.5 mg by mouth 2 times daily (with meals)      hydrALAZINE (APRESOLINE) 25 MG tablet Take 1 tablet by mouth every 8 hours 90 tablet 3    torsemide (DEMADEX) 20 MG tablet Take 2 tablets by mouth daily 60 tablet 3    potassium chloride (KLOR-CON M) 20 MEQ extended release tablet Take 1 tablet by mouth daily (with breakfast) 30 tablet 3    amLODIPine (NORVASC) 10 MG tablet Take 1 tablet by mouth daily 30 tablet 3    cloNIDine (CATAPRES) 0.2 MG tablet Take 1 tablet by mouth 3 times daily 270 tablet 1    melatonin 3 MG TABS tablet Take 1 tablet by mouth nightly as needed (sleep) 30 tablet 1    citalopram (CELEXA) 40 MG tablet TAKE ONE TABLET BY MOUTH DAILY 90 tablet 0    albuterol sulfate HFA (VENTOLIN HFA) 108 (90 Base) MCG/ACT inhaler Inhale 2 puffs into the lungs every 4 hours as needed for Wheezing or Shortness of Breath 1 Inhaler 5    budesonide-formoterol (SYMBICORT) 160-4.5 MCG/ACT AERO Inhale 2 puffs into the lungs 2 times daily 1 Inhaler 5    ipratropium-albuterol (DUONEB) 0.5-2.5 (3) MG/3ML SOLN nebulizer solution Inhale 3 mLs into the lungs every 4 hours as needed for Shortness of Breath 360 mL 5    tiotropium (SPIRIVA RESPIMAT) 2.5 MCG/ACT AERS inhaler Inhale 2 puffs into the lungs daily 1 Inhaler 5    clopidogrel (PLAVIX) 75 MG tablet Take 1 tablet by mouth daily 90 tablet 1    atorvastatin (LIPITOR) 40 MG tablet TAKE ONE TABLET BY MOUTH DAILY 90 tablet 1    lansoprazole (PREVACID) 15 MG delayed release capsule Take 1 capsule by mouth daily 90 capsule 0    levothyroxine (SYNTHROID) 50 MCG tablet TAKE ONE TABLET BY MOUTH DAILY 90 tablet 0    lidocaine (XYLOCAINE) 5 % ointment Apply topically as needed. 1 Tube 5    diclofenac sodium (VOLTAREN) 1 % GEL Apply 2 g topically 3 times daily as needed for Pain 100 g 5    lidocaine (XYLOCAINE) 2 % jelly Apply pea-sized amount topically to affected area every 8 hours when necessary pain 1 Tube 11    mineral oil-hydrophilic petrolatum (HYDROPHOR) ointment Apply topically as needed. 1 Tube 5    aspirin 81 MG tablet Take 1 tablet by mouth daily 30 tablet 3     No current facility-administered medications for this visit. Physical Exam:   /62   Pulse 60   Ht 5' (1.524 m)   Wt 166 lb 12.8 oz (75.7 kg)   LMP 05/01/2006 (Approximate)   SpO2 96%   BMI 32.58 kg/m²   Wt Readings from Last 2 Encounters:   08/05/21 166 lb 12.8 oz (75.7 kg)   08/05/21 166 lb (75.3 kg)     Constitutional: She is oriented to person, place, and time. She is chronically ill in appearance. In no acute distress. O2 In place. HEENT: Normocephalic and atraumatic. Sclerae anicteric. No xanthelasmas. EOM's intact. Neck: Supple. No JVD present. Carotids without bruits. No thyromegaly present. No lymphadenopathy present. Cardiovascular: RRR, normal S1 and S2; I/VI systolic murmur  Pulmonary/Chest: Effort normal.  Lungs clear with diminished breath sounds in posterior bases. Chest wall nontender  Abdominal: soft, nontender, nondistended. + bowel sounds; no hepatomegaly   Extremities: No clubbing. Pulses are 2+ radial/carotid and 1+ bilateral DP/PTbilaterally. Cap refill brisk. 1+ bilateral pedal edema  Neurological: No focal deficit. Skin: Skin is warm and dry.    Psychiatric: She has a normal mood and affect. Her speech is normal and behavior is normal.     Lab Review:   Lab Results   Component Value Date    TRIG 153 01/22/2021    HDL 44 01/22/2021    LDLCALC 102 01/22/2021    LDLDIRECT 120 01/09/2018    LABVLDL 31 01/22/2021     Lab Results   Component Value Date     06/09/2021    K 3.5 06/09/2021    K 4.0 06/05/2021    CL 93 06/09/2021    CO2 36 06/09/2021    BUN 58 06/09/2021    CREATININE 1.1 06/09/2021    GLUCOSE 89 06/09/2021    CALCIUM 9.4 06/09/2021      Lab Results   Component Value Date    WBC 9.6 06/05/2021    HGB 9.0 (L) 06/05/2021    HCT 28.7 (L) 06/05/2021    MCV 75.8 (L) 06/05/2021     06/05/2021 5/14/2021 Carotid US:  Right Impression   Limited visualization due to shadowing and patient movement. The right internal carotid artery appears to have a 50-79% diameter reducing   stenosis based on velocity criteria. The right vertebral artery was not visualized. Severe shadowing plaque formation demonstrated in the bulb and Internal   Carotid Artery. Left Impression   Limited visualization due to shadowing and patient movement. The left internal carotid artery appears to have a 50-79% diameter reducing   stenosis based on velocity criteria. The left vertebral artery demonstrates high velocity antegrade flow. Mild plaque formation demonstrated in the bulb. Internal Carotid Artery and   External Carotid Artery was not well visualized due to shadowing.       Conclusions        Summary        Limited study due to patient movement. Moderate internal carotid artery    stenosis bilaterally. Right ratio is 2.75 and left ratio is 1.46. Plaque    appreciate in both carotid bulbs.          1/4/2021 Watchman implant:  · Percutaneous transcatheter closure of the left atrial appendage with endocardial implant   · Transeptal Puncture  · MIKO  · 27 mm Watchman    MIKO 1/4/2021 :  Noreene Shouts is no significant pericardial effusion at the start of the procedure.   The left atrium and appendage appear free of thrombus.   Appendage ostial measurements   43 degrees 1.77cm   92 degrees 1.77cm   97 degrees 1.82cm   154 degrees 1.88cm   175 degrees 1.94cm   A 27mm device is deployed into the left atrial appendage. The device appears   adequately compressed. There is no evidence of leak by color flow.   There is no change in effusion upon completion of the procedure.     1/5/2021 Limited echo:   Limited study performed to assess for pericardial effusion.  Dewitte Peto is no evidence of a significant effusion.     Echo 2/14/2020:   Left ventricular cavity size is normal.   There is mild concentric left ventricular hypertrophy.   Ejection fraction is visually estimated to be 55-60%. Grade I diastolic   dysfunction   Mild to moderate mitral regurgitation.   Mild to moderate tricuspid regurgitation with RVSP of 51 mmHg    8/24/2018 R heart cath:  1. Right atrial pressure is 10 mmHg. 2.  RV pressure is at 39/-6. 3.  Right ventricular pressure is 32/-1. 4.  Pulmonary capillary wedge pressure of 7 mmHg. 5.  Pulmonary artery pressure 26/1.  6.  Cardiac output is 3.46 liters per minute. 7.  Right atrial pressure is 63%. 8.  Aortic saturation 96%. 9.  Pulmonary artery saturation 64%.     SUMMARY:  Normal left and right heart cardiac pressure. 9/13/2017 Cardiac cath:  1. The left main comes from the left coronary cusp. There is normal  ELIER 3 flow. 2.  The left anterior descending artery comes from the left main  coronary artery. It has ELIER 3 flow. The midportion has 50%  stenosis. 3.  The left circumflex comes from the left main. It is nondominant. No significant stenosis was seen. 4.  The right coronary artery comes from the right coronary cusp. It  is dominant giving rise to the right posterior descending artery and  posterolateral branch and has no significant stenosis.   5.  LV ejection fraction is 60%.     SUMMARY:  Nonobstructive coronary artery disease with normal LV  ejection fraction. LVEDP was 20 mmHg. 8/29/2017 Carotid US:   The left and right internal carotid artery appears to have a 50-79%    8/2017 Lexiscan-Myoview:   Abnormal study. There is a moderate sized, moderate intensity, reversible    defect of the mid to apical inferior wall which is consistent with ischemia.    Normal LV size and systolic function.    Overall findings represent an intermediate risk study. Assessment:    1. Bilateral leg edema  -improved after extra diuretic  -continue diuretic  -reinforced compression hose and elevation  -decrease sodium intake  -appears to be fairly well controlled today    2. Chronic diastolic heart failure (HCC)  -appears to be compensated today  -NYHA class hard to ascertain d/t her COPD; on chronic O2  -LVEF 55-60%  -continue carvedilol, hydralazine, diuretic  -not on ACE/ARB/ARNI/aldactone d/t elevated Cr  -low sodium diet and fluid restriction    3. ANGIE treated with BiPap  -currently having issues with her BiPap machine  -encouraged to keep follow up with Dr. Florecita Pereyra    4. Chronic respiratory failure with hypoxia (HCC)  -underlying COPD and on chronic O2  -follows pulmonary    5. Paroxysmal atrial fibrillation (HCC)  -S/P LAAC device (Watchman) on 1/4/2021  -S/P MIKO on 2/23/2021: Eliquis dc'd   -poor long term anticoagulation candidate d/t chronic anemia and GI bleed  -continue antiplatelet  -VR controlled    6. Essential hypertension  -BP much better controlled  -continue medical management  -goal BP < 140/80    7. Bilateral carotid artery stenosis  -bilateral ICA stenosis noted on carotid US  -continue ASA and statin    8. Chronic anemia  -has received iron infusions  -will check CBC  -follows with hematology    9.  Chronic kidney disease  -last Cr 1.1 and improved  -follow up BMP      Plan:  Check BMP, BNP and CBC  Continue amlodipine, ASA, statin, clonidine, plavix, hydralazine, KCL, torsemide and carvedilol  Check BMP, BNP and CBC  Discussed and reviewed low-fat/low sodium diet, monitoring of daily weights, fluid restriction, worsening signs and symptoms of heart failure and when to call, and the importance of regular exercise and activity. Follow up with Dr. Alfredo Welch in 3 months or sooner if needed    Return in about 3 months (around 11/5/2021) for with Dr. Alfredo Welch. Thanks for allowing me to participate in the care of this patient.       SHAZIA Chase  [facility], [address] [place] [address] [address] [place] [address], [address] [place] [address]  Office: [phone]  Fax: [phone]      Electronically signed by GIULIANO Last CNP on 8/5/2021 at 4:23 PM

## 2021-08-05 ENCOUNTER — HOSPITAL ENCOUNTER (EMERGENCY)
Age: 76
Discharge: HOME | End: 2021-08-05
Payer: MEDICARE

## 2021-08-05 ENCOUNTER — OFFICE VISIT (OUTPATIENT)
Dept: CARDIOLOGY CLINIC | Age: 76
End: 2021-08-05
Payer: MEDICARE

## 2021-08-05 ENCOUNTER — OFFICE VISIT (OUTPATIENT)
Dept: FAMILY MEDICINE CLINIC | Age: 76
End: 2021-08-05
Payer: MEDICARE

## 2021-08-05 VITALS
BODY MASS INDEX: 32.75 KG/M2 | HEIGHT: 60 IN | SYSTOLIC BLOOD PRESSURE: 136 MMHG | OXYGEN SATURATION: 96 % | DIASTOLIC BLOOD PRESSURE: 62 MMHG | HEART RATE: 60 BPM | WEIGHT: 166.8 LBS

## 2021-08-05 VITALS
HEART RATE: 65 BPM | WEIGHT: 166.8 LBS | TEMPERATURE: 98.4 F | RESPIRATION RATE: 18 BRPM | SYSTOLIC BLOOD PRESSURE: 181 MMHG | BODY MASS INDEX: 32.75 KG/M2 | HEIGHT: 60 IN | DIASTOLIC BLOOD PRESSURE: 63 MMHG | OXYGEN SATURATION: 95 %

## 2021-08-05 VITALS
WEIGHT: 166 LBS | DIASTOLIC BLOOD PRESSURE: 54 MMHG | HEART RATE: 62 BPM | BODY MASS INDEX: 32.59 KG/M2 | OXYGEN SATURATION: 96 % | HEIGHT: 60 IN | SYSTOLIC BLOOD PRESSURE: 120 MMHG

## 2021-08-05 DIAGNOSIS — I50.32 CHRONIC DIASTOLIC HEART FAILURE (HCC): ICD-10-CM

## 2021-08-05 DIAGNOSIS — J44.9 COPD, SEVERE (HCC): ICD-10-CM

## 2021-08-05 DIAGNOSIS — I10 ESSENTIAL HYPERTENSION: ICD-10-CM

## 2021-08-05 DIAGNOSIS — I50.32 CHRONIC HEART FAILURE WITH PRESERVED EJECTION FRACTION (HCC): ICD-10-CM

## 2021-08-05 DIAGNOSIS — S51.812A SKIN TEAR OF LEFT FOREARM WITHOUT COMPLICATION, INITIAL ENCOUNTER: Primary | ICD-10-CM

## 2021-08-05 DIAGNOSIS — R82.90 FOUL SMELLING URINE: Primary | ICD-10-CM

## 2021-08-05 DIAGNOSIS — J96.11 CHRONIC RESPIRATORY FAILURE WITH HYPOXIA (HCC): ICD-10-CM

## 2021-08-05 DIAGNOSIS — I48.0 PAROXYSMAL ATRIAL FIBRILLATION (HCC): ICD-10-CM

## 2021-08-05 DIAGNOSIS — G47.33 OSA TREATED WITH BIPAP: ICD-10-CM

## 2021-08-05 DIAGNOSIS — R60.0 BILATERAL LEG EDEMA: Primary | ICD-10-CM

## 2021-08-05 PROBLEM — K56.609 LARGE BOWEL OBSTRUCTION (HCC): Status: RESOLVED | Noted: 2018-03-05 | Resolved: 2021-08-05

## 2021-08-05 PROBLEM — R51.9 HEADACHE: Status: RESOLVED | Noted: 2021-05-30 | Resolved: 2021-08-05

## 2021-08-05 PROBLEM — I16.0 HYPERTENSIVE URGENCY: Status: RESOLVED | Noted: 2021-05-30 | Resolved: 2021-08-05

## 2021-08-05 PROBLEM — J81.0 PULMONARY EDEMA, ACUTE (HCC): Status: RESOLVED | Noted: 2021-01-05 | Resolved: 2021-08-05

## 2021-08-05 PROBLEM — K57.33 DIVERTICULITIS LARGE INTESTINE W/O PERFORATION OR ABSCESS W/BLEEDING: Status: RESOLVED | Noted: 2018-03-03 | Resolved: 2021-08-05

## 2021-08-05 LAB
BILIRUBIN, POC: NEGATIVE
BLOOD URINE, POC: NEGATIVE
CLARITY, POC: ABNORMAL
COLOR, POC: YELLOW
GLUCOSE URINE, POC: NEGATIVE
KETONES, POC: NEGATIVE
LEUKOCYTE EST, POC: ABNORMAL
NITRITE, POC: NEGATIVE
PH, POC: 5.5
PROTEIN, POC: ABNORMAL
SPECIFIC GRAVITY, POC: 1.02
UROBILINOGEN, POC: ABNORMAL

## 2021-08-05 PROCEDURE — 1123F ACP DISCUSS/DSCN MKR DOCD: CPT | Performed by: NURSE PRACTITIONER

## 2021-08-05 PROCEDURE — 81002 URINALYSIS NONAUTO W/O SCOPE: CPT | Performed by: FAMILY MEDICINE

## 2021-08-05 PROCEDURE — 3023F SPIROM DOC REV: CPT | Performed by: FAMILY MEDICINE

## 2021-08-05 PROCEDURE — 1036F TOBACCO NON-USER: CPT | Performed by: FAMILY MEDICINE

## 2021-08-05 PROCEDURE — 99284 EMERGENCY DEPT VISIT MOD MDM: CPT

## 2021-08-05 PROCEDURE — 4040F PNEUMOC VAC/ADMIN/RCVD: CPT | Performed by: FAMILY MEDICINE

## 2021-08-05 PROCEDURE — 4040F PNEUMOC VAC/ADMIN/RCVD: CPT | Performed by: NURSE PRACTITIONER

## 2021-08-05 PROCEDURE — 1036F TOBACCO NON-USER: CPT | Performed by: NURSE PRACTITIONER

## 2021-08-05 PROCEDURE — 99285 EMERGENCY DEPT VISIT HI MDM: CPT

## 2021-08-05 PROCEDURE — G8417 CALC BMI ABV UP PARAM F/U: HCPCS | Performed by: NURSE PRACTITIONER

## 2021-08-05 PROCEDURE — G8427 DOCREV CUR MEDS BY ELIG CLIN: HCPCS | Performed by: FAMILY MEDICINE

## 2021-08-05 PROCEDURE — 6360000002 HC RX W HCPCS: Performed by: PHYSICIAN ASSISTANT

## 2021-08-05 PROCEDURE — 1090F PRES/ABSN URINE INCON ASSESS: CPT | Performed by: NURSE PRACTITIONER

## 2021-08-05 PROCEDURE — G8417 CALC BMI ABV UP PARAM F/U: HCPCS | Performed by: FAMILY MEDICINE

## 2021-08-05 PROCEDURE — 99214 OFFICE O/P EST MOD 30 MIN: CPT | Performed by: FAMILY MEDICINE

## 2021-08-05 PROCEDURE — G8427 DOCREV CUR MEDS BY ELIG CLIN: HCPCS | Performed by: NURSE PRACTITIONER

## 2021-08-05 PROCEDURE — 1123F ACP DISCUSS/DSCN MKR DOCD: CPT | Performed by: FAMILY MEDICINE

## 2021-08-05 PROCEDURE — 1090F PRES/ABSN URINE INCON ASSESS: CPT | Performed by: FAMILY MEDICINE

## 2021-08-05 PROCEDURE — 99214 OFFICE O/P EST MOD 30 MIN: CPT | Performed by: NURSE PRACTITIONER

## 2021-08-05 PROCEDURE — 6370000000 HC RX 637 (ALT 250 FOR IP): Performed by: PHYSICIAN ASSISTANT

## 2021-08-05 PROCEDURE — 90471 IMMUNIZATION ADMIN: CPT | Performed by: PHYSICIAN ASSISTANT

## 2021-08-05 PROCEDURE — 90715 TDAP VACCINE 7 YRS/> IM: CPT | Performed by: PHYSICIAN ASSISTANT

## 2021-08-05 PROCEDURE — 3017F COLORECTAL CA SCREEN DOC REV: CPT | Performed by: NURSE PRACTITIONER

## 2021-08-05 PROCEDURE — G8399 PT W/DXA RESULTS DOCUMENT: HCPCS | Performed by: NURSE PRACTITIONER

## 2021-08-05 PROCEDURE — 3017F COLORECTAL CA SCREEN DOC REV: CPT | Performed by: FAMILY MEDICINE

## 2021-08-05 PROCEDURE — G8399 PT W/DXA RESULTS DOCUMENT: HCPCS | Performed by: FAMILY MEDICINE

## 2021-08-05 RX ORDER — TORSEMIDE 20 MG/1
TABLET ORAL
Qty: 210 TABLET | Refills: 2 | Status: SHIPPED | OUTPATIENT
Start: 2021-08-05 | End: 2021-12-27 | Stop reason: SDUPTHER

## 2021-08-05 RX ORDER — BACITRACIN ZINC AND POLYMYXIN B SULFATE 500; 1000 [USP'U]/G; [USP'U]/G
OINTMENT TOPICAL ONCE
Status: COMPLETED | OUTPATIENT
Start: 2021-08-05 | End: 2021-08-05

## 2021-08-05 RX ADMIN — BACITRACIN ZINC AND POLYMYXIN B SULFATE: 500; 10000 OINTMENT TOPICAL at 18:12

## 2021-08-05 RX ADMIN — TETANUS TOXOID, REDUCED DIPHTHERIA TOXOID AND ACELLULAR PERTUSSIS VACCINE, ADSORBED 0.5 ML: 5; 2.5; 8; 8; 2.5 SUSPENSION INTRAMUSCULAR at 18:12

## 2021-08-05 ASSESSMENT — PAIN DESCRIPTION - DESCRIPTORS: DESCRIPTORS: ACHING;BURNING;SORE

## 2021-08-05 ASSESSMENT — PAIN DESCRIPTION - LOCATION: LOCATION: ARM

## 2021-08-05 ASSESSMENT — PAIN DESCRIPTION - FREQUENCY: FREQUENCY: CONTINUOUS

## 2021-08-05 ASSESSMENT — PAIN DESCRIPTION - ORIENTATION: ORIENTATION: LEFT

## 2021-08-05 ASSESSMENT — PAIN SCALES - GENERAL: PAINLEVEL_OUTOF10: 6

## 2021-08-05 ASSESSMENT — PAIN DESCRIPTION - ONSET: ONSET: SUDDEN

## 2021-08-05 ASSESSMENT — PAIN DESCRIPTION - PAIN TYPE: TYPE: ACUTE PAIN

## 2021-08-05 ASSESSMENT — PAIN DESCRIPTION - PROGRESSION: CLINICAL_PROGRESSION: NOT CHANGED

## 2021-08-05 ASSESSMENT — PAIN - FUNCTIONAL ASSESSMENT: PAIN_FUNCTIONAL_ASSESSMENT: PREVENTS OR INTERFERES SOME ACTIVE ACTIVITIES AND ADLS
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
## 2021-08-05 NOTE — ED NOTES
Patient states she is due for her home blood pressure medication.      Radhames Rosas RN  08/05/21 0415

## 2021-08-05 NOTE — PROGRESS NOTES
8/5/2021    This is a 70 y.o. female   Chief Complaint   Patient presents with    Follow-up     f/u [facility]     HPI     Alisha notes foul-smelling urine over the past few days. Some baseline frequency, no significant dysuria. Has a strong history of UTIs in the past    HTN  - on amlodipine, hydralazine, clonidine, torsemide, carvedilol    COPD - on Singulair, Symbicort, Spiriva Respimat, with albuterol rescue inhaler. On 4L oxygen currently    HFpEF - follows with Cardiology. On Coreg, Hydralazine, ASA 81, Plavix, Recent increase of Lasix to 60mg daily for 3 days due to weight gain/fluid retention, supposed to have BMP recheck.      CKD - last Cr 1.1 (improved from prior)      Review of Systems     Current Outpatient Medications   Medication Sig Dispense Refill    amoxicillin-clavulanate (AUGMENTIN) 875-125 MG per tablet Take 1 tablet by mouth 2 times daily for 10 days 20 tablet 0    montelukast (SINGULAIR) 10 MG tablet TAKE ONE TABLET BY MOUTH DAILY 90 tablet 0    carvedilol (COREG) 12.5 MG tablet Take 12.5 mg by mouth 2 times daily (with meals)      hydrALAZINE (APRESOLINE) 25 MG tablet Take 1 tablet by mouth every 8 hours 90 tablet 3    potassium chloride (KLOR-CON M) 20 MEQ extended release tablet Take 1 tablet by mouth daily (with breakfast) 30 tablet 3    amLODIPine (NORVASC) 10 MG tablet Take 1 tablet by mouth daily 30 tablet 3    cloNIDine (CATAPRES) 0.2 MG tablet Take 1 tablet by mouth 3 times daily 270 tablet 1    melatonin 3 MG TABS tablet Take 1 tablet by mouth nightly as needed (sleep) 30 tablet 1    citalopram (CELEXA) 40 MG tablet TAKE ONE TABLET BY MOUTH DAILY 90 tablet 0    albuterol sulfate HFA (VENTOLIN HFA) 108 (90 Base) MCG/ACT inhaler Inhale 2 puffs into the lungs every 4 hours as needed for Wheezing or Shortness of Breath 1 Inhaler 5    budesonide-formoterol (SYMBICORT) 160-4.5 MCG/ACT AERO Inhale 2 puffs into the lungs 2 times daily 1 Inhaler 5    ipratropium-albuterol (DUONEB) 0.5-2.5 (3) MG/3ML SOLN nebulizer solution Inhale 3 mLs into the lungs every 4 hours as needed for Shortness of Breath 360 mL 5    tiotropium (SPIRIVA RESPIMAT) 2.5 MCG/ACT AERS inhaler Inhale 2 puffs into the lungs daily 1 Inhaler 5    clopidogrel (PLAVIX) 75 MG tablet Take 1 tablet by mouth daily 90 tablet 1    atorvastatin (LIPITOR) 40 MG tablet TAKE ONE TABLET BY MOUTH DAILY 90 tablet 1    lansoprazole (PREVACID) 15 MG delayed release capsule Take 1 capsule by mouth daily 90 capsule 0    levothyroxine (SYNTHROID) 50 MCG tablet TAKE ONE TABLET BY MOUTH DAILY 90 tablet 0    lidocaine (XYLOCAINE) 5 % ointment Apply topically as needed. 1 Tube 5    diclofenac sodium (VOLTAREN) 1 % GEL Apply 2 g topically 3 times daily as needed for Pain 100 g 5    lidocaine (XYLOCAINE) 2 % jelly Apply pea-sized amount topically to affected area every 8 hours when necessary pain 1 Tube 11    mineral oil-hydrophilic petrolatum (HYDROPHOR) ointment Apply topically as needed. 1 Tube 5    aspirin 81 MG tablet Take 1 tablet by mouth daily 30 tablet 3    torsemide (DEMADEX) 20 MG tablet Take 2 tablets daily (40 mg) and every Tuesday and Thursday, take additional 20 mg tablet for total of 60 mg. 210 tablet 2     No current facility-administered medications for this visit. BP (!) 120/54   Pulse 62   Ht 5' (1.524 m)   Wt 166 lb (75.3 kg)   LMP 05/01/2006 (Approximate)   SpO2 96% Comment: 4 L of 02  Breastfeeding No   BMI 32.42 kg/m²     Physical Exam  Skin:     Comments: Trace bilateral lower extremity edema         Wt Readings from Last 3 Encounters:   08/05/21 166 lb 12.8 oz (75.7 kg)   08/05/21 166 lb 12.8 oz (75.7 kg)   08/05/21 166 lb (75.3 kg)       BP Readings from Last 3 Encounters:   08/05/21 (!) 181/63   08/05/21 136/62   08/05/21 (!) 120/54         Assessment/Plan:  1. Foul smelling urine  Start Augmentin and follow urine culture  - POCT Urinalysis no Micro  - Culture, Urine    2.  COPD, severe ([address]  Her COPD is severe. She is optimized on her inhaler control. 3. Chronic heart failure with preserved ejection fraction [facility])  She has follow-up with cardiology today. She reports compliance with her medications including her diuretic    4. Essential hypertension  136/62 here in office today    We were unable to fully address her other chronic conditions that she has another appointment immediately after this.   We did address as noted above

## 2021-08-05 NOTE — ED NOTES
Patient ambulatory from ED. AVS provided and discussed with patient. All questions answered. Patient verbalizes understanding of discharge instructions. Respirations even and easy. No obvious distress at this time.        Cyrilla Boast, RN  08/05/21 0885

## 2021-08-05 NOTE — PATIENT INSTRUCTIONS
Continue Torsemide 20 mg tablets: take 2 tablets daily and on Tues and Thurs take 3 tablets    Continue other medications    Check labs: BMP, CBC and BNP    Support hose/socks

## 2021-08-05 NOTE — ED NOTES
Wound cleansed and dressed per PA specifications at this time. Patient verbalizes comfort of dressing placement. CMS verified post placement. Capillary Refill time is less than 2 seconds.      Sukh Torres RN  08/05/21 0955

## 2021-08-05 NOTE — Clinical Note
Please let Hubert Lehmanchwood know I am sending in an antibiotic based on her urine test she left with us yesterday.   Thank you
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
## 2021-08-06 LAB — URINE CULTURE, ROUTINE: NORMAL

## 2021-08-06 RX ORDER — AMOXICILLIN AND CLAVULANATE POTASSIUM 875; 125 MG/1; MG/1
1 TABLET, FILM COATED ORAL 2 TIMES DAILY
Qty: 20 TABLET | Refills: 0 | Status: SHIPPED | OUTPATIENT
Start: 2021-08-06 | End: 2021-08-16

## 2021-08-11 NOTE — PROGRESS NOTES
Pt has picked up abx but still has foul smelling urine  If no improvement by Friday she will let us know

## 2021-08-16 ENCOUNTER — PATIENT MESSAGE (OUTPATIENT)
Dept: FAMILY MEDICINE CLINIC | Age: 76
End: 2021-08-16

## 2021-08-16 DIAGNOSIS — M54.2 CHRONIC NECK PAIN: ICD-10-CM

## 2021-08-16 DIAGNOSIS — F41.9 ANXIETY: ICD-10-CM

## 2021-08-16 DIAGNOSIS — G89.29 CHRONIC NECK PAIN: ICD-10-CM

## 2021-08-17 NOTE — TELEPHONE ENCOUNTER
From: Panchito Argueta  To: Gali Cleaning MD  Sent: 8/16/2021 7:11 PM EDT  Subject: Non-Urgent Medical Question    Could you please call me in a refill of ativan and also lidacaine ? could you give me a higher dose as the last took the edge off but still had alot of pain. Please and thank you.  Sonia Santana

## 2021-08-18 ENCOUNTER — NURSE TRIAGE (OUTPATIENT)
Dept: OTHER | Facility: CLINIC | Age: 76
End: 2021-08-18

## 2021-08-18 RX ORDER — LIDOCAINE 50 MG/G
OINTMENT TOPICAL
Qty: 1 TUBE | Refills: 5 | Status: SHIPPED | OUTPATIENT
Start: 2021-08-18

## 2021-08-18 RX ORDER — LORAZEPAM 1 MG/1
TABLET ORAL
Qty: 30 TABLET | Refills: 0 | Status: SHIPPED | OUTPATIENT
Start: 2021-08-18 | End: 2021-10-04 | Stop reason: SDUPTHER

## 2021-08-18 NOTE — TELEPHONE ENCOUNTER
Pt had nose bleeds yesterday and today she had nose bleeds that lasted more than six hours. Yesterday pt had a nose bleed that lasted almost all day. That is when she gets lightheaded. Right now she is not lightheaded but the bleeding stopped. She tried laying with her head back and everything to get it to stop. When she gets the nose bleeds they start out of no where. If she blows her nose she only does it lightly.   Spoke to dr hernandez and advised pt to put afrin spray up her nose and hold pressure to bridge of her nose, try that a couple time and if the bleeding doesn't stop then pt should go to the hospital.

## 2021-08-18 NOTE — TELEPHONE ENCOUNTER
Reason for Disposition   Dizziness or lightheadedness    Answer Assessment - Initial Assessment Questions  1. AMOUNT OF BLEEDING: \"How bad is the bleeding? \" \"How much blood was lost?\" \"Has the bleeding stopped? \"    - MILD: needed a couple tissues    - MODERATE: needed many tissues    - SEVERE: large blood clots, soaked many tissues, lasted more than 30 minutes       Last from 9003-5840 today and passing large clots    2. ONSET: \"When did the nosebleed start? \"       Yesterday    3. FREQUENCY: \"How many nosebleeds have you had in the last 24 hours? \"       2, both lasted about 6 hours or more    4. RECURRENT SYMPTOMS: \"Have there been other recent nosebleeds? \" If so, ask: \"How long did it take you to stop the bleeding? \" \"What worked best?\"       Over last couple months has had several nosebleeds but were not as bad    5. CAUSE: \"What do you think caused this nosebleed? \"      Unsure- was recently switched from Eliquis to Plavix    6. LOCAL FACTORS: \"Do you have any cold symptoms? \", \"Have you been rubbing or picking at your nose? \"      Denies    7. SYSTEMIC FACTORS: \"Do you have high blood pressure or any bleeding problems? \"      History HTN    8. BLOOD THINNERS: \"Do you take any blood thinners? \" (e.g., coumadin, heparin, aspirin, Plavix)      Plavix, ASA- recently taken off Eliquis    9. OTHER SYMPTOMS: \"Do you have any other symptoms? \" (e.g., lightheadedness)      Was very light headed earlier but has resolved    10. PREGNANCY: \"Is there any chance you are pregnant? \" \"When was your last menstrual period? \"        n/a    Protocols used: NOSEBLEED-ADULT-AH    Received call from Kye Devries at [facility] with Red Flag Complaint. Brief description of triage: Patient states over last two days she has had two nose bleeds lasting >6 hours with severe bleeding and passing large blood clots. States she was very light headed earlier today.  Patient recently started on Plavix    Triage indicates for patient to Go to ED now    Care advice provided, patient verbalizes understanding; denies any other questions or concerns; instructed to call back for any new or worsening symptoms. Attention Provider: Thank you for allowing me to participate in the care of your patient. The patient was connected to triage in response to information provided to the [facility]. Please do not respond through this encounter as the response is not directed to a shared pool.

## 2021-08-20 ENCOUNTER — PATIENT MESSAGE (OUTPATIENT)
Dept: FAMILY MEDICINE CLINIC | Age: 76
End: 2021-08-20

## 2021-08-20 ENCOUNTER — TELEPHONE (OUTPATIENT)
Dept: PULMONOLOGY | Age: 76
End: 2021-08-20

## 2021-08-20 NOTE — TELEPHONE ENCOUNTER
From: Thalia Scanlon  To: Janice San MD  Sent: 8/20/2021 11:36 AM EDT  Subject: Non-Urgent Medical Question    [employer] called n cannot get a ride until monday. Are there any test that you want me to have because i can get a ride tomorrow. im sorry but with no vehicle its very hard for me to get around. Also i am out of ativan can you please call me a refill?  Thank you

## 2021-09-07 ENCOUNTER — TELEPHONE (OUTPATIENT)
Dept: FAMILY MEDICINE CLINIC | Age: 76
End: 2021-09-07

## 2021-09-07 NOTE — TELEPHONE ENCOUNTER
Pt called and spoke with call center. Noted that pt's left eye is bruised and blood. And has blood spots all over both of her arms. Noted that pt was advised that Dr. Stone Kelli is not in the office today. No available appts today with any provider today     Please Advise.     Pt can be reached at [phone]

## 2021-09-07 NOTE — TELEPHONE ENCOUNTER
----- Message from Cadence Phillips sent at 9/7/2021  9:33 AM EDT -----  Subject: Appointment Request    Reason for Call: Urgent Eye Problem    QUESTIONS  Type of Appointment? Established Patient  Reason for appointment request? No appointments available during search  Additional Information for Provider? Pt is calling about her left eye,   whole eye is bruised and blood. And has blood spots all over both of her   arms. (Coco from the office stated I could send the message over due to   PCP not being in office today and Pt only wanting to see PCP.)  ---------------------------------------------------------------------------  --------------  CALL BACK INFO  What is the best way for the office to contact you? OK to leave message on   voicemail  Preferred Call Back Phone Number? [phone]  ---------------------------------------------------------------------------  --------------  SCRIPT ANSWERS  Relationship to Patient? Self  Have you had an injury or trauma? No  Have you had sudden loss of vision? No  Are you having eye pain? Yes  Have you been diagnosed with, awaiting test results for, or told that you   are suspected of having COVID-19 (Coronavirus)? (If patient has tested   negative or was tested as a requirement for work, school, or travel and   not based on symptoms, answer no)? No  Do you currently have flu-like symptoms including fever or chills, cough,   shortness of breath, difficulty breathing, or new loss of taste or smell? No  Have you had close contact with someone with COVID-19 in the last 14 days? No  (Service Expert  click yes below to proceed with [employer] As Usual   Scheduling)?  Yes

## 2021-09-07 NOTE — TELEPHONE ENCOUNTER
Called pt and asked if she could go do labs that were ordered from cardio she said she could do that.  Advised pt if her pain gets too bad she should go to the er she said she would but right now she is fine and will see carson tomorrow

## 2021-09-07 NOTE — TELEPHONE ENCOUNTER
Patient has history of anemia. If she is not having pain in her eyes or bleeding from anywhere, I can see her in the office tomorrow. If she is having pain or bleeding, would need to go to ER for evaluation. She has orders to have her CBC checked-- would like for her to complete this today.

## 2021-09-07 NOTE — TELEPHONE ENCOUNTER
I added this pt to your schedule tomorrow at 9:40 I called she said she didn't fall this happened out of nowhere, she said it has happened before.   I am not sure if she is ok to wait or if she should go to the ED please advise

## 2021-09-08 ENCOUNTER — OFFICE VISIT (OUTPATIENT)
Dept: FAMILY MEDICINE CLINIC | Age: 76
End: 2021-09-08
Payer: MEDICARE

## 2021-09-08 VITALS
WEIGHT: 164 LBS | HEIGHT: 60 IN | DIASTOLIC BLOOD PRESSURE: 80 MMHG | OXYGEN SATURATION: 97 % | HEART RATE: 78 BPM | BODY MASS INDEX: 32.2 KG/M2 | RESPIRATION RATE: 16 BRPM | SYSTOLIC BLOOD PRESSURE: 140 MMHG

## 2021-09-08 DIAGNOSIS — H11.32 CONJUNCTIVAL HEMORRHAGE OF LEFT EYE: ICD-10-CM

## 2021-09-08 DIAGNOSIS — H54.62 DECREASED VISION OF LEFT EYE: ICD-10-CM

## 2021-09-08 DIAGNOSIS — H57.12 ACUTE LEFT EYE PAIN: Primary | ICD-10-CM

## 2021-09-08 PROCEDURE — 1123F ACP DISCUSS/DSCN MKR DOCD: CPT | Performed by: NURSE PRACTITIONER

## 2021-09-08 PROCEDURE — G8399 PT W/DXA RESULTS DOCUMENT: HCPCS | Performed by: NURSE PRACTITIONER

## 2021-09-08 PROCEDURE — 3017F COLORECTAL CA SCREEN DOC REV: CPT | Performed by: NURSE PRACTITIONER

## 2021-09-08 PROCEDURE — 1036F TOBACCO NON-USER: CPT | Performed by: NURSE PRACTITIONER

## 2021-09-08 PROCEDURE — 1090F PRES/ABSN URINE INCON ASSESS: CPT | Performed by: NURSE PRACTITIONER

## 2021-09-08 PROCEDURE — G8417 CALC BMI ABV UP PARAM F/U: HCPCS | Performed by: NURSE PRACTITIONER

## 2021-09-08 PROCEDURE — 99214 OFFICE O/P EST MOD 30 MIN: CPT | Performed by: NURSE PRACTITIONER

## 2021-09-08 PROCEDURE — 4040F PNEUMOC VAC/ADMIN/RCVD: CPT | Performed by: NURSE PRACTITIONER

## 2021-09-08 PROCEDURE — G8427 DOCREV CUR MEDS BY ELIG CLIN: HCPCS | Performed by: NURSE PRACTITIONER

## 2021-09-08 RX ORDER — CITALOPRAM 40 MG/1
TABLET ORAL
Qty: 90 TABLET | Refills: 0 | Status: SHIPPED | OUTPATIENT
Start: 2021-09-08 | End: 2021-12-27 | Stop reason: SDUPTHER

## 2021-09-08 NOTE — PATIENT INSTRUCTIONS
See Eye Dr tomorrow at 1:30 bring ID and insurance cards with you and wear a mask.      [facility] [address], [place], [address] [facility],[address]  [phone]

## 2021-09-08 NOTE — PROGRESS NOTES
9/8/2021    This is a 70 y.o. female   Chief Complaint   Patient presents with    Bleeding/Bruising     pt states she started bruising x2 weeks ago, her eyes turned red yesterday. last week she had nose bleeds that last 6 hours 2 times and she had one the other day. Michelle Ocampo HPI  Patient reports that 2 nights ago she woke up and noticed that she had bruising above her left eye and that her eye had a hemorrhage. She reports that she is having decreased vision and constant pain. Reports that she dark lines in her visual field at times. Denies fever. On ASA and plavix therapy. Reports that her stool was black 2 weeks ago. She reports that she just had one episode of this and that is now resolved. She has had normal BM since without it being black or red. She does have a colostomy and reports that her stoma has chronic bleeding. This is not worse than her baseline. She reports that she has had epigastric pain for the past 1.5 weeks. Reports that this pain will come and go. She is not able to say when she has the pain. Does not feel that it changes in relation to food intake. Has history of GERD- takes lansoprazole 15 mg daily. Denies heartburn. Denies nausea or vomiting. Has history of chronic anemia. Follows with [facility]. Has not seen recently. Not on oral iron. Has needed iron transfusion in the past.     Reports that she has gained 4 lbs in the past day. Reports that she was 160 lbs yesterday and today was 164 lbs. She is having increased edema in legs. Takes torsemide 40 mg daily and Tues and Thurs takes 60 mg daily. Denies cough. Reports that she has had increased shortness of breath. Has history of severe COPD and on 4 L O2.       Patient Active Problem List   Diagnosis    Fracture, metacarpal, neck    PAD (peripheral artery disease) (HCC)    CAD (coronary artery disease)    HTN (hypertension)    Diastolic dysfunction    RLS (restless legs syndrome)    History of tobacco use    Centrilobular emphysema (HCC)    Colovesical fistula    ANGIE treated with BiPAP    CKD (chronic kidney disease), stage III - sees Dr. Bar Aldana COPD, severe [facility])    Colonic obstruction ([facility]    Carotid artery stenosis without cerebral infarction, bilateral    [facility]    Hyperlipidemia    Paroxysmal atrial fibrillation (HCC)    Acquired hypothyroidism    Chronic anemia    Anxiety    History of GI bleed    Iron deficiency anemia due to chronic blood loss    Presence of Watchman left atrial appendage closure device    Chronic respiratory failure with hypoxia (HCC)    Severe muscle deconditioning    Essential hypertension    Chronic heart failure with preserved ejection fraction (HCC)          Current Outpatient Medications   Medication Sig Dispense Refill    melatonin 3 MG TABS tablet Take 1 tablet by mouth nightly as needed (sleep) 90 tablet 1    LORazepam (ATIVAN) 1 MG tablet TAKE ONE TABLET BY MOUTH DAILY AS NEEDED FOR ANXIETY 30 tablet 0    lidocaine (XYLOCAINE) 5 % ointment Apply topically as needed.  1 Tube 5    torsemide (DEMADEX) 20 MG tablet Take 2 tablets daily (40 mg) and every Tuesday and Thursday, take additional 20 mg tablet for total of 60 mg. 210 tablet 2    montelukast (SINGULAIR) 10 MG tablet TAKE ONE TABLET BY MOUTH DAILY 90 tablet 0    carvedilol (COREG) 12.5 MG tablet Take 12.5 mg by mouth 2 times daily (with meals)      hydrALAZINE (APRESOLINE) 25 MG tablet Take 1 tablet by mouth every 8 hours 90 tablet 3    potassium chloride (KLOR-CON M) 20 MEQ extended release tablet Take 1 tablet by mouth daily (with breakfast) 30 tablet 3    amLODIPine (NORVASC) 10 MG tablet Take 1 tablet by mouth daily 30 tablet 3    cloNIDine (CATAPRES) 0.2 MG tablet Take 1 tablet by mouth 3 times daily 270 tablet 1    albuterol sulfate HFA (VENTOLIN HFA) 108 (90 Base) MCG/ACT inhaler Inhale 2 puffs into the lungs every 4 hours as needed for Wheezing or Shortness of Breath 1 Inhaler 5    budesonide-formoterol (SYMBICORT) 160-4.5 MCG/ACT AERO Inhale 2 puffs into the lungs 2 times daily 1 Inhaler 5    ipratropium-albuterol (DUONEB) 0.5-2.5 (3) MG/3ML SOLN nebulizer solution Inhale 3 mLs into the lungs every 4 hours as needed for Shortness of Breath 360 mL 5    tiotropium (SPIRIVA RESPIMAT) 2.5 MCG/ACT AERS inhaler Inhale 2 puffs into the lungs daily 1 Inhaler 5    clopidogrel (PLAVIX) 75 MG tablet Take 1 tablet by mouth daily 90 tablet 1    atorvastatin (LIPITOR) 40 MG tablet TAKE ONE TABLET BY MOUTH DAILY 90 tablet 1    lansoprazole (PREVACID) 15 MG delayed release capsule Take 1 capsule by mouth daily 90 capsule 0    levothyroxine (SYNTHROID) 50 MCG tablet TAKE ONE TABLET BY MOUTH DAILY 90 tablet 0    diclofenac sodium (VOLTAREN) 1 % GEL Apply 2 g topically 3 times daily as needed for Pain 100 g 5    lidocaine (XYLOCAINE) 2 % jelly Apply pea-sized amount topically to affected area every 8 hours when necessary pain 1 Tube 11    mineral oil-hydrophilic petrolatum (HYDROPHOR) ointment Apply topically as needed. 1 Tube 5    aspirin 81 MG tablet Take 1 tablet by mouth daily 30 tablet 3    citalopram (CELEXA) 40 MG tablet TAKE ONE TABLET BY MOUTH DAILY 90 tablet 0     No current facility-administered medications for this visit. Allergies   Allergen Reactions    Iv Dye [Iodides]      Flushed, broke out and difficulty breathing       Review of Systems  See HPI    Vitals:    09/08/21 1052 09/08/21 1251   BP: (!) 146/74 (!) 140/80   Site: Right Upper Arm Right Upper Arm   Position: Sitting Sitting   Cuff Size: Medium Adult Medium Adult   Pulse: 78    Resp: 16    SpO2: 97%    Weight: 164 lb (74.4 kg)    Height: 5' (1.524 m)        Body mass index is 32.03 kg/m².      Wt Readings from Last 3 Encounters:   09/08/21 164 lb (74.4 kg)   08/05/21 166 lb 12.8 oz (75.7 kg)   08/05/21 166 lb 12.8 oz (75.7 kg)       BP Readings from Last 3 Encounters:   09/08/21 (!) 146/74   08/05/21 (!) 181/63   08/05/21 136/62       Physical Exam  Vitals and nursing note reviewed. Constitutional:       General: She is not in acute distress. Appearance: She is well-developed. HENT:      Head: Normocephalic and atraumatic. Eyes:      Conjunctiva/sclera:      Left eye: Hemorrhage present. Comments: Left conjunctival hemorrhage. Has pulling of blood on lateral eye. Bruising noted on upper eyelid that extends to to eyebrow. This is slightly tender to palpation. Cardiovascular:      Rate and Rhythm: Normal rate and regular rhythm. Heart sounds: Murmur heard. Systolic murmur is present with a grade of 2/6. No friction rub. No gallop. Pulmonary:      Effort: Pulmonary effort is normal. No respiratory distress. Breath sounds: Examination of the right-lower field reveals decreased breath sounds. Examination of the left-lower field reveals decreased breath sounds. Decreased breath sounds present. No wheezing or rhonchi. Abdominal:      General: Bowel sounds are normal.      Palpations: Abdomen is soft. Tenderness: There is generalized abdominal tenderness. Comments: LLQ with colostomy. Stoma pink. Colostomy bag intact- some blood noted in bag that she reports is normal due to bleeding from her stoma. Musculoskeletal:      Cervical back: Neck supple. Right lower leg: No edema. Left lower leg: No edema. Skin:     General: Skin is warm and dry. Neurological:      Mental Status: She is alert and oriented to person, place, and time. Psychiatric:         Behavior: Behavior normal.         Thought Content: Thought content normal.         Judgment: Judgment normal.         Assessmentand Plan  Alisha was seen today for bleeding/bruising.     Diagnoses and all orders for this visit:    Acute left eye pain  -     External Referral To Ophthalmology    Decreased vision of left eye  -     External Referral To Ophthalmology    Conjunctival hemorrhage of left eye  - External Referral To Ophthalmology    Had CBC drawn earlier today that is pending. She was not able to make appt with ophthalmology today due to transportation. Schedule with [facility] tomorrow at 130 PM for evaluation. She reports that she will get her sister to drive her to her appt. CHF/HTN- she does not have any edema on exam. She is reporting a 4 lbs weight gain. Lungs were diminished, but no rhonchi or crackles noted. She will continue her current medications per cardiology. Tomorrow is her regular day for increased dose of torsemide. Will need to continue to monitor stoma bleeding. She will call with any increase or if she has black stools again. F/u depends on ophthalmology evaluation and also clinical progression.

## 2021-09-09 ENCOUNTER — TELEPHONE (OUTPATIENT)
Dept: FAMILY MEDICINE CLINIC | Age: 76
End: 2021-09-09

## 2021-09-09 NOTE — TELEPHONE ENCOUNTER
I called patient and she had to reschedue her appointment until next Tuesday due to her ride did not show up. She states she feels about the same.  Eye no worse/no better

## 2021-09-10 NOTE — TELEPHONE ENCOUNTER
Concern for retinal detachment would be if Tata Jaquez sees flashes or floaters in the eye or significant visual changes. If these occur it would be necessary for an ER visit to get in with en eye doc.  If not occurring and symptoms are not worsening, ok to wait until Tuesday appt

## 2021-09-10 NOTE — TELEPHONE ENCOUNTER
Called pt and relayed message to her. Pt agreed that if symptoms got worse she would go to [facility] ED. Pt also asked if there is no more blood does that mean her retinal  is not detached anymore?   Pt also stated she has an appointment with the eye doctor on Tuesday 09/14/2021 at 1:00

## 2021-09-14 DIAGNOSIS — H91.90 HEARING LOSS, UNSPECIFIED HEARING LOSS TYPE, UNSPECIFIED LATERALITY: Primary | ICD-10-CM

## 2021-10-06 DIAGNOSIS — F41.9 ANXIETY: ICD-10-CM

## 2021-10-06 RX ORDER — LORAZEPAM 1 MG/1
TABLET ORAL
Qty: 30 TABLET | Refills: 0 | Status: SHIPPED | OUTPATIENT
Start: 2021-10-06 | End: 2021-11-05

## 2021-10-08 DIAGNOSIS — J43.2 CENTRILOBULAR EMPHYSEMA (HCC): ICD-10-CM

## 2021-10-08 DIAGNOSIS — J81.0 ACUTE PULMONARY EDEMA (HCC): ICD-10-CM

## 2021-10-08 RX ORDER — MONTELUKAST SODIUM 10 MG/1
TABLET ORAL
Qty: 90 TABLET | Refills: 0 | Status: SHIPPED | OUTPATIENT
Start: 2021-10-08 | End: 2021-12-27 | Stop reason: SDUPTHER

## 2021-10-08 RX ORDER — FUROSEMIDE 40 MG/1
TABLET ORAL
Qty: 180 TABLET | Refills: 0 | OUTPATIENT
Start: 2021-10-08

## 2021-10-08 RX ORDER — ATORVASTATIN CALCIUM 40 MG/1
TABLET, FILM COATED ORAL
Qty: 90 TABLET | Refills: 0 | Status: SHIPPED | OUTPATIENT
Start: 2021-10-08 | End: 2021-12-27 | Stop reason: SDUPTHER

## 2021-11-09 ENCOUNTER — PATIENT MESSAGE (OUTPATIENT)
Dept: FAMILY MEDICINE CLINIC | Age: 76
End: 2021-11-09

## 2021-11-09 DIAGNOSIS — F41.9 ANXIETY: Primary | ICD-10-CM

## 2021-11-10 NOTE — TELEPHONE ENCOUNTER
From: Alverto Villafuerte  To: Dr. Cheryl Baez Day  Sent: 11/9/2021 2:26 PM EST  Subject: Non-Urgent Medical Question    Doc i have went to [facility] for three months. I wanted to ask you if you could please call me in refills of my medicine maybe for ninety days. Also my ativan refill. The pharmacy is [facility] [place] [facility]. Phone number is [phone]. Thank you so much.

## 2021-11-22 ENCOUNTER — PATIENT MESSAGE (OUTPATIENT)
Dept: FAMILY MEDICINE CLINIC | Age: 76
End: 2021-11-22

## 2021-11-23 RX ORDER — LORAZEPAM 1 MG/1
1 TABLET ORAL DAILY PRN
Qty: 30 TABLET | Refills: 0 | Status: SHIPPED | OUTPATIENT
Start: 2021-11-23 | End: 2021-12-29 | Stop reason: SDUPTHER

## 2021-11-23 NOTE — TELEPHONE ENCOUNTER
Ativan sent  Does she have her other meds? If she's been hospitalized in [place], it is quite possible they've changed some of her meds.  It would be ideal to have some sort of eval to go over this if possible to update her med list

## 2021-11-23 NOTE — TELEPHONE ENCOUNTER
From: Nestor Nunez  To: Dr. Vijay Gaitan: 11/22/2021 10:56 PM EST  Subject: Perscription    I sent a message saying [place] for a couple of months and also sent name of [facility]. In [place]. Niall Connell

## 2021-12-27 ENCOUNTER — PATIENT MESSAGE (OUTPATIENT)
Dept: FAMILY MEDICINE CLINIC | Age: 76
End: 2021-12-27

## 2021-12-27 DIAGNOSIS — J43.2 CENTRILOBULAR EMPHYSEMA (HCC): ICD-10-CM

## 2021-12-27 DIAGNOSIS — K21.9 GASTROESOPHAGEAL REFLUX DISEASE, UNSPECIFIED WHETHER ESOPHAGITIS PRESENT: ICD-10-CM

## 2021-12-27 DIAGNOSIS — E03.9 HYPOTHYROIDISM, UNSPECIFIED TYPE: ICD-10-CM

## 2021-12-27 DIAGNOSIS — I50.32 CHRONIC DIASTOLIC HEART FAILURE (HCC): ICD-10-CM

## 2021-12-27 DIAGNOSIS — F41.9 ANXIETY: ICD-10-CM

## 2021-12-27 DIAGNOSIS — R60.0 BILATERAL LEG EDEMA: ICD-10-CM

## 2021-12-28 RX ORDER — LEVOTHYROXINE SODIUM 0.05 MG/1
TABLET ORAL
Qty: 90 TABLET | Refills: 0 | Status: SHIPPED | OUTPATIENT
Start: 2021-12-28

## 2021-12-28 RX ORDER — ATORVASTATIN CALCIUM 40 MG/1
TABLET, FILM COATED ORAL
Qty: 90 TABLET | Refills: 0 | Status: SHIPPED | OUTPATIENT
Start: 2021-12-28

## 2021-12-28 RX ORDER — LANSOPRAZOLE 15 MG/1
15 CAPSULE, DELAYED RELEASE ORAL DAILY
Qty: 90 CAPSULE | Refills: 0 | Status: SHIPPED | OUTPATIENT
Start: 2021-12-28

## 2021-12-28 RX ORDER — LANOLIN ALCOHOL/MO/W.PET/CERES
3 CREAM (GRAM) TOPICAL NIGHTLY PRN
Qty: 90 TABLET | Refills: 1 | Status: SHIPPED | OUTPATIENT
Start: 2021-12-28

## 2021-12-28 RX ORDER — CLOPIDOGREL BISULFATE 75 MG/1
75 TABLET ORAL DAILY
Qty: 90 TABLET | Refills: 1 | Status: SHIPPED | OUTPATIENT
Start: 2021-12-28

## 2021-12-28 RX ORDER — MONTELUKAST SODIUM 10 MG/1
10 TABLET ORAL DAILY
Qty: 90 TABLET | Refills: 0 | Status: SHIPPED | OUTPATIENT
Start: 2021-12-28

## 2021-12-28 RX ORDER — CITALOPRAM 40 MG/1
TABLET ORAL
Qty: 90 TABLET | Refills: 0 | Status: SHIPPED | OUTPATIENT
Start: 2021-12-28

## 2021-12-28 RX ORDER — TORSEMIDE 20 MG/1
TABLET ORAL
Qty: 210 TABLET | Refills: 0 | Status: SHIPPED | OUTPATIENT
Start: 2021-12-28

## 2021-12-28 NOTE — TELEPHONE ENCOUNTER
Last ov 8/5/21  Pending appt overdue for appt  Last refill 8/5/21 #210x2  Last labs 11/2/21    [phone] (home)   Left message on pts machine to call back  Pt needs an appt when she calls back

## 2021-12-29 DIAGNOSIS — F41.9 ANXIETY: ICD-10-CM

## 2021-12-29 RX ORDER — LORAZEPAM 1 MG/1
1 TABLET ORAL DAILY PRN
Qty: 30 TABLET | Refills: 0 | Status: CANCELLED | OUTPATIENT
Start: 2021-12-29 | End: 2022-01-28

## 2021-12-29 RX ORDER — LORAZEPAM 1 MG/1
1 TABLET ORAL DAILY PRN
Qty: 30 TABLET | Refills: 0 | Status: SHIPPED | OUTPATIENT
Start: 2021-12-29 | End: 2022-01-20 | Stop reason: SDUPTHER

## 2021-12-29 NOTE — TELEPHONE ENCOUNTER
Called and spoke to pt   All of pts meds got refilled   Pt just needs ativan filled   Medication pending   Please advise   Thank you

## 2021-12-30 ENCOUNTER — TELEPHONE (OUTPATIENT)
Dept: FAMILY MEDICINE CLINIC | Age: 76
End: 2021-12-30

## 2021-12-30 NOTE — TELEPHONE ENCOUNTER
----- Message from Chioma Miguel sent at 12/30/2021 12:19 PM EST -----  Subject: Refill Request    QUESTIONS  Name of Medication? LORazepam (ATIVAN) 1 MG tablet  Patient-reported dosage and instructions? 1 MG once a day   How many days do you have left? 0  [id number]  Pharmacy phone number (if available)? [phone]  Additional Information for Provider? Pt is completely out of meds. Visiting in [place] [facility] so please send to that location.  ---------------------------------------------------------------------------  --------------  [address]  What is the best way for the office to contact you? OK to leave message on   voicemail  Preferred Call Back Phone Number?  [phone]

## 2022-01-27 ENCOUNTER — TELEPHONE (OUTPATIENT)
Dept: FAMILY MEDICINE CLINIC | Age: 77
End: 2022-01-27

## 2022-01-27 NOTE — TELEPHONE ENCOUNTER
Pt called in wants to speak to a ma   [place] of [place]  a form will be sent to us that Dr Webber will fill out it has something to do with the pt being on oxygen she is hoping it gets faxed today   Pt wants a follow up call       Please call Dianne Ward

## 2022-02-02 NOTE — TELEPHONE ENCOUNTER
Let pt know we got this today it is on dr days desk to fill out.   We will fax it when it is finished

## 2022-02-02 NOTE — TELEPHONE ENCOUNTER
Patient called to check the status of the form she needs Dr. Kymberly Joaquin to sign. She needs this signed by Friday, Feb 4. Have we received anything yet?     See message below    Please Advise

## 2022-09-08 ENCOUNTER — TELEPHONE (OUTPATIENT)
Dept: FAMILY MEDICINE | Facility: CLINIC | Age: 77
End: 2022-09-08

## 2022-09-08 NOTE — TELEPHONE ENCOUNTER
----- Message from Estrella Ralph sent at 9/8/2022 10:31 AM CDT -----  Contact: pt at [phone]  Type:  Sooner Appointment Request    Caller is requesting a sooner appointment.  Caller declined first available appointment listed below.  Caller will not accept being placed on the waitlist and is requesting a message be sent to doctor.    Name of Caller:  pt  When is the first available appointment?  N/A  Symptoms:   est care, heart problems  Best Call Back Number:  [phone]    Additional Information:  pt will be a new pt that need to est care she just moved from out of town and have heart problems. She states she need to be seen soon. Please call back and advise.

## 2023-03-01 ENCOUNTER — OUTSIDE PLACE OF SERVICE (OUTPATIENT)
Dept: NEPHROLOGY | Facility: CLINIC | Age: 78
End: 2023-03-01
Payer: MEDICARE

## 2023-03-19 PROCEDURE — 99233 PR SUBSEQUENT HOSPITAL CARE,LEVL III: ICD-10-PCS | Mod: ,,, | Performed by: INTERNAL MEDICINE

## 2023-03-19 PROCEDURE — 99233 SBSQ HOSP IP/OBS HIGH 50: CPT | Mod: ,,, | Performed by: INTERNAL MEDICINE

## 2023-03-20 PROCEDURE — 99233 PR SUBSEQUENT HOSPITAL CARE,LEVL III: ICD-10-PCS | Mod: ,,, | Performed by: INTERNAL MEDICINE

## 2023-03-20 PROCEDURE — 99233 SBSQ HOSP IP/OBS HIGH 50: CPT | Mod: ,,, | Performed by: INTERNAL MEDICINE

## 2023-03-21 PROCEDURE — 99233 PR SUBSEQUENT HOSPITAL CARE,LEVL III: ICD-10-PCS | Mod: ,,, | Performed by: INTERNAL MEDICINE

## 2023-03-21 PROCEDURE — 99233 SBSQ HOSP IP/OBS HIGH 50: CPT | Mod: ,,, | Performed by: INTERNAL MEDICINE

## 2023-03-22 PROCEDURE — 99233 PR SUBSEQUENT HOSPITAL CARE,LEVL III: ICD-10-PCS | Mod: ,,, | Performed by: INTERNAL MEDICINE

## 2023-03-22 PROCEDURE — 99233 SBSQ HOSP IP/OBS HIGH 50: CPT | Mod: ,,, | Performed by: INTERNAL MEDICINE

## 2023-03-23 PROCEDURE — 99233 PR SUBSEQUENT HOSPITAL CARE,LEVL III: ICD-10-PCS | Mod: ,,, | Performed by: INTERNAL MEDICINE

## 2023-03-23 PROCEDURE — 99233 SBSQ HOSP IP/OBS HIGH 50: CPT | Mod: ,,, | Performed by: INTERNAL MEDICINE

## 2023-03-24 PROCEDURE — 99233 SBSQ HOSP IP/OBS HIGH 50: CPT | Mod: ,,, | Performed by: INTERNAL MEDICINE

## 2023-03-24 PROCEDURE — 99233 PR SUBSEQUENT HOSPITAL CARE,LEVL III: ICD-10-PCS | Mod: ,,, | Performed by: INTERNAL MEDICINE

## 2023-03-25 PROCEDURE — 99232 SBSQ HOSP IP/OBS MODERATE 35: CPT | Mod: ,,, | Performed by: INTERNAL MEDICINE

## 2023-03-25 PROCEDURE — 99232 PR SUBSEQUENT HOSPITAL CARE,LEVL II: ICD-10-PCS | Mod: ,,, | Performed by: INTERNAL MEDICINE

## 2023-03-26 PROCEDURE — 99232 PR SUBSEQUENT HOSPITAL CARE,LEVL II: ICD-10-PCS | Mod: ,,, | Performed by: INTERNAL MEDICINE

## 2023-03-26 PROCEDURE — 99232 SBSQ HOSP IP/OBS MODERATE 35: CPT | Mod: ,,, | Performed by: INTERNAL MEDICINE

## 2025-04-25 NOTE — TELEPHONE ENCOUNTER
Patient called pre-service center [facility] [place] with red flag complaint. Brief description of triage: Caller stated that she is not passing enough urine and feels like she has fluid building up in her lungs. Wanting to know if she should take an extra water pill. She has already left a message for her provider and was informed of his response in separate encounter. Please see triage below for more detailed information. Triage indicates for patient to follow up in PCP or Cardiology office tomorrow    Care advice provided, patient verbalizes understanding; denies any other questions or concerns; instructed to call back for any new or worsening symptoms. Writer provided warm transfer to [address] [place] [address] at [facility] for appointment scheduling. Attention Provider: Thank you for allowing me to participate in the care of your patient. The patient was connected to triage in response to information provided to the [facility]. Please do not respond through this encounter as the response is not directed to a shared pool. Reason for Disposition   Unable to urinate (or only a few drops) > 4 hours and bladder feels very full (e.g., palpable bladder or strong urge to urinate)     Also has SOB and chest tightness, has already spoken with office and PCP has given recommendation, see details in Epic encounter. Answer Assessment - Initial Assessment Questions  1. SYMPTOM: \"What's the main symptom you're concerned about? \" (e.g., frequency, incontinence)      Going a small amount    2. ONSET: \"When did the  retension  start? \"      This morning    3. PAIN: \"Is there any pain? \" If so, ask: \"How bad is it? \" (Scale: 1-10; mild, moderate, severe)      Denies    4. CAUSE: \"What do you think is causing the symptoms? \"      Retaining water within the lungs    5. OTHER SYMPTOMS: \"Do you have any other symptoms? \" (e.g., fever, flank pain, blood in urine, pain with urination)      SOB, chest tightness that comes and goes    6.  PREGNANCY: \"Is there any chance you are pregnant? \" \"When was your last menstrual period? \"      NA    Protocols used: URINARY SYMPTOMS-ADULT-OH No

## (undated) DEVICE — BW-412T DISP COMBO CLEANING BRUSH: Brand: SINGLE USE COMBINATION CLEANING BRUSH

## (undated) DEVICE — PROCEDURE KIT ENDOSCP CUST

## (undated) DEVICE — SOLUTION IV IRRIG WATER 500ML POUR BRL ST 2F7113

## (undated) DEVICE — SET VLV 3 PC AWS DISPOSABLE GRDIAN SCOPEVALET

## (undated) DEVICE — MOUTHPIECE ENDOSCP L CTRL OPN AND SIDE PORTS DISP